# Patient Record
Sex: FEMALE | Race: WHITE | NOT HISPANIC OR LATINO | Employment: FULL TIME | URBAN - METROPOLITAN AREA
[De-identification: names, ages, dates, MRNs, and addresses within clinical notes are randomized per-mention and may not be internally consistent; named-entity substitution may affect disease eponyms.]

---

## 2017-01-09 ENCOUNTER — LAB VISIT (OUTPATIENT)
Dept: LAB | Facility: HOSPITAL | Age: 62
End: 2017-01-09
Attending: FAMILY MEDICINE
Payer: COMMERCIAL

## 2017-01-09 ENCOUNTER — OFFICE VISIT (OUTPATIENT)
Dept: FAMILY MEDICINE | Facility: CLINIC | Age: 62
End: 2017-01-09
Payer: COMMERCIAL

## 2017-01-09 VITALS
BODY MASS INDEX: 29.8 KG/M2 | OXYGEN SATURATION: 95 % | RESPIRATION RATE: 14 BRPM | HEIGHT: 65 IN | SYSTOLIC BLOOD PRESSURE: 126 MMHG | WEIGHT: 178.88 LBS | TEMPERATURE: 98 F | HEART RATE: 87 BPM | DIASTOLIC BLOOD PRESSURE: 80 MMHG

## 2017-01-09 DIAGNOSIS — F43.23 ACUTE ADJUSTMENT DISORDER WITH MIXED ANXIETY AND DEPRESSED MOOD: ICD-10-CM

## 2017-01-09 DIAGNOSIS — I10 ESSENTIAL HYPERTENSION: Primary | ICD-10-CM

## 2017-01-09 DIAGNOSIS — E78.5 HYPERLIPIDEMIA, UNSPECIFIED HYPERLIPIDEMIA TYPE: ICD-10-CM

## 2017-01-09 DIAGNOSIS — I20.1 PRINZMETAL'S ANGINA: ICD-10-CM

## 2017-01-09 DIAGNOSIS — G47.33 OSA ON CPAP: ICD-10-CM

## 2017-01-09 DIAGNOSIS — I10 ESSENTIAL HYPERTENSION: ICD-10-CM

## 2017-01-09 LAB
ALBUMIN SERPL BCP-MCNC: 3.7 G/DL
ALP SERPL-CCNC: 65 U/L
ALT SERPL W/O P-5'-P-CCNC: 15 U/L
ANION GAP SERPL CALC-SCNC: 8 MMOL/L
AST SERPL-CCNC: 20 U/L
BILIRUB SERPL-MCNC: 0.4 MG/DL
BUN SERPL-MCNC: 15 MG/DL
CALCIUM SERPL-MCNC: 9.5 MG/DL
CHLORIDE SERPL-SCNC: 105 MMOL/L
CO2 SERPL-SCNC: 28 MMOL/L
CREAT SERPL-MCNC: 0.8 MG/DL
EST. GFR  (AFRICAN AMERICAN): >60 ML/MIN/1.73 M^2
EST. GFR  (NON AFRICAN AMERICAN): >60 ML/MIN/1.73 M^2
GLUCOSE SERPL-MCNC: 88 MG/DL
POTASSIUM SERPL-SCNC: 4.3 MMOL/L
PROT SERPL-MCNC: 7 G/DL
SODIUM SERPL-SCNC: 141 MMOL/L

## 2017-01-09 PROCEDURE — 80053 COMPREHEN METABOLIC PANEL: CPT

## 2017-01-09 PROCEDURE — 90471 IMMUNIZATION ADMIN: CPT | Mod: S$GLB,,, | Performed by: FAMILY MEDICINE

## 2017-01-09 PROCEDURE — 36415 COLL VENOUS BLD VENIPUNCTURE: CPT | Mod: PO

## 2017-01-09 PROCEDURE — 3074F SYST BP LT 130 MM HG: CPT | Mod: S$GLB,,, | Performed by: FAMILY MEDICINE

## 2017-01-09 PROCEDURE — 99214 OFFICE O/P EST MOD 30 MIN: CPT | Mod: 25,S$GLB,, | Performed by: FAMILY MEDICINE

## 2017-01-09 PROCEDURE — 1159F MED LIST DOCD IN RCRD: CPT | Mod: S$GLB,,, | Performed by: FAMILY MEDICINE

## 2017-01-09 PROCEDURE — 3079F DIAST BP 80-89 MM HG: CPT | Mod: S$GLB,,, | Performed by: FAMILY MEDICINE

## 2017-01-09 PROCEDURE — 90688 IIV4 VACCINE SPLT 0.5 ML IM: CPT | Mod: S$GLB,,, | Performed by: FAMILY MEDICINE

## 2017-01-09 PROCEDURE — 99999 PR PBB SHADOW E&M-EST. PATIENT-LVL III: CPT | Mod: PBBFAC,,, | Performed by: FAMILY MEDICINE

## 2017-01-09 RX ORDER — VALSARTAN 80 MG/1
80 TABLET ORAL DAILY
Qty: 90 TABLET | Refills: 0 | Status: SHIPPED | OUTPATIENT
Start: 2017-01-09 | End: 2017-03-01 | Stop reason: SDUPTHER

## 2017-01-09 RX ORDER — CITALOPRAM 20 MG/1
20 TABLET, FILM COATED ORAL DAILY
Qty: 30 TABLET | Refills: 1 | Status: SHIPPED | OUTPATIENT
Start: 2017-01-09 | End: 2017-03-01

## 2017-01-09 RX ORDER — HYDROCHLOROTHIAZIDE 12.5 MG/1
12.5 TABLET ORAL DAILY
Qty: 90 TABLET | Refills: 1 | Status: SHIPPED | OUTPATIENT
Start: 2017-01-09 | End: 2018-06-01 | Stop reason: SDUPTHER

## 2017-01-09 RX ORDER — EZETIMIBE 10 MG/1
10 TABLET ORAL DAILY
Qty: 90 TABLET | Refills: 1 | Status: SHIPPED | OUTPATIENT
Start: 2017-01-09 | End: 2017-06-26 | Stop reason: SDUPTHER
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
## 2017-01-09 NOTE — MR AVS SNAPSHOT
[facility]  [address]  [facility] [address]  Phone: [phone]  Fax: [phone]                  Ro Hernandez   2017 10:20 AM   Office Visit    Description:  Female : 1955   Provider:  Marcela Chopra MD   Department:  [facility]           Reason for Visit     Chronic Care           Diagnoses this Visit        Comments    Essential hypertension    -  Primary     Prinzmetal's angina         Hyperlipidemia, unspecified hyperlipidemia type         Acute adjustment disorder with mixed anxiety and depressed mood         HERB on CPAP                To Do List           Future Appointments        Provider Department Dept Phone    2017 12:10 PM LABORATORY, [facility]     2017 1:00 PM [facility] XR2 [facility] [phone]    2017 1:30 PM PULMONARY LAB, [facility] Pulmonary Function [facility] [phone]    2017 2:20 PM Elizabeth Lejeune, NP [facility] - Pulmonary Services [phone]      Goals (5 Years of Data)     None       These Medications        Disp Refills Start End    hydrochlorothiazide (HYDRODIURIL) 12.5 MG Tab 90 tablet 1 2017     Take 1 tablet (12.5 mg total) by mouth once daily. - Oral    Pharmacy: [facility] - [address] Ph #: [phone]       valsartan (DIOVAN) 80 MG tablet 90 tablet 0 2017    Take 1 tablet (80 mg total) by mouth once daily. - Oral    Pharmacy: [facility] - [address] Ph #: [phone]       citalopram (CELEXA) 20 MG tablet 30 tablet 1 2017    Take 1 tablet (20 mg total) by mouth once daily. - Oral    Ph[facility]: [facility] [address] [place], [place] -  [facility] Ph #: [phone]       ezetimibe (ZETIA) 10 mg tablet 90 tablet 1 2017    Take 1 tablet (10 mg total) by mouth once daily. - Oral     Pharmacy: [facility] [address] [place] [address] #: [phone]         [facility] On Call     [facility] On Call Nurse Care Line - 24/7 Assistance  Registered nurses in the [facility] provide clinical advisement, health education, appointment booking, and other advisory services.  Call for this free service at [phone].             Medications           Message regarding Medications     Verify the changes and/or additions to your medication regime listed below are the same as discussed with your clinician today.  If any of these changes or additions are incorrect, please notify your healthcare provider.        START taking these NEW medications        Refills    valsartan (DIOVAN) 80 MG tablet 0    Sig: Take 1 tablet (80 mg total) by mouth once daily.    Class: Normal    Route: Oral    citalopram (CELEXA) 20 MG tablet 1    Sig: Take 1 tablet (20 mg total) by mouth once daily.    Class: Normal    Route: Oral    ezetimibe (ZETIA) 10 mg tablet 1    Sig: Take 1 tablet (10 mg total) by mouth once daily.    Class: Normal    Route: Oral      CHANGE how you are taking these medications     Start Taking Instead of    hydrochlorothiazide (HYDRODIURIL) 12.5 MG Tab hydrochlorothiazide (HYDRODIURIL) 12.5 MG Tab    Dosage:  Take 1 tablet (12.5 mg total) by mouth once daily. Dosage:  Take 12.5 mg by mouth once daily.    Reason for Change:  Reorder       STOP taking these medications     EDARBI 80 mg Tab take 1 tablet by mouth once daily           Verify that the below list of medications is an accurate representation of the medications you are currently taking.  If none reported, the list may be blank. If incorrect, please contact your healthcare provider. Carry this list with you in case of emergency.           Current Medications     CALCIUM CARBONATE/VITAMIN D3 (CALCIUM WITH VITAMIN D ORAL) Take 1 tablet by mouth Daily.    diphenhydrAMINE (BENADRYL) 25 mg capsule Take 1 capsule  "by mouth At bedtime.    estrogen, conjugated,-medroxyprogesterone (PREMPRO) 0.3-1.5 mg per tablet Take 1 tablet by mouth Daily.    hydrochlorothiazide (HYDRODIURIL) 12.5 MG Tab Take 1 tablet (12.5 mg total) by mouth once daily.    hydrocodone-acetaminophen 10-325mg (NORCO)  mg Tab Take 1 tablet by mouth every 12 (twelve) hours.    isosorbide mononitrate (IMDUR) 30 MG 24 hr tablet Take 30 mg by mouth once daily.     LIDODERM 5 %(700 mg/patch) apply 1/2 PATCH DAILY AS DIRECTED FOR 2 WEEKS    MULTIVITAMIN (MULTIPLE VITAMIN ORAL) Take 1 tablet by mouth Daily.    naproxen (NAPROSYN) 500 MG tablet Take 1 tablet (500 mg total) by mouth 2 (two) times daily with meals.    nitroGLYCERIN (NITROSTAT) 0.4 MG SL tablet Place under the tongue Use as needed.    omeprazole (PRILOSEC) 20 MG capsule Take 20 mg by mouth Daily.    verapamil (CALAN-SR) 240 MG CR tablet Take 1 tablet (240 mg total) by mouth once daily.    citalopram (CELEXA) 20 MG tablet Take 1 tablet (20 mg total) by mouth once daily.    ezetimibe (ZETIA) 10 mg tablet Take 1 tablet (10 mg total) by mouth once daily.    valsartan (DIOVAN) 80 MG tablet Take 1 tablet (80 mg total) by mouth once daily.           Clinical Reference Information           Vital Signs - Last Recorded  Most recent update: 1/9/2017 10:43 AM by Mirella Ballesteros MA    BP Pulse Temp Resp Ht Wt    126/80 87 97.7 °F (36.5 °C) (Temporal) 14 5' 5" (1.651 m) 81.1 kg (178 lb 14.5 oz)    SpO2 BMI             95% 29.77 kg/m2         Blood Pressure          Most Recent Value    BP  126/80      Allergies as of 1/9/2017     Ace Inhibitors    Lisinopril    Pravastatin    Rosuvastatin    Simvastatin      Immunizations Administered on Date of Encounter - 1/9/2017     None      Orders Placed During Today's Visit     Future Labs/Procedures Expected by Expires    Comprehensive metabolic panel  1/9/2017 3/10/2018      "

## 2017-01-25 ENCOUNTER — TELEPHONE (OUTPATIENT)
Dept: SMOKING CESSATION | Facility: CLINIC | Age: 62
End: 2017-01-25

## 2017-01-26 ENCOUNTER — TELEPHONE (OUTPATIENT)
Dept: SMOKING CESSATION | Facility: CLINIC | Age: 62
End: 2017-01-26

## 2017-01-30 ENCOUNTER — TELEPHONE (OUTPATIENT)
Dept: SMOKING CESSATION | Facility: CLINIC | Age: 62
End: 2017-01-30

## 2017-03-01 ENCOUNTER — OFFICE VISIT (OUTPATIENT)
Dept: FAMILY MEDICINE | Facility: CLINIC | Age: 62
End: 2017-03-01
Payer: COMMERCIAL

## 2017-03-01 VITALS
TEMPERATURE: 98 F | DIASTOLIC BLOOD PRESSURE: 70 MMHG | BODY MASS INDEX: 31.24 KG/M2 | RESPIRATION RATE: 14 BRPM | HEIGHT: 65 IN | SYSTOLIC BLOOD PRESSURE: 134 MMHG | HEART RATE: 75 BPM | OXYGEN SATURATION: 93 % | WEIGHT: 187.5 LBS

## 2017-03-01 DIAGNOSIS — M19.90 GENERALIZED ARTHRITIS: ICD-10-CM

## 2017-03-01 DIAGNOSIS — I10 ESSENTIAL HYPERTENSION: Primary | ICD-10-CM

## 2017-03-01 DIAGNOSIS — R06.89 YAWNING: ICD-10-CM

## 2017-03-01 DIAGNOSIS — R60.0 EDEMA EXTREMITIES: ICD-10-CM

## 2017-03-01 DIAGNOSIS — E66.9 OBESITY (BMI 30.0-34.9): ICD-10-CM

## 2017-03-01 DIAGNOSIS — F43.23 ACUTE ADJUSTMENT DISORDER WITH MIXED ANXIETY AND DEPRESSED MOOD: ICD-10-CM

## 2017-03-01 DIAGNOSIS — J44.1 COPD WITH ACUTE EXACERBATION: ICD-10-CM

## 2017-03-01 DIAGNOSIS — R63.5 ABNORMAL WEIGHT GAIN: ICD-10-CM

## 2017-03-01 PROCEDURE — 1160F RVW MEDS BY RX/DR IN RCRD: CPT | Mod: S$GLB,,, | Performed by: FAMILY MEDICINE

## 2017-03-01 PROCEDURE — 3078F DIAST BP <80 MM HG: CPT | Mod: S$GLB,,, | Performed by: FAMILY MEDICINE

## 2017-03-01 PROCEDURE — 99214 OFFICE O/P EST MOD 30 MIN: CPT | Mod: S$GLB,,, | Performed by: FAMILY MEDICINE

## 2017-03-01 PROCEDURE — 3075F SYST BP GE 130 - 139MM HG: CPT | Mod: S$GLB,,, | Performed by: FAMILY MEDICINE

## 2017-03-01 PROCEDURE — 99999 PR PBB SHADOW E&M-EST. PATIENT-LVL III: CPT | Mod: PBBFAC,,, | Performed by: FAMILY MEDICINE

## 2017-03-01 RX ORDER — METHYLPREDNISOLONE 4 MG/1
TABLET ORAL
Qty: 1 PACKAGE | Refills: 0 | Status: SHIPPED | OUTPATIENT
Start: 2017-03-01 | End: 2017-03-20 | Stop reason: ALTCHOICE

## 2017-03-01 RX ORDER — VALSARTAN 80 MG/1
80 TABLET ORAL DAILY
Qty: 90 TABLET | Refills: 0 | Status: SHIPPED | OUTPATIENT
Start: 2017-03-01 | End: 2017-04-18 | Stop reason: SDUPTHER

## 2017-03-01 NOTE — PATIENT INSTRUCTIONS
Take Celexa every other day this week, then every 3rd day next week    Take your HCTZ Monday- Friday and if your swelling persists then take it daily.

## 2017-03-01 NOTE — MR AVS SNAPSHOT
[facility] Medicine  [address]  [facility] [address]  Phone: [phone]  Fax: [phone]                  Ro Hernandez   3/1/2017 9:00 AM   Office Visit    Description:  Female : 1955   Provider:  Marcela Chopra MD   Department:  [facility]           Reason for Visit     Follow-up           Diagnoses this Visit        Comments    Essential hypertension    -  Primary     COPD with acute exacerbation         Edema extremities         Acute adjustment disorder with mixed anxiety and depressed mood         Generalized arthritis         Yawning         Abnormal weight gain         Obesity (BMI 30.0-34.9)                To Do List           Future Appointments        Provider Department Dept Phone    2017 1:00 PM [facility] XR2 [facility] [phone]    2017 1:30 PM PULMONARY LAB, [facility]- Pulmonary Function Svcs [phone]    2017 2:20 PM Elizabeth Lejeune, NP [facility] - Pulmonary Services [phone]    2017 7:00 AM Marcela Chopra MD [facility] [phone]      Goals (5 Years of Data)     None       These Medications        Disp Refills Start End    methylPREDNISolone (MEDROL DOSEPACK) 4 mg tablet 1 Package 0 3/1/2017     use as directed    [facility]: [facility] [place] [address] Ph #: [phone]       valsartan (DIOVAN) 80 MG tablet 90 tablet 0 3/1/2017 3/1/2018    Take 1 tablet (80 mg total) by mouth once daily. - Oral    Pharmacy: [facility] - [facility] [place] [address] Ph #: [phone]       Notes to Pharmacy: Please keep on file      [facility] On Call     [facility] On [facility] Nurse Care Line -  Assistance  Registered nurses in the [facility] provide clinical advisement, health education, appointment booking, and other advisory services.  Call for this free service at [phone].              Medications           Message regarding Medications     Verify the changes and/or additions to your medication regime listed below are the same as discussed with your clinician today.  If any of these changes or additions are incorrect, please notify your healthcare provider.        START taking these NEW medications        Refills    methylPREDNISolone (MEDROL DOSEPACK) 4 mg tablet 0    Sig: use as directed    Class: Normal      STOP taking these medications     citalopram (CELEXA) 20 MG tablet Take 1 tablet (20 mg total) by mouth once daily.           Verify that the below list of medications is an accurate representation of the medications you are currently taking.  If none reported, the list may be blank. If incorrect, please contact your healthcare provider. Carry this list with you in case of emergency.           Current Medications     CALCIUM CARBONATE/VITAMIN D3 (CALCIUM WITH VITAMIN D ORAL) Take 1 tablet by mouth Daily.    diphenhydrAMINE (BENADRYL) 25 mg capsule Take 1 capsule by mouth At bedtime.    estrogen, conjugated,-medroxyprogesterone (PREMPRO) 0.3-1.5 mg per tablet Take 1 tablet by mouth Daily.    ezetimibe (ZETIA) 10 mg tablet Take 1 tablet (10 mg total) by mouth once daily.    hydrochlorothiazide (HYDRODIURIL) 12.5 MG Tab Take 1 tablet (12.5 mg total) by mouth once daily.    hydrocodone-acetaminophen 10-325mg (NORCO)  mg Tab Take 1 tablet by mouth every 12 (twelve) hours.    isosorbide mononitrate (IMDUR) 30 MG 24 hr tablet Take 30 mg by mouth once daily.     LIDODERM 5 %(700 mg/patch) apply 1/2 PATCH DAILY AS DIRECTED FOR 2 WEEKS    MULTIVITAMIN (MULTIPLE VITAMIN ORAL) Take 1 tablet by mouth Daily.    naproxen (NAPROSYN) 500 MG tablet Take 1 tablet (500 mg total) by mouth 2 (two) times daily with meals.    nitroGLYCERIN (NITROSTAT) 0.4 MG SL tablet Place under the tongue Use as needed.    omeprazole (PRILOSEC) 20 MG capsule Take 20 mg by mouth Daily.    valsartan (DIOVAN) 80 MG  tablet Take 1 tablet (80 mg total) by mouth once daily.    verapamil (CALAN-SR) 240 MG CR tablet Take 1 tablet (240 mg total) by mouth once daily.    methylPREDNISolone (MEDROL DOSEPACK) 4 mg tablet use as directed           Clinical Reference Information           Your Vitals Were     BP                   134/70           Blood Pressure          Most Recent Value    BP  134/70      Allergies as of 3/1/2017     Ace Inhibitors    Lisinopril    Pravastatin    Rosuvastatin    Simvastatin      Immunizations Administered on Date of Encounter - 3/1/2017     None      Instructions    Take Celexa every other day this week, then every 3rd day next week    Take your HCTZ Monday- Friday and if your swelling persists then take it daily.       Language Assistance Services     ATTENTION: Language assistance services are available, free of charge. Please call [phone].      ATENCIÓN: Si neal delaney, tiene a patino disposición servicios gratuitos de asistencia lingüística. Llame al [phone].     [facility] Ý: N?u b?n nói Ti?ng Vi?t, có các d?ch v? h? tr? ngôn ng? mi?n phí dành cho b?n. G?i s? [phone].         [facility] complies with applicable Federal civil rights laws and does not discriminate on the basis of race, color, national origin, age, disability, or sex.

## 2017-03-01 NOTE — PROGRESS NOTES
"Subjective:       Patient ID: Ro Hernandez is a 61 y.o. female.    Chief Complaint: Follow-up    HPI   Follow-up  60yo female presents today for follow-up. At the time of her last check Celexa was initiated for mood symptoms. She is reporting multiple side effects with use including yawning and overeating. She has gained 9 pounds since her last visit. People at work have been commenting on her yawning as well. She describes binge eating. She continues to report stress but feels her emotions have been blunted. She has been experiencing diffuse body aches and states "everything hurts". Her blood pressure regimen was adjusted due to a formulary change and she is now taking Diovan and HCTZ. She takes the Diovan routinely but the HCTZ only Monday, Wednesday and Friday. Edema has increased. She notes increased cough in the mornings of late but attributes this to dry wall dust in her home. She has not been using her inhalers more frequently. No shortness of breath is reported. She has been afebrile. No known sick contacts.    Review of Systems   Constitutional: Positive for appetite change and unexpected weight change. Negative for fatigue.   Eyes: Negative for visual disturbance.   Respiratory: Negative for cough and shortness of breath.    Cardiovascular: Negative for chest pain, palpitations and leg swelling.   Gastrointestinal: Negative for abdominal pain and nausea.   Genitourinary: Negative for decreased urine volume and difficulty urinating.   Musculoskeletal: Positive for arthralgias and myalgias.   Skin: Negative for rash.   Neurological: Negative for dizziness and headaches.   Psychiatric/Behavioral: Positive for dysphoric mood and sleep disturbance (taking Ibuprofen PM and Benadryl). Negative for suicidal ideas. The patient is nervous/anxious.        Objective:   /70  Pulse 75  Temp 97.6 °F (36.4 °C) (Temporal)   Resp 14  Ht 5' 5" (1.651 m)  Wt 85.1 kg (187 lb 8 oz)  SpO2 (!) 93%  BMI 31.2 " kg/m2  Physical Exam   Constitutional: She is oriented to person, place, and time. She appears well-developed. No distress.   Obese, non-toxic   HENT:   Head: Normocephalic and atraumatic.   Right Ear: Tympanic membrane, external ear and ear canal normal.   Left Ear: Tympanic membrane, external ear and ear canal normal.   Nose: Mucosal edema and rhinorrhea present.   Mouth/Throat: Oropharynx is clear and moist.   Eyes: Conjunctivae and EOM are normal. Pupils are equal, round, and reactive to light.   Neck: Normal range of motion. Neck supple.   Cardiovascular: Normal rate, regular rhythm and normal heart sounds.    Pulmonary/Chest: Effort normal. No respiratory distress. She has wheezes.   Musculoskeletal: She exhibits edema (trace bilateral LE edema).   Lymphadenopathy:     She has no cervical adenopathy.   Neurological: She is alert and oriented to person, place, and time.   Skin: Skin is warm and dry. No rash noted. She is not diaphoretic.   Psychiatric: Her behavior is normal.       Assessment:       1. Essential hypertension    2. COPD with acute exacerbation    3. Edema extremities    4. Acute adjustment disorder with mixed anxiety and depressed mood    5. Generalized arthritis    6. Yawning    7. Abnormal weight gain    8. Obesity (BMI 30.0-34.9)        Plan:   Essential hypertension  Stable. Will continue with current treatment. Have advised intermittent monitoring with target goal <140/90.  -     valsartan (DIOVAN) 80 MG tablet; Take 1 tablet (80 mg total) by mouth once daily.  Dispense: 90 tablet; Refill: 0    COPD with acute exacerbation  Have advised initiation of Medrol christiana with use of Mucinex. She will monitor for any acute changes. Albuterol use every 4 hours while awake. RTC for assessment with any worsening.  -     methylPREDNISolone (MEDROL DOSEPACK) 4 mg tablet; use as directed  Dispense: 1 Package; Refill: 0    Edema extremities  Increased since last check. Discussed appropriate use of HCTZ.  Recommend elevating the extremities while at rest.    Acute adjustment disorder with mixed anxiety and depressed mood  Discussed treatment options and reviewed coping mechanisms. She has declined alternatives. Will taper off Celexa- instructions provided.    Generalized arthritis  Consider Tylenol arthritis. Continue with current measures otherwise as per pain management.    Yawning  Likely secondary to Celexa. Anticipate return to baseline with discontinuation.    Abnormal weight gain  Have advised monitoring dietary intake. Avoidance of binge eating is recommended. Will monitor.    Obesity (BMI 30.0-34.9)  Weight loss efforts remain encouraged through diet and lifestyle measures.    RTC for well check in June 2017.  MMG in May at [facility]

## 2017-03-20 ENCOUNTER — HOSPITAL ENCOUNTER (OUTPATIENT)
Dept: RADIOLOGY | Facility: HOSPITAL | Age: 62
Discharge: HOME | End: 2017-03-20
Attending: FAMILY MEDICINE
Payer: COMMERCIAL

## 2017-03-20 ENCOUNTER — OFFICE VISIT (OUTPATIENT)
Dept: FAMILY MEDICINE | Facility: CLINIC | Age: 62
End: 2017-03-20
Payer: COMMERCIAL

## 2017-03-20 VITALS
SYSTOLIC BLOOD PRESSURE: 149 MMHG | DIASTOLIC BLOOD PRESSURE: 78 MMHG | HEART RATE: 83 BPM | TEMPERATURE: 97 F | HEIGHT: 65 IN | OXYGEN SATURATION: 97 % | BODY MASS INDEX: 31.99 KG/M2 | WEIGHT: 192 LBS

## 2017-03-20 DIAGNOSIS — J02.9 SORE THROAT: ICD-10-CM

## 2017-03-20 DIAGNOSIS — R05.9 COUGH: ICD-10-CM

## 2017-03-20 DIAGNOSIS — J44.89 ASTHMA WITH COPD: Primary | ICD-10-CM

## 2017-03-20 DIAGNOSIS — Z20.828 EXPOSURE TO THE FLU: ICD-10-CM

## 2017-03-20 PROCEDURE — 71020 XR CHEST PA AND LATERAL: CPT | Mod: TC,PO

## 2017-03-20 PROCEDURE — 3078F DIAST BP <80 MM HG: CPT | Mod: S$GLB,,, | Performed by: FAMILY MEDICINE

## 2017-03-20 PROCEDURE — 71020 XR CHEST PA AND LATERAL: CPT | Mod: 26,,, | Performed by: RADIOLOGY

## 2017-03-20 PROCEDURE — 3077F SYST BP >= 140 MM HG: CPT | Mod: S$GLB,,, | Performed by: FAMILY MEDICINE

## 2017-03-20 PROCEDURE — 99999 PR PBB SHADOW E&M-EST. PATIENT-LVL IV: CPT | Mod: PBBFAC,,, | Performed by: FAMILY MEDICINE

## 2017-03-20 PROCEDURE — 87081 CULTURE SCREEN ONLY: CPT

## 2017-03-20 PROCEDURE — 1160F RVW MEDS BY RX/DR IN RCRD: CPT | Mod: S$GLB,,, | Performed by: FAMILY MEDICINE

## 2017-03-20 PROCEDURE — 99214 OFFICE O/P EST MOD 30 MIN: CPT | Mod: S$GLB,,, | Performed by: FAMILY MEDICINE

## 2017-03-20 RX ORDER — PREDNISONE 20 MG/1
TABLET ORAL
Qty: 10 TABLET | Refills: 0 | Status: SHIPPED | OUTPATIENT
Start: 2017-03-20 | End: 2017-06-14 | Stop reason: ALTCHOICE

## 2017-03-20 RX ORDER — OSELTAMIVIR PHOSPHATE 75 MG/1
75 CAPSULE ORAL DAILY
Qty: 10 CAPSULE | Refills: 0 | Status: SHIPPED | OUTPATIENT
Start: 2017-03-20 | End: 2017-03-25

## 2017-03-20 NOTE — PROGRESS NOTES
"Subjective:       Patient ID: Ro Hernandez is a 61 y.o. female.    Chief Complaint: Sore Throat    Sore Throat    This is a new problem. The current episode started in the past 7 days. The problem has been unchanged. The maximum temperature recorded prior to her arrival was 100.4 - 100.9 F. The pain is moderate. Associated symptoms include coughing, swollen glands and trouble swallowing. Pertinent negatives include no abdominal pain, congestion, diarrhea, ear pain, headaches, hoarse voice, shortness of breath or vomiting. Treatments tried: Chloraseptic spray. The treatment provided mild relief.   Patient notes that her  was diagnosed with Influenza B yesterday at a local . She reports having similar symptoms. She had seasonal flu vaccination this year. Patient was seen earlier this month with symptoms of COPD exacerbation. She completed a Medrol christiana but notes that she has continued with some residual wheezing. No shortness of breath is reported. She has not used albuterol today.     Review of Systems   Constitutional: Positive for fatigue. Negative for appetite change, chills and fever.   HENT: Positive for sore throat and trouble swallowing. Negative for congestion, ear pain and hoarse voice.    Respiratory: Positive for cough and wheezing. Negative for chest tightness and shortness of breath.    Cardiovascular: Negative for chest pain and palpitations.   Gastrointestinal: Negative for abdominal pain, diarrhea, nausea and vomiting.   Genitourinary: Negative for decreased urine volume and difficulty urinating.   Musculoskeletal: Positive for arthralgias and myalgias.   Skin: Negative for rash.   Neurological: Negative for dizziness, weakness and headaches.   Psychiatric/Behavioral: Positive for sleep disturbance.       Objective:   BP (!) 149/78  Pulse 83  Temp 96.9 °F (36.1 °C) (Oral)   Ht 5' 5" (1.651 m)  Wt 87.1 kg (192 lb 0.3 oz)  SpO2 97%  BMI 31.95 kg/m2  Physical Exam   Constitutional: She " appears well-developed. No distress.   Obese, mildly ill appearing, non-toxic   HENT:   Head: Normocephalic and atraumatic.   Right Ear: Tympanic membrane, external ear and ear canal normal.   Left Ear: Tympanic membrane, external ear and ear canal normal.   Nose: Mucosal edema and rhinorrhea present.   Mouth/Throat: Posterior oropharyngeal erythema present. No oropharyngeal exudate. No tonsillar exudate.   Eyes: Conjunctivae and EOM are normal. Pupils are equal, round, and reactive to light.   Neck: Normal range of motion. Neck supple.   Cardiovascular: Normal rate, regular rhythm and normal heart sounds.    Pulmonary/Chest: Effort normal. No respiratory distress. She has wheezes.   Abdominal: Soft. Bowel sounds are normal.   Musculoskeletal: She exhibits no edema.   Lymphadenopathy:     She has no cervical adenopathy.        Right: No supraclavicular adenopathy present.        Left: No supraclavicular adenopathy present.   Skin: Skin is warm and dry. No rash noted. She is not diaphoretic.   Psychiatric: She has a normal mood and affect.       Assessment:       1. Asthma with COPD    2. Sore throat    3. Cough    4. Exposure to the flu        Plan:   Asthma with COPD  Recommend use of Albuterol every 4-6 hours while awake- she will proceed home now and utilize. Prednisone taper given persistent wheezing. No PNA on CXR.  -     predniSONE (DELTASONE) 20 MG tablet; Take 2 tabs po daily for 3 days then take 1 tab po daily for 4 days  Dispense: 10 tablet; Refill: 0    Sore throat  Negative RST. Throat culture is pending. Magic Mouthwash prn. Prednisone as above.  -     POCT RAPID STREP A  -     Strep A culture, throat  -     (Magic mouthwash) 1:1:1 Benadryl 12.5mg/5ml liq, aluminum & magnesium hydroxide-simehticone (Maalox), lidocaine viscous 2%; Swish and spit 5 mLs every 4 (four) hours as needed. for mouth sores  Dispense: 90 mL; Refill: 0  -     predniSONE (DELTASONE) 20 MG tablet; Take 2 tabs po daily for 3 days  then take 1 tab po daily for 4 days  Dispense: 10 tablet; Refill: 0    Cough  Consider Mucinex in addition to the above.  -     X-Ray Chest PA And Lateral; Future; Expected date: 3/20/17    Exposure to the flu  Negative rapid flu. Will provide Tamiflu given current symptoms and household exposure.  -     POCT INFLUENZA A/B  -     oseltamivir (TAMIFLU) 75 MG capsule; Take 1 capsule (75 mg total) by mouth once daily.  Dispense: 10 capsule; Refill: 0

## 2017-03-20 NOTE — MR AVS SNAPSHOT
[facility]  [address] [address]  Phone: [phone]  Fax: [phone]                  Ro Hernandez   3/20/2017 9:20 AM   Office Visit    Description:  Female : 1955   Provider:  Marcela Chopra MD   Department:  [facility]           Reason for Visit     Sore Throat           Diagnoses this Visit        Comments    Cough    -  Primary     Sore throat         Exposure to the flu                To Do List           Future Appointments        Provider Department Dept Phone    3/20/2017 10:15 AM [facility] XR1 [facility] [phone]    2017 1:00 PM [facility] XR2 [facility] [phone]    2017 1:30 PM PULMONARY LAB, [facility]- Pulmonary Function [facility] [phone]    2017 2:20 PM Elizabeth Lejeune, NP [facility] - Pulmonary Services [phone]    2017 7:00 AM Marcela Chopra MD [facility] [phone]      Goals (5 Years of Data)     None       These Medications        Disp Refills Start End    (Magic mouthwash) 1:1:1 Benadryl 12.5mg/5ml liq, aluminum & magnesium hydroxide-simehticone (Maalox), lidocaine viscous 2% 90 mL 0 3/20/2017     Swish and spit 5 mLs every 4 (four) hours as needed. for mouth sores - Swish & Spit    Ph[facility]: [facility] [facility] [address] [facility] Ph #: [phone]       oseltamivir (TAMIFLU) 75 MG capsule 10 capsule 0 3/20/2017 3/25/2017    Take 1 capsule (75 mg total) by mouth once daily. - Oral    [facility], [place] [facility] [address] [facility] Ph #: [phone]         [facility]     [facility] -  Assistance  Registered nurses in the [facility] provide clinical advisement, health education, appointment booking, and other advisory services.  Call for this free service at [phone].             Medications            Message regarding Medications     Verify the changes and/or additions to your medication regime listed below are the same as discussed with your clinician today.  If any of these changes or additions are incorrect, please notify your healthcare provider.        START taking these NEW medications        Refills    (Magic mouthwash) 1:1:1 Benadryl 12.5mg/5ml liq, aluminum & magnesium hydroxide-simehticone (Maalox), lidocaine viscous 2% 0    Sig: Swish and spit 5 mLs every 4 (four) hours as needed. for mouth sores    Class: Print    Route: Swish & Spit    oseltamivir (TAMIFLU) 75 MG capsule 0    Sig: Take 1 capsule (75 mg total) by mouth once daily.    Class: Normal    Route: Oral           Verify that the below list of medications is an accurate representation of the medications you are currently taking.  If none reported, the list may be blank. If incorrect, please contact your healthcare provider. Carry this list with you in case of emergency.           Current Medications     CALCIUM CARBONATE/VITAMIN D3 (CALCIUM WITH VITAMIN D ORAL) Take 1 tablet by mouth Daily.    diphenhydrAMINE (BENADRYL) 25 mg capsule Take 1 capsule by mouth At bedtime.    estrogen, conjugated,-medroxyprogesterone (PREMPRO) 0.3-1.5 mg per tablet Take 1 tablet by mouth Daily.    ezetimibe (ZETIA) 10 mg tablet Take 1 tablet (10 mg total) by mouth once daily.    hydrochlorothiazide (HYDRODIURIL) 12.5 MG Tab Take 1 tablet (12.5 mg total) by mouth once daily.    hydrocodone-acetaminophen 10-325mg (NORCO)  mg Tab Take 1 tablet by mouth every 12 (twelve) hours.    isosorbide mononitrate (IMDUR) 30 MG 24 hr tablet Take 30 mg by mouth once daily.     LIDODERM 5 %(700 mg/patch) apply 1/2 PATCH DAILY AS DIRECTED FOR 2 WEEKS    methylPREDNISolone (MEDROL DOSEPACK) 4 mg tablet use as directed    MULTIVITAMIN (MULTIPLE VITAMIN ORAL) Take 1 tablet by mouth Daily.    naproxen (NAPROSYN) 500 MG tablet Take 1 tablet (500 mg total) by mouth 2 (two) times  "daily with meals.    nitroGLYCERIN (NITROSTAT) 0.4 MG SL tablet Place under the tongue Use as needed.    omeprazole (PRILOSEC) 20 MG capsule Take 20 mg by mouth Daily.    valsartan (DIOVAN) 80 MG tablet Take 1 tablet (80 mg total) by mouth once daily.    verapamil (CALAN-SR) 240 MG CR tablet Take 1 tablet (240 mg total) by mouth once daily.    (Magic mouthwash) 1:1:1 Benadryl 12.5mg/5ml liq, aluminum & magnesium hydroxide-simehticone (Maalox), lidocaine viscous 2% Swish and spit 5 mLs every 4 (four) hours as needed. for mouth sores    oseltamivir (TAMIFLU) 75 MG capsule Take 1 capsule (75 mg total) by mouth once daily.           Clinical Reference Information           Your Vitals Were     BP Pulse Temp Height Weight SpO2    149/78 83 96.9 °F (36.1 °C) (Oral) 5' 5" (1.651 m) 87.1 kg (192 lb 0.3 oz) 97%    BMI                31.95 kg/m2          Blood Pressure          Most Recent Value    BP  (!)  149/78      Allergies as of 3/20/2017     Ace Inhibitors    Lisinopril    Pravastatin    Rosuvastatin    Simvastatin      Immunizations Administered on Date of Encounter - 3/20/2017     None      Orders Placed During Today's Visit      Normal Orders This Visit    POCT INFLUENZA A/B     POCT RAPID STREP A     Strep A culture, throat     Future Labs/Procedures Expected by Expires    X-Ray Chest PA And Lateral  3/20/2017 3/20/2018      Language Assistance Services     ATTENTION: Language assistance services are available, free of charge. Please call [phone].      ATENCIÓN: Si habla rhett, tiene a patino disposición servicios gratuitos de asistencia lingüística. Llame al [phone].     FER Ý: N?u b?n nói Ti?ng Vi?t, có các d?ch v? h? tr? ngôn ng? mi?n phí dành cho b?n. G?i s? [phone].         [facility] complies with applicable Federal civil rights laws and does not discriminate on the basis of race, color, national origin, age, disability, or sex.        "

## 2017-03-23 LAB — BACTERIA THROAT CULT: NORMAL

## 2017-04-18 DIAGNOSIS — I10 ESSENTIAL HYPERTENSION: ICD-10-CM

## 2017-04-18 RX ORDER — VALSARTAN 80 MG/1
TABLET ORAL
Qty: 90 TABLET | Refills: 0 | Status: SHIPPED | OUTPATIENT
Start: 2017-04-18 | End: 2017-07-09 | Stop reason: SDUPTHER

## 2017-05-16 ENCOUNTER — PROCEDURE VISIT (OUTPATIENT)
Dept: PULMONOLOGY | Facility: CLINIC | Age: 62
End: 2017-05-16
Payer: COMMERCIAL

## 2017-05-16 ENCOUNTER — HOSPITAL ENCOUNTER (OUTPATIENT)
Dept: RADIOLOGY | Facility: HOSPITAL | Age: 62
Discharge: HOME | End: 2017-05-16
Attending: NURSE PRACTITIONER
Payer: COMMERCIAL

## 2017-05-16 ENCOUNTER — OFFICE VISIT (OUTPATIENT)
Dept: SLEEP MEDICINE | Facility: CLINIC | Age: 62
End: 2017-05-16
Payer: COMMERCIAL

## 2017-05-16 VITALS
RESPIRATION RATE: 18 BRPM | BODY MASS INDEX: 33.49 KG/M2 | DIASTOLIC BLOOD PRESSURE: 72 MMHG | OXYGEN SATURATION: 95 % | WEIGHT: 201 LBS | HEART RATE: 85 BPM | HEIGHT: 65 IN | SYSTOLIC BLOOD PRESSURE: 152 MMHG

## 2017-05-16 DIAGNOSIS — G47.33 OSA (OBSTRUCTIVE SLEEP APNEA): Primary | ICD-10-CM

## 2017-05-16 DIAGNOSIS — J44.9 CHRONIC OBSTRUCTIVE PULMONARY DISEASE, UNSPECIFIED COPD TYPE: ICD-10-CM

## 2017-05-16 LAB
POST FEF 25 75: 1.27 L/S (ref 1.64–2.91)
POST FET 100: 10.05 S
POST FEV1 FVC: 73 %
POST FEV1: 1.85 L (ref 2.26–2.87)
POST FIF 50: 3 L/S
POST FVC: 2.55 L (ref 2.98–3.7)
POST PEF: 4.66 L/S (ref 5.39–7.15)
PRE FEF 25 75: 1.24 L/S (ref 1.64–2.91)
PRE FET 100: 10.28 S
PRE FEV1 FVC: 70 %
PRE FEV1: 1.79 L (ref 2.26–2.87)
PRE FIF 50: 1.94 L/S
PRE FVC: 2.55 L (ref 2.98–3.7)
PRE PEF: 2.95 L/S (ref 5.39–7.15)
PREDICTED FEV1 FVC: 77.42 % (ref 72.52–82.32)
PREDICTED FEV1: 2.57 L (ref 2.26–2.87)
PREDICTED FVC: 3.34 L (ref 2.98–3.7)
PROVOCATION PROTOCOL: ABNORMAL

## 2017-05-16 PROCEDURE — 1160F RVW MEDS BY RX/DR IN RCRD: CPT | Mod: S$GLB,,, | Performed by: NURSE PRACTITIONER

## 2017-05-16 PROCEDURE — 99999 PR PBB SHADOW E&M-EST. PATIENT-LVL IV: CPT | Mod: PBBFAC,,, | Performed by: NURSE PRACTITIONER

## 2017-05-16 PROCEDURE — 71020 XR CHEST PA AND LATERAL: CPT | Mod: TC,PO

## 2017-05-16 PROCEDURE — 3077F SYST BP >= 140 MM HG: CPT | Mod: S$GLB,,, | Performed by: NURSE PRACTITIONER

## 2017-05-16 PROCEDURE — 99214 OFFICE O/P EST MOD 30 MIN: CPT | Mod: 25,S$GLB,, | Performed by: NURSE PRACTITIONER

## 2017-05-16 PROCEDURE — 3078F DIAST BP <80 MM HG: CPT | Mod: S$GLB,,, | Performed by: NURSE PRACTITIONER

## 2017-05-16 PROCEDURE — 71020 XR CHEST PA AND LATERAL: CPT | Mod: 26,,, | Performed by: RADIOLOGY

## 2017-05-16 PROCEDURE — 94060 EVALUATION OF WHEEZING: CPT | Mod: S$GLB,,, | Performed by: INTERNAL MEDICINE

## 2017-05-16 NOTE — PROGRESS NOTES
"Subjective:      Patient ID: Ro Hernandez is a 61 y.o. female.    Chief Complaint: COPD    HPI Comments: Patient presents to the office today for evaluation of COPD and sleep apnea on CPAP.  Patient stopped smoking June 11, 2016.  She has declined bronchodilators.  She feels more shortness of breath when she takes these inhalers.  She is doing well at this time.  No fever, chills, hemoptysis.  Patient is compliant with CPAP.  Unable to download data from her machine.  She is due for replacement as hers is at end-of-life.  Sleep study 2011 reviewed.  Stable lung nodules dating back 2006.    COPD         BP (!) 152/72  Pulse 85  Resp 18  Ht 5' 5" (1.651 m)  Wt 91.2 kg (201 lb)  SpO2 95%  BMI 33.45 kg/m2  Body mass index is 33.45 kg/(m^2).    Review of Systems   Constitutional: Negative.    HENT: Negative.    Respiratory: Negative.    Cardiovascular: Negative.    Musculoskeletal: Negative.    Gastrointestinal: Negative.    Neurological: Negative.    Psychiatric/Behavioral: Negative.      Objective:      Physical Exam   Constitutional: She is oriented to person, place, and time. She appears well-developed and well-nourished.   HENT:   Head: Normocephalic and atraumatic.   Mouth/Throat: Oropharynx is clear and moist.   Neck: Normal range of motion. Neck supple.   Cardiovascular: Normal rate and regular rhythm.  Exam reveals no gallop.    No murmur heard.  Pulmonary/Chest: Effort normal and breath sounds normal.   Abdominal: Soft. She exhibits no mass.   Musculoskeletal: Normal range of motion. She exhibits no edema.   Neurological: She is alert and oriented to person, place, and time.   Skin: Skin is warm and dry.   Psychiatric: She has a normal mood and affect.     Personal Diagnostic Review  Spirometry reviewed with an FEV1 of 72% of predicted    Results for orders placed during the hospital encounter of 05/16/17   X-Ray Chest PA And Lateral    Narrative 2 view chest    Comparison: 03/20/2017    Findings: The " lungs are clear and free of infiltrate.  No pleural effusion or pneumothorax is identified.  The heart is not enlarged.Chronic increased peribronchial markings noted throughout the chest.  There is a calcified granuloma noted within the left upper lung zone.    Impression 1.  No acute cardiopulmonary process.      Electronically signed by: COURTNEY TELLES D.O.  Date:     05/16/17  Time:    13:26          Assessment:       1. HERB (obstructive sleep apnea)    2. Chronic obstructive pulmonary disease, unspecified COPD type        Outpatient Encounter Prescriptions as of 5/16/2017   Medication Sig Dispense Refill    (Magic mouthwash) 1:1:1 Benadryl 12.5mg/5ml liq, aluminum & magnesium hydroxide-simehticone (Maalox), lidocaine viscous 2% Swish and spit 5 mLs every 4 (four) hours as needed. for mouth sores 90 mL 0    CALCIUM CARBONATE/VITAMIN D3 (CALCIUM WITH VITAMIN D ORAL) Take 1 tablet by mouth Daily.      diphenhydrAMINE (BENADRYL) 25 mg capsule Take 1 capsule by mouth At bedtime.      estrogen, conjugated,-medroxyprogesterone (PREMPRO) 0.3-1.5 mg per tablet Take 1 tablet by mouth Daily.      ezetimibe (ZETIA) 10 mg tablet Take 1 tablet (10 mg total) by mouth once daily. 90 tablet 1    hydrochlorothiazide (HYDRODIURIL) 12.5 MG Tab Take 1 tablet (12.5 mg total) by mouth once daily. 90 tablet 1    hydrocodone-acetaminophen 10-325mg (NORCO)  mg Tab Take 1 tablet by mouth every 12 (twelve) hours.  0    isosorbide mononitrate (IMDUR) 30 MG 24 hr tablet Take 30 mg by mouth once daily.   0    LIDODERM 5 %(700 mg/patch) apply 1/2 PATCH DAILY AS DIRECTED FOR 2 WEEKS 10 each 0    MULTIVITAMIN (MULTIPLE VITAMIN ORAL) Take 1 tablet by mouth Daily.      naproxen (NAPROSYN) 500 MG tablet Take 1 tablet (500 mg total) by mouth 2 (two) times daily with meals. (Patient taking differently: Take 500 mg by mouth 2 (two) times daily as needed. ) 60 tablet 2    nitroGLYCERIN (NITROSTAT) 0.4 MG SL tablet Place under the  tongue Use as needed.      omeprazole (PRILOSEC) 20 MG capsule Take 20 mg by mouth Daily.      predniSONE (DELTASONE) 20 MG tablet Take 2 tabs po daily for 3 days then take 1 tab po daily for 4 days 10 tablet 0    valsartan (DIOVAN) 80 MG tablet TAKE 1 TABLET DAILY 90 tablet 0    verapamil (CALAN-SR) 240 MG CR tablet Take 1 tablet (240 mg total) by mouth once daily. 30 tablet 0     No facility-administered encounter medications on file as of 5/16/2017.      Orders Placed This Encounter   Procedures    CPAP/BIPAP SUPPLIES     Replacement- end of life. APRIA     Order Specific Question:   Type of mask:     Answer:   Nasal     Comments:   or FFM     Order Specific Question:   Headgear?     Answer:   Yes     Order Specific Question:   Tubing?     Answer:   Yes     Order Specific Question:   Humidifier chamber?     Answer:   Yes     Order Specific Question:   Chin strap?     Answer:   Yes     Order Specific Question:   Filters?     Answer:   Yes     Order Specific Question:   Length of need (1-99 months):     Answer:   99    CPAP FOR HOME USE     APRIA     Order Specific Question:   Type:     Answer:   Auto CPAP     Order Specific Question:   Auto CPAP pressure setting range (cmH20):     Answer:   4-20     Order Specific Question:   Length of need (1-99 months):     Answer:   99     Order Specific Question:   Humidification:     Answer:   Heated     Order Specific Question:   Type of mask:     Answer:   FFM     Comments:   or nasal     Order Specific Question:   Headgear?     Answer:   Yes     Order Specific Question:   Tubing?     Answer:   Yes     Order Specific Question:   Humidifier chamber?     Answer:   Yes     Order Specific Question:   Chin strap?     Answer:   Yes     Order Specific Question:   Filters?     Answer:   Yes     Plan:      PAP replacement with [employer].  Auto setting 4-20 cm water pressure.  Follow-up in 2 months to review compliance and symptoms.

## 2017-06-02 ENCOUNTER — PATIENT OUTREACH (OUTPATIENT)
Dept: ADMINISTRATIVE | Facility: HOSPITAL | Age: 62
End: 2017-06-02

## 2017-06-02 DIAGNOSIS — E78.5 HYPERLIPIDEMIA, UNSPECIFIED HYPERLIPIDEMIA TYPE: Primary | ICD-10-CM

## 2017-06-12 ENCOUNTER — PATIENT OUTREACH (OUTPATIENT)
Dept: ADMINISTRATIVE | Facility: HOSPITAL | Age: 62
End: 2017-06-12

## 2017-06-12 DIAGNOSIS — E78.5 HYPERLIPIDEMIA, UNSPECIFIED HYPERLIPIDEMIA TYPE: Primary | ICD-10-CM

## 2017-06-12 NOTE — PROGRESS NOTES
I spoke with pt that states she see's [facility] and has had mammogram. Pt stated it was ok to obtain all future results to put in her [facility] records.

## 2017-06-14 ENCOUNTER — OFFICE VISIT (OUTPATIENT)
Dept: FAMILY MEDICINE | Facility: CLINIC | Age: 62
End: 2017-06-14
Payer: COMMERCIAL

## 2017-06-14 ENCOUNTER — LAB VISIT (OUTPATIENT)
Dept: LAB | Facility: HOSPITAL | Age: 62
End: 2017-06-14
Attending: FAMILY MEDICINE
Payer: COMMERCIAL

## 2017-06-14 VITALS
DIASTOLIC BLOOD PRESSURE: 78 MMHG | HEART RATE: 70 BPM | HEIGHT: 65 IN | BODY MASS INDEX: 33.24 KG/M2 | OXYGEN SATURATION: 96 % | WEIGHT: 199.5 LBS | RESPIRATION RATE: 14 BRPM | TEMPERATURE: 97 F | SYSTOLIC BLOOD PRESSURE: 120 MMHG

## 2017-06-14 DIAGNOSIS — J44.89 ASTHMA WITH COPD: ICD-10-CM

## 2017-06-14 DIAGNOSIS — Z13.1 SCREENING FOR DIABETES MELLITUS: ICD-10-CM

## 2017-06-14 DIAGNOSIS — I10 ESSENTIAL HYPERTENSION: ICD-10-CM

## 2017-06-14 DIAGNOSIS — Z00.00 ANNUAL PHYSICAL EXAM: ICD-10-CM

## 2017-06-14 DIAGNOSIS — Z78.0 POST-MENOPAUSE: ICD-10-CM

## 2017-06-14 DIAGNOSIS — Z00.00 ANNUAL PHYSICAL EXAM: Primary | ICD-10-CM

## 2017-06-14 DIAGNOSIS — G47.33 OSA ON CPAP: ICD-10-CM

## 2017-06-14 DIAGNOSIS — E66.9 OBESITY (BMI 30.0-34.9): ICD-10-CM

## 2017-06-14 DIAGNOSIS — E78.5 HYPERLIPIDEMIA, UNSPECIFIED HYPERLIPIDEMIA TYPE: ICD-10-CM

## 2017-06-14 LAB
ALBUMIN SERPL BCP-MCNC: 3.4 G/DL
ALP SERPL-CCNC: 58 U/L
ALT SERPL W/O P-5'-P-CCNC: 17 U/L
ANION GAP SERPL CALC-SCNC: 9 MMOL/L
AST SERPL-CCNC: 22 U/L
BASOPHILS # BLD AUTO: 0.01 K/UL
BASOPHILS NFR BLD: 0.2 %
BILIRUB SERPL-MCNC: 0.4 MG/DL
BUN SERPL-MCNC: 16 MG/DL
CALCIUM SERPL-MCNC: 9.1 MG/DL
CHLORIDE SERPL-SCNC: 107 MMOL/L
CHOLEST/HDLC SERPL: 3.4 {RATIO}
CO2 SERPL-SCNC: 26 MMOL/L
CREAT SERPL-MCNC: 0.8 MG/DL
DIFFERENTIAL METHOD: NORMAL
EOSINOPHIL # BLD AUTO: 0.1 K/UL
EOSINOPHIL NFR BLD: 2.2 %
ERYTHROCYTE [DISTWIDTH] IN BLOOD BY AUTOMATED COUNT: 12.6 %
EST. GFR  (AFRICAN AMERICAN): >60 ML/MIN/1.73 M^2
EST. GFR  (NON AFRICAN AMERICAN): >60 ML/MIN/1.73 M^2
GLUCOSE SERPL-MCNC: 81 MG/DL
HCT VFR BLD AUTO: 40.5 %
HDL/CHOLESTEROL RATIO: 29.3 %
HDLC SERPL-MCNC: 215 MG/DL
HDLC SERPL-MCNC: 63 MG/DL
HGB BLD-MCNC: 13.5 G/DL
LDLC SERPL CALC-MCNC: 132.4 MG/DL
LYMPHOCYTES # BLD AUTO: 1.4 K/UL
LYMPHOCYTES NFR BLD: 28.6 %
MCH RBC QN AUTO: 30.5 PG
MCHC RBC AUTO-ENTMCNC: 33.3 %
MCV RBC AUTO: 92 FL
MONOCYTES # BLD AUTO: 0.3 K/UL
MONOCYTES NFR BLD: 6.7 %
NEUTROPHILS # BLD AUTO: 3.1 K/UL
NEUTROPHILS NFR BLD: 62.1 %
NONHDLC SERPL-MCNC: 152 MG/DL
PLATELET # BLD AUTO: 216 K/UL
PMV BLD AUTO: 9.7 FL
POTASSIUM SERPL-SCNC: 4.3 MMOL/L
PROT SERPL-MCNC: 6.6 G/DL
RBC # BLD AUTO: 4.42 M/UL
SODIUM SERPL-SCNC: 142 MMOL/L
TRIGL SERPL-MCNC: 98 MG/DL
TSH SERPL DL<=0.005 MIU/L-ACNC: 1.11 UIU/ML
WBC # BLD AUTO: 5.04 K/UL

## 2017-06-14 PROCEDURE — 99396 PREV VISIT EST AGE 40-64: CPT | Mod: S$GLB,,, | Performed by: FAMILY MEDICINE

## 2017-06-14 PROCEDURE — 80061 LIPID PANEL: CPT

## 2017-06-14 PROCEDURE — 85025 COMPLETE CBC W/AUTO DIFF WBC: CPT

## 2017-06-14 PROCEDURE — 99999 PR PBB SHADOW E&M-EST. PATIENT-LVL IV: CPT | Mod: PBBFAC,,, | Performed by: FAMILY MEDICINE

## 2017-06-14 PROCEDURE — 80053 COMPREHEN METABOLIC PANEL: CPT

## 2017-06-14 PROCEDURE — 83036 HEMOGLOBIN GLYCOSYLATED A1C: CPT

## 2017-06-14 PROCEDURE — 84443 ASSAY THYROID STIM HORMONE: CPT

## 2017-06-14 PROCEDURE — 36415 COLL VENOUS BLD VENIPUNCTURE: CPT | Mod: PO

## 2017-06-14 RX ORDER — VERAPAMIL HYDROCHLORIDE 240 MG/1
240 TABLET, FILM COATED, EXTENDED RELEASE ORAL DAILY
Qty: 90 TABLET | Refills: 1 | Status: CANCELLED | OUTPATIENT
Start: 2017-06-14

## 2017-06-14 RX ORDER — EZETIMIBE 10 MG/1
10 TABLET ORAL DAILY
Qty: 90 TABLET | Refills: 1 | Status: CANCELLED | OUTPATIENT
Start: 2017-06-14 | End: 2018-06-14

## 2017-06-14 RX ORDER — VALSARTAN 80 MG/1
80 TABLET ORAL DAILY
Qty: 90 TABLET | Refills: 0 | Status: CANCELLED | OUTPATIENT
Start: 2017-06-14

## 2017-06-14 NOTE — PATIENT INSTRUCTIONS
Prevention Guidelines, Women Ages 50 to 64  Screening tests and vaccines are an important part of managing your health. Health counseling is essential, too. Below are guidelines for these, for women ages 50 to 64. Talk with your healthcare provider to make sure youre up to date on what you need.  Screening Who needs it How often   Type 2 diabetes or prediabetes All adults beginning at age 45 and adults without symptoms at any age who are overweight or obese and have 1 or more additional risk factors for diabetes. At  least every 3 years   Alcohol misuse All women in this age group At routine exams   Blood pressure All women in this age group Every 2 years if your blood pressure is less than 120/80 mm Hg; yearly if your systolic blood pressure is 120 to 139 mm Hg, or your diastolic blood pressure reading is 80 to 89 mm Hg   Breast cancer All women in this age group Yearly mammogram and clinical breast exam1   Cervical cancer All women in this age group, except women who have had a complete hysterectomy Pap test every 3 years or Pap test with human papillomavirus (HPV) test every 5 years   Chlamydia Women at increased risk for infection At routine exams   Colorectal cancer All women in this age group Flexible sigmoidoscopy every 5 years, or colonoscopy every 10 years, or double-contrast barium enema every 5 years; yearly fecal occult blood test or fecal immunochemical test; or a stool DNA test as often as your health care provider advises; talk with your health care provider about which tests are best for you   Depression All women in this age group At routine exams   Gonorrhea Sexually active women at increased risk for infection At routine exams   Hepatitis C Anyone at increased risk; 1 time for those born between 1945 and 1965 At routine exams   High cholesterol or triglycerides All women in this age group who are at risk for coronary artery disease At least every 5 years   HIV All women At routine exams   Lung  cancer Adults age 55 to 80 who have smoked Yearly screening in smokers with 30 pack-year history of smoking or who quit within 15 years   Obesity All women in this age group At routine exams   Osteoporosis Women who are postmenopausal Ask your healthcare provider   Syphilis Women at increased risk for infection - talk with your healthcare provider At routine exams   Tuberculosis Women at increased risk for infection - talk with your healthcare provider Ask your healthcare provider   Vision All women in this age group Ask your healthcare provider   Vaccine Who needs it How often   Chickenpox (varicella) All women in this age group who have no record of this infection or vaccine 2 doses; the second dose should be given at least 4 weeks after the first dose   Hepatitis A Women at increased risk for infection - talk with your healthcare provider 2 doses given at least 6 months apart   Hepatitis B Women at increased risk for infection - talk with your healthcare provider 3 doses over 6 months; second dose should be given 1 month after the first dose; the third dose should be given at least 2 months after the second dose and at least 4 months after the first dose   Haemophilus influenzaeType B (HIB) Women at increased risk for infection - talk with your healthcare provider 1 to 3 doses   Influenza (flu) All women in this age group Once a year   Measles, mumps, rubella (MMR) Women in this age group through their late 50s who have no record of these infections or vaccines 1 dose   Meningococcal Women at increased risk for infection - talk with your healthcare provider 1 or more doses   Pneumococcal conjugate vaccine (PCV13) and pneumococcal polysaccharide vaccine (PPSV23) Women at increased risk for infection - talk with your healthcare provider PCV13: 1 dose ages 19 to 65 (protects against 13 types of pneumococcal bacteria)  PPSV23: 1 to 2 doses through age 64, or 1 dose at 65 or older (protects against 23 types of  pneumococcal bacteria)   Tetanus/diphtheria/pertussis (Td/Tdap) booster All women in this age group Td every 10 years, or a one-time dose of Tdap instead of a Td booster after age 18, then Td every 10 years   Zoster All women ages 60 and older 1 dose   Counseling Who needs it How often   BRCA gene mutation testing for breast and ovarian cancer susceptibility Women with increased risk for having gene mutation When your risk is known   Breast cancer and chemoprevention Women at high risk for breast cancer When your risk is known   Diet and exercise Women who are overweight or obese When diagnosed, and then at routine exams   Sexually transmitted infection prevention Women at increased risk for infection - talk with your healthcare provider At routine exams   Use of daily aspirin Women ages 55 and up in this age group who are at risk for cardiovascular health problems such as stroke When your risk is known   Use of tobacco and the health effects it can cause All women in this age group Every exam   1American [employer] Society  Date Last Reviewed: 1/26/2016  © 0521-5042 [employer]. [address] [place] [address], [place] [address]. All rights reserved. This information is not intended as a substitute for professional medical care. Always follow your healthcare professional's instructions.

## 2017-06-14 NOTE — LETTER
June 14, 2017                 [facility]  [address] [facility] [address]  Phone: [phone]  Fax: [phone] June 14, 2017     Patient: Ro Hernandez    YOB: 1955   Date of Visit: 6/14/2017       To Whom It May Concern:     Dr. Rosen is requesting a copy of pt's last pap and mammogram  Please fax records to [phone]    If you have any questions or concerns, please don't hesitate to call.    Sincerely,  Rich RodriguezFulton County Hospital  Phone #[phone] Fax # [phone]
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
## 2017-06-15 LAB
ESTIMATED AVG GLUCOSE: 100 MG/DL
HBA1C MFR BLD HPLC: 5.1 %

## 2017-06-19 DIAGNOSIS — E78.5 HYPERLIPIDEMIA, UNSPECIFIED HYPERLIPIDEMIA TYPE: Primary | ICD-10-CM

## 2017-06-26 DIAGNOSIS — E78.5 HYPERLIPIDEMIA, UNSPECIFIED HYPERLIPIDEMIA TYPE: ICD-10-CM

## 2017-06-27 RX ORDER — EZETIMIBE 10 MG/1
TABLET ORAL
Qty: 90 TABLET | Refills: 0 | Status: SHIPPED | OUTPATIENT
Start: 2017-06-27 | End: 2017-10-20 | Stop reason: SDUPTHER

## 2017-07-09 DIAGNOSIS — I10 ESSENTIAL HYPERTENSION: ICD-10-CM

## 2017-07-09 RX ORDER — VALSARTAN 80 MG/1
TABLET ORAL
Qty: 90 TABLET | Refills: 0 | Status: SHIPPED | OUTPATIENT
Start: 2017-07-09 | End: 2017-10-10 | Stop reason: SDUPTHER

## 2017-07-18 ENCOUNTER — OFFICE VISIT (OUTPATIENT)
Dept: SLEEP MEDICINE | Facility: CLINIC | Age: 62
End: 2017-07-18
Payer: COMMERCIAL

## 2017-07-18 VITALS
HEART RATE: 94 BPM | WEIGHT: 201.25 LBS | DIASTOLIC BLOOD PRESSURE: 72 MMHG | BODY MASS INDEX: 33.53 KG/M2 | RESPIRATION RATE: 18 BRPM | HEIGHT: 65 IN | OXYGEN SATURATION: 95 % | SYSTOLIC BLOOD PRESSURE: 134 MMHG

## 2017-07-18 DIAGNOSIS — J44.9 CHRONIC OBSTRUCTIVE PULMONARY DISEASE, UNSPECIFIED COPD TYPE: ICD-10-CM

## 2017-07-18 DIAGNOSIS — G47.33 OSA (OBSTRUCTIVE SLEEP APNEA): Primary | ICD-10-CM

## 2017-07-18 PROCEDURE — 99214 OFFICE O/P EST MOD 30 MIN: CPT | Mod: S$GLB,,, | Performed by: NURSE PRACTITIONER

## 2017-07-18 PROCEDURE — 99999 PR PBB SHADOW E&M-EST. PATIENT-LVL IV: CPT | Mod: PBBFAC,,, | Performed by: NURSE PRACTITIONER

## 2017-07-18 NOTE — PROGRESS NOTES
"Subjective:      Patient ID: Ro Hernandez is a 61 y.o. female.    Chief Complaint: Sleep Apnea    Presents to office for review of AutoPAP therapy. Recent replacement. Patient states improved symptoms with use of AutoPAP. Sleeping more soundly. Waking up feeling more refreshed. Improved daytime sleepiness. Patient states she is benefiting from use of the AutoPAP.    No fever, chills, or hemoptysis. No pleuritic type chest pain. Breathing is stable as compared to 6 months ago.  Quit smoking one year ago.She has declined bronchodilators.  She feels more shortness of breath when she takes these inhalers.  She is doing well at this time.  Stable lung nodules dating back 2006.    Patient Active Problem List:     Obesity     Unspecified essential hypertension     Other and unspecified hyperlipidemia     Sleep apnea     GERD (gastroesophageal reflux disease)     Arthritis     Fatigue     Benign heart murmur     Personal history of colonic polyps     HERB on CPAP     Asthma with COPD            /72 (BP Location: Right arm, Patient Position: Sitting, BP Method: Manual)   Pulse 94   Resp 18   Ht 5' 5" (1.651 m)   Wt 91.3 kg (201 lb 4.5 oz)   SpO2 95%   BMI 33.49 kg/m²   Body mass index is 33.49 kg/m².    Review of Systems   Constitutional: Negative.    HENT: Negative.    Respiratory: Negative.    Cardiovascular: Negative.    Musculoskeletal: Negative.    Gastrointestinal: Negative.    Neurological: Negative.    Psychiatric/Behavioral: Negative.      Objective:      Physical Exam   Constitutional: She is oriented to person, place, and time. She appears well-developed and well-nourished.   HENT:   Head: Normocephalic and atraumatic.   Mouth/Throat: Oropharynx is clear and moist.   Neck: Normal range of motion. Neck supple.   Cardiovascular: Normal rate and regular rhythm.  Exam reveals no gallop.    No murmur heard.  Pulmonary/Chest: Effort normal and breath sounds normal.   Abdominal: Soft. She exhibits no mass. " There is no tenderness.   Musculoskeletal: Normal range of motion. She exhibits no edema.   Neurological: She is alert and oriented to person, place, and time.   Skin: Skin is warm and dry.   Psychiatric: She has a normal mood and affect.     Personal Diagnostic Review  Autopap download: Patient wears on average 7 hrs and 54 minutes. Greater than 4 hrs 100 % of the time. 90% percentile pressure 11 with AHI 0.4 events/hr  6/27/27-7/16/17    Assessment:       1. HERB (obstructive sleep apnea)    2. Chronic obstructive pulmonary disease, unspecified COPD type        Outpatient Encounter Prescriptions as of 7/18/2017   Medication Sig Dispense Refill    CALCIUM CARBONATE/VITAMIN D3 (CALCIUM WITH VITAMIN D ORAL) Take 1 tablet by mouth Daily.      diphenhydrAMINE (BENADRYL) 25 mg capsule Take 1 capsule by mouth At bedtime.      estrogen, conjugated,-medroxyprogesterone (PREMPRO) 0.3-1.5 mg per tablet Take 1 tablet by mouth Daily.      ezetimibe (ZETIA) 10 mg tablet TAKE 1 TABLET DAILY 90 tablet 0    hydrochlorothiazide (HYDRODIURIL) 12.5 MG Tab Take 1 tablet (12.5 mg total) by mouth once daily. 90 tablet 1    hydrocodone-acetaminophen 10-325mg (NORCO)  mg Tab Take 1 tablet by mouth every 12 (twelve) hours.  0    isosorbide mononitrate (IMDUR) 30 MG 24 hr tablet Take 30 mg by mouth once daily.   0    LIDODERM 5 %(700 mg/patch) apply 1/2 PATCH DAILY AS DIRECTED FOR 2 WEEKS 10 each 0    MULTIVITAMIN (MULTIPLE VITAMIN ORAL) Take 1 tablet by mouth Daily.      naproxen (NAPROSYN) 500 MG tablet Take 1 tablet (500 mg total) by mouth 2 (two) times daily with meals. (Patient taking differently: Take 500 mg by mouth 2 (two) times daily as needed. ) 60 tablet 2    nitroGLYCERIN (NITROSTAT) 0.4 MG SL tablet Place under the tongue Use as needed.      omeprazole (PRILOSEC) 20 MG capsule Take 20 mg by mouth Daily.      valsartan (DIOVAN) 80 MG tablet TAKE 1 TABLET DAILY 90 tablet 0    verapamil (CALAN-SR) 240 MG CR  tablet Take 1 tablet (240 mg total) by mouth once daily. 30 tablet 0     No facility-administered encounter medications on file as of 7/18/2017.      Orders Placed This Encounter   Procedures    X-Ray Chest PA And Lateral     Standing Status:   Future     Standing Expiration Date:   7/18/2018     Order Specific Question:   May the Radiologist modify the order per protocol to meet the clinical needs of the patient?     Answer:   Yes    Spirometry with/without bronchodilator     Standing Status:   Future     Standing Expiration Date:   7/18/2018     Plan:   Doing well on PAP settings. Patient is compliant. Follow up in 10 months with PAP data download from [employer], CXR, Spirometry or call earlier if any problems.

## 2017-08-11 ENCOUNTER — TELEPHONE (OUTPATIENT)
Dept: SMOKING CESSATION | Facility: CLINIC | Age: 62
End: 2017-08-11

## 2017-08-14 ENCOUNTER — TELEPHONE (OUTPATIENT)
Dept: SMOKING CESSATION | Facility: CLINIC | Age: 62
End: 2017-08-14

## 2017-08-16 ENCOUNTER — TELEPHONE (OUTPATIENT)
Dept: SMOKING CESSATION | Facility: CLINIC | Age: 62
End: 2017-08-16

## 2017-08-16 ENCOUNTER — APPOINTMENT (OUTPATIENT)
Dept: RADIOLOGY | Facility: CLINIC | Age: 62
End: 2017-08-16
Attending: FAMILY MEDICINE
Payer: COMMERCIAL

## 2017-08-16 DIAGNOSIS — Z78.0 POST-MENOPAUSE: ICD-10-CM

## 2017-08-16 PROCEDURE — 77080 DXA BONE DENSITY AXIAL: CPT | Mod: TC

## 2017-08-16 PROCEDURE — 77080 DXA BONE DENSITY AXIAL: CPT | Mod: 26,,, | Performed by: INTERNAL MEDICINE

## 2017-08-28 DIAGNOSIS — E55.9 VITAMIN D INSUFFICIENCY: Primary | ICD-10-CM

## 2017-09-15 ENCOUNTER — TELEPHONE (OUTPATIENT)
Dept: FAMILY MEDICINE | Facility: CLINIC | Age: 62
End: 2017-09-15

## 2017-09-15 NOTE — TELEPHONE ENCOUNTER
----- Message from Kathleen Benavidez sent at 9/15/2017 10:53 AM CDT -----  Patient received a letter asking to call contact the office regarding her test results.   Call her at [phone].                                            [email or web address]

## 2017-10-09 ENCOUNTER — OFFICE VISIT (OUTPATIENT)
Dept: FAMILY MEDICINE | Facility: CLINIC | Age: 62
End: 2017-10-09
Payer: COMMERCIAL

## 2017-10-09 VITALS
HEIGHT: 65 IN | HEART RATE: 87 BPM | WEIGHT: 197.31 LBS | SYSTOLIC BLOOD PRESSURE: 144 MMHG | DIASTOLIC BLOOD PRESSURE: 72 MMHG | TEMPERATURE: 99 F | RESPIRATION RATE: 18 BRPM | OXYGEN SATURATION: 96 % | BODY MASS INDEX: 32.87 KG/M2

## 2017-10-09 DIAGNOSIS — R07.9 CHEST PAIN, UNSPECIFIED TYPE: ICD-10-CM

## 2017-10-09 DIAGNOSIS — J06.9 VIRAL UPPER RESPIRATORY TRACT INFECTION: ICD-10-CM

## 2017-10-09 DIAGNOSIS — M54.50 ACUTE BILATERAL LOW BACK PAIN WITHOUT SCIATICA: Primary | ICD-10-CM

## 2017-10-09 PROCEDURE — 99213 OFFICE O/P EST LOW 20 MIN: CPT | Mod: S$GLB,,, | Performed by: NURSE PRACTITIONER

## 2017-10-09 PROCEDURE — 99999 PR PBB SHADOW E&M-EST. PATIENT-LVL IV: CPT | Mod: PBBFAC,,, | Performed by: NURSE PRACTITIONER

## 2017-10-09 RX ORDER — CYCLOBENZAPRINE HCL 10 MG
10 TABLET ORAL 3 TIMES DAILY PRN
Qty: 30 TABLET | Refills: 0 | Status: SHIPPED | OUTPATIENT
Start: 2017-10-09 | End: 2017-10-19

## 2017-10-09 RX ORDER — NITROGLYCERIN 0.4 MG/1
TABLET SUBLINGUAL
Qty: 30 TABLET | Refills: 0 | Status: SHIPPED | OUTPATIENT
Start: 2017-10-09 | End: 2017-10-20 | Stop reason: SDUPTHER

## 2017-10-09 RX ORDER — FLUTICASONE PROPIONATE 50 MCG
1 SPRAY, SUSPENSION (ML) NASAL DAILY
Qty: 16 G | Refills: 0 | Status: SHIPPED | OUTPATIENT
Start: 2017-10-09 | End: 2018-06-13 | Stop reason: SDUPTHER

## 2017-10-09 RX ORDER — METHYLPREDNISOLONE 4 MG/1
TABLET ORAL
Qty: 1 PACKAGE | Refills: 0 | Status: SHIPPED | OUTPATIENT
Start: 2017-10-09 | End: 2017-12-18 | Stop reason: ALTCHOICE

## 2017-10-09 RX ORDER — LEVOCETIRIZINE DIHYDROCHLORIDE 5 MG/1
5 TABLET, FILM COATED ORAL NIGHTLY
Qty: 30 TABLET | Refills: 11 | Status: SHIPPED | OUTPATIENT
Start: 2017-10-09 | End: 2018-06-07

## 2017-10-09 NOTE — PROGRESS NOTES
"Subjective:       Patient ID: Ro Hernandez is a 62 y.o. female.    Chief Complaint: Back Pain  pt reports to clinic with chief complaint of low back pain onset 2 days.  Pt reports bending to  object from low shelf and upon rising she felt a "pinch".  Denies numbness or tingling in lower extremities.  Negative for incontinence of bowel or bladder.  Has tried taking norco without any relief.  Pain worsens upon ambulation resolves with lying.  Rates pain 6/10.  Pt is also requesting a refill on NTG.  Reports medication at home has .   Back Pain   This is a new problem. The current episode started in the past 7 days. The problem occurs constantly. The problem is unchanged. The pain is present in the lumbar spine and sacro-iliac. The quality of the pain is described as aching. The pain does not radiate. The pain is at a severity of 6/10. The symptoms are aggravated by bending and position. Pertinent negatives include no bladder incontinence, bowel incontinence, leg pain, numbness, paresthesias or tingling. The treatment provided no relief.     Review of Systems   Constitutional: Negative.    HENT: Negative.    Respiratory: Negative.    Cardiovascular: Negative.    Gastrointestinal: Negative.  Negative for bowel incontinence.   Genitourinary: Negative.  Negative for bladder incontinence.   Musculoskeletal: Positive for back pain.   Neurological: Negative for tingling, numbness and paresthesias.       Objective:      Physical Exam   Constitutional: She is oriented to person, place, and time. She appears well-developed and well-nourished.   HENT:   Head: Normocephalic.   Nose: Rhinorrhea present. No mucosal edema. Right sinus exhibits no maxillary sinus tenderness and no frontal sinus tenderness. Left sinus exhibits no maxillary sinus tenderness and no frontal sinus tenderness.   Eyes: EOM are normal.   Neck: Neck supple.   Cardiovascular: Normal rate and normal heart sounds.    Pulmonary/Chest: Effort normal " and breath sounds normal.   Musculoskeletal:        Lumbar back: She exhibits decreased range of motion and tenderness (bilateral sacro iliac).   Negative leg raises   Neurological: She is alert and oriented to person, place, and time.   Skin: Skin is warm and dry.   Vitals reviewed.      Assessment:       1. Acute bilateral low back pain without sciatica    2. Viral upper respiratory tract infection    3. Chest pain, unspecified type        Plan:   Acute bilateral low back pain without sciatica  -     cyclobenzaprine (FLEXERIL) 10 MG tablet; Take 1 tablet (10 mg total) by mouth 3 (three) times daily as needed.  Dispense: 30 tablet; Refill: 0  -     methylPREDNISolone (MEDROL DOSEPACK) 4 mg tablet; use as directed  Dispense: 1 Package; Refill: 0  Back stretching exercises. Follow up if symptoms does not improve  Viral upper respiratory tract infection  -     fluticasone (FLONASE) 50 mcg/actuation nasal spray; 1 spray by Each Nare route once daily.  Dispense: 16 g; Refill: 0  -     levocetirizine (XYZAL) 5 MG tablet; Take 1 tablet (5 mg total) by mouth every evening.  Dispense: 30 tablet; Refill: 11    Chest pain, unspecified type  -     nitroGLYCERIN (NITROSTAT) 0.4 MG SL tablet; q5 mins x 3 doses as needed chest pain  Dispense: 30 tablet; Refill: 0      No Follow-up on file.

## 2017-10-10 DIAGNOSIS — I10 ESSENTIAL HYPERTENSION: ICD-10-CM

## 2017-10-10 RX ORDER — VALSARTAN 80 MG/1
TABLET ORAL
Qty: 90 TABLET | Refills: 0 | Status: SHIPPED | OUTPATIENT
Start: 2017-10-10 | End: 2017-12-18 | Stop reason: SDUPTHER

## 2017-10-20 DIAGNOSIS — R07.9 CHEST PAIN, UNSPECIFIED TYPE: ICD-10-CM

## 2017-10-20 DIAGNOSIS — E78.5 HYPERLIPIDEMIA, UNSPECIFIED HYPERLIPIDEMIA TYPE: ICD-10-CM

## 2017-10-20 RX ORDER — NITROGLYCERIN 0.4 MG/1
TABLET SUBLINGUAL
Qty: 25 TABLET | Refills: 0 | Status: SHIPPED | OUTPATIENT
Start: 2017-10-20

## 2017-10-20 RX ORDER — EZETIMIBE 10 MG/1
TABLET ORAL
Qty: 90 TABLET | Refills: 0 | Status: SHIPPED | OUTPATIENT
Start: 2017-10-20 | End: 2017-12-18 | Stop reason: SDUPTHER

## 2017-12-18 ENCOUNTER — OFFICE VISIT (OUTPATIENT)
Dept: FAMILY MEDICINE | Facility: CLINIC | Age: 62
End: 2017-12-18
Payer: COMMERCIAL

## 2017-12-18 VITALS
RESPIRATION RATE: 15 BRPM | HEIGHT: 65 IN | HEART RATE: 86 BPM | BODY MASS INDEX: 32.85 KG/M2 | WEIGHT: 197.19 LBS | TEMPERATURE: 98 F | DIASTOLIC BLOOD PRESSURE: 70 MMHG | OXYGEN SATURATION: 97 % | SYSTOLIC BLOOD PRESSURE: 132 MMHG

## 2017-12-18 DIAGNOSIS — E55.9 VITAMIN D INSUFFICIENCY: ICD-10-CM

## 2017-12-18 DIAGNOSIS — E78.5 HYPERLIPIDEMIA, UNSPECIFIED HYPERLIPIDEMIA TYPE: ICD-10-CM

## 2017-12-18 DIAGNOSIS — E66.9 OBESITY (BMI 30.0-34.9): ICD-10-CM

## 2017-12-18 DIAGNOSIS — G47.33 OSA ON CPAP: ICD-10-CM

## 2017-12-18 DIAGNOSIS — J44.89 ASTHMA WITH COPD: ICD-10-CM

## 2017-12-18 DIAGNOSIS — I10 ESSENTIAL HYPERTENSION: Primary | ICD-10-CM

## 2017-12-18 DIAGNOSIS — J30.9 ALLERGIC RHINITIS, UNSPECIFIED CHRONICITY, UNSPECIFIED SEASONALITY, UNSPECIFIED TRIGGER: ICD-10-CM

## 2017-12-18 PROCEDURE — 99214 OFFICE O/P EST MOD 30 MIN: CPT | Mod: 25,S$GLB,, | Performed by: FAMILY MEDICINE

## 2017-12-18 PROCEDURE — 90686 IIV4 VACC NO PRSV 0.5 ML IM: CPT | Mod: S$GLB,,, | Performed by: FAMILY MEDICINE

## 2017-12-18 PROCEDURE — 90471 IMMUNIZATION ADMIN: CPT | Mod: S$GLB,,, | Performed by: FAMILY MEDICINE

## 2017-12-18 PROCEDURE — 99999 PR PBB SHADOW E&M-EST. PATIENT-LVL III: CPT | Mod: PBBFAC,,, | Performed by: FAMILY MEDICINE

## 2017-12-18 RX ORDER — VALSARTAN 80 MG/1
80 TABLET ORAL DAILY
Qty: 90 TABLET | Refills: 1 | Status: SHIPPED | OUTPATIENT
Start: 2017-12-18 | End: 2018-06-13 | Stop reason: SDUPTHER

## 2017-12-18 RX ORDER — EZETIMIBE 10 MG/1
10 TABLET ORAL DAILY
Qty: 90 TABLET | Refills: 1 | Status: SHIPPED | OUTPATIENT
Start: 2017-12-18 | End: 2018-06-13 | Stop reason: SDUPTHER

## 2017-12-18 RX ORDER — ALBUTEROL SULFATE 90 UG/1
2 AEROSOL, METERED RESPIRATORY (INHALATION) EVERY 6 HOURS PRN
Qty: 3 INHALER | Refills: 1 | Status: SHIPPED | OUTPATIENT
Start: 2017-12-18 | End: 2018-07-17

## 2017-12-18 NOTE — PROGRESS NOTES
Subjective:       Patient ID: Ro Hernandez is a 62 y.o. female.    Chief Complaint: Chronic Care    HPI   Chronic Care  63yo female presents today for chronic care assessment. Blood pressure has been well controlled with current treatment. She denies any HA, CP or dizziness. She continues to see Cardiology and reports having a visit within the past 2 months. An echocardiogram is planned to assess her murmur. Compliance with Zetia use is reported. Updated lipid levels are due. She denies any adverse effects of treatment. Asthma and COPD symptoms have been stable. No recent use of albuterol is reported. She has some nasal drainage today and reports smoke exposure yesterday which seems to have triggered symptoms. Compliance with Xyzal use is reported. She does have Flonase but has not been consistent with use. She has been wearing her CPAP. She had a visit with Pulmonology in July 2017 and a visit is planned for next year. There have been no recent ER visits or hospitalizations.    Review of Systems   Constitutional: Negative for appetite change, fatigue and unexpected weight change.   HENT: Positive for postnasal drip. Negative for congestion, ear pain, sinus pressure and sore throat.    Eyes: Negative for redness, itching and visual disturbance.   Respiratory: Negative for cough and shortness of breath.    Cardiovascular: Negative for chest pain, palpitations and leg swelling.   Gastrointestinal: Negative for abdominal pain, constipation and diarrhea.   Genitourinary: Negative for decreased urine volume and difficulty urinating.   Musculoskeletal: Negative for arthralgias and myalgias.   Skin: Negative for rash.   Allergic/Immunologic: Positive for environmental allergies.   Neurological: Negative for dizziness, weakness and headaches.   Psychiatric/Behavioral: Negative for dysphoric mood and sleep disturbance. The patient is not nervous/anxious.        Objective:   /70   Pulse 86   Temp 98 °F (36.7 °C)  "(Temporal)   Resp 15   Ht 5' 5" (1.651 m)   Wt 89.4 kg (197 lb 3.2 oz)   SpO2 97%   BMI 32.82 kg/m²   Physical Exam   Constitutional: She is oriented to person, place, and time. She appears well-developed. No distress.   Obese, non-toxic   HENT:   Head: Normocephalic and atraumatic.   Right Ear: Tympanic membrane, external ear and ear canal normal.   Left Ear: Tympanic membrane, external ear and ear canal normal.   Nose: Mucosal edema and rhinorrhea present.   Mouth/Throat: Posterior oropharyngeal erythema present. No oropharyngeal exudate.   Eyes: Conjunctivae and EOM are normal. Pupils are equal, round, and reactive to light.   Neck: Normal range of motion. Neck supple.   Cardiovascular: Normal rate, regular rhythm and normal heart sounds.    Pulmonary/Chest: Effort normal and breath sounds normal.   Abdominal: Soft. Bowel sounds are normal.   Musculoskeletal: She exhibits no edema.   Lymphadenopathy:     She has no cervical adenopathy.   Neurological: She is alert and oriented to person, place, and time.   Skin: Skin is warm and dry. She is not diaphoretic.   Psychiatric: She has a normal mood and affect. Her behavior is normal.       Assessment:       1. Essential hypertension    2. Hyperlipidemia, unspecified hyperlipidemia type    3. Asthma with COPD    4. Allergic rhinitis, unspecified chronicity, unspecified seasonality, unspecified trigger    5. HERB on CPAP    6. Vitamin D insufficiency    7. Obesity (BMI 30.0-34.9)        Plan:   Essential hypertension  Stable and well controlled. Will continue with the current combination of treatment as well as heart healthy diet. Target BP <140/90. She continues with HCTZ every M,W and F and will call for renewal if needed prior to next check. She will follow-up with Dr. Quiles regarding echocardiogram.  -     valsartan (DIOVAN) 80 MG tablet; Take 1 tablet (80 mg total) by mouth once daily.  Dispense: 90 tablet; Refill: 1    Hyperlipidemia, unspecified " hyperlipidemia type  Will proceed with updated lipid assessment. Continue with Zetia and heart healthy diet in the interim.  -     ezetimibe (ZETIA) 10 mg tablet; Take 1 tablet (10 mg total) by mouth once daily.  Dispense: 90 tablet; Refill: 1    Asthma with COPD  Stable and without s/s of exacerbation. Will renew albuterol which she has been using sparingly on a prn basis. Keep appt next year with Pulmonology as scheduled.  -     albuterol 90 mcg/actuation inhaler; Inhale 2 puffs into the lungs every 6 (six) hours as needed. Dispense with spacer.  Dispense: 3 Inhaler; Refill: 1    Allergic rhinitis, unspecified chronicity, unspecified seasonality, unspecified trigger  Discussed consistent use of Flonase. Continue with Xyzal.    HERB on CPAP  Compliance with CPAP use is recommended.    Vitamin D insufficiency  Proceed with vitamin D labs. Additional recommendations pending results.    Obesity (BMI 30.0-34.9)  Weight loss efforts remain encouraged through diet and lifestyle measures.    RTC in June 2018 for wellness.  Records release for MMG to be obtained at [facility] next week.

## 2017-12-19 ENCOUNTER — LAB VISIT (OUTPATIENT)
Dept: LAB | Facility: HOSPITAL | Age: 62
End: 2017-12-19
Attending: FAMILY MEDICINE
Payer: COMMERCIAL

## 2017-12-19 DIAGNOSIS — E55.9 VITAMIN D INSUFFICIENCY: ICD-10-CM

## 2017-12-19 DIAGNOSIS — E78.5 HYPERLIPIDEMIA, UNSPECIFIED HYPERLIPIDEMIA TYPE: ICD-10-CM

## 2017-12-19 LAB
25(OH)D3+25(OH)D2 SERPL-MCNC: 27 NG/ML
ALBUMIN SERPL BCP-MCNC: 3.6 G/DL
ALP SERPL-CCNC: 59 U/L
ALT SERPL W/O P-5'-P-CCNC: 15 U/L
ANION GAP SERPL CALC-SCNC: 7 MMOL/L
AST SERPL-CCNC: 18 U/L
BILIRUB SERPL-MCNC: 0.4 MG/DL
BUN SERPL-MCNC: 19 MG/DL
CALCIUM SERPL-MCNC: 9.4 MG/DL
CHLORIDE SERPL-SCNC: 106 MMOL/L
CHOLEST SERPL-MCNC: 220 MG/DL
CHOLEST/HDLC SERPL: 3.7 {RATIO}
CO2 SERPL-SCNC: 28 MMOL/L
CREAT SERPL-MCNC: 0.8 MG/DL
EST. GFR  (AFRICAN AMERICAN): >60 ML/MIN/1.73 M^2
EST. GFR  (NON AFRICAN AMERICAN): >60 ML/MIN/1.73 M^2
GLUCOSE SERPL-MCNC: 93 MG/DL
HDLC SERPL-MCNC: 59 MG/DL
HDLC SERPL: 26.8 %
LDLC SERPL CALC-MCNC: 141.2 MG/DL
NONHDLC SERPL-MCNC: 161 MG/DL
POTASSIUM SERPL-SCNC: 4.3 MMOL/L
PROT SERPL-MCNC: 6.8 G/DL
SODIUM SERPL-SCNC: 141 MMOL/L
TRIGL SERPL-MCNC: 99 MG/DL

## 2017-12-19 PROCEDURE — 80053 COMPREHEN METABOLIC PANEL: CPT

## 2017-12-19 PROCEDURE — 80061 LIPID PANEL: CPT

## 2017-12-19 PROCEDURE — 36415 COLL VENOUS BLD VENIPUNCTURE: CPT | Mod: PO

## 2017-12-19 PROCEDURE — 82306 VITAMIN D 25 HYDROXY: CPT

## 2018-03-05 ENCOUNTER — CLINICAL SUPPORT (OUTPATIENT)
Dept: SMOKING CESSATION | Facility: CLINIC | Age: 63
End: 2018-03-05
Payer: COMMERCIAL

## 2018-03-05 DIAGNOSIS — F17.200 NICOTINE DEPENDENCE: Primary | ICD-10-CM

## 2018-03-05 PROCEDURE — 99407 BEHAV CHNG SMOKING > 10 MIN: CPT | Mod: S$GLB,,,

## 2018-03-13 ENCOUNTER — TELEPHONE (OUTPATIENT)
Dept: FAMILY MEDICINE | Facility: CLINIC | Age: 63
End: 2018-03-13

## 2018-03-13 DIAGNOSIS — Z12.31 VISIT FOR SCREENING MAMMOGRAM: Primary | ICD-10-CM

## 2018-03-13 NOTE — TELEPHONE ENCOUNTER
Please call patient- records of mammogram obtained in August 2016 from [facility] have been received. Has she had an updated study elsewhere?

## 2018-03-13 NOTE — TELEPHONE ENCOUNTER
Please ask that she call after the mammogram so we can request the results. See if she can provide us date and location.

## 2018-03-16 DIAGNOSIS — R07.9 CHEST PAIN, UNSPECIFIED TYPE: ICD-10-CM

## 2018-03-16 RX ORDER — NITROGLYCERIN 0.4 MG/1
TABLET SUBLINGUAL
Refills: 0 | OUTPATIENT
Start: 2018-03-16

## 2018-03-21 ENCOUNTER — PATIENT OUTREACH (OUTPATIENT)
Dept: ADMINISTRATIVE | Facility: HOSPITAL | Age: 63
End: 2018-03-21

## 2018-05-01 ENCOUNTER — TELEPHONE (OUTPATIENT)
Dept: PULMONOLOGY | Facility: CLINIC | Age: 63
End: 2018-05-01

## 2018-05-02 ENCOUNTER — PATIENT MESSAGE (OUTPATIENT)
Dept: FAMILY MEDICINE | Facility: CLINIC | Age: 63
End: 2018-05-02

## 2018-05-31 ENCOUNTER — PATIENT OUTREACH (OUTPATIENT)
Dept: ADMINISTRATIVE | Facility: HOSPITAL | Age: 63
End: 2018-05-31

## 2018-06-01 ENCOUNTER — TELEPHONE (OUTPATIENT)
Dept: FAMILY MEDICINE | Facility: CLINIC | Age: 63
End: 2018-06-01

## 2018-06-01 DIAGNOSIS — I10 ESSENTIAL HYPERTENSION: ICD-10-CM

## 2018-06-04 RX ORDER — HYDROCHLOROTHIAZIDE 12.5 MG/1
TABLET ORAL
Qty: 90 TABLET | Refills: 0 | Status: SHIPPED | OUTPATIENT
Start: 2018-06-04 | End: 2018-06-13 | Stop reason: SDUPTHER

## 2018-06-04 NOTE — TELEPHONE ENCOUNTER
RX request has been approved. Please advise patient to keep pending visit for follow-up as scheduled.

## 2018-06-07 ENCOUNTER — OFFICE VISIT (OUTPATIENT)
Dept: SLEEP MEDICINE | Facility: CLINIC | Age: 63
End: 2018-06-07
Payer: COMMERCIAL

## 2018-06-07 ENCOUNTER — HOSPITAL ENCOUNTER (OUTPATIENT)
Dept: RADIOLOGY | Facility: HOSPITAL | Age: 63
Discharge: HOME | End: 2018-06-07
Attending: NURSE PRACTITIONER
Payer: COMMERCIAL

## 2018-06-07 ENCOUNTER — PROCEDURE VISIT (OUTPATIENT)
Dept: PULMONOLOGY | Facility: CLINIC | Age: 63
End: 2018-06-07
Payer: COMMERCIAL

## 2018-06-07 VITALS
SYSTOLIC BLOOD PRESSURE: 124 MMHG | OXYGEN SATURATION: 96 % | BODY MASS INDEX: 36.1 KG/M2 | RESPIRATION RATE: 16 BRPM | HEIGHT: 65 IN | DIASTOLIC BLOOD PRESSURE: 58 MMHG | HEART RATE: 83 BPM | WEIGHT: 216.69 LBS

## 2018-06-07 DIAGNOSIS — J45.20 MILD INTERMITTENT ASTHMA WITHOUT COMPLICATION: ICD-10-CM

## 2018-06-07 DIAGNOSIS — J44.9 CHRONIC OBSTRUCTIVE PULMONARY DISEASE, UNSPECIFIED COPD TYPE: Primary | ICD-10-CM

## 2018-06-07 DIAGNOSIS — J44.9 CHRONIC OBSTRUCTIVE PULMONARY DISEASE, UNSPECIFIED COPD TYPE: ICD-10-CM

## 2018-06-07 DIAGNOSIS — G47.33 OSA ON CPAP: Primary | Chronic | ICD-10-CM

## 2018-06-07 DIAGNOSIS — R91.1 PULMONARY NODULE: ICD-10-CM

## 2018-06-07 DIAGNOSIS — R91.1 LUNG NODULE: ICD-10-CM

## 2018-06-07 LAB
BRPFT: NORMAL
FEF 25 75 CHG: 16 %
FEF 25 75 LLN: 1.03
FEF 25 75 POST REF: 87 %
FEF 25 75 POST: 1.84 L/S (ref 1.03–3.19)
FEF 25 75 PRE REF: 75 %
FEF 25 75 PRE: 1.58 L/S (ref 1.03–3.19)
FEF 25 75 REF: 2.11
FET100 CHG: -5.7 %
FET100 POST: 9.6 SEC
FET100 PRE: 10.18 SEC
FEV1 CHG: 3.8 %
FEV1 FVC CHG: 4.4 %
FEV1 FVC LLN: 66
FEV1 FVC POST REF: 95.8 %
FEV1 FVC POST: 75.31 % (ref 66.06–91.22)
FEV1 FVC PRE REF: 91.7 %
FEV1 FVC PRE: 72.14 % (ref 66.06–91.22)
FEV1 FVC REF: 79
FEV1 LLN: 1.81
FEV1 POST REF: 95.4 %
FEV1 POST: 2.32 L (ref 1.81–3.06)
FEV1 PRE REF: 91.8 %
FEV1 PRE: 2.23 L (ref 1.81–3.06)
FEV1 REF: 2.43
FEV6 CHG: 0 %
FEV6 LLN: 2.48
FEV6 POST REF: 93.6 %
FEV6 POST: 2.98 L (ref 2.48–3.88)
FEV6 PRE REF: 93.6 %
FEV6 PRE: 2.98 L (ref 2.48–3.88)
FEV6 REF: 3.18
FVC CHG: -0.5 %
FVC LLN: 2.32
FVC POST REF: 98.9 %
FVC POST: 3.08 L (ref 2.32–3.91)
FVC PRE REF: 99.4 %
FVC PRE: 3.1 L (ref 2.32–3.91)
FVC REF: 3.12
MVV LLN: 79
MVV REF: 93
PEF CHG: -0.7 %
PEF LLN: 4.45
PEF POST REF: 92.2 %
PEF POST: 5.73 L/S (ref 4.45–7.97)
PEF PRE REF: 92.9 %
PEF PRE: 5.77 L/S (ref 4.45–7.97)
PEF REF: 6.21

## 2018-06-07 PROCEDURE — 99214 OFFICE O/P EST MOD 30 MIN: CPT | Mod: 25,S$GLB,, | Performed by: INTERNAL MEDICINE

## 2018-06-07 PROCEDURE — 3074F SYST BP LT 130 MM HG: CPT | Mod: CPTII,S$GLB,, | Performed by: INTERNAL MEDICINE

## 2018-06-07 PROCEDURE — 94060 EVALUATION OF WHEEZING: CPT | Mod: S$GLB,,, | Performed by: INTERNAL MEDICINE

## 2018-06-07 PROCEDURE — 71046 X-RAY EXAM CHEST 2 VIEWS: CPT | Mod: 26,,, | Performed by: RADIOLOGY

## 2018-06-07 PROCEDURE — 71046 X-RAY EXAM CHEST 2 VIEWS: CPT | Mod: TC,FY,PO

## 2018-06-07 PROCEDURE — 99999 PR PBB SHADOW E&M-EST. PATIENT-LVL III: CPT | Mod: PBBFAC,,, | Performed by: INTERNAL MEDICINE

## 2018-06-07 PROCEDURE — 3078F DIAST BP <80 MM HG: CPT | Mod: CPTII,S$GLB,, | Performed by: INTERNAL MEDICINE

## 2018-06-07 PROCEDURE — 3008F BODY MASS INDEX DOCD: CPT | Mod: CPTII,S$GLB,, | Performed by: INTERNAL MEDICINE

## 2018-06-07 NOTE — ASSESSMENT & PLAN NOTE
ACT score 25  Immunizations current  Both FEV1 and FVC significantly improved since 11/2016  Very infrequent MDI use

## 2018-06-07 NOTE — LETTER
June 7, 2018      Elizabeth Lejeune, NP  [address] [address]           [facility]  [address] [address]  Phone: [phone]          Patient: Ro Hernandez   MR Number: [id number]   YOB: 1955   Date of Visit: 6/7/2018       Dear Elizabeth Lejeune:    Thank you for referring Ro Hernandez to me for evaluation. Attached you will find relevant portions of my assessment and plan of care.    If you have questions, please do not hesitate to call me. I look forward to following Ro Hernandez along with you.    Sincerely,    Laurent Jain MD    Enclosure  CC:  No Recipients    If you would like to receive this communication electronically, please contact [email or web address] or [phone] to request more information on [employer] access.    For providers and/or their staff who would like to refer a patient to [facility], please contact us through our one-stop-shop provider referral line, Guillermo Yanes, at [phone].    If you feel you have received this communication in error or would no longer like to receive these types of communications, please e-mail [email or web address]

## 2018-06-07 NOTE — PROGRESS NOTES
"Subjective:       Patient ID: Ro Hernandez is a 62 y.o. female.    Chief Complaint: Asthma and Sleep Apnea    Patient is a 62 y.o. female   Last visit 09/2014: Has quit smoking since and FEV1 and FVC improved  No cough No SOB, No wheeze  Has PRn inhaler: FEV1 2.23( 91.8%)  Immunisations current  Has nodule on prior CT 2014, Semisolid GGO  Repeat ordered  She has HERB on CPAP  [place] score 2  Using CPAP and benefits  1PPD for 25 years quit for 16 years now smoking againe 1/2 ppd.  I have reviewed the patient's medical history in detail and updated the computerized patient record.        Review of Systems   All other systems reviewed and are negative.      Objective:       Vitals:    06/07/18 0940   BP: (!) 124/58   BP Location: Right arm   Patient Position: Sitting   Pulse: 83   Resp: 16   SpO2: 96%   Weight: 98.3 kg (216 lb 11.2 oz)   Height: 5' 5" (1.651 m)     Physical Exam   Constitutional: She is oriented to person, place, and time. She appears well-developed and well-nourished. She is obese.   HENT:   Head: Normocephalic.   Nose: Nose normal.   Mouth/Throat: Oropharynx is clear and moist. Mallampati Score: II.   Neck: Normal range of motion. Neck supple. No JVD present.   Cardiovascular: Normal rate and regular rhythm.    Murmur heard.  Pulmonary/Chest: Normal expansion, effort normal and breath sounds normal. She has no decreased breath sounds. She exhibits no tenderness.   Abdominal: Soft.   Musculoskeletal: Normal range of motion.   Lymphadenopathy:     She has no axillary adenopathy.   Neurological: She is alert and oriented to person, place, and time. She has normal reflexes. No cranial nerve deficit.   Skin: Skin is warm and dry.   Psychiatric: She has a normal mood and affect.   Nursing note and vitals reviewed.    Personal Diagnostic Review  Chest x-ray:   The lungs are clear and free of infiltrate.  No pleural effusion or pneumothorax. The heart is not enlarged. There are 2 nodules noted within the " left lung 1 located at the junction of the left upper lung zone and left midlung zone 1 located within the left lower lung zone that are stable when compared to multiple prior studies.      Pulmonary function tests: FEV1: 2.23  (91.8 % predicted), FVC:  3.10 (99.4 % predicted), FEV1/FVC:  72    DOWNLOAD  Compliance Report  Usage 03/09/2018 - 06/06/2018  Usage days 89/90 days (99%)  >= 4 hours 85 days (94%)  < 4 hours 4 days (4%)  Usage hours 695 hours 20 minutes  Average usage (total days) 7 hours 44 minutes  Average usage (days used) 7 hours 49 minutes  Median usage (days used) 8 hours 1 minutes  AirSense 10 AutoSet  Serial number [id number]  Mode AutoSet  Min Pressure 4 cmH2O  Max Pressure 20 cmH2O  EPR Fulltime  EPR level 3  Response Standard  Therapy  Pressure - cmH2O Median: 5.0 95th percentile: 9.5 Maximum: 11.0  Leaks - L/min Median: 1.9 95th percentile: 14.4 Maximum: 22.8  Events per hour AI: 0.1 HI: 0.5 AHI: 0.6  Apnea Index Central: 0.0 Obstructive: 0.1 Unknown: 0.0  RERA Index 0.1  Cheyne-Gamboa respiration (average duration per night) 0 minutes (0%)  No flowsheet data found.      Assessment:       Problem List Items Addressed This Visit     HERB on CPAP - Primary (Chronic)     [place] score 2  Bed time 64518bs  Wake 6 15 am  APAP 4-20  Will change to 5-12         Relevant Orders    HME - OTHER    MyChart Patient Entered CPAP Usage    Mild intermittent asthma without complication     ACT score 25  Immunizations current  Both FEV1 and FVC significantly improved since 11/2016  Very infrequent MDI use         Pulmonary nodule     Semi GGO LLL  8.2 mm  Former smoker  Last imaging 10/2014           Other Visit Diagnoses     Lung nodule        Relevant Orders    CT Chest With Contrast    Creatinine, serum          Plan:       Using CPAP and benefits  Asthma Stable    Follow-up in about 1 year (around 6/7/2019) for chest CT next week, APAP 5-12, weight loss, PRN inhaler, Spirometry and CXR next visit, Download,  CP.    This note was prepared using voice recognition system and is likely to have sound alike errors that may have been overlooked even after proof reading.  Please call me with any questions    Discussed diagnosis, its evaluation, treatment and usual course. All questions answered.    Thank you for the courtesy of participating in the care of this patient    Laurent Jain MD

## 2018-06-08 ENCOUNTER — PATIENT MESSAGE (OUTPATIENT)
Dept: FAMILY MEDICINE | Facility: CLINIC | Age: 63
End: 2018-06-08

## 2018-06-12 ENCOUNTER — LAB VISIT (OUTPATIENT)
Dept: LAB | Facility: HOSPITAL | Age: 63
End: 2018-06-12
Attending: INTERNAL MEDICINE
Payer: COMMERCIAL

## 2018-06-12 ENCOUNTER — TELEPHONE (OUTPATIENT)
Dept: RADIOLOGY | Facility: HOSPITAL | Age: 63
End: 2018-06-12

## 2018-06-12 DIAGNOSIS — R91.1 LUNG NODULE: ICD-10-CM

## 2018-06-12 LAB
CREAT SERPL-MCNC: 0.7 MG/DL
EST. GFR  (AFRICAN AMERICAN): >60 ML/MIN/1.73 M^2
EST. GFR  (NON AFRICAN AMERICAN): >60 ML/MIN/1.73 M^2

## 2018-06-12 PROCEDURE — 82565 ASSAY OF CREATININE: CPT

## 2018-06-12 PROCEDURE — 36415 COLL VENOUS BLD VENIPUNCTURE: CPT | Mod: PO

## 2018-06-13 ENCOUNTER — OFFICE VISIT (OUTPATIENT)
Dept: FAMILY MEDICINE | Facility: CLINIC | Age: 63
End: 2018-06-13
Payer: COMMERCIAL

## 2018-06-13 ENCOUNTER — HOSPITAL ENCOUNTER (OUTPATIENT)
Dept: RADIOLOGY | Facility: HOSPITAL | Age: 63
Discharge: HOME | End: 2018-06-13
Attending: INTERNAL MEDICINE
Payer: COMMERCIAL

## 2018-06-13 VITALS
WEIGHT: 220.44 LBS | TEMPERATURE: 97 F | HEIGHT: 65 IN | RESPIRATION RATE: 18 BRPM | SYSTOLIC BLOOD PRESSURE: 138 MMHG | OXYGEN SATURATION: 98 % | DIASTOLIC BLOOD PRESSURE: 80 MMHG | BODY MASS INDEX: 36.73 KG/M2 | HEART RATE: 88 BPM

## 2018-06-13 DIAGNOSIS — E66.01 SEVERE OBESITY (BMI 35.0-39.9) WITH COMORBIDITY: ICD-10-CM

## 2018-06-13 DIAGNOSIS — G47.33 OSA ON CPAP: ICD-10-CM

## 2018-06-13 DIAGNOSIS — J30.9 ALLERGIC RHINITIS, UNSPECIFIED SEASONALITY, UNSPECIFIED TRIGGER: ICD-10-CM

## 2018-06-13 DIAGNOSIS — R63.5 ABNORMAL WEIGHT GAIN: ICD-10-CM

## 2018-06-13 DIAGNOSIS — Z78.9 STATIN INTOLERANCE: ICD-10-CM

## 2018-06-13 DIAGNOSIS — J44.89 ASTHMA WITH COPD: ICD-10-CM

## 2018-06-13 DIAGNOSIS — I10 ESSENTIAL HYPERTENSION: Primary | ICD-10-CM

## 2018-06-13 DIAGNOSIS — E55.9 VITAMIN D INSUFFICIENCY: ICD-10-CM

## 2018-06-13 DIAGNOSIS — E78.5 HYPERLIPIDEMIA, UNSPECIFIED HYPERLIPIDEMIA TYPE: ICD-10-CM

## 2018-06-13 DIAGNOSIS — I20.1 PRINZMETAL'S ANGINA: ICD-10-CM

## 2018-06-13 DIAGNOSIS — R91.1 PULMONARY NODULE: ICD-10-CM

## 2018-06-13 DIAGNOSIS — R91.1 LUNG NODULE: ICD-10-CM

## 2018-06-13 PROCEDURE — 71260 CT THORAX DX C+: CPT | Mod: TC,PO

## 2018-06-13 PROCEDURE — 25500020 PHARM REV CODE 255: Mod: PO | Performed by: INTERNAL MEDICINE

## 2018-06-13 PROCEDURE — 99214 OFFICE O/P EST MOD 30 MIN: CPT | Mod: S$GLB,,, | Performed by: FAMILY MEDICINE

## 2018-06-13 PROCEDURE — 71260 CT THORAX DX C+: CPT | Mod: 26,,, | Performed by: RADIOLOGY

## 2018-06-13 PROCEDURE — 3008F BODY MASS INDEX DOCD: CPT | Mod: CPTII,S$GLB,, | Performed by: FAMILY MEDICINE

## 2018-06-13 PROCEDURE — 3075F SYST BP GE 130 - 139MM HG: CPT | Mod: CPTII,S$GLB,, | Performed by: FAMILY MEDICINE

## 2018-06-13 PROCEDURE — 3079F DIAST BP 80-89 MM HG: CPT | Mod: CPTII,S$GLB,, | Performed by: FAMILY MEDICINE

## 2018-06-13 PROCEDURE — 99999 PR PBB SHADOW E&M-EST. PATIENT-LVL III: CPT | Mod: PBBFAC,,, | Performed by: FAMILY MEDICINE

## 2018-06-13 RX ORDER — HYDROCHLOROTHIAZIDE 12.5 MG/1
12.5 TABLET ORAL DAILY
Qty: 90 TABLET | Refills: 1 | Status: SHIPPED | OUTPATIENT
Start: 2018-06-13 | End: 2019-09-30

## 2018-06-13 RX ORDER — EZETIMIBE 10 MG/1
10 TABLET ORAL DAILY
Qty: 90 TABLET | Refills: 1 | Status: SHIPPED | OUTPATIENT
Start: 2018-06-13 | End: 2018-12-10 | Stop reason: SDUPTHER

## 2018-06-13 RX ORDER — VALSARTAN 80 MG/1
80 TABLET ORAL DAILY
Qty: 90 TABLET | Refills: 1 | Status: SHIPPED | OUTPATIENT
Start: 2018-06-13 | End: 2018-09-04 | Stop reason: ALTCHOICE

## 2018-06-13 RX ORDER — FLUTICASONE PROPIONATE 50 MCG
2 SPRAY, SUSPENSION (ML) NASAL DAILY
Qty: 16 G | Refills: 5 | Status: SHIPPED | OUTPATIENT
Start: 2018-06-13 | End: 2018-10-23 | Stop reason: SDUPTHER

## 2018-06-13 RX ADMIN — IOHEXOL 75 ML: 350 INJECTION, SOLUTION INTRAVENOUS at 12:06

## 2018-06-13 NOTE — PROGRESS NOTES
Subjective:       Patient ID: Ro Hernandez is a 62 y.o. female.    Chief Complaint: Chronic Care    HPI   Chronic Care  61yo female presents today for chronic care assessment. She reports stable BP with combination of Diovan and HCTZ. She is also on Verapamil and Imdur per Cardiology. She has not been monitoring her BP. She does take NTG intermittently- no recent use is reported. She describes having some palpitations attributed to vitamin D intake. She has discontinued use and symptoms have resolved. She is taking Zetia and has statin intolerance. Last lipid panel was in December 2017. She notes increase in post nasal drainage and has been utilizing Affrin for relief. Previously taking Flonase and would like RX. She notes that she has increased congestion in the evening hours which impacts her CPAP use. She has had recent assessment per Pulmonology with stable PFT's. She will have a CT chest later today. She notes her WWE is up to date per Dr. Richardson- states recent MMG was negative. There have been no recent ER visits or hospitalizations.    Review of Systems   Constitutional: Negative for fatigue and unexpected weight change.   HENT: Positive for congestion and postnasal drip. Negative for rhinorrhea and sore throat.    Eyes: Negative for visual disturbance.   Respiratory: Negative for cough and shortness of breath.    Cardiovascular: Negative for chest pain, palpitations and leg swelling.   Gastrointestinal: Negative for constipation and diarrhea.   Endocrine: Negative for cold intolerance and heat intolerance.   Genitourinary: Negative for decreased urine volume and difficulty urinating.   Musculoskeletal: Positive for arthralgias and back pain. Negative for myalgias.   Skin: Negative for rash.   Allergic/Immunologic: Positive for environmental allergies.   Neurological: Negative for dizziness and headaches.   Psychiatric/Behavioral: Negative for dysphoric mood and sleep disturbance. The patient is not  "nervous/anxious.        Objective:   /80   Pulse 88   Temp 97.3 °F (36.3 °C) (Temporal)   Resp 18   Ht 5' 5" (1.651 m)   Wt 100 kg (220 lb 7.4 oz)   SpO2 98%   BMI 36.69 kg/m²   Physical Exam   Constitutional: She is oriented to person, place, and time. She appears well-developed and well-nourished.   Obese, non-toxic   HENT:   Head: Normocephalic and atraumatic.   Right Ear: Tympanic membrane, external ear and ear canal normal.   Left Ear: Tympanic membrane, external ear and ear canal normal.   Nose: Nose normal.   Mouth/Throat: Oropharynx is clear and moist.   Eyes: Conjunctivae and EOM are normal. Pupils are equal, round, and reactive to light.   Neck: Normal range of motion. Neck supple.   Cardiovascular: Normal rate, regular rhythm and normal heart sounds.    Pulmonary/Chest: Effort normal and breath sounds normal.   Abdominal: Soft. Bowel sounds are normal.   Musculoskeletal: She exhibits no edema.   Neurological: She is alert and oriented to person, place, and time.   Skin: Skin is warm and dry.   Psychiatric: She has a normal mood and affect. Her behavior is normal.       Assessment:       1. Essential hypertension    2. Hyperlipidemia, unspecified hyperlipidemia type    3. Asthma with COPD    4. Pulmonary nodule    5. HERB on CPAP    6. Prinzmetal's angina    7. Abnormal weight gain    8. Allergic rhinitis, unspecified seasonality, unspecified trigger    9. Vitamin D insufficiency    10. Severe obesity (BMI 35.0-39.9) with comorbidity    11. Statin intolerance        Plan:      Essential hypertension  Stable. Continue with current treatment. Target BP goal remains<140/90. Heart healthy diet is advised. Intermittent home monitoring has been discussed.  -     Comprehensive metabolic panel; Future; Expected date: 06/13/2018  -     TSH; Future; Expected date: 06/13/2018  -     valsartan (DIOVAN) 80 MG tablet; Take 1 tablet (80 mg total) by mouth once daily.  Dispense: 90 tablet; Refill: 1  -     " hydroCHLOROthiazide (HYDRODIURIL) 12.5 MG Tab; Take 1 tablet (12.5 mg total) by mouth once daily.  Dispense: 90 tablet; Refill: 1    Hyperlipidemia, unspecified hyperlipidemia type  -     Lipid panel; Future; Expected date: 06/13/2018  -     ezetimibe (ZETIA) 10 mg tablet; Take 1 tablet (10 mg total) by mouth once daily.  Dispense: 90 tablet; Refill: 1    Asthma with COPD  Continue as per Pulmonology.    Pulmonary nodule  Continue as per Pulmonology.    HERB on CPAP  Continue as per Pulmonology.    Prinzmetal's angina  Continue as per Cardiology.    Abnormal weight gain  -     Hemoglobin A1c; Future; Expected date: 06/13/2018    Allergic rhinitis, unspecified seasonality, unspecified trigger  -     fluticasone (FLONASE) 50 mcg/actuation nasal spray; 2 sprays (100 mcg total) by Each Nare route once daily.  Dispense: 16 g; Refill: 5    Vitamin D insufficiency  Continue with supplementation.    Severe obesity (BMI 35.0-39.9) with comorbidity  Weight loss efforts are encouraged.    Statin intolerance  As above. Continue with Zetia.  RTC in July 2018 for wellness

## 2018-06-14 ENCOUNTER — PATIENT OUTREACH (OUTPATIENT)
Dept: ADMINISTRATIVE | Facility: HOSPITAL | Age: 63
End: 2018-06-14

## 2018-06-14 NOTE — LETTER
July 16, 2018    Dr Sammie Satnton             [facility]  [address]  [facility] [address]  Phone: [phone] July 16, 2018     Patient: Ro Hernandez    YOB: 1955   Date of Visit: 6/14/2018     To whom it may concern       We are seeing Ro Hernandez, JOELLEN.O.B is 1955, at [facility]. Marcela Chopra MD is their primary care physician. To help with our [facility] maintenance records could you please send the following:     Most recent Mammogram Result.    Please send fax to [phone], Attention Lexis YADAV LPN Care Coordination.    Thank-you in advance for your assistance. If you have any questions or concerns, please don't hesitate to contact me at [phone].     Lexis PATTEN LPN  Care Coordination Department  [facility]

## 2018-06-15 ENCOUNTER — LAB VISIT (OUTPATIENT)
Dept: LAB | Facility: HOSPITAL | Age: 63
End: 2018-06-15
Attending: FAMILY MEDICINE
Payer: COMMERCIAL

## 2018-06-15 DIAGNOSIS — R63.5 ABNORMAL WEIGHT GAIN: ICD-10-CM

## 2018-06-15 DIAGNOSIS — E78.5 HYPERLIPIDEMIA, UNSPECIFIED HYPERLIPIDEMIA TYPE: ICD-10-CM

## 2018-06-15 DIAGNOSIS — I10 ESSENTIAL HYPERTENSION: ICD-10-CM

## 2018-06-15 LAB
ALBUMIN SERPL BCP-MCNC: 3.7 G/DL
ALP SERPL-CCNC: 57 U/L
ALT SERPL W/O P-5'-P-CCNC: 15 U/L
ANION GAP SERPL CALC-SCNC: 9 MMOL/L
AST SERPL-CCNC: 19 U/L
BILIRUB SERPL-MCNC: 0.4 MG/DL
BUN SERPL-MCNC: 19 MG/DL
CALCIUM SERPL-MCNC: 9.4 MG/DL
CHLORIDE SERPL-SCNC: 105 MMOL/L
CHOLEST SERPL-MCNC: 208 MG/DL
CHOLEST/HDLC SERPL: 3.8 {RATIO}
CO2 SERPL-SCNC: 26 MMOL/L
CREAT SERPL-MCNC: 0.8 MG/DL
EST. GFR  (AFRICAN AMERICAN): >60 ML/MIN/1.73 M^2
EST. GFR  (NON AFRICAN AMERICAN): >60 ML/MIN/1.73 M^2
ESTIMATED AVG GLUCOSE: 100 MG/DL
GLUCOSE SERPL-MCNC: 80 MG/DL
HBA1C MFR BLD HPLC: 5.1 %
HDLC SERPL-MCNC: 55 MG/DL
HDLC SERPL: 26.4 %
LDLC SERPL CALC-MCNC: 117.8 MG/DL
NONHDLC SERPL-MCNC: 153 MG/DL
POTASSIUM SERPL-SCNC: 4.3 MMOL/L
PROT SERPL-MCNC: 6.9 G/DL
SODIUM SERPL-SCNC: 140 MMOL/L
TRIGL SERPL-MCNC: 176 MG/DL
TSH SERPL DL<=0.005 MIU/L-ACNC: 1.47 UIU/ML

## 2018-06-15 PROCEDURE — 84443 ASSAY THYROID STIM HORMONE: CPT

## 2018-06-15 PROCEDURE — 80053 COMPREHEN METABOLIC PANEL: CPT

## 2018-06-15 PROCEDURE — 83036 HEMOGLOBIN GLYCOSYLATED A1C: CPT

## 2018-06-15 PROCEDURE — 80061 LIPID PANEL: CPT

## 2018-06-15 PROCEDURE — 36415 COLL VENOUS BLD VENIPUNCTURE: CPT | Mod: PO

## 2018-06-18 ENCOUNTER — PATIENT MESSAGE (OUTPATIENT)
Dept: FAMILY MEDICINE | Facility: CLINIC | Age: 63
End: 2018-06-18

## 2018-06-24 ENCOUNTER — TELEPHONE (OUTPATIENT)
Dept: PULMONOLOGY | Facility: HOSPITAL | Age: 63
End: 2018-06-24

## 2018-06-24 DIAGNOSIS — N28.1 KIDNEY CYSTS: Primary | ICD-10-CM

## 2018-06-25 ENCOUNTER — TELEPHONE (OUTPATIENT)
Dept: PULMONOLOGY | Facility: CLINIC | Age: 63
End: 2018-06-25

## 2018-06-25 NOTE — TELEPHONE ENCOUNTER
----- Message from Laurent Jain MD sent at 6/24/2018  4:37 PM CDT -----  Please inform chest ct was okay but has large cyst in left kidney and needs further test: US  Mail report also

## 2018-07-06 ENCOUNTER — TELEPHONE (OUTPATIENT)
Dept: RADIOLOGY | Facility: HOSPITAL | Age: 63
End: 2018-07-06

## 2018-07-09 ENCOUNTER — HOSPITAL ENCOUNTER (OUTPATIENT)
Dept: RADIOLOGY | Facility: HOSPITAL | Age: 63
Discharge: HOME | End: 2018-07-09
Attending: INTERNAL MEDICINE
Payer: COMMERCIAL

## 2018-07-09 DIAGNOSIS — N28.1 KIDNEY CYSTS: ICD-10-CM

## 2018-07-09 PROCEDURE — 76770 US EXAM ABDO BACK WALL COMP: CPT | Mod: TC,PO

## 2018-07-09 PROCEDURE — 76770 US EXAM ABDO BACK WALL COMP: CPT | Mod: 26,,, | Performed by: RADIOLOGY

## 2018-07-10 ENCOUNTER — TELEPHONE (OUTPATIENT)
Dept: PULMONOLOGY | Facility: CLINIC | Age: 63
End: 2018-07-10

## 2018-07-10 DIAGNOSIS — N28.1 RENAL CYST: Primary | ICD-10-CM

## 2018-07-10 NOTE — TELEPHONE ENCOUNTER
----- Message from Laurent Jain MD sent at 7/9/2018 10:22 AM CDT -----  Please let her know cysts seen on Kidney  If has not see Urology before should see one

## 2018-07-16 ENCOUNTER — PATIENT OUTREACH (OUTPATIENT)
Dept: FAMILY MEDICINE | Facility: CLINIC | Age: 63
End: 2018-07-16

## 2018-07-17 ENCOUNTER — OFFICE VISIT (OUTPATIENT)
Dept: FAMILY MEDICINE | Facility: CLINIC | Age: 63
End: 2018-07-17
Payer: COMMERCIAL

## 2018-07-17 VITALS
HEIGHT: 65 IN | DIASTOLIC BLOOD PRESSURE: 76 MMHG | HEART RATE: 90 BPM | SYSTOLIC BLOOD PRESSURE: 122 MMHG | OXYGEN SATURATION: 96 % | BODY MASS INDEX: 36.65 KG/M2 | TEMPERATURE: 98 F | WEIGHT: 220 LBS

## 2018-07-17 DIAGNOSIS — I20.1 PRINZMETAL'S ANGINA: ICD-10-CM

## 2018-07-17 DIAGNOSIS — Z00.00 ANNUAL PHYSICAL EXAM: Primary | ICD-10-CM

## 2018-07-17 DIAGNOSIS — R91.1 PULMONARY NODULE: ICD-10-CM

## 2018-07-17 DIAGNOSIS — R63.5 ABNORMAL WEIGHT GAIN: ICD-10-CM

## 2018-07-17 DIAGNOSIS — E66.01 SEVERE OBESITY (BMI 35.0-39.9) WITH COMORBIDITY: ICD-10-CM

## 2018-07-17 DIAGNOSIS — Z78.9 STATIN INTOLERANCE: ICD-10-CM

## 2018-07-17 DIAGNOSIS — N28.1 COMPLEX RENAL CYST: ICD-10-CM

## 2018-07-17 DIAGNOSIS — Z87.891 HISTORY OF SMOKING: ICD-10-CM

## 2018-07-17 DIAGNOSIS — J44.89 ASTHMA WITH COPD: ICD-10-CM

## 2018-07-17 DIAGNOSIS — E78.5 HYPERLIPIDEMIA, UNSPECIFIED HYPERLIPIDEMIA TYPE: ICD-10-CM

## 2018-07-17 DIAGNOSIS — G47.33 OSA ON CPAP: ICD-10-CM

## 2018-07-17 DIAGNOSIS — I10 ESSENTIAL HYPERTENSION: ICD-10-CM

## 2018-07-17 PROCEDURE — 3078F DIAST BP <80 MM HG: CPT | Mod: CPTII,S$GLB,, | Performed by: FAMILY MEDICINE

## 2018-07-17 PROCEDURE — 3074F SYST BP LT 130 MM HG: CPT | Mod: CPTII,S$GLB,, | Performed by: FAMILY MEDICINE

## 2018-07-17 PROCEDURE — 99396 PREV VISIT EST AGE 40-64: CPT | Mod: S$GLB,,, | Performed by: FAMILY MEDICINE

## 2018-07-17 PROCEDURE — 99999 PR PBB SHADOW E&M-EST. PATIENT-LVL IV: CPT | Mod: PBBFAC,,, | Performed by: FAMILY MEDICINE

## 2018-07-17 NOTE — PROGRESS NOTES
Ro Hernandez 62 y.o. White female    HPI- The patient is presenting today for Annual Exam  63yo female presents today for annual examination. She is reporting stable vision. She has not had a recent eye exam- estimates her last check was 4 years ago. She has been followed at [place]. Hearing has been stable and denies any tinnitus or hearing loss. She has been struggling with weight gain. Since her last visit she has not gained any further. She has made dietary changes. Recent A1c screening is not consistent with diabetes. She is not currently exercising but she does wear a FITBIT and achieves 5,000 steps daily. No exertional intolerance is reported. She is sleeping well and averages at least 8 hours nightly. She has been compliant with CPAP use. She quit smoking 2 years ago in June 2018. She notes having recent assessment per Pulmonology with stable findings but a left complex renal cyst was identified. She has been advised to see Urology but arrangements have not been made. Mood symptoms are stable. She is followed by GYN (Dr. Sammie Richardson) and has had recent updated MMG. She continues to see Cardiology for routine follow-up with regard to her Prinzmetal's. Recent echocardiogram was reportedly stable. There have been no recent ER visits or hospitalizations.    Current providers- Dr. Chung (Pain Management); Dr. Richardson (GYN); Dr. Quiles (Cardiology); Dr. Jain (Pulm); [facility]    Past Medical History:   Diagnosis Date    Allergy     Arthritis 6/11/2013    COPD (chronic obstructive pulmonary disease)     GERD (gastroesophageal reflux disease)     Hypertension     Obesity 6/11/2013    Other and unspecified hyperlipidemia 6/11/2013    Prinzmetal's angina     Sleep apnea 6/11/2013     Past Surgical History:   Procedure Laterality Date    ABDOMINAL SURGERY      CHOLECYSTECTOMY      EYE SURGERY      FOOT SURGERY      TONSILLECTOMY      TUBAL LIGATION       Family History   Problem  Relation Age of Onset    Hypertension Mother     Cancer Mother     Kidney cancer Mother     COPD Father     Hypertension Father     Heart disease Sister     Hyperlipidemia Son     Colon cancer Neg Hx      Current Outpatient Prescriptions   Medication Sig Dispense Refill    diphenhydrAMINE (BENADRYL) 25 mg capsule Take 1 capsule by mouth At bedtime.      estrogen, conjugated,-medroxyprogesterone (PREMPRO) 0.3-1.5 mg per tablet Take 1 tablet by mouth Daily.      ezetimibe (ZETIA) 10 mg tablet Take 1 tablet (10 mg total) by mouth once daily. 90 tablet 1    fluticasone (FLONASE) 50 mcg/actuation nasal spray 2 sprays (100 mcg total) by Each Nare route once daily. 16 g 5    hydroCHLOROthiazide (HYDRODIURIL) 12.5 MG Tab Take 1 tablet (12.5 mg total) by mouth once daily. 90 tablet 1    hydrocodone-acetaminophen 10-325mg (NORCO)  mg Tab Take 1 tablet by mouth every 12 (twelve) hours.  0    isosorbide mononitrate (IMDUR) 30 MG 24 hr tablet Take 30 mg by mouth once daily.   0    MULTIVITAMIN (MULTIPLE VITAMIN ORAL) Take 1 tablet by mouth Daily.      naproxen (NAPROSYN) 500 MG tablet Take 1 tablet (500 mg total) by mouth 2 (two) times daily with meals. (Patient taking differently: Take 500 mg by mouth 2 (two) times daily as needed. ) 60 tablet 2    nitroGLYCERIN (NITROSTAT) 0.4 MG SL tablet DISSOLVE 1 TABLET UNDER THE TONGUE EVERY 5 MINUTES FOR 3 DOSES AS NEEDED FOR CHEST PAIN 25 tablet 0    omeprazole (PRILOSEC) 20 MG capsule Take 20 mg by mouth Daily.      valsartan (DIOVAN) 80 MG tablet Take 1 tablet (80 mg total) by mouth once daily. 90 tablet 1    verapamil (CALAN-SR) 240 MG CR tablet Take 1 tablet (240 mg total) by mouth once daily. 30 tablet 0     No current facility-administered medications for this visit.      Allergies-  Review of patient's allergies indicates:   Allergen Reactions    Ace inhibitors      Other reaction(s): Hives    Lisinopril      Other reaction(s): rash    Pravastatin   "    Other reaction(s): Mental Changes    Rosuvastatin      Other reaction(s): Muscle pain    Simvastatin      Other reaction(s): leg cramps     ROS-   CONSTITUTIONAL- No fever, chills, fatigue or weight loss, +weight gain  HEENT- No vision changes, ear pain, hearing loss, sore throat  CHEST- No cough, shortness of breath  CV- No chest pain, palpitations, edema  Abdomen- No abdominal pain, change in bowel habits, blood in stool  - No change in urination, no dysuria, no hematuria  Neurologic- No headaches, no dizziness, no weakness  Musculoskeletal- No joint pain, + back pain, no myalgias  Endocrine- No polydypsia, polyphagia or polyuria, no heat or cold intolerance  Hematologic- No bruising or bleeding, no lymphadenopathy  Psych- No change in mood, no trouble with sleep  Integument- No rashes, no skin changes    /76 (BP Location: Right arm, Patient Position: Sitting, BP Method: Large (Manual))   Pulse 90   Temp 97.5 °F (36.4 °C) (Tympanic)   Ht 5' 5" (1.651 m)   Wt 99.8 kg (219 lb 15.8 oz)   SpO2 96%   BMI 36.61 kg/m²     PE-  CONSTITIONAL- Well developed, well nourished, no acute distress  HEENT- Normal cephalic, atraumatic, PERRLA, right ear external- no abnormality, left ear external- no abnormality, right TM- normal to visualization, left TM- normal to visualization, moist mucus membranes, no oropharyngeal abnormality visualized nasal turbinates are clear  Neck- Full range of motion, no thyromegaly, no cervical lymphadenopathy  CV- Normal rate and rhythm, heart sounds normal, + murmur, no rubs or gallops  Chest- Breath sounds are normal and equal bilaterally, normal inspiratory and expiratory efforts  Abdomen- Bowel sounds are equal in all four quadrants, non tender to palpation, soft, no distention  Musculoskeletal- Normal ROM of the extremities, no tenderness  Neurologic- Alert, orientated x 3, cranial nerves intact  Skin- Warm and dry to touch, no rashes, no visible lesions  Psych- Mood and " affect normal, Behavior normal    Assessment and Plan-  Annual physical exam  General health screening recommendations were reviewed with the patient in office today. Emphasized healthy eating and regular physical activity. Anticipatory guidance has been provided with regard to age and informational handouts have been given. Next well check is recommended in 1 year, rtc sooner for routine chronic care assessment.     Essential hypertension  Stable. Continue with current treatment. Target BP goal remains<140/90. Heart healthy diet is advised. Intermittent home monitoring has been discussed.    Hyperlipidemia, unspecified hyperlipidemia type  Continue with Zetia at current dosing.    Asthma with COPD  Continue with current treatment and follow-up recommendations as per Pulmonology.    Pulmonary nodule  Continue with current treatment and follow-up recommendations as per Pulmonology.    HERB on CPAP  Continue with current treatment and follow-up recommendations as per Pulmonology.    Prinzmetal's angina  Continue with current treatment and follow-up recommendations as per Cardiology.    Abnormal weight gain  -     Glucose Tolerance 2 Hour; Future; Expected date: 07/17/2018    Complex renal cyst  -     Ambulatory Referral to Urology    Severe obesity (BMI 35.0-39.9) with comorbidity  Weight loss efforts remain encouraged through diet and lifestyle measures.    Statin intolerance  Continue with Zetia at current dosing in combination with heart healthy diet.    History of smoking    Records request [facility] from [facility] (Dr. Sammie Richardson)

## 2018-07-18 ENCOUNTER — LAB VISIT (OUTPATIENT)
Dept: LAB | Facility: HOSPITAL | Age: 63
End: 2018-07-18
Attending: FAMILY MEDICINE
Payer: COMMERCIAL

## 2018-07-18 DIAGNOSIS — R63.5 ABNORMAL WEIGHT GAIN: ICD-10-CM

## 2018-07-18 LAB
GLUCOSE SERPL-MCNC: 113 MG/DL
GLUCOSE SERPL-MCNC: 79 MG/DL
GLUCOSE SERPL-MCNC: 88 MG/DL

## 2018-07-18 PROCEDURE — 36415 COLL VENOUS BLD VENIPUNCTURE: CPT | Mod: PO

## 2018-07-18 PROCEDURE — 82951 GLUCOSE TOLERANCE TEST (GTT): CPT

## 2018-08-31 ENCOUNTER — TELEPHONE (OUTPATIENT)
Dept: FAMILY MEDICINE | Facility: CLINIC | Age: 63
End: 2018-08-31

## 2018-08-31 NOTE — TELEPHONE ENCOUNTER
[facility] sent [facility] a request stating that pt valsartan 80 mg has been recalled.    lov 7/17/18  Last labs 7/18/18

## 2018-09-04 DIAGNOSIS — I10 ESSENTIAL HYPERTENSION: Primary | ICD-10-CM

## 2018-09-04 RX ORDER — LOSARTAN POTASSIUM 50 MG/1
50 TABLET ORAL DAILY
Qty: 90 TABLET | Refills: 3 | Status: SHIPPED | OUTPATIENT
Start: 2018-09-04 | End: 2018-09-18 | Stop reason: SDUPTHER

## 2018-09-04 NOTE — TELEPHONE ENCOUNTER
RX for Losartan has been sent locally. Please schedule patient for a nurse visit for BP check within the next 2 weeks. If BP is stable with the change from Valsartan I will then send a new RX to her mail order. Let me know if there are questions.

## 2018-09-05 ENCOUNTER — TELEPHONE (OUTPATIENT)
Dept: FAMILY MEDICINE | Facility: CLINIC | Age: 63
End: 2018-09-05

## 2018-09-05 NOTE — TELEPHONE ENCOUNTER
Losartan was already sent and I asked that she come in for a follow-up visit once she has started the new medication. Home BP monitoring is also recommended in the interim. Please make arrangements this month if not already completed.

## 2018-09-05 NOTE — TELEPHONE ENCOUNTER
Received valsartan 80 mg from [facility] stating that the medication was recalled.   Pt will need alternative [facility].

## 2018-09-06 ENCOUNTER — OFFICE VISIT (OUTPATIENT)
Dept: URGENT CARE | Facility: CLINIC | Age: 63
End: 2018-09-06
Payer: COMMERCIAL

## 2018-09-06 VITALS
HEIGHT: 65 IN | RESPIRATION RATE: 18 BRPM | DIASTOLIC BLOOD PRESSURE: 72 MMHG | TEMPERATURE: 98 F | OXYGEN SATURATION: 97 % | SYSTOLIC BLOOD PRESSURE: 170 MMHG | BODY MASS INDEX: 37.74 KG/M2 | WEIGHT: 226.5 LBS | HEART RATE: 84 BPM

## 2018-09-06 DIAGNOSIS — J32.9 SINUSITIS, UNSPECIFIED CHRONICITY, UNSPECIFIED LOCATION: Primary | ICD-10-CM

## 2018-09-06 DIAGNOSIS — J40 BRONCHITIS: ICD-10-CM

## 2018-09-06 LAB
CTP QC/QA: YES
S PYO RRNA THROAT QL PROBE: NEGATIVE

## 2018-09-06 PROCEDURE — 87081 CULTURE SCREEN ONLY: CPT

## 2018-09-06 PROCEDURE — 99999 PR PBB SHADOW E&M-EST. PATIENT-LVL V: CPT | Mod: PBBFAC,,, | Performed by: NURSE PRACTITIONER

## 2018-09-06 PROCEDURE — 87880 STREP A ASSAY W/OPTIC: CPT | Mod: QW,S$GLB,, | Performed by: NURSE PRACTITIONER

## 2018-09-06 PROCEDURE — 96372 THER/PROPH/DIAG INJ SC/IM: CPT | Mod: S$GLB,,, | Performed by: NURSE PRACTITIONER

## 2018-09-06 PROCEDURE — 3077F SYST BP >= 140 MM HG: CPT | Mod: CPTII,S$GLB,, | Performed by: NURSE PRACTITIONER

## 2018-09-06 PROCEDURE — 3078F DIAST BP <80 MM HG: CPT | Mod: CPTII,S$GLB,, | Performed by: NURSE PRACTITIONER

## 2018-09-06 PROCEDURE — 99214 OFFICE O/P EST MOD 30 MIN: CPT | Mod: 25,S$GLB,, | Performed by: NURSE PRACTITIONER

## 2018-09-06 RX ORDER — BETAMETHASONE SODIUM PHOSPHATE AND BETAMETHASONE ACETATE 3; 3 MG/ML; MG/ML
6 INJECTION, SUSPENSION INTRA-ARTICULAR; INTRALESIONAL; INTRAMUSCULAR; SOFT TISSUE
Status: COMPLETED | OUTPATIENT
Start: 2018-09-06 | End: 2018-09-06

## 2018-09-06 RX ORDER — CODEINE PHOSPHATE AND GUAIFENESIN 10; 100 MG/5ML; MG/5ML
5 SOLUTION ORAL EVERY 6 HOURS PRN
Qty: 180 ML | Refills: 0 | Status: SHIPPED | OUTPATIENT
Start: 2018-09-06 | End: 2018-09-16

## 2018-09-06 RX ORDER — DOXYCYCLINE 100 MG/1
100 CAPSULE ORAL EVERY 12 HOURS
Qty: 20 CAPSULE | Refills: 0 | Status: SHIPPED | OUTPATIENT
Start: 2018-09-06 | End: 2018-09-16

## 2018-09-06 RX ADMIN — BETAMETHASONE SODIUM PHOSPHATE AND BETAMETHASONE ACETATE 6 MG: 3; 3 INJECTION, SUSPENSION INTRA-ARTICULAR; INTRALESIONAL; INTRAMUSCULAR; SOFT TISSUE at 05:09

## 2018-09-06 NOTE — PROGRESS NOTES
"Subjective:      Patient ID: Ro Hernandez is a 63 y.o. female.    Chief Complaint: Cough; Sinusitis; and Sore Throat    Cough   This is a new problem. The current episode started in the past 7 days. The problem has been gradually worsening. The problem occurs constantly. The cough is productive of sputum. Associated symptoms include headaches, nasal congestion, postnasal drip, a sore throat, shortness of breath and wheezing. Pertinent negatives include no fever. Risk factors: redoing wood floors in the house and there is a lot of dust and debris. Treatments tried: Flonase. The treatment provided no relief. Her past medical history is significant for asthma (improved greatly after she stopped smoking), COPD and pneumonia (as a child had chronic pneumonia).     Review of Systems   Constitutional: Negative.  Negative for fever.   HENT: Positive for congestion, postnasal drip and sore throat.    Respiratory: Positive for cough, shortness of breath and wheezing.    Cardiovascular: Negative.    Gastrointestinal: Negative.    Musculoskeletal: Negative.    Skin: Negative.    Neurological: Positive for headaches.   Hematological: Negative.        Objective:   BP (!) 170/72 (BP Location: Right arm, Patient Position: Sitting)   Pulse 84   Temp 97.7 °F (36.5 °C) (Tympanic)   Resp 18   Ht 5' 5" (1.651 m)   Wt 102.7 kg (226 lb 8.4 oz)   SpO2 97%   BMI 37.70 kg/m²   Physical Exam   Constitutional: She is oriented to person, place, and time. She appears well-developed and well-nourished. She is active and cooperative. No distress.   HENT:   Head: Normocephalic and atraumatic.   Right Ear: A middle ear effusion is present.   Left Ear: A middle ear effusion is present.   Nose: Mucosal edema and sinus tenderness present.   Mouth/Throat: Uvula is midline and mucous membranes are normal. Posterior oropharyngeal erythema present. No oropharyngeal exudate or posterior oropharyngeal edema.   Eyes: Right eye exhibits no discharge. " Left eye exhibits no discharge.   Neck: Normal range of motion. Neck supple.   Cardiovascular: Regular rhythm and normal heart sounds.   Pulmonary/Chest: Effort normal. She has wheezes (diffuse expiratory wheezing). She has no rales.   Musculoskeletal: Normal range of motion.   Lymphadenopathy:     She has no cervical adenopathy.   Neurological: She is alert and oriented to person, place, and time.   Skin: Skin is warm. No rash noted. She is not diaphoretic.   Nursing note and vitals reviewed.    Assessment:      1. Sinusitis, unspecified chronicity, unspecified location    2. Bronchitis       Plan:   Sinusitis, unspecified chronicity, unspecified location  -     doxycycline (VIBRAMYCIN) 100 MG Cap; Take 1 capsule (100 mg total) by mouth every 12 (twelve) hours. for 10 days  Dispense: 20 capsule; Refill: 0    Bronchitis  -     guaifenesin-codeine 100-10 mg/5 ml (TUSSI-ORGANIDIN NR)  mg/5 mL syrup; Take 5 mLs by mouth every 6 (six) hours as needed for Cough.  Dispense: 180 mL; Refill: 0  -     betamethasone acetate-betamethasone sodium phosphate injection 6 mg; Inject 1 mL (6 mg total) into the muscle one time.    Instructions, follow up, and supportive care as per AVS.  Follow up with PCP if not improved or for any new or worsening symptoms.  Advised of potential for drowsiness with cough syrup. No driving, alcohol, or other potentially sedating medications while taking this medication.    Chest X-ray deferred today. Mrs. Hernandez has had pneumonia in the past and states that this does not feel like pneumonia. She will notify PCP or myself if symptoms aren't improving and would likely need imaging if not better.

## 2018-09-10 LAB — BACTERIA THROAT CULT: NORMAL

## 2018-09-18 ENCOUNTER — HOSPITAL ENCOUNTER (OUTPATIENT)
Dept: RADIOLOGY | Facility: HOSPITAL | Age: 63
Discharge: HOME | End: 2018-09-18
Attending: FAMILY MEDICINE
Payer: COMMERCIAL

## 2018-09-18 ENCOUNTER — TELEPHONE (OUTPATIENT)
Dept: FAMILY MEDICINE | Facility: CLINIC | Age: 63
End: 2018-09-18

## 2018-09-18 ENCOUNTER — OFFICE VISIT (OUTPATIENT)
Dept: FAMILY MEDICINE | Facility: CLINIC | Age: 63
End: 2018-09-18
Payer: COMMERCIAL

## 2018-09-18 VITALS
DIASTOLIC BLOOD PRESSURE: 80 MMHG | RESPIRATION RATE: 17 BRPM | SYSTOLIC BLOOD PRESSURE: 136 MMHG | TEMPERATURE: 98 F | HEIGHT: 65 IN | WEIGHT: 221.81 LBS | BODY MASS INDEX: 36.96 KG/M2 | OXYGEN SATURATION: 97 % | HEART RATE: 86 BPM

## 2018-09-18 DIAGNOSIS — J01.00 ACUTE MAXILLARY SINUSITIS, RECURRENCE NOT SPECIFIED: Primary | ICD-10-CM

## 2018-09-18 DIAGNOSIS — I10 ESSENTIAL HYPERTENSION: ICD-10-CM

## 2018-09-18 DIAGNOSIS — J44.89 ASTHMA WITH COPD: ICD-10-CM

## 2018-09-18 DIAGNOSIS — E66.01 SEVERE OBESITY (BMI 35.0-39.9) WITH COMORBIDITY: ICD-10-CM

## 2018-09-18 PROCEDURE — 3075F SYST BP GE 130 - 139MM HG: CPT | Mod: CPTII,S$GLB,, | Performed by: FAMILY MEDICINE

## 2018-09-18 PROCEDURE — 3079F DIAST BP 80-89 MM HG: CPT | Mod: CPTII,S$GLB,, | Performed by: FAMILY MEDICINE

## 2018-09-18 PROCEDURE — 99999 PR PBB SHADOW E&M-EST. PATIENT-LVL III: CPT | Mod: PBBFAC,,, | Performed by: FAMILY MEDICINE

## 2018-09-18 PROCEDURE — 71046 X-RAY EXAM CHEST 2 VIEWS: CPT | Mod: TC,PO

## 2018-09-18 PROCEDURE — 1101F PT FALLS ASSESS-DOCD LE1/YR: CPT | Mod: CPTII,S$GLB,, | Performed by: FAMILY MEDICINE

## 2018-09-18 PROCEDURE — 99214 OFFICE O/P EST MOD 30 MIN: CPT | Mod: S$GLB,,, | Performed by: FAMILY MEDICINE

## 2018-09-18 PROCEDURE — 71046 X-RAY EXAM CHEST 2 VIEWS: CPT | Mod: 26,,, | Performed by: RADIOLOGY

## 2018-09-18 PROCEDURE — 3008F BODY MASS INDEX DOCD: CPT | Mod: CPTII,S$GLB,, | Performed by: FAMILY MEDICINE

## 2018-09-18 RX ORDER — PREDNISONE 20 MG/1
TABLET ORAL
Qty: 10 TABLET | Refills: 0 | Status: SHIPPED | OUTPATIENT
Start: 2018-09-18 | End: 2018-10-25

## 2018-09-18 RX ORDER — LOSARTAN POTASSIUM 50 MG/1
50 TABLET ORAL DAILY
Qty: 90 TABLET | Refills: 0 | Status: SHIPPED | OUTPATIENT
Start: 2018-09-18 | End: 2018-10-25

## 2018-09-18 RX ORDER — AMOXICILLIN AND CLAVULANATE POTASSIUM 875; 125 MG/1; MG/1
1 TABLET, FILM COATED ORAL EVERY 12 HOURS
Qty: 20 TABLET | Refills: 0 | Status: SHIPPED | OUTPATIENT
Start: 2018-09-18 | End: 2018-10-25

## 2018-09-18 NOTE — PROGRESS NOTES
Subjective:       Patient ID: Ro Hernandez is a 63 y.o. female.    Chief Complaint: Cough    Cough   This is a new problem. The current episode started 1 to 4 weeks ago. The problem has been unchanged. The problem occurs every few hours. The cough is productive of sputum. Associated symptoms include ear pain (left ear), headaches, nasal congestion and postnasal drip. Pertinent negatives include no chest pain, chills, eye redness, fever, myalgias, rash, sore throat or shortness of breath. Nothing aggravates the symptoms. Treatments tried: Doxycycline, Tussi-Organdin, Betamethasone injection. The treatment provided mild relief. Her past medical history is significant for environmental allergies.   Patient presents with report of persistent cough. Symptoms have been ongoing for the past 2-3 weeks. She was evaluated at  on 9/6/18 and was placed on Doxy which she has completed but symptoms have not resolved. CXR was not obtained. She has noted some improvement but no resolution. Persistent left ear pain is reported. Hearing is stable. She is afebrile. Notes having some recent work on her bamboo wood floors which seems to have contributed to symptoms. Patient is also reporting in spite of attempts to transition off Valsartan to Losartan she has not obtained RX. BP control has been stable.     Review of Systems   Constitutional: Negative for chills and fever.   HENT: Positive for congestion, ear pain (left ear), postnasal drip and sinus pressure. Negative for hearing loss and sore throat.    Eyes: Positive for discharge and itching. Negative for redness and visual disturbance.   Respiratory: Positive for cough and chest tightness. Negative for shortness of breath.    Cardiovascular: Negative for chest pain and palpitations.   Gastrointestinal: Negative for abdominal pain, diarrhea, nausea and vomiting.   Genitourinary: Negative for decreased urine volume and difficulty urinating.   Musculoskeletal: Negative for  "arthralgias and myalgias.   Skin: Negative for rash.   Allergic/Immunologic: Positive for environmental allergies.   Neurological: Positive for headaches. Negative for dizziness and weakness.   Hematological: Positive for adenopathy.   Psychiatric/Behavioral: Positive for sleep disturbance (due to cough).       Objective:   /80   Pulse 86   Temp 97.9 °F (36.6 °C) (Temporal)   Resp 17   Ht 5' 5" (1.651 m)   Wt 100.6 kg (221 lb 12.5 oz)   SpO2 97%   BMI 36.91 kg/m²   Physical Exam   Constitutional: She is oriented to person, place, and time. She appears well-developed and well-nourished. No distress.   Obese, non-toxic   HENT:   Head: Normocephalic and atraumatic.   Right Ear: Tympanic membrane, external ear and ear canal normal.   Left Ear: Tympanic membrane, external ear and ear canal normal.   Nose: Mucosal edema present. No rhinorrhea. Right sinus exhibits no maxillary sinus tenderness. Left sinus exhibits maxillary sinus tenderness.   Mouth/Throat: Oropharynx is clear and moist.   Eyes: Conjunctivae and EOM are normal. Pupils are equal, round, and reactive to light.   Neck: Normal range of motion. Neck supple.   Cardiovascular: Normal rate, regular rhythm and normal heart sounds.   Pulmonary/Chest: Effort normal. She has decreased breath sounds in the right lower field and the left lower field.   Abdominal: Soft. Bowel sounds are normal.   Musculoskeletal: She exhibits no edema.   Neurological: She is alert and oriented to person, place, and time.   Skin: Skin is warm and dry. She is not diaphoretic.   Psychiatric: She has a normal mood and affect. Her behavior is normal.       Assessment:       1. Acute maxillary sinusitis, recurrence not specified    2. Asthma with COPD    3. Essential hypertension    4. Severe obesity (BMI 35.0-39.9) with comorbidity        Plan:      Acute maxillary sinusitis, recurrence not specified  Recommend continuation of Flonase. Consider nasal saline rinses. Will provide " Augmentin as completion of Doxycycline has not afforded resolution. Low threshold for ENT eval if symptoms do not improve.  -     amoxicillin-clavulanate 875-125mg (AUGMENTIN) 875-125 mg per tablet; Take 1 tablet by mouth every 12 (twelve) hours.  Dispense: 20 tablet; Refill: 0    Asthma with COPD  No evidence of PNA. Will provide Prednisone as below. Discussed use of Albuterol as well. Proceed as above.  -     X-Ray Chest PA And Lateral; Future; Expected date: 09/18/2018  -     predniSONE (DELTASONE) 20 MG tablet; Take 2 tabs po daily for 3 days then 1 tab po daily for 4 days  Dispense: 10 tablet; Refill: 0    Essential hypertension  Will transition to Losartan. Patient will need a nurse visit for BP check within the next 2 weeks. Home monitoring has also been advised. Target BP goal remains <140/90. Continue with remainder of routine medications.  -     losartan (COZAAR) 50 MG tablet; Take 1 tablet (50 mg total) by mouth once daily.  Dispense: 90 tablet; Refill: 0    Severe obesity (BMI 35.0-39.9) with comorbidity  Weight loss efforts remain encouraged.

## 2018-09-18 NOTE — TELEPHONE ENCOUNTER
Nurse visit for BP check in 2 weeks. Also, see result note for CXR please for instruction. Nasal saline rinses can be added as needed.

## 2018-10-02 ENCOUNTER — TELEPHONE (OUTPATIENT)
Dept: FAMILY MEDICINE | Facility: CLINIC | Age: 63
End: 2018-10-02

## 2018-10-02 ENCOUNTER — CLINICAL SUPPORT (OUTPATIENT)
Dept: FAMILY MEDICINE | Facility: CLINIC | Age: 63
End: 2018-10-02
Payer: COMMERCIAL

## 2018-10-02 VITALS — SYSTOLIC BLOOD PRESSURE: 120 MMHG | DIASTOLIC BLOOD PRESSURE: 78 MMHG

## 2018-10-02 NOTE — TELEPHONE ENCOUNTER
Pt came in for bp check . bp was 120/78. Pt states she doesn't think the medication is working bc she feels weird when she takes it she couldn't describe how she feels. But  She said she has Prinzmetal's angina. and you are aware of it. Please advise

## 2018-10-23 DIAGNOSIS — J30.9 ALLERGIC RHINITIS, UNSPECIFIED SEASONALITY, UNSPECIFIED TRIGGER: ICD-10-CM

## 2018-10-23 RX ORDER — FLUTICASONE PROPIONATE 50 MCG
2 SPRAY, SUSPENSION (ML) NASAL DAILY
Qty: 48 G | Refills: 1 | Status: SHIPPED | OUTPATIENT
Start: 2018-10-23 | End: 2019-07-26 | Stop reason: SDUPTHER

## 2018-10-25 ENCOUNTER — LAB VISIT (OUTPATIENT)
Dept: LAB | Facility: HOSPITAL | Age: 63
End: 2018-10-25
Attending: UROLOGY
Payer: COMMERCIAL

## 2018-10-25 ENCOUNTER — OFFICE VISIT (OUTPATIENT)
Dept: UROLOGY | Facility: CLINIC | Age: 63
End: 2018-10-25
Payer: COMMERCIAL

## 2018-10-25 VITALS
DIASTOLIC BLOOD PRESSURE: 82 MMHG | BODY MASS INDEX: 37.43 KG/M2 | SYSTOLIC BLOOD PRESSURE: 124 MMHG | WEIGHT: 224.63 LBS | HEIGHT: 65 IN

## 2018-10-25 DIAGNOSIS — N28.1 RENAL CYST: Primary | ICD-10-CM

## 2018-10-25 DIAGNOSIS — N28.1 RENAL CYST: ICD-10-CM

## 2018-10-25 LAB
BILIRUB SERPL-MCNC: NORMAL MG/DL
BLOOD URINE, POC: NORMAL
COLOR, POC UA: YELLOW
CREAT SERPL-MCNC: 0.8 MG/DL
EST. GFR  (AFRICAN AMERICAN): >60 ML/MIN/1.73 M^2
EST. GFR  (NON AFRICAN AMERICAN): >60 ML/MIN/1.73 M^2
GLUCOSE UR QL STRIP: NORMAL
KETONES UR QL STRIP: NORMAL
LEUKOCYTE ESTERASE URINE, POC: NORMAL
NITRITE, POC UA: NORMAL
PH, POC UA: 5
PROTEIN, POC: NORMAL
SPECIFIC GRAVITY, POC UA: 1.01
UROBILINOGEN, POC UA: NORMAL

## 2018-10-25 PROCEDURE — 36415 COLL VENOUS BLD VENIPUNCTURE: CPT

## 2018-10-25 PROCEDURE — 82565 ASSAY OF CREATININE: CPT

## 2018-10-25 PROCEDURE — 99999 PR PBB SHADOW E&M-EST. PATIENT-LVL III: CPT | Mod: PBBFAC,,, | Performed by: UROLOGY

## 2018-10-25 PROCEDURE — 99244 OFF/OP CNSLTJ NEW/EST MOD 40: CPT | Mod: 25,S$GLB,, | Performed by: UROLOGY

## 2018-10-25 PROCEDURE — 81002 URINALYSIS NONAUTO W/O SCOPE: CPT | Mod: S$GLB,,, | Performed by: UROLOGY

## 2018-10-25 RX ORDER — VALSARTAN 80 MG/1
TABLET ORAL
Refills: 3 | COMMUNITY
Start: 2018-10-04 | End: 2019-02-08 | Stop reason: SDUPTHER

## 2018-10-25 NOTE — LETTER
October 25, 2018      Marcela Chopra [place]  [address] [address] [address]           [facility]  [address] [place] [id number]  Phone: [phone]  Fax: [phone]          Patient: Ro Hernandez   MR Number: [id number]   YOB: 1955   Date of Visit: 10/25/2018       Dear Dr. Marcela Chopra:    Thank you for referring Ro Hernandez to me for evaluation. Attached you will find relevant portions of my assessment and plan of care.    If you have questions, please do not hesitate to call me. I look forward to following Ro Hernandez along with you.    Sincerely,    Blade Livingston IV, MD    Enclosure  CC:  No Recipients    If you would like to receive this communication electronically, please contact [email or web address] or [phone] to request more information on [employer] Link access.    For providers and/or their staff who would like to refer a patient to [facility], please contact us through our one-stop-shop provider referral line, [facility], at [phone].    If you feel you have received this communication in error or would no longer like to receive these types of communications, please e-mail [email or web address]

## 2018-10-25 NOTE — PROGRESS NOTES
Chief Complaint: Renal Cyst    HPI:   10/25/18: 64 yo woman referred by Dr. Flores for finding of a complex renal cyst seen on CT ordered by pulmonology.  No abd/pelvic pain and no exac/rel factors.  No hematuria.  No urolithiasis.  No urinary bother.  No  history.  Normal sexual function.  Had an US that shows a 6cm left renal cyst with a septation and calcification.     Allergies:  Ace inhibitors; Lisinopril; Pravastatin; Rosuvastatin; and Simvastatin    Medications: has a current medication list which includes the following prescription(s): diphenhydramine, estrogen (conjugated)-medroxyprogesterone 0.3-1.5 mg, ezetimibe, fluticasone, hydrochlorothiazide, hydrocodone-acetaminophen, isosorbide mononitrate, multivitamin, naproxen, nitroglycerin, omeprazole, valsartan, and verapamil.    Review of Systems:  General: No fever, chills, fatigability, or weight loss.  Skin: No rashes, itching, or changes in color or texture of skin.  Chest: Denies MALCOLM, cyanosis, wheezing, cough, and sputum production.  Abdomen: Appetite fine. No weight loss. Denies diarrhea, abdominal pain, hematemesis, or blood in stool.  Musculoskeletal: No joint stiffness or swelling. Denies back pain.  : As above.  All other review of systems negative.    PMH:   has a past medical history of Allergy, Arthritis (6/11/2013), COPD (chronic obstructive pulmonary disease), GERD (gastroesophageal reflux disease), Hypertension, Obesity (6/11/2013), Other and unspecified hyperlipidemia (6/11/2013), Prinzmetal's angina, and Sleep apnea (6/11/2013).    PSH:   has a past surgical history that includes Eye surgery; Foot surgery; Cholecystectomy; Tonsillectomy; Tubal ligation; Abdominal surgery; and COLONOSCOPY (N/A, 4/24/2014).    FamHx: family history includes COPD in her father; Cancer in her mother; Heart disease in her sister; Hyperlipidemia in her son; Hypertension in her father and mother; Kidney cancer in her mother.    SocHx:  reports that she quit  smoking about 2 years ago. Her smoking use included cigarettes. She has a 1.00 pack-year smoking history. she has never used smokeless tobacco. She reports that she does not drink alcohol or use drugs.     Physical Exam:  Vitals:   Vitals:    10/25/18 1316   BP: 124/82     General: A&Ox3. No apparent distress. No deformities.  Neck: No masses. Normal thyroid.  Lungs: normal inspiration. No use of accessory muscles.  Heart: normal pulse. No arrhythmias.  Abdomen: Soft. NT. ND. No masses. No hernias. No hepatosplenomegaly.  Lymphatic: Neck and groin nodes negative.  Skin: The skin is warm and dry. No jaundice.  Ext: No c/c/e.  : External genitalia normal.     Labs/Studies:   Urinalysis performed in clinic, summary: UA normal    Impression/Plan:   1. CT renal mass protocol to confirm mass Bos2 and no worse, US/RTC 1 year.

## 2018-11-01 ENCOUNTER — HOSPITAL ENCOUNTER (OUTPATIENT)
Dept: RADIOLOGY | Facility: HOSPITAL | Age: 63
Discharge: HOME | End: 2018-11-01
Attending: UROLOGY
Payer: COMMERCIAL

## 2018-11-01 DIAGNOSIS — N28.1 RENAL CYST: ICD-10-CM

## 2018-11-01 PROCEDURE — 25500020 PHARM REV CODE 255: Performed by: UROLOGY

## 2018-11-01 PROCEDURE — 74178 CT ABD&PLV WO CNTR FLWD CNTR: CPT | Mod: TC

## 2018-11-01 RX ADMIN — IOHEXOL 75 ML: 350 INJECTION, SOLUTION INTRAVENOUS at 03:11

## 2018-11-12 ENCOUNTER — HOSPITAL ENCOUNTER (OUTPATIENT)
Dept: RADIOLOGY | Facility: HOSPITAL | Age: 63
Discharge: HOME | End: 2018-11-12
Attending: FAMILY MEDICINE
Payer: COMMERCIAL

## 2018-11-12 ENCOUNTER — OFFICE VISIT (OUTPATIENT)
Dept: FAMILY MEDICINE | Facility: CLINIC | Age: 63
End: 2018-11-12
Payer: COMMERCIAL

## 2018-11-12 DIAGNOSIS — J30.9 ALLERGIC RHINITIS, UNSPECIFIED SEASONALITY, UNSPECIFIED TRIGGER: ICD-10-CM

## 2018-11-12 DIAGNOSIS — R14.0 ABDOMINAL BLOATING: ICD-10-CM

## 2018-11-12 DIAGNOSIS — I10 ESSENTIAL HYPERTENSION: ICD-10-CM

## 2018-11-12 DIAGNOSIS — I20.1 PRINZMETAL'S ANGINA: ICD-10-CM

## 2018-11-12 DIAGNOSIS — R14.0 ABDOMINAL BLOATING: Primary | ICD-10-CM

## 2018-11-12 DIAGNOSIS — R10.10 UPPER ABDOMINAL PAIN: ICD-10-CM

## 2018-11-12 DIAGNOSIS — E66.01 SEVERE OBESITY (BMI 35.0-39.9) WITH COMORBIDITY: ICD-10-CM

## 2018-11-12 DIAGNOSIS — J44.89 ASTHMA WITH COPD: ICD-10-CM

## 2018-11-12 PROCEDURE — 74019 RADEX ABDOMEN 2 VIEWS: CPT | Mod: 26,,, | Performed by: RADIOLOGY

## 2018-11-12 PROCEDURE — 1101F PT FALLS ASSESS-DOCD LE1/YR: CPT | Mod: CPTII,S$GLB,, | Performed by: FAMILY MEDICINE

## 2018-11-12 PROCEDURE — 99999 PR PBB SHADOW E&M-EST. PATIENT-LVL V: CPT | Mod: PBBFAC,,, | Performed by: FAMILY MEDICINE

## 2018-11-12 PROCEDURE — 74019 RADEX ABDOMEN 2 VIEWS: CPT | Mod: TC,PO

## 2018-11-12 PROCEDURE — 3008F BODY MASS INDEX DOCD: CPT | Mod: CPTII,S$GLB,, | Performed by: FAMILY MEDICINE

## 2018-11-12 PROCEDURE — 99214 OFFICE O/P EST MOD 30 MIN: CPT | Mod: 25,S$GLB,, | Performed by: FAMILY MEDICINE

## 2018-11-12 PROCEDURE — 90471 IMMUNIZATION ADMIN: CPT | Mod: S$GLB,,, | Performed by: FAMILY MEDICINE

## 2018-11-12 PROCEDURE — 90662 IIV NO PRSV INCREASED AG IM: CPT | Mod: S$GLB,,, | Performed by: FAMILY MEDICINE

## 2018-11-12 RX ORDER — PANTOPRAZOLE SODIUM 40 MG/1
40 TABLET, DELAYED RELEASE ORAL DAILY
Qty: 30 TABLET | Refills: 0 | Status: SHIPPED | OUTPATIENT
Start: 2018-11-12 | End: 2018-12-10 | Stop reason: SDUPTHER

## 2018-11-12 NOTE — PROGRESS NOTES
"Subjective:       Patient ID: Ro Hernandez is a 63 y.o. female.    Chief Complaint: Abdominal Pain    HPI   Abdominal Pain  64yo female presents today with report of epigastric abdominal pain for the past 1-2 weeks. She notes "when I drink water it hurts". She is taking Prilosec OTC and Mylanta with some improvement in symptoms. Describes increasing belching. She does report some flatus but denies any increase. There is no constipation or diarrhea currently. She did have an acute GI illness a few weeks ago but has been at baseline. Notes that she had similar symptoms remotely. A family member has recently been found to have H pylori and she is concerned about the possibility of infection herself. No prior history of the infection and no previous testing. Pain is rated at 7/10.    Review of Systems   Constitutional: Positive for appetite change. Negative for fatigue.   HENT: Positive for postnasal drip. Negative for sore throat and trouble swallowing.    Respiratory: Negative for cough and shortness of breath.    Cardiovascular: Negative for chest pain and palpitations.   Gastrointestinal: Positive for abdominal distention and abdominal pain. Negative for anal bleeding, blood in stool, constipation, diarrhea, nausea and vomiting.   Genitourinary: Negative for decreased urine volume and difficulty urinating.   Allergic/Immunologic: Positive for environmental allergies.   Neurological: Negative for dizziness, weakness and headaches.       Objective:   /72   Pulse 88   Temp 98.4 °F (36.9 °C) (Temporal)   Resp 16   Ht 5' 5" (1.651 m)   Wt 100.8 kg (222 lb 5 oz)   SpO2 97%   BMI 36.99 kg/m²   Physical Exam   Constitutional: She is oriented to person, place, and time. She appears well-developed and well-nourished.   Obese, non-toxic   HENT:   Head: Normocephalic and atraumatic.   Right Ear: Tympanic membrane, external ear and ear canal normal.   Left Ear: Tympanic membrane, external ear and ear canal normal. "   Nose: Mucosal edema and rhinorrhea present.   Mouth/Throat: Oropharynx is clear and moist.   Eyes: Conjunctivae and EOM are normal. Pupils are equal, round, and reactive to light.   Neck: Normal range of motion. Neck supple.   Cardiovascular: Normal rate, regular rhythm, normal heart sounds and intact distal pulses.   Pulmonary/Chest: Effort normal and breath sounds normal.   Abdominal: Soft. Bowel sounds are normal. There is tenderness in the epigastric area.   Musculoskeletal: She exhibits no edema.   Neurological: She is alert and oriented to person, place, and time.   Skin: Skin is warm and dry.   Psychiatric: She has a normal mood and affect. Her behavior is normal.       Assessment:       1. Abdominal bloating    2. Upper abdominal pain    3. Essential hypertension    4. Prinzmetal's angina    5. Asthma with COPD    6. Allergic rhinitis, unspecified seasonality, unspecified trigger    7. Severe obesity (BMI 35.0-39.9) with comorbidity        Plan:      Abdominal bloating  -     X-Ray Abdomen Flat And Erect; Future; Expected date: 11/12/2018    Upper abdominal pain  -     H. PYLORI ANTIBODY, IGG; Future; Expected date: 11/12/2018  -     pantoprazole (PROTONIX) 40 MG tablet; Take 1 tablet (40 mg total) by mouth once daily.  Dispense: 30 tablet; Refill: 0  -     Lipase; Future; Expected date: 11/12/2018  -     Comprehensive metabolic panel; Future; Expected date: 11/12/2018  -     X-Ray Abdomen Flat And Erect; Future; Expected date: 11/12/2018    Essential hypertension  Continue with current treatment as per Cardiology. Advised importance of heart healthy diet. Target BP <140/90.    Prinzmetal's angina  Continue as per Cardiology.    Asthma with COPD  Continue as per Pulmonology.    Allergic rhinitis, unspecified seasonality, unspecified trigger  Continue with Flonase and Benadryl.    Severe obesity (BMI 35.0-39.9) with comorbidity  Weight loss efforts remain encouraged through diet and lifestyle  measures.    Other orders  -     Influenza - High Dose (65+) (PF) (IM)

## 2018-11-20 VITALS
HEIGHT: 65 IN | HEART RATE: 88 BPM | WEIGHT: 222.31 LBS | RESPIRATION RATE: 16 BRPM | BODY MASS INDEX: 37.04 KG/M2 | DIASTOLIC BLOOD PRESSURE: 72 MMHG | SYSTOLIC BLOOD PRESSURE: 134 MMHG | TEMPERATURE: 98 F | OXYGEN SATURATION: 97 %

## 2018-11-21 ENCOUNTER — OFFICE VISIT (OUTPATIENT)
Dept: GASTROENTEROLOGY | Facility: CLINIC | Age: 63
End: 2018-11-21
Payer: COMMERCIAL

## 2018-11-21 VITALS
WEIGHT: 222.69 LBS | BODY MASS INDEX: 37.1 KG/M2 | HEIGHT: 65 IN | HEART RATE: 78 BPM | DIASTOLIC BLOOD PRESSURE: 69 MMHG | SYSTOLIC BLOOD PRESSURE: 126 MMHG

## 2018-11-21 DIAGNOSIS — K21.9 GASTROESOPHAGEAL REFLUX DISEASE, ESOPHAGITIS PRESENCE NOT SPECIFIED: ICD-10-CM

## 2018-11-21 DIAGNOSIS — R14.2 BELCHING: ICD-10-CM

## 2018-11-21 DIAGNOSIS — R10.13 EPIGASTRIC PAIN: Primary | ICD-10-CM

## 2018-11-21 PROCEDURE — 99999 PR PBB SHADOW E&M-EST. PATIENT-LVL IV: CPT | Mod: PBBFAC,,, | Performed by: PHYSICIAN ASSISTANT

## 2018-11-21 PROCEDURE — 3078F DIAST BP <80 MM HG: CPT | Mod: CPTII,S$GLB,, | Performed by: PHYSICIAN ASSISTANT

## 2018-11-21 PROCEDURE — 3008F BODY MASS INDEX DOCD: CPT | Mod: CPTII,S$GLB,, | Performed by: PHYSICIAN ASSISTANT

## 2018-11-21 PROCEDURE — 99203 OFFICE O/P NEW LOW 30 MIN: CPT | Mod: S$GLB,,, | Performed by: PHYSICIAN ASSISTANT

## 2018-11-21 PROCEDURE — 1101F PT FALLS ASSESS-DOCD LE1/YR: CPT | Mod: CPTII,S$GLB,, | Performed by: PHYSICIAN ASSISTANT

## 2018-11-21 PROCEDURE — 3074F SYST BP LT 130 MM HG: CPT | Mod: CPTII,S$GLB,, | Performed by: PHYSICIAN ASSISTANT

## 2018-11-21 NOTE — H&P (VIEW-ONLY)
GI OUTPATIENT NOTE    PCP: Marcela Chopra [address] [facility] [address] / [facility] [id number]    Chief Complaint   Patient presents with    Abdominal Pain       HISTORY OF PRESENT ILLNESS:  63 y.o. female presents to the GI clinic today for initial evaluation. The patient complains of epigastric pain and belching. The need to belch wakes her at night. Sometimes she has to move around to get it up. She also takes antacids and soda water. She wears Cpap every night. The epigastric pain started about four weeks ago but the belching has been going on about six months. The epigastric pain is described as burning and occurs when she eats. It doesn't have to be much and sometimes even water can cause it. If she doesn't eat, she has no pain. She denies nausea or vomiting. She is s/p cholecystectomy. She was taking Naproxyn daily but her PCP had her stop it about two weeks ago. Her PCP also prescribed Protonix 40 mg daily, but there has been no significant change in her pain. She denies heartburn, indigestion, dysphagia, nausea, vomiting or melena. She takes a stool softener daily and denies lower GI symptoms. She takes an opioid for arthritis.     Past Medical History:   Diagnosis Date    Allergy     Arthritis 6/11/2013    COPD (chronic obstructive pulmonary disease)     GERD (gastroesophageal reflux disease)     Hypertension     Obesity 6/11/2013    Other and unspecified hyperlipidemia 6/11/2013    Prinzmetal's angina     Sleep apnea 6/11/2013       Past Surgical History:   Procedure Laterality Date    ABDOMINAL SURGERY      CHOLECYSTECTOMY      COLONOSCOPY N/A 4/24/2014    Performed by Madan Palacio MD at [facility] ENDO    EYE SURGERY      FOOT SURGERY      TONSILLECTOMY      TUBAL LIGATION         Social History     Socioeconomic History    Marital status:      Spouse name: Not on file    Number of children: Not on file    Years of education: Not on file    Highest education level: Not on  file   Social Needs    Financial resource strain: Not on file    Food insecurity - worry: Not on file    Food insecurity - inability: Not on file    Transportation needs - medical: Not on file    Transportation needs - non-medical: Not on file   Occupational History    Not on file   Tobacco Use    Smoking status: Former Smoker     Packs/day: 0.50     Years: 2.00     Pack years: 1.00     Types: Cigarettes     Last attempt to quit: 2016     Years since quittin.4    Smokeless tobacco: Never Used    Tobacco comment: has patch on now   Substance and Sexual Activity    Alcohol use: No     Alcohol/week: 0.6 oz     Types: 1 Standard drinks or equivalent per week    Drug use: No    Sexual activity: No   Other Topics Concern    Not on file   Social History Narrative    Not on file       Family History   Problem Relation Age of Onset    Hypertension Mother     Cancer Mother     Kidney cancer Mother     COPD Father     Hypertension Father     Heart disease Sister     Hyperlipidemia Son     Colon cancer Neg Hx        MEDS/ALLERGY:  The patient's medications and allergies were reviewed and updated in the EPIC chart.     Review of Systems   Constitutional: Negative for fever.   HENT: Negative for hearing loss.    Eyes: Negative for visual disturbance.   Respiratory: Negative for cough and shortness of breath.    Cardiovascular: Negative for chest pain.   Gastrointestinal:        As per HPI.   Genitourinary: Positive for frequency. Negative for dysuria and hematuria.   Musculoskeletal: Positive for arthralgias and back pain.   Skin: Negative for rash.   Neurological: Negative for seizures, syncope, numbness and headaches.   Hematological: Does not bruise/bleed easily.   Psychiatric/Behavioral: The patient is not nervous/anxious.        Physical Exam   Constitutional: She is oriented to person, place, and time. Vital signs are normal. She appears well-developed and well-nourished.   HENT:   Head:  Normocephalic and atraumatic.   Eyes: EOM are normal.   Neck: Normal range of motion. Neck supple. Carotid bruit is not present. No thyromegaly present.   Cardiovascular: Normal rate and regular rhythm.   Murmur heard.  Pulmonary/Chest: Effort normal and breath sounds normal. No respiratory distress. She has no wheezes.   Abdominal: Soft. Normal appearance and bowel sounds are normal. She exhibits no distension and no mass. There is no tenderness.   Musculoskeletal: She exhibits no edema.   Neurological: She is alert and oriented to person, place, and time. No cranial nerve deficit. Gait normal.   Skin: Skin is warm and dry. No rash noted.   Psychiatric: Thought content normal.     LABS/IMAGING:   Pertinent results were reviewed.     ASSESSMENT:  1. Epigastric pain    2. Belching    3. Gastroesophageal reflux disease, esophagitis presence not specified        PLAN:  Ulcer is at the top of the differential because of her NSAID use. Also, consider gastroparesis, but less likely. Continue Protonix daily. Add OTC Omeprazole at bedtime. Continue to avoid NSAIDs. I will order an UGI series. If abnormal or continued symptoms, will consider EGD.   We discussed air swallowing as a common cause of belching. She is on CPAP at night which is time when the belching is problematic. I will have her try Simethicone at bedtime, but not sure we will be able to change this much. Discussed other times when air swallowing can occur in attempts to minimize.     ORDER SUMMARY:  Orders Placed This Encounter   Procedures    FL Upper GI With KUB        Follow-up in about 4 weeks (around 12/19/2018).     Thank you for the opportunity to participate in the care of this patient. This consult was designated to me by my supervising physician. He fully participated in the development of the assessment and recommendations.    Clarence Barboza PA-C.

## 2018-11-21 NOTE — PROGRESS NOTES
GI OUTPATIENT NOTE    PCP: Marcela Chopra [id number] [facility] [address]    Chief Complaint   Patient presents with    Abdominal Pain       HISTORY OF PRESENT ILLNESS:  63 y.o. female presents to the GI clinic today for initial evaluation. The patient complains of epigastric pain and belching. The need to belch wakes her at night. Sometimes she has to move around to get it up. She also takes antacids and soda water. She wears Cpap every night. The epigastric pain started about four weeks ago but the belching has been going on about six months. The epigastric pain is described as burning and occurs when she eats. It doesn't have to be much and sometimes even water can cause it. If she doesn't eat, she has no pain. She denies nausea or vomiting. She is s/p cholecystectomy. She was taking Naproxyn daily but her PCP had her stop it about two weeks ago. Her PCP also prescribed Protonix 40 mg daily, but there has been no significant change in her pain. She denies heartburn, indigestion, dysphagia, nausea, vomiting or melena. She takes a stool softener daily and denies lower GI symptoms. She takes an opioid for arthritis.     Past Medical History:   Diagnosis Date    Allergy     Arthritis 6/11/2013    COPD (chronic obstructive pulmonary disease)     GERD (gastroesophageal reflux disease)     Hypertension     Obesity 6/11/2013    Other and unspecified hyperlipidemia 6/11/2013    Prinzmetal's angina     Sleep apnea 6/11/2013       Past Surgical History:   Procedure Laterality Date    ABDOMINAL SURGERY      CHOLECYSTECTOMY      COLONOSCOPY N/A 4/24/2014    Performed by Madan Palacio MD at [facility]    EYE SURGERY      FOOT SURGERY      TONSILLECTOMY      TUBAL LIGATION         Social History     Socioeconomic History    Marital status:      Spouse name: Not on file    Number of children: Not on file    Years of education: Not on file    Highest education level: Not on  file   Social Needs    Financial resource strain: Not on file    Food insecurity - worry: Not on file    Food insecurity - inability: Not on file    Transportation needs - medical: Not on file    Transportation needs - non-medical: Not on file   Occupational History    Not on file   Tobacco Use    Smoking status: Former Smoker     Packs/day: 0.50     Years: 2.00     Pack years: 1.00     Types: Cigarettes     Last attempt to quit: 2016     Years since quittin.4    Smokeless tobacco: Never Used    Tobacco comment: has patch on now   Substance and Sexual Activity    Alcohol use: No     Alcohol/week: 0.6 oz     Types: 1 Standard drinks or equivalent per week    Drug use: No    Sexual activity: No   Other Topics Concern    Not on file   Social History Narrative    Not on file       Family History   Problem Relation Age of Onset    Hypertension Mother     Cancer Mother     Kidney cancer Mother     COPD Father     Hypertension Father     Heart disease Sister     Hyperlipidemia Son     Colon cancer Neg Hx        MEDS/ALLERGY:  The patient's medications and allergies were reviewed and updated in the EPIC chart.     Review of Systems   Constitutional: Negative for fever.   HENT: Negative for hearing loss.    Eyes: Negative for visual disturbance.   Respiratory: Negative for cough and shortness of breath.    Cardiovascular: Negative for chest pain.   Gastrointestinal:        As per HPI.   Genitourinary: Positive for frequency. Negative for dysuria and hematuria.   Musculoskeletal: Positive for arthralgias and back pain.   Skin: Negative for rash.   Neurological: Negative for seizures, syncope, numbness and headaches.   Hematological: Does not bruise/bleed easily.   Psychiatric/Behavioral: The patient is not nervous/anxious.        Physical Exam   Constitutional: She is oriented to person, place, and time. Vital signs are normal. She appears well-developed and well-nourished.   HENT:   Head:  Normocephalic and atraumatic.   Eyes: EOM are normal.   Neck: Normal range of motion. Neck supple. Carotid bruit is not present. No thyromegaly present.   Cardiovascular: Normal rate and regular rhythm.   Murmur heard.  Pulmonary/Chest: Effort normal and breath sounds normal. No respiratory distress. She has no wheezes.   Abdominal: Soft. Normal appearance and bowel sounds are normal. She exhibits no distension and no mass. There is no tenderness.   Musculoskeletal: She exhibits no edema.   Neurological: She is alert and oriented to person, place, and time. No cranial nerve deficit. Gait normal.   Skin: Skin is warm and dry. No rash noted.   Psychiatric: Thought content normal.     LABS/IMAGING:   Pertinent results were reviewed.     ASSESSMENT:  1. Epigastric pain    2. Belching    3. Gastroesophageal reflux disease, esophagitis presence not specified        PLAN:  Ulcer is at the top of the differential because of her NSAID use. Also, consider gastroparesis, but less likely. Continue Protonix daily. Add OTC Omeprazole at bedtime. Continue to avoid NSAIDs. I will order an UGI series. If abnormal or continued symptoms, will consider EGD.   We discussed air swallowing as a common cause of belching. She is on CPAP at night which is time when the belching is problematic. I will have her try Simethicone at bedtime, but not sure we will be able to change this much. Discussed other times when air swallowing can occur in attempts to minimize.     ORDER SUMMARY:  Orders Placed This Encounter   Procedures    FL Upper GI With KUB        Follow-up in about 4 weeks (around 12/19/2018).     Thank you for the opportunity to participate in the care of this patient. This consult was designated to me by my supervising physician. He fully participated in the development of the assessment and recommendations.    Clarence Barboza PA-C.

## 2018-11-21 NOTE — PATIENT INSTRUCTIONS
Continue with Protonix in the mornings. Start Omeprazole (Prilosec) in the evening before your last meal. Take Simethicone at bedtime.

## 2018-11-21 NOTE — LETTER
November 21, 2018      Marcela Chopra MD  [id number] [address] [address]           [facility]  [address] [place] [id number]  Phone: [phone]  Fax: [phone]          Patient: Ro Hernandez   MR Number: [id number]   YOB: 1955   Date of Visit: 11/21/2018       Dear Dr. Marcela Chopra:    Thank you for referring Ro Hernandez to me for evaluation. Attached you will find relevant portions of my assessment and plan of care.    If you have questions, please do not hesitate to call me. I look forward to following Ro Hernandez along with you.    Sincerely,    GERMAINE Doeosure  CC:  No Recipients    If you would like to receive this communication electronically, please contact [email or web address] or [phone] to request more information on [employer] access.    For providers and/or their staff who would like to refer a patient to [facility], please contact us through our one-stop-shop provider referral line, [facility] [place], at [phone].    If you feel you have received this communication in error or would no longer like to receive these types of communications, please e-mail [email or web address]

## 2018-11-28 ENCOUNTER — HOSPITAL ENCOUNTER (OUTPATIENT)
Dept: RADIOLOGY | Facility: HOSPITAL | Age: 63
Discharge: HOME | End: 2018-11-28
Attending: PHYSICIAN ASSISTANT
Payer: COMMERCIAL

## 2018-11-28 DIAGNOSIS — R14.2 BELCHING: ICD-10-CM

## 2018-11-28 DIAGNOSIS — R10.13 EPIGASTRIC PAIN: Primary | ICD-10-CM

## 2018-11-28 DIAGNOSIS — K21.9 GASTROESOPHAGEAL REFLUX DISEASE, ESOPHAGITIS PRESENCE NOT SPECIFIED: ICD-10-CM

## 2018-11-28 DIAGNOSIS — R10.13 EPIGASTRIC PAIN: ICD-10-CM

## 2018-11-28 DIAGNOSIS — R93.3 ABNORMAL FINDING ON GI TRACT IMAGING: ICD-10-CM

## 2018-11-28 PROCEDURE — 74241 FL UPPER GI W KUB: CPT | Mod: TC

## 2018-11-28 PROCEDURE — A9698 NON-RAD CONTRAST MATERIALNOC: HCPCS | Performed by: PHYSICIAN ASSISTANT

## 2018-11-28 PROCEDURE — 25500020 PHARM REV CODE 255: Performed by: PHYSICIAN ASSISTANT

## 2018-11-28 RX ADMIN — Medication: at 09:11

## 2018-12-10 ENCOUNTER — HOSPITAL ENCOUNTER (OUTPATIENT)
Facility: HOSPITAL | Age: 63
Discharge: HOME | End: 2018-12-10
Attending: INTERNAL MEDICINE | Admitting: INTERNAL MEDICINE
Payer: COMMERCIAL

## 2018-12-10 ENCOUNTER — ANESTHESIA EVENT (OUTPATIENT)
Dept: ENDOSCOPY | Facility: HOSPITAL | Age: 63
End: 2018-12-10
Payer: COMMERCIAL

## 2018-12-10 ENCOUNTER — ANESTHESIA (OUTPATIENT)
Dept: ENDOSCOPY | Facility: HOSPITAL | Age: 63
End: 2018-12-10
Payer: COMMERCIAL

## 2018-12-10 DIAGNOSIS — E78.5 HYPERLIPIDEMIA, UNSPECIFIED HYPERLIPIDEMIA TYPE: ICD-10-CM

## 2018-12-10 DIAGNOSIS — R10.13 EPIGASTRIC ABDOMINAL PAIN: Primary | ICD-10-CM

## 2018-12-10 DIAGNOSIS — K29.30 CHRONIC SUPERFICIAL GASTRITIS WITHOUT BLEEDING: ICD-10-CM

## 2018-12-10 DIAGNOSIS — K29.60 BILE REFLUX GASTRITIS: ICD-10-CM

## 2018-12-10 DIAGNOSIS — R93.3 ABNORMAL UGI SERIES: ICD-10-CM

## 2018-12-10 DIAGNOSIS — R10.10 UPPER ABDOMINAL PAIN: ICD-10-CM

## 2018-12-10 PROCEDURE — 25000003 PHARM REV CODE 250: Performed by: INTERNAL MEDICINE

## 2018-12-10 PROCEDURE — 27201012 HC FORCEPS, HOT/COLD, DISP: Performed by: INTERNAL MEDICINE

## 2018-12-10 PROCEDURE — 43239 EGD BIOPSY SINGLE/MULTIPLE: CPT | Performed by: INTERNAL MEDICINE

## 2018-12-10 PROCEDURE — 25000003 PHARM REV CODE 250: Performed by: NURSE ANESTHETIST, CERTIFIED REGISTERED

## 2018-12-10 PROCEDURE — 88305 TISSUE EXAM BY PATHOLOGIST: CPT | Performed by: PATHOLOGY

## 2018-12-10 PROCEDURE — 37000008 HC ANESTHESIA 1ST 15 MINUTES: Performed by: INTERNAL MEDICINE

## 2018-12-10 PROCEDURE — 88305 TISSUE EXAM BY PATHOLOGIST: CPT | Mod: 26,,, | Performed by: PATHOLOGY

## 2018-12-10 PROCEDURE — 63600175 PHARM REV CODE 636 W HCPCS: Performed by: NURSE ANESTHETIST, CERTIFIED REGISTERED

## 2018-12-10 PROCEDURE — 37000009 HC ANESTHESIA EA ADD 15 MINS: Performed by: INTERNAL MEDICINE

## 2018-12-10 PROCEDURE — 43239 EGD BIOPSY SINGLE/MULTIPLE: CPT | Mod: ,,, | Performed by: INTERNAL MEDICINE

## 2018-12-10 RX ORDER — LIDOCAINE HYDROCHLORIDE 10 MG/ML
INJECTION INFILTRATION; PERINEURAL
Status: DISCONTINUED | OUTPATIENT
Start: 2018-12-10 | End: 2018-12-10

## 2018-12-10 RX ORDER — PROPOFOL 10 MG/ML
VIAL (ML) INTRAVENOUS
Status: DISCONTINUED | OUTPATIENT
Start: 2018-12-10 | End: 2018-12-10

## 2018-12-10 RX ORDER — SODIUM CHLORIDE, SODIUM LACTATE, POTASSIUM CHLORIDE, CALCIUM CHLORIDE 600; 310; 30; 20 MG/100ML; MG/100ML; MG/100ML; MG/100ML
INJECTION, SOLUTION INTRAVENOUS CONTINUOUS
Status: DISCONTINUED | OUTPATIENT
Start: 2018-12-10 | End: 2018-12-10 | Stop reason: HOSPADM

## 2018-12-10 RX ORDER — PANTOPRAZOLE SODIUM 40 MG/1
TABLET, DELAYED RELEASE ORAL
Qty: 30 TABLET | Refills: 0 | Status: SHIPPED | OUTPATIENT
Start: 2018-12-10 | End: 2018-12-19

## 2018-12-10 RX ORDER — EZETIMIBE 10 MG/1
TABLET ORAL
Qty: 90 TABLET | Refills: 1 | Status: SHIPPED | OUTPATIENT
Start: 2018-12-10 | End: 2019-09-30 | Stop reason: SDUPTHER

## 2018-12-10 RX ORDER — SODIUM CHLORIDE 0.9 % (FLUSH) 0.9 %
3 SYRINGE (ML) INJECTION
Status: DISCONTINUED | OUTPATIENT
Start: 2018-12-10 | End: 2018-12-10 | Stop reason: HOSPADM

## 2018-12-10 RX ADMIN — PROPOFOL 50 MG: 10 INJECTION, EMULSION INTRAVENOUS at 07:12

## 2018-12-10 RX ADMIN — PROPOFOL 150 MG: 10 INJECTION, EMULSION INTRAVENOUS at 06:12

## 2018-12-10 RX ADMIN — LIDOCAINE HYDROCHLORIDE 50 MG: 10 INJECTION, SOLUTION INFILTRATION; PERINEURAL at 06:12

## 2018-12-10 RX ADMIN — SODIUM CHLORIDE, SODIUM LACTATE, POTASSIUM CHLORIDE, AND CALCIUM CHLORIDE: .6; .31; .03; .02 INJECTION, SOLUTION INTRAVENOUS at 06:12

## 2018-12-10 NOTE — TRANSFER OF CARE
"Anesthesia Transfer of Care Note    Patient: Ro Hernandez    Procedure(s) Performed: Procedure(s) (LRB):  EGD (ESOPHAGOGASTRODUODENOSCOPY) (N/A)    Patient location: PACU    Anesthesia Type: MAC    Transport from OR: Transported from OR on room air with adequate spontaneous ventilation    Post pain: adequate analgesia    Post assessment: no apparent anesthetic complications    Post vital signs: stable    Level of consciousness: awake    Nausea/Vomiting: no nausea/vomiting    Complications: none    Transfer of care protocol was followed      Last vitals:   Visit Vitals  BP (!) 158/66 (BP Location: Left arm, Patient Position: Lying)   Pulse 86   Temp 36.7 °C (98.1 °F) (Tympanic)   Resp 20   Ht 5' 5" (1.651 m)   Wt 100.7 kg (222 lb)   SpO2 95%   Breastfeeding? No   BMI 36.94 kg/m²     "

## 2018-12-10 NOTE — DISCHARGE INSTRUCTIONS
Gastritis (Adult)    Gastritis is inflammation and irritation of the stomach lining. It can be present for a short time (acute) or be long lasting (chronic). Gastritis is often caused by infection with bacteria called H pylori. More than a third of people in the [place] have this bacteria in their bodies. In many cases, H pylori causes no problems or symptoms. In some people, though, the infection irritates the stomach lining and causes gastritis. Other causes of stomach irritation include drinking alcohol or taking pain-relieving medicines called NSAIDs (such as aspirin or ibuprofen).   Symptoms of gastritis can include:  · Abdominal pain or bloating  · Loss of appetite  · Nausea or vomiting  · Vomiting blood or having black stools  · Feeling more tired than usual  An inflamed and irritated stomach lining is more likely to develop a sore called an ulcer. To help prevent this, gastritis should be treated.  Home care  If needed, medicines may be prescribed. If you have H pylori infection, treating it will likely relieve your symptoms. Other changes can help reduce stomach irritation and help it heal.  · If you have been prescribed medicines for H pylori infection, take them as directed. Take all of the medicine until it is finished or your healthcare provider tells you to stop, even if you feel better.  · Your healthcare provider may recommend avoiding NSAIDs. If you take daily aspirin for your heart or other medical reasons, do not stop without talking to your healthcare provider first.  · Avoid drinking alcohol.  · Stop smoking. Smoking can irritate the stomach and delay healing. As much as possible, stay away from second hand smoke.  Follow-up care  Follow up with your healthcare provider, or as advised by our staff. Testing may be needed to check for inflammation or an ulcer.  When to seek medical advice  Call your healthcare provider for any of the following:  · Stomach pain that gets worse or moves to the lower  right abdomen (appendix area)  · Chest pain that appears or gets worse, or spreads to the back, neck, shoulder, or arm  · Frequent vomiting (cant keep down liquids)  · Blood in the stool or vomit (red or black in color)  · Feeling weak or dizzy  · Fever of 100.4ºF (38ºC) or higher, or as directed by your healthcare provider  Date Last Reviewed: 6/22/2015  © 5005-4686 [employer]. [address] [place] [address]. All rights reserved. This information is not intended as a substitute for professional medical care. Always follow your healthcare professional's instructions.

## 2018-12-10 NOTE — DISCHARGE SUMMARY
[facility]  Brief Operative Note     SUMMARY     Surgery Date: 12/10/2018     Surgeon(s) and Role:     * Raúl Polanco III, MD - Primary    Assisting Surgeon: None    Pre-op Diagnosis:  Epigastric pain [R10.13]  Abnormal finding on GI tract imaging [R93.3]    Post-op Diagnosis:  Post-Op Diagnosis Codes:     * Epigastric pain [R10.13]     * Abnormal finding on GI tract imaging [R93.3]     - Gastritis  Procedure(s) (LRB):  EGD (ESOPHAGOGASTRODUODENOSCOPY) (N/A)    Anesthesia: Choice    Description of the findings of the procedure: Procedures completed. See Procedure note for full details.    Findings/Key Components: Procedures completed. See Procedure note for full details.    Prosthetics/Devices: None    Estimated Blood Loss: * No values recorded between 12/10/2018 12:00 AM and 12/10/2018  7:24 AM *         Specimens:   Specimen (12h ago, onward)    Start     Ordered    12/10/18 0704  Specimen to Pathology - Surgery  Once     Comments:  1. Prepyloric & Antrum bx- gastritis2. Esophagus bx- r/o Eosinophilic esophagitis and microscopic esophagitis     Start Status   12/10/18 0704 Collected (12/10/18 0710)       12/10/18 0708          Discharge Note    SUMMARY     Admit Date: 12/10/2018    Discharge Date and Time: 12/10/2018    Hospital Course (synopsis of major diagnoses, care, treatment, and services provided during the course of the hospital stay):  Procedures completed. See Procedure note for full details. Discharge patient when discharge criteria met.    Final Diagnosis: Post-Op Diagnosis Codes:     * Epigastric pain [R10.13]     * Abnormal finding on GI tract imaging [R93.3]     - Gastritis  Disposition: Discharge patient when discharge criteria met.    Follow Up/Patient Instructions:       Medications:  Reconciled Home Medications:   Current Discharge Medication List      CONTINUE these medications which have NOT CHANGED    Details   estrogen, conjugated,-medroxyprogesterone (PREMPRO) 0.3-1.5  mg per tablet Take 1 tablet by mouth Daily.      ezetimibe (ZETIA) 10 mg tablet Take 1 tablet (10 mg total) by mouth once daily.  Qty: 90 tablet, Refills: 1    Associated Diagnoses: Hyperlipidemia, unspecified hyperlipidemia type      fluticasone (FLONASE) 50 mcg/actuation nasal spray 2 sprays (100 mcg total) by Each Nare route once daily.  Qty: 48 g, Refills: 1    Associated Diagnoses: Allergic rhinitis, unspecified seasonality, unspecified trigger      hydroCHLOROthiazide (HYDRODIURIL) 12.5 MG Tab Take 1 tablet (12.5 mg total) by mouth once daily.  Qty: 90 tablet, Refills: 1    Associated Diagnoses: Essential hypertension      hydrocodone-acetaminophen 10-325mg (NORCO)  mg Tab Take 1 tablet by mouth every 12 (twelve) hours.  Refills: 0      isosorbide mononitrate (IMDUR) 30 MG 24 hr tablet Take 30 mg by mouth once daily.   Refills: 0      MULTIVITAMIN (MULTIPLE VITAMIN ORAL) Take 1 tablet by mouth Daily.      pantoprazole (PROTONIX) 40 MG tablet Take 1 tablet (40 mg total) by mouth once daily.  Qty: 30 tablet, Refills: 0    Associated Diagnoses: Upper abdominal pain      valsartan (DIOVAN) 80 MG tablet TK 1 T PO D  Refills: 3      verapamil (CALAN-SR) 240 MG CR tablet Take 1 tablet (240 mg total) by mouth once daily.  Qty: 30 tablet, Refills: 0    Associated Diagnoses: Essential hypertension      diphenhydrAMINE (BENADRYL) 25 mg capsule Take 1 capsule by mouth At bedtime.      naproxen (NAPROSYN) 500 MG tablet Take 1 tablet (500 mg total) by mouth 2 (two) times daily with meals.  Qty: 60 tablet, Refills: 2      nitroGLYCERIN (NITROSTAT) 0.4 MG SL tablet DISSOLVE 1 TABLET UNDER THE TONGUE EVERY 5 MINUTES FOR 3 DOSES AS NEEDED FOR CHEST PAIN  Qty: 25 tablet, Refills: 0    Associated Diagnoses: Chest pain, unspecified type            Discharge Procedure Orders   Diet general     Diet general     Activity as tolerated     Activity as tolerated

## 2018-12-10 NOTE — [employer] PATIENT INSTRUCTIONS
Discharge Summary/Instructions after an Endoscopic Procedure  Patient Name: Ro Hernandez  Patient MRN: [id number]  Patient YOB: 1955  Monday, December 10, 2018 Raúl Polanco III, MD  RESTRICTIONS:  During your procedure today, you received medications for sedation.  These   medications may affect your judgment, balance and coordination.  Therefore,   for 24 hours, you have the following restrictions:   - DO NOT drive a car, operate machinery, make legal/financial decisions,   sign important papers or drink alcohol.    ACTIVITY:  Today: no heavy lifting, straining or running due to procedural   sedation/anesthesia.  The following day: return to full activity including work.  DIET:  Eat and drink normally unless instructed otherwise.     TREATMENT FOR COMMON SIDE EFFECTS:  - Mild abdominal pain, nausea, belching, bloating or excessive gas:  rest,   eat lightly and use a heating pad.  - Sore Throat: treat with throat lozenges and/or gargle with warm salt   water.  - Because air was used during the procedure, expelling large amounts of air   from your rectum or belching is normal.  - If a bowel prep was taken, you may not have a bowel movement for 1-3 days.    This is normal.  SYMPTOMS TO WATCH FOR AND REPORT TO YOUR PHYSICIAN:  1. Abdominal pain or bloating, other than gas cramps.  2. Chest pain.  3. Back pain.  4. Signs of infection such as: chills or fever occurring within 24 hours   after the procedure.  5. Rectal bleeding, which would show as bright red, maroon, or black stools.   (A tablespoon of blood from the rectum is not serious, especially if   hemorrhoids are present.)  6. Vomiting.  7. Weakness or dizziness.  GO DIRECTLY TO THE NEAREST EMERGENCY ROOM IF YOU HAVE ANY OF THE FOLLOWING:      Difficulty breathing              Chills and/or fever over 101 F   Persistent vomiting and/or vomiting blood   Severe abdominal pain   Severe chest pain   Black, tarry stools   Bleeding- more than one  tablespoon   Any other symptom or condition that you feel may need urgent attention  Your doctor recommends these additional instructions:  If any biopsies were taken, your doctors clinic will contact you in 1 to 2   weeks with any results.  - Discharge patient to home (via wheelchair).   - Resume previous diet.   - Continue present medications.   - Await pathology results.   - Return to GI clinic as previously scheduled.  For questions, problems or results please call your physician Raúl Polanco III, MD at Work:  [phone]  If you have any questions about the above instructions, call the GI   department at [phone] or call the endoscopy unit at [phone]   from 7am until 3 pm.  [facility], EMERGENCY ROOM PHONE NUMBER:   [phone]  IF A COMPLICATION OR EMERGENCY SITUATION ARISES AND YOU ARE UNABLE TO REACH   YOUR PHYSICIAN - GO DIRECTLY TO THE EMERGENCY ROOM.  I have read or have had read to me these discharge instructions for my   procedure and have received a written copy.  I understand these   instructions and will follow-up with my physician if I have any questions.     __________________________________       _____________________________________  Nurse Signature                                          Patient/Designated   Responsible Party Signature  Raúl Polanco III, MD  12/10/2018 7:20:31 AM  This report has been verified and signed electronically.  [employer]

## 2018-12-10 NOTE — ANESTHESIA PREPROCEDURE EVALUATION
12/10/2018  Ro Hernandez is a 63 y.o., female.    Anesthesia Evaluation    I have reviewed the Patient Summary Reports.    I have reviewed the Nursing Notes.   I have reviewed the Medications.     Review of Systems  Anesthesia Hx:  No problems with previous Anesthesia  Denies Family Hx of Anesthesia complications.   Denies Personal Hx of Anesthesia complications.   Social:  Non-Smoker, No Alcohol Use    Hematology/Oncology:  Hematology Normal   Oncology Normal     EENT/Dental:EENT/Dental Normal   Cardiovascular:   Exercise tolerance: poor Hypertension, well controlled Angina Pt states has had echo states everything is fine, seen NP 2 times in last month for BP and is well controlled   Pulmonary:   COPD, mild Asthma asymptomatic Sleep Apnea, CPAP    Renal/:   Chronic Renal Disease (renal cyst left)    Hepatic/GI:   GERD, poorly controlled    Musculoskeletal:  Musculoskeletal Normal    Neurological:  Neurology Normal    Endocrine:  Endocrine Normal    Dermatological:  Skin Normal    Psych:  Psychiatric Normal           Physical Exam  General:  Well nourished, Obesity    Airway/Jaw/Neck:  Airway Findings: Mouth Opening: Normal Mallampati: I        Eyes/Ears/Nose:  EYES/EARS/NOSE FINDINGS: Normal   Dental:  Dental Findings: In tact   Chest/Lungs:  Chest/Lungs Findings: Clear to auscultation     Heart/Vascular:  Heart Findings: Rate: Normal  Rhythm: Regular Rhythm  Heart Murmur  Systolic  Vascular Findings: Normal    Abdomen:  Abdomen Findings:  Nontender     Musculoskeletal:  Musculoskeletal Findings: Normal   Skin:  Skin Findings: Normal    Mental Status:  Mental Status Findings:  Alert and Oriented, Cooperative         Anesthesia Plan  Type of Anesthesia, risks & benefits discussed:  Anesthesia Type:  MAC  Patient's Preference:   Intra-op Monitoring Plan:   Intra-op Monitoring Plan Comments:   Post Op Pain  Control Plan:   Post Op Pain Control Plan Comments:   Induction:   IV  Beta Blocker:  Patient is not currently on a Beta-Blocker (No further documentation required).       Informed Consent: Patient understands risks and agrees with Anesthesia plan.  Questions answered. Anesthesia consent signed with patient.  ASA Score: 3     Day of Surgery Review of History & Physical: I have interviewed and examined the patient. I have reviewed the patient's H&P dated:  There are no significant changes.          Ready For Surgery From Anesthesia Perspective.

## 2018-12-10 NOTE — INTERVAL H&P NOTE
The patient has been examined and the H&P has been reviewed:I have reviewed this note and I agree with this assessment. The patient was seen in the GI office and remains stable for endoscopy at the time of this present evaluation.         Anesthesia/Surgery risks, benefits and alternative options discussed and understood by patient/family.          Active Hospital Problems    Diagnosis  POA    Epigastric abdominal pain [R10.13]  Yes      Resolved Hospital Problems   No resolved problems to display.

## 2018-12-10 NOTE — ANESTHESIA RELEASE NOTE
"Anesthesia Release from PACU Note    Patient: Ro Hernandez    Procedure(s) Performed: Procedure(s) (LRB):  EGD (ESOPHAGOGASTRODUODENOSCOPY) (N/A)    Anesthesia type: MAC    Post pain: Adequate analgesia    Post assessment: no apparent anesthetic complications, tolerated procedure well and no evidence of recall    Last Vitals:   Visit Vitals  BP (!) 158/66 (BP Location: Left arm, Patient Position: Lying)   Pulse 86   Temp 36.7 °C (98.1 °F) (Tympanic)   Resp 20   Ht 5' 5" (1.651 m)   Wt 100.7 kg (222 lb)   SpO2 95%   Breastfeeding? No   BMI 36.94 kg/m²       Post vital signs: stable    Level of consciousness: awake, alert  and oriented    Nausea/Vomiting: no nausea/no vomiting    Complications: none    Airway Patency: patent    Respiratory: unassisted, spontaneous ventilation, room air    Cardiovascular: stable and blood pressure at baseline    Hydration: euvolemic  "

## 2018-12-11 VITALS
HEART RATE: 78 BPM | DIASTOLIC BLOOD PRESSURE: 67 MMHG | WEIGHT: 222 LBS | BODY MASS INDEX: 36.99 KG/M2 | TEMPERATURE: 99 F | SYSTOLIC BLOOD PRESSURE: 128 MMHG | OXYGEN SATURATION: 95 % | RESPIRATION RATE: 20 BRPM | HEIGHT: 65 IN

## 2018-12-19 ENCOUNTER — OFFICE VISIT (OUTPATIENT)
Dept: GASTROENTEROLOGY | Facility: CLINIC | Age: 63
End: 2018-12-19
Payer: COMMERCIAL

## 2018-12-19 VITALS
HEIGHT: 65 IN | HEART RATE: 79 BPM | WEIGHT: 223.75 LBS | BODY MASS INDEX: 37.28 KG/M2 | DIASTOLIC BLOOD PRESSURE: 69 MMHG | SYSTOLIC BLOOD PRESSURE: 124 MMHG

## 2018-12-19 DIAGNOSIS — K59.09 CHRONIC CONSTIPATION: ICD-10-CM

## 2018-12-19 DIAGNOSIS — R10.13 EPIGASTRIC PAIN: Primary | ICD-10-CM

## 2018-12-19 DIAGNOSIS — R14.2 BELCHING: ICD-10-CM

## 2018-12-19 PROCEDURE — 99213 OFFICE O/P EST LOW 20 MIN: CPT | Mod: S$GLB,,, | Performed by: PHYSICIAN ASSISTANT

## 2018-12-19 PROCEDURE — 3008F BODY MASS INDEX DOCD: CPT | Mod: CPTII,S$GLB,, | Performed by: PHYSICIAN ASSISTANT

## 2018-12-19 PROCEDURE — 3074F SYST BP LT 130 MM HG: CPT | Mod: CPTII,S$GLB,, | Performed by: PHYSICIAN ASSISTANT

## 2018-12-19 PROCEDURE — 99999 PR PBB SHADOW E&M-EST. PATIENT-LVL IV: CPT | Mod: PBBFAC,,, | Performed by: PHYSICIAN ASSISTANT

## 2018-12-19 PROCEDURE — 1101F PT FALLS ASSESS-DOCD LE1/YR: CPT | Mod: CPTII,S$GLB,, | Performed by: PHYSICIAN ASSISTANT

## 2018-12-19 PROCEDURE — 3078F DIAST BP <80 MM HG: CPT | Mod: CPTII,S$GLB,, | Performed by: PHYSICIAN ASSISTANT

## 2018-12-19 RX ORDER — PANTOPRAZOLE SODIUM 20 MG/1
20 TABLET, DELAYED RELEASE ORAL
Qty: 60 TABLET | Refills: 11 | Status: SHIPPED | OUTPATIENT
Start: 2018-12-19 | End: 2019-01-11 | Stop reason: SDUPTHER

## 2018-12-19 NOTE — PROGRESS NOTES
Subjective:      Patient ID: Ro Hernandez is a 63 y.o. female.    Chief Complaint: Abdominal Pain    HPI  The patient has a history of epigastric burning, belching and GERD. Last visit, she reported NSAID use which her PCP had recommended she stop. She had just started a daily PPI prescribed by her PCP. An upper GI series was recommended and showed mild thickening of the stomach folds without gastroparesis. It also showed moderate constipation. Daily Miralax was recommended. An EGD was then recommended which showed mild erythema in the stomach. Biopsies showed mild reactive gastropathy without H. Pylori. Today the patient reports resolution of epigastric pain and GERD; however, belching continues to be a problem. She tried simethicone without relief. She is on CPAP at night.     Review of Systems  As per HPI.     Objective:     Physical Exam   Constitutional: She is oriented to person, place, and time. She appears well-developed and well-nourished. No distress.   HENT:   Head: Normocephalic and atraumatic.   Eyes: EOM are normal.   Cardiovascular: Normal rate and regular rhythm.   Murmur heard.  Pulmonary/Chest: Effort normal and breath sounds normal. No respiratory distress. She has no wheezes.   Abdominal: Soft. Bowel sounds are normal. She exhibits no distension. There is no tenderness.   Neurological: She is alert and oriented to person, place, and time. No cranial nerve deficit.   Skin: She is not diaphoretic.   Psychiatric: Her behavior is normal.       Assessment:     1. Epigastric pain    2. Belching    3. Chronic constipation        Plan:     I will adjust her PPI. We discussed possible long term side effects. We reviewed habits and foods which may cause belching. Nothing further work up indicated at this time.     Medications Ordered This Encounter   Medications    pantoprazole (PROTONIX) 20 MG tablet     Sig: Take 1 tablet (20 mg total) by mouth 2 (two) times daily before meals.     Dispense:  60 tablet      Refill:  11     Follow-up if symptoms worsen or fail to improve.    Thank you for the opportunity to participate in the care of this patient.   Clarence Barboza PA-C.

## 2019-01-10 ENCOUNTER — PATIENT MESSAGE (OUTPATIENT)
Dept: GASTROENTEROLOGY | Facility: CLINIC | Age: 64
End: 2019-01-10

## 2019-01-11 RX ORDER — PANTOPRAZOLE SODIUM 20 MG/1
20 TABLET, DELAYED RELEASE ORAL
Qty: 180 TABLET | Refills: 3 | Status: SHIPPED | OUTPATIENT
Start: 2019-01-11 | End: 2019-12-02 | Stop reason: SDUPTHER

## 2019-01-15 ENCOUNTER — TELEPHONE (OUTPATIENT)
Dept: ENDOSCOPY | Facility: HOSPITAL | Age: 64
End: 2019-01-15

## 2019-02-07 DIAGNOSIS — I10 ESSENTIAL HYPERTENSION: ICD-10-CM

## 2019-02-08 RX ORDER — VALSARTAN 80 MG/1
TABLET ORAL
Qty: 90 TABLET | Refills: 0 | Status: SHIPPED | OUTPATIENT
Start: 2019-02-08 | End: 2019-05-09 | Stop reason: SDUPTHER

## 2019-05-09 DIAGNOSIS — I10 ESSENTIAL HYPERTENSION: ICD-10-CM

## 2019-05-10 RX ORDER — VALSARTAN 80 MG/1
TABLET ORAL
Qty: 90 TABLET | Refills: 0 | Status: SHIPPED | OUTPATIENT
Start: 2019-05-10 | End: 2019-08-06 | Stop reason: SDUPTHER

## 2019-06-07 DIAGNOSIS — E78.5 HYPERLIPIDEMIA, UNSPECIFIED HYPERLIPIDEMIA TYPE: ICD-10-CM

## 2019-06-10 RX ORDER — EZETIMIBE 10 MG/1
TABLET ORAL
Qty: 90 TABLET | Refills: 1 | OUTPATIENT
Start: 2019-06-10

## 2019-07-26 DIAGNOSIS — J30.9 ALLERGIC RHINITIS, UNSPECIFIED SEASONALITY, UNSPECIFIED TRIGGER: ICD-10-CM

## 2019-07-27 RX ORDER — FLUTICASONE PROPIONATE 50 MCG
SPRAY, SUSPENSION (ML) NASAL
Qty: 48 G | Refills: 1 | Status: SHIPPED | OUTPATIENT
Start: 2019-07-27 | End: 2020-03-27 | Stop reason: SDUPTHER

## 2019-08-06 DIAGNOSIS — I10 ESSENTIAL HYPERTENSION: ICD-10-CM

## 2019-08-07 RX ORDER — VALSARTAN 80 MG/1
TABLET ORAL
Qty: 90 TABLET | Refills: 0 | Status: SHIPPED | OUTPATIENT
Start: 2019-08-07 | End: 2019-09-30 | Stop reason: SDUPTHER

## 2019-09-26 ENCOUNTER — PATIENT MESSAGE (OUTPATIENT)
Dept: FAMILY MEDICINE | Facility: CLINIC | Age: 64
End: 2019-09-26

## 2019-09-26 ENCOUNTER — PATIENT OUTREACH (OUTPATIENT)
Dept: ADMINISTRATIVE | Facility: HOSPITAL | Age: 64
End: 2019-09-26

## 2019-09-26 DIAGNOSIS — I10 ESSENTIAL HYPERTENSION: Primary | ICD-10-CM

## 2019-09-26 DIAGNOSIS — R53.83 FATIGUE, UNSPECIFIED TYPE: ICD-10-CM

## 2019-09-26 DIAGNOSIS — R63.5 ABNORMAL WEIGHT GAIN: ICD-10-CM

## 2019-09-26 DIAGNOSIS — E55.9 VITAMIN D INSUFFICIENCY: ICD-10-CM

## 2019-09-26 NOTE — PROGRESS NOTES
Health Maintenance reviewed.   Immunizations: Abstracted.  Care Everywhere abstracted: No new results found.  Health Maintenance Due   Topic    Aspirin/Antiplatelet Therapy     Pneumococcal Vaccine (65+ Low/Medium Risk) (1 of 2 - PCV13)    Mammogram     Colonoscopy     Lipid Panel    ANDREW:MAMMO ANDREW:COLON LIPID:[id number]

## 2019-09-27 ENCOUNTER — LAB VISIT (OUTPATIENT)
Dept: LAB | Facility: HOSPITAL | Age: 64
End: 2019-09-27
Attending: FAMILY MEDICINE
Payer: COMMERCIAL

## 2019-09-27 DIAGNOSIS — I10 ESSENTIAL HYPERTENSION: ICD-10-CM

## 2019-09-27 DIAGNOSIS — E55.9 VITAMIN D INSUFFICIENCY: ICD-10-CM

## 2019-09-27 DIAGNOSIS — R53.83 FATIGUE, UNSPECIFIED TYPE: ICD-10-CM

## 2019-09-27 DIAGNOSIS — R63.5 ABNORMAL WEIGHT GAIN: ICD-10-CM

## 2019-09-27 LAB
25(OH)D3+25(OH)D2 SERPL-MCNC: 33 NG/ML (ref 30–96)
ALBUMIN SERPL BCP-MCNC: 3.8 G/DL (ref 3.5–5.2)
ALP SERPL-CCNC: 55 U/L (ref 55–135)
ALT SERPL W/O P-5'-P-CCNC: 10 U/L (ref 10–44)
ANION GAP SERPL CALC-SCNC: 9 MMOL/L (ref 8–16)
AST SERPL-CCNC: 15 U/L (ref 10–40)
BASOPHILS # BLD AUTO: 0.02 K/UL (ref 0–0.2)
BASOPHILS NFR BLD: 0.5 % (ref 0–1.9)
BILIRUB SERPL-MCNC: 0.4 MG/DL (ref 0.1–1)
BUN SERPL-MCNC: 17 MG/DL (ref 8–23)
CALCIUM SERPL-MCNC: 9.8 MG/DL (ref 8.7–10.5)
CHLORIDE SERPL-SCNC: 105 MMOL/L (ref 95–110)
CHOLEST SERPL-MCNC: 256 MG/DL (ref 120–199)
CHOLEST/HDLC SERPL: 4.1 {RATIO} (ref 2–5)
CO2 SERPL-SCNC: 25 MMOL/L (ref 23–29)
CREAT SERPL-MCNC: 0.9 MG/DL (ref 0.5–1.4)
DIFFERENTIAL METHOD: NORMAL
EOSINOPHIL # BLD AUTO: 0.1 K/UL (ref 0–0.5)
EOSINOPHIL NFR BLD: 2.7 % (ref 0–8)
ERYTHROCYTE [DISTWIDTH] IN BLOOD BY AUTOMATED COUNT: 12.8 % (ref 11.5–14.5)
EST. GFR  (AFRICAN AMERICAN): >60 ML/MIN/1.73 M^2
EST. GFR  (NON AFRICAN AMERICAN): >60 ML/MIN/1.73 M^2
ESTIMATED AVG GLUCOSE: 94 MG/DL (ref 68–131)
GLUCOSE SERPL-MCNC: 86 MG/DL (ref 70–110)
HBA1C MFR BLD HPLC: 4.9 % (ref 4–5.6)
HCT VFR BLD AUTO: 40 % (ref 37–48.5)
HDLC SERPL-MCNC: 63 MG/DL (ref 40–75)
HDLC SERPL: 24.6 % (ref 20–50)
HGB BLD-MCNC: 13.1 G/DL (ref 12–16)
IMM GRANULOCYTES # BLD AUTO: 0 K/UL (ref 0–0.04)
IMM GRANULOCYTES NFR BLD AUTO: 0 % (ref 0–0.5)
LDLC SERPL CALC-MCNC: 157 MG/DL (ref 63–159)
LYMPHOCYTES # BLD AUTO: 1.4 K/UL (ref 1–4.8)
LYMPHOCYTES NFR BLD: 33.3 % (ref 18–48)
MCH RBC QN AUTO: 30.5 PG (ref 27–31)
MCHC RBC AUTO-ENTMCNC: 32.8 G/DL (ref 32–36)
MCV RBC AUTO: 93 FL (ref 82–98)
MONOCYTES # BLD AUTO: 0.3 K/UL (ref 0.3–1)
MONOCYTES NFR BLD: 6.1 % (ref 4–15)
NEUTROPHILS # BLD AUTO: 2.3 K/UL (ref 1.8–7.7)
NEUTROPHILS NFR BLD: 57.4 % (ref 38–73)
NONHDLC SERPL-MCNC: 193 MG/DL
NRBC BLD-RTO: 0 /100 WBC
PLATELET # BLD AUTO: 233 K/UL (ref 150–350)
PMV BLD AUTO: 10.2 FL (ref 9.2–12.9)
POTASSIUM SERPL-SCNC: 4.5 MMOL/L (ref 3.5–5.1)
PROT SERPL-MCNC: 7.1 G/DL (ref 6–8.4)
RBC # BLD AUTO: 4.29 M/UL (ref 4–5.4)
SODIUM SERPL-SCNC: 139 MMOL/L (ref 136–145)
TRIGL SERPL-MCNC: 180 MG/DL (ref 30–150)
TSH SERPL DL<=0.005 MIU/L-ACNC: 1.06 UIU/ML (ref 0.4–4)
WBC # BLD AUTO: 4.08 K/UL (ref 3.9–12.7)

## 2019-09-27 PROCEDURE — 36415 COLL VENOUS BLD VENIPUNCTURE: CPT | Mod: PO

## 2019-09-27 PROCEDURE — 80061 LIPID PANEL: CPT

## 2019-09-27 PROCEDURE — 85025 COMPLETE CBC W/AUTO DIFF WBC: CPT

## 2019-09-27 PROCEDURE — 84443 ASSAY THYROID STIM HORMONE: CPT

## 2019-09-27 PROCEDURE — 83036 HEMOGLOBIN GLYCOSYLATED A1C: CPT

## 2019-09-27 PROCEDURE — 80053 COMPREHEN METABOLIC PANEL: CPT

## 2019-09-27 PROCEDURE — 82306 VITAMIN D 25 HYDROXY: CPT

## 2019-09-30 ENCOUNTER — OFFICE VISIT (OUTPATIENT)
Dept: FAMILY MEDICINE | Facility: CLINIC | Age: 64
End: 2019-09-30
Payer: COMMERCIAL

## 2019-09-30 VITALS
BODY MASS INDEX: 32.03 KG/M2 | DIASTOLIC BLOOD PRESSURE: 80 MMHG | SYSTOLIC BLOOD PRESSURE: 138 MMHG | HEIGHT: 65 IN | RESPIRATION RATE: 16 BRPM | OXYGEN SATURATION: 97 % | WEIGHT: 192.25 LBS | TEMPERATURE: 98 F | HEART RATE: 93 BPM

## 2019-09-30 DIAGNOSIS — I10 ESSENTIAL HYPERTENSION: Primary | ICD-10-CM

## 2019-09-30 DIAGNOSIS — J45.20 MILD INTERMITTENT ASTHMA WITHOUT COMPLICATION: ICD-10-CM

## 2019-09-30 DIAGNOSIS — I20.1 PRINZMETAL'S ANGINA: ICD-10-CM

## 2019-09-30 DIAGNOSIS — E55.9 VITAMIN D INSUFFICIENCY: ICD-10-CM

## 2019-09-30 DIAGNOSIS — Z78.9 STATIN INTOLERANCE: ICD-10-CM

## 2019-09-30 DIAGNOSIS — J30.9 ALLERGIC RHINITIS, UNSPECIFIED SEASONALITY, UNSPECIFIED TRIGGER: ICD-10-CM

## 2019-09-30 DIAGNOSIS — E78.5 HYPERLIPIDEMIA, UNSPECIFIED HYPERLIPIDEMIA TYPE: ICD-10-CM

## 2019-09-30 DIAGNOSIS — Z86.010 HISTORY OF COLON POLYPS: ICD-10-CM

## 2019-09-30 DIAGNOSIS — E66.9 OBESITY (BMI 30-39.9): ICD-10-CM

## 2019-09-30 DIAGNOSIS — Z23 NEED FOR VACCINATION WITH 13-POLYVALENT PNEUMOCOCCAL CONJUGATE VACCINE: ICD-10-CM

## 2019-09-30 DIAGNOSIS — L98.9 SKIN LESION OF RIGHT ARM: ICD-10-CM

## 2019-09-30 DIAGNOSIS — Z12.11 COLON CANCER SCREENING: ICD-10-CM

## 2019-09-30 PROCEDURE — 90670 PNEUMOCOCCAL CONJUGATE VACCINE 13-VALENT LESS THAN 5YO & GREATER THAN: ICD-10-PCS | Mod: S$GLB,,, | Performed by: FAMILY MEDICINE

## 2019-09-30 PROCEDURE — 90471 FLU VACCINE - HIGH DOSE (65+) PRESERVATIVE FREE IM: ICD-10-PCS | Mod: S$GLB,,, | Performed by: FAMILY MEDICINE

## 2019-09-30 PROCEDURE — 99999 PR PBB SHADOW E&M-EST. PATIENT-LVL V: ICD-10-PCS | Mod: PBBFAC,,, | Performed by: FAMILY MEDICINE

## 2019-09-30 PROCEDURE — 3075F SYST BP GE 130 - 139MM HG: CPT | Mod: CPTII,S$GLB,, | Performed by: FAMILY MEDICINE

## 2019-09-30 PROCEDURE — 90472 PNEUMOCOCCAL CONJUGATE VACCINE 13-VALENT LESS THAN 5YO & GREATER THAN: ICD-10-PCS | Mod: S$GLB,,, | Performed by: FAMILY MEDICINE

## 2019-09-30 PROCEDURE — 99999 PR PBB SHADOW E&M-EST. PATIENT-LVL V: CPT | Mod: PBBFAC,,, | Performed by: FAMILY MEDICINE

## 2019-09-30 PROCEDURE — 90662 IIV NO PRSV INCREASED AG IM: CPT | Mod: S$GLB,,, | Performed by: FAMILY MEDICINE

## 2019-09-30 PROCEDURE — 90670 PCV13 VACCINE IM: CPT | Mod: S$GLB,,, | Performed by: FAMILY MEDICINE

## 2019-09-30 PROCEDURE — 90472 IMMUNIZATION ADMIN EACH ADD: CPT | Mod: S$GLB,,, | Performed by: FAMILY MEDICINE

## 2019-09-30 PROCEDURE — 99214 OFFICE O/P EST MOD 30 MIN: CPT | Mod: 25,S$GLB,, | Performed by: FAMILY MEDICINE

## 2019-09-30 PROCEDURE — 1101F PR PT FALLS ASSESS DOC 0-1 FALLS W/OUT INJ PAST YR: ICD-10-PCS | Mod: CPTII,S$GLB,, | Performed by: FAMILY MEDICINE

## 2019-09-30 PROCEDURE — 90471 IMMUNIZATION ADMIN: CPT | Mod: S$GLB,,, | Performed by: FAMILY MEDICINE

## 2019-09-30 PROCEDURE — 1101F PT FALLS ASSESS-DOCD LE1/YR: CPT | Mod: CPTII,S$GLB,, | Performed by: FAMILY MEDICINE

## 2019-09-30 PROCEDURE — 3079F DIAST BP 80-89 MM HG: CPT | Mod: CPTII,S$GLB,, | Performed by: FAMILY MEDICINE

## 2019-09-30 PROCEDURE — 99214 PR OFFICE/OUTPT VISIT, EST, LEVL IV, 30-39 MIN: ICD-10-PCS | Mod: 25,S$GLB,, | Performed by: FAMILY MEDICINE

## 2019-09-30 PROCEDURE — 3075F PR MOST RECENT SYSTOLIC BLOOD PRESS GE 130-139MM HG: ICD-10-PCS | Mod: CPTII,S$GLB,, | Performed by: FAMILY MEDICINE

## 2019-09-30 PROCEDURE — 90662 FLU VACCINE - HIGH DOSE (65+) PRESERVATIVE FREE IM: ICD-10-PCS | Mod: S$GLB,,, | Performed by: FAMILY MEDICINE

## 2019-09-30 PROCEDURE — 3079F PR MOST RECENT DIASTOLIC BLOOD PRESSURE 80-89 MM HG: ICD-10-PCS | Mod: CPTII,S$GLB,, | Performed by: FAMILY MEDICINE

## 2019-09-30 RX ORDER — CELECOXIB 200 MG/1
CAPSULE ORAL
COMMUNITY
Start: 2019-09-04 | End: 2022-04-01

## 2019-09-30 RX ORDER — VALSARTAN 80 MG/1
80 TABLET ORAL DAILY
Qty: 90 TABLET | Refills: 1 | Status: SHIPPED | OUTPATIENT
Start: 2019-09-30 | End: 2020-03-27

## 2019-09-30 RX ORDER — VERAPAMIL HYDROCHLORIDE 240 MG/1
240 TABLET, FILM COATED, EXTENDED RELEASE ORAL DAILY
Qty: 90 TABLET | Refills: 1 | Status: SHIPPED | OUTPATIENT
Start: 2019-09-30 | End: 2020-03-27

## 2019-09-30 RX ORDER — EZETIMIBE 10 MG/1
10 TABLET ORAL DAILY
Qty: 90 TABLET | Refills: 1 | Status: SHIPPED | OUTPATIENT
Start: 2019-09-30 | End: 2020-03-27 | Stop reason: SDUPTHER

## 2019-09-30 RX ORDER — ASPIRIN 81 MG/1
81 TABLET ORAL DAILY
COMMUNITY

## 2019-09-30 NOTE — PROGRESS NOTES
"Subjective:       Patient ID: Ro Hernandez is a 64 y.o. female.    Chief Complaint: Chronic Care    HPI   Chronic Care  65yo female presents today for chronic care assessment. She has had recent lab assessment and is here for review of results. Maintains compliance with Valsartan and Verapamil in combination. There is no report of CP or palpitations. She has been off Zetia but denies any previous adverse effects of use. Lipid levels as follows: TC-256, TG-180, HDL-63 and LDL-157.0. She has lost 30 pounds since last assessment (November 2018) and has been consuming a low carb diet. Notes a new lesion to the right arm that has a dark appearance. No drainage or redness is noted. Health maintenance update is needed. There have been no recent ER visits or hospitalizations.    Review of Systems   Constitutional: Negative for activity change and unexpected weight change.   HENT: Negative for hearing loss, rhinorrhea and trouble swallowing.    Eyes: Negative for discharge and visual disturbance.   Respiratory: Negative for chest tightness and wheezing.    Cardiovascular: Negative for chest pain and palpitations.   Gastrointestinal: Positive for constipation. Negative for blood in stool, diarrhea and vomiting.   Endocrine: Negative for polydipsia and polyuria.   Genitourinary: Negative for difficulty urinating, dysuria, hematuria and menstrual problem.   Musculoskeletal: Positive for arthralgias. Negative for joint swelling and neck pain.   Neurological: Negative for weakness and headaches.   Psychiatric/Behavioral: Negative for confusion and dysphoric mood.       Objective:   /80   Pulse 93   Temp 97.5 °F (36.4 °C) (Temporal)   Resp 16   Ht 5' 5" (1.651 m)   Wt 87.2 kg (192 lb 3.9 oz)   SpO2 97%   BMI 31.99 kg/m²   Physical Exam   Constitutional: She is oriented to person, place, and time. She appears well-developed and well-nourished.   Obese, non-toxic   HENT:   Head: Normocephalic and atraumatic.   Right " Ear: Tympanic membrane, external ear and ear canal normal.   Left Ear: Tympanic membrane, external ear and ear canal normal.   Nose: Nose normal.   Mouth/Throat: Oropharynx is clear and moist.   Eyes: Pupils are equal, round, and reactive to light. Conjunctivae and EOM are normal.   Neck: Normal range of motion. Neck supple.   Cardiovascular: Normal rate, regular rhythm and normal heart sounds.   Pulmonary/Chest: Effort normal and breath sounds normal.   Abdominal: Soft. Bowel sounds are normal.   Musculoskeletal: She exhibits no edema.   Neurological: She is alert and oriented to person, place, and time.   Skin: Skin is warm and dry.   Psychiatric: She has a normal mood and affect. Her behavior is normal.       Assessment:       1. Essential hypertension    2. Hyperlipidemia, unspecified hyperlipidemia type    3. Vitamin D insufficiency    4. Prinzmetal's angina    5. Mild intermittent asthma without complication    6. Allergic rhinitis, unspecified seasonality, unspecified trigger    7. Skin lesion of right arm    8. Obesity (BMI 30-39.9)    9. Statin intolerance    10. Colon cancer screening    11. History of colon polyps    12. Need for vaccination with 13-polyvalent pneumococcal conjugate vaccine        Plan:      Essential hypertension  Stable. Continue with current treatment. Target BP goal remains<140/90. Heart healthy diet is advised. Intermittent home monitoring has been discussed.  -     valsartan (DIOVAN) 80 MG tablet; Take 1 tablet (80 mg total) by mouth once daily.  Dispense: 90 tablet; Refill: 1  -     verapamil (CALAN-SR) 240 MG CR tablet; Take 1 tablet (240 mg total) by mouth once daily.  Dispense: 90 tablet; Refill: 1  -     Comprehensive metabolic panel; Future; Expected date: 09/30/2019    Hyperlipidemia, unspecified hyperlipidemia type  -     ezetimibe (ZETIA) 10 mg tablet; Take 1 tablet (10 mg total) by mouth once daily.  Dispense: 90 tablet; Refill: 1  -     Lipid panel; Future; Expected date:  09/30/2019    Vitamin D insufficiency  Continue with current supplementation (is taking calcium plus D).    Prinzmetal's angina  As per Cardiology.    Mild intermittent asthma without complication  Overdue for assessment with Pulmonology- recommend scheduling as soon as possible.    Allergic rhinitis, unspecified seasonality, unspecified trigger  On Flonase and prn Claritin. May benefit from the addition of Singulair.     Skin lesion of right arm  -     Ambulatory Referral to Dermatology    Obesity (BMI 30-39.9)  Weight loss efforts remain encouraged through diet and lifestyle measures.    Statin intolerance  As above.    Colon cancer screening  -     Case request GI: COLONOSCOPY    History of colon polyps  -     Case request GI: COLONOSCOPY    Need for vaccination with 13-polyvalent pneumococcal conjugate vaccine  -     (In Office Administered) Pneumococcal Conjugate Vaccine (13 Valent) (IM)    Other orders  -     Influenza - High Dose (65+) (PF) (IM)    Records request for pap and MMG from Dr. Sammie Richardson at [facility].  Next check in 6 months with labs prior to the visit.

## 2019-10-01 ENCOUNTER — TELEPHONE (OUTPATIENT)
Dept: ENDOSCOPY | Facility: HOSPITAL | Age: 64
End: 2019-10-01

## 2019-10-01 ENCOUNTER — PATIENT MESSAGE (OUTPATIENT)
Dept: ENDOSCOPY | Facility: HOSPITAL | Age: 64
End: 2019-10-01

## 2019-10-03 ENCOUNTER — INITIAL CONSULT (OUTPATIENT)
Dept: DERMATOLOGY | Facility: CLINIC | Age: 64
End: 2019-10-03
Payer: COMMERCIAL

## 2019-10-03 DIAGNOSIS — D18.00 ANGIOMA: ICD-10-CM

## 2019-10-03 DIAGNOSIS — D22.9 MULTIPLE BENIGN NEVI: Primary | ICD-10-CM

## 2019-10-03 PROCEDURE — 99203 OFFICE O/P NEW LOW 30 MIN: CPT | Mod: S$GLB,,, | Performed by: DERMATOLOGY

## 2019-10-03 PROCEDURE — 99999 PR PBB SHADOW E&M-EST. PATIENT-LVL III: ICD-10-PCS | Mod: PBBFAC,,, | Performed by: DERMATOLOGY

## 2019-10-03 PROCEDURE — 1101F PT FALLS ASSESS-DOCD LE1/YR: CPT | Mod: CPTII,S$GLB,, | Performed by: DERMATOLOGY

## 2019-10-03 PROCEDURE — 99203 PR OFFICE/OUTPT VISIT, NEW, LEVL III, 30-44 MIN: ICD-10-PCS | Mod: S$GLB,,, | Performed by: DERMATOLOGY

## 2019-10-03 PROCEDURE — 1101F PR PT FALLS ASSESS DOC 0-1 FALLS W/OUT INJ PAST YR: ICD-10-PCS | Mod: CPTII,S$GLB,, | Performed by: DERMATOLOGY

## 2019-10-03 PROCEDURE — 99999 PR PBB SHADOW E&M-EST. PATIENT-LVL III: CPT | Mod: PBBFAC,,, | Performed by: DERMATOLOGY

## 2019-10-03 NOTE — PATIENT INSTRUCTIONS
Monitoring Moles  Moles, also called nevi, are small, pigmented (colored) marks on the skin. They have no known purpose. Many moles appear before age 30, but they also increase frequently as people age. Moles most often are benign (not cancer) and harmless. But some become cancerous. Thats why you need to watch the moles on your body and tell your health care provider about any concern you.    What are moles?  Moles are a type of pigmented pascale. Freckles, which often are sprinkled across the bridge of the nose, the cheeks, and the arms, are another type of pigmented pascale. Moles can appear on any part of the body. There are many types, sizes, and shapes of moles. Most moles are solid brown. In most cases, they are flat or dome-shaped, smooth, and have well-defined edges. Freckles are flat.  Why worry about moles?  Most moles are benign and dont require treatment. You can have moles removed if you dont like the way they look or feel. But moles that appear after you are 30 or that change in certain ways may become a problem. These moles may turn into melanoma, a type of skin cancer. Melanoma is one of the fastest growing cancers in the [place], but it is often curable if caught early. But this disease can be life-threatening, particularly when not diagnosed early. The more moles someone has, the higher the risk. Risk is also higher for those who have had more lifetime exposure to the sun, severe blistering sunburns, exposure to tanning beds, a prior personal history of cancer, and those with a family history of skin cancer. To manage your risk, its smart to check your moles for changes and ask your health care provider to perform a thorough skin exam when you have a physical exam. To do this, you first need to learn where your moles are. Then, be sure to check your moles each month.    Checking your moles  You can check many of your moles each month. You can do this right after you shower and before you get  dressed. Check your body from head to toe. Then, make a list of your moles. If you find any new moles or changes in your moles, call your health care provider. To check your moles, youll need:  · A full-length mirror  · A stool or chair to sit on while you check your feet  If you have a lot of moles, take digital photos of them each month. Make sure to take photos both up close and from a distance. These can help you see if any moles change over time.  When to seek medical treatment  See your health care provider if your moles hurt, itch, ooze, bleed, thicken, become crusty, or show other changes. Also, be sure to call your health care provider if your moles show any of the following signs of melanoma:  · A change in size, shape, color, or elevation  · Asymmetry (when the sides dont match)  · Ragged, notched, or blurred borders  · Varied colors within the same mole  · Size is larger than 5 mm or 6 mm in diameter (the size of a pencil eraser)  © 5661-2931 [employer]. [address] [place] [address]. All rights reserved. This information is not intended as a substitute for professional medical care. Always follow your healthcare professional's instructions.        Preventing Skin Cancer  Relaxing in the sun may feel good. But it isnt good for your skin. In fact, being exposed to the suns harmful rays is a major cause of skin cancer. This is a serious disease that can be life-threatening. People of all ages and backgrounds are at risk. But in most cases, skin cancer can be prevented.    Your Role in Prevention  You can act today to help prevent skin cancer. Start by avoiding the suns UV (ultraviolet) rays. And dont use tanning beds, which are no safer than the sun. Taking these steps can help keep you from getting skin cancer. It can also help prevent wrinkles and other sun-induced aging effects. Make sure your children also follow these safeguards. Now is the time to start taking  preventive steps against skin cancer.  When You Are Outdoors  Protect your skin when you go outdoors during the day. Take precautions whenever you go out to eat, run errands by car or on foot, or do any outdoor activity. There isnt just one easy way to protect your skin. Its best to follow all of these steps:  · Wear tightly woven clothing that covers your skin. Put on a wide-brimmed hat to protect your face, ears, and scalp.  · Watch the clock. Try to avoid the sun between 10 a.m. and 4 p.m., when it is strongest.  · Head for the shade or create your own. Use an umbrella when sitting or strolling.  · Know that the suns rays can reflect off sand, water, and snow. This can harm your skin. Take extra care when you are near reflective surfaces.  · Keep in mind that even when the weather is hazy or cloudy, your skin can be exposed to strong UV rays.  · Shield your skin with sunscreen. Also, apply sunscreen to your childrens skin.  Tips for Using Sunscreen  To help prevent skin cancer, choose the right sunscreen and use it correctly. Try the following tips:  · Choose a sunscreen that has a sun protection factor (SPF) of at least 15. For the best protection, an SPF of at least 30 is preferred. Also, choose a sunscreen labeled broad spectrum. This will shield you from both UVA and UVB (ultraviolet A and B) rays.  · If one brand irritates your skin, try another, particularly ones without fragrance.  · Use a water-resistant sunscreen if swimming or sweating.  · Reapply sunscreen every 2 hours. If youre active, do this more often.  · Cover any sun-exposed skin, from your face to your feet. Dont forget your ears and your lips.  · Know that while sunscreen helps protect you, it isnt enough. You should also wear protective clothing. And try to stay out of the sun as much as you can, especially from 10 a.m. to 4 p.m.  © 1793-3617 [employer]. [address] [place] [address], [place] [address]. All rights reserved.  This information is not intended as a substitute for professional medical care. Always follow your healthcare professional's instructions.

## 2019-10-03 NOTE — LETTER
October 3, 2019      Marcela Chopra MD  [address] [facility] [address]           [facility]  [address] [facility] [address]  Phone: [phone]  Fax: [phone]          Patient: Ro Hernandez   MR Number: [id number]   YOB: 1955   Date of Visit: 10/3/2019       Dear Dr. Marcela Chopra:    Thank you for referring Ro Hernandez to me for evaluation. Attached you will find relevant portions of my assessment and plan of care.    If you have questions, please do not hesitate to call me. I look forward to following Ro Hernandez along with you.    Sincerely,    Daphne Segundo MD    Enclosure  CC:  No Recipients    If you would like to receive this communication electronically, please contact [email or web address] or [phone] to request more information on [employer] online [employer] Link access.    For providers and/or their staff who would like to refer a patient to [facility], please contact us through our one-stop-shop provider referral line, [facility], at [phone].    If you feel you have received this communication in error or would no longer like to receive these types of communications, please e-mail [email or web address]

## 2019-10-03 NOTE — PROGRESS NOTES
Subjective:       Patient ID:  Ro Hernandez is a 64 y.o. female who presents for   Chief Complaint   Patient presents with    Skin Check     FBSE c/o spot to R arm      History of Present Illness: The patient presents with chief complaint of lesion.  Location: R arm  Duration: 1 year  Signs/Symptoms: growing    Prior treatments: none     Denies personal or family hx skin cancers       Review of Systems   Constitutional: Negative for fever and chills.   Gastrointestinal: Negative for nausea and vomiting.   Skin: Positive for activity-related sunscreen use. Negative for daily sunscreen use and recent sunburn.   Hematologic/Lymphatic: Does not bruise/bleed easily.        Objective:    Physical Exam   Constitutional: She appears well-developed and well-nourished. No distress.   Neurological: She is alert and oriented to person, place, and time. She is not disoriented.   Psychiatric: She has a normal mood and affect.   Skin:   Areas Examined (abnormalities noted in diagram):   Scalp / Hair Palpated and Inspected  Head / Face Inspection Performed  Neck Inspection Performed  Chest / Axilla Inspection Performed  Abdomen Inspection Performed  Genitals / Buttocks / Groin Inspection Performed  Back Inspection Performed  RUE Inspected  LUE Inspection Performed  RLE Inspected  LLE Inspection Performed  Nails and Digits Inspection Performed                   Diagram Legend     Erythematous scaling macule/papule c/w actinic keratosis       Vascular papule c/w angioma      Pigmented verrucoid papule/plaque c/w seborrheic keratosis      Yellow umbilicated papule c/w sebaceous hyperplasia      Irregularly shaped tan macule c/w lentigo     1-2 mm smooth white papules consistent with Milia      Movable subcutaneous cyst with punctum c/w epidermal inclusion cyst      Subcutaneous movable cyst c/w pilar cyst      Firm pink to brown papule c/w dermatofibroma      Pedunculated fleshy papule(s) c/w skin tag(s)      Evenly pigmented  macule c/w junctional nevus     Mildly variegated pigmented, slightly irregular-bordered macule c/w mildly atypical nevus      Flesh colored to evenly pigmented papule c/w intradermal nevus       Pink pearly papule/plaque c/w basal cell carcinoma      Erythematous hyperkeratotic cursted plaque c/w SCC      Surgical scar with no sign of skin cancer recurrence      Open and closed comedones      Inflammatory papules and pustules      Verrucoid papule consistent consistent with wart     Erythematous eczematous patches and plaques     Dystrophic onycholytic nail with subungual debris c/w onychomycosis     Umbilicated papule    Erythematous-base heme-crusted tan verrucoid plaque consistent with inflamed seborrheic keratosis     Erythematous Silvery Scaling Plaque c/w Psoriasis     See annotation      Assessment / Plan:        Multiple benign nevi  Reassurance given.  Discussed ABCDEF of melanoma and changes for patient to look for.  AAD Handout given. Discussed importance of daily use of sunscreen which is broad-spectrum and has a minimum SPF of 30.    Angioma  Reassurance given.  Lesions are benign.             Follow up in about 1 year (around 10/3/2020).

## 2019-10-23 ENCOUNTER — TELEPHONE (OUTPATIENT)
Dept: RADIOLOGY | Facility: HOSPITAL | Age: 64
End: 2019-10-23

## 2019-10-24 ENCOUNTER — APPOINTMENT (OUTPATIENT)
Dept: RADIOLOGY | Facility: HOSPITAL | Age: 64
End: 2019-10-24
Attending: UROLOGY
Payer: COMMERCIAL

## 2019-10-24 DIAGNOSIS — N28.1 RENAL CYST: ICD-10-CM

## 2019-10-24 PROCEDURE — 76770 US EXAM ABDO BACK WALL COMP: CPT | Mod: 26,,, | Performed by: RADIOLOGY

## 2019-10-24 PROCEDURE — 76770 US EXAM ABDO BACK WALL COMP: CPT | Mod: TC,PO

## 2019-10-24 PROCEDURE — 76770 US RETROPERITONEAL COMPLETE: ICD-10-PCS | Mod: 26,,, | Performed by: RADIOLOGY

## 2019-11-06 ENCOUNTER — LAB VISIT (OUTPATIENT)
Dept: LAB | Facility: HOSPITAL | Age: 64
End: 2019-11-06
Attending: UROLOGY
Payer: COMMERCIAL

## 2019-11-06 ENCOUNTER — OFFICE VISIT (OUTPATIENT)
Dept: UROLOGY | Facility: CLINIC | Age: 64
End: 2019-11-06
Payer: COMMERCIAL

## 2019-11-06 VITALS
HEART RATE: 74 BPM | DIASTOLIC BLOOD PRESSURE: 58 MMHG | HEIGHT: 65 IN | SYSTOLIC BLOOD PRESSURE: 144 MMHG | WEIGHT: 192.25 LBS | BODY MASS INDEX: 32.03 KG/M2

## 2019-11-06 DIAGNOSIS — N28.1 RENAL CYST: Primary | ICD-10-CM

## 2019-11-06 DIAGNOSIS — N28.1 RENAL CYST: ICD-10-CM

## 2019-11-06 DIAGNOSIS — I10 HYPERTENSION, UNSPECIFIED TYPE: ICD-10-CM

## 2019-11-06 LAB
BILIRUB SERPL-MCNC: NORMAL MG/DL
BLOOD URINE, POC: NORMAL
COLOR, POC UA: YELLOW
GLUCOSE UR QL STRIP: NORMAL
KETONES UR QL STRIP: NORMAL
LEUKOCYTE ESTERASE URINE, POC: NORMAL
NITRITE, POC UA: NORMAL
PH, POC UA: 7
PROTEIN, POC: NORMAL
SPECIFIC GRAVITY, POC UA: 1
UROBILINOGEN, POC UA: NORMAL

## 2019-11-06 PROCEDURE — 99214 OFFICE O/P EST MOD 30 MIN: CPT | Mod: 25,S$GLB,, | Performed by: UROLOGY

## 2019-11-06 PROCEDURE — 81002 URINALYSIS NONAUTO W/O SCOPE: CPT | Mod: S$GLB,,, | Performed by: UROLOGY

## 2019-11-06 PROCEDURE — 36415 COLL VENOUS BLD VENIPUNCTURE: CPT

## 2019-11-06 PROCEDURE — 3078F PR MOST RECENT DIASTOLIC BLOOD PRESSURE < 80 MM HG: ICD-10-PCS | Mod: CPTII,S$GLB,, | Performed by: UROLOGY

## 2019-11-06 PROCEDURE — 3077F SYST BP >= 140 MM HG: CPT | Mod: CPTII,S$GLB,, | Performed by: UROLOGY

## 2019-11-06 PROCEDURE — 1101F PT FALLS ASSESS-DOCD LE1/YR: CPT | Mod: CPTII,S$GLB,, | Performed by: UROLOGY

## 2019-11-06 PROCEDURE — 3078F DIAST BP <80 MM HG: CPT | Mod: CPTII,S$GLB,, | Performed by: UROLOGY

## 2019-11-06 PROCEDURE — 99214 PR OFFICE/OUTPT VISIT, EST, LEVL IV, 30-39 MIN: ICD-10-PCS | Mod: 25,S$GLB,, | Performed by: UROLOGY

## 2019-11-06 PROCEDURE — 99999 PR PBB SHADOW E&M-EST. PATIENT-LVL III: ICD-10-PCS | Mod: PBBFAC,,, | Performed by: UROLOGY

## 2019-11-06 PROCEDURE — 1101F PR PT FALLS ASSESS DOC 0-1 FALLS W/OUT INJ PAST YR: ICD-10-PCS | Mod: CPTII,S$GLB,, | Performed by: UROLOGY

## 2019-11-06 PROCEDURE — 99999 PR PBB SHADOW E&M-EST. PATIENT-LVL III: CPT | Mod: PBBFAC,,, | Performed by: UROLOGY

## 2019-11-06 PROCEDURE — 3077F PR MOST RECENT SYSTOLIC BLOOD PRESSURE >= 140 MM HG: ICD-10-PCS | Mod: CPTII,S$GLB,, | Performed by: UROLOGY

## 2019-11-06 PROCEDURE — 81002 POCT URINE DIPSTICK WITHOUT MICROSCOPE: ICD-10-PCS | Mod: S$GLB,,, | Performed by: UROLOGY

## 2019-11-06 PROCEDURE — 82565 ASSAY OF CREATININE: CPT

## 2019-11-06 NOTE — PROGRESS NOTES
Chief Complaint: Renal Cyst    HPI:   1/6/19: US shows a new lower pole left renal finding not obviously present on CT.  Reviewed history in detail.   10/25/18: 62 yo woman referred by Dr. Flores for finding of a complex renal cyst seen on CT ordered by pulmonology.  No abd/pelvic pain and no exac/rel factors.  No hematuria.  No urolithiasis.  No urinary bother.  No  history.  Normal sexual function.  Had an US that shows a 6cm left renal cyst with a septation and calcification.     Allergies:  Ace inhibitors; Lisinopril; Pravastatin; Rosuvastatin; and Simvastatin    Medications: has a current medication list which includes the following prescription(s): aspirin, celecoxib, estrogen (conjugated)-medroxyprogesterone 0.3-1.5 mg, fluticasone propionate, hydrocodone-acetaminophen, isosorbide mononitrate, multivitamin, naproxen, nitroglycerin, pantoprazole, valsartan, verapamil, and ezetimibe.    Review of Systems:  General: No fever, chills, fatigability, or weight loss.  Skin: No rashes, itching, or changes in color or texture of skin.  Chest: Denies MALCOLM, cyanosis, wheezing, cough, and sputum production.  Abdomen: Appetite fine. No weight loss. Denies diarrhea, abdominal pain, hematemesis, or blood in stool.  Musculoskeletal: No joint stiffness or swelling. Denies back pain.  : As above.  All other review of systems negative.    PMH:   has a past medical history of Allergy, Arthritis (6/11/2013), COPD (chronic obstructive pulmonary disease), GERD (gastroesophageal reflux disease), Hypertension, Obesity (6/11/2013), Other and unspecified hyperlipidemia (6/11/2013), Prinzmetal's angina, and Sleep apnea (6/11/2013).    PSH:   has a past surgical history that includes Eye surgery; Foot surgery; Cholecystectomy; Tonsillectomy; Tubal ligation; Abdominal surgery; and Esophagogastroduodenoscopy (N/A, 12/10/2018).    FamHx: family history includes COPD in her father; Cancer in her mother; Heart disease in her sister;  Hyperlipidemia in her son; Hypertension in her father and mother; Kidney cancer in her mother.    SocHx:  reports that she quit smoking about 3 years ago. Her smoking use included cigarettes. She has a 1.00 pack-year smoking history. She has never used smokeless tobacco. She reports that she does not drink alcohol or use drugs.     Physical Exam:  Vitals:   Vitals:    11/06/19 1555   BP: (!) 144/58   Pulse: 74     General: A&Ox3. No apparent distress. No deformities.  Neck: No masses. Normal thyroid.  Lungs: normal inspiration. No use of accessory muscles.  Heart: normal pulse. No arrhythmias.  Abdomen: Soft. NT. ND  Skin: The skin is warm and dry. No jaundice.  Ext: No c/c/e.  :     10/18: External genitalia normal.     Labs/Studies:   Urinalysis performed in clinic, summary: UA normal    Impression/Plan:   1. CT renal mass protocol to confirm mass Bos2 and no worse again, US/RTC 1 year.  2. HTN: discussed and referred to PCP for further management.

## 2019-11-07 LAB
CREAT SERPL-MCNC: 1 MG/DL (ref 0.5–1.4)
EST. GFR  (AFRICAN AMERICAN): >60 ML/MIN/1.73 M^2
EST. GFR  (NON AFRICAN AMERICAN): 58.8 ML/MIN/1.73 M^2

## 2019-11-13 ENCOUNTER — HOSPITAL ENCOUNTER (OUTPATIENT)
Dept: RADIOLOGY | Facility: HOSPITAL | Age: 64
Discharge: HOME | End: 2019-11-13
Attending: UROLOGY
Payer: COMMERCIAL

## 2019-11-13 DIAGNOSIS — N28.1 RENAL CYST: ICD-10-CM

## 2019-11-13 PROCEDURE — 74178 CT ABD&PLV WO CNTR FLWD CNTR: CPT | Mod: TC

## 2019-11-13 PROCEDURE — 25500020 PHARM REV CODE 255: Performed by: UROLOGY

## 2019-11-13 RX ADMIN — IOHEXOL 100 ML: 350 INJECTION, SOLUTION INTRAVENOUS at 03:11

## 2019-11-22 ENCOUNTER — TELEPHONE (OUTPATIENT)
Dept: ENDOSCOPY | Facility: HOSPITAL | Age: 64
End: 2019-11-22

## 2019-11-22 RX ORDER — SODIUM, POTASSIUM,MAG SULFATES 17.5-3.13G
1 SOLUTION, RECONSTITUTED, ORAL ORAL DAILY
Qty: 1 KIT | Refills: 0 | Status: SHIPPED | OUTPATIENT
Start: 2019-11-22 | End: 2019-11-24

## 2019-11-22 NOTE — TELEPHONE ENCOUNTER
Notified patient of change in prep for colonoscopy.  Advised to  Suprep RX from her pharmacy. Written directions sent via patient portal and discussed with patient. All questions answered.

## 2019-12-02 RX ORDER — PANTOPRAZOLE SODIUM 20 MG/1
20 TABLET, DELAYED RELEASE ORAL
Qty: 180 TABLET | Refills: 0 | Status: SHIPPED | OUTPATIENT
Start: 2019-12-02 | End: 2020-03-23

## 2019-12-06 ENCOUNTER — ANESTHESIA EVENT (OUTPATIENT)
Dept: ENDOSCOPY | Facility: HOSPITAL | Age: 64
End: 2019-12-06
Payer: COMMERCIAL

## 2019-12-06 ENCOUNTER — ANESTHESIA (OUTPATIENT)
Dept: ENDOSCOPY | Facility: HOSPITAL | Age: 64
End: 2019-12-06
Payer: COMMERCIAL

## 2019-12-06 ENCOUNTER — HOSPITAL ENCOUNTER (OUTPATIENT)
Facility: HOSPITAL | Age: 64
Discharge: HOME | End: 2019-12-06
Attending: COLON & RECTAL SURGERY | Admitting: COLON & RECTAL SURGERY
Payer: COMMERCIAL

## 2019-12-06 VITALS
HEART RATE: 78 BPM | WEIGHT: 184.94 LBS | OXYGEN SATURATION: 97 % | DIASTOLIC BLOOD PRESSURE: 65 MMHG | BODY MASS INDEX: 30.81 KG/M2 | HEIGHT: 65 IN | TEMPERATURE: 97 F | SYSTOLIC BLOOD PRESSURE: 128 MMHG | RESPIRATION RATE: 20 BRPM

## 2019-12-06 DIAGNOSIS — Z12.11 SCREENING FOR COLON CANCER: ICD-10-CM

## 2019-12-06 DIAGNOSIS — Z86.010 PERSONAL HISTORY OF COLONIC POLYPS: Primary | ICD-10-CM

## 2019-12-06 PROCEDURE — 88305 TISSUE EXAM BY PATHOLOGIST: ICD-10-PCS | Mod: 26,,, | Performed by: PATHOLOGY

## 2019-12-06 PROCEDURE — 45380 COLONOSCOPY AND BIOPSY: CPT | Mod: 33,,, | Performed by: COLON & RECTAL SURGERY

## 2019-12-06 PROCEDURE — 37000008 HC ANESTHESIA 1ST 15 MINUTES: Performed by: COLON & RECTAL SURGERY

## 2019-12-06 PROCEDURE — 45380 COLONOSCOPY AND BIOPSY: CPT | Performed by: COLON & RECTAL SURGERY

## 2019-12-06 PROCEDURE — 37000009 HC ANESTHESIA EA ADD 15 MINS: Performed by: COLON & RECTAL SURGERY

## 2019-12-06 PROCEDURE — 88305 TISSUE EXAM BY PATHOLOGIST: CPT | Mod: 26,,, | Performed by: PATHOLOGY

## 2019-12-06 PROCEDURE — 27201012 HC FORCEPS, HOT/COLD, DISP: Performed by: COLON & RECTAL SURGERY

## 2019-12-06 PROCEDURE — 88305 TISSUE EXAM BY PATHOLOGIST: CPT | Performed by: PATHOLOGY

## 2019-12-06 PROCEDURE — 25000003 PHARM REV CODE 250: Performed by: NURSE ANESTHETIST, CERTIFIED REGISTERED

## 2019-12-06 PROCEDURE — 45380 PR COLONOSCOPY,BIOPSY: ICD-10-PCS | Mod: 33,,, | Performed by: COLON & RECTAL SURGERY

## 2019-12-06 PROCEDURE — 63600175 PHARM REV CODE 636 W HCPCS: Performed by: COLON & RECTAL SURGERY

## 2019-12-06 PROCEDURE — 63600175 PHARM REV CODE 636 W HCPCS: Performed by: NURSE ANESTHETIST, CERTIFIED REGISTERED

## 2019-12-06 PROCEDURE — C1773 RET DEV, INSERTABLE: HCPCS | Performed by: COLON & RECTAL SURGERY

## 2019-12-06 RX ORDER — SODIUM CHLORIDE, SODIUM LACTATE, POTASSIUM CHLORIDE, CALCIUM CHLORIDE 600; 310; 30; 20 MG/100ML; MG/100ML; MG/100ML; MG/100ML
INJECTION, SOLUTION INTRAVENOUS CONTINUOUS
Status: DISCONTINUED | OUTPATIENT
Start: 2019-12-06 | End: 2019-12-06 | Stop reason: HOSPADM

## 2019-12-06 RX ORDER — PROPOFOL 10 MG/ML
VIAL (ML) INTRAVENOUS
Status: DISCONTINUED | OUTPATIENT
Start: 2019-12-06 | End: 2019-12-06

## 2019-12-06 RX ORDER — LIDOCAINE HYDROCHLORIDE 10 MG/ML
INJECTION, SOLUTION EPIDURAL; INFILTRATION; INTRACAUDAL; PERINEURAL
Status: DISCONTINUED | OUTPATIENT
Start: 2019-12-06 | End: 2019-12-06

## 2019-12-06 RX ADMIN — PROPOFOL 20 MG: 10 INJECTION, EMULSION INTRAVENOUS at 08:12

## 2019-12-06 RX ADMIN — PROPOFOL 100 MG: 10 INJECTION, EMULSION INTRAVENOUS at 08:12

## 2019-12-06 RX ADMIN — LIDOCAINE HYDROCHLORIDE 50 MG: 10 INJECTION, SOLUTION EPIDURAL; INFILTRATION; INTRACAUDAL; PERINEURAL at 08:12

## 2019-12-06 RX ADMIN — SODIUM CHLORIDE, SODIUM LACTATE, POTASSIUM CHLORIDE, AND CALCIUM CHLORIDE: 600; 310; 30; 20 INJECTION, SOLUTION INTRAVENOUS at 08:12

## 2019-12-06 NOTE — BRIEF OP NOTE
[facility] -   Brief Operative Note     SUMMARY     Surgery Date: 12/6/2019     Surgeon(s) and Role:     * Winston Smith MD - Primary    Assisting Surgeon: None    Pre-op Diagnosis:  Screening [Z13.9]    Post-op Diagnosis:  Post-Op Diagnosis Codes:     * Screening [Z13.9]    Procedure(s) (LRB):  COLONOSCOPY (N/A)    Anesthesia: Choice    Description of the findings of the procedure: See Op Note    Findings/Key Components: See Op Note    Estimated Blood Loss: * No values recorded between 12/6/2019 12:00 AM and 12/6/2019  8:39 AM *         Specimens:   Specimen (12h ago, onward)    None          Discharge Note    SUMMARY     Admit Date: 12/6/2019    Discharge Date and Time: 12/6/2019 8:42 AM    Hospital Course Patient was seen in the preoperative area by both myself and anesthesia. All consents were verified and all questions appropriately answered. All risks, benefits and alternatives explained to patient. Patient proceeded to endoscopy suite for colonoscopy and was discharged home postoperative once cleared by anesthesia.    Final Diagnosis: Post-Op Diagnosis Codes:     * Screening [Z13.9]    Disposition: Home or Self Care    Follow Up/Patient Instructions: See Provation report    Medications:  Reconciled Home Medications:      Medication List      CHANGE how you take these medications    naproxen 500 MG tablet  Commonly known as:  NAPROSYN  Take 1 tablet (500 mg total) by mouth 2 (two) times daily with meals.  What changed:    · when to take this  · reasons to take this        CONTINUE taking these medications    aspirin 81 MG EC tablet  Commonly known as:  ECOTRIN  Take 81 mg by mouth once daily.     celecoxib 200 MG capsule  Commonly known as:  CeleBREX     ezetimibe 10 mg tablet  Commonly known as:  ZETIA  Take 1 tablet (10 mg total) by mouth once daily.     fluticasone propionate 50 mcg/actuation nasal spray  Commonly known as:  FLONASE  USE 2 SPRAYS IN EACH NOSTRIL ONCE DAILY      HYDROcodone-acetaminophen  mg per tablet  Commonly known as:  NORCO  Take 1 tablet by mouth every 12 (twelve) hours.     isosorbide mononitrate 30 MG 24 hr tablet  Commonly known as:  IMDUR  Take 30 mg by mouth once daily.     MULTIPLE VITAMIN ORAL  Take 1 tablet by mouth Daily.     nitroGLYCERIN 0.4 MG SL tablet  Commonly known as:  NITROSTAT  DISSOLVE 1 TABLET UNDER THE TONGUE EVERY 5 MINUTES FOR 3 DOSES AS NEEDED FOR CHEST PAIN     pantoprazole 20 MG tablet  Commonly known as:  PROTONIX  Take 1 tablet (20 mg total) by mouth 2 (two) times daily before meals.     Prempro 0.3-1.5 mg per tablet  Generic drug:  estrogen (conjugated)-medroxyprogesterone 0.3-1.5 mg  Take 1 tablet by mouth Daily.     valsartan 80 MG tablet  Commonly known as:  DIOVAN  Take 1 tablet (80 mg total) by mouth once daily.     verapamil 240 MG CR tablet  Commonly known as:  CALAN-SR  Take 1 tablet (240 mg total) by mouth once daily.          Discharge Procedure Orders   Diet general     Call MD for:  temperature >100.4     Call MD for:  persistent nausea and vomiting     Call MD for:  severe uncontrolled pain     Call MD for:  difficulty breathing, headache or visual disturbances     Call MD for:  redness, tenderness, or signs of infection (pain, swelling, redness, odor or green/yellow discharge around incision site)     Call MD for:  hives     Call MD for:  persistent dizziness or light-headedness     Call MD for:  extreme fatigue     Activity as tolerated     Follow-up Information     Marcela Chopra MD.    Specialty:  Family Medicine  Why:  As needed  Contact information:  [address] [place] [address] [phone]

## 2019-12-06 NOTE — [employer] PATIENT INSTRUCTIONS
Discharge Summary/Instructions after an Endoscopic Procedure  Patient Name: Ro Hernandez  Patient MRN: [id number]  Patient YOB: 1955 Friday, December 06, 2019 Winston Smith MD  RESTRICTIONS:  During your procedure today, you received medications for sedation.  These   medications may affect your judgment, balance and coordination.  Therefore,   for 24 hours, you have the following restrictions:   - DO NOT drive a car, operate machinery, make legal/financial decisions,   sign important papers or drink alcohol.    ACTIVITY:  Today: no heavy lifting, straining or running due to procedural   sedation/anesthesia.  The following day: return to full activity including work.  DIET:  Eat and drink normally unless instructed otherwise.     TREATMENT FOR COMMON SIDE EFFECTS:  - Mild abdominal pain, nausea, belching, bloating or excessive gas:  rest,   eat lightly and use a heating pad.  - Sore Throat: treat with throat lozenges and/or gargle with warm salt   water.  - Because air was used during the procedure, expelling large amounts of air   from your rectum or belching is normal.  - If a bowel prep was taken, you may not have a bowel movement for 1-3 days.    This is normal.  SYMPTOMS TO WATCH FOR AND REPORT TO YOUR PHYSICIAN:  1. Abdominal pain or bloating, other than gas cramps.  2. Chest pain.  3. Back pain.  4. Signs of infection such as: chills or fever occurring within 24 hours   after the procedure.  5. Rectal bleeding, which would show as bright red, maroon, or black stools.   (A tablespoon of blood from the rectum is not serious, especially if   hemorrhoids are present.)  6. Vomiting.  7. Weakness or dizziness.  GO DIRECTLY TO THE NEAREST EMERGENCY ROOM IF YOU HAVE ANY OF THE FOLLOWING:      Difficulty breathing              Chills and/or fever over 101 F   Persistent vomiting and/or vomiting blood   Severe abdominal pain   Severe chest pain   Black, tarry stools   Bleeding- more than one  tablespoon   Any other symptom or condition that you feel may need urgent attention  Your doctor recommends these additional instructions:  If any biopsies were taken, your doctors clinic will contact you in 1 to 2   weeks with any results.  - Discharge patient to home.   - Resume previous diet.   - Continue present medications.   - Await pathology results.   - Repeat colonoscopy in 5 years for surveillance.   - Return to primary care physician PRN.  For questions, problems or results please call your physician Winston Smith MD at Work:  [phone]  If you have any questions about the above instructions, call the GI   department at [phone] or call the endoscopy unit at [phone]   from 7am until 3 pm.  [facility] [place], EMERGENCY ROOM PHONE NUMBER:   [phone]  IF A COMPLICATION OR EMERGENCY SITUATION ARISES AND YOU ARE UNABLE TO REACH   YOUR PHYSICIAN - GO DIRECTLY TO THE EMERGENCY ROOM.  I have read or have had read to me these discharge instructions for my   procedure and have received a written copy.  I understand these   instructions and will follow-up with my physician if I have any questions.     __________________________________       _____________________________________  Nurse Signature                                          Patient/Designated   Responsible Party Signature  MD Winston Magallanes MD  12/6/2019 8:42:47 AM  This report has been verified and signed electronically.  [employer]

## 2019-12-06 NOTE — ANESTHESIA RELEASE NOTE
"Anesthesia Release from PACU Note    Patient: Ro Hernandez    Procedure(s) Performed: Procedure(s) (LRB):  COLONOSCOPY (N/A)    Anesthesia type: MAC    Post pain: Adequate analgesia    Post assessment: no apparent anesthetic complications    Last Vitals:   Visit Vitals  /68 (BP Location: Left arm, Patient Position: Lying)   Pulse 89   Temp 37.1 °C (98.7 °F) (Skin)   Resp 18   Ht 5' 5" (1.651 m)   Wt 83.9 kg (184 lb 15.5 oz)   SpO2 96%   Breastfeeding? No   BMI 30.78 kg/m²       Post vital signs: stable    Level of consciousness: awake    Nausea/Vomiting: no nausea/no vomiting    Complications: none    Airway Patency: patent    Respiratory: unassisted    Cardiovascular: stable and blood pressure at baseline    Hydration: euvolemic  "

## 2019-12-06 NOTE — H&P
[facility]  Colon and Rectal Surgery  History & Physical    Patient Name: Ro Hernandez  MRN: [id number]  Admission Date: 12/6/2019  Attending Physician: Winston Smith MD  Primary Care Provider: Marcela Chopra MD    Patient information was obtained from patient and medical records.    Subjective:     Chief Complaint/Reason for Admission: Here for Colonoscopy    History of Present Illness:  Patient is a 64 y.o. female presents for colonoscopy. Last cscope 5yrs ago and +personal hx of polyps. No current hematochezia, melena or change in bowel habits. No personal or fam hx of CRC or IBD.    No current facility-administered medications on file prior to encounter.      Current Outpatient Medications on File Prior to Encounter   Medication Sig    celecoxib (CELEBREX) 200 MG capsule     estrogen, conjugated,-medroxyprogesterone (PREMPRO) 0.3-1.5 mg per tablet Take 1 tablet by mouth Daily.    fluticasone propionate (FLONASE) 50 mcg/actuation nasal spray USE 2 SPRAYS IN EACH NOSTRIL ONCE DAILY    hydrocodone-acetaminophen 10-325mg (NORCO)  mg Tab Take 1 tablet by mouth every 12 (twelve) hours.    isosorbide mononitrate (IMDUR) 30 MG 24 hr tablet Take 30 mg by mouth once daily.     MULTIVITAMIN (MULTIPLE VITAMIN ORAL) Take 1 tablet by mouth Daily.    naproxen (NAPROSYN) 500 MG tablet Take 1 tablet (500 mg total) by mouth 2 (two) times daily with meals. (Patient taking differently: Take 500 mg by mouth 2 (two) times daily as needed. )    nitroGLYCERIN (NITROSTAT) 0.4 MG SL tablet DISSOLVE 1 TABLET UNDER THE TONGUE EVERY 5 MINUTES FOR 3 DOSES AS NEEDED FOR CHEST PAIN    valsartan (DIOVAN) 80 MG tablet Take 1 tablet (80 mg total) by mouth once daily.    verapamil (CALAN-SR) 240 MG CR tablet Take 1 tablet (240 mg total) by mouth once daily.    aspirin (ECOTRIN) 81 MG EC tablet Take 81 mg by mouth once daily.    ezetimibe (ZETIA) 10 mg tablet Take 1 tablet (10 mg total) by mouth once  daily. (Patient not taking: Reported on 11/6/2019)       Review of patient's allergies indicates:   Allergen Reactions    Ace inhibitors      Other reaction(s): Hives    Lisinopril      Other reaction(s): rash    Pravastatin      Other reaction(s): Mental Changes    Rosuvastatin      Other reaction(s): Muscle pain    Simvastatin      Other reaction(s): leg cramps       Past Medical History:   Diagnosis Date    Allergy     Arthritis 6/11/2013    COPD (chronic obstructive pulmonary disease)     GERD (gastroesophageal reflux disease)     Hypertension     Obesity 6/11/2013    Other and unspecified hyperlipidemia 6/11/2013    Prinzmetal's angina     Sleep apnea 6/11/2013     Past Surgical History:   Procedure Laterality Date    ABDOMINAL SURGERY      CHOLECYSTECTOMY      ESOPHAGOGASTRODUODENOSCOPY N/A 12/10/2018    Procedure: EGD (ESOPHAGOGASTRODUODENOSCOPY);  Surgeon: Raúl Polanco III, MD;  Location: [facility];  Service: Endoscopy;  Laterality: N/A;    EYE SURGERY      FOOT SURGERY      TONSILLECTOMY      TUBAL LIGATION       Family History     Problem Relation (Age of Onset)    COPD Father    Cancer Mother    Heart disease Sister    Hyperlipidemia Son    Hypertension Mother, Father    Kidney cancer Mother        Tobacco Use    Smoking status: Former Smoker     Packs/day: 0.50     Years: 2.00     Pack years: 1.00     Types: Cigarettes     Last attempt to quit: 6/11/2016     Years since quitting: 3.4    Smokeless tobacco: Never Used   Substance and Sexual Activity    Alcohol use: No     Alcohol/week: 1.0 standard drinks     Types: 1 Standard drinks or equivalent per week     Frequency: Never    Drug use: No    Sexual activity: Never     Review of Systems   Constitutional: Negative for activity change, appetite change, chills, fatigue, fever and unexpected weight change.   HENT: Negative for congestion, ear pain, sore throat and trouble swallowing.    Eyes: Negative for pain, redness and  itching.   Respiratory: Negative for cough, shortness of breath and wheezing.    Cardiovascular: Negative for chest pain, palpitations and leg swelling.   Gastrointestinal: Negative for abdominal distention, abdominal pain, anal bleeding, blood in stool, constipation, diarrhea, nausea, rectal pain and vomiting.   Endocrine: Negative for cold intolerance, heat intolerance and polyuria.   Genitourinary: Negative for dysuria, flank pain, frequency and hematuria.   Musculoskeletal: Negative for gait problem, joint swelling and neck pain.   Skin: Negative for color change, rash and wound.   Allergic/Immunologic: Negative for environmental allergies and immunocompromised state.   Neurological: Negative for dizziness, speech difficulty, weakness and numbness.   Psychiatric/Behavioral: Negative for agitation, confusion and hallucinations.     Objective:     Vital Signs (Most Recent):  Temp: 98.7 °F (37.1 °C) (12/06/19 0742)  Pulse: 89 (12/06/19 0742)  Resp: 18 (12/06/19 0742)  BP: 127/68 (12/06/19 0742)  SpO2: 96 % (12/06/19 0742) Vital Signs (24h Range):  Temp:  [98.7 °F (37.1 °C)] 98.7 °F (37.1 °C)  Pulse:  [89] 89  Resp:  [18] 18  SpO2:  [96 %] 96 %  BP: (127)/(68) 127/68     Weight: 83.9 kg (184 lb 15.5 oz)  Body mass index is 30.78 kg/m².    Physical Exam   Constitutional: She is oriented to person, place, and time. She appears well-developed.   HENT:   Head: Normocephalic and atraumatic.   Eyes: Conjunctivae and EOM are normal.   Neck: Normal range of motion. No thyromegaly present.   Cardiovascular: Normal rate and regular rhythm.   Pulmonary/Chest: Effort normal. No respiratory distress.   Abdominal: Soft. She exhibits no distension and no mass. There is no tenderness.   Musculoskeletal: Normal range of motion. She exhibits no edema or tenderness.   Neurological: She is alert and oriented to person, place, and time.   Skin: Skin is warm and dry. Capillary refill takes less than 2 seconds. No rash noted.    Psychiatric: She has a normal mood and affect.         Assessment/Plan:     Patient is a 64 y.o. female who presents for colonoscopy     - Ok to proceed to endoscopy suite for colonoscopy  - Consent obtained. All risks, benefits and alternatives fully explained to patient, including but not limited to bleeding, infection, perforation, and missed polyps. All questions appropriately answered to patient's satisfaction. Consent signed and placed on chart.    Active Diagnoses:    Diagnosis Date Noted POA    Screening for colon cancer [Z12.11] 12/06/2019 Not Applicable      Problems Resolved During this Admission:     VTE Risk Mitigation (From admission, onward)    None          Winston Smith MD  Colon and Rectal Surgery  [facility]

## 2019-12-06 NOTE — ANESTHESIA PREPROCEDURE EVALUATION
12/06/2019  Ro Hernandez is a 64 y.o., female.    Anesthesia Evaluation    I have reviewed the Patient Summary Reports.    I have reviewed the Nursing Notes.   I have reviewed the Medications.     Review of Systems  Anesthesia Hx:  No problems with previous Anesthesia    Hematology/Oncology:  Hematology Normal   Oncology Normal     EENT/Dental:EENT/Dental Normal   Cardiovascular:   Hypertension, well controlled CAD   Angina    Pulmonary:   COPD, mild Sleep Apnea    Education provided regarding risk of obstructive sleep apnea     Renal/:   Chronic Renal Disease    Hepatic/GI:   GERD, well controlled    Neurological:   Neuromuscular Disease,    Endocrine:  Endocrine Normal    Dermatological:  Skin Normal    Psych:  Psychiatric Normal           Physical Exam  General:  Obesity    Airway/Jaw/Neck:  Airway Findings: Mouth Opening: Normal Tongue: Normal  General Airway Assessment: Adult  Mallampati: I  Improves to I with phonation.  TM Distance: 4 - 6 cm      Dental:  Dental Findings: Upper front caps   Chest/Lungs:  Chest/Lungs Findings: Clear to auscultation, Normal Respiratory Rate     Heart/Vascular:  Heart Findings: Rate: Normal  Rhythm: Regular Rhythm        Mental Status:  Mental Status Findings:  Alert and Oriented         Anesthesia Plan  Type of Anesthesia, risks & benefits discussed:  Anesthesia Type:  MAC  Patient's Preference:   Intra-op Monitoring Plan: standard ASA monitors  Intra-op Monitoring Plan Comments:   Post Op Pain Control Plan:   Post Op Pain Control Plan Comments:   Induction:   IV  Beta Blocker:  Patient is not currently on a Beta-Blocker (No further documentation required).       Informed Consent: Patient understands risks and agrees with Anesthesia plan.  Questions answered. Anesthesia consent signed with patient.  ASA Score: 3     Day of Surgery Review of History & Physical: I have  interviewed and examined the patient. I have reviewed the patient's H&P dated:  There are no significant changes.          Ready For Surgery From Anesthesia Perspective.

## 2019-12-06 NOTE — DISCHARGE INSTRUCTIONS

## 2019-12-06 NOTE — TRANSFER OF CARE
"Anesthesia Transfer of Care Note    Patient: Ro Hernandez    Procedure(s) Performed: Procedure(s) (LRB):  COLONOSCOPY (N/A)    Patient location: GI    Anesthesia Type: MAC    Transport from OR: Transported from OR on room air with adequate spontaneous ventilation    Post pain: adequate analgesia    Post assessment: no apparent anesthetic complications    Post vital signs: stable    Level of consciousness: awake    Complications: none    Transfer of care protocol was followed      Last vitals:   Visit Vitals  /68 (BP Location: Left arm, Patient Position: Lying)   Pulse 89   Temp 37.1 °C (98.7 °F) (Skin)   Resp 18   Ht 5' 5" (1.651 m)   Wt 83.9 kg (184 lb 15.5 oz)   SpO2 96%   Breastfeeding? No   BMI 30.78 kg/m²     "

## 2019-12-11 LAB
FINAL PATHOLOGIC DIAGNOSIS: NORMAL
GROSS: NORMAL

## 2020-02-11 ENCOUNTER — PATIENT MESSAGE (OUTPATIENT)
Dept: FAMILY MEDICINE | Facility: CLINIC | Age: 65
End: 2020-02-11

## 2020-02-13 ENCOUNTER — LAB VISIT (OUTPATIENT)
Dept: LAB | Facility: HOSPITAL | Age: 65
End: 2020-02-13
Attending: FAMILY MEDICINE
Payer: COMMERCIAL

## 2020-02-13 DIAGNOSIS — E78.5 HYPERLIPIDEMIA, UNSPECIFIED HYPERLIPIDEMIA TYPE: ICD-10-CM

## 2020-02-13 DIAGNOSIS — I10 ESSENTIAL HYPERTENSION: ICD-10-CM

## 2020-02-13 PROCEDURE — 80053 COMPREHEN METABOLIC PANEL: CPT

## 2020-02-13 PROCEDURE — 36415 COLL VENOUS BLD VENIPUNCTURE: CPT | Mod: PO

## 2020-02-13 PROCEDURE — 80061 LIPID PANEL: CPT

## 2020-02-14 LAB
ALBUMIN SERPL BCP-MCNC: 3.6 G/DL (ref 3.5–5.2)
ALP SERPL-CCNC: 49 U/L (ref 55–135)
ALT SERPL W/O P-5'-P-CCNC: 9 U/L (ref 10–44)
ANION GAP SERPL CALC-SCNC: 5 MMOL/L (ref 8–16)
AST SERPL-CCNC: 15 U/L (ref 10–40)
BILIRUB SERPL-MCNC: 0.3 MG/DL (ref 0.1–1)
BUN SERPL-MCNC: 20 MG/DL (ref 8–23)
CALCIUM SERPL-MCNC: 8.9 MG/DL (ref 8.7–10.5)
CHLORIDE SERPL-SCNC: 108 MMOL/L (ref 95–110)
CHOLEST SERPL-MCNC: 248 MG/DL (ref 120–199)
CHOLEST/HDLC SERPL: 4.4 {RATIO} (ref 2–5)
CO2 SERPL-SCNC: 28 MMOL/L (ref 23–29)
CREAT SERPL-MCNC: 1 MG/DL (ref 0.5–1.4)
EST. GFR  (AFRICAN AMERICAN): >60 ML/MIN/1.73 M^2
EST. GFR  (NON AFRICAN AMERICAN): 58.8 ML/MIN/1.73 M^2
GLUCOSE SERPL-MCNC: 84 MG/DL (ref 70–110)
HDLC SERPL-MCNC: 57 MG/DL (ref 40–75)
HDLC SERPL: 23 % (ref 20–50)
LDLC SERPL CALC-MCNC: 159.6 MG/DL (ref 63–159)
NONHDLC SERPL-MCNC: 191 MG/DL
POTASSIUM SERPL-SCNC: 4.4 MMOL/L (ref 3.5–5.1)
PROT SERPL-MCNC: 6.8 G/DL (ref 6–8.4)
SODIUM SERPL-SCNC: 141 MMOL/L (ref 136–145)
TRIGL SERPL-MCNC: 157 MG/DL (ref 30–150)

## 2020-03-23 RX ORDER — PANTOPRAZOLE SODIUM 20 MG/1
TABLET, DELAYED RELEASE ORAL
Qty: 60 TABLET | Refills: 2 | Status: SHIPPED | OUTPATIENT
Start: 2020-03-23 | End: 2020-06-03

## 2020-03-24 ENCOUNTER — PATIENT MESSAGE (OUTPATIENT)
Dept: FAMILY MEDICINE | Facility: CLINIC | Age: 65
End: 2020-03-24

## 2020-03-27 ENCOUNTER — TELEPHONE (OUTPATIENT)
Dept: FAMILY MEDICINE | Facility: CLINIC | Age: 65
End: 2020-03-27

## 2020-03-27 ENCOUNTER — OFFICE VISIT (OUTPATIENT)
Dept: FAMILY MEDICINE | Facility: CLINIC | Age: 65
End: 2020-03-27
Payer: COMMERCIAL

## 2020-03-27 VITALS — SYSTOLIC BLOOD PRESSURE: 132 MMHG | DIASTOLIC BLOOD PRESSURE: 78 MMHG

## 2020-03-27 DIAGNOSIS — J30.9 ALLERGIC RHINITIS, UNSPECIFIED SEASONALITY, UNSPECIFIED TRIGGER: ICD-10-CM

## 2020-03-27 DIAGNOSIS — J45.20 MILD INTERMITTENT ASTHMA WITHOUT COMPLICATION: ICD-10-CM

## 2020-03-27 DIAGNOSIS — E78.5 HYPERLIPIDEMIA, UNSPECIFIED HYPERLIPIDEMIA TYPE: ICD-10-CM

## 2020-03-27 DIAGNOSIS — I20.1 PRINZMETAL'S ANGINA: ICD-10-CM

## 2020-03-27 DIAGNOSIS — G47.33 OSA ON CPAP: ICD-10-CM

## 2020-03-27 DIAGNOSIS — E66.9 OBESITY (BMI 30-39.9): ICD-10-CM

## 2020-03-27 DIAGNOSIS — I10 ESSENTIAL HYPERTENSION: Primary | ICD-10-CM

## 2020-03-27 DIAGNOSIS — Z78.9 STATIN INTOLERANCE: ICD-10-CM

## 2020-03-27 PROCEDURE — 1101F PT FALLS ASSESS-DOCD LE1/YR: CPT | Mod: CPTII,,, | Performed by: FAMILY MEDICINE

## 2020-03-27 PROCEDURE — 3078F DIAST BP <80 MM HG: CPT | Mod: CPTII,,, | Performed by: FAMILY MEDICINE

## 2020-03-27 PROCEDURE — 1159F PR MEDICATION LIST DOCUMENTED IN MEDICAL RECORD: ICD-10-PCS | Mod: ,,, | Performed by: FAMILY MEDICINE

## 2020-03-27 PROCEDURE — 3075F SYST BP GE 130 - 139MM HG: CPT | Mod: CPTII,,, | Performed by: FAMILY MEDICINE

## 2020-03-27 PROCEDURE — 3075F PR MOST RECENT SYSTOLIC BLOOD PRESS GE 130-139MM HG: ICD-10-PCS | Mod: CPTII,,, | Performed by: FAMILY MEDICINE

## 2020-03-27 PROCEDURE — 1159F MED LIST DOCD IN RCRD: CPT | Mod: ,,, | Performed by: FAMILY MEDICINE

## 2020-03-27 PROCEDURE — 99214 PR OFFICE/OUTPT VISIT, EST, LEVL IV, 30-39 MIN: ICD-10-PCS | Mod: 95,,, | Performed by: FAMILY MEDICINE

## 2020-03-27 PROCEDURE — 99214 OFFICE O/P EST MOD 30 MIN: CPT | Mod: 95,,, | Performed by: FAMILY MEDICINE

## 2020-03-27 PROCEDURE — 3078F PR MOST RECENT DIASTOLIC BLOOD PRESSURE < 80 MM HG: ICD-10-PCS | Mod: CPTII,,, | Performed by: FAMILY MEDICINE

## 2020-03-27 PROCEDURE — 1101F PR PT FALLS ASSESS DOC 0-1 FALLS W/OUT INJ PAST YR: ICD-10-PCS | Mod: CPTII,,, | Performed by: FAMILY MEDICINE

## 2020-03-27 RX ORDER — FLUTICASONE PROPIONATE 50 MCG
2 SPRAY, SUSPENSION (ML) NASAL DAILY
Qty: 48 G | Refills: 1 | Status: SHIPPED | OUTPATIENT
Start: 2020-03-27 | End: 2020-09-23 | Stop reason: SDUPTHER

## 2020-03-27 RX ORDER — ISOSORBIDE MONONITRATE 30 MG/1
TABLET, EXTENDED RELEASE ORAL
Qty: 135 TABLET | Refills: 1
Start: 2020-03-27

## 2020-03-27 RX ORDER — VALSARTAN 40 MG/1
TABLET ORAL
Qty: 45 TABLET | Refills: 1
Start: 2020-03-27 | End: 2020-09-23

## 2020-03-27 RX ORDER — EZETIMIBE 10 MG/1
10 TABLET ORAL DAILY
Qty: 90 TABLET | Refills: 1 | Status: SHIPPED | OUTPATIENT
Start: 2020-03-27 | End: 2020-09-23

## 2020-03-27 RX ORDER — VERAPAMIL HYDROCHLORIDE 360 MG/1
360 CAPSULE, DELAYED RELEASE PELLETS ORAL DAILY
Qty: 90 CAPSULE | Refills: 1
Start: 2020-03-27 | End: 2021-03-27

## 2020-03-27 NOTE — TELEPHONE ENCOUNTER
Please schedule labs around 9/15- wants 8AM appt any day. See me the following week- requesting 7:40appt.

## 2020-03-27 NOTE — PROGRESS NOTES
Subjective:       Patient ID: Ro Hernandez is a 64 y.o. female.    Chief Complaint: Follow-up    HPI   Follow-up  65yo female presents today via video visit for follow-up. She has had recent lab update. She reports since her last visit adjustments have been made to her BP regimen. She had been experiencing more CP than usual and with adjustment in Imdur dosing her symptoms improved. She was scheduled to see her Cardiologist (Dr. Quiles) for follow-up but given the COVID-19 crisis her visit date has been rescheduled to late April 2020. Home BP readings have been in the 120-130's/60-70's. She denies any HA, palpitations or dyspnea. She was never able to fill the RX for Zetia that was sent in September 2020. She has historical statin intolerance. Describes maintaining a healthy diet intake with limiting calories to less than 1200 per day. She has been walking for activity as she is currently out of work. No exertional intolerance is reported. She does note AR symptoms but takes Claritin in combination with Flonase for relief. There are no cough or cold symptoms reported. Reflux is stable with PPI use. She does take NSAIDs in addition to chronic pain medication for underlying joint pain. There have been no recent ER visits or hospitalizations.     Review of Systems   Constitutional: Negative for activity change and unexpected weight change.   HENT: Negative for hearing loss, rhinorrhea and trouble swallowing.    Eyes: Negative for discharge and visual disturbance.   Respiratory: Negative for chest tightness and wheezing.    Cardiovascular: Negative for chest pain and palpitations.   Gastrointestinal: Negative for blood in stool, constipation, diarrhea and vomiting.   Endocrine: Negative for polydipsia and polyuria.   Genitourinary: Negative for difficulty urinating, dysuria, hematuria and menstrual problem.   Musculoskeletal: Positive for arthralgias. Negative for joint swelling and neck pain.   Neurological: Negative  for weakness and headaches.   Psychiatric/Behavioral: Negative for confusion, dysphoric mood and sleep disturbance. The patient is not nervous/anxious.        Objective:   /78   Physical Exam   Constitutional: She is oriented to person, place, and time. She appears well-developed and well-nourished. No distress.   HENT:   Head: Normocephalic and atraumatic.   Mouth/Throat: Oropharynx is clear and moist.   Eyes: No scleral icterus.   Pulmonary/Chest: Effort normal.   Neurological: She is alert and oriented to person, place, and time.   Skin: She is not diaphoretic.   Psychiatric: She has a normal mood and affect. Her behavior is normal.       Assessment:       1. Essential hypertension    2. Hyperlipidemia, unspecified hyperlipidemia type    3. Prinzmetal's angina    4. Mild intermittent asthma without complication    5. Allergic rhinitis, unspecified seasonality, unspecified trigger    6. HERB on CPAP    7. Obesity (BMI 30-39.9)    8. Statin intolerance        Plan:      Essential hypertension  Stable. Continue with current treatment. Target BP goal remains<140/90. Heart healthy diet is advised. Intermittent home monitoring has been discussed. Further follow-up with Cardiology is recommended as scheduled.   -     isosorbide mononitrate (IMDUR) 30 MG 24 hr tablet; Take 1 tab po in the AM and 1/2 tab po in the PM  Dispense: 135 tablet; Refill: 1  -     valsartan (DIOVAN) 40 MG tablet; Take 1/2 tab po once daily  Dispense: 45 tablet; Refill: 1  -     verapamiL (VERELAN) 360 MG C24P; Take 1 capsule (360 mg total) by mouth once daily.  Dispense: 90 capsule; Refill: 1  -     Comprehensive metabolic panel; Future; Expected date: 03/27/2020    Hyperlipidemia, unspecified hyperlipidemia type  -     ezetimibe (ZETIA) 10 mg tablet; Take 1 tablet (10 mg total) by mouth once daily.  Dispense: 90 tablet; Refill: 1  -     Lipid panel; Future; Expected date: 03/27/2020  -     TSH; Future; Expected date:  03/27/2020    Prinzmetal's angina  As per Cardiology.    Mild intermittent asthma without complication  No respiratory concerns since quitting smoking. Declines need for rescue inhaler.  -     CBC auto differential; Future; Expected date: 03/27/2020    Allergic rhinitis, unspecified seasonality, unspecified trigger  -     fluticasone propionate (FLONASE) 50 mcg/actuation nasal spray; 2 sprays (100 mcg total) by Each Nostril route once daily.  Dispense: 48 g; Refill: 1  -     CBC auto differential; Future; Expected date: 03/27/2020    HERB on CPAP  Compliance with CPAP use is advised.     Obesity (BMI 30-39.9)  Weight loss efforts remain encouraged through diet and lifestyle measures.     Statin intolerance    The patient location is: home  The chief complaint leading to consultation is: follow-up/chronic care  Visit type: Virtual visit with synchronous audio and video  Total time spent with patient: 20 minutes  Each patient to whom he or she provides medical services by telemedicine is:  (1) informed of the relationship between the physician and patient and the respective role of any other health care provider with respect to management of the patient; and (2) notified that he or she may decline to receive medical services by telemedicine and may withdraw from such care at any time.

## 2020-04-02 ENCOUNTER — TELEPHONE (OUTPATIENT)
Dept: FAMILY MEDICINE | Facility: CLINIC | Age: 65
End: 2020-04-02

## 2020-04-02 NOTE — TELEPHONE ENCOUNTER
Spoke with pharmacist and did a prior auth for patient Zetia 10 mg 1 po daily and it was approved for 1 year. [facility] will be filling today.

## 2020-04-02 NOTE — TELEPHONE ENCOUNTER
----- Message from Yamilex Engel sent at 4/2/2020  3:40 PM CDT -----  Contact: Jasmin with [facility]   Jasmin called and stated she was calling to follow up on a prescription for Zetia. She can be reached at [phone] ref # [id number]     Thanks,  TF

## 2020-05-07 ENCOUNTER — PATIENT OUTREACH (OUTPATIENT)
Dept: ADMINISTRATIVE | Facility: HOSPITAL | Age: 65
End: 2020-05-07

## 2020-05-07 NOTE — PROGRESS NOTES
Patient's mammogram performed at [facility] with Dr. Sammie Stanton, not schedule yet. Patient unsure when she will call and schedule, but she will contact us after to retreive results.

## 2020-06-05 ENCOUNTER — PATIENT OUTREACH (OUTPATIENT)
Dept: ADMINISTRATIVE | Facility: HOSPITAL | Age: 65
End: 2020-06-05

## 2020-07-07 ENCOUNTER — OFFICE VISIT (OUTPATIENT)
Dept: FAMILY MEDICINE | Facility: CLINIC | Age: 65
End: 2020-07-07
Payer: COMMERCIAL

## 2020-07-07 ENCOUNTER — HOSPITAL ENCOUNTER (OUTPATIENT)
Dept: RADIOLOGY | Facility: HOSPITAL | Age: 65
Discharge: HOME | End: 2020-07-07
Attending: FAMILY MEDICINE
Payer: COMMERCIAL

## 2020-07-07 VITALS
OXYGEN SATURATION: 97 % | RESPIRATION RATE: 16 BRPM | BODY MASS INDEX: 31 KG/M2 | WEIGHT: 186.06 LBS | DIASTOLIC BLOOD PRESSURE: 80 MMHG | TEMPERATURE: 99 F | HEART RATE: 97 BPM | SYSTOLIC BLOOD PRESSURE: 136 MMHG | HEIGHT: 65 IN

## 2020-07-07 DIAGNOSIS — R10.9 LEFT FLANK PAIN: ICD-10-CM

## 2020-07-07 DIAGNOSIS — E66.9 OBESITY (BMI 30-39.9): ICD-10-CM

## 2020-07-07 DIAGNOSIS — N30.00 ACUTE CYSTITIS WITHOUT HEMATURIA: Primary | ICD-10-CM

## 2020-07-07 DIAGNOSIS — K59.00 CONSTIPATION, UNSPECIFIED CONSTIPATION TYPE: ICD-10-CM

## 2020-07-07 DIAGNOSIS — N30.00 ACUTE CYSTITIS WITHOUT HEMATURIA: ICD-10-CM

## 2020-07-07 LAB
BACTERIA #/AREA URNS AUTO: ABNORMAL /HPF
BILIRUB SERPL-MCNC: NORMAL MG/DL
BILIRUB UR QL STRIP: NEGATIVE
BLOOD URINE, POC: NORMAL
CLARITY UR REFRACT.AUTO: CLEAR
CLARITY, POC UA: CLEAR
COLOR UR AUTO: YELLOW
COLOR, POC UA: YELLOW
GLUCOSE UR QL STRIP: NEGATIVE
GLUCOSE UR QL STRIP: NORMAL
HGB UR QL STRIP: NEGATIVE
KETONES UR QL STRIP: NEGATIVE
KETONES UR QL STRIP: NORMAL
LEUKOCYTE ESTERASE UR QL STRIP: ABNORMAL
LEUKOCYTE ESTERASE URINE, POC: NORMAL
MICROSCOPIC COMMENT: ABNORMAL
NITRITE UR QL STRIP: NEGATIVE
NITRITE, POC UA: NORMAL
PH UR STRIP: 5 [PH] (ref 5–8)
PH, POC UA: 5
PROT UR QL STRIP: NEGATIVE
PROTEIN, POC: NORMAL
RBC #/AREA URNS AUTO: 0 /HPF (ref 0–4)
SP GR UR STRIP: 1.01 (ref 1–1.03)
SPECIFIC GRAVITY, POC UA: 1.01
SQUAMOUS #/AREA URNS AUTO: 0 /HPF
URN SPEC COLLECT METH UR: ABNORMAL
UROBILINOGEN, POC UA: NORMAL
WBC #/AREA URNS AUTO: 9 /HPF (ref 0–5)

## 2020-07-07 PROCEDURE — 87086 URINE CULTURE/COLONY COUNT: CPT

## 2020-07-07 PROCEDURE — 3079F PR MOST RECENT DIASTOLIC BLOOD PRESSURE 80-89 MM HG: ICD-10-PCS | Mod: CPTII,S$GLB,, | Performed by: FAMILY MEDICINE

## 2020-07-07 PROCEDURE — 87186 SC STD MICRODIL/AGAR DIL: CPT

## 2020-07-07 PROCEDURE — 74018 RADEX ABDOMEN 1 VIEW: CPT | Mod: 26,,, | Performed by: RADIOLOGY

## 2020-07-07 PROCEDURE — 3008F BODY MASS INDEX DOCD: CPT | Mod: CPTII,S$GLB,, | Performed by: FAMILY MEDICINE

## 2020-07-07 PROCEDURE — 1101F PR PT FALLS ASSESS DOC 0-1 FALLS W/OUT INJ PAST YR: ICD-10-PCS | Mod: CPTII,S$GLB,, | Performed by: FAMILY MEDICINE

## 2020-07-07 PROCEDURE — 1126F AMNT PAIN NOTED NONE PRSNT: CPT | Mod: S$GLB,,, | Performed by: FAMILY MEDICINE

## 2020-07-07 PROCEDURE — 3079F DIAST BP 80-89 MM HG: CPT | Mod: CPTII,S$GLB,, | Performed by: FAMILY MEDICINE

## 2020-07-07 PROCEDURE — 99999 PR PBB SHADOW E&M-EST. PATIENT-LVL V: ICD-10-PCS | Mod: PBBFAC,,, | Performed by: FAMILY MEDICINE

## 2020-07-07 PROCEDURE — 3075F SYST BP GE 130 - 139MM HG: CPT | Mod: CPTII,S$GLB,, | Performed by: FAMILY MEDICINE

## 2020-07-07 PROCEDURE — 87077 CULTURE AEROBIC IDENTIFY: CPT

## 2020-07-07 PROCEDURE — 1159F MED LIST DOCD IN RCRD: CPT | Mod: S$GLB,,, | Performed by: FAMILY MEDICINE

## 2020-07-07 PROCEDURE — 87088 URINE BACTERIA CULTURE: CPT

## 2020-07-07 PROCEDURE — 99214 OFFICE O/P EST MOD 30 MIN: CPT | Mod: 25,S$GLB,, | Performed by: FAMILY MEDICINE

## 2020-07-07 PROCEDURE — 81002 POCT URINE DIPSTICK WITHOUT MICROSCOPE: ICD-10-PCS | Mod: S$GLB,,, | Performed by: FAMILY MEDICINE

## 2020-07-07 PROCEDURE — 3075F PR MOST RECENT SYSTOLIC BLOOD PRESS GE 130-139MM HG: ICD-10-PCS | Mod: CPTII,S$GLB,, | Performed by: FAMILY MEDICINE

## 2020-07-07 PROCEDURE — 74018 RADEX ABDOMEN 1 VIEW: CPT | Mod: TC,PO

## 2020-07-07 PROCEDURE — 81002 URINALYSIS NONAUTO W/O SCOPE: CPT | Mod: S$GLB,,, | Performed by: FAMILY MEDICINE

## 2020-07-07 PROCEDURE — 3008F PR BODY MASS INDEX (BMI) DOCUMENTED: ICD-10-PCS | Mod: CPTII,S$GLB,, | Performed by: FAMILY MEDICINE

## 2020-07-07 PROCEDURE — 81001 URINALYSIS AUTO W/SCOPE: CPT

## 2020-07-07 PROCEDURE — 99999 PR PBB SHADOW E&M-EST. PATIENT-LVL V: CPT | Mod: PBBFAC,,, | Performed by: FAMILY MEDICINE

## 2020-07-07 PROCEDURE — 1126F PR PAIN SEVERITY QUANTIFIED, NO PAIN PRESENT: ICD-10-PCS | Mod: S$GLB,,, | Performed by: FAMILY MEDICINE

## 2020-07-07 PROCEDURE — 99214 PR OFFICE/OUTPT VISIT, EST, LEVL IV, 30-39 MIN: ICD-10-PCS | Mod: 25,S$GLB,, | Performed by: FAMILY MEDICINE

## 2020-07-07 PROCEDURE — 74018 XR ABDOMEN AP 1 VIEW: ICD-10-PCS | Mod: 26,,, | Performed by: RADIOLOGY

## 2020-07-07 PROCEDURE — 1159F PR MEDICATION LIST DOCUMENTED IN MEDICAL RECORD: ICD-10-PCS | Mod: S$GLB,,, | Performed by: FAMILY MEDICINE

## 2020-07-07 PROCEDURE — 1101F PT FALLS ASSESS-DOCD LE1/YR: CPT | Mod: CPTII,S$GLB,, | Performed by: FAMILY MEDICINE

## 2020-07-07 RX ORDER — PHENAZOPYRIDINE HYDROCHLORIDE 100 MG/1
100 TABLET, FILM COATED ORAL 3 TIMES DAILY PRN
Qty: 6 TABLET | Refills: 0 | Status: SHIPPED | OUTPATIENT
Start: 2020-07-07 | End: 2020-07-09

## 2020-07-07 RX ORDER — CEFUROXIME AXETIL 250 MG/1
250 TABLET ORAL EVERY 12 HOURS
Qty: 14 TABLET | Refills: 0 | Status: SHIPPED | OUTPATIENT
Start: 2020-07-07 | End: 2020-07-07 | Stop reason: SDUPTHER

## 2020-07-07 RX ORDER — PHENAZOPYRIDINE HYDROCHLORIDE 100 MG/1
100 TABLET, FILM COATED ORAL 3 TIMES DAILY PRN
Qty: 6 TABLET | Refills: 0 | Status: SHIPPED | OUTPATIENT
Start: 2020-07-07 | End: 2020-07-07 | Stop reason: SDUPTHER

## 2020-07-07 RX ORDER — CEFUROXIME AXETIL 250 MG/1
250 TABLET ORAL EVERY 12 HOURS
Qty: 14 TABLET | Refills: 0 | Status: SHIPPED | OUTPATIENT
Start: 2020-07-07 | End: 2020-07-30 | Stop reason: ALTCHOICE

## 2020-07-07 NOTE — PROGRESS NOTES
"Subjective:       Patient ID: Ro Hernandez is a 64 y.o. female.    Chief Complaint: Urinary Tract Infection    Urinary Tract Infection   This is a new problem. The current episode started in the past 7 days. The problem occurs every urination. The problem has been gradually improving. The quality of the pain is described as aching. The pain is at a severity of 5/10. The pain is moderate. There has been no fever. Associated symptoms include flank pain and constipation. Pertinent negatives include no chills, frequency, nausea, urgency, vomiting or rash. She has tried nothing for the symptoms. Her past medical history is significant for hypertension. There is no history of diabetes mellitus, kidney stones or recurrent UTIs.     Review of Systems   Constitutional: Negative for chills and fever.   HENT: Positive for rhinorrhea. Negative for congestion, ear pain and sinus pressure.    Eyes: Negative for visual disturbance.   Respiratory: Negative for cough and shortness of breath.    Cardiovascular: Negative for chest pain and palpitations.   Gastrointestinal: Positive for constipation. Negative for abdominal pain, diarrhea, nausea and vomiting.   Genitourinary: Positive for dysuria and flank pain. Negative for difficulty urinating, frequency, urgency, vaginal discharge and vaginal pain.   Musculoskeletal: Negative for arthralgias and myalgias.   Skin: Negative for rash.   Neurological: Negative for weakness and light-headedness.   Psychiatric/Behavioral: Negative for sleep disturbance.       Objective:   /80   Pulse 97   Temp 98.8 °F (37.1 °C) (Temporal)   Resp 16   Ht 5' 5" (1.651 m)   Wt 84.4 kg (186 lb 1.1 oz)   SpO2 97%   BMI 30.96 kg/m²   Physical Exam  Constitutional:       Appearance: She is well-developed. She is obese. She is not toxic-appearing.   HENT:      Head: Normocephalic and atraumatic.      Right Ear: Tympanic membrane, ear canal and external ear normal.      Left Ear: Tympanic membrane, " ear canal and external ear normal.      Nose: Nose normal.      Mouth/Throat:      Mouth: Mucous membranes are moist.   Eyes:      Extraocular Movements: Extraocular movements intact.      Conjunctiva/sclera: Conjunctivae normal.      Pupils: Pupils are equal, round, and reactive to light.   Neck:      Musculoskeletal: Normal range of motion.   Cardiovascular:      Rate and Rhythm: Normal rate and regular rhythm.      Heart sounds: Murmur present.   Pulmonary:      Effort: Pulmonary effort is normal.      Breath sounds: Normal breath sounds.   Abdominal:      General: Bowel sounds are normal.      Palpations: Abdomen is soft.      Tenderness: There is no abdominal tenderness.   Genitourinary:     Comments: Mild suprapubic tenderness, no CVA tenderness  Skin:     General: Skin is warm and dry.      Findings: No rash.   Neurological:      General: No focal deficit present.      Mental Status: She is alert and oriented to person, place, and time.   Psychiatric:         Mood and Affect: Mood normal.         Behavior: Behavior normal.         Assessment:       1. Acute cystitis without hematuria    2. Left flank pain    3. Constipation, unspecified constipation type    4. Obesity (BMI 30-39.9)        Plan:      Acute cystitis without hematuria  -     POCT URINE DIPSTICK WITHOUT MICROSCOPE  -     Urine culture; Future; Expected date: 07/07/2020  -     Urinalysis; Future; Expected date: 07/07/2020  -     Discontinue: cefUROXime (CEFTIN) 250 MG tablet; Take 1 tablet (250 mg total) by mouth every 12 (twelve) hours.  Dispense: 14 tablet; Refill: 0  -     Discontinue: phenazopyridine (PYRIDIUM) 100 MG tablet; Take 1 tablet (100 mg total) by mouth 3 (three) times daily as needed for Pain.  Dispense: 6 tablet; Refill: 0    Left flank pain  -     X-Ray Abdomen AP 1 View; Future; Expected date: 07/07/2020    Constipation, unspecified constipation type  -     X-Ray Abdomen AP 1 View; Future; Expected date: 07/07/2020    Obesity  (BMI 30-39.9)  Weight loss efforts remain encouraged through diet and lifestyle measures.

## 2020-07-09 LAB — BACTERIA UR CULT: ABNORMAL

## 2020-07-28 ENCOUNTER — PATIENT OUTREACH (OUTPATIENT)
Dept: ADMINISTRATIVE | Facility: OTHER | Age: 65
End: 2020-07-28

## 2020-07-28 NOTE — PROGRESS NOTES
Requested updates within Care Everywhere.  Patient's chart was reviewed for overdue DUSTIN topics.  Immunizations reconciled.    ANDREW sent to  for 2020 mammogram.

## 2020-07-28 NOTE — LETTER
July 28, 2020        Sammie Stanton MD  [address] [place], [place] [address] [address]             [facility]  [address] [place] [id number]  Phone: [phone]   Patient: Ro Hernandez   MR Number: [id number]   YOB: 1955           Dear Dr. Stanton:    Ro Hernandez is a patient of Dr. Chopra (PCP) at [facility]. While reviewing his/her chart, it has come to our attention that there is an outstanding lab/procedure. Please help keep our Health Maintenance records as accurate and as up to date as possible by supplying the following:     2020 Mammogram                                                                              Please fax to [facility] @ [phone].    Thank you for your assistance in our patient's healthcare.     Sincerely,     Jany Cespedes MA           CC  No Recipients

## 2020-07-30 ENCOUNTER — OFFICE VISIT (OUTPATIENT)
Dept: GASTROENTEROLOGY | Facility: CLINIC | Age: 65
End: 2020-07-30
Payer: COMMERCIAL

## 2020-07-30 VITALS
SYSTOLIC BLOOD PRESSURE: 118 MMHG | WEIGHT: 188.06 LBS | BODY MASS INDEX: 31.33 KG/M2 | HEART RATE: 94 BPM | OXYGEN SATURATION: 98 % | DIASTOLIC BLOOD PRESSURE: 58 MMHG | HEIGHT: 65 IN

## 2020-07-30 DIAGNOSIS — K59.09 CHRONIC CONSTIPATION: ICD-10-CM

## 2020-07-30 DIAGNOSIS — R14.2 BELCHING: ICD-10-CM

## 2020-07-30 DIAGNOSIS — K21.9 GASTROESOPHAGEAL REFLUX DISEASE, ESOPHAGITIS PRESENCE NOT SPECIFIED: Primary | ICD-10-CM

## 2020-07-30 PROCEDURE — 1159F PR MEDICATION LIST DOCUMENTED IN MEDICAL RECORD: ICD-10-PCS | Mod: S$GLB,,, | Performed by: PHYSICIAN ASSISTANT

## 2020-07-30 PROCEDURE — 1159F MED LIST DOCD IN RCRD: CPT | Mod: S$GLB,,, | Performed by: PHYSICIAN ASSISTANT

## 2020-07-30 PROCEDURE — 1101F PT FALLS ASSESS-DOCD LE1/YR: CPT | Mod: CPTII,S$GLB,, | Performed by: PHYSICIAN ASSISTANT

## 2020-07-30 PROCEDURE — 1126F AMNT PAIN NOTED NONE PRSNT: CPT | Mod: S$GLB,,, | Performed by: PHYSICIAN ASSISTANT

## 2020-07-30 PROCEDURE — 99999 PR PBB SHADOW E&M-EST. PATIENT-LVL IV: ICD-10-PCS | Mod: PBBFAC,,, | Performed by: PHYSICIAN ASSISTANT

## 2020-07-30 PROCEDURE — 1126F PR PAIN SEVERITY QUANTIFIED, NO PAIN PRESENT: ICD-10-PCS | Mod: S$GLB,,, | Performed by: PHYSICIAN ASSISTANT

## 2020-07-30 PROCEDURE — 1101F PR PT FALLS ASSESS DOC 0-1 FALLS W/OUT INJ PAST YR: ICD-10-PCS | Mod: CPTII,S$GLB,, | Performed by: PHYSICIAN ASSISTANT

## 2020-07-30 PROCEDURE — 3078F DIAST BP <80 MM HG: CPT | Mod: CPTII,S$GLB,, | Performed by: PHYSICIAN ASSISTANT

## 2020-07-30 PROCEDURE — 3078F PR MOST RECENT DIASTOLIC BLOOD PRESSURE < 80 MM HG: ICD-10-PCS | Mod: CPTII,S$GLB,, | Performed by: PHYSICIAN ASSISTANT

## 2020-07-30 PROCEDURE — 99999 PR PBB SHADOW E&M-EST. PATIENT-LVL IV: CPT | Mod: PBBFAC,,, | Performed by: PHYSICIAN ASSISTANT

## 2020-07-30 PROCEDURE — 99213 OFFICE O/P EST LOW 20 MIN: CPT | Mod: S$GLB,,, | Performed by: PHYSICIAN ASSISTANT

## 2020-07-30 PROCEDURE — 99213 PR OFFICE/OUTPT VISIT, EST, LEVL III, 20-29 MIN: ICD-10-PCS | Mod: S$GLB,,, | Performed by: PHYSICIAN ASSISTANT

## 2020-07-30 PROCEDURE — 3008F PR BODY MASS INDEX (BMI) DOCUMENTED: ICD-10-PCS | Mod: CPTII,S$GLB,, | Performed by: PHYSICIAN ASSISTANT

## 2020-07-30 PROCEDURE — 3008F BODY MASS INDEX DOCD: CPT | Mod: CPTII,S$GLB,, | Performed by: PHYSICIAN ASSISTANT

## 2020-07-30 PROCEDURE — 3074F SYST BP LT 130 MM HG: CPT | Mod: CPTII,S$GLB,, | Performed by: PHYSICIAN ASSISTANT

## 2020-07-30 PROCEDURE — 3074F PR MOST RECENT SYSTOLIC BLOOD PRESSURE < 130 MM HG: ICD-10-PCS | Mod: CPTII,S$GLB,, | Performed by: PHYSICIAN ASSISTANT

## 2020-07-30 RX ORDER — PANTOPRAZOLE SODIUM 20 MG/1
20 TABLET, DELAYED RELEASE ORAL
Qty: 180 TABLET | Refills: 3 | Status: SHIPPED | OUTPATIENT
Start: 2020-07-30 | End: 2021-08-16

## 2020-07-30 NOTE — PROGRESS NOTES
Subjective:      Patient ID: Ro Hernandez is a 65 y.o. female.    Chief Complaint: Annual Exam (Protonix refill)    HPI  The patient has a history of GERD, belching and chronic constipation. She was last seen 12/2018. She is here for a routine follow up and medication refill. She is doing well with Protonix 20 mg bid. She still has belching but no other upper GI symptoms. She reports some issues with constipation lately and has been treating it with a stool softener and daily dulcolax.     Colonoscopy (12/06/19) - 3 mm polyp, diverticulosis, non-bleeding internal hemorrhoids.     Review of Systems  As per HPI.     Objective:     Physical Exam  Constitutional:       General: She is not in acute distress.     Appearance: She is well-developed. She is not diaphoretic.   HENT:      Head: Normocephalic and atraumatic.   Cardiovascular:      Rate and Rhythm: Normal rate and regular rhythm.   Pulmonary:      Effort: Pulmonary effort is normal. No respiratory distress.      Breath sounds: Normal breath sounds. No wheezing.   Abdominal:      General: Bowel sounds are normal. There is no distension.      Palpations: Abdomen is soft.      Tenderness: There is no abdominal tenderness.   Neurological:      Mental Status: She is alert and oriented to person, place, and time.      Cranial Nerves: No cranial nerve deficit.      Gait: Gait abnormal.   Psychiatric:         Behavior: Behavior normal.         Assessment:     1. Gastroesophageal reflux disease, esophagitis presence not specified    2. Belching    3. Chronic constipation        Plan:     Discussed Protonix dosing and she is pleased with current dose. Refill sent.   Recommend starting Miralax daily for constipation. If bowel habits don't improve after four weeks, she should contact me to discuss alternative therapy.     Medications Ordered This Encounter   Medications    pantoprazole (PROTONIX) 20 MG tablet     Sig: Take 1 tablet (20 mg total) by mouth 2 (two) times  daily before meals.     Dispense:  180 tablet     Refill:  3       Follow up if symptoms worsen or fail to improve.    Thank you for the opportunity to participate in the care of this patient.   Clarence Barboza PA-C.

## 2020-07-30 NOTE — PATIENT INSTRUCTIONS
Start Miralax every day for at least four weeks. If bowel movements don't improve contact me to discuss alternative therapy.

## 2020-08-07 ENCOUNTER — OFFICE VISIT (OUTPATIENT)
Dept: PULMONOLOGY | Facility: CLINIC | Age: 65
End: 2020-08-07
Payer: COMMERCIAL

## 2020-08-07 ENCOUNTER — LAB VISIT (OUTPATIENT)
Dept: LAB | Facility: HOSPITAL | Age: 65
End: 2020-08-07
Attending: INTERNAL MEDICINE
Payer: COMMERCIAL

## 2020-08-07 VITALS
HEART RATE: 95 BPM | WEIGHT: 189.63 LBS | DIASTOLIC BLOOD PRESSURE: 72 MMHG | HEIGHT: 65 IN | RESPIRATION RATE: 18 BRPM | SYSTOLIC BLOOD PRESSURE: 122 MMHG | OXYGEN SATURATION: 96 % | BODY MASS INDEX: 31.59 KG/M2

## 2020-08-07 DIAGNOSIS — E66.09 CLASS 1 OBESITY DUE TO EXCESS CALORIES WITH SERIOUS COMORBIDITY AND BODY MASS INDEX (BMI) OF 31.0 TO 31.9 IN ADULT: ICD-10-CM

## 2020-08-07 DIAGNOSIS — R06.89 DYSPNEA AND RESPIRATORY ABNORMALITIES: ICD-10-CM

## 2020-08-07 DIAGNOSIS — R06.00 DYSPNEA AND RESPIRATORY ABNORMALITIES: ICD-10-CM

## 2020-08-07 DIAGNOSIS — R06.00 DYSPNEA AND RESPIRATORY ABNORMALITIES: Primary | ICD-10-CM

## 2020-08-07 DIAGNOSIS — G47.33 OSA ON CPAP: Chronic | ICD-10-CM

## 2020-08-07 DIAGNOSIS — R06.89 DYSPNEA AND RESPIRATORY ABNORMALITIES: Primary | ICD-10-CM

## 2020-08-07 DIAGNOSIS — R91.1 LUNG NODULE: Chronic | ICD-10-CM

## 2020-08-07 LAB — ERYTHROCYTE [SEDIMENTATION RATE] IN BLOOD BY WESTERGREN METHOD: 21 MM/HR (ref 0–20)

## 2020-08-07 PROCEDURE — 99215 PR OFFICE/OUTPT VISIT, EST, LEVL V, 40-54 MIN: ICD-10-PCS | Mod: S$GLB,,, | Performed by: INTERNAL MEDICINE

## 2020-08-07 PROCEDURE — 3008F BODY MASS INDEX DOCD: CPT | Mod: CPTII,S$GLB,, | Performed by: INTERNAL MEDICINE

## 2020-08-07 PROCEDURE — 99999 PR PBB SHADOW E&M-EST. PATIENT-LVL IV: ICD-10-PCS | Mod: PBBFAC,,, | Performed by: INTERNAL MEDICINE

## 2020-08-07 PROCEDURE — 1101F PR PT FALLS ASSESS DOC 0-1 FALLS W/OUT INJ PAST YR: ICD-10-PCS | Mod: CPTII,S$GLB,, | Performed by: INTERNAL MEDICINE

## 2020-08-07 PROCEDURE — 1101F PT FALLS ASSESS-DOCD LE1/YR: CPT | Mod: CPTII,S$GLB,, | Performed by: INTERNAL MEDICINE

## 2020-08-07 PROCEDURE — 1159F MED LIST DOCD IN RCRD: CPT | Mod: S$GLB,,, | Performed by: INTERNAL MEDICINE

## 2020-08-07 PROCEDURE — 3008F PR BODY MASS INDEX (BMI) DOCUMENTED: ICD-10-PCS | Mod: CPTII,S$GLB,, | Performed by: INTERNAL MEDICINE

## 2020-08-07 PROCEDURE — 3078F PR MOST RECENT DIASTOLIC BLOOD PRESSURE < 80 MM HG: ICD-10-PCS | Mod: CPTII,S$GLB,, | Performed by: INTERNAL MEDICINE

## 2020-08-07 PROCEDURE — 1159F PR MEDICATION LIST DOCUMENTED IN MEDICAL RECORD: ICD-10-PCS | Mod: S$GLB,,, | Performed by: INTERNAL MEDICINE

## 2020-08-07 PROCEDURE — 36415 COLL VENOUS BLD VENIPUNCTURE: CPT

## 2020-08-07 PROCEDURE — 3074F PR MOST RECENT SYSTOLIC BLOOD PRESSURE < 130 MM HG: ICD-10-PCS | Mod: CPTII,S$GLB,, | Performed by: INTERNAL MEDICINE

## 2020-08-07 PROCEDURE — 86140 C-REACTIVE PROTEIN: CPT

## 2020-08-07 PROCEDURE — 3078F DIAST BP <80 MM HG: CPT | Mod: CPTII,S$GLB,, | Performed by: INTERNAL MEDICINE

## 2020-08-07 PROCEDURE — 99215 OFFICE O/P EST HI 40 MIN: CPT | Mod: S$GLB,,, | Performed by: INTERNAL MEDICINE

## 2020-08-07 PROCEDURE — 85651 RBC SED RATE NONAUTOMATED: CPT

## 2020-08-07 PROCEDURE — 86255 FLUORESCENT ANTIBODY SCREEN: CPT

## 2020-08-07 PROCEDURE — 99999 PR PBB SHADOW E&M-EST. PATIENT-LVL IV: CPT | Mod: PBBFAC,,, | Performed by: INTERNAL MEDICINE

## 2020-08-07 PROCEDURE — 3074F SYST BP LT 130 MM HG: CPT | Mod: CPTII,S$GLB,, | Performed by: INTERNAL MEDICINE

## 2020-08-07 PROCEDURE — 86038 ANTINUCLEAR ANTIBODIES: CPT

## 2020-08-07 RX ORDER — METOPROLOL SUCCINATE 25 MG/1
TABLET, EXTENDED RELEASE ORAL
COMMUNITY
Start: 2020-08-04 | End: 2022-02-18

## 2020-08-07 NOTE — ASSESSMENT & PLAN NOTE
7 mm Lung nodule.     Fleischner Society Guidelines:    High risk patient:   Smoking history, history of malignancy or risk factors for malignancy.( non-solid ground glass opacities and partially solid nodules may require longer follow up to exclude indolent adenocarcinoma)      Nodule size Low risk patient High risk patient   4 mm  No follow up Follow up CT in 12 months. If unchanged, no further follow up.   4 - 6 mm Follow up CT in 12 months; if unchanged, no further follow up.  Initial follow up CT in 6 - 12 months, then at 18 - 24 months if no change.      6 - 8 mm  Initial follow up CT at 6 - 12 months and at 18 months if no change.  Initial follow up CT at 3 - 6 months. Then at 9-12 months and 24 months if no change.      > 8 mm Initial follow up CT at 3, 6, 9 and 24 months, dynamic contrast enhanced CT, PET-CT and/or biopsy. Initial follow up CT at 3, 6, 9 and 24 months, dynamic contrast enhanced CT, PET-CT and/or biopsy.       Repeat CT chest.

## 2020-08-07 NOTE — PROGRESS NOTES
New patient    Subjective:      Patient ID: Ro Hernandez is a 65 y.o. female.    Patient Active Problem List   Diagnosis    Class 1 obesity due to excess calories with body mass index (BMI) of 31.0 to 31.9 in adult    Unspecified essential hypertension    Other and unspecified hyperlipidemia    GERD (gastroesophageal reflux disease)    Arthritis    Fatigue    Benign heart murmur    Personal history of colonic polyps    HERB on CPAP    Mild intermittent asthma without complication    Lung nodule    Complex renal cyst    Prinzmetal's angina    Epigastric abdominal pain    Screening for colon cancer    Dyspnea and respiratory abnormalities       Problem list has been reviewed.    she has been referred by Self, Balaji for evaluation and management for   Chief Complaint   Patient presents with    Sleep Apnea    Pulmonary Nodules    Asthma    COPD       Chief Complaint: Sleep Apnea, Pulmonary Nodules, Asthma, and COPD      HPI:    Diagnosed with HERB by [facility] approximately 10 years ago.     She is on APAP 4 - 20 CMWP. She is complaince with her PAP.     She  definitely thinks PAP is beneficial to her health and she wants to continue with PAP therapy.        Boyd Sleepiness Scale   EPWORTH SLEEPINESS SCALE 8/7/2020 6/7/2018 7/18/2017   Sitting and reading 0 0 0   Watching TV 0 0 1   Sitting, inactive in a public place (e.g. a theatre or a meeting) 0 0 0   As a passenger in a car for an hour without a break 0 0 0   Lying down to rest in the afternoon when circumstances permit 2 2 3   Sitting and talking to someone 0 0 0   Sitting quietly after a lunch without alcohol 0 0 0   In a car, while stopped for a few minutes in traffic 0 0 0   Total score 2 2 4           Patient reports difficulty breathing, AM productive cough and denies hemoptysis.       Dyspnea Characteristics:   Disproportionate Exertional Dyspnea   Dyspnea Duration:  Chronic  Dyspnea Severity:  JESSICA 2  Dyspnea Timing:  Exertional  only  Dyspnea Contributing Factors:  Tobacco Abuse   Dyspnea Associated Symptoms:  Chest tightmess       Modified Pramod Dyspnea Scale      0  Nothing at all    0.5  Very, very slight (just noticeable)    1  Very slight    2  Slight    3  Moderate    4 Somewhat severe    5 Severe      6    7  Very severe    8    9   Very, very severe (almost maximal)  10  Maximal        Patient denies recent sick contacts.   Patient does not have new pets. Patient does not have a history of asthma. Patient does have a history of environmental allergens. Daily allergy regimen = CLARITIN / ZYRTEC PRN. Patient has not traveled recently. Patient does have a history of smoking. She smoked 1 PPD for approximately more than 40 years.  Patient has had a previous chest x-ray. Patient has had a PPD done.  PPD was Negative      Previous Report Reviewed: historical medical records, office notes and radiology reports     Past Medical History: The following portions of the patient's history were reviewed and updated as appropriate:   She  has a past surgical history that includes Eye surgery; Foot surgery; Cholecystectomy; Tonsillectomy; Tubal ligation; Abdominal surgery; Esophagogastroduodenoscopy (N/A, 12/10/2018); and Colonoscopy (N/A, 12/6/2019).  Her family history includes COPD in her father; Cancer in her mother; Heart disease in her sister; Hyperlipidemia in her son; Hypertension in her father and mother; Kidney cancer in her mother.  She  reports that she quit smoking about 4 years ago. Her smoking use included cigarettes. She has a 1.00 pack-year smoking history. She has never used smokeless tobacco. She reports that she does not drink alcohol or use drugs.  She has a current medication list which includes the following prescription(s): aspirin, celecoxib, estrogen (conjugated)-medroxyprogesterone 0.3-1.5 mg, ezetimibe, fluticasone propionate, hydrocodone-acetaminophen, isosorbide mononitrate, metoprolol succinate, multivitamin, naproxen,  "nitroglycerin, pantoprazole, valsartan, and verapamil.  She is allergic to ace inhibitors; lisinopril; pravastatin; rosuvastatin; and simvastatin..    Review of Systems   Constitutional: Negative for fever, chills, appetite change, fatigue and night sweats.   HENT: Negative for nosebleeds, postnasal drip, sore throat, trouble swallowing, congestion and hearing loss.    Eyes: Positive for itching.   Respiratory: Positive for apnea, chest tightness and dyspnea on extertion. Negative for snoring, cough, wheezing and orthopnea.    Cardiovascular: Positive for palpitations. Negative for chest pain and leg swelling.   Genitourinary: Negative for difficulty urinating.   Endocrine: Negative for cold intolerance and heat intolerance.    Musculoskeletal: Positive for arthralgias and back pain.   Skin: Negative for rash.   Gastrointestinal: Positive for acid reflux. Negative for nausea, vomiting, abdominal pain and abdominal distention.        Constipation   Neurological: Negative for dizziness, syncope, light-headedness and headaches.   Hematological: Excessive bruising.   Psychiatric/Behavioral: The patient is not nervous/anxious.           Occupational History:    Denies occupational exposure to asbestos, silica and petrochemicals.    Avocational Exposures:  none    Pet Exposures:  none      Objective:     Vitals:    08/07/20 1334   BP: 122/72   Pulse: 95   Resp: 18   SpO2: 96%   Weight: 86 kg (189 lb 9.5 oz)   Height: 5' 5" (1.651 m)     Body mass index is 31.55 kg/m².    Physical Exam  Vitals signs and nursing note reviewed.   Constitutional:       Appearance: Normal appearance.   HENT:      Head: Normocephalic and atraumatic.      Right Ear: Tympanic membrane normal.      Left Ear: Tympanic membrane normal.      Mouth/Throat:      Mouth: Mucous membranes are dry.   Eyes:      Extraocular Movements: Extraocular movements intact.      Pupils: Pupils are equal, round, and reactive to light.   Neck:      " Musculoskeletal: Normal range of motion and neck supple.   Cardiovascular:      Rate and Rhythm: Normal rate.      Heart sounds: Murmur present. Systolic murmur present with a grade of 4/6.   Pulmonary:      Effort: Pulmonary effort is normal.      Breath sounds: Normal breath sounds.   Abdominal:      General: Abdomen is flat.      Palpations: Abdomen is soft.   Musculoskeletal: Normal range of motion.      Right lower leg: No edema.      Left lower leg: No edema.   Skin:     General: Skin is warm and dry.      Capillary Refill: Capillary refill takes less than 2 seconds.   Neurological:      General: No focal deficit present.      Mental Status: She is alert.   Psychiatric:         Mood and Affect: Mood normal.         Behavior: Behavior normal.         Personal Diagnostic Review  Pulmonary function tests: 06/07/18: FEV1: 2.23  (91.8 % predicted), FVC: 3.10 (99.4 % predicted), FEV1/FVC:  72 %. NORMAL SPIROMETRY    No results found for this or any previous visit.  Results for orders placed during the hospital encounter of 06/13/18   CT Chest With Contrast    Narrative EXAMINATION:  CT CHEST WITH CONTRAST    CLINICAL HISTORY:  Lung nodule, <1cm;Solitary pulmonary nodule    TECHNIQUE:  Low dose axial images, sagittal and coronal reformations were obtained from the thoracic inlet to the lung bases following the IV administration of 75 mL of Omnipaque 350.    COMPARISON:  Chest x-ray from 6 days earlier and a chest CT from 10/02/2014 and a chest CT from September 2006    FINDINGS:  There is a nodule seen in the left lower lobe that measures approximately 7 mm.  This was also present on the CT from 2006 and at that time it also measured approximately 7 mm.  Given stability for several years this is most compatible with benign etiology.  This may relate to a noncalcified granuloma.  Additional tiny scattered nodular densities in the left lower lobe seen on exam from 2006 are no longer visualized consistent with resolved  inflammation/infection.  A tiny calcified granuloma in the left upper lobe is seen.  No new pulmonary nodule or mass is identified.    There is ground-glass attenuation noted along the bronchovascular bundles which may suggest mild edema.  Respiratory motion artifact is present.  Bibasilar areas of subsegmental atelectasis are noted.  Trachea is patent.  Thyroid gland appears unremarkable.    Limited imaging through the upper abdomen demonstrates cholecystectomy changes, calcified splenic granulomas and partial visualization of a left renal cyst which appears to contain a thin septation with calcification.  No nodular components.    Heart size is normal.  No pericardial effusion.  Great vessels remain patent.  Small scattered mediastinal and hilar lymph nodes.  No bulky adenopathy.  Calcified left subcarinal region granuloma is noted.    Review of bone windows demonstrate the osseous structures to be grossly intact.  No destructive osseous lesion is evident.      Impression Left lower lobe pulmonary nodule which has been stable for multiple years and is most suggestive of benign etiology likely a noncalcified granuloma.  Additional calcified granuloma in the left upper lung.  No new or suspicious nodule is seen at this time.  Partially visualized mildly complex appearing left renal cyst.  Consider renal sonogram for further characterization.  Other findings as detailed above.      Electronically signed by: Arnel Latif MD  Date:    06/13/2018  Time:    13:31       Assessment /Plan:     Discussed diagnosis, its evaluation, treatment and usual course. All questions answered.    Problem List Items Addressed This Visit        Pulmonary    Lung nodule    Current Assessment & Plan       7 mm Lung nodule.     Fleischner Society Guidelines:    High risk patient:   Smoking history, history of malignancy or risk factors for malignancy.( non-solid ground glass opacities and partially solid nodules may require longer follow up  to exclude indolent adenocarcinoma)      Nodule size Low risk patient High risk patient   4 mm  No follow up Follow up CT in 12 months. If unchanged, no further follow up.   4 - 6 mm Follow up CT in 12 months; if unchanged, no further follow up.  Initial follow up CT in 6 - 12 months, then at 18 - 24 months if no change.      6 - 8 mm  Initial follow up CT at 6 - 12 months and at 18 months if no change.  Initial follow up CT at 3 - 6 months. Then at 9-12 months and 24 months if no change.      > 8 mm Initial follow up CT at 3, 6, 9 and 24 months, dynamic contrast enhanced CT, PET-CT and/or biopsy. Initial follow up CT at 3, 6, 9 and 24 months, dynamic contrast enhanced CT, PET-CT and/or biopsy.       Repeat CT chest.          Relevant Orders    CT Chest Without Contrast       Endocrine    Class 1 obesity due to excess calories with body mass index (BMI) of 31.0 to 31.9 in adult (Chronic)    Current Assessment & Plan     General weight loss/lifestyle modification strategies discussed (elicit support from others; identify saboteurs; non-food rewards, etc).  Diet interventions: low calorie (1000 kCal/d) deficit diet.  Informal exercise measures discussed, e.g. taking stairs instead of elevator.  Regular aerobic exercise program discussed.            Other    HERB on CPAP (Chronic)    Current Assessment & Plan     Start APAP of  4  - 20 CMWP. (DME - [facility])     Discussed therapeutic goals for positive airway pressure therapy(CPAP or BiPAP): Ideal is usage 100% of nights for 6 - 8 hours per night. Minimum usage is 70% of night for at least 4 hours per night used. Pateint expressed understanding. All Questions answered.    She will bring her SD card next visit.          Dyspnea and respiratory abnormalities - Primary    Current Assessment & Plan     Etiology unclear.  Multifactorial etiology suspected.  Likely contributors to etiology (checked)    [x] Pulmonary airway disease    [x]  Pulmonary parenchymal  disease  [x] Pulmonary vascular disease   [x] Pleural disease  [] Pulmonary vasculitis  [] Hypoventilation ( chest wall deformity, neuromuscular disease, obesity etc)  [] Anemia  [] Thyroid disease.  [] Cardiac illess  []         Sleep disorder    EVALUATION:  [x]        Complete PFT with bronchodilator.  []        Bronchial challenge with methacholine.   [x]        Stress test, pulmonary.  [x]        PULM - Arterial Blood Gases  [x]        Chest X Ray  [x]        CT scan of chest.   [x]        JAYY  [x]        Sedimentation rate  [x]        C-reactive protein  [x]        Anti-neutrophilic cytoplasmic antibody          PLAN:  Discussed diagnosis, its evaluation, treatment and usual course.        Call if shortness of breath worsens, blood in sputum, change in character of cough, development of fever or chills, inability to maintain nutrition and hydration.     Re evaluate in four weeks with results.           Relevant Orders    JAYY Screen w/Reflex    C-Reactive Protein    Sedimentation rate    Anti-neutrophilic cytoplasmic antibody    Complete PFT with bronchodilator    PULM - Arterial Blood Gases--in addition to PFT only    Pulmonary stress test              TIME SPENT WITH PATIENT: Time spent: 60 minutes in face to face  discussion concerning diagnosis, prognosis, review of lab and test results, benefits of treatment as well as management of disease, counseling of patient and coordination of care between various health  care providers . Greater than half the time spent was used for coordination of care and counseling of patient.     Follow up in about 1 month (around 9/7/2020) for Obesity, Shortness of breath, Lung Nodule, HERB.

## 2020-08-07 NOTE — PATIENT INSTRUCTIONS
Lung Anatomy  Your lungs take air in to give your body oxygen, which the body needs to work. Your lungs, like all the tissues in your body, are made up of billions of tiny specialized cells. Old lung cells die and are replaced by new, identical lung cells. This natural process helps ensure healthy lungs.    Date Last Reviewed: 11/1/2016  © 0519-8096 [employer]. [address] [place] [address], [place] [address]. All rights reserved. This information is not intended as a substitute for professional medical care. Always follow your healthcare professional's instructions.

## 2020-08-07 NOTE — ASSESSMENT & PLAN NOTE
Etiology unclear.  Multifactorial etiology suspected.  Likely contributors to etiology (checked)    [x] Pulmonary airway disease    [x]  Pulmonary parenchymal disease  [x] Pulmonary vascular disease   [x] Pleural disease  [] Pulmonary vasculitis  [] Hypoventilation ( chest wall deformity, neuromuscular disease, obesity etc)  [] Anemia  [] Thyroid disease.  [] Cardiac illess  []         Sleep disorder    EVALUATION:  [x]        Complete PFT with bronchodilator.  []        Bronchial challenge with methacholine.   [x]        Stress test, pulmonary.  [x]        PULM - Arterial Blood Gases  [x]        Chest X Ray  [x]        CT scan of chest.   [x]        JAYY  [x]        Sedimentation rate  [x]        C-reactive protein  [x]        Anti-neutrophilic cytoplasmic antibody          PLAN:  Discussed diagnosis, its evaluation, treatment and usual course.        Call if shortness of breath worsens, blood in sputum, change in character of cough, development of fever or chills, inability to maintain nutrition and hydration.     Re evaluate in four weeks with results.

## 2020-08-07 NOTE — ASSESSMENT & PLAN NOTE
Start APAP of  4  - 20 CMWP. (DME - [facility])     Discussed therapeutic goals for positive airway pressure therapy(CPAP or BiPAP): Ideal is usage 100% of nights for 6 - 8 hours per night. Minimum usage is 70% of night for at least 4 hours per night used. Pateint expressed understanding. All Questions answered.    She will bring her SD card next visit.

## 2020-08-08 LAB — CRP SERPL-MCNC: 1.1 MG/L (ref 0–8.2)

## 2020-08-10 LAB
ANA SER QL IF: NORMAL
ANCA AB TITR SER IF: NORMAL TITER
P-ANCA TITR SER IF: NORMAL TITER

## 2020-08-31 ENCOUNTER — TELEPHONE (OUTPATIENT)
Dept: FAMILY MEDICINE | Facility: CLINIC | Age: 65
End: 2020-08-31

## 2020-08-31 NOTE — TELEPHONE ENCOUNTER
----- Message from Marcela Chopra MD sent at 8/31/2020  4:23 PM CDT -----  Contact: [facility]  ext [phone]  Spoke with NP Hoa.  ----- Message -----  From: Corina Pena LPN  Sent: 8/31/2020   4:13 PM CDT  To: Marcela Chopra MD    Hey the NP wants you to call her cell when you have a chance. Its listed below  ----- Message -----  From: Freda Carrero  Sent: 8/31/2020   4:07 PM CDT  To: [facility] Staff    Zoë states Dr Quiles or Hoa NP needs to speak to Dr Flores. Please call and advise at  ext [phone] or Fang NP cell

## 2020-09-01 ENCOUNTER — LAB VISIT (OUTPATIENT)
Dept: LAB | Facility: HOSPITAL | Age: 65
End: 2020-09-01
Attending: FAMILY MEDICINE
Payer: COMMERCIAL

## 2020-09-01 ENCOUNTER — OFFICE VISIT (OUTPATIENT)
Dept: FAMILY MEDICINE | Facility: CLINIC | Age: 65
End: 2020-09-01
Payer: COMMERCIAL

## 2020-09-01 VITALS
DIASTOLIC BLOOD PRESSURE: 70 MMHG | WEIGHT: 188.25 LBS | OXYGEN SATURATION: 95 % | TEMPERATURE: 99 F | SYSTOLIC BLOOD PRESSURE: 138 MMHG | HEIGHT: 65 IN | RESPIRATION RATE: 16 BRPM | HEART RATE: 89 BPM | BODY MASS INDEX: 31.36 KG/M2

## 2020-09-01 DIAGNOSIS — J45.20 MILD INTERMITTENT ASTHMA WITHOUT COMPLICATION: ICD-10-CM

## 2020-09-01 DIAGNOSIS — K59.09 CHRONIC CONSTIPATION: ICD-10-CM

## 2020-09-01 DIAGNOSIS — R70.0 ELEVATED SED RATE: ICD-10-CM

## 2020-09-01 DIAGNOSIS — D64.9 ANEMIA, UNSPECIFIED TYPE: ICD-10-CM

## 2020-09-01 DIAGNOSIS — G47.33 OSA ON CPAP: ICD-10-CM

## 2020-09-01 DIAGNOSIS — K21.9 GASTROESOPHAGEAL REFLUX DISEASE, ESOPHAGITIS PRESENCE NOT SPECIFIED: ICD-10-CM

## 2020-09-01 DIAGNOSIS — I20.1 PRINZMETAL'S ANGINA: ICD-10-CM

## 2020-09-01 DIAGNOSIS — D64.9 ANEMIA, UNSPECIFIED TYPE: Primary | ICD-10-CM

## 2020-09-01 LAB
BASOPHILS # BLD AUTO: 0.02 K/UL (ref 0–0.2)
BASOPHILS NFR BLD: 0.4 % (ref 0–1.9)
DIFFERENTIAL METHOD: ABNORMAL
EOSINOPHIL # BLD AUTO: 0.1 K/UL (ref 0–0.5)
EOSINOPHIL NFR BLD: 1.6 % (ref 0–8)
ERYTHROCYTE [DISTWIDTH] IN BLOOD BY AUTOMATED COUNT: 14.2 % (ref 11.5–14.5)
ERYTHROCYTE [SEDIMENTATION RATE] IN BLOOD BY WESTERGREN METHOD: 18 MM/HR (ref 0–36)
HCT VFR BLD AUTO: 31.6 % (ref 37–48.5)
HGB BLD-MCNC: 9 G/DL (ref 12–16)
IMM GRANULOCYTES # BLD AUTO: 0.01 K/UL (ref 0–0.04)
IMM GRANULOCYTES NFR BLD AUTO: 0.2 % (ref 0–0.5)
LYMPHOCYTES # BLD AUTO: 1.2 K/UL (ref 1–4.8)
LYMPHOCYTES NFR BLD: 24.6 % (ref 18–48)
MCH RBC QN AUTO: 22.7 PG (ref 27–31)
MCHC RBC AUTO-ENTMCNC: 28.5 G/DL (ref 32–36)
MCV RBC AUTO: 80 FL (ref 82–98)
MONOCYTES # BLD AUTO: 0.3 K/UL (ref 0.3–1)
MONOCYTES NFR BLD: 6.8 % (ref 4–15)
NEUTROPHILS # BLD AUTO: 3.2 K/UL (ref 1.8–7.7)
NEUTROPHILS NFR BLD: 66.4 % (ref 38–73)
NRBC BLD-RTO: 0 /100 WBC
PLATELET # BLD AUTO: 268 K/UL (ref 150–350)
PMV BLD AUTO: 10.4 FL (ref 9.2–12.9)
RBC # BLD AUTO: 3.96 M/UL (ref 4–5.4)
WBC # BLD AUTO: 4.87 K/UL (ref 3.9–12.7)

## 2020-09-01 PROCEDURE — 99214 OFFICE O/P EST MOD 30 MIN: CPT | Mod: S$GLB,,, | Performed by: FAMILY MEDICINE

## 2020-09-01 PROCEDURE — 1126F PR PAIN SEVERITY QUANTIFIED, NO PAIN PRESENT: ICD-10-PCS | Mod: S$GLB,,, | Performed by: FAMILY MEDICINE

## 2020-09-01 PROCEDURE — 3078F PR MOST RECENT DIASTOLIC BLOOD PRESSURE < 80 MM HG: ICD-10-PCS | Mod: CPTII,S$GLB,, | Performed by: FAMILY MEDICINE

## 2020-09-01 PROCEDURE — 3008F PR BODY MASS INDEX (BMI) DOCUMENTED: ICD-10-PCS | Mod: CPTII,S$GLB,, | Performed by: FAMILY MEDICINE

## 2020-09-01 PROCEDURE — 1159F MED LIST DOCD IN RCRD: CPT | Mod: S$GLB,,, | Performed by: FAMILY MEDICINE

## 2020-09-01 PROCEDURE — 99214 PR OFFICE/OUTPT VISIT, EST, LEVL IV, 30-39 MIN: ICD-10-PCS | Mod: S$GLB,,, | Performed by: FAMILY MEDICINE

## 2020-09-01 PROCEDURE — 81001 URINALYSIS AUTO W/SCOPE: CPT

## 2020-09-01 PROCEDURE — 1126F AMNT PAIN NOTED NONE PRSNT: CPT | Mod: S$GLB,,, | Performed by: FAMILY MEDICINE

## 2020-09-01 PROCEDURE — 82607 VITAMIN B-12: CPT

## 2020-09-01 PROCEDURE — 1101F PT FALLS ASSESS-DOCD LE1/YR: CPT | Mod: CPTII,S$GLB,, | Performed by: FAMILY MEDICINE

## 2020-09-01 PROCEDURE — 85652 RBC SED RATE AUTOMATED: CPT

## 2020-09-01 PROCEDURE — 82728 ASSAY OF FERRITIN: CPT

## 2020-09-01 PROCEDURE — 1101F PR PT FALLS ASSESS DOC 0-1 FALLS W/OUT INJ PAST YR: ICD-10-PCS | Mod: CPTII,S$GLB,, | Performed by: FAMILY MEDICINE

## 2020-09-01 PROCEDURE — 1159F PR MEDICATION LIST DOCUMENTED IN MEDICAL RECORD: ICD-10-PCS | Mod: S$GLB,,, | Performed by: FAMILY MEDICINE

## 2020-09-01 PROCEDURE — 3075F PR MOST RECENT SYSTOLIC BLOOD PRESS GE 130-139MM HG: ICD-10-PCS | Mod: CPTII,S$GLB,, | Performed by: FAMILY MEDICINE

## 2020-09-01 PROCEDURE — 99999 PR PBB SHADOW E&M-EST. PATIENT-LVL IV: CPT | Mod: PBBFAC,,, | Performed by: FAMILY MEDICINE

## 2020-09-01 PROCEDURE — 83540 ASSAY OF IRON: CPT

## 2020-09-01 PROCEDURE — 3078F DIAST BP <80 MM HG: CPT | Mod: CPTII,S$GLB,, | Performed by: FAMILY MEDICINE

## 2020-09-01 PROCEDURE — 3075F SYST BP GE 130 - 139MM HG: CPT | Mod: CPTII,S$GLB,, | Performed by: FAMILY MEDICINE

## 2020-09-01 PROCEDURE — 36415 COLL VENOUS BLD VENIPUNCTURE: CPT | Mod: PO

## 2020-09-01 PROCEDURE — 82746 ASSAY OF FOLIC ACID SERUM: CPT

## 2020-09-01 PROCEDURE — 99999 PR PBB SHADOW E&M-EST. PATIENT-LVL IV: ICD-10-PCS | Mod: PBBFAC,,, | Performed by: FAMILY MEDICINE

## 2020-09-01 PROCEDURE — 3008F BODY MASS INDEX DOCD: CPT | Mod: CPTII,S$GLB,, | Performed by: FAMILY MEDICINE

## 2020-09-01 PROCEDURE — 85025 COMPLETE CBC W/AUTO DIFF WBC: CPT

## 2020-09-01 NOTE — PROGRESS NOTES
Subjective:       Patient ID: Ro Hernandez is a 65 y.o. female.    Chief Complaint: Anemia    HPI   Anemia  64yo female presents today on recommendation made per Cardiology secondary to anemia. Patient notes having progressively worsening dyspnea on exertion since July 2020. She has found herself easily fatigued and has required frequent breaks with minimal activity. She attributed symptoms to her underlying cardiac issues and was seen by Dr. Quiles. She underwent a left heart cath last week and there were plans to proceed with KIERAN next week. Plans have been placed on hold due to findings of anemia. She denies any melena or rectal bleeding. There is no report of any hematuria or unusual bruising or bleeding. She has been more tired than her usual baseline. She has been using her inhaler more than usual- this does afford some relief. There is no previous history of anemia. Within the past two years she has undergone EGD and C scope. She is scheduled for updated WWE with her GYN later this month (Dr. Sammie Stanton).     Review of Systems   Constitutional: Positive for activity change and fatigue. Negative for unexpected weight change.   HENT: Positive for postnasal drip. Negative for congestion, ear pain, nosebleeds, sore throat and trouble swallowing.    Eyes: Negative for visual disturbance.   Respiratory: Positive for cough, chest tightness and shortness of breath.    Cardiovascular: Positive for palpitations. Negative for chest pain.   Gastrointestinal: Positive for constipation. Negative for abdominal pain, blood in stool, diarrhea, nausea and vomiting.   Genitourinary: Negative for difficulty urinating and hematuria.   Musculoskeletal: Negative for arthralgias and myalgias.   Skin: Positive for pallor. Negative for rash.   Neurological: Positive for light-headedness. Negative for dizziness, weakness and headaches.   Hematological: Bruises/bleeds easily.   Psychiatric/Behavioral: Negative for dysphoric mood and  "sleep disturbance.       Objective:   /70   Pulse 89   Temp 98.8 °F (37.1 °C) (Temporal)   Resp 16   Ht 5' 5" (1.651 m)   Wt 85.4 kg (188 lb 4.4 oz)   SpO2 95%   BMI 31.33 kg/m²   Physical Exam  Constitutional:       Appearance: She is well-developed. She is obese. She is not toxic-appearing.   HENT:      Head: Normocephalic and atraumatic.      Right Ear: Tympanic membrane, ear canal and external ear normal.      Left Ear: Tympanic membrane, ear canal and external ear normal.      Nose: Nose normal.      Mouth/Throat:      Mouth: Mucous membranes are moist.   Eyes:      Extraocular Movements: Extraocular movements intact.      Conjunctiva/sclera: Conjunctivae normal.      Pupils: Pupils are equal, round, and reactive to light.   Neck:      Musculoskeletal: Normal range of motion.   Cardiovascular:      Rate and Rhythm: Normal rate and regular rhythm.      Heart sounds: Murmur present.   Pulmonary:      Effort: Pulmonary effort is normal.      Breath sounds: Normal breath sounds.   Abdominal:      General: Bowel sounds are normal.      Palpations: Abdomen is soft.   Skin:     General: Skin is warm and dry.      Findings: No rash.   Neurological:      Mental Status: She is alert and oriented to person, place, and time.   Psychiatric:         Mood and Affect: Mood normal.         Behavior: Behavior normal.         Assessment:       1. Anemia, unspecified type    2. Elevated sed rate    3. Gastroesophageal reflux disease, esophagitis presence not specified    4. Chronic constipation    5. Prinzmetal's angina    6. Mild intermittent asthma without complication    7. HERB on CPAP    8. BMI 31.0-31.9,adult        Plan:      Anemia, unspecified type  Records obtained and reviewed from Dr. Quiles. Spoke with NP Hoa Hopkins by phone- H&H has dropped now to 8.7/30.3 from 9.4/31.6 (labs obtained 2 weeks apart). Last CBC on file within [facility] was obtained in September 2019 at which time H&H was 13/1/40. Will proceed " with additional labs as below. Have made arrangements for updated GI evaluation. In the interim she is asked to continue with PPI use and place her NSAID medication on hold (ok to continue with ASA). She will monitor for any change in symptoms. Low threshold for Hematology evaluation.   -     CBC auto differential; Future; Expected date: 09/01/2020  -     Ferritin; Future; Expected date: 09/01/2020  -     Iron and TIBC; Future; Expected date: 09/01/2020  -     Urinalysis; Future; Expected date: 09/01/2020  -     Occult blood x 1, stool; Future; Expected date: 09/01/2020  -     Vitamin B12; Future; Expected date: 09/01/2020  -     Folate; Future; Expected date: 09/01/2020    Elevated sed rate  -     Sedimentation rate; Future; Expected date: 09/01/2020    Gastroesophageal reflux disease, esophagitis presence not specified  As above.    Chronic constipation  As per GI.    Prinzmetal's angina  As per Cardiology.    Mild intermittent asthma without complication  As per Pulmonology.    HERB on CPAP  As above.     BMI 31.0-31.9, adult  Weight loss efforts remain encouraged through diet and lifestyle measures.

## 2020-09-02 ENCOUNTER — LAB VISIT (OUTPATIENT)
Dept: LAB | Facility: HOSPITAL | Age: 65
End: 2020-09-02
Attending: FAMILY MEDICINE
Payer: COMMERCIAL

## 2020-09-02 ENCOUNTER — PATIENT OUTREACH (OUTPATIENT)
Dept: ADMINISTRATIVE | Facility: OTHER | Age: 65
End: 2020-09-02

## 2020-09-02 DIAGNOSIS — D64.9 ANEMIA, UNSPECIFIED TYPE: ICD-10-CM

## 2020-09-02 DIAGNOSIS — R82.90 ABNORMAL FINDING ON URINALYSIS: Primary | ICD-10-CM

## 2020-09-02 DIAGNOSIS — N39.0 URINARY TRACT INFECTION WITHOUT HEMATURIA, SITE UNSPECIFIED: ICD-10-CM

## 2020-09-02 LAB
BACTERIA #/AREA URNS AUTO: ABNORMAL /HPF
BILIRUB UR QL STRIP: NEGATIVE
CLARITY UR REFRACT.AUTO: ABNORMAL
COLOR UR AUTO: YELLOW
FERRITIN SERPL-MCNC: 9 NG/ML (ref 20–300)
FOLATE SERPL-MCNC: 35.5 NG/ML (ref 4–24)
GLUCOSE UR QL STRIP: NEGATIVE
HGB UR QL STRIP: ABNORMAL
IRON SERPL-MCNC: 17 UG/DL (ref 30–160)
KETONES UR QL STRIP: NEGATIVE
LEUKOCYTE ESTERASE UR QL STRIP: NEGATIVE
MICROSCOPIC COMMENT: ABNORMAL
NITRITE UR QL STRIP: NEGATIVE
PH UR STRIP: 7 [PH] (ref 5–8)
PROT UR QL STRIP: NEGATIVE
RBC #/AREA URNS AUTO: 1 /HPF (ref 0–4)
SATURATED IRON: 3 % (ref 20–50)
SP GR UR STRIP: 1.01 (ref 1–1.03)
SQUAMOUS #/AREA URNS AUTO: 1 /HPF
TOTAL IRON BINDING CAPACITY: 561 UG/DL (ref 250–450)
TRANSFERRIN SERPL-MCNC: 379 MG/DL (ref 200–375)
URN SPEC COLLECT METH UR: ABNORMAL
VIT B12 SERPL-MCNC: 528 PG/ML (ref 210–950)
WBC #/AREA URNS AUTO: 1 /HPF (ref 0–5)

## 2020-09-02 PROCEDURE — 82272 OCCULT BLD FECES 1-3 TESTS: CPT

## 2020-09-02 NOTE — PROGRESS NOTES
Health Maintenance Due   Topic Date Due    Shingles Vaccine (1 of 2) 07/21/2003    Mammogram  04/17/2020    Influenza Vaccine (1) 09/01/2020     Updates were requested from care everywhere.  Chart was reviewed for overdue Proactive [facility] Encounters ([facility]USTIN) topics (CRS, Breast Cancer Screening, Eye exam)  Health Maintenance has been updated.  LINKS immunization registry triggered.  Immunizations were reconciled.

## 2020-09-03 ENCOUNTER — OFFICE VISIT (OUTPATIENT)
Dept: GASTROENTEROLOGY | Facility: CLINIC | Age: 65
End: 2020-09-03
Payer: COMMERCIAL

## 2020-09-03 VITALS
WEIGHT: 189.63 LBS | DIASTOLIC BLOOD PRESSURE: 66 MMHG | HEIGHT: 65 IN | HEART RATE: 90 BPM | OXYGEN SATURATION: 97 % | SYSTOLIC BLOOD PRESSURE: 144 MMHG | BODY MASS INDEX: 31.59 KG/M2

## 2020-09-03 DIAGNOSIS — Z13.9 SCREENING PROCEDURE: ICD-10-CM

## 2020-09-03 DIAGNOSIS — D50.9 IRON DEFICIENCY ANEMIA, UNSPECIFIED IRON DEFICIENCY ANEMIA TYPE: Primary | ICD-10-CM

## 2020-09-03 LAB — OB PNL STL: NEGATIVE

## 2020-09-03 PROCEDURE — 99214 PR OFFICE/OUTPT VISIT, EST, LEVL IV, 30-39 MIN: ICD-10-PCS | Mod: S$GLB,,, | Performed by: PHYSICIAN ASSISTANT

## 2020-09-03 PROCEDURE — 1126F PR PAIN SEVERITY QUANTIFIED, NO PAIN PRESENT: ICD-10-PCS | Mod: S$GLB,,, | Performed by: PHYSICIAN ASSISTANT

## 2020-09-03 PROCEDURE — 3077F PR MOST RECENT SYSTOLIC BLOOD PRESSURE >= 140 MM HG: ICD-10-PCS | Mod: CPTII,S$GLB,, | Performed by: PHYSICIAN ASSISTANT

## 2020-09-03 PROCEDURE — 3008F PR BODY MASS INDEX (BMI) DOCUMENTED: ICD-10-PCS | Mod: CPTII,S$GLB,, | Performed by: PHYSICIAN ASSISTANT

## 2020-09-03 PROCEDURE — 1159F PR MEDICATION LIST DOCUMENTED IN MEDICAL RECORD: ICD-10-PCS | Mod: S$GLB,,, | Performed by: PHYSICIAN ASSISTANT

## 2020-09-03 PROCEDURE — 3078F DIAST BP <80 MM HG: CPT | Mod: CPTII,S$GLB,, | Performed by: PHYSICIAN ASSISTANT

## 2020-09-03 PROCEDURE — 1126F AMNT PAIN NOTED NONE PRSNT: CPT | Mod: S$GLB,,, | Performed by: PHYSICIAN ASSISTANT

## 2020-09-03 PROCEDURE — 1101F PR PT FALLS ASSESS DOC 0-1 FALLS W/OUT INJ PAST YR: ICD-10-PCS | Mod: CPTII,S$GLB,, | Performed by: PHYSICIAN ASSISTANT

## 2020-09-03 PROCEDURE — 3077F SYST BP >= 140 MM HG: CPT | Mod: CPTII,S$GLB,, | Performed by: PHYSICIAN ASSISTANT

## 2020-09-03 PROCEDURE — 1159F MED LIST DOCD IN RCRD: CPT | Mod: S$GLB,,, | Performed by: PHYSICIAN ASSISTANT

## 2020-09-03 PROCEDURE — 99999 PR PBB SHADOW E&M-EST. PATIENT-LVL V: CPT | Mod: PBBFAC,,, | Performed by: PHYSICIAN ASSISTANT

## 2020-09-03 PROCEDURE — 3008F BODY MASS INDEX DOCD: CPT | Mod: CPTII,S$GLB,, | Performed by: PHYSICIAN ASSISTANT

## 2020-09-03 PROCEDURE — 3078F PR MOST RECENT DIASTOLIC BLOOD PRESSURE < 80 MM HG: ICD-10-PCS | Mod: CPTII,S$GLB,, | Performed by: PHYSICIAN ASSISTANT

## 2020-09-03 PROCEDURE — 99999 PR PBB SHADOW E&M-EST. PATIENT-LVL V: ICD-10-PCS | Mod: PBBFAC,,, | Performed by: PHYSICIAN ASSISTANT

## 2020-09-03 PROCEDURE — 1101F PT FALLS ASSESS-DOCD LE1/YR: CPT | Mod: CPTII,S$GLB,, | Performed by: PHYSICIAN ASSISTANT

## 2020-09-03 PROCEDURE — 99214 OFFICE O/P EST MOD 30 MIN: CPT | Mod: S$GLB,,, | Performed by: PHYSICIAN ASSISTANT

## 2020-09-04 ENCOUNTER — TELEPHONE (OUTPATIENT)
Dept: GASTROENTEROLOGY | Facility: CLINIC | Age: 65
End: 2020-09-04

## 2020-09-04 RX ORDER — CEFUROXIME AXETIL 250 MG/1
250 TABLET ORAL 2 TIMES DAILY
Qty: 14 TABLET | Refills: 0 | Status: SHIPPED | OUTPATIENT
Start: 2020-09-04 | End: 2020-09-08 | Stop reason: ALTCHOICE

## 2020-09-05 ENCOUNTER — LAB VISIT (OUTPATIENT)
Dept: URGENT CARE | Facility: CLINIC | Age: 65
End: 2020-09-05
Payer: COMMERCIAL

## 2020-09-05 DIAGNOSIS — Z13.9 SCREENING PROCEDURE: ICD-10-CM

## 2020-09-05 PROCEDURE — U0003 INFECTIOUS AGENT DETECTION BY NUCLEIC ACID (DNA OR RNA); SEVERE ACUTE RESPIRATORY SYNDROME CORONAVIRUS 2 (SARS-COV-2) (CORONAVIRUS DISEASE [COVID-19]), AMPLIFIED PROBE TECHNIQUE, MAKING USE OF HIGH THROUGHPUT TECHNOLOGIES AS DESCRIBED BY CMS-2020-01-R: HCPCS

## 2020-09-06 LAB — SARS-COV-2 RNA RESP QL NAA+PROBE: NOT DETECTED

## 2020-09-08 ENCOUNTER — HOSPITAL ENCOUNTER (OUTPATIENT)
Facility: HOSPITAL | Age: 65
Discharge: HOME | End: 2020-09-08
Attending: INTERNAL MEDICINE | Admitting: INTERNAL MEDICINE
Payer: COMMERCIAL

## 2020-09-08 ENCOUNTER — TELEPHONE (OUTPATIENT)
Dept: FAMILY MEDICINE | Facility: CLINIC | Age: 65
End: 2020-09-08

## 2020-09-08 ENCOUNTER — ANESTHESIA (OUTPATIENT)
Dept: ENDOSCOPY | Facility: HOSPITAL | Age: 65
End: 2020-09-08
Payer: COMMERCIAL

## 2020-09-08 ENCOUNTER — ANESTHESIA EVENT (OUTPATIENT)
Dept: ENDOSCOPY | Facility: HOSPITAL | Age: 65
End: 2020-09-08
Payer: COMMERCIAL

## 2020-09-08 VITALS
RESPIRATION RATE: 18 BRPM | TEMPERATURE: 98 F | OXYGEN SATURATION: 94 % | WEIGHT: 187.81 LBS | SYSTOLIC BLOOD PRESSURE: 141 MMHG | BODY MASS INDEX: 31.29 KG/M2 | HEART RATE: 78 BPM | DIASTOLIC BLOOD PRESSURE: 63 MMHG | HEIGHT: 65 IN

## 2020-09-08 DIAGNOSIS — D50.0 IRON DEFICIENCY ANEMIA DUE TO CHRONIC BLOOD LOSS: Primary | ICD-10-CM

## 2020-09-08 PROCEDURE — 63600175 PHARM REV CODE 636 W HCPCS: Performed by: INTERNAL MEDICINE

## 2020-09-08 PROCEDURE — 88305 TISSUE EXAM BY PATHOLOGIST: CPT | Mod: 26,,, | Performed by: PATHOLOGY

## 2020-09-08 PROCEDURE — 63600175 PHARM REV CODE 636 W HCPCS: Performed by: NURSE ANESTHETIST, CERTIFIED REGISTERED

## 2020-09-08 PROCEDURE — 37000009 HC ANESTHESIA EA ADD 15 MINS: Performed by: INTERNAL MEDICINE

## 2020-09-08 PROCEDURE — 43239 PR EGD, FLEX, W/BIOPSY, SGL/MULTI: ICD-10-PCS | Mod: ,,, | Performed by: INTERNAL MEDICINE

## 2020-09-08 PROCEDURE — 88305 TISSUE EXAM BY PATHOLOGIST: CPT | Performed by: PATHOLOGY

## 2020-09-08 PROCEDURE — 88305 TISSUE EXAM BY PATHOLOGIST: ICD-10-PCS | Mod: 26,,, | Performed by: PATHOLOGY

## 2020-09-08 PROCEDURE — 43239 EGD BIOPSY SINGLE/MULTIPLE: CPT | Performed by: INTERNAL MEDICINE

## 2020-09-08 PROCEDURE — 27201012 HC FORCEPS, HOT/COLD, DISP: Performed by: INTERNAL MEDICINE

## 2020-09-08 PROCEDURE — 37000008 HC ANESTHESIA 1ST 15 MINUTES: Performed by: INTERNAL MEDICINE

## 2020-09-08 PROCEDURE — 43239 EGD BIOPSY SINGLE/MULTIPLE: CPT | Mod: ,,, | Performed by: INTERNAL MEDICINE

## 2020-09-08 RX ORDER — PROPOFOL 10 MG/ML
VIAL (ML) INTRAVENOUS
Status: DISCONTINUED | OUTPATIENT
Start: 2020-09-08 | End: 2020-09-08

## 2020-09-08 RX ORDER — SODIUM CHLORIDE 0.9 % (FLUSH) 0.9 %
10 SYRINGE (ML) INJECTION
Status: DISCONTINUED | OUTPATIENT
Start: 2020-09-08 | End: 2020-09-08 | Stop reason: HOSPADM

## 2020-09-08 RX ORDER — SODIUM CHLORIDE, SODIUM LACTATE, POTASSIUM CHLORIDE, CALCIUM CHLORIDE 600; 310; 30; 20 MG/100ML; MG/100ML; MG/100ML; MG/100ML
INJECTION, SOLUTION INTRAVENOUS CONTINUOUS
Status: DISCONTINUED | OUTPATIENT
Start: 2020-09-08 | End: 2020-09-08 | Stop reason: HOSPADM

## 2020-09-08 RX ORDER — NITROFURANTOIN 25; 75 MG/1; MG/1
100 CAPSULE ORAL 2 TIMES DAILY
Qty: 14 CAPSULE | Refills: 0 | Status: SHIPPED | OUTPATIENT
Start: 2020-09-08 | End: 2020-09-23 | Stop reason: ALTCHOICE

## 2020-09-08 RX ADMIN — PROPOFOL 20 MG: 10 INJECTION, EMULSION INTRAVENOUS at 11:09

## 2020-09-08 RX ADMIN — PROPOFOL 30 MG: 10 INJECTION, EMULSION INTRAVENOUS at 11:09

## 2020-09-08 RX ADMIN — PROPOFOL 100 MG: 10 INJECTION, EMULSION INTRAVENOUS at 11:09

## 2020-09-08 RX ADMIN — SODIUM CHLORIDE, SODIUM LACTATE, POTASSIUM CHLORIDE, AND CALCIUM CHLORIDE: 600; 310; 30; 20 INJECTION, SOLUTION INTRAVENOUS at 11:09

## 2020-09-08 NOTE — DISCHARGE SUMMARY
[facility] -   Brief Operative Note     SUMMARY     Surgery Date: 9/8/2020     Surgeon(s) and Role:     * Raúl Polanco III, MD - Primary    Assisting Surgeon: None    Pre-op Diagnosis:  Iron deficiency anemia, unspecified iron deficiency anemia type [D50.9]    Post-op Diagnosis:  Post-Op Diagnosis Codes:     * Iron deficiency anemia, unspecified iron deficiency anemia type [D50.9]      - Gastritis      - Gastric Ulcer scar  Procedure(s) (LRB):  EGD (ESOPHAGOGASTRODUODENOSCOPY) (N/A)    Anesthesia: Choice    Description of the findings of the procedure: Procedures completed. See Procedure note for full details.    Findings/Key Components: Procedures completed. See Procedure note for full details.    Prosthetics/Devices: None    Estimated Blood Loss: 0 mL         Specimens:   Specimen (12h ago, onward)    None          Discharge Note    SUMMARY     Admit Date: 9/8/2020    Discharge Date and Time: 9/8/2020    Hospital Course (synopsis of major diagnoses, care, treatment, and services provided during the course of the hospital stay):  Procedures completed. See Procedure note for full details. Discharge patient when discharge criteria met.    Final Diagnosis: Post-Op Diagnosis Codes:     * Iron deficiency anemia, unspecified iron deficiency anemia type [D50.9]      - Gastritis      - Gastric Ulcer scar  Disposition: Discharge patient when discharge criteria met.    Follow Up/Patient Instructions:       Medications:  Reconciled Home Medications:   Current Discharge Medication List      CONTINUE these medications which have NOT CHANGED    Details   aspirin (ECOTRIN) 81 MG EC tablet Take 81 mg by mouth once daily.      celecoxib (CELEBREX) 200 MG capsule       estrogen, conjugated,-medroxyprogesterone (PREMPRO) 0.3-1.5 mg per tablet Take 1 tablet by mouth Daily.      ezetimibe (ZETIA) 10 mg tablet Take 1 tablet (10 mg total) by mouth once daily.  Qty: 90 tablet, Refills: 1    Associated Diagnoses:  Hyperlipidemia, unspecified hyperlipidemia type      fluticasone propionate (FLONASE) 50 mcg/actuation nasal spray 2 sprays (100 mcg total) by Each Nostril route once daily.  Qty: 48 g, Refills: 1    Associated Diagnoses: Allergic rhinitis, unspecified seasonality, unspecified trigger      hydrocodone-acetaminophen 10-325mg (NORCO)  mg Tab Take 1 tablet by mouth every 12 (twelve) hours.  Refills: 0      isosorbide mononitrate (IMDUR) 30 MG 24 hr tablet Take 1 tab po in the AM and 1/2 tab po in the PM  Qty: 135 tablet, Refills: 1    Associated Diagnoses: Essential hypertension      metoprolol succinate (TOPROL-XL) 25 MG 24 hr tablet     Comments: .      MULTIVITAMIN (MULTIPLE VITAMIN ORAL) Take 1 tablet by mouth Daily.      naproxen (NAPROSYN) 500 MG tablet Take 1 tablet (500 mg total) by mouth 2 (two) times daily with meals.  Qty: 60 tablet, Refills: 2      nitrofurantoin, macrocrystal-monohydrate, (MACROBID) 100 MG capsule Take 1 capsule (100 mg total) by mouth 2 (two) times daily.  Qty: 14 capsule, Refills: 0    Associated Diagnoses: Urinary tract infection without hematuria, site unspecified      nitroGLYCERIN (NITROSTAT) 0.4 MG SL tablet DISSOLVE 1 TABLET UNDER THE TONGUE EVERY 5 MINUTES FOR 3 DOSES AS NEEDED FOR CHEST PAIN  Qty: 25 tablet, Refills: 0    Associated Diagnoses: Chest pain, unspecified type      pantoprazole (PROTONIX) 20 MG tablet Take 1 tablet (20 mg total) by mouth 2 (two) times daily before meals.  Qty: 180 tablet, Refills: 3      verapamiL (VERELAN) 360 MG C24P Take 1 capsule (360 mg total) by mouth once daily.  Qty: 90 capsule, Refills: 1    Associated Diagnoses: Essential hypertension      valsartan (DIOVAN) 40 MG tablet Take 1/2 tab po once daily  Qty: 45 tablet, Refills: 1    Associated Diagnoses: Essential hypertension            Discharge Procedure Orders   Diet general     Activity as tolerated

## 2020-09-08 NOTE — [employer] PATIENT INSTRUCTIONS
Discharge Summary/Instructions after an Endoscopic Procedure  Patient Name: Ro Hernandez  Patient MRN: [id number]  Patient YOB: 1955 Tuesday, September 8, 2020 Raúl Polanco III, MD  RESTRICTIONS:  During your procedure today, you received medications for sedation.  These   medications may affect your judgment, balance and coordination.  Therefore,   for 24 hours, you have the following restrictions:   - DO NOT drive a car, operate machinery, make legal/financial decisions,   sign important papers or drink alcohol.    ACTIVITY:  Today: no heavy lifting, straining or running due to procedural   sedation/anesthesia.  The following day: return to full activity including work.  DIET:  Eat and drink normally unless instructed otherwise.     TREATMENT FOR COMMON SIDE EFFECTS:  - Mild abdominal pain, nausea, belching, bloating or excessive gas:  rest,   eat lightly and use a heating pad.  - Sore Throat: treat with throat lozenges and/or gargle with warm salt   water.  - Because air was used during the procedure, expelling large amounts of air   from your rectum or belching is normal.  - If a bowel prep was taken, you may not have a bowel movement for 1-3 days.    This is normal.  SYMPTOMS TO WATCH FOR AND REPORT TO YOUR PHYSICIAN:  1. Abdominal pain or bloating, other than gas cramps.  2. Chest pain.  3. Back pain.  4. Signs of infection such as: chills or fever occurring within 24 hours   after the procedure.  5. Rectal bleeding, which would show as bright red, maroon, or black stools.   (A tablespoon of blood from the rectum is not serious, especially if   hemorrhoids are present.)  6. Vomiting.  7. Weakness or dizziness.  GO DIRECTLY TO THE NEAREST EMERGENCY ROOM IF YOU HAVE ANY OF THE FOLLOWING:      Difficulty breathing              Chills and/or fever over 101 F   Persistent vomiting and/or vomiting blood   Severe abdominal pain   Severe chest pain   Black, tarry stools   Bleeding- more than one  tablespoon   Any other symptom or condition that you feel may need urgent attention  Your doctor recommends these additional instructions:  If any biopsies were taken, your doctors clinic will contact you in 1 to 2   weeks with any results.  - Discharge patient to home (via wheelchair).   - Resume previous diet.   - Continue present medications.   - Await pathology results.   - To visualize the small bowel, perform video capsule endoscopy at   appointment to be scheduled.  For questions, problems or results please call your physician Raúl Polanco III, MD at Work:  [phone]  If you have any questions about the above instructions, call the GI   department at [phone] or call the endoscopy unit at [phone]   from 7am until 3 pm.  [facility] [place], EMERGENCY ROOM PHONE NUMBER:   [phone]  IF A COMPLICATION OR EMERGENCY SITUATION ARISES AND YOU ARE UNABLE TO REACH   YOUR PHYSICIAN - GO DIRECTLY TO THE EMERGENCY ROOM.  I have read or have had read to me these discharge instructions for my   procedure and have received a written copy.  I understand these   instructions and will follow-up with my physician if I have any questions.     __________________________________       _____________________________________  Nurse Signature                                          Patient/Designated   Responsible Party Signature  Raúl Polanco III, MD  9/8/2020 11:36:47 AM  This report has been verified and signed electronically.  [employer]

## 2020-09-08 NOTE — ANESTHESIA PREPROCEDURE EVALUATION
09/08/2020  Ro Hernandez is a 65 y.o., female.    Anesthesia Evaluation    I have reviewed the Patient Summary Reports.    I have reviewed the Nursing Notes.    I have reviewed the Medications.     Review of Systems  Anesthesia Hx:  No previous Anesthesia    Social:  Former Smoker    Hematology/Oncology:  Hematology Normal   Oncology Normal     EENT/Dental:EENT/Dental Normal   Cardiovascular:   Exercise tolerance: good Hypertension CAD   Angina NYHA Classification I    Pulmonary:   COPD Asthma Sleep Apnea    Renal/:   Chronic Renal Disease    Hepatic/GI:  Hepatic/GI Normal    Musculoskeletal:  Musculoskeletal Normal    Neurological:   Neuromuscular Disease,    Endocrine:  Endocrine Normal    Dermatological:  Skin Normal    Psych:  Psychiatric Normal           Physical Exam  General:  Well nourished, Obesity    Airway/Jaw/Neck:  Airway Findings: Mouth Opening: Normal Tongue: Normal  General Airway Assessment: Adult  Mallampati: II  Improves to I with phonation.  TM Distance: Normal, at least 6 cm        Eyes/Ears/Nose:  EYES/EARS/NOSE FINDINGS: Normal   Dental:  DENTAL FINDINGS: Normal   Chest/Lungs:  Chest/Lungs Findings: Clear to auscultation, Normal Respiratory Rate     Heart/Vascular:  Heart Findings: Rate: Normal  Rhythm: Regular Rhythm     Abdomen:  Abdomen Findings: Normal    Musculoskeletal:  Musculoskeletal Findings: Normal   Skin:  Skin Findings: Normal    Mental Status:  Mental Status Findings:  Cooperative, Alert and Oriented         Anesthesia Plan  Type of Anesthesia, risks & benefits discussed:  Anesthesia Type:  MAC  Patient's Preference:   Intra-op Monitoring Plan: standard ASA monitors  Intra-op Monitoring Plan Comments:   Post Op Pain Control Plan: multimodal analgesia  Post Op Pain Control Plan Comments:   Induction:   IV  Beta Blocker:  Patient is not currently on a Beta-Blocker (No  further documentation required).       Informed Consent: Patient understands risks and agrees with Anesthesia plan.  Questions answered. Anesthesia consent signed with patient.  ASA Score: 3     Day of Surgery Review of History & Physical:    H&P update referred to the surgeon.         Ready For Surgery From Anesthesia Perspective.

## 2020-09-08 NOTE — INTERVAL H&P NOTE
The patient has been examined and the H&P has been reviewed:I have reviewed this note and I agree with this assessment. The patient was seen in the GI office and remains stable for endoscopy at the time of this present evaluation. GH       risks, benefits and alternative options discussed and understood by patient/family.          Active Hospital Problems    Diagnosis  POA    Iron deficiency anemia due to chronic blood loss [D50.0]  Yes      Resolved Hospital Problems   No resolved problems to display.

## 2020-09-08 NOTE — DISCHARGE INSTRUCTIONS

## 2020-09-08 NOTE — H&P
Short Stay Endoscopy History and Physical    PCP - Marcela Chopra MD    Procedure - EGD  ASA - 3  Mallampati - per anesthesia  History of Anesthesia problems - no  Family history Anesthesia problems -  no     HPI:  This is a 65 y.o.female here for evaluation of : Iron deficiency Anemia    Reflux - yes  Dysphagia - no  Abdominal pain - no  Diarrhea - no  Anemia - yes  GI bleeding - no  Nausea and vomiting-no  Early satiety-no  aversion to sight or smell of food-no    ROS:  Constitutional: No fevers, chills, No weight loss  ENT: No allergies  CV: No chest pain  Pulm: No cough, No shortness of breath  Ophtho: No vision changes  GI: see HPI  Derm: No rash  Heme: No lymphadenopathy, No bruising  MSK: No arthritis  : No dysuria, No hematuria  Endo: No hot or cold intolerance  Neuro: No syncope, No seizure  Psych: No anxiety, No depression    Medical History:  Past Medical History:   Diagnosis Date    Allergy     Arthritis 6/11/2013    COPD (chronic obstructive pulmonary disease)     GERD (gastroesophageal reflux disease)     Hypertension     Obesity 6/11/2013    Other and unspecified hyperlipidemia 6/11/2013    Prinzmetal's angina     Sleep apnea 6/11/2013       Surgical History:  Past Surgical History:   Procedure Laterality Date    ABDOMINAL SURGERY      CHOLECYSTECTOMY      COLONOSCOPY N/A 12/6/2019    Procedure: COLONOSCOPY;  Surgeon: Winston Smith MD;  Location: [facility];  Service: Endoscopy;  Laterality: N/A;    ESOPHAGOGASTRODUODENOSCOPY N/A 12/10/2018    Procedure: EGD (ESOPHAGOGASTRODUODENOSCOPY);  Surgeon: Raúl Polanco III, MD;  Location: [facility];  Service: Endoscopy;  Laterality: N/A;    EYE SURGERY      FOOT SURGERY      TONSILLECTOMY      TUBAL LIGATION         Family History:  Family History   Problem Relation Age of Onset    Hypertension Mother     Cancer Mother     Kidney cancer Mother     COPD Father     Hypertension Father     Heart disease Sister      Hyperlipidemia Son     Colon cancer Neg Hx        Social History:  Social History     Socioeconomic History    Marital status:      Spouse name: Not on file    Number of children: Not on file    Years of education: Not on file    Highest education level: Not on file   Occupational History    Not on file   Social Needs    Financial resource strain: Not hard at all    Food insecurity     Worry: Never true     Inability: Never true    Transportation needs     Medical: No     Non-medical: No   Tobacco Use    Smoking status: Former Smoker     Packs/day: 0.50     Years: 2.00     Pack years: 1.00     Types: Cigarettes     Quit date: 2016     Years since quittin.2    Smokeless tobacco: Never Used   Substance and Sexual Activity    Alcohol use: No     Alcohol/week: 1.0 standard drinks     Types: 1 Standard drinks or equivalent per week     Frequency: Never    Drug use: No    Sexual activity: Never   Lifestyle    Physical activity     Days per week: 0 days     Minutes per session: Not on file    Stress: Only a little   Relationships    Social connections     Talks on phone: More than three times a week     Gets together: More than three times a week     Attends Gnosticist service: Not on file     Active member of club or organization: No     Attends meetings of clubs or organizations: Never     Relationship status:    Other Topics Concern    Not on file   Social History Narrative    Not on file       Allergies:   Review of patient's allergies indicates:   Allergen Reactions    Ace inhibitors      Other reaction(s): Hives    Lisinopril      Other reaction(s): rash    Pravastatin      Other reaction(s): Mental Changes    Rosuvastatin Other (See Comments)     Other reaction(s): Muscle pain    Simvastatin Other (See Comments)     Other reaction(s): leg cramps       Medications:   No current facility-administered medications on file prior to encounter.      Current Outpatient  Medications on File Prior to Encounter   Medication Sig Dispense Refill    aspirin (ECOTRIN) 81 MG EC tablet Take 81 mg by mouth once daily.      cefUROXime (CEFTIN) 250 MG tablet Take 1 tablet (250 mg total) by mouth 2 (two) times daily. 14 tablet 0    celecoxib (CELEBREX) 200 MG capsule       estrogen, conjugated,-medroxyprogesterone (PREMPRO) 0.3-1.5 mg per tablet Take 1 tablet by mouth Daily.      ezetimibe (ZETIA) 10 mg tablet Take 1 tablet (10 mg total) by mouth once daily. 90 tablet 1    fluticasone propionate (FLONASE) 50 mcg/actuation nasal spray 2 sprays (100 mcg total) by Each Nostril route once daily. 48 g 1    hydrocodone-acetaminophen 10-325mg (NORCO)  mg Tab Take 1 tablet by mouth every 12 (twelve) hours.  0    isosorbide mononitrate (IMDUR) 30 MG 24 hr tablet Take 1 tab po in the AM and 1/2 tab po in the  tablet 1    metoprolol succinate (TOPROL-XL) 25 MG 24 hr tablet       MULTIVITAMIN (MULTIPLE VITAMIN ORAL) Take 1 tablet by mouth Daily.      naproxen (NAPROSYN) 500 MG tablet Take 1 tablet (500 mg total) by mouth 2 (two) times daily with meals. (Patient not taking: Reported on 9/3/2020) 60 tablet 2    nitroGLYCERIN (NITROSTAT) 0.4 MG SL tablet DISSOLVE 1 TABLET UNDER THE TONGUE EVERY 5 MINUTES FOR 3 DOSES AS NEEDED FOR CHEST PAIN 25 tablet 0    pantoprazole (PROTONIX) 20 MG tablet Take 1 tablet (20 mg total) by mouth 2 (two) times daily before meals. 180 tablet 3    valsartan (DIOVAN) 40 MG tablet Take 1/2 tab po once daily 45 tablet 1    verapamiL (VERELAN) 360 MG C24P Take 1 capsule (360 mg total) by mouth once daily. 90 capsule 1       Objective Findings:    Vital Signs:There were no vitals filed for this visit.        Physical Exam:  General Appearance: Well appearing in no acute distress  Eyes:    No scleral icterus  ENT: Neck supple, Lips, mucosa, and tongue normal; teeth and gums normal  Lungs: CTA bilaterally in anterior and posterior fields, no wheezes, no  crackles.  Heart:  Regular rate, S1, S2 normal, no murmurs heard.  Abdomen: Soft, non tender, non distended with normal bowel sounds. No hepatosplenomegaly, ascites, or mass.  Extremities: No clubbing, cyanosis or edema  Skin: No rash    Labs:  Reviewed    Plan: EGD  I have explained the risks and benefits of endoscopy procedures to the patient including but not limited to bleeding, perforation, infection, and death. The patient wishes to proceed.

## 2020-09-08 NOTE — TELEPHONE ENCOUNTER
----- Message from Marcela Chopra MD sent at 9/8/2020  7:22 AM CDT -----  D/C Ceftin in favor of Macrobid. Urine culture is positive for 2 organisms. Please check to see whether she is having any UTI symptoms.

## 2020-09-08 NOTE — ANESTHESIA POSTPROCEDURE EVALUATION
Anesthesia Post Evaluation    Patient: Ro Hernandez    Procedure(s) Performed: Procedure(s) (LRB):  EGD (ESOPHAGOGASTRODUODENOSCOPY) (N/A)    Final Anesthesia Type: MAC    Patient location during evaluation: GI PACU  Patient participation: Yes- Able to Participate  Level of consciousness: awake and alert  Post-procedure vital signs: reviewed and stable  Pain management: adequate  Airway patency: patent    PONV status at discharge: No PONV  Anesthetic complications: no      Cardiovascular status: blood pressure returned to baseline  Respiratory status: unassisted  Hydration status: euvolemic  Follow-up not needed.          Vitals Value Taken Time   /72 09/08/20 1128   Temp 97 09/08/20 1128   Pulse 72 09/08/20 1128   Resp 12 09/08/20 1128   SpO2 95 09/08/20 1128         No case tracking events are documented in the log.      Pain/Trinh Score: No data recorded

## 2020-09-08 NOTE — TRANSFER OF CARE
"Anesthesia Transfer of Care Note    Patient: Ro Hernandez    Procedure(s) Performed: Procedure(s) (LRB):  EGD (ESOPHAGOGASTRODUODENOSCOPY) (N/A)    Patient location: GI    Anesthesia Type: MAC    Transport from OR: Transported from OR on room air with adequate spontaneous ventilation    Post pain: adequate analgesia    Post assessment: no apparent anesthetic complications    Post vital signs: stable    Level of consciousness: awake and alert    Nausea/Vomiting: no nausea/vomiting    Complications: none    Transfer of care protocol was followed      Last vitals:   Visit Vitals  BP (!) 154/74   Pulse 81   Temp 36.7 °C (98.1 °F)   Resp 16   Ht 5' 5" (1.651 m)   Wt 85.2 kg (187 lb 13.3 oz)   SpO2 95%   Breastfeeding No   BMI 31.26 kg/m²     "

## 2020-09-09 DIAGNOSIS — D50.9 IRON DEFICIENCY ANEMIA, UNSPECIFIED IRON DEFICIENCY ANEMIA TYPE: Primary | ICD-10-CM

## 2020-09-10 LAB
FINAL PATHOLOGIC DIAGNOSIS: NORMAL
GROSS: NORMAL

## 2020-09-14 ENCOUNTER — OFFICE VISIT (OUTPATIENT)
Dept: HEMATOLOGY/ONCOLOGY | Facility: CLINIC | Age: 65
End: 2020-09-14
Payer: COMMERCIAL

## 2020-09-14 VITALS
HEIGHT: 65 IN | OXYGEN SATURATION: 95 % | BODY MASS INDEX: 31.74 KG/M2 | TEMPERATURE: 99 F | HEART RATE: 88 BPM | WEIGHT: 190.5 LBS

## 2020-09-14 DIAGNOSIS — K25.4 CHRONIC GASTRIC ULCER WITH HEMORRHAGE: ICD-10-CM

## 2020-09-14 DIAGNOSIS — D50.9 IRON DEFICIENCY ANEMIA, UNSPECIFIED IRON DEFICIENCY ANEMIA TYPE: Primary | ICD-10-CM

## 2020-09-14 PROCEDURE — 99204 OFFICE O/P NEW MOD 45 MIN: CPT | Mod: S$GLB,,, | Performed by: INTERNAL MEDICINE

## 2020-09-14 PROCEDURE — 99999 PR PBB SHADOW E&M-EST. PATIENT-LVL IV: CPT | Mod: PBBFAC,,, | Performed by: INTERNAL MEDICINE

## 2020-09-14 PROCEDURE — 1159F PR MEDICATION LIST DOCUMENTED IN MEDICAL RECORD: ICD-10-PCS | Mod: S$GLB,,, | Performed by: INTERNAL MEDICINE

## 2020-09-14 PROCEDURE — 3008F PR BODY MASS INDEX (BMI) DOCUMENTED: ICD-10-PCS | Mod: CPTII,S$GLB,, | Performed by: INTERNAL MEDICINE

## 2020-09-14 PROCEDURE — 1101F PR PT FALLS ASSESS DOC 0-1 FALLS W/OUT INJ PAST YR: ICD-10-PCS | Mod: CPTII,S$GLB,, | Performed by: INTERNAL MEDICINE

## 2020-09-14 PROCEDURE — 1159F MED LIST DOCD IN RCRD: CPT | Mod: S$GLB,,, | Performed by: INTERNAL MEDICINE

## 2020-09-14 PROCEDURE — 1101F PT FALLS ASSESS-DOCD LE1/YR: CPT | Mod: CPTII,S$GLB,, | Performed by: INTERNAL MEDICINE

## 2020-09-14 PROCEDURE — 3008F BODY MASS INDEX DOCD: CPT | Mod: CPTII,S$GLB,, | Performed by: INTERNAL MEDICINE

## 2020-09-14 PROCEDURE — 99204 PR OFFICE/OUTPT VISIT, NEW, LEVL IV, 45-59 MIN: ICD-10-PCS | Mod: S$GLB,,, | Performed by: INTERNAL MEDICINE

## 2020-09-14 PROCEDURE — 99999 PR PBB SHADOW E&M-EST. PATIENT-LVL IV: ICD-10-PCS | Mod: PBBFAC,,, | Performed by: INTERNAL MEDICINE

## 2020-09-14 PROCEDURE — 1126F PR PAIN SEVERITY QUANTIFIED, NO PAIN PRESENT: ICD-10-PCS | Mod: S$GLB,,, | Performed by: INTERNAL MEDICINE

## 2020-09-14 PROCEDURE — 1126F AMNT PAIN NOTED NONE PRSNT: CPT | Mod: S$GLB,,, | Performed by: INTERNAL MEDICINE

## 2020-09-14 RX ORDER — METHYLPREDNISOLONE SOD SUCC 125 MG
125 VIAL (EA) INJECTION
Status: CANCELLED
Start: 2020-09-25

## 2020-09-14 RX ORDER — SODIUM CHLORIDE 0.9 % (FLUSH) 0.9 %
10 SYRINGE (ML) INJECTION
Status: CANCELLED | OUTPATIENT
Start: 2020-09-14

## 2020-09-14 RX ORDER — HEPARIN 100 UNIT/ML
500 SYRINGE INTRAVENOUS
Status: CANCELLED | OUTPATIENT
Start: 2020-09-25

## 2020-09-14 RX ORDER — METHYLPREDNISOLONE SOD SUCC 125 MG
125 VIAL (EA) INJECTION
Status: CANCELLED
Start: 2020-09-14

## 2020-09-14 RX ORDER — SODIUM CHLORIDE 0.9 % (FLUSH) 0.9 %
10 SYRINGE (ML) INJECTION
Status: CANCELLED | OUTPATIENT
Start: 2020-09-25

## 2020-09-14 RX ORDER — HEPARIN 100 UNIT/ML
500 SYRINGE INTRAVENOUS
Status: CANCELLED | OUTPATIENT
Start: 2020-09-14

## 2020-09-14 NOTE — LETTER
September 14, 2020      Clarence Barboza PA-C  [address]  [place] [address]            [facility]  [address] [address]  Phone: [phone]  Fax: [phone]          Patient: Ro Hernandez   MR Number: [id number]   YOB: 1955   Date of Visit: 9/14/2020       Dear Clarence Barboza:    Thank you for referring Ro Hernandez to me for evaluation. Attached you will find relevant portions of my assessment and plan of care.    If you have questions, please do not hesitate to call me. I look forward to following Ro Hernandez along with you.    Sincerely,    John Jennings MD    Enclosure  CC:  No Recipients    If you would like to receive this communication electronically, please contact [email or web address] or [phone] to request more information on [employer] Link access.    For providers and/or their staff who would like to refer a patient to [facility], please contact us through our one-stop-shop provider referral line, [facility] , at [phone].    If you feel you have received this communication in error or would no longer like to receive these types of communications, please e-mail [email or web address]

## 2020-09-14 NOTE — PROGRESS NOTES
Subjective:       Patient ID: Ro Hernandez is a 65 y.o. female.    Chief Complaint: Results and Anemia    HPI 65 year female history of increasing fatigue shortness of breath patient was found have marked anemia also has Pica for ice.  Patient recently underwent upper endoscopy which reveals a scar from a gastric ulcer.  Patient is scheduled for video capsule endoscopy later this week being evaluated by Dr. Quiles at [facility]    Past Medical History:   Diagnosis Date    Allergy     Angina     Arthritis 2013    Asthma     COPD (chronic obstructive pulmonary disease)     GERD (gastroesophageal reflux disease)     Hx of Prinzmetal angina     Hypertension     Obesity 2013    Other and unspecified hyperlipidemia 2013    Prinzmetal's angina     Sleep apnea 2013     Family History   Problem Relation Age of Onset    Hypertension Mother     Cancer Mother     Kidney cancer Mother     COPD Father     Hypertension Father     Heart disease Sister     Hyperlipidemia Son     Colon cancer Neg Hx      Social History     Socioeconomic History    Marital status:      Spouse name: Not on file    Number of children: Not on file    Years of education: Not on file    Highest education level: Not on file   Occupational History    Not on file   Social Needs    Financial resource strain: Not hard at all    Food insecurity     Worry: Never true     Inability: Never true    Transportation needs     Medical: No     Non-medical: No   Tobacco Use    Smoking status: Former Smoker     Packs/day: 0.50     Years: 2.00     Pack years: 1.00     Types: Cigarettes     Quit date: 2016     Years since quittin.2    Smokeless tobacco: Never Used   Substance and Sexual Activity    Alcohol use: No     Alcohol/week: 1.0 standard drinks     Types: 1 Standard drinks or equivalent per week     Frequency: Never    Drug use: No    Sexual activity: Never   Lifestyle    Physical  activity     Days per week: 0 days     Minutes per session: Not on file    Stress: Only a little   Relationships    Social connections     Talks on phone: More than three times a week     Gets together: More than three times a week     Attends Spiritism service: Not on file     Active member of club or organization: No     Attends meetings of clubs or organizations: Never     Relationship status:    Other Topics Concern    Not on file   Social History Narrative    Not on file     Past Surgical History:   Procedure Laterality Date    ABDOMINAL SURGERY      CHOLECYSTECTOMY      COLONOSCOPY N/A 12/6/2019    Procedure: COLONOSCOPY;  Surgeon: Winston Smith MD;  Location: [facility];  Service: Endoscopy;  Laterality: N/A;    ESOPHAGOGASTRODUODENOSCOPY N/A 12/10/2018    Procedure: EGD (ESOPHAGOGASTRODUODENOSCOPY);  Surgeon: Raúl Polanco III, MD;  Location: [facility];  Service: Endoscopy;  Laterality: N/A;    ESOPHAGOGASTRODUODENOSCOPY N/A 9/8/2020    Procedure: EGD (ESOPHAGOGASTRODUODENOSCOPY);  Surgeon: Raúl Polanco III, MD;  Location: [facility];  Service: Endoscopy;  Laterality: N/A;    EYE SURGERY      FOOT SURGERY      TONSILLECTOMY      TUBAL LIGATION         Labs:  Lab Results   Component Value Date    WBC 4.87 09/01/2020    HGB 9.0 (L) 09/01/2020    HCT 31.6 (L) 09/01/2020    MCV 80 (L) 09/01/2020     09/01/2020     BMP  Lab Results   Component Value Date     02/13/2020    K 4.4 02/13/2020     02/13/2020    CO2 28 02/13/2020    BUN 20 02/13/2020    CREATININE 1.0 02/13/2020    CALCIUM 8.9 02/13/2020    ANIONGAP 5 (L) 02/13/2020    ESTGFRAFRICA >60.0 02/13/2020    EGFRNONAA 58.8 (A) 02/13/2020     Lab Results   Component Value Date    ALT 9 (L) 02/13/2020    AST 15 02/13/2020    ALKPHOS 49 (L) 02/13/2020    BILITOT 0.3 02/13/2020       Lab Results   Component Value Date    IRON 17 (L) 09/01/2020    TIBC 561 (H) 09/01/2020    FERRITIN 9 (L) 09/01/2020     Lab Results    Component Value Date    UGYQHEIN44 528 09/01/2020     Lab Results   Component Value Date    FOLATE 35.5 (H) 09/01/2020     Lab Results   Component Value Date    TSH 1.056 09/27/2019         Review of Systems   Constitutional: Positive for activity change, appetite change and fatigue. Negative for chills, diaphoresis, fever and unexpected weight change.        Pica for ice   HENT: Negative for congestion, dental problem, drooling, ear discharge, ear pain, facial swelling, hearing loss, mouth sores, nosebleeds, postnasal drip, rhinorrhea, sinus pressure, sneezing, sore throat, tinnitus, trouble swallowing and voice change.    Eyes: Negative for photophobia, pain, discharge, redness, itching and visual disturbance.   Respiratory: Positive for shortness of breath. Negative for cough, choking, chest tightness, wheezing and stridor.    Cardiovascular: Negative for chest pain, palpitations and leg swelling.   Gastrointestinal: Negative for abdominal distention, abdominal pain, anal bleeding, blood in stool, constipation, diarrhea, nausea, rectal pain and vomiting.   Endocrine: Negative for cold intolerance, heat intolerance, polydipsia, polyphagia and polyuria.   Genitourinary: Negative for decreased urine volume, difficulty urinating, dyspareunia, dysuria, enuresis, flank pain, frequency, genital sores, hematuria, menstrual problem, pelvic pain, urgency, vaginal bleeding, vaginal discharge and vaginal pain.   Musculoskeletal: Negative for arthralgias, back pain, gait problem, joint swelling, myalgias, neck pain and neck stiffness.   Skin: Negative for color change, pallor and rash.   Allergic/Immunologic: Negative for environmental allergies, food allergies and immunocompromised state.   Neurological: Positive for weakness. Negative for dizziness, tremors, seizures, syncope, facial asymmetry, speech difficulty, light-headedness, numbness and headaches.   Hematological: Negative for adenopathy. Does not bruise/bleed  easily.   Psychiatric/Behavioral: Negative for agitation, behavioral problems, confusion, decreased concentration, dysphoric mood, hallucinations, self-injury, sleep disturbance and suicidal ideas. The patient is nervous/anxious. The patient is not hyperactive.        Objective:      Physical Exam  Vitals signs reviewed.   Constitutional:       General: She is not in acute distress.     Appearance: She is well-developed. She is not diaphoretic.   HENT:      Head: Normocephalic and atraumatic.      Right Ear: External ear normal.      Left Ear: External ear normal.      Nose: Nose normal.      Right Sinus: No maxillary sinus tenderness or frontal sinus tenderness.      Left Sinus: No maxillary sinus tenderness or frontal sinus tenderness.      Mouth/Throat:      Pharynx: No oropharyngeal exudate.   Eyes:      General: Lids are normal. No scleral icterus.        Right eye: No discharge.         Left eye: No discharge.      Conjunctiva/sclera: Conjunctivae normal.      Right eye: Right conjunctiva is not injected. No hemorrhage.     Left eye: Left conjunctiva is not injected. No hemorrhage.     Pupils: Pupils are equal, round, and reactive to light.   Neck:      Musculoskeletal: Normal range of motion and neck supple.      Thyroid: No thyromegaly.      Vascular: No JVD.      Trachea: No tracheal deviation.   Cardiovascular:      Rate and Rhythm: Normal rate.   Pulmonary:      Effort: Pulmonary effort is normal. No respiratory distress.      Breath sounds: No stridor.   Chest:      Chest wall: No tenderness.   Abdominal:      General: Bowel sounds are normal. There is no distension.      Palpations: Abdomen is soft. There is no hepatomegaly, splenomegaly or mass.      Tenderness: There is no abdominal tenderness. There is no rebound.   Musculoskeletal: Normal range of motion.         General: No tenderness.   Lymphadenopathy:      Cervical: No cervical adenopathy.      Upper Body:      Right upper body: No  supraclavicular adenopathy.      Left upper body: No supraclavicular adenopathy.   Skin:     General: Skin is dry.      Findings: No erythema or rash.   Neurological:      Mental Status: She is alert and oriented to person, place, and time.      Cranial Nerves: No cranial nerve deficit.      Coordination: Coordination normal.   Psychiatric:         Behavior: Behavior normal.         Thought Content: Thought content normal.         Judgment: Judgment normal.             Assessment:      1. Iron deficiency anemia, unspecified iron deficiency anemia type    2. Chronic gastric ulcer with hemorrhage           Plan:     Documented iron deficiency anemia new onset scheduled for video capsule later this week and evaluation by Cardiology intolerant of oral iron severe constipation will proceed with intravenous iron orders written reviewed return 3 months with CBC iron status prior discussed implications with her        John Jennings Jr, MD FACP

## 2020-09-15 ENCOUNTER — LAB VISIT (OUTPATIENT)
Dept: LAB | Facility: HOSPITAL | Age: 65
End: 2020-09-15
Attending: FAMILY MEDICINE
Payer: COMMERCIAL

## 2020-09-15 DIAGNOSIS — J45.20 MILD INTERMITTENT ASTHMA WITHOUT COMPLICATION: ICD-10-CM

## 2020-09-15 DIAGNOSIS — I10 ESSENTIAL HYPERTENSION: ICD-10-CM

## 2020-09-15 DIAGNOSIS — J30.9 ALLERGIC RHINITIS, UNSPECIFIED SEASONALITY, UNSPECIFIED TRIGGER: ICD-10-CM

## 2020-09-15 DIAGNOSIS — E78.5 HYPERLIPIDEMIA, UNSPECIFIED HYPERLIPIDEMIA TYPE: ICD-10-CM

## 2020-09-15 DIAGNOSIS — D50.9 IRON DEFICIENCY ANEMIA, UNSPECIFIED IRON DEFICIENCY ANEMIA TYPE: ICD-10-CM

## 2020-09-15 LAB
ALBUMIN SERPL BCP-MCNC: 3.6 G/DL (ref 3.5–5.2)
ALP SERPL-CCNC: 56 U/L (ref 55–135)
ALT SERPL W/O P-5'-P-CCNC: 11 U/L (ref 10–44)
ANION GAP SERPL CALC-SCNC: 10 MMOL/L (ref 8–16)
AST SERPL-CCNC: 16 U/L (ref 10–40)
BASOPHILS # BLD AUTO: 0.03 K/UL (ref 0–0.2)
BASOPHILS NFR BLD: 0.5 % (ref 0–1.9)
BILIRUB SERPL-MCNC: 0.3 MG/DL (ref 0.1–1)
BUN SERPL-MCNC: 18 MG/DL (ref 8–23)
CALCIUM SERPL-MCNC: 9.1 MG/DL (ref 8.7–10.5)
CHLORIDE SERPL-SCNC: 108 MMOL/L (ref 95–110)
CHOLEST SERPL-MCNC: 195 MG/DL (ref 120–199)
CHOLEST/HDLC SERPL: 3.1 {RATIO} (ref 2–5)
CO2 SERPL-SCNC: 22 MMOL/L (ref 23–29)
CREAT SERPL-MCNC: 0.8 MG/DL (ref 0.5–1.4)
DIFFERENTIAL METHOD: ABNORMAL
EOSINOPHIL # BLD AUTO: 0.2 K/UL (ref 0–0.5)
EOSINOPHIL NFR BLD: 3.8 % (ref 0–8)
ERYTHROCYTE [DISTWIDTH] IN BLOOD BY AUTOMATED COUNT: 14.5 % (ref 11.5–14.5)
EST. GFR  (AFRICAN AMERICAN): >60 ML/MIN/1.73 M^2
EST. GFR  (NON AFRICAN AMERICAN): >60 ML/MIN/1.73 M^2
FERRITIN SERPL-MCNC: 8 NG/ML (ref 20–300)
GLUCOSE SERPL-MCNC: 82 MG/DL (ref 70–110)
HCT VFR BLD AUTO: 32.1 % (ref 37–48.5)
HDLC SERPL-MCNC: 62 MG/DL (ref 40–75)
HDLC SERPL: 31.8 % (ref 20–50)
HGB BLD-MCNC: 8.8 G/DL (ref 12–16)
IMM GRANULOCYTES # BLD AUTO: 0.03 K/UL (ref 0–0.04)
IMM GRANULOCYTES NFR BLD AUTO: 0.5 % (ref 0–0.5)
IRON SERPL-MCNC: 19 UG/DL (ref 30–160)
LDLC SERPL CALC-MCNC: 113.6 MG/DL (ref 63–159)
LYMPHOCYTES # BLD AUTO: 1.2 K/UL (ref 1–4.8)
LYMPHOCYTES NFR BLD: 20.8 % (ref 18–48)
MCH RBC QN AUTO: 21.9 PG (ref 27–31)
MCHC RBC AUTO-ENTMCNC: 27.4 G/DL (ref 32–36)
MCV RBC AUTO: 80 FL (ref 82–98)
MONOCYTES # BLD AUTO: 0.4 K/UL (ref 0.3–1)
MONOCYTES NFR BLD: 7.6 % (ref 4–15)
NEUTROPHILS # BLD AUTO: 3.7 K/UL (ref 1.8–7.7)
NEUTROPHILS NFR BLD: 66.8 % (ref 38–73)
NONHDLC SERPL-MCNC: 133 MG/DL
NRBC BLD-RTO: 0 /100 WBC
PLATELET # BLD AUTO: 226 K/UL (ref 150–350)
PMV BLD AUTO: 10.5 FL (ref 9.2–12.9)
POTASSIUM SERPL-SCNC: 4.5 MMOL/L (ref 3.5–5.1)
PROT SERPL-MCNC: 7 G/DL (ref 6–8.4)
RBC # BLD AUTO: 4.02 M/UL (ref 4–5.4)
SATURATED IRON: 4 % (ref 20–50)
SODIUM SERPL-SCNC: 140 MMOL/L (ref 136–145)
TOTAL IRON BINDING CAPACITY: 524 UG/DL (ref 250–450)
TRANSFERRIN SERPL-MCNC: 354 MG/DL (ref 200–375)
TRIGL SERPL-MCNC: 97 MG/DL (ref 30–150)
TSH SERPL DL<=0.005 MIU/L-ACNC: 1.3 UIU/ML (ref 0.4–4)
WBC # BLD AUTO: 5.53 K/UL (ref 3.9–12.7)

## 2020-09-15 PROCEDURE — 84443 ASSAY THYROID STIM HORMONE: CPT

## 2020-09-15 PROCEDURE — 80053 COMPREHEN METABOLIC PANEL: CPT

## 2020-09-15 PROCEDURE — 36415 COLL VENOUS BLD VENIPUNCTURE: CPT | Mod: PO

## 2020-09-15 PROCEDURE — 83540 ASSAY OF IRON: CPT

## 2020-09-15 PROCEDURE — 82728 ASSAY OF FERRITIN: CPT

## 2020-09-15 PROCEDURE — 80061 LIPID PANEL: CPT

## 2020-09-15 PROCEDURE — 85025 COMPLETE CBC W/AUTO DIFF WBC: CPT

## 2020-09-16 NOTE — PRE-PROCEDURE INSTRUCTIONS
PAT call completed, medications reviewed, NPO status reviewed. Procedure day instructions reviewed. Patient verbalized understanding.

## 2020-09-18 ENCOUNTER — HOSPITAL ENCOUNTER (OUTPATIENT)
Facility: HOSPITAL | Age: 65
Discharge: HOME | End: 2020-09-18
Attending: INTERNAL MEDICINE | Admitting: INTERNAL MEDICINE
Payer: COMMERCIAL

## 2020-09-18 PROCEDURE — 91110 PR GI TRACT CAPSULE ENDOSCOPY: ICD-10-PCS | Mod: 26,,, | Performed by: INTERNAL MEDICINE

## 2020-09-18 PROCEDURE — 91110 GI TRC IMG INTRAL ESOPH-ILE: CPT | Mod: 26,,, | Performed by: INTERNAL MEDICINE

## 2020-09-18 PROCEDURE — 91110 GI TRC IMG INTRAL ESOPH-ILE: CPT

## 2020-09-18 NOTE — PLAN OF CARE
Patient arrived for Video Capsule Endoscopy. Two patient identifier verified. Patient verbalized adequate prep. Reviewed the procedure w/ the patient, provided instructions, and answered all questions. Patient verbalized understanding. Dietary restrictions explained. PiilCam swallowed at  0740.  Capsule visualized in patient's stomach. Lot# [id number], Capsule ID# [id number], Exp: 7/20/2021. Pt instructed to return at 1540. No distress noted. Discharged from facility w/o difficulties.

## 2020-09-18 NOTE — DISCHARGE INSTRUCTIONS

## 2020-09-23 ENCOUNTER — OFFICE VISIT (OUTPATIENT)
Dept: FAMILY MEDICINE | Facility: CLINIC | Age: 65
End: 2020-09-23
Payer: COMMERCIAL

## 2020-09-23 ENCOUNTER — PATIENT MESSAGE (OUTPATIENT)
Dept: GASTROENTEROLOGY | Facility: CLINIC | Age: 65
End: 2020-09-23

## 2020-09-23 VITALS
TEMPERATURE: 98 F | WEIGHT: 189.94 LBS | OXYGEN SATURATION: 100 % | DIASTOLIC BLOOD PRESSURE: 60 MMHG | SYSTOLIC BLOOD PRESSURE: 136 MMHG | BODY MASS INDEX: 31.65 KG/M2 | HEART RATE: 95 BPM | RESPIRATION RATE: 16 BRPM | HEIGHT: 65 IN

## 2020-09-23 DIAGNOSIS — E78.2 MIXED HYPERLIPIDEMIA: ICD-10-CM

## 2020-09-23 DIAGNOSIS — J30.9 ALLERGIC RHINITIS, UNSPECIFIED SEASONALITY, UNSPECIFIED TRIGGER: ICD-10-CM

## 2020-09-23 DIAGNOSIS — N39.0 URINARY TRACT INFECTION WITHOUT HEMATURIA, SITE UNSPECIFIED: ICD-10-CM

## 2020-09-23 DIAGNOSIS — I20.1 PRINZMETAL'S ANGINA: ICD-10-CM

## 2020-09-23 DIAGNOSIS — I10 ESSENTIAL HYPERTENSION: Primary | ICD-10-CM

## 2020-09-23 DIAGNOSIS — Z23 NEED FOR 23-POLYVALENT PNEUMOCOCCAL POLYSACCHARIDE VACCINE: ICD-10-CM

## 2020-09-23 DIAGNOSIS — K21.9 GASTROESOPHAGEAL REFLUX DISEASE, ESOPHAGITIS PRESENCE NOT SPECIFIED: ICD-10-CM

## 2020-09-23 DIAGNOSIS — G47.33 OSA ON CPAP: ICD-10-CM

## 2020-09-23 PROCEDURE — 87086 URINE CULTURE/COLONY COUNT: CPT

## 2020-09-23 PROCEDURE — 1126F AMNT PAIN NOTED NONE PRSNT: CPT | Mod: S$GLB,,, | Performed by: FAMILY MEDICINE

## 2020-09-23 PROCEDURE — 3008F PR BODY MASS INDEX (BMI) DOCUMENTED: ICD-10-PCS | Mod: CPTII,S$GLB,, | Performed by: FAMILY MEDICINE

## 2020-09-23 PROCEDURE — 99999 PR PBB SHADOW E&M-EST. PATIENT-LVL IV: ICD-10-PCS | Mod: PBBFAC,,, | Performed by: FAMILY MEDICINE

## 2020-09-23 PROCEDURE — 87186 SC STD MICRODIL/AGAR DIL: CPT

## 2020-09-23 PROCEDURE — 1101F PR PT FALLS ASSESS DOC 0-1 FALLS W/OUT INJ PAST YR: ICD-10-PCS | Mod: CPTII,S$GLB,, | Performed by: FAMILY MEDICINE

## 2020-09-23 PROCEDURE — 99214 PR OFFICE/OUTPT VISIT, EST, LEVL IV, 30-39 MIN: ICD-10-PCS | Mod: S$GLB,,, | Performed by: FAMILY MEDICINE

## 2020-09-23 PROCEDURE — 3078F PR MOST RECENT DIASTOLIC BLOOD PRESSURE < 80 MM HG: ICD-10-PCS | Mod: CPTII,S$GLB,, | Performed by: FAMILY MEDICINE

## 2020-09-23 PROCEDURE — 3075F SYST BP GE 130 - 139MM HG: CPT | Mod: CPTII,S$GLB,, | Performed by: FAMILY MEDICINE

## 2020-09-23 PROCEDURE — 1101F PT FALLS ASSESS-DOCD LE1/YR: CPT | Mod: CPTII,S$GLB,, | Performed by: FAMILY MEDICINE

## 2020-09-23 PROCEDURE — 99214 OFFICE O/P EST MOD 30 MIN: CPT | Mod: S$GLB,,, | Performed by: FAMILY MEDICINE

## 2020-09-23 PROCEDURE — 99999 PR PBB SHADOW E&M-EST. PATIENT-LVL IV: CPT | Mod: PBBFAC,,, | Performed by: FAMILY MEDICINE

## 2020-09-23 PROCEDURE — 3078F DIAST BP <80 MM HG: CPT | Mod: CPTII,S$GLB,, | Performed by: FAMILY MEDICINE

## 2020-09-23 PROCEDURE — 1159F PR MEDICATION LIST DOCUMENTED IN MEDICAL RECORD: ICD-10-PCS | Mod: S$GLB,,, | Performed by: FAMILY MEDICINE

## 2020-09-23 PROCEDURE — 3075F PR MOST RECENT SYSTOLIC BLOOD PRESS GE 130-139MM HG: ICD-10-PCS | Mod: CPTII,S$GLB,, | Performed by: FAMILY MEDICINE

## 2020-09-23 PROCEDURE — 1159F MED LIST DOCD IN RCRD: CPT | Mod: S$GLB,,, | Performed by: FAMILY MEDICINE

## 2020-09-23 PROCEDURE — 87088 URINE BACTERIA CULTURE: CPT

## 2020-09-23 PROCEDURE — 87077 CULTURE AEROBIC IDENTIFY: CPT

## 2020-09-23 PROCEDURE — 1126F PR PAIN SEVERITY QUANTIFIED, NO PAIN PRESENT: ICD-10-PCS | Mod: S$GLB,,, | Performed by: FAMILY MEDICINE

## 2020-09-23 PROCEDURE — 3008F BODY MASS INDEX DOCD: CPT | Mod: CPTII,S$GLB,, | Performed by: FAMILY MEDICINE

## 2020-09-23 RX ORDER — ISOSORBIDE MONONITRATE 30 MG/1
TABLET, EXTENDED RELEASE ORAL
Qty: 135 TABLET | Refills: 1 | Status: CANCELLED
Start: 2020-09-23

## 2020-09-23 RX ORDER — VERAPAMIL HYDROCHLORIDE 360 MG/1
360 CAPSULE, DELAYED RELEASE PELLETS ORAL DAILY
Qty: 90 CAPSULE | Refills: 1 | Status: CANCELLED
Start: 2020-09-23 | End: 2021-09-23

## 2020-09-23 RX ORDER — FLUTICASONE PROPIONATE 50 MCG
2 SPRAY, SUSPENSION (ML) NASAL DAILY
Qty: 48 G | Refills: 1 | Status: SHIPPED | OUTPATIENT
Start: 2020-09-23 | End: 2021-06-28 | Stop reason: SDUPTHER

## 2020-09-23 RX ORDER — EZETIMIBE 10 MG/1
10 TABLET ORAL DAILY
Qty: 90 TABLET | Refills: 1 | Status: SHIPPED | OUTPATIENT
Start: 2020-09-23 | End: 2021-03-29 | Stop reason: SDUPTHER

## 2020-09-23 NOTE — PROGRESS NOTES
Subjective:       Patient ID: Ro Hernandez is a 65 y.o. female.    Chief Complaint: Shortness of Breath    Shortness of Breath  This is a new problem. The current episode started more than 1 month ago. The problem occurs every few minutes. The problem has been waxing and waning. The average episode lasts 10 minutes. Associated symptoms include chest pain. Pertinent negatives include no abdominal pain, claudication, coryza, ear pain, fever, headaches, hemoptysis, leg pain, leg swelling, neck pain, orthopnea, PND, rash, rhinorrhea, sore throat, sputum production, swollen glands, syncope, vomiting or wheezing. The symptoms are aggravated by emotional upset, exercise and any activity. The patient has no known risk factors for DVT/PE. Her past medical history is significant for allergies, asthma, COPD and pneumonia. There is no history of aspirin allergies, bronchiolitis, CAD, chronic lung disease, DVT, a heart failure, PE or a recent surgery.   Since last visit patient has been evaluated by GI and Hematology. She has just completed capsule endoscopy and is awaiting results. Tomorrow she will receive the first of two scheduled iron infusions. She notes ongoing dyspnea and fatigue. At times she stumbles when ambulating but denies any falls. She denies any rectal bleeding or melena and has not experienced any epistaxis or hematuria. Updated labs demonstrate improvement in overall lipid control with Zetia use. Current lipid levels are measured as follows: TC-195, TG-97, HDL-62 and LDL-113.6. BP is well controlled. She does have a pending follow-up visit with Cardiology in October 2020. Allergy symptoms vary. She denies any current symptoms of concern for infection. Urine culture obtained at last visit was consistent with UTI. She denies any urinary symptoms. She will see her GYN on 10/16 for updated WWE and MMG.     Review of Systems   Constitutional: Positive for activity change and fatigue. Negative for appetite change  "and fever.   HENT: Negative for congestion, ear pain, rhinorrhea and sore throat.    Eyes: Negative for visual disturbance.   Respiratory: Positive for shortness of breath. Negative for cough, hemoptysis, sputum production and wheezing.    Cardiovascular: Positive for chest pain. Negative for orthopnea, claudication, leg swelling, syncope and PND.   Gastrointestinal: Negative for abdominal pain, blood in stool, constipation, diarrhea, nausea and vomiting.   Genitourinary: Negative for decreased urine volume, difficulty urinating, dysuria, flank pain and frequency.   Musculoskeletal: Positive for arthralgias. Negative for neck pain.   Skin: Negative for rash.   Allergic/Immunologic: Positive for environmental allergies.   Neurological: Positive for weakness. Negative for dizziness, light-headedness and headaches.   Psychiatric/Behavioral: Negative for dysphoric mood and sleep disturbance.       Objective:   /60   Pulse 95   Temp 98.2 °F (36.8 °C) (Temporal)   Resp 16   Ht 5' 5" (1.651 m)   Wt 86.1 kg (189 lb 14.8 oz)   SpO2 100%   BMI 31.61 kg/m²   Physical Exam  Constitutional:       Appearance: She is well-developed. She is obese. She is not ill-appearing or toxic-appearing.   HENT:      Head: Normocephalic and atraumatic.      Right Ear: Tympanic membrane, ear canal and external ear normal.      Left Ear: Tympanic membrane, ear canal and external ear normal.      Nose: Nose normal.      Mouth/Throat:      Mouth: Mucous membranes are moist.   Eyes:      Extraocular Movements: Extraocular movements intact.      Conjunctiva/sclera: Conjunctivae normal.      Pupils: Pupils are equal, round, and reactive to light.   Neck:      Musculoskeletal: Normal range of motion.   Cardiovascular:      Rate and Rhythm: Normal rate and regular rhythm.      Heart sounds: Murmur present.   Pulmonary:      Effort: Pulmonary effort is normal.      Breath sounds: Normal breath sounds.   Abdominal:      General: Bowel sounds " are normal.      Palpations: Abdomen is soft.   Musculoskeletal:         General: No swelling.   Skin:     General: Skin is warm and dry.      Comments: Scattered bruising to the bilateral upper extremities   Neurological:      Mental Status: She is alert and oriented to person, place, and time.   Psychiatric:         Mood and Affect: Mood normal.         Behavior: Behavior normal.         Assessment:       1. Essential hypertension    2. Mixed hyperlipidemia    3. Allergic rhinitis, unspecified seasonality, unspecified trigger    4. Gastroesophageal reflux disease, esophagitis presence not specified    5. Prinzmetal's angina    6. HERB on CPAP    7. Urinary tract infection without hematuria, site unspecified    8. BMI 31.0-31.9,adult    9. Need for 23-polyvalent pneumococcal polysaccharide vaccine        Plan:      Essential hypertension  Stable with current measures. Heart healthy diet remains advised. Follow-up with Cardiology as scheduled.    Mixed hyperlipidemia  Stable and improved with treatment. Continuation of Zetia in combination with heart healthy diet is advised.  -     ezetimibe (ZETIA) 10 mg tablet; Take 1 tablet (10 mg total) by mouth once daily.  Dispense: 90 tablet; Refill: 1    Allergic rhinitis, unspecified seasonality, unspecified trigger  -     fluticasone propionate (FLONASE) 50 mcg/actuation nasal spray; 2 sprays (100 mcg total) by Each Nostril route once daily.  Dispense: 48 g; Refill: 1    Gastroesophageal reflux disease, esophagitis presence not specified  As per GI.    Prinzmetal's angina  As per Cardiology.    HERB on CPAP  Compliance with CPAP use is advised.    Urinary tract infection without hematuria, site unspecified  -     Urine culture; Future; Expected date: 09/23/2020    BMI 31.0-31.9,adult  Weight loss efforts remain encouraged through diet and lifestyle measures.    Need for 23-polyvalent pneumococcal polysaccharide vaccine  -     (In Office Administered) Pneumococcal  Polysaccharide Vaccine (23 Valent) (SQ/IM)    Patient will return in October for pneumococcal and flu vaccines.  Records request obtained for pending GYN eval.

## 2020-09-24 ENCOUNTER — TELEPHONE (OUTPATIENT)
Dept: GASTROENTEROLOGY | Facility: CLINIC | Age: 65
End: 2020-09-24

## 2020-09-24 ENCOUNTER — INFUSION (OUTPATIENT)
Dept: INFUSION THERAPY | Facility: HOSPITAL | Age: 65
End: 2020-09-24
Attending: INTERNAL MEDICINE
Payer: COMMERCIAL

## 2020-09-24 VITALS
DIASTOLIC BLOOD PRESSURE: 58 MMHG | SYSTOLIC BLOOD PRESSURE: 122 MMHG | RESPIRATION RATE: 16 BRPM | BODY MASS INDEX: 31.53 KG/M2 | HEIGHT: 65 IN | HEART RATE: 82 BPM | WEIGHT: 189.25 LBS | OXYGEN SATURATION: 96 % | TEMPERATURE: 98 F

## 2020-09-24 DIAGNOSIS — D50.0 IRON DEFICIENCY ANEMIA DUE TO CHRONIC BLOOD LOSS: Primary | ICD-10-CM

## 2020-09-24 DIAGNOSIS — T18.9XXA FOREIGN BODY IN DIGESTIVE TRACT, INITIAL ENCOUNTER: Primary | ICD-10-CM

## 2020-09-24 PROCEDURE — 25000003 PHARM REV CODE 250: Performed by: INTERNAL MEDICINE

## 2020-09-24 PROCEDURE — 96365 THER/PROPH/DIAG IV INF INIT: CPT

## 2020-09-24 PROCEDURE — A4216 STERILE WATER/SALINE, 10 ML: HCPCS | Performed by: INTERNAL MEDICINE

## 2020-09-24 PROCEDURE — 96375 TX/PRO/DX INJ NEW DRUG ADDON: CPT

## 2020-09-24 PROCEDURE — 63600175 PHARM REV CODE 636 W HCPCS: Mod: JG | Performed by: INTERNAL MEDICINE

## 2020-09-24 RX ORDER — METHYLPREDNISOLONE SOD SUCC 125 MG
125 VIAL (EA) INJECTION
Status: COMPLETED | OUTPATIENT
Start: 2020-09-24 | End: 2020-09-24

## 2020-09-24 RX ORDER — SODIUM CHLORIDE 0.9 % (FLUSH) 0.9 %
10 SYRINGE (ML) INJECTION
Status: DISCONTINUED | OUTPATIENT
Start: 2020-09-24 | End: 2020-09-24 | Stop reason: HOSPADM

## 2020-09-24 RX ADMIN — METHYLPREDNISOLONE SODIUM SUCCINATE 125 MG: 125 INJECTION, POWDER, FOR SOLUTION INTRAMUSCULAR; INTRAVENOUS at 08:09

## 2020-09-24 RX ADMIN — Medication 10 ML: at 08:09

## 2020-09-24 RX ADMIN — FERRIC CARBOXYMALTOSE INJECTION 750 MG: 50 INJECTION, SOLUTION INTRAVENOUS at 09:09

## 2020-09-24 RX ADMIN — SODIUM CHLORIDE: 9 INJECTION, SOLUTION INTRAVENOUS at 08:09

## 2020-09-24 NOTE — DISCHARGE INSTRUCTIONS
[facility]  [address]  [address]  [phone] phone     [phone] fax  Hours of Operation: Monday- Friday 8:00am- 5:00pm  After hours phone  [phone]  Hematology / Oncology Physicians on call      Dr. Dick Nunez, MEJIA Torre, MEJIA Welsh NP    Please call with any concerns regarding your appointment today.  WAYS TO HELP PREVENT INFECTION         WASH YOUR HANDS OFTEN DURING THE DAY, ESPECIALLY BEFORE YOU EAT, AFTER USING THE BATHROOM, AND AFTER TOUCHING ANIMALS     STAY AWAY FROM PEOPLE WHO HAVE ILLNESSES YOU CAN CATCH; SUCH AS COLDS, FLU, CHICKEN POX     TRY TO AVOID CROWDS     STAY AWAY FROM CHILDREN WHO RECENTLY HAVE RECEIVED LIVE VIRUS VACCINES     MAINTAIN GOOD MOUTH CARE     DO NOT SQUEEZE OR SCRATCH PIMPLES     CLEAN CUTS & SCRAPES RIGHT AWAY AND DAILY UNTIL HEALED WITH WARM WATER, SOAP & AN ANTISEPTIC     AVOID CONTACT WITH LITTER BOXES, BIRD CAGES, & FISH TANKS     AVOID STANDING WATER, IE., BIRD BATHS, FLOWER POTS/VASES, OR HUMIDIFIERS     WEAR GLOVES WHEN GARDENING OR CLEANING UP AFTER OTHERS, ESPECIALLY BABIES & SMALL CHILDREN     DO NOT EAT RAW FISH, SEAFOOD, MEAT, OR EGGS  FALL PREVENTION   Falls often occur due to slipping, tripping or losing your balance. Here are ways to reduce your risk of falling again.   Was there anything that caused your fall that can be fixed, removed or replaced?   Make your home safe by keeping walkways clear of objects you may trip over.   Use non-slip pads under rugs.   Do not walk in poorly lit areas.   Do not stand on chairs or wobbly ladders.   Use caution when reaching overhead or looking upward. This position can cause a loss of balance.   Be sure your shoes fit properly, have non-slip bottoms and are in good condition.   Be cautious when going up and down stairs, curbs, and when walking on  uneven sidewalks.   If your balance is poor, consider using a cane or walker.   If your fall was related to alcohol use, stop or limit alcohol intake.   If your fall was related to use of sleeping medicines, talk to your doctor about this. You may need to reduce your dosage at bedtime if you awaken during the night to go to the bathroom.   To reduce the need for nighttime bathroom trips:   Avoid drinking fluids for several hours before going to bed   Empty your bladder before going to bed   Men can keep a urinal at the bedside   © 2175-9069 [facility], [address] [place] [address], [place] [address]. All rights reserved. This information is not intended as a substitute for professional medical care. Always follow your healthcare professional's instructions.

## 2020-09-24 NOTE — [employer] PATIENT INSTRUCTIONS
Discharge Summary/Instructions after an Endoscopic Procedure  Patient Name: Ro Hernandez  Patient MRN: [id number]  Patient YOB: 1955 Friday, September 18, 2020 Yvette Looney MD  RESTRICTIONS:  During your procedure today, you received medications for sedation.  These   medications may affect your judgment, balance and coordination.  Therefore,   for 24 hours, you have the following restrictions:   - DO NOT drive a car, operate machinery, make legal/financial decisions,   sign important papers or drink alcohol.    ACTIVITY:  Today: no heavy lifting, straining or running due to procedural   sedation/anesthesia.  The following day: return to full activity including work.  DIET:  Eat and drink normally unless instructed otherwise.     TREATMENT FOR COMMON SIDE EFFECTS:  - Mild abdominal pain, nausea, belching, bloating or excessive gas:  rest,   eat lightly and use a heating pad.  - Sore Throat: treat with throat lozenges and/or gargle with warm salt   water.  - Because air was used during the procedure, expelling large amounts of air   from your rectum or belching is normal.  - If a bowel prep was taken, you may not have a bowel movement for 1-3 days.    This is normal.  SYMPTOMS TO WATCH FOR AND REPORT TO YOUR PHYSICIAN:  1. Abdominal pain or bloating, other than gas cramps.  2. Chest pain.  3. Back pain.  4. Signs of infection such as: chills or fever occurring within 24 hours   after the procedure.  5. Rectal bleeding, which would show as bright red, maroon, or black stools.   (A tablespoon of blood from the rectum is not serious, especially if   hemorrhoids are present.)  6. Vomiting.  7. Weakness or dizziness.  GO DIRECTLY TO THE NEAREST EMERGENCY ROOM IF YOU HAVE ANY OF THE FOLLOWING:      Difficulty breathing              Chills and/or fever over 101 F   Persistent vomiting and/or vomiting blood   Severe abdominal pain   Severe chest pain   Black, tarry stools   Bleeding- more than one  tablespoon   Any other symptom or condition that you feel may need urgent attention  Your doctor recommends these additional instructions:  If any biopsies were taken, your doctors clinic will contact you in 1 to 2   weeks with any results.  -  If anemia continues, consider push enteroscopy for further evaluation  - Perform an upright abdominal x-ray at the next available appointment to   rule out capsule retention.  For questions, problems or results please call your physician Yvette Looney MD at Work:  [phone]  If you have any questions about the above instructions, call the GI   department at [phone] or call the endoscopy unit at [phone]   from 7am until 3 pm.  [facility], EMERGENCY ROOM PHONE NUMBER:   [phone]  IF A COMPLICATION OR EMERGENCY SITUATION ARISES AND YOU ARE UNABLE TO REACH   YOUR PHYSICIAN - GO DIRECTLY TO THE EMERGENCY ROOM.  I have read or have had read to me these discharge instructions for my   procedure and have received a written copy.  I understand these   instructions and will follow-up with my physician if I have any questions.     __________________________________       _____________________________________  Nurse Signature                                          Patient/Designated   Responsible Party Signature  MD Yvette Ward MD  9/24/2020 9:51:31 AM  [employer]

## 2020-09-24 NOTE — TELEPHONE ENCOUNTER
----- Message from Yvette Looney MD sent at 9/24/2020  9:52 AM CDT -----  Regarding: KUB for ADIN Roque,     This patient's video capsule study does not capture any footage of the video capsule entering into the colon. I have ordered an abdominal xray for the patient to complete at her earliest convenience to ensure the capsule passed into the colon. Please call patient and inform.     Thanks,       Dr. Looney

## 2020-09-24 NOTE — PLAN OF CARE
Problem: Adult Inpatient Plan of Care  Goal: Plan of Care Review  Outcome: Ongoing, Progressing  Goal: Patient-Specific Goal (Individualization)  Outcome: Ongoing, Progressing  Flowsheets (Taken 9/24/2020 0857)  Individualized Care Needs: feet elevated and warm blanket  Anxieties, Fears or Concerns: none voiced  Patient-Specific Goals (Include Timeframe): to tolerate iron today  Goal: Absence of Hospital-Acquired Illness or Injury  Outcome: Ongoing, Progressing  Goal: Optimal Comfort and Wellbeing  Outcome: Ongoing, Progressing  Goal: Readiness for Transition of Care  Outcome: Ongoing, Progressing  Goal: Rounds/Family Conference  Outcome: Ongoing, Progressing     Problem: Anemia  Goal: Anemia Symptom Improvement  Outcome: Ongoing, Progressing   Patient report, feeling tired and SOBOE.

## 2020-09-25 ENCOUNTER — PATIENT OUTREACH (OUTPATIENT)
Dept: ADMINISTRATIVE | Facility: HOSPITAL | Age: 65
End: 2020-09-25

## 2020-09-25 ENCOUNTER — HOSPITAL ENCOUNTER (OUTPATIENT)
Dept: RADIOLOGY | Facility: HOSPITAL | Age: 65
Discharge: HOME | End: 2020-09-25
Attending: INTERNAL MEDICINE
Payer: COMMERCIAL

## 2020-09-25 DIAGNOSIS — T18.9XXA FOREIGN BODY IN DIGESTIVE TRACT, INITIAL ENCOUNTER: ICD-10-CM

## 2020-09-25 LAB — BACTERIA UR CULT: ABNORMAL

## 2020-09-25 PROCEDURE — 74018 XR ABDOMEN AP 1 VIEW: ICD-10-PCS | Mod: 26,,, | Performed by: RADIOLOGY

## 2020-09-25 PROCEDURE — 74018 RADEX ABDOMEN 1 VIEW: CPT | Mod: 26,,, | Performed by: RADIOLOGY

## 2020-09-25 PROCEDURE — 74018 RADEX ABDOMEN 1 VIEW: CPT | Mod: TC,PO

## 2020-09-25 NOTE — PROGRESS NOTES
Working Mammo Report:     Spoke with pt regarding updated mammogram. Pt is scheduled 10/16/2020 with Dr. Sammie Vasquez for WWE and MMG/.

## 2020-09-28 DIAGNOSIS — B96.20 E. COLI UTI: Primary | ICD-10-CM

## 2020-09-28 DIAGNOSIS — N39.0 E. COLI UTI: Primary | ICD-10-CM

## 2020-09-28 RX ORDER — NITROFURANTOIN 25; 75 MG/1; MG/1
100 CAPSULE ORAL 2 TIMES DAILY
Qty: 14 CAPSULE | Refills: 0 | Status: SHIPPED | OUTPATIENT
Start: 2020-09-28 | End: 2021-01-11

## 2020-10-01 ENCOUNTER — INFUSION (OUTPATIENT)
Dept: INFUSION THERAPY | Facility: HOSPITAL | Age: 65
End: 2020-10-01
Attending: INTERNAL MEDICINE
Payer: COMMERCIAL

## 2020-10-01 VITALS
WEIGHT: 191.69 LBS | RESPIRATION RATE: 17 BRPM | DIASTOLIC BLOOD PRESSURE: 61 MMHG | BODY MASS INDEX: 31.9 KG/M2 | OXYGEN SATURATION: 97 % | TEMPERATURE: 98 F | HEART RATE: 75 BPM | SYSTOLIC BLOOD PRESSURE: 120 MMHG

## 2020-10-01 DIAGNOSIS — D50.0 IRON DEFICIENCY ANEMIA DUE TO CHRONIC BLOOD LOSS: Primary | ICD-10-CM

## 2020-10-01 DIAGNOSIS — D50.9 IRON DEFICIENCY ANEMIA, UNSPECIFIED IRON DEFICIENCY ANEMIA TYPE: Primary | ICD-10-CM

## 2020-10-01 PROCEDURE — 96365 THER/PROPH/DIAG IV INF INIT: CPT

## 2020-10-01 PROCEDURE — 96375 TX/PRO/DX INJ NEW DRUG ADDON: CPT

## 2020-10-01 PROCEDURE — 25000003 PHARM REV CODE 250: Performed by: INTERNAL MEDICINE

## 2020-10-01 PROCEDURE — 63600175 PHARM REV CODE 636 W HCPCS: Mod: JG | Performed by: INTERNAL MEDICINE

## 2020-10-01 RX ORDER — METHYLPREDNISOLONE SOD SUCC 125 MG
125 VIAL (EA) INJECTION
Status: COMPLETED | OUTPATIENT
Start: 2020-10-01 | End: 2020-10-01

## 2020-10-01 RX ADMIN — METHYLPREDNISOLONE SODIUM SUCCINATE 125 MG: 125 INJECTION, POWDER, FOR SOLUTION INTRAMUSCULAR; INTRAVENOUS at 09:10

## 2020-10-01 RX ADMIN — FERRIC CARBOXYMALTOSE INJECTION 750 MG: 50 INJECTION, SOLUTION INTRAVENOUS at 09:10

## 2020-10-01 NOTE — PLAN OF CARE
Patient tolerated iron infusion well. Reviewed S&S of reaction. Patient verbalized understanding.

## 2020-10-14 ENCOUNTER — OFFICE VISIT (OUTPATIENT)
Dept: FAMILY MEDICINE | Facility: CLINIC | Age: 65
End: 2020-10-14
Payer: COMMERCIAL

## 2020-10-14 ENCOUNTER — CLINICAL SUPPORT (OUTPATIENT)
Dept: FAMILY MEDICINE | Facility: CLINIC | Age: 65
End: 2020-10-14
Payer: COMMERCIAL

## 2020-10-14 ENCOUNTER — PATIENT MESSAGE (OUTPATIENT)
Dept: FAMILY MEDICINE | Facility: CLINIC | Age: 65
End: 2020-10-14

## 2020-10-14 VITALS — SYSTOLIC BLOOD PRESSURE: 118 MMHG | DIASTOLIC BLOOD PRESSURE: 67 MMHG

## 2020-10-14 DIAGNOSIS — N28.1 RENAL CYST: ICD-10-CM

## 2020-10-14 DIAGNOSIS — N39.0 RECURRENT UTI: Primary | ICD-10-CM

## 2020-10-14 DIAGNOSIS — Z86.16 HISTORY OF 2019 NOVEL CORONAVIRUS DISEASE (COVID-19): ICD-10-CM

## 2020-10-14 LAB
BACTERIA #/AREA URNS AUTO: NORMAL /HPF
BILIRUB UR QL STRIP: NEGATIVE
CLARITY UR REFRACT.AUTO: ABNORMAL
COLOR UR AUTO: YELLOW
GLUCOSE UR QL STRIP: NEGATIVE
HGB UR QL STRIP: NEGATIVE
KETONES UR QL STRIP: NEGATIVE
LEUKOCYTE ESTERASE UR QL STRIP: ABNORMAL
MICROSCOPIC COMMENT: NORMAL
NITRITE UR QL STRIP: NEGATIVE
PH UR STRIP: 5 [PH] (ref 5–8)
PROT UR QL STRIP: NEGATIVE
SP GR UR STRIP: 1.02 (ref 1–1.03)
SQUAMOUS #/AREA URNS AUTO: 3 /HPF
URN SPEC COLLECT METH UR: ABNORMAL
WBC #/AREA URNS AUTO: 0 /HPF (ref 0–5)

## 2020-10-14 PROCEDURE — 1159F PR MEDICATION LIST DOCUMENTED IN MEDICAL RECORD: ICD-10-PCS | Mod: ,,, | Performed by: FAMILY MEDICINE

## 2020-10-14 PROCEDURE — 87086 URINE CULTURE/COLONY COUNT: CPT

## 2020-10-14 PROCEDURE — 87077 CULTURE AEROBIC IDENTIFY: CPT

## 2020-10-14 PROCEDURE — 1101F PR PT FALLS ASSESS DOC 0-1 FALLS W/OUT INJ PAST YR: ICD-10-PCS | Mod: CPTII,,, | Performed by: FAMILY MEDICINE

## 2020-10-14 PROCEDURE — 81001 URINALYSIS AUTO W/SCOPE: CPT

## 2020-10-14 PROCEDURE — 99214 OFFICE O/P EST MOD 30 MIN: CPT | Mod: 95,25,, | Performed by: FAMILY MEDICINE

## 2020-10-14 PROCEDURE — 1159F MED LIST DOCD IN RCRD: CPT | Mod: ,,, | Performed by: FAMILY MEDICINE

## 2020-10-14 PROCEDURE — 96372 PR INJECTION,THERAP/PROPH/DIAG2ST, IM OR SUBCUT: ICD-10-PCS | Mod: S$GLB,,, | Performed by: FAMILY MEDICINE

## 2020-10-14 PROCEDURE — 1101F PT FALLS ASSESS-DOCD LE1/YR: CPT | Mod: CPTII,,, | Performed by: FAMILY MEDICINE

## 2020-10-14 PROCEDURE — 96372 THER/PROPH/DIAG INJ SC/IM: CPT | Mod: S$GLB,,, | Performed by: FAMILY MEDICINE

## 2020-10-14 PROCEDURE — 99999 PR PBB SHADOW E&M-EST. PATIENT-LVL II: ICD-10-PCS | Mod: PBBFAC,,,

## 2020-10-14 PROCEDURE — 87186 SC STD MICRODIL/AGAR DIL: CPT

## 2020-10-14 PROCEDURE — 87088 URINE BACTERIA CULTURE: CPT

## 2020-10-14 PROCEDURE — 3074F PR MOST RECENT SYSTOLIC BLOOD PRESSURE < 130 MM HG: ICD-10-PCS | Mod: CPTII,,, | Performed by: FAMILY MEDICINE

## 2020-10-14 PROCEDURE — 99999 PR PBB SHADOW E&M-EST. PATIENT-LVL II: CPT | Mod: PBBFAC,,,

## 2020-10-14 PROCEDURE — 99214 PR OFFICE/OUTPT VISIT, EST, LEVL IV, 30-39 MIN: ICD-10-PCS | Mod: 95,25,, | Performed by: FAMILY MEDICINE

## 2020-10-14 PROCEDURE — 3078F PR MOST RECENT DIASTOLIC BLOOD PRESSURE < 80 MM HG: ICD-10-PCS | Mod: CPTII,,, | Performed by: FAMILY MEDICINE

## 2020-10-14 PROCEDURE — 3078F DIAST BP <80 MM HG: CPT | Mod: CPTII,,, | Performed by: FAMILY MEDICINE

## 2020-10-14 PROCEDURE — 3074F SYST BP LT 130 MM HG: CPT | Mod: CPTII,,, | Performed by: FAMILY MEDICINE

## 2020-10-14 RX ORDER — LIDOCAINE HYDROCHLORIDE 10 MG/ML
2.1 INJECTION INFILTRATION; PERINEURAL ONCE
Status: CANCELLED | OUTPATIENT
Start: 2020-10-14 | End: 2020-10-14

## 2020-10-14 RX ORDER — LIDOCAINE HYDROCHLORIDE 10 MG/ML
2.1 INJECTION INFILTRATION; PERINEURAL
Status: COMPLETED | OUTPATIENT
Start: 2020-10-14 | End: 2020-10-14

## 2020-10-14 RX ORDER — CEFDINIR 300 MG/1
300 CAPSULE ORAL 2 TIMES DAILY
Qty: 10 CAPSULE | Refills: 0 | Status: SHIPPED | OUTPATIENT
Start: 2020-10-14 | End: 2020-10-19

## 2020-10-14 RX ORDER — CEFTRIAXONE 1 G/1
1 INJECTION, POWDER, FOR SOLUTION INTRAMUSCULAR; INTRAVENOUS
Status: COMPLETED | OUTPATIENT
Start: 2020-10-14 | End: 2020-10-14

## 2020-10-14 RX ADMIN — CEFTRIAXONE 1 G: 1 INJECTION, POWDER, FOR SOLUTION INTRAMUSCULAR; INTRAVENOUS at 03:10

## 2020-10-14 RX ADMIN — LIDOCAINE HYDROCHLORIDE 2.1 ML: 10 INJECTION INFILTRATION; PERINEURAL at 03:10

## 2020-10-14 NOTE — PROGRESS NOTES
Pt on day 11 of COVID but per Dr. Chopra, pt to get Rocephin 1 Gram.  Reconstituted Lidocaine 1% 2.1ml into Rocephin 1gram vial. Donned full PPE, gown, gloves, N95 mask and face.  Shield. adminstered to G. Pt tolerated well. Advised to wait 15 min, verbalized understanding.

## 2020-10-14 NOTE — PROGRESS NOTES
Subjective:       Patient ID: Ro Hernandez is a 65 y.o. female.    Chief Complaint: Painful Urination    Dysuria   This is a new problem. The current episode started in the past 7 days. The problem has been gradually worsening. The quality of the pain is described as burning. The pain is mild. There has been no fever. Associated symptoms include frequency and urgency. Pertinent negatives include no chills, flank pain, hematuria, nausea, vomiting, constipation or rash. There is no history of kidney stones.   Patient presents today via virtual visit with concern for recurrent UTI. Since July 2020 she has been found to have a positive urine culture on 3 occasions (7/7/2020 Klebsiella, 9/2/2020 E coli and Enterococcus, 9/23/2020 E coli). She reports maintaining Miralax use for management of constipation. Denies any current bowel related concerns. She has been seen by Urology and is followed annually per Dr. Livingston for renal cyst. She is currently recovering from COVID-19. Notes she is afebrile and starting to feel back to baseline. Given her diagnosis she was provided with Azithromycin and Prednisone which she has completed. There are no vaginitis symptoms.     Review of Systems   Constitutional: Negative for chills, fatigue and fever.   HENT: Negative for congestion, ear pain and sinus pressure.    Eyes: Negative for visual disturbance.   Respiratory: Negative for cough and shortness of breath.    Cardiovascular: Negative for chest pain and palpitations.   Gastrointestinal: Negative for constipation, nausea and vomiting.   Genitourinary: Positive for dysuria, frequency and urgency. Negative for difficulty urinating, flank pain, hematuria and vaginal discharge.   Musculoskeletal: Negative for arthralgias and myalgias.   Skin: Negative for rash.   Neurological: Negative for weakness and headaches.   Psychiatric/Behavioral: Negative for dysphoric mood and sleep disturbance.       Objective:   /65   Physical  Exam  Constitutional:       Appearance: Normal appearance. She is not ill-appearing or toxic-appearing.   HENT:      Head: Normocephalic and atraumatic.      Mouth/Throat:      Mouth: Mucous membranes are moist.   Eyes:      Extraocular Movements: Extraocular movements intact.      Conjunctiva/sclera: Conjunctivae normal.   Pulmonary:      Effort: Pulmonary effort is normal. No respiratory distress.   Genitourinary:     Comments: No suprapubic tenderness  Neurological:      Mental Status: She is alert and oriented to person, place, and time.   Psychiatric:         Mood and Affect: Mood normal.         Behavior: Behavior normal.         Assessment:       1. Recurrent UTI    2. Renal cyst    3. History of 2019 novel coronavirus disease (COVID-19)    4. BMI 31.0-31.9,adult        Plan:      Recurrent UTI  Will obtain updated UA with CX. If results are positive further assessment per Urology is recommended- cath specimen will need to be considered. Antibiotic resistance noted on most recent CX results. Rocephin IM to be provided today and she will start Cefdinir in the AM. Encouraged cranberry intake and hydration efforts. Emphasized importance of maintaining stable bowel habits. Reviewed w/s and reasons to seek reassessment should symptoms evolve.  -     Urinalysis; Future; Expected date: 10/14/2020  -     Urine culture; Future; Expected date: 10/14/2020  -     cefTRIAXone injection 1 g  -     cefdinir (OMNICEF) 300 MG capsule; Take 1 capsule (300 mg total) by mouth 2 (two) times daily. for 5 days  Dispense: 10 capsule; Refill: 0    Renal cyst  As per Urology.    History of 2019 novel coronavirus disease (COVID-19)  Patient has completed quarantine and has remained afebrile. Note of the infection has been made in her [facility] record.    BMI 31.0-31.9,adult  Weight loss efforts remain encouraged through diet and lifestyle measures.     The patient location is: [place]  The chief complaint leading to consultation is:  Painful urination    Visit type: audiovisual    Face to Face time with patient: 15 minutes  17 minutes of total time spent on the encounter, which includes face to face time and non-face to face time preparing to see the patient (eg, review of tests), Obtaining and/or reviewing separately obtained history, Documenting clinical information in the electronic or other health record, Independently interpreting results (not separately reported) and communicating results to the patient/family/caregiver, or Care coordination (not separately reported).     Each patient to whom he or she provides medical services by telemedicine is:  (1) informed of the relationship between the physician and patient and the respective role of any other health care provider with respect to management of the patient; and (2) notified that he or she may decline to receive medical services by telemedicine and may withdraw from such care at any time.

## 2020-10-16 LAB — BACTERIA UR CULT: ABNORMAL

## 2020-11-03 ENCOUNTER — TELEPHONE (OUTPATIENT)
Dept: RADIOLOGY | Facility: HOSPITAL | Age: 65
End: 2020-11-03

## 2020-11-04 ENCOUNTER — HOSPITAL ENCOUNTER (OUTPATIENT)
Dept: RADIOLOGY | Facility: HOSPITAL | Age: 65
Discharge: HOME | End: 2020-11-04
Attending: UROLOGY
Payer: COMMERCIAL

## 2020-11-04 DIAGNOSIS — N28.1 RENAL CYST: ICD-10-CM

## 2020-11-04 PROCEDURE — 76770 US EXAM ABDO BACK WALL COMP: CPT | Mod: TC

## 2020-11-04 PROCEDURE — 76770 US EXAM ABDO BACK WALL COMP: CPT | Mod: 26,,, | Performed by: RADIOLOGY

## 2020-11-04 PROCEDURE — 76770 US RETROPERITONEAL COMPLETE: ICD-10-PCS | Mod: 26,,, | Performed by: RADIOLOGY

## 2020-11-09 ENCOUNTER — OFFICE VISIT (OUTPATIENT)
Dept: UROLOGY | Facility: CLINIC | Age: 65
End: 2020-11-09
Payer: COMMERCIAL

## 2020-11-09 VITALS
BODY MASS INDEX: 31.96 KG/M2 | WEIGHT: 191.81 LBS | SYSTOLIC BLOOD PRESSURE: 124 MMHG | DIASTOLIC BLOOD PRESSURE: 68 MMHG | HEIGHT: 65 IN

## 2020-11-09 DIAGNOSIS — K59.00 CONSTIPATION, UNSPECIFIED CONSTIPATION TYPE: ICD-10-CM

## 2020-11-09 DIAGNOSIS — N30.00 ACUTE CYSTITIS WITHOUT HEMATURIA: ICD-10-CM

## 2020-11-09 DIAGNOSIS — N28.1 RENAL CYST: Primary | ICD-10-CM

## 2020-11-09 DIAGNOSIS — E66.01 MORBID OBESITY: ICD-10-CM

## 2020-11-09 LAB
BILIRUB SERPL-MCNC: NORMAL MG/DL
BLOOD URINE, POC: NORMAL
CLARITY, POC UA: NORMAL
COLOR, POC UA: YELLOW
GLUCOSE UR QL STRIP: NORMAL
KETONES UR QL STRIP: NORMAL
LEUKOCYTE ESTERASE URINE, POC: NORMAL
NITRITE, POC UA: NORMAL
PH, POC UA: 6
POC RESIDUAL URINE VOLUME: 19 ML (ref 0–100)
PROTEIN, POC: NORMAL
SPECIFIC GRAVITY, POC UA: 1.01
UROBILINOGEN, POC UA: NORMAL

## 2020-11-09 PROCEDURE — 87088 URINE BACTERIA CULTURE: CPT

## 2020-11-09 PROCEDURE — 99214 OFFICE O/P EST MOD 30 MIN: CPT | Mod: 25,S$GLB,, | Performed by: UROLOGY

## 2020-11-09 PROCEDURE — 87186 SC STD MICRODIL/AGAR DIL: CPT

## 2020-11-09 PROCEDURE — 1101F PT FALLS ASSESS-DOCD LE1/YR: CPT | Mod: CPTII,S$GLB,, | Performed by: UROLOGY

## 2020-11-09 PROCEDURE — 3078F PR MOST RECENT DIASTOLIC BLOOD PRESSURE < 80 MM HG: ICD-10-PCS | Mod: CPTII,S$GLB,, | Performed by: UROLOGY

## 2020-11-09 PROCEDURE — 81002 URINALYSIS NONAUTO W/O SCOPE: CPT | Mod: S$GLB,,, | Performed by: UROLOGY

## 2020-11-09 PROCEDURE — 51798 POCT BLADDER SCAN: ICD-10-PCS | Mod: S$GLB,,, | Performed by: UROLOGY

## 2020-11-09 PROCEDURE — 3078F DIAST BP <80 MM HG: CPT | Mod: CPTII,S$GLB,, | Performed by: UROLOGY

## 2020-11-09 PROCEDURE — 1126F AMNT PAIN NOTED NONE PRSNT: CPT | Mod: S$GLB,,, | Performed by: UROLOGY

## 2020-11-09 PROCEDURE — 3008F BODY MASS INDEX DOCD: CPT | Mod: CPTII,S$GLB,, | Performed by: UROLOGY

## 2020-11-09 PROCEDURE — 81002 POCT URINE DIPSTICK WITHOUT MICROSCOPE: ICD-10-PCS | Mod: S$GLB,,, | Performed by: UROLOGY

## 2020-11-09 PROCEDURE — 1126F PR PAIN SEVERITY QUANTIFIED, NO PAIN PRESENT: ICD-10-PCS | Mod: S$GLB,,, | Performed by: UROLOGY

## 2020-11-09 PROCEDURE — 87086 URINE CULTURE/COLONY COUNT: CPT

## 2020-11-09 PROCEDURE — 1159F PR MEDICATION LIST DOCUMENTED IN MEDICAL RECORD: ICD-10-PCS | Mod: S$GLB,,, | Performed by: UROLOGY

## 2020-11-09 PROCEDURE — 3074F SYST BP LT 130 MM HG: CPT | Mod: CPTII,S$GLB,, | Performed by: UROLOGY

## 2020-11-09 PROCEDURE — 99999 PR PBB SHADOW E&M-EST. PATIENT-LVL III: CPT | Mod: PBBFAC,,, | Performed by: UROLOGY

## 2020-11-09 PROCEDURE — 99214 PR OFFICE/OUTPT VISIT, EST, LEVL IV, 30-39 MIN: ICD-10-PCS | Mod: 25,S$GLB,, | Performed by: UROLOGY

## 2020-11-09 PROCEDURE — 99999 PR PBB SHADOW E&M-EST. PATIENT-LVL III: ICD-10-PCS | Mod: PBBFAC,,, | Performed by: UROLOGY

## 2020-11-09 PROCEDURE — 87077 CULTURE AEROBIC IDENTIFY: CPT

## 2020-11-09 PROCEDURE — 3008F PR BODY MASS INDEX (BMI) DOCUMENTED: ICD-10-PCS | Mod: CPTII,S$GLB,, | Performed by: UROLOGY

## 2020-11-09 PROCEDURE — 1101F PR PT FALLS ASSESS DOC 0-1 FALLS W/OUT INJ PAST YR: ICD-10-PCS | Mod: CPTII,S$GLB,, | Performed by: UROLOGY

## 2020-11-09 PROCEDURE — 81001 URINALYSIS AUTO W/SCOPE: CPT

## 2020-11-09 PROCEDURE — 51798 US URINE CAPACITY MEASURE: CPT | Mod: S$GLB,,, | Performed by: UROLOGY

## 2020-11-09 PROCEDURE — 3074F PR MOST RECENT SYSTOLIC BLOOD PRESSURE < 130 MM HG: ICD-10-PCS | Mod: CPTII,S$GLB,, | Performed by: UROLOGY

## 2020-11-09 PROCEDURE — 1159F MED LIST DOCD IN RCRD: CPT | Mod: S$GLB,,, | Performed by: UROLOGY

## 2020-11-09 NOTE — PROGRESS NOTES
Chief Complaint: Renal Cyst    HPI:   11/9/20:  Indep assessment of imaging agree with report:  Bilateral renal cysts present and likely benign.  Reviewed history in detail. Sx of UTI present and are dysuria, sudden urgency.  Has had Abx on 4-5 occasions in last months.  Constipation has been a problem chronicly.  Uses daily miralax.  Drinks a lot of fluid.  Two cups coffee first in AM.  Daytime worse.  Nocturia x1.    1/6/19: US shows a new lower pole left renal finding not obviously present on CT.  Reviewed history in detail.   10/25/18: 64 yo woman referred by Dr. Flores for finding of a complex renal cyst seen on CT ordered by pulmonology.  No abd/pelvic pain and no exac/rel factors.  No hematuria.  No urolithiasis.  No urinary bother.  No  history.  Normal sexual function.  Had an US that shows a 6cm left renal cyst with a septation and calcification.     Allergies:  Ace inhibitors, Lisinopril, Pravastatin, Rosuvastatin, and Simvastatin    Medications: has a current medication list which includes the following prescription(s): aspirin, celecoxib, estrogen (conjugated)-medroxyprogesterone 0.3-1.5 mg, ezetimibe, fluticasone propionate, hydrocodone-acetaminophen, isosorbide mononitrate, metoprolol succinate, multivitamin, nitrofurantoin (macrocrystal-monohydrate), nitroglycerin, pantoprazole, and verapamil.    Review of Systems:  General: No fever, chills, fatigability, or weight loss.  Skin: No rashes, itching, or changes in color or texture of skin.  Chest: Denies MALCOLM, cyanosis, wheezing, cough, and sputum production.  Abdomen: Appetite fine. No weight loss. Denies diarrhea, abdominal pain, hematemesis, or blood in stool.  Musculoskeletal: No joint stiffness or swelling. Denies back pain.  : As above.  All other review of systems negative.    PMH:   has a past medical history of Allergy, Angina, Arthritis (6/11/2013), Asthma, COPD (chronic obstructive pulmonary disease), GERD (gastroesophageal reflux disease),  Prinzmetal angina, Hypertension, Obesity (6/11/2013), Other and unspecified hyperlipidemia (6/11/2013), Prinzmetal's angina, and Sleep apnea (6/11/2013).    PSH:   has a past surgical history that includes Eye surgery; Foot surgery; Cholecystectomy; Tonsillectomy; Tubal ligation; Abdominal surgery; Esophagogastroduodenoscopy (N/A, 12/10/2018); Colonoscopy (N/A, 12/6/2019); Esophagogastroduodenoscopy (N/A, 9/8/2020); and Intraluminal gastrointestinal tract imaging via capsule (N/A, 9/18/2020).    FamHx: family history includes COPD in her father; Cancer in her mother; Heart disease in her sister; Hyperlipidemia in her son; Hypertension in her father and mother; Kidney cancer in her mother.    SocHx:  reports that she quit smoking about 4 years ago. Her smoking use included cigarettes. She has a 1.00 pack-year smoking history. She has never used smokeless tobacco. She reports that she does not drink alcohol or use drugs.     Physical Exam:  Vitals:   Vitals:    11/09/20 1345   BP: 124/68     General: A&Ox3. No apparent distress. No deformities.  Neck: No masses. Normal thyroid.  Lungs: normal inspiration. No use of accessory muscles.  Heart: normal pulse. No arrhythmias.  Abdomen: Soft. NT. ND  Skin: The skin is warm and dry. No jaundice.  Ext: No c/c/e.  :     10/18: External genitalia normal.     Labs/Studies:   Urinalysis performed in clinic, summary: UA normal    Impression/Plan:   1. CT renal mass protocol confirmed mass Bos2 and no worse again, US/RTC 1 year.  2. UTI: UA is normal today. Cath UA/UCx.   3. Miralax for constipation; norco likely causing constipation  4. Obesity: discussed portion control

## 2020-11-10 LAB
BACTERIA #/AREA URNS AUTO: NORMAL /HPF
MICROSCOPIC COMMENT: NORMAL
RBC #/AREA URNS AUTO: 1 /HPF (ref 0–4)
SQUAMOUS #/AREA URNS AUTO: 1 /HPF
WBC #/AREA URNS AUTO: 1 /HPF (ref 0–5)

## 2020-11-12 LAB — BACTERIA UR CULT: ABNORMAL

## 2020-11-16 ENCOUNTER — PATIENT OUTREACH (OUTPATIENT)
Dept: ADMINISTRATIVE | Facility: HOSPITAL | Age: 65
End: 2020-11-16

## 2020-11-16 ENCOUNTER — TELEPHONE (OUTPATIENT)
Dept: UROLOGY | Facility: CLINIC | Age: 65
End: 2020-11-16

## 2020-11-16 RX ORDER — NITROFURANTOIN (MACROCRYSTALS) 100 MG/1
100 CAPSULE ORAL EVERY 12 HOURS
Qty: 14 CAPSULE | Refills: 0 | Status: SHIPPED | OUTPATIENT
Start: 2020-11-16 | End: 2020-11-23

## 2020-12-02 ENCOUNTER — PATIENT MESSAGE (OUTPATIENT)
Dept: HEMATOLOGY/ONCOLOGY | Facility: CLINIC | Age: 65
End: 2020-12-02

## 2020-12-07 ENCOUNTER — LAB VISIT (OUTPATIENT)
Dept: LAB | Facility: HOSPITAL | Age: 65
End: 2020-12-07
Attending: INTERNAL MEDICINE
Payer: COMMERCIAL

## 2020-12-07 DIAGNOSIS — D50.9 IRON DEFICIENCY ANEMIA, UNSPECIFIED IRON DEFICIENCY ANEMIA TYPE: ICD-10-CM

## 2020-12-07 LAB
BASOPHILS # BLD AUTO: 0.03 K/UL (ref 0–0.2)
BASOPHILS NFR BLD: 0.4 % (ref 0–1.9)
DIFFERENTIAL METHOD: ABNORMAL
EOSINOPHIL # BLD AUTO: 0.1 K/UL (ref 0–0.5)
EOSINOPHIL NFR BLD: 0.9 % (ref 0–8)
ERYTHROCYTE [DISTWIDTH] IN BLOOD BY AUTOMATED COUNT: 16.8 % (ref 11.5–14.5)
HCT VFR BLD AUTO: 43.9 % (ref 37–48.5)
HGB BLD-MCNC: 13.8 G/DL (ref 12–16)
IMM GRANULOCYTES # BLD AUTO: 0.02 K/UL (ref 0–0.04)
IMM GRANULOCYTES NFR BLD AUTO: 0.3 % (ref 0–0.5)
LYMPHOCYTES # BLD AUTO: 1.7 K/UL (ref 1–4.8)
LYMPHOCYTES NFR BLD: 21.1 % (ref 18–48)
MCH RBC QN AUTO: 29 PG (ref 27–31)
MCHC RBC AUTO-ENTMCNC: 31.4 G/DL (ref 32–36)
MCV RBC AUTO: 92 FL (ref 82–98)
MONOCYTES # BLD AUTO: 0.6 K/UL (ref 0.3–1)
MONOCYTES NFR BLD: 7.5 % (ref 4–15)
NEUTROPHILS # BLD AUTO: 5.5 K/UL (ref 1.8–7.7)
NEUTROPHILS NFR BLD: 69.8 % (ref 38–73)
NRBC BLD-RTO: 0 /100 WBC
PLATELET # BLD AUTO: 204 K/UL (ref 150–350)
PMV BLD AUTO: 9.2 FL (ref 9.2–12.9)
RBC # BLD AUTO: 4.76 M/UL (ref 4–5.4)
WBC # BLD AUTO: 7.91 K/UL (ref 3.9–12.7)

## 2020-12-07 PROCEDURE — 82728 ASSAY OF FERRITIN: CPT

## 2020-12-07 PROCEDURE — 85025 COMPLETE CBC W/AUTO DIFF WBC: CPT

## 2020-12-07 PROCEDURE — 36415 COLL VENOUS BLD VENIPUNCTURE: CPT | Mod: PO

## 2020-12-07 PROCEDURE — 83540 ASSAY OF IRON: CPT

## 2020-12-08 LAB
FERRITIN SERPL-MCNC: 79 NG/ML (ref 20–300)
IRON SERPL-MCNC: 68 UG/DL (ref 30–160)
SATURATED IRON: 17 % (ref 20–50)
TOTAL IRON BINDING CAPACITY: 400 UG/DL (ref 250–450)
TRANSFERRIN SERPL-MCNC: 270 MG/DL (ref 200–375)

## 2020-12-14 ENCOUNTER — OFFICE VISIT (OUTPATIENT)
Dept: HEMATOLOGY/ONCOLOGY | Facility: CLINIC | Age: 65
End: 2020-12-14
Payer: COMMERCIAL

## 2020-12-14 ENCOUNTER — INFUSION (OUTPATIENT)
Dept: INFUSION THERAPY | Facility: HOSPITAL | Age: 65
End: 2020-12-14
Attending: INTERNAL MEDICINE
Payer: COMMERCIAL

## 2020-12-14 VITALS
SYSTOLIC BLOOD PRESSURE: 163 MMHG | TEMPERATURE: 99 F | BODY MASS INDEX: 33.64 KG/M2 | HEART RATE: 71 BPM | WEIGHT: 202.19 LBS | OXYGEN SATURATION: 96 % | DIASTOLIC BLOOD PRESSURE: 73 MMHG

## 2020-12-14 VITALS
WEIGHT: 202.19 LBS | OXYGEN SATURATION: 94 % | HEART RATE: 76 BPM | HEIGHT: 65 IN | BODY MASS INDEX: 33.69 KG/M2 | TEMPERATURE: 98 F | SYSTOLIC BLOOD PRESSURE: 150 MMHG | DIASTOLIC BLOOD PRESSURE: 74 MMHG

## 2020-12-14 DIAGNOSIS — K25.4 CHRONIC GASTRIC ULCER WITH HEMORRHAGE: ICD-10-CM

## 2020-12-14 DIAGNOSIS — D50.9 IRON DEFICIENCY ANEMIA, UNSPECIFIED IRON DEFICIENCY ANEMIA TYPE: Primary | ICD-10-CM

## 2020-12-14 DIAGNOSIS — Z86.16 HISTORY OF 2019 NOVEL CORONAVIRUS DISEASE (COVID-19): ICD-10-CM

## 2020-12-14 PROBLEM — R06.89 DYSPNEA AND RESPIRATORY ABNORMALITIES: Status: RESOLVED | Noted: 2020-08-07 | Resolved: 2020-12-14

## 2020-12-14 PROBLEM — R06.00 DYSPNEA AND RESPIRATORY ABNORMALITIES: Status: RESOLVED | Noted: 2020-08-07 | Resolved: 2020-12-14

## 2020-12-14 PROCEDURE — 99214 PR OFFICE/OUTPT VISIT, EST, LEVL IV, 30-39 MIN: ICD-10-PCS | Mod: 25,S$GLB,, | Performed by: INTERNAL MEDICINE

## 2020-12-14 PROCEDURE — 90472 PNEUMOCOCCAL POLYSACCHARIDE VACCINE 23-VALENT =>2YO SQ IM: ICD-10-PCS | Mod: ,,, | Performed by: INTERNAL MEDICINE

## 2020-12-14 PROCEDURE — 90694 VACC AIIV4 NO PRSRV 0.5ML IM: CPT | Mod: ,,, | Performed by: INTERNAL MEDICINE

## 2020-12-14 PROCEDURE — 90694 FLU VACCINE - QUADRIVALENT - ADJUVANTED: ICD-10-PCS | Mod: ,,, | Performed by: INTERNAL MEDICINE

## 2020-12-14 PROCEDURE — 3077F SYST BP >= 140 MM HG: CPT | Mod: CPTII,S$GLB,, | Performed by: INTERNAL MEDICINE

## 2020-12-14 PROCEDURE — 1159F MED LIST DOCD IN RCRD: CPT | Mod: S$GLB,,, | Performed by: INTERNAL MEDICINE

## 2020-12-14 PROCEDURE — 99999 PR PBB SHADOW E&M-EST. PATIENT-LVL IV: CPT | Mod: PBBFAC,,, | Performed by: INTERNAL MEDICINE

## 2020-12-14 PROCEDURE — 1159F PR MEDICATION LIST DOCUMENTED IN MEDICAL RECORD: ICD-10-PCS | Mod: S$GLB,,, | Performed by: INTERNAL MEDICINE

## 2020-12-14 PROCEDURE — 3008F BODY MASS INDEX DOCD: CPT | Mod: CPTII,S$GLB,, | Performed by: INTERNAL MEDICINE

## 2020-12-14 PROCEDURE — 1126F PR PAIN SEVERITY QUANTIFIED, NO PAIN PRESENT: ICD-10-PCS | Mod: S$GLB,,, | Performed by: INTERNAL MEDICINE

## 2020-12-14 PROCEDURE — 90732 PPSV23 VACC 2 YRS+ SUBQ/IM: CPT | Mod: ,,, | Performed by: INTERNAL MEDICINE

## 2020-12-14 PROCEDURE — 90471 FLU VACCINE - QUADRIVALENT - ADJUVANTED: ICD-10-PCS | Mod: ,,, | Performed by: INTERNAL MEDICINE

## 2020-12-14 PROCEDURE — 3288F PR FALLS RISK ASSESSMENT DOCUMENTED: ICD-10-PCS | Mod: CPTII,S$GLB,, | Performed by: INTERNAL MEDICINE

## 2020-12-14 PROCEDURE — 99999 PR PBB SHADOW E&M-EST. PATIENT-LVL IV: ICD-10-PCS | Mod: PBBFAC,,, | Performed by: INTERNAL MEDICINE

## 2020-12-14 PROCEDURE — 1126F AMNT PAIN NOTED NONE PRSNT: CPT | Mod: S$GLB,,, | Performed by: INTERNAL MEDICINE

## 2020-12-14 PROCEDURE — 1101F PT FALLS ASSESS-DOCD LE1/YR: CPT | Mod: CPTII,S$GLB,, | Performed by: INTERNAL MEDICINE

## 2020-12-14 PROCEDURE — 3288F FALL RISK ASSESSMENT DOCD: CPT | Mod: CPTII,S$GLB,, | Performed by: INTERNAL MEDICINE

## 2020-12-14 PROCEDURE — 90732 PNEUMOCOCCAL POLYSACCHARIDE VACCINE 23-VALENT =>2YO SQ IM: ICD-10-PCS | Mod: ,,, | Performed by: INTERNAL MEDICINE

## 2020-12-14 PROCEDURE — 3078F DIAST BP <80 MM HG: CPT | Mod: CPTII,S$GLB,, | Performed by: INTERNAL MEDICINE

## 2020-12-14 PROCEDURE — 3008F PR BODY MASS INDEX (BMI) DOCUMENTED: ICD-10-PCS | Mod: CPTII,S$GLB,, | Performed by: INTERNAL MEDICINE

## 2020-12-14 PROCEDURE — 3077F PR MOST RECENT SYSTOLIC BLOOD PRESSURE >= 140 MM HG: ICD-10-PCS | Mod: CPTII,S$GLB,, | Performed by: INTERNAL MEDICINE

## 2020-12-14 PROCEDURE — 90472 IMMUNIZATION ADMIN EACH ADD: CPT | Mod: ,,, | Performed by: INTERNAL MEDICINE

## 2020-12-14 PROCEDURE — 90471 IMMUNIZATION ADMIN: CPT | Mod: ,,, | Performed by: INTERNAL MEDICINE

## 2020-12-14 PROCEDURE — 1101F PR PT FALLS ASSESS DOC 0-1 FALLS W/OUT INJ PAST YR: ICD-10-PCS | Mod: CPTII,S$GLB,, | Performed by: INTERNAL MEDICINE

## 2020-12-14 PROCEDURE — 3078F PR MOST RECENT DIASTOLIC BLOOD PRESSURE < 80 MM HG: ICD-10-PCS | Mod: CPTII,S$GLB,, | Performed by: INTERNAL MEDICINE

## 2020-12-14 PROCEDURE — 99214 OFFICE O/P EST MOD 30 MIN: CPT | Mod: 25,S$GLB,, | Performed by: INTERNAL MEDICINE

## 2020-12-14 NOTE — PROGRESS NOTES
Subjective:       Patient ID: Ro Hernandez is a 65 y.o. female.    Chief Complaint: Results and Anemia    HPI 65-year-old female history of iron deficiency anemia.  GI evaluation recently completed patient feels much better after intravenous iron.  Recent COVID infection 2020    Past Medical History:   Diagnosis Date    Allergy     Angina     Arthritis 2013    Asthma     COPD (chronic obstructive pulmonary disease)     GERD (gastroesophageal reflux disease)     Hx of Prinzmetal angina     Hypertension     Obesity 2013    Other and unspecified hyperlipidemia 2013    Prinzmetal's angina     Sleep apnea 2013     Family History   Problem Relation Age of Onset    Hypertension Mother     Cancer Mother     Kidney cancer Mother     COPD Father     Hypertension Father     Heart disease Sister     Hyperlipidemia Son     Colon cancer Neg Hx      Social History     Socioeconomic History    Marital status:      Spouse name: Not on file    Number of children: Not on file    Years of education: Not on file    Highest education level: Not on file   Occupational History    Not on file   Social Needs    Financial resource strain: Not hard at all    Food insecurity     Worry: Never true     Inability: Never true    Transportation needs     Medical: No     Non-medical: No   Tobacco Use    Smoking status: Former Smoker     Packs/day: 0.50     Years: 2.00     Pack years: 1.00     Types: Cigarettes     Quit date: 2016     Years since quittin.5    Smokeless tobacco: Never Used   Substance and Sexual Activity    Alcohol use: No     Alcohol/week: 1.0 standard drinks     Types: 1 Standard drinks or equivalent per week     Frequency: Never    Drug use: No    Sexual activity: Never   Lifestyle    Physical activity     Days per week: 0 days     Minutes per session: Not on file    Stress: Only a little   Relationships    Social connections     Talks on phone: More  than three times a week     Gets together: More than three times a week     Attends [employer] service: Not on file     Active member of club or organization: No     Attends meetings of clubs or organizations: Never     Relationship status:    Other Topics Concern    Not on file   Social History Narrative    Not on file     Past Surgical History:   Procedure Laterality Date    ABDOMINAL SURGERY      CHOLECYSTECTOMY      COLONOSCOPY N/A 12/6/2019    Procedure: COLONOSCOPY;  Surgeon: Winston Smith MD;  Location: [facility];  Service: Endoscopy;  Laterality: N/A;    ESOPHAGOGASTRODUODENOSCOPY N/A 12/10/2018    Procedure: EGD (ESOPHAGOGASTRODUODENOSCOPY);  Surgeon: Raúl Polanco III, MD;  Location: [facility];  Service: Endoscopy;  Laterality: N/A;    ESOPHAGOGASTRODUODENOSCOPY N/A 9/8/2020    Procedure: EGD (ESOPHAGOGASTRODUODENOSCOPY);  Surgeon: Raúl Polanco III, MD;  Location: [facility];  Service: Endoscopy;  Laterality: N/A;    EYE SURGERY      FOOT SURGERY      INTRALUMINAL GASTROINTESTINAL TRACT IMAGING VIA CAPSULE N/A 9/18/2020    Procedure: IMAGING PROCEDURE, GI TRACT, INTRALUMINAL, VIA CAPSULE;  Surgeon: Blade Holley RN;  Location: [facility];  Service: Endoscopy;  Laterality: N/A;    TONSILLECTOMY      TUBAL LIGATION         Labs:  Lab Results   Component Value Date    WBC 7.91 12/07/2020    HGB 13.8 12/07/2020    HCT 43.9 12/07/2020    MCV 92 12/07/2020     12/07/2020     BMP  Lab Results   Component Value Date     09/15/2020    K 4.5 09/15/2020     09/15/2020    CO2 22 (L) 09/15/2020    BUN 18 09/15/2020    CREATININE 0.8 09/15/2020    CALCIUM 9.1 09/15/2020    ANIONGAP 10 09/15/2020    ESTGFRAFRICA >60.0 09/15/2020    EGFRNONAA >60.0 09/15/2020     Lab Results   Component Value Date    ALT 11 09/15/2020    AST 16 09/15/2020    ALKPHOS 56 09/15/2020    BILITOT 0.3 09/15/2020       Lab Results   Component Value Date    IRON 68 12/07/2020    TIBC 400 12/07/2020     FERRITIN 79 12/07/2020     Lab Results   Component Value Date    RWEDAKTL53 528 09/01/2020     Lab Results   Component Value Date    FOLATE 35.5 (H) 09/01/2020     Lab Results   Component Value Date    TSH 1.304 09/15/2020         Review of Systems   Constitutional: Positive for fatigue. Negative for activity change, appetite change, chills, diaphoresis, fever and unexpected weight change.   HENT: Negative for congestion, dental problem, drooling, ear discharge, ear pain, facial swelling, hearing loss, mouth sores, nosebleeds, postnasal drip, rhinorrhea, sinus pressure, sneezing, sore throat, tinnitus, trouble swallowing and voice change.    Eyes: Negative for photophobia, pain, discharge, redness, itching and visual disturbance.   Respiratory: Negative for cough, choking, chest tightness, shortness of breath, wheezing and stridor.    Cardiovascular: Negative for chest pain, palpitations and leg swelling.   Gastrointestinal: Negative for abdominal distention, abdominal pain, anal bleeding, blood in stool, constipation, diarrhea, nausea, rectal pain and vomiting.   Endocrine: Negative for cold intolerance, heat intolerance, polydipsia, polyphagia and polyuria.   Genitourinary: Negative for decreased urine volume, difficulty urinating, dyspareunia, dysuria, enuresis, flank pain, frequency, genital sores, hematuria, menstrual problem, pelvic pain, urgency, vaginal bleeding, vaginal discharge and vaginal pain.   Musculoskeletal: Negative for arthralgias, back pain, gait problem, joint swelling, myalgias, neck pain and neck stiffness.   Skin: Negative for color change, pallor and rash.   Allergic/Immunologic: Negative for environmental allergies, food allergies and immunocompromised state.   Neurological: Positive for weakness. Negative for dizziness, tremors, seizures, syncope, facial asymmetry, speech difficulty, light-headedness, numbness and headaches.   Hematological: Negative for adenopathy. Does not bruise/bleed  easily.   Psychiatric/Behavioral: Positive for dysphoric mood. Negative for agitation, behavioral problems, confusion, decreased concentration, hallucinations, self-injury, sleep disturbance and suicidal ideas. The patient is nervous/anxious. The patient is not hyperactive.        Objective:      Physical Exam  Vitals signs reviewed.   Constitutional:       General: She is not in acute distress.     Appearance: She is well-developed. She is not diaphoretic.   HENT:      Head: Normocephalic and atraumatic.      Right Ear: External ear normal.      Left Ear: External ear normal.      Nose: Nose normal.      Right Sinus: No maxillary sinus tenderness or frontal sinus tenderness.      Left Sinus: No maxillary sinus tenderness or frontal sinus tenderness.      Mouth/Throat:      Pharynx: No oropharyngeal exudate.   Eyes:      General: Lids are normal. No scleral icterus.        Right eye: No discharge.         Left eye: No discharge.      Conjunctiva/sclera: Conjunctivae normal.      Right eye: Right conjunctiva is not injected. No hemorrhage.     Left eye: Left conjunctiva is not injected. No hemorrhage.     Pupils: Pupils are equal, round, and reactive to light.   Neck:      Musculoskeletal: Normal range of motion and neck supple.      Thyroid: No thyromegaly.      Vascular: No JVD.      Trachea: No tracheal deviation.   Cardiovascular:      Rate and Rhythm: Normal rate.   Pulmonary:      Effort: Pulmonary effort is normal. No respiratory distress.      Breath sounds: No stridor.   Chest:      Chest wall: No tenderness.   Abdominal:      General: Bowel sounds are normal. There is no distension.      Palpations: Abdomen is soft. There is no hepatomegaly, splenomegaly or mass.      Tenderness: There is no abdominal tenderness. There is no rebound.   Musculoskeletal: Normal range of motion.         General: No tenderness.   Lymphadenopathy:      Cervical: No cervical adenopathy.      Upper Body:      Right upper body: No  supraclavicular adenopathy.      Left upper body: No supraclavicular adenopathy.   Skin:     General: Skin is dry.      Findings: No erythema or rash.   Neurological:      Mental Status: She is alert and oriented to person, place, and time.      Cranial Nerves: No cranial nerve deficit.      Coordination: Coordination normal.   Psychiatric:         Behavior: Behavior normal.         Thought Content: Thought content normal.         Judgment: Judgment normal.             Assessment:      1. Iron deficiency anemia, unspecified iron deficiency anemia type    2. Chronic gastric ulcer with hemorrhage    3. History of 2019 novel coronavirus disease (COVID-19)           Plan:     Marked good improvement in terms of intravenous iron administration with CBC and iron status.  At this time patient is to return in 3 months with CBC iron status prior.  Patient will be given flu and pneumonia 23 vaccination today discussed implications with her 25 min face-to-face time coordination of care greater 50% time face-to-face with patient        John Jennings Jr, MD FACP

## 2020-12-14 NOTE — NURSING
Patient arrived to unit ambulatory without distress for immunizations, Flu and Pneumonia; see MAR and vitals for more details. Tolerated well without adverse events, discharged ambulatory out of clinic.  Instructed to wait 15 minutes following injections. Understanding verbalized.

## 2021-01-06 ENCOUNTER — PATIENT MESSAGE (OUTPATIENT)
Dept: FAMILY MEDICINE | Facility: CLINIC | Age: 66
End: 2021-01-06

## 2021-01-06 ENCOUNTER — HOSPITAL ENCOUNTER (OUTPATIENT)
Dept: RADIOLOGY | Facility: HOSPITAL | Age: 66
Discharge: HOME | End: 2021-01-06
Attending: NURSE PRACTITIONER
Payer: COMMERCIAL

## 2021-01-06 ENCOUNTER — OFFICE VISIT (OUTPATIENT)
Dept: FAMILY MEDICINE | Facility: CLINIC | Age: 66
End: 2021-01-06
Payer: COMMERCIAL

## 2021-01-06 ENCOUNTER — CLINICAL SUPPORT (OUTPATIENT)
Dept: PULMONOLOGY | Facility: CLINIC | Age: 66
End: 2021-01-06
Payer: COMMERCIAL

## 2021-01-06 ENCOUNTER — OFFICE VISIT (OUTPATIENT)
Dept: PULMONOLOGY | Facility: CLINIC | Age: 66
End: 2021-01-06
Payer: COMMERCIAL

## 2021-01-06 VITALS
WEIGHT: 206.56 LBS | OXYGEN SATURATION: 96 % | WEIGHT: 206.56 LBS | DIASTOLIC BLOOD PRESSURE: 68 MMHG | HEIGHT: 65 IN | HEART RATE: 78 BPM | RESPIRATION RATE: 18 BRPM | BODY MASS INDEX: 34.41 KG/M2 | BODY MASS INDEX: 34.41 KG/M2 | HEIGHT: 65 IN | SYSTOLIC BLOOD PRESSURE: 139 MMHG

## 2021-01-06 VITALS
HEIGHT: 65 IN | HEART RATE: 96 BPM | OXYGEN SATURATION: 95 % | BODY MASS INDEX: 34.44 KG/M2 | DIASTOLIC BLOOD PRESSURE: 70 MMHG | RESPIRATION RATE: 16 BRPM | SYSTOLIC BLOOD PRESSURE: 120 MMHG | WEIGHT: 206.69 LBS | TEMPERATURE: 97 F

## 2021-01-06 DIAGNOSIS — J44.89 ASTHMA WITH COPD: Primary | ICD-10-CM

## 2021-01-06 DIAGNOSIS — R06.00 DYSPNEA AND RESPIRATORY ABNORMALITIES: ICD-10-CM

## 2021-01-06 DIAGNOSIS — E78.2 MIXED HYPERLIPIDEMIA: ICD-10-CM

## 2021-01-06 DIAGNOSIS — J44.1 ASTHMA WITH COPD WITH EXACERBATION: ICD-10-CM

## 2021-01-06 DIAGNOSIS — E66.09 CLASS 1 OBESITY DUE TO EXCESS CALORIES WITH SERIOUS COMORBIDITY AND BODY MASS INDEX (BMI) OF 31.0 TO 31.9 IN ADULT: ICD-10-CM

## 2021-01-06 DIAGNOSIS — J45.901 ASTHMA WITH COPD WITH EXACERBATION: ICD-10-CM

## 2021-01-06 DIAGNOSIS — R39.15 URINARY URGENCY: ICD-10-CM

## 2021-01-06 DIAGNOSIS — R06.89 DYSPNEA AND RESPIRATORY ABNORMALITIES: ICD-10-CM

## 2021-01-06 DIAGNOSIS — N28.1 RENAL CYST: ICD-10-CM

## 2021-01-06 DIAGNOSIS — F17.211 CIGARETTE NICOTINE DEPENDENCE IN REMISSION: ICD-10-CM

## 2021-01-06 DIAGNOSIS — G47.33 OSA ON CPAP: ICD-10-CM

## 2021-01-06 DIAGNOSIS — D64.9 ANEMIA, UNSPECIFIED TYPE: ICD-10-CM

## 2021-01-06 DIAGNOSIS — I10 ESSENTIAL HYPERTENSION: ICD-10-CM

## 2021-01-06 DIAGNOSIS — R06.00 DYSPNEA, UNSPECIFIED TYPE: Primary | ICD-10-CM

## 2021-01-06 DIAGNOSIS — Z12.2 ENCOUNTER FOR SCREENING FOR MALIGNANT NEOPLASM OF RESPIRATORY ORGANS: ICD-10-CM

## 2021-01-06 LAB
BILIRUB SERPL-MCNC: NORMAL MG/DL
BLOOD URINE, POC: NORMAL
CLARITY, POC UA: CLEAR
COLOR, POC UA: NORMAL
GLUCOSE UR QL STRIP: NORMAL
KETONES UR QL STRIP: NORMAL
LEUKOCYTE ESTERASE URINE, POC: NORMAL
NITRITE, POC UA: NORMAL
PH, POC UA: 5
PROTEIN, POC: NORMAL
SPECIFIC GRAVITY, POC UA: 1.02
UROBILINOGEN, POC UA: NORMAL

## 2021-01-06 PROCEDURE — 3288F FALL RISK ASSESSMENT DOCD: CPT | Mod: CPTII,S$GLB,, | Performed by: NURSE PRACTITIONER

## 2021-01-06 PROCEDURE — 1159F MED LIST DOCD IN RCRD: CPT | Mod: S$GLB,,, | Performed by: NURSE PRACTITIONER

## 2021-01-06 PROCEDURE — 3078F DIAST BP <80 MM HG: CPT | Mod: CPTII,S$GLB,, | Performed by: FAMILY MEDICINE

## 2021-01-06 PROCEDURE — 81002 POCT URINE DIPSTICK WITHOUT MICROSCOPE: ICD-10-PCS | Mod: S$GLB,,, | Performed by: FAMILY MEDICINE

## 2021-01-06 PROCEDURE — 81002 URINALYSIS NONAUTO W/O SCOPE: CPT | Mod: S$GLB,,, | Performed by: FAMILY MEDICINE

## 2021-01-06 PROCEDURE — 71046 XR CHEST PA AND LATERAL: ICD-10-PCS | Mod: 26,,, | Performed by: RADIOLOGY

## 2021-01-06 PROCEDURE — 87186 SC STD MICRODIL/AGAR DIL: CPT

## 2021-01-06 PROCEDURE — 3288F FALL RISK ASSESSMENT DOCD: CPT | Mod: CPTII,S$GLB,, | Performed by: FAMILY MEDICINE

## 2021-01-06 PROCEDURE — 1126F AMNT PAIN NOTED NONE PRSNT: CPT | Mod: S$GLB,,, | Performed by: FAMILY MEDICINE

## 2021-01-06 PROCEDURE — 99214 PR OFFICE/OUTPT VISIT, EST, LEVL IV, 30-39 MIN: ICD-10-PCS | Mod: 25,S$GLB,, | Performed by: FAMILY MEDICINE

## 2021-01-06 PROCEDURE — 1126F PR PAIN SEVERITY QUANTIFIED, NO PAIN PRESENT: ICD-10-PCS | Mod: S$GLB,,, | Performed by: FAMILY MEDICINE

## 2021-01-06 PROCEDURE — 3078F PR MOST RECENT DIASTOLIC BLOOD PRESSURE < 80 MM HG: ICD-10-PCS | Mod: CPTII,S$GLB,, | Performed by: FAMILY MEDICINE

## 2021-01-06 PROCEDURE — 87086 URINE CULTURE/COLONY COUNT: CPT

## 2021-01-06 PROCEDURE — 3008F BODY MASS INDEX DOCD: CPT | Mod: CPTII,S$GLB,, | Performed by: NURSE PRACTITIONER

## 2021-01-06 PROCEDURE — 1159F MED LIST DOCD IN RCRD: CPT | Mod: S$GLB,,, | Performed by: FAMILY MEDICINE

## 2021-01-06 PROCEDURE — 94618 PULMONARY STRESS TESTING: ICD-10-PCS | Mod: S$GLB,,, | Performed by: INTERNAL MEDICINE

## 2021-01-06 PROCEDURE — 99214 PR OFFICE/OUTPT VISIT, EST, LEVL IV, 30-39 MIN: ICD-10-PCS | Mod: 25,S$GLB,, | Performed by: NURSE PRACTITIONER

## 2021-01-06 PROCEDURE — 1101F PR PT FALLS ASSESS DOC 0-1 FALLS W/OUT INJ PAST YR: ICD-10-PCS | Mod: CPTII,S$GLB,, | Performed by: FAMILY MEDICINE

## 2021-01-06 PROCEDURE — 3288F PR FALLS RISK ASSESSMENT DOCUMENTED: ICD-10-PCS | Mod: CPTII,S$GLB,, | Performed by: FAMILY MEDICINE

## 2021-01-06 PROCEDURE — 99999 PR PBB SHADOW E&M-EST. PATIENT-LVL V: ICD-10-PCS | Mod: PBBFAC,,, | Performed by: NURSE PRACTITIONER

## 2021-01-06 PROCEDURE — 3008F PR BODY MASS INDEX (BMI) DOCUMENTED: ICD-10-PCS | Mod: CPTII,S$GLB,, | Performed by: FAMILY MEDICINE

## 2021-01-06 PROCEDURE — 87077 CULTURE AEROBIC IDENTIFY: CPT

## 2021-01-06 PROCEDURE — 3074F SYST BP LT 130 MM HG: CPT | Mod: CPTII,S$GLB,, | Performed by: FAMILY MEDICINE

## 2021-01-06 PROCEDURE — 99999 PR PBB SHADOW E&M-EST. PATIENT-LVL IV: CPT | Mod: PBBFAC,,, | Performed by: FAMILY MEDICINE

## 2021-01-06 PROCEDURE — 99999 PR PBB SHADOW E&M-EST. PATIENT-LVL IV: ICD-10-PCS | Mod: PBBFAC,,, | Performed by: FAMILY MEDICINE

## 2021-01-06 PROCEDURE — 3008F PR BODY MASS INDEX (BMI) DOCUMENTED: ICD-10-PCS | Mod: CPTII,S$GLB,, | Performed by: NURSE PRACTITIONER

## 2021-01-06 PROCEDURE — 1159F PR MEDICATION LIST DOCUMENTED IN MEDICAL RECORD: ICD-10-PCS | Mod: S$GLB,,, | Performed by: NURSE PRACTITIONER

## 2021-01-06 PROCEDURE — 1101F PR PT FALLS ASSESS DOC 0-1 FALLS W/OUT INJ PAST YR: ICD-10-PCS | Mod: CPTII,S$GLB,, | Performed by: NURSE PRACTITIONER

## 2021-01-06 PROCEDURE — 3078F PR MOST RECENT DIASTOLIC BLOOD PRESSURE < 80 MM HG: ICD-10-PCS | Mod: CPTII,S$GLB,, | Performed by: NURSE PRACTITIONER

## 2021-01-06 PROCEDURE — 99214 OFFICE O/P EST MOD 30 MIN: CPT | Mod: 25,S$GLB,, | Performed by: NURSE PRACTITIONER

## 2021-01-06 PROCEDURE — 1101F PT FALLS ASSESS-DOCD LE1/YR: CPT | Mod: CPTII,S$GLB,, | Performed by: FAMILY MEDICINE

## 2021-01-06 PROCEDURE — 3075F PR MOST RECENT SYSTOLIC BLOOD PRESS GE 130-139MM HG: ICD-10-PCS | Mod: CPTII,S$GLB,, | Performed by: NURSE PRACTITIONER

## 2021-01-06 PROCEDURE — 3008F BODY MASS INDEX DOCD: CPT | Mod: CPTII,S$GLB,, | Performed by: FAMILY MEDICINE

## 2021-01-06 PROCEDURE — 94618 PULMONARY STRESS TESTING: CPT | Mod: S$GLB,,, | Performed by: INTERNAL MEDICINE

## 2021-01-06 PROCEDURE — 87088 URINE BACTERIA CULTURE: CPT

## 2021-01-06 PROCEDURE — 99214 OFFICE O/P EST MOD 30 MIN: CPT | Mod: 25,S$GLB,, | Performed by: FAMILY MEDICINE

## 2021-01-06 PROCEDURE — 3288F PR FALLS RISK ASSESSMENT DOCUMENTED: ICD-10-PCS | Mod: CPTII,S$GLB,, | Performed by: NURSE PRACTITIONER

## 2021-01-06 PROCEDURE — 3078F DIAST BP <80 MM HG: CPT | Mod: CPTII,S$GLB,, | Performed by: NURSE PRACTITIONER

## 2021-01-06 PROCEDURE — 99999 PR PBB SHADOW E&M-EST. PATIENT-LVL V: CPT | Mod: PBBFAC,,, | Performed by: NURSE PRACTITIONER

## 2021-01-06 PROCEDURE — 71046 X-RAY EXAM CHEST 2 VIEWS: CPT | Mod: TC

## 2021-01-06 PROCEDURE — 3074F PR MOST RECENT SYSTOLIC BLOOD PRESSURE < 130 MM HG: ICD-10-PCS | Mod: CPTII,S$GLB,, | Performed by: FAMILY MEDICINE

## 2021-01-06 PROCEDURE — 1101F PT FALLS ASSESS-DOCD LE1/YR: CPT | Mod: CPTII,S$GLB,, | Performed by: NURSE PRACTITIONER

## 2021-01-06 PROCEDURE — 71046 X-RAY EXAM CHEST 2 VIEWS: CPT | Mod: 26,,, | Performed by: RADIOLOGY

## 2021-01-06 PROCEDURE — 3075F SYST BP GE 130 - 139MM HG: CPT | Mod: CPTII,S$GLB,, | Performed by: NURSE PRACTITIONER

## 2021-01-06 PROCEDURE — 1159F PR MEDICATION LIST DOCUMENTED IN MEDICAL RECORD: ICD-10-PCS | Mod: S$GLB,,, | Performed by: FAMILY MEDICINE

## 2021-01-06 RX ORDER — MOMETASONE FUROATE AND FORMOTEROL FUMARATE DIHYDRATE 200; 5 UG/1; UG/1
2 AEROSOL RESPIRATORY (INHALATION) 2 TIMES DAILY
Qty: 13 G | Refills: 11 | Status: SHIPPED | OUTPATIENT
Start: 2021-01-06 | End: 2024-01-02

## 2021-01-06 RX ORDER — ALBUTEROL SULFATE 90 UG/1
2 AEROSOL, METERED RESPIRATORY (INHALATION) EVERY 4 HOURS PRN
Qty: 18 G | Refills: 11 | Status: SHIPPED | OUTPATIENT
Start: 2021-01-06 | End: 2022-01-06

## 2021-01-06 RX ORDER — ALBUTEROL SULFATE 0.83 MG/ML
2.5 SOLUTION RESPIRATORY (INHALATION) EVERY 4 HOURS PRN
Qty: 150 ML | Refills: 11 | Status: SHIPPED | OUTPATIENT
Start: 2021-01-06 | End: 2022-01-06

## 2021-01-06 RX ORDER — AZITHROMYCIN 250 MG/1
TABLET, FILM COATED ORAL
Qty: 6 TABLET | Refills: 0 | Status: SHIPPED | OUTPATIENT
Start: 2021-01-06 | End: 2021-11-19

## 2021-01-06 RX ORDER — PREDNISONE 20 MG/1
TABLET ORAL
Qty: 20 TABLET | Refills: 0 | Status: SHIPPED | OUTPATIENT
Start: 2021-01-06 | End: 2021-11-19

## 2021-01-09 LAB — BACTERIA UR CULT: ABNORMAL

## 2021-01-11 RX ORDER — NITROFURANTOIN 25; 75 MG/1; MG/1
100 CAPSULE ORAL 2 TIMES DAILY
Qty: 20 CAPSULE | Refills: 0 | Status: SHIPPED | OUTPATIENT
Start: 2021-01-11 | End: 2022-04-01 | Stop reason: ALTCHOICE

## 2021-02-28 ENCOUNTER — LAB VISIT (OUTPATIENT)
Dept: OTOLARYNGOLOGY | Facility: CLINIC | Age: 66
End: 2021-02-28
Payer: COMMERCIAL

## 2021-02-28 DIAGNOSIS — J44.89 ASTHMA WITH COPD: ICD-10-CM

## 2021-02-28 PROCEDURE — U0003 INFECTIOUS AGENT DETECTION BY NUCLEIC ACID (DNA OR RNA); SEVERE ACUTE RESPIRATORY SYNDROME CORONAVIRUS 2 (SARS-COV-2) (CORONAVIRUS DISEASE [COVID-19]), AMPLIFIED PROBE TECHNIQUE, MAKING USE OF HIGH THROUGHPUT TECHNOLOGIES AS DESCRIBED BY CMS-2020-01-R: HCPCS

## 2021-02-28 PROCEDURE — U0005 INFEC AGEN DETEC AMPLI PROBE: HCPCS

## 2021-03-01 LAB — SARS-COV-2 RNA RESP QL NAA+PROBE: NOT DETECTED

## 2021-03-02 ENCOUNTER — HOSPITAL ENCOUNTER (OUTPATIENT)
Dept: RADIOLOGY | Facility: HOSPITAL | Age: 66
Discharge: HOME | End: 2021-03-02
Attending: NURSE PRACTITIONER
Payer: COMMERCIAL

## 2021-03-02 DIAGNOSIS — F17.211 CIGARETTE NICOTINE DEPENDENCE IN REMISSION: ICD-10-CM

## 2021-03-02 DIAGNOSIS — Z12.2 ENCOUNTER FOR SCREENING FOR MALIGNANT NEOPLASM OF RESPIRATORY ORGANS: ICD-10-CM

## 2021-03-02 PROCEDURE — 71271 CT CHEST LUNG SCREENING LOW DOSE: ICD-10-PCS | Mod: 26,,, | Performed by: RADIOLOGY

## 2021-03-02 PROCEDURE — 71271 CT THORAX LUNG CANCER SCR C-: CPT | Mod: TC

## 2021-03-02 PROCEDURE — 71271 CT THORAX LUNG CANCER SCR C-: CPT | Mod: 26,,, | Performed by: RADIOLOGY

## 2021-03-03 ENCOUNTER — CLINICAL SUPPORT (OUTPATIENT)
Dept: PULMONOLOGY | Facility: CLINIC | Age: 66
End: 2021-03-03
Payer: COMMERCIAL

## 2021-03-03 ENCOUNTER — OFFICE VISIT (OUTPATIENT)
Dept: PULMONOLOGY | Facility: CLINIC | Age: 66
End: 2021-03-03
Payer: COMMERCIAL

## 2021-03-03 VITALS
SYSTOLIC BLOOD PRESSURE: 124 MMHG | HEIGHT: 65 IN | BODY MASS INDEX: 35.26 KG/M2 | RESPIRATION RATE: 18 BRPM | HEART RATE: 80 BPM | OXYGEN SATURATION: 95 % | DIASTOLIC BLOOD PRESSURE: 78 MMHG | WEIGHT: 211.63 LBS

## 2021-03-03 DIAGNOSIS — E66.01 SEVERE OBESITY (BMI 35.0-35.9 WITH COMORBIDITY): ICD-10-CM

## 2021-03-03 DIAGNOSIS — R01.0 BENIGN HEART MURMUR: ICD-10-CM

## 2021-03-03 DIAGNOSIS — J45.20 MILD INTERMITTENT ASTHMA WITHOUT COMPLICATION: ICD-10-CM

## 2021-03-03 DIAGNOSIS — J44.89 ASTHMA WITH COPD: ICD-10-CM

## 2021-03-03 DIAGNOSIS — K21.9 GASTROESOPHAGEAL REFLUX DISEASE, UNSPECIFIED WHETHER ESOPHAGITIS PRESENT: ICD-10-CM

## 2021-03-03 DIAGNOSIS — R91.8 MULTIPLE LUNG NODULES: Primary | ICD-10-CM

## 2021-03-03 DIAGNOSIS — Z86.16 HISTORY OF 2019 NOVEL CORONAVIRUS DISEASE (COVID-19): ICD-10-CM

## 2021-03-03 DIAGNOSIS — G47.33 OSA ON CPAP: Chronic | ICD-10-CM

## 2021-03-03 DIAGNOSIS — R59.0 MEDIASTINAL LYMPHADENOPATHY: ICD-10-CM

## 2021-03-03 PROCEDURE — 3288F FALL RISK ASSESSMENT DOCD: CPT | Mod: CPTII,S$GLB,, | Performed by: NURSE PRACTITIONER

## 2021-03-03 PROCEDURE — 99214 OFFICE O/P EST MOD 30 MIN: CPT | Mod: 25,S$GLB,, | Performed by: NURSE PRACTITIONER

## 2021-03-03 PROCEDURE — 1159F PR MEDICATION LIST DOCUMENTED IN MEDICAL RECORD: ICD-10-PCS | Mod: S$GLB,,, | Performed by: NURSE PRACTITIONER

## 2021-03-03 PROCEDURE — 3008F BODY MASS INDEX DOCD: CPT | Mod: CPTII,S$GLB,, | Performed by: NURSE PRACTITIONER

## 2021-03-03 PROCEDURE — 3288F PR FALLS RISK ASSESSMENT DOCUMENTED: ICD-10-PCS | Mod: CPTII,S$GLB,, | Performed by: NURSE PRACTITIONER

## 2021-03-03 PROCEDURE — 1101F PR PT FALLS ASSESS DOC 0-1 FALLS W/OUT INJ PAST YR: ICD-10-PCS | Mod: CPTII,S$GLB,, | Performed by: NURSE PRACTITIONER

## 2021-03-03 PROCEDURE — 99214 PR OFFICE/OUTPT VISIT, EST, LEVL IV, 30-39 MIN: ICD-10-PCS | Mod: 25,S$GLB,, | Performed by: NURSE PRACTITIONER

## 2021-03-03 PROCEDURE — 3078F PR MOST RECENT DIASTOLIC BLOOD PRESSURE < 80 MM HG: ICD-10-PCS | Mod: CPTII,S$GLB,, | Performed by: NURSE PRACTITIONER

## 2021-03-03 PROCEDURE — 99999 PR PBB SHADOW E&M-EST. PATIENT-LVL IV: CPT | Mod: PBBFAC,,, | Performed by: NURSE PRACTITIONER

## 2021-03-03 PROCEDURE — 3078F DIAST BP <80 MM HG: CPT | Mod: CPTII,S$GLB,, | Performed by: NURSE PRACTITIONER

## 2021-03-03 PROCEDURE — 94010 BREATHING CAPACITY TEST: ICD-10-PCS | Mod: S$GLB,,, | Performed by: INTERNAL MEDICINE

## 2021-03-03 PROCEDURE — 94010 BREATHING CAPACITY TEST: CPT | Mod: S$GLB,,, | Performed by: INTERNAL MEDICINE

## 2021-03-03 PROCEDURE — 3074F SYST BP LT 130 MM HG: CPT | Mod: CPTII,S$GLB,, | Performed by: NURSE PRACTITIONER

## 2021-03-03 PROCEDURE — 99999 PR PBB SHADOW E&M-EST. PATIENT-LVL IV: ICD-10-PCS | Mod: PBBFAC,,, | Performed by: NURSE PRACTITIONER

## 2021-03-03 PROCEDURE — 3074F PR MOST RECENT SYSTOLIC BLOOD PRESSURE < 130 MM HG: ICD-10-PCS | Mod: CPTII,S$GLB,, | Performed by: NURSE PRACTITIONER

## 2021-03-03 PROCEDURE — 1101F PT FALLS ASSESS-DOCD LE1/YR: CPT | Mod: CPTII,S$GLB,, | Performed by: NURSE PRACTITIONER

## 2021-03-03 PROCEDURE — 1159F MED LIST DOCD IN RCRD: CPT | Mod: S$GLB,,, | Performed by: NURSE PRACTITIONER

## 2021-03-03 PROCEDURE — 3008F PR BODY MASS INDEX (BMI) DOCUMENTED: ICD-10-PCS | Mod: CPTII,S$GLB,, | Performed by: NURSE PRACTITIONER

## 2021-03-08 LAB
BRPFT: NORMAL
FEF 25 75 LLN: 0.95
FEF 25 75 PRE REF: 81.7 %
FEF 25 75 REF: 2
FEV1 FVC LLN: 65
FEV1 FVC PRE REF: 99.1 %
FEV1 FVC REF: 78
FEV1 LLN: 1.73
FEV1 PRE REF: 77.6 %
FEV1 REF: 2.35
FVC LLN: 2.23
FVC PRE REF: 77.6 %
FVC REF: 3.03
PEF LLN: 4.25
PEF PRE REF: 88.4 %
PEF REF: 6.01
PRE FEF 25 75: 1.64 L/S
PRE FET 100: 7.63 SEC
PRE FEV1 FVC: 77.62 %
PRE FEV1: 1.82 L
PRE FVC: 2.35 L
PRE PEF: 5.32 L/S

## 2021-03-17 ENCOUNTER — LAB VISIT (OUTPATIENT)
Dept: LAB | Facility: HOSPITAL | Age: 66
End: 2021-03-17
Attending: INTERNAL MEDICINE
Payer: COMMERCIAL

## 2021-03-17 DIAGNOSIS — D50.9 IRON DEFICIENCY ANEMIA, UNSPECIFIED IRON DEFICIENCY ANEMIA TYPE: ICD-10-CM

## 2021-03-17 DIAGNOSIS — K25.4 CHRONIC GASTRIC ULCER WITH HEMORRHAGE: ICD-10-CM

## 2021-03-17 LAB
BASOPHILS # BLD AUTO: 0.02 K/UL (ref 0–0.2)
BASOPHILS NFR BLD: 0.3 % (ref 0–1.9)
DIFFERENTIAL METHOD: NORMAL
EOSINOPHIL # BLD AUTO: 0.1 K/UL (ref 0–0.5)
EOSINOPHIL NFR BLD: 1.3 % (ref 0–8)
ERYTHROCYTE [DISTWIDTH] IN BLOOD BY AUTOMATED COUNT: 12.7 % (ref 11.5–14.5)
HCT VFR BLD AUTO: 42 % (ref 37–48.5)
HGB BLD-MCNC: 14 G/DL (ref 12–16)
IMM GRANULOCYTES # BLD AUTO: 0.03 K/UL (ref 0–0.04)
IMM GRANULOCYTES NFR BLD AUTO: 0.5 % (ref 0–0.5)
LYMPHOCYTES # BLD AUTO: 1.8 K/UL (ref 1–4.8)
LYMPHOCYTES NFR BLD: 29.3 % (ref 18–48)
MCH RBC QN AUTO: 30.5 PG (ref 27–31)
MCHC RBC AUTO-ENTMCNC: 33.3 G/DL (ref 32–36)
MCV RBC AUTO: 92 FL (ref 82–98)
MONOCYTES # BLD AUTO: 0.4 K/UL (ref 0.3–1)
MONOCYTES NFR BLD: 6.4 % (ref 4–15)
NEUTROPHILS # BLD AUTO: 3.9 K/UL (ref 1.8–7.7)
NEUTROPHILS NFR BLD: 62.2 % (ref 38–73)
NRBC BLD-RTO: 0 /100 WBC
PLATELET # BLD AUTO: 208 K/UL (ref 150–350)
PMV BLD AUTO: 9.3 FL (ref 9.2–12.9)
RBC # BLD AUTO: 4.59 M/UL (ref 4–5.4)
WBC # BLD AUTO: 6.24 K/UL (ref 3.9–12.7)

## 2021-03-17 PROCEDURE — 83540 ASSAY OF IRON: CPT | Performed by: INTERNAL MEDICINE

## 2021-03-17 PROCEDURE — 82728 ASSAY OF FERRITIN: CPT | Performed by: INTERNAL MEDICINE

## 2021-03-17 PROCEDURE — 85025 COMPLETE CBC W/AUTO DIFF WBC: CPT | Performed by: INTERNAL MEDICINE

## 2021-03-17 PROCEDURE — 36415 COLL VENOUS BLD VENIPUNCTURE: CPT | Performed by: INTERNAL MEDICINE

## 2021-03-18 LAB
FERRITIN SERPL-MCNC: 26 NG/ML (ref 20–300)
IRON SERPL-MCNC: 69 UG/DL (ref 30–160)
SATURATED IRON: 15 % (ref 20–50)
TOTAL IRON BINDING CAPACITY: 451 UG/DL (ref 250–450)
TRANSFERRIN SERPL-MCNC: 305 MG/DL (ref 200–375)

## 2021-03-22 ENCOUNTER — PATIENT MESSAGE (OUTPATIENT)
Dept: HEMATOLOGY/ONCOLOGY | Facility: CLINIC | Age: 66
End: 2021-03-22

## 2021-03-22 ENCOUNTER — OFFICE VISIT (OUTPATIENT)
Dept: HEMATOLOGY/ONCOLOGY | Facility: CLINIC | Age: 66
End: 2021-03-22
Payer: COMMERCIAL

## 2021-03-22 VITALS
OXYGEN SATURATION: 96 % | DIASTOLIC BLOOD PRESSURE: 75 MMHG | TEMPERATURE: 97 F | BODY MASS INDEX: 35.45 KG/M2 | SYSTOLIC BLOOD PRESSURE: 132 MMHG | HEIGHT: 65 IN | HEART RATE: 80 BPM | WEIGHT: 212.75 LBS

## 2021-03-22 DIAGNOSIS — E66.01 SEVERE OBESITY (BMI 35.0-35.9 WITH COMORBIDITY): ICD-10-CM

## 2021-03-22 DIAGNOSIS — D50.9 IRON DEFICIENCY ANEMIA, UNSPECIFIED IRON DEFICIENCY ANEMIA TYPE: Primary | ICD-10-CM

## 2021-03-22 DIAGNOSIS — Z86.16 HISTORY OF 2019 NOVEL CORONAVIRUS DISEASE (COVID-19): ICD-10-CM

## 2021-03-22 PROBLEM — K25.4 CHRONIC GASTRIC ULCER WITH HEMORRHAGE: Status: RESOLVED | Noted: 2020-09-14 | Resolved: 2021-03-22

## 2021-03-22 PROBLEM — Z12.11 SCREENING FOR COLON CANCER: Status: RESOLVED | Noted: 2019-12-06 | Resolved: 2021-03-22

## 2021-03-22 PROCEDURE — 1159F PR MEDICATION LIST DOCUMENTED IN MEDICAL RECORD: ICD-10-PCS | Mod: S$GLB,,, | Performed by: INTERNAL MEDICINE

## 2021-03-22 PROCEDURE — 1101F PT FALLS ASSESS-DOCD LE1/YR: CPT | Mod: CPTII,S$GLB,, | Performed by: INTERNAL MEDICINE

## 2021-03-22 PROCEDURE — 3075F PR MOST RECENT SYSTOLIC BLOOD PRESS GE 130-139MM HG: ICD-10-PCS | Mod: CPTII,S$GLB,, | Performed by: INTERNAL MEDICINE

## 2021-03-22 PROCEDURE — 1159F MED LIST DOCD IN RCRD: CPT | Mod: S$GLB,,, | Performed by: INTERNAL MEDICINE

## 2021-03-22 PROCEDURE — 1126F AMNT PAIN NOTED NONE PRSNT: CPT | Mod: S$GLB,,, | Performed by: INTERNAL MEDICINE

## 2021-03-22 PROCEDURE — 3288F FALL RISK ASSESSMENT DOCD: CPT | Mod: CPTII,S$GLB,, | Performed by: INTERNAL MEDICINE

## 2021-03-22 PROCEDURE — 1101F PR PT FALLS ASSESS DOC 0-1 FALLS W/OUT INJ PAST YR: ICD-10-PCS | Mod: CPTII,S$GLB,, | Performed by: INTERNAL MEDICINE

## 2021-03-22 PROCEDURE — 3078F DIAST BP <80 MM HG: CPT | Mod: CPTII,S$GLB,, | Performed by: INTERNAL MEDICINE

## 2021-03-22 PROCEDURE — 99214 PR OFFICE/OUTPT VISIT, EST, LEVL IV, 30-39 MIN: ICD-10-PCS | Mod: S$GLB,,, | Performed by: INTERNAL MEDICINE

## 2021-03-22 PROCEDURE — 99999 PR PBB SHADOW E&M-EST. PATIENT-LVL IV: CPT | Mod: PBBFAC,,, | Performed by: INTERNAL MEDICINE

## 2021-03-22 PROCEDURE — 3078F PR MOST RECENT DIASTOLIC BLOOD PRESSURE < 80 MM HG: ICD-10-PCS | Mod: CPTII,S$GLB,, | Performed by: INTERNAL MEDICINE

## 2021-03-22 PROCEDURE — 3288F PR FALLS RISK ASSESSMENT DOCUMENTED: ICD-10-PCS | Mod: CPTII,S$GLB,, | Performed by: INTERNAL MEDICINE

## 2021-03-22 PROCEDURE — 99214 OFFICE O/P EST MOD 30 MIN: CPT | Mod: S$GLB,,, | Performed by: INTERNAL MEDICINE

## 2021-03-22 PROCEDURE — 3008F PR BODY MASS INDEX (BMI) DOCUMENTED: ICD-10-PCS | Mod: CPTII,S$GLB,, | Performed by: INTERNAL MEDICINE

## 2021-03-22 PROCEDURE — 3075F SYST BP GE 130 - 139MM HG: CPT | Mod: CPTII,S$GLB,, | Performed by: INTERNAL MEDICINE

## 2021-03-22 PROCEDURE — 3008F BODY MASS INDEX DOCD: CPT | Mod: CPTII,S$GLB,, | Performed by: INTERNAL MEDICINE

## 2021-03-22 PROCEDURE — 1126F PR PAIN SEVERITY QUANTIFIED, NO PAIN PRESENT: ICD-10-PCS | Mod: S$GLB,,, | Performed by: INTERNAL MEDICINE

## 2021-03-22 PROCEDURE — 99999 PR PBB SHADOW E&M-EST. PATIENT-LVL IV: ICD-10-PCS | Mod: PBBFAC,,, | Performed by: INTERNAL MEDICINE

## 2021-03-29 ENCOUNTER — PATIENT MESSAGE (OUTPATIENT)
Dept: FAMILY MEDICINE | Facility: CLINIC | Age: 66
End: 2021-03-29

## 2021-03-29 DIAGNOSIS — E78.2 MIXED HYPERLIPIDEMIA: ICD-10-CM

## 2021-03-29 RX ORDER — EZETIMIBE 10 MG/1
10 TABLET ORAL DAILY
Qty: 90 TABLET | Refills: 1 | Status: SHIPPED | OUTPATIENT
Start: 2021-03-29 | End: 2021-09-20

## 2021-05-20 ENCOUNTER — LAB VISIT (OUTPATIENT)
Dept: LAB | Facility: HOSPITAL | Age: 66
End: 2021-05-20
Attending: INTERNAL MEDICINE
Payer: COMMERCIAL

## 2021-05-20 DIAGNOSIS — D50.9 IRON DEFICIENCY ANEMIA, UNSPECIFIED IRON DEFICIENCY ANEMIA TYPE: ICD-10-CM

## 2021-05-20 LAB
BASOPHILS # BLD AUTO: 0.03 K/UL (ref 0–0.2)
BASOPHILS NFR BLD: 0.6 % (ref 0–1.9)
DIFFERENTIAL METHOD: ABNORMAL
EOSINOPHIL # BLD AUTO: 0.1 K/UL (ref 0–0.5)
EOSINOPHIL NFR BLD: 1.7 % (ref 0–8)
ERYTHROCYTE [DISTWIDTH] IN BLOOD BY AUTOMATED COUNT: 12.5 % (ref 11.5–14.5)
FERRITIN SERPL-MCNC: 17 NG/ML (ref 20–300)
HCT VFR BLD AUTO: 43.3 % (ref 37–48.5)
HGB BLD-MCNC: 14.1 G/DL (ref 12–16)
IMM GRANULOCYTES # BLD AUTO: 0.02 K/UL (ref 0–0.04)
IMM GRANULOCYTES NFR BLD AUTO: 0.4 % (ref 0–0.5)
IRON SERPL-MCNC: 56 UG/DL (ref 30–160)
LYMPHOCYTES # BLD AUTO: 1.5 K/UL (ref 1–4.8)
LYMPHOCYTES NFR BLD: 28.2 % (ref 18–48)
MCH RBC QN AUTO: 30.2 PG (ref 27–31)
MCHC RBC AUTO-ENTMCNC: 32.6 G/DL (ref 32–36)
MCV RBC AUTO: 93 FL (ref 82–98)
MONOCYTES # BLD AUTO: 0.4 K/UL (ref 0.3–1)
MONOCYTES NFR BLD: 7.8 % (ref 4–15)
NEUTROPHILS # BLD AUTO: 3.2 K/UL (ref 1.8–7.7)
NEUTROPHILS NFR BLD: 61.3 % (ref 38–73)
NRBC BLD-RTO: 0 /100 WBC
PLATELET # BLD AUTO: 211 K/UL (ref 150–450)
PMV BLD AUTO: 9.1 FL (ref 9.2–12.9)
RBC # BLD AUTO: 4.67 M/UL (ref 4–5.4)
SATURATED IRON: 12 % (ref 20–50)
TOTAL IRON BINDING CAPACITY: 475 UG/DL (ref 250–450)
TRANSFERRIN SERPL-MCNC: 321 MG/DL (ref 200–375)
WBC # BLD AUTO: 5.28 K/UL (ref 3.9–12.7)

## 2021-05-20 PROCEDURE — 82728 ASSAY OF FERRITIN: CPT | Performed by: INTERNAL MEDICINE

## 2021-05-20 PROCEDURE — 85025 COMPLETE CBC W/AUTO DIFF WBC: CPT | Performed by: INTERNAL MEDICINE

## 2021-05-20 PROCEDURE — 83540 ASSAY OF IRON: CPT | Performed by: INTERNAL MEDICINE

## 2021-05-20 PROCEDURE — 36415 COLL VENOUS BLD VENIPUNCTURE: CPT | Performed by: INTERNAL MEDICINE

## 2021-05-31 ENCOUNTER — OFFICE VISIT (OUTPATIENT)
Dept: HEMATOLOGY/ONCOLOGY | Facility: CLINIC | Age: 66
End: 2021-05-31
Payer: COMMERCIAL

## 2021-05-31 VITALS
TEMPERATURE: 97 F | RESPIRATION RATE: 18 BRPM | DIASTOLIC BLOOD PRESSURE: 64 MMHG | SYSTOLIC BLOOD PRESSURE: 127 MMHG | BODY MASS INDEX: 35.26 KG/M2 | OXYGEN SATURATION: 95 % | WEIGHT: 211.63 LBS | HEART RATE: 73 BPM | HEIGHT: 65 IN

## 2021-05-31 DIAGNOSIS — D50.9 IRON DEFICIENCY ANEMIA, UNSPECIFIED IRON DEFICIENCY ANEMIA TYPE: Primary | ICD-10-CM

## 2021-05-31 PROCEDURE — 1159F MED LIST DOCD IN RCRD: CPT | Mod: S$GLB,,, | Performed by: NURSE PRACTITIONER

## 2021-05-31 PROCEDURE — 1101F PR PT FALLS ASSESS DOC 0-1 FALLS W/OUT INJ PAST YR: ICD-10-PCS | Mod: CPTII,S$GLB,, | Performed by: NURSE PRACTITIONER

## 2021-05-31 PROCEDURE — 99999 PR PBB SHADOW E&M-EST. PATIENT-LVL III: ICD-10-PCS | Mod: PBBFAC,,, | Performed by: NURSE PRACTITIONER

## 2021-05-31 PROCEDURE — 1126F PR PAIN SEVERITY QUANTIFIED, NO PAIN PRESENT: ICD-10-PCS | Mod: S$GLB,,, | Performed by: NURSE PRACTITIONER

## 2021-05-31 PROCEDURE — 1159F PR MEDICATION LIST DOCUMENTED IN MEDICAL RECORD: ICD-10-PCS | Mod: S$GLB,,, | Performed by: NURSE PRACTITIONER

## 2021-05-31 PROCEDURE — 1126F AMNT PAIN NOTED NONE PRSNT: CPT | Mod: S$GLB,,, | Performed by: NURSE PRACTITIONER

## 2021-05-31 PROCEDURE — 3008F PR BODY MASS INDEX (BMI) DOCUMENTED: ICD-10-PCS | Mod: CPTII,S$GLB,, | Performed by: NURSE PRACTITIONER

## 2021-05-31 PROCEDURE — 3288F PR FALLS RISK ASSESSMENT DOCUMENTED: ICD-10-PCS | Mod: CPTII,S$GLB,, | Performed by: NURSE PRACTITIONER

## 2021-05-31 PROCEDURE — 99214 OFFICE O/P EST MOD 30 MIN: CPT | Mod: S$GLB,,, | Performed by: NURSE PRACTITIONER

## 2021-05-31 PROCEDURE — 3008F BODY MASS INDEX DOCD: CPT | Mod: CPTII,S$GLB,, | Performed by: NURSE PRACTITIONER

## 2021-05-31 PROCEDURE — 99999 PR PBB SHADOW E&M-EST. PATIENT-LVL III: CPT | Mod: PBBFAC,,, | Performed by: NURSE PRACTITIONER

## 2021-05-31 PROCEDURE — 99214 PR OFFICE/OUTPT VISIT, EST, LEVL IV, 30-39 MIN: ICD-10-PCS | Mod: S$GLB,,, | Performed by: NURSE PRACTITIONER

## 2021-05-31 PROCEDURE — 3288F FALL RISK ASSESSMENT DOCD: CPT | Mod: CPTII,S$GLB,, | Performed by: NURSE PRACTITIONER

## 2021-05-31 PROCEDURE — 1101F PT FALLS ASSESS-DOCD LE1/YR: CPT | Mod: CPTII,S$GLB,, | Performed by: NURSE PRACTITIONER

## 2021-05-31 RX ORDER — SODIUM CHLORIDE 0.9 % (FLUSH) 0.9 %
10 SYRINGE (ML) INJECTION
Status: CANCELLED | OUTPATIENT
Start: 2021-05-31

## 2021-05-31 RX ORDER — METHYLPREDNISOLONE SOD SUCC 125 MG
80 VIAL (EA) INJECTION
Status: CANCELLED
Start: 2021-05-31

## 2021-05-31 RX ORDER — HEPARIN 100 UNIT/ML
500 SYRINGE INTRAVENOUS
Status: CANCELLED | OUTPATIENT
Start: 2021-05-31

## 2021-05-31 RX ORDER — HEPARIN 100 UNIT/ML
500 SYRINGE INTRAVENOUS
Status: CANCELLED | OUTPATIENT
Start: 2021-06-11

## 2021-05-31 RX ORDER — METHYLPREDNISOLONE SOD SUCC 125 MG
80 VIAL (EA) INJECTION
Status: CANCELLED
Start: 2021-06-11

## 2021-05-31 RX ORDER — SODIUM CHLORIDE 0.9 % (FLUSH) 0.9 %
10 SYRINGE (ML) INJECTION
Status: CANCELLED | OUTPATIENT
Start: 2021-06-11

## 2021-06-03 ENCOUNTER — OFFICE VISIT (OUTPATIENT)
Dept: PULMONOLOGY | Facility: CLINIC | Age: 66
End: 2021-06-03
Payer: COMMERCIAL

## 2021-06-03 ENCOUNTER — HOSPITAL ENCOUNTER (OUTPATIENT)
Dept: RADIOLOGY | Facility: HOSPITAL | Age: 66
Discharge: HOME | End: 2021-06-03
Attending: NURSE PRACTITIONER
Payer: COMMERCIAL

## 2021-06-03 VITALS
BODY MASS INDEX: 35.21 KG/M2 | OXYGEN SATURATION: 92 % | HEART RATE: 78 BPM | SYSTOLIC BLOOD PRESSURE: 124 MMHG | HEIGHT: 65 IN | WEIGHT: 211.31 LBS | RESPIRATION RATE: 20 BRPM | DIASTOLIC BLOOD PRESSURE: 60 MMHG

## 2021-06-03 DIAGNOSIS — R59.0 MEDIASTINAL LYMPHADENOPATHY: ICD-10-CM

## 2021-06-03 DIAGNOSIS — R91.8 MULTIPLE LUNG NODULES: ICD-10-CM

## 2021-06-03 DIAGNOSIS — F17.211 CIGARETTE NICOTINE DEPENDENCE IN REMISSION: ICD-10-CM

## 2021-06-03 DIAGNOSIS — R59.0 MEDIASTINAL LYMPHADENOPATHY: Primary | ICD-10-CM

## 2021-06-03 DIAGNOSIS — Z86.16 HISTORY OF 2019 NOVEL CORONAVIRUS DISEASE (COVID-19): ICD-10-CM

## 2021-06-03 DIAGNOSIS — R91.1 SOLITARY PULMONARY NODULE: ICD-10-CM

## 2021-06-03 DIAGNOSIS — J45.20 MILD INTERMITTENT ASTHMA WITHOUT COMPLICATION: ICD-10-CM

## 2021-06-03 DIAGNOSIS — G47.33 OSA ON CPAP: Chronic | ICD-10-CM

## 2021-06-03 DIAGNOSIS — R01.0 BENIGN HEART MURMUR: ICD-10-CM

## 2021-06-03 PROCEDURE — 3288F PR FALLS RISK ASSESSMENT DOCUMENTED: ICD-10-PCS | Mod: CPTII,S$GLB,, | Performed by: NURSE PRACTITIONER

## 2021-06-03 PROCEDURE — 71250 CT THORAX DX C-: CPT | Mod: TC

## 2021-06-03 PROCEDURE — 99214 OFFICE O/P EST MOD 30 MIN: CPT | Mod: S$GLB,,, | Performed by: NURSE PRACTITIONER

## 2021-06-03 PROCEDURE — 1159F MED LIST DOCD IN RCRD: CPT | Mod: S$GLB,,, | Performed by: NURSE PRACTITIONER

## 2021-06-03 PROCEDURE — 3008F PR BODY MASS INDEX (BMI) DOCUMENTED: ICD-10-PCS | Mod: CPTII,S$GLB,, | Performed by: NURSE PRACTITIONER

## 2021-06-03 PROCEDURE — 1101F PT FALLS ASSESS-DOCD LE1/YR: CPT | Mod: CPTII,S$GLB,, | Performed by: NURSE PRACTITIONER

## 2021-06-03 PROCEDURE — 3288F FALL RISK ASSESSMENT DOCD: CPT | Mod: CPTII,S$GLB,, | Performed by: NURSE PRACTITIONER

## 2021-06-03 PROCEDURE — 99999 PR PBB SHADOW E&M-EST. PATIENT-LVL V: CPT | Mod: PBBFAC,,, | Performed by: NURSE PRACTITIONER

## 2021-06-03 PROCEDURE — 99214 PR OFFICE/OUTPT VISIT, EST, LEVL IV, 30-39 MIN: ICD-10-PCS | Mod: S$GLB,,, | Performed by: NURSE PRACTITIONER

## 2021-06-03 PROCEDURE — 1101F PR PT FALLS ASSESS DOC 0-1 FALLS W/OUT INJ PAST YR: ICD-10-PCS | Mod: CPTII,S$GLB,, | Performed by: NURSE PRACTITIONER

## 2021-06-03 PROCEDURE — 1159F PR MEDICATION LIST DOCUMENTED IN MEDICAL RECORD: ICD-10-PCS | Mod: S$GLB,,, | Performed by: NURSE PRACTITIONER

## 2021-06-03 PROCEDURE — 99999 PR PBB SHADOW E&M-EST. PATIENT-LVL V: ICD-10-PCS | Mod: PBBFAC,,, | Performed by: NURSE PRACTITIONER

## 2021-06-03 PROCEDURE — 3008F BODY MASS INDEX DOCD: CPT | Mod: CPTII,S$GLB,, | Performed by: NURSE PRACTITIONER

## 2021-06-10 ENCOUNTER — INFUSION (OUTPATIENT)
Dept: INFUSION THERAPY | Facility: HOSPITAL | Age: 66
End: 2021-06-10
Attending: INTERNAL MEDICINE
Payer: COMMERCIAL

## 2021-06-10 VITALS
HEART RATE: 63 BPM | HEIGHT: 65 IN | WEIGHT: 210.56 LBS | OXYGEN SATURATION: 96 % | BODY MASS INDEX: 35.08 KG/M2 | TEMPERATURE: 98 F | SYSTOLIC BLOOD PRESSURE: 134 MMHG | DIASTOLIC BLOOD PRESSURE: 63 MMHG | RESPIRATION RATE: 18 BRPM

## 2021-06-10 DIAGNOSIS — D50.0 IRON DEFICIENCY ANEMIA DUE TO CHRONIC BLOOD LOSS: Primary | ICD-10-CM

## 2021-06-10 PROCEDURE — 25000003 PHARM REV CODE 250: Performed by: NURSE PRACTITIONER

## 2021-06-10 PROCEDURE — 63600175 PHARM REV CODE 636 W HCPCS: Performed by: NURSE PRACTITIONER

## 2021-06-10 PROCEDURE — 96365 THER/PROPH/DIAG IV INF INIT: CPT

## 2021-06-10 PROCEDURE — 96375 TX/PRO/DX INJ NEW DRUG ADDON: CPT

## 2021-06-10 RX ORDER — METHYLPREDNISOLONE SOD SUCC 125 MG
80 VIAL (EA) INJECTION
Status: COMPLETED | OUTPATIENT
Start: 2021-06-10 | End: 2021-06-10

## 2021-06-10 RX ADMIN — METHYLPREDNISOLONE SODIUM SUCCINATE 80 MG: 125 INJECTION, POWDER, FOR SOLUTION INTRAMUSCULAR; INTRAVENOUS at 08:06

## 2021-06-10 RX ADMIN — SODIUM CHLORIDE: 9 INJECTION, SOLUTION INTRAVENOUS at 08:06

## 2021-06-10 RX ADMIN — FERRIC CARBOXYMALTOSE INJECTION 750 MG: 50 INJECTION, SOLUTION INTRAVENOUS at 08:06

## 2021-06-11 ENCOUNTER — TELEPHONE (OUTPATIENT)
Dept: RADIOLOGY | Facility: HOSPITAL | Age: 66
End: 2021-06-11

## 2021-06-14 ENCOUNTER — HOSPITAL ENCOUNTER (OUTPATIENT)
Dept: RADIOLOGY | Facility: HOSPITAL | Age: 66
Discharge: HOME | End: 2021-06-14
Attending: NURSE PRACTITIONER
Payer: COMMERCIAL

## 2021-06-14 DIAGNOSIS — R91.8 MULTIPLE LUNG NODULES: ICD-10-CM

## 2021-06-14 DIAGNOSIS — R59.0 MEDIASTINAL LYMPHADENOPATHY: ICD-10-CM

## 2021-06-14 DIAGNOSIS — F17.211 CIGARETTE NICOTINE DEPENDENCE IN REMISSION: ICD-10-CM

## 2021-06-14 PROCEDURE — 78815 PET IMAGE W/CT SKULL-THIGH: CPT | Mod: 26,PS,, | Performed by: RADIOLOGY

## 2021-06-14 PROCEDURE — 78815 NM PET CT ROUTINE: ICD-10-PCS | Mod: 26,PS,, | Performed by: RADIOLOGY

## 2021-06-14 PROCEDURE — 78815 PET IMAGE W/CT SKULL-THIGH: CPT | Mod: TC

## 2021-06-17 ENCOUNTER — INFUSION (OUTPATIENT)
Dept: INFUSION THERAPY | Facility: HOSPITAL | Age: 66
End: 2021-06-17
Attending: INTERNAL MEDICINE
Payer: COMMERCIAL

## 2021-06-17 VITALS
DIASTOLIC BLOOD PRESSURE: 62 MMHG | OXYGEN SATURATION: 95 % | HEART RATE: 69 BPM | SYSTOLIC BLOOD PRESSURE: 127 MMHG | RESPIRATION RATE: 16 BRPM | TEMPERATURE: 98 F

## 2021-06-17 DIAGNOSIS — D50.0 IRON DEFICIENCY ANEMIA DUE TO CHRONIC BLOOD LOSS: Primary | ICD-10-CM

## 2021-06-17 PROCEDURE — 96365 THER/PROPH/DIAG IV INF INIT: CPT

## 2021-06-17 PROCEDURE — 25000003 PHARM REV CODE 250: Performed by: NURSE PRACTITIONER

## 2021-06-17 PROCEDURE — 96375 TX/PRO/DX INJ NEW DRUG ADDON: CPT

## 2021-06-17 PROCEDURE — 63600175 PHARM REV CODE 636 W HCPCS: Mod: JG | Performed by: NURSE PRACTITIONER

## 2021-06-17 RX ORDER — METHYLPREDNISOLONE SOD SUCC 125 MG
80 VIAL (EA) INJECTION
Status: COMPLETED | OUTPATIENT
Start: 2021-06-17 | End: 2021-06-17

## 2021-06-17 RX ADMIN — FERRIC CARBOXYMALTOSE INJECTION 750 MG: 50 INJECTION, SOLUTION INTRAVENOUS at 09:06

## 2021-06-17 RX ADMIN — METHYLPREDNISOLONE SODIUM SUCCINATE 80 MG: 125 INJECTION, POWDER, FOR SOLUTION INTRAMUSCULAR; INTRAVENOUS at 08:06

## 2021-06-28 DIAGNOSIS — J30.9 ALLERGIC RHINITIS, UNSPECIFIED SEASONALITY, UNSPECIFIED TRIGGER: ICD-10-CM

## 2021-06-28 RX ORDER — FLUTICASONE PROPIONATE 50 MCG
2 SPRAY, SUSPENSION (ML) NASAL DAILY
Qty: 48 G | Refills: 1 | Status: SHIPPED | OUTPATIENT
Start: 2021-06-28 | End: 2022-01-15

## 2021-08-16 RX ORDER — PANTOPRAZOLE SODIUM 20 MG/1
TABLET, DELAYED RELEASE ORAL
Qty: 60 TABLET | Refills: 0 | Status: SHIPPED | OUTPATIENT
Start: 2021-08-16 | End: 2021-09-13

## 2021-08-27 ENCOUNTER — LAB VISIT (OUTPATIENT)
Dept: LAB | Facility: HOSPITAL | Age: 66
End: 2021-08-27
Attending: INTERNAL MEDICINE
Payer: COMMERCIAL

## 2021-08-27 DIAGNOSIS — D50.9 IRON DEFICIENCY ANEMIA, UNSPECIFIED IRON DEFICIENCY ANEMIA TYPE: ICD-10-CM

## 2021-08-27 LAB
ANION GAP SERPL CALC-SCNC: 9 MMOL/L (ref 8–16)
BASOPHILS # BLD AUTO: 0.01 K/UL (ref 0–0.2)
BASOPHILS NFR BLD: 0.2 % (ref 0–1.9)
BUN SERPL-MCNC: 23 MG/DL (ref 8–23)
CALCIUM SERPL-MCNC: 9.7 MG/DL (ref 8.7–10.5)
CHLORIDE SERPL-SCNC: 107 MMOL/L (ref 95–110)
CO2 SERPL-SCNC: 25 MMOL/L (ref 23–29)
CREAT SERPL-MCNC: 0.8 MG/DL (ref 0.5–1.4)
DIFFERENTIAL METHOD: ABNORMAL
EOSINOPHIL # BLD AUTO: 0.1 K/UL (ref 0–0.5)
EOSINOPHIL NFR BLD: 1.6 % (ref 0–8)
ERYTHROCYTE [DISTWIDTH] IN BLOOD BY AUTOMATED COUNT: 12.6 % (ref 11.5–14.5)
EST. GFR  (AFRICAN AMERICAN): >60 ML/MIN/1.73 M^2
EST. GFR  (NON AFRICAN AMERICAN): >60 ML/MIN/1.73 M^2
GLUCOSE SERPL-MCNC: 103 MG/DL (ref 70–110)
HCT VFR BLD AUTO: 43.3 % (ref 37–48.5)
HGB BLD-MCNC: 14.8 G/DL (ref 12–16)
IMM GRANULOCYTES # BLD AUTO: 0.02 K/UL (ref 0–0.04)
IMM GRANULOCYTES NFR BLD AUTO: 0.3 % (ref 0–0.5)
IRON SERPL-MCNC: 109 UG/DL (ref 30–160)
LYMPHOCYTES # BLD AUTO: 1.8 K/UL (ref 1–4.8)
LYMPHOCYTES NFR BLD: 28.8 % (ref 18–48)
MCH RBC QN AUTO: 31.6 PG (ref 27–31)
MCHC RBC AUTO-ENTMCNC: 34.2 G/DL (ref 32–36)
MCV RBC AUTO: 92 FL (ref 82–98)
MONOCYTES # BLD AUTO: 0.5 K/UL (ref 0.3–1)
MONOCYTES NFR BLD: 8.6 % (ref 4–15)
NEUTROPHILS # BLD AUTO: 3.7 K/UL (ref 1.8–7.7)
NEUTROPHILS NFR BLD: 60.5 % (ref 38–73)
NRBC BLD-RTO: 0 /100 WBC
PLATELET # BLD AUTO: 200 K/UL (ref 150–450)
PMV BLD AUTO: 9 FL (ref 9.2–12.9)
POTASSIUM SERPL-SCNC: 4.6 MMOL/L (ref 3.5–5.1)
RBC # BLD AUTO: 4.69 M/UL (ref 4–5.4)
SATURATED IRON: 31 % (ref 20–50)
SODIUM SERPL-SCNC: 141 MMOL/L (ref 136–145)
TOTAL IRON BINDING CAPACITY: 351 UG/DL (ref 250–450)
TRANSFERRIN SERPL-MCNC: 237 MG/DL (ref 200–375)
WBC # BLD AUTO: 6.17 K/UL (ref 3.9–12.7)

## 2021-08-27 PROCEDURE — 80048 BASIC METABOLIC PNL TOTAL CA: CPT | Performed by: NURSE PRACTITIONER

## 2021-08-27 PROCEDURE — 36415 COLL VENOUS BLD VENIPUNCTURE: CPT | Performed by: NURSE PRACTITIONER

## 2021-08-27 PROCEDURE — 85025 COMPLETE CBC W/AUTO DIFF WBC: CPT | Performed by: NURSE PRACTITIONER

## 2021-08-27 PROCEDURE — 82728 ASSAY OF FERRITIN: CPT | Performed by: NURSE PRACTITIONER

## 2021-08-27 PROCEDURE — 84466 ASSAY OF TRANSFERRIN: CPT | Performed by: NURSE PRACTITIONER

## 2021-08-28 LAB — FERRITIN SERPL-MCNC: 449 NG/ML (ref 20–300)

## 2021-09-03 ENCOUNTER — OFFICE VISIT (OUTPATIENT)
Dept: HEMATOLOGY/ONCOLOGY | Facility: CLINIC | Age: 66
End: 2021-09-03
Payer: COMMERCIAL

## 2021-09-03 VITALS
TEMPERATURE: 98 F | SYSTOLIC BLOOD PRESSURE: 135 MMHG | BODY MASS INDEX: 36.14 KG/M2 | WEIGHT: 216.94 LBS | DIASTOLIC BLOOD PRESSURE: 57 MMHG | OXYGEN SATURATION: 95 % | HEIGHT: 65 IN | HEART RATE: 75 BPM

## 2021-09-03 DIAGNOSIS — D50.9 IRON DEFICIENCY ANEMIA, UNSPECIFIED IRON DEFICIENCY ANEMIA TYPE: Primary | ICD-10-CM

## 2021-09-03 PROCEDURE — 3078F PR MOST RECENT DIASTOLIC BLOOD PRESSURE < 80 MM HG: ICD-10-PCS | Mod: CPTII,S$GLB,, | Performed by: NURSE PRACTITIONER

## 2021-09-03 PROCEDURE — 1101F PR PT FALLS ASSESS DOC 0-1 FALLS W/OUT INJ PAST YR: ICD-10-PCS | Mod: CPTII,S$GLB,, | Performed by: NURSE PRACTITIONER

## 2021-09-03 PROCEDURE — 1101F PT FALLS ASSESS-DOCD LE1/YR: CPT | Mod: CPTII,S$GLB,, | Performed by: NURSE PRACTITIONER

## 2021-09-03 PROCEDURE — 1160F PR REVIEW ALL MEDS BY PRESCRIBER/CLIN PHARMACIST DOCUMENTED: ICD-10-PCS | Mod: CPTII,S$GLB,, | Performed by: NURSE PRACTITIONER

## 2021-09-03 PROCEDURE — 1160F RVW MEDS BY RX/DR IN RCRD: CPT | Mod: CPTII,S$GLB,, | Performed by: NURSE PRACTITIONER

## 2021-09-03 PROCEDURE — 1125F AMNT PAIN NOTED PAIN PRSNT: CPT | Mod: CPTII,S$GLB,, | Performed by: NURSE PRACTITIONER

## 2021-09-03 PROCEDURE — 1125F PR PAIN SEVERITY QUANTIFIED, PAIN PRESENT: ICD-10-PCS | Mod: CPTII,S$GLB,, | Performed by: NURSE PRACTITIONER

## 2021-09-03 PROCEDURE — 3288F PR FALLS RISK ASSESSMENT DOCUMENTED: ICD-10-PCS | Mod: CPTII,S$GLB,, | Performed by: NURSE PRACTITIONER

## 2021-09-03 PROCEDURE — 3075F SYST BP GE 130 - 139MM HG: CPT | Mod: CPTII,S$GLB,, | Performed by: NURSE PRACTITIONER

## 2021-09-03 PROCEDURE — 3288F FALL RISK ASSESSMENT DOCD: CPT | Mod: CPTII,S$GLB,, | Performed by: NURSE PRACTITIONER

## 2021-09-03 PROCEDURE — 3078F DIAST BP <80 MM HG: CPT | Mod: CPTII,S$GLB,, | Performed by: NURSE PRACTITIONER

## 2021-09-03 PROCEDURE — 3075F PR MOST RECENT SYSTOLIC BLOOD PRESS GE 130-139MM HG: ICD-10-PCS | Mod: CPTII,S$GLB,, | Performed by: NURSE PRACTITIONER

## 2021-09-03 PROCEDURE — 99213 OFFICE O/P EST LOW 20 MIN: CPT | Mod: S$GLB,,, | Performed by: NURSE PRACTITIONER

## 2021-09-03 PROCEDURE — 99999 PR PBB SHADOW E&M-EST. PATIENT-LVL IV: ICD-10-PCS | Mod: PBBFAC,,, | Performed by: NURSE PRACTITIONER

## 2021-09-03 PROCEDURE — 3008F BODY MASS INDEX DOCD: CPT | Mod: CPTII,S$GLB,, | Performed by: NURSE PRACTITIONER

## 2021-09-03 PROCEDURE — 1159F MED LIST DOCD IN RCRD: CPT | Mod: CPTII,S$GLB,, | Performed by: NURSE PRACTITIONER

## 2021-09-03 PROCEDURE — 1159F PR MEDICATION LIST DOCUMENTED IN MEDICAL RECORD: ICD-10-PCS | Mod: CPTII,S$GLB,, | Performed by: NURSE PRACTITIONER

## 2021-09-03 PROCEDURE — 99213 PR OFFICE/OUTPT VISIT, EST, LEVL III, 20-29 MIN: ICD-10-PCS | Mod: S$GLB,,, | Performed by: NURSE PRACTITIONER

## 2021-09-03 PROCEDURE — 3008F PR BODY MASS INDEX (BMI) DOCUMENTED: ICD-10-PCS | Mod: CPTII,S$GLB,, | Performed by: NURSE PRACTITIONER

## 2021-09-03 PROCEDURE — 99999 PR PBB SHADOW E&M-EST. PATIENT-LVL IV: CPT | Mod: PBBFAC,,, | Performed by: NURSE PRACTITIONER

## 2021-09-17 DIAGNOSIS — E78.2 MIXED HYPERLIPIDEMIA: ICD-10-CM

## 2021-09-20 RX ORDER — EZETIMIBE 10 MG/1
TABLET ORAL
Qty: 90 TABLET | Refills: 0 | Status: SHIPPED | OUTPATIENT
Start: 2021-09-20 | End: 2021-11-19 | Stop reason: SDUPTHER

## 2021-10-19 RX ORDER — CYCLOBENZAPRINE HCL 10 MG
TABLET ORAL
COMMUNITY
Start: 2021-10-14 | End: 2022-04-01

## 2021-10-19 RX ORDER — KETOROLAC TROMETHAMINE 10 MG/1
TABLET, FILM COATED ORAL
COMMUNITY
Start: 2021-10-14 | End: 2022-04-01

## 2021-11-09 ENCOUNTER — HOSPITAL ENCOUNTER (OUTPATIENT)
Dept: RADIOLOGY | Facility: HOSPITAL | Age: 66
Discharge: HOME | End: 2021-11-09
Attending: UROLOGY
Payer: COMMERCIAL

## 2021-11-09 DIAGNOSIS — N28.1 RENAL CYST: ICD-10-CM

## 2021-11-09 PROCEDURE — 76770 US EXAM ABDO BACK WALL COMP: CPT | Mod: TC

## 2021-11-09 PROCEDURE — 76770 US RETROPERITONEAL COMPLETE: ICD-10-PCS | Mod: 26,,, | Performed by: RADIOLOGY

## 2021-11-09 PROCEDURE — 76770 US EXAM ABDO BACK WALL COMP: CPT | Mod: 26,,, | Performed by: RADIOLOGY

## 2021-11-17 ENCOUNTER — OFFICE VISIT (OUTPATIENT)
Dept: UROLOGY | Facility: CLINIC | Age: 66
End: 2021-11-17
Payer: COMMERCIAL

## 2021-11-17 VITALS
DIASTOLIC BLOOD PRESSURE: 69 MMHG | SYSTOLIC BLOOD PRESSURE: 152 MMHG | HEIGHT: 65 IN | HEART RATE: 85 BPM | BODY MASS INDEX: 36.73 KG/M2 | WEIGHT: 220.44 LBS

## 2021-11-17 DIAGNOSIS — N28.1 RENAL CYST: Primary | ICD-10-CM

## 2021-11-17 DIAGNOSIS — N30.00 ACUTE CYSTITIS WITHOUT HEMATURIA: ICD-10-CM

## 2021-11-17 DIAGNOSIS — N39.41 URGE INCONTINENCE: ICD-10-CM

## 2021-11-17 PROCEDURE — 3288F PR FALLS RISK ASSESSMENT DOCUMENTED: ICD-10-PCS | Mod: CPTII,S$GLB,, | Performed by: UROLOGY

## 2021-11-17 PROCEDURE — 99999 PR PBB SHADOW E&M-EST. PATIENT-LVL IV: CPT | Mod: PBBFAC,,, | Performed by: UROLOGY

## 2021-11-17 PROCEDURE — 1126F AMNT PAIN NOTED NONE PRSNT: CPT | Mod: CPTII,S$GLB,, | Performed by: UROLOGY

## 2021-11-17 PROCEDURE — 1101F PR PT FALLS ASSESS DOC 0-1 FALLS W/OUT INJ PAST YR: ICD-10-PCS | Mod: CPTII,S$GLB,, | Performed by: UROLOGY

## 2021-11-17 PROCEDURE — 99214 OFFICE O/P EST MOD 30 MIN: CPT | Mod: S$GLB,,, | Performed by: UROLOGY

## 2021-11-17 PROCEDURE — 3288F FALL RISK ASSESSMENT DOCD: CPT | Mod: CPTII,S$GLB,, | Performed by: UROLOGY

## 2021-11-17 PROCEDURE — 1101F PT FALLS ASSESS-DOCD LE1/YR: CPT | Mod: CPTII,S$GLB,, | Performed by: UROLOGY

## 2021-11-17 PROCEDURE — 99214 PR OFFICE/OUTPT VISIT, EST, LEVL IV, 30-39 MIN: ICD-10-PCS | Mod: S$GLB,,, | Performed by: UROLOGY

## 2021-11-17 PROCEDURE — 3078F DIAST BP <80 MM HG: CPT | Mod: CPTII,S$GLB,, | Performed by: UROLOGY

## 2021-11-17 PROCEDURE — 3008F BODY MASS INDEX DOCD: CPT | Mod: CPTII,S$GLB,, | Performed by: UROLOGY

## 2021-11-17 PROCEDURE — 3078F PR MOST RECENT DIASTOLIC BLOOD PRESSURE < 80 MM HG: ICD-10-PCS | Mod: CPTII,S$GLB,, | Performed by: UROLOGY

## 2021-11-17 PROCEDURE — 3008F PR BODY MASS INDEX (BMI) DOCUMENTED: ICD-10-PCS | Mod: CPTII,S$GLB,, | Performed by: UROLOGY

## 2021-11-17 PROCEDURE — 1159F MED LIST DOCD IN RCRD: CPT | Mod: CPTII,S$GLB,, | Performed by: UROLOGY

## 2021-11-17 PROCEDURE — 3077F PR MOST RECENT SYSTOLIC BLOOD PRESSURE >= 140 MM HG: ICD-10-PCS | Mod: CPTII,S$GLB,, | Performed by: UROLOGY

## 2021-11-17 PROCEDURE — 3077F SYST BP >= 140 MM HG: CPT | Mod: CPTII,S$GLB,, | Performed by: UROLOGY

## 2021-11-17 PROCEDURE — 1159F PR MEDICATION LIST DOCUMENTED IN MEDICAL RECORD: ICD-10-PCS | Mod: CPTII,S$GLB,, | Performed by: UROLOGY

## 2021-11-17 PROCEDURE — 99999 PR PBB SHADOW E&M-EST. PATIENT-LVL IV: ICD-10-PCS | Mod: PBBFAC,,, | Performed by: UROLOGY

## 2021-11-17 PROCEDURE — 1126F PR PAIN SEVERITY QUANTIFIED, NO PAIN PRESENT: ICD-10-PCS | Mod: CPTII,S$GLB,, | Performed by: UROLOGY

## 2021-11-17 RX ORDER — METOPROLOL SUCCINATE 50 MG/1
50 TABLET, EXTENDED RELEASE ORAL DAILY
COMMUNITY
Start: 2021-11-12 | End: 2022-02-18

## 2021-11-17 RX ORDER — SOLIFENACIN SUCCINATE 10 MG/1
10 TABLET, FILM COATED ORAL DAILY
Qty: 30 TABLET | Refills: 11 | Status: SHIPPED | OUTPATIENT
Start: 2021-11-17 | End: 2022-04-06 | Stop reason: SDUPTHER

## 2021-11-19 ENCOUNTER — LAB VISIT (OUTPATIENT)
Dept: LAB | Facility: HOSPITAL | Age: 66
End: 2021-11-19
Attending: NURSE PRACTITIONER
Payer: COMMERCIAL

## 2021-11-19 ENCOUNTER — OFFICE VISIT (OUTPATIENT)
Dept: FAMILY MEDICINE | Facility: CLINIC | Age: 66
End: 2021-11-19
Payer: COMMERCIAL

## 2021-11-19 VITALS
DIASTOLIC BLOOD PRESSURE: 70 MMHG | SYSTOLIC BLOOD PRESSURE: 134 MMHG | BODY MASS INDEX: 36.8 KG/M2 | TEMPERATURE: 98 F | HEIGHT: 65 IN | HEART RATE: 85 BPM | OXYGEN SATURATION: 95 % | WEIGHT: 220.88 LBS

## 2021-11-19 DIAGNOSIS — E66.01 SEVERE OBESITY (BMI 35.0-35.9 WITH COMORBIDITY): ICD-10-CM

## 2021-11-19 DIAGNOSIS — R59.0 LYMPHADENOPATHY, MEDIASTINAL: ICD-10-CM

## 2021-11-19 DIAGNOSIS — I20.1 PRINZMETAL'S ANGINA: ICD-10-CM

## 2021-11-19 DIAGNOSIS — Z00.00 ANNUAL PHYSICAL EXAM: ICD-10-CM

## 2021-11-19 DIAGNOSIS — Z00.00 ANNUAL PHYSICAL EXAM: Primary | ICD-10-CM

## 2021-11-19 DIAGNOSIS — E78.2 MIXED HYPERLIPIDEMIA: ICD-10-CM

## 2021-11-19 DIAGNOSIS — I10 PRIMARY HYPERTENSION: ICD-10-CM

## 2021-11-19 LAB
ALBUMIN SERPL BCP-MCNC: 3.9 G/DL (ref 3.5–5.2)
ALP SERPL-CCNC: 64 U/L (ref 55–135)
ALT SERPL W/O P-5'-P-CCNC: 16 U/L (ref 10–44)
ANION GAP SERPL CALC-SCNC: 7 MMOL/L (ref 8–16)
AST SERPL-CCNC: 20 U/L (ref 10–40)
BASOPHILS # BLD AUTO: 0.02 K/UL (ref 0–0.2)
BASOPHILS NFR BLD: 0.4 % (ref 0–1.9)
BILIRUB SERPL-MCNC: 0.5 MG/DL (ref 0.1–1)
BUN SERPL-MCNC: 18 MG/DL (ref 8–23)
CALCIUM SERPL-MCNC: 9.6 MG/DL (ref 8.7–10.5)
CHLORIDE SERPL-SCNC: 104 MMOL/L (ref 95–110)
CHOLEST SERPL-MCNC: 217 MG/DL (ref 120–199)
CHOLEST/HDLC SERPL: 4.4 {RATIO} (ref 2–5)
CO2 SERPL-SCNC: 29 MMOL/L (ref 23–29)
CREAT SERPL-MCNC: 0.7 MG/DL (ref 0.5–1.4)
DIFFERENTIAL METHOD: ABNORMAL
EOSINOPHIL # BLD AUTO: 0.1 K/UL (ref 0–0.5)
EOSINOPHIL NFR BLD: 1.9 % (ref 0–8)
ERYTHROCYTE [DISTWIDTH] IN BLOOD BY AUTOMATED COUNT: 12 % (ref 11.5–14.5)
EST. GFR  (AFRICAN AMERICAN): >60 ML/MIN/1.73 M^2
EST. GFR  (NON AFRICAN AMERICAN): >60 ML/MIN/1.73 M^2
GLUCOSE SERPL-MCNC: 89 MG/DL (ref 70–110)
HCT VFR BLD AUTO: 42.6 % (ref 37–48.5)
HDLC SERPL-MCNC: 49 MG/DL (ref 40–75)
HDLC SERPL: 22.6 % (ref 20–50)
HGB BLD-MCNC: 14.2 G/DL (ref 12–16)
IMM GRANULOCYTES # BLD AUTO: 0.01 K/UL (ref 0–0.04)
IMM GRANULOCYTES NFR BLD AUTO: 0.2 % (ref 0–0.5)
LDLC SERPL CALC-MCNC: 133.2 MG/DL (ref 63–159)
LYMPHOCYTES # BLD AUTO: 1.4 K/UL (ref 1–4.8)
LYMPHOCYTES NFR BLD: 27.9 % (ref 18–48)
MCH RBC QN AUTO: 31.2 PG (ref 27–31)
MCHC RBC AUTO-ENTMCNC: 33.3 G/DL (ref 32–36)
MCV RBC AUTO: 94 FL (ref 82–98)
MONOCYTES # BLD AUTO: 0.3 K/UL (ref 0.3–1)
MONOCYTES NFR BLD: 5.7 % (ref 4–15)
NEUTROPHILS # BLD AUTO: 3.3 K/UL (ref 1.8–7.7)
NEUTROPHILS NFR BLD: 63.9 % (ref 38–73)
NONHDLC SERPL-MCNC: 168 MG/DL
NRBC BLD-RTO: 0 /100 WBC
PLATELET # BLD AUTO: 211 K/UL (ref 150–450)
PMV BLD AUTO: 9.8 FL (ref 9.2–12.9)
POTASSIUM SERPL-SCNC: 4.9 MMOL/L (ref 3.5–5.1)
PROT SERPL-MCNC: 6.7 G/DL (ref 6–8.4)
RBC # BLD AUTO: 4.55 M/UL (ref 4–5.4)
SODIUM SERPL-SCNC: 140 MMOL/L (ref 136–145)
TRIGL SERPL-MCNC: 174 MG/DL (ref 30–150)
WBC # BLD AUTO: 5.13 K/UL (ref 3.9–12.7)

## 2021-11-19 PROCEDURE — 3008F PR BODY MASS INDEX (BMI) DOCUMENTED: ICD-10-PCS | Mod: CPTII,S$GLB,, | Performed by: NURSE PRACTITIONER

## 2021-11-19 PROCEDURE — 99397 PER PM REEVAL EST PAT 65+ YR: CPT | Mod: 25,S$GLB,, | Performed by: NURSE PRACTITIONER

## 2021-11-19 PROCEDURE — 1125F PR PAIN SEVERITY QUANTIFIED, PAIN PRESENT: ICD-10-PCS | Mod: CPTII,S$GLB,, | Performed by: NURSE PRACTITIONER

## 2021-11-19 PROCEDURE — 1101F PR PT FALLS ASSESS DOC 0-1 FALLS W/OUT INJ PAST YR: ICD-10-PCS | Mod: CPTII,S$GLB,, | Performed by: NURSE PRACTITIONER

## 2021-11-19 PROCEDURE — 3078F PR MOST RECENT DIASTOLIC BLOOD PRESSURE < 80 MM HG: ICD-10-PCS | Mod: CPTII,S$GLB,, | Performed by: NURSE PRACTITIONER

## 2021-11-19 PROCEDURE — 90694 FLU VACCINE - QUADRIVALENT - ADJUVANTED: ICD-10-PCS | Mod: S$GLB,,, | Performed by: NURSE PRACTITIONER

## 2021-11-19 PROCEDURE — 99999 PR PBB SHADOW E&M-EST. PATIENT-LVL V: CPT | Mod: PBBFAC,,, | Performed by: NURSE PRACTITIONER

## 2021-11-19 PROCEDURE — 90694 VACC AIIV4 NO PRSRV 0.5ML IM: CPT | Mod: S$GLB,,, | Performed by: NURSE PRACTITIONER

## 2021-11-19 PROCEDURE — 3288F FALL RISK ASSESSMENT DOCD: CPT | Mod: CPTII,S$GLB,, | Performed by: NURSE PRACTITIONER

## 2021-11-19 PROCEDURE — 1125F AMNT PAIN NOTED PAIN PRSNT: CPT | Mod: CPTII,S$GLB,, | Performed by: NURSE PRACTITIONER

## 2021-11-19 PROCEDURE — 3008F BODY MASS INDEX DOCD: CPT | Mod: CPTII,S$GLB,, | Performed by: NURSE PRACTITIONER

## 2021-11-19 PROCEDURE — 3077F SYST BP >= 140 MM HG: CPT | Mod: CPTII,S$GLB,, | Performed by: NURSE PRACTITIONER

## 2021-11-19 PROCEDURE — 3288F PR FALLS RISK ASSESSMENT DOCUMENTED: ICD-10-PCS | Mod: CPTII,S$GLB,, | Performed by: NURSE PRACTITIONER

## 2021-11-19 PROCEDURE — 86769 SARS-COV-2 COVID-19 ANTIBODY: CPT | Performed by: NURSE PRACTITIONER

## 2021-11-19 PROCEDURE — 80061 LIPID PANEL: CPT | Performed by: NURSE PRACTITIONER

## 2021-11-19 PROCEDURE — 90471 FLU VACCINE - QUADRIVALENT - ADJUVANTED: ICD-10-PCS | Mod: S$GLB,,, | Performed by: NURSE PRACTITIONER

## 2021-11-19 PROCEDURE — 1160F PR REVIEW ALL MEDS BY PRESCRIBER/CLIN PHARMACIST DOCUMENTED: ICD-10-PCS | Mod: CPTII,S$GLB,, | Performed by: NURSE PRACTITIONER

## 2021-11-19 PROCEDURE — 1101F PT FALLS ASSESS-DOCD LE1/YR: CPT | Mod: CPTII,S$GLB,, | Performed by: NURSE PRACTITIONER

## 2021-11-19 PROCEDURE — 1159F MED LIST DOCD IN RCRD: CPT | Mod: CPTII,S$GLB,, | Performed by: NURSE PRACTITIONER

## 2021-11-19 PROCEDURE — 36415 COLL VENOUS BLD VENIPUNCTURE: CPT | Mod: PO | Performed by: NURSE PRACTITIONER

## 2021-11-19 PROCEDURE — 3078F DIAST BP <80 MM HG: CPT | Mod: CPTII,S$GLB,, | Performed by: NURSE PRACTITIONER

## 2021-11-19 PROCEDURE — 1159F PR MEDICATION LIST DOCUMENTED IN MEDICAL RECORD: ICD-10-PCS | Mod: CPTII,S$GLB,, | Performed by: NURSE PRACTITIONER

## 2021-11-19 PROCEDURE — 80053 COMPREHEN METABOLIC PANEL: CPT | Performed by: NURSE PRACTITIONER

## 2021-11-19 PROCEDURE — 99397 PR PREVENTIVE VISIT,EST,65 & OVER: ICD-10-PCS | Mod: 25,S$GLB,, | Performed by: NURSE PRACTITIONER

## 2021-11-19 PROCEDURE — 3077F PR MOST RECENT SYSTOLIC BLOOD PRESSURE >= 140 MM HG: ICD-10-PCS | Mod: CPTII,S$GLB,, | Performed by: NURSE PRACTITIONER

## 2021-11-19 PROCEDURE — 1160F RVW MEDS BY RX/DR IN RCRD: CPT | Mod: CPTII,S$GLB,, | Performed by: NURSE PRACTITIONER

## 2021-11-19 PROCEDURE — 85025 COMPLETE CBC W/AUTO DIFF WBC: CPT | Performed by: NURSE PRACTITIONER

## 2021-11-19 PROCEDURE — 99999 PR PBB SHADOW E&M-EST. PATIENT-LVL V: ICD-10-PCS | Mod: PBBFAC,,, | Performed by: NURSE PRACTITIONER

## 2021-11-19 PROCEDURE — 90471 IMMUNIZATION ADMIN: CPT | Mod: S$GLB,,, | Performed by: NURSE PRACTITIONER

## 2021-11-19 RX ORDER — EZETIMIBE 10 MG/1
10 TABLET ORAL DAILY
Qty: 90 TABLET | Refills: 3 | Status: SHIPPED | OUTPATIENT
Start: 2021-11-19 | End: 2022-11-21

## 2021-11-20 LAB
SARS-COV-2 IGG SERPL IA-ACNC: 198.3 AU/ML
SARS-COV-2 IGG SERPL QL IA: POSITIVE

## 2021-11-22 ENCOUNTER — PATIENT MESSAGE (OUTPATIENT)
Dept: FAMILY MEDICINE | Facility: CLINIC | Age: 66
End: 2021-11-22
Payer: COMMERCIAL

## 2021-12-17 ENCOUNTER — OFFICE VISIT (OUTPATIENT)
Dept: UROLOGY | Facility: CLINIC | Age: 66
End: 2021-12-17
Payer: COMMERCIAL

## 2021-12-17 VITALS
HEART RATE: 66 BPM | SYSTOLIC BLOOD PRESSURE: 142 MMHG | DIASTOLIC BLOOD PRESSURE: 79 MMHG | BODY MASS INDEX: 36.8 KG/M2 | WEIGHT: 220.88 LBS | HEIGHT: 65 IN

## 2021-12-17 DIAGNOSIS — N28.1 RENAL CYST: Primary | ICD-10-CM

## 2021-12-17 DIAGNOSIS — N39.41 URGE INCONTINENCE: ICD-10-CM

## 2021-12-17 DIAGNOSIS — N30.00 ACUTE CYSTITIS WITHOUT HEMATURIA: ICD-10-CM

## 2021-12-17 LAB
BILIRUB SERPL-MCNC: NORMAL MG/DL
BLOOD URINE, POC: NORMAL
CLARITY, POC UA: CLEAR
COLOR, POC UA: YELLOW
GLUCOSE UR QL STRIP: NORMAL
KETONES UR QL STRIP: NORMAL
LEUKOCYTE ESTERASE URINE, POC: NORMAL
NITRITE, POC UA: NORMAL
PH, POC UA: 8
PROTEIN, POC: NORMAL
SPECIFIC GRAVITY, POC UA: NORMAL
UROBILINOGEN, POC UA: NORMAL

## 2021-12-17 PROCEDURE — 99213 OFFICE O/P EST LOW 20 MIN: CPT | Mod: S$GLB,,, | Performed by: UROLOGY

## 2021-12-17 PROCEDURE — 81002 URINALYSIS NONAUTO W/O SCOPE: CPT | Mod: S$GLB,,, | Performed by: UROLOGY

## 2021-12-17 PROCEDURE — 99999 PR PBB SHADOW E&M-EST. PATIENT-LVL IV: CPT | Mod: PBBFAC,,, | Performed by: UROLOGY

## 2021-12-17 PROCEDURE — 81002 POCT URINE DIPSTICK WITHOUT MICROSCOPE: ICD-10-PCS | Mod: S$GLB,,, | Performed by: UROLOGY

## 2021-12-17 PROCEDURE — 99213 PR OFFICE/OUTPT VISIT, EST, LEVL III, 20-29 MIN: ICD-10-PCS | Mod: S$GLB,,, | Performed by: UROLOGY

## 2021-12-17 PROCEDURE — 99999 PR PBB SHADOW E&M-EST. PATIENT-LVL IV: ICD-10-PCS | Mod: PBBFAC,,, | Performed by: UROLOGY

## 2022-01-10 DIAGNOSIS — J30.9 ALLERGIC RHINITIS, UNSPECIFIED SEASONALITY, UNSPECIFIED TRIGGER: ICD-10-CM

## 2022-01-11 NOTE — TELEPHONE ENCOUNTER
No new care gaps identified.  Powered by [employer]. Reference number: [id number].   1/10/2022 11:23:01 PM CST

## 2022-01-15 RX ORDER — FLUTICASONE PROPIONATE 50 MCG
SPRAY, SUSPENSION (ML) NASAL
Qty: 48 G | Refills: 0 | Status: SHIPPED | OUTPATIENT
Start: 2022-01-15 | End: 2022-04-15

## 2022-01-15 NOTE — TELEPHONE ENCOUNTER
Refill Authorization Note   Ro Hernandez  is requesting a refill authorization.  Brief Assessment and Rationale for Refill:  Approve     Medication Therapy Plan:       Medication Reconciliation Completed: No   Comments:   --->Care Gap information included below if applicable.   Orders Placed This Encounter    fluticasone propionate (FLONASE) 50 mcg/actuation nasal spray      Requested Prescriptions   Signed Prescriptions Disp Refills    fluticasone propionate (FLONASE) 50 mcg/actuation nasal spray 48 g 0     Sig: USE 2 SPRAYS IN EACH NOSTRIL ONCE DAILY       Ear, Nose, and Throat: Nasal Preparations - Corticosteroids Passed - 1/10/2022 11:22 PM        Passed - Patient is at least 18 years old        Passed - Valid encounter within last 15 months     Recent Visits  Date Type Provider Dept   01/06/21 Office Visit Minor Charlton MD [facility]   10/14/20 Office Visit Marcela Chopra MD [facility]   09/23/20 Office Visit Marcela Chopra MD [facility]   09/01/20 Office Visit Marcela Chopra MD [facility]   07/07/20 Office Visit Marcela Chopra MD [facility]   03/27/20 Office Visit Marcela Chopra MD [facility]   Showing recent visits within past 720 days and meeting all other requirements  Future Appointments  No visits were found meeting these conditions.  Showing future appointments within next 150 days and meeting all other requirements      Future Appointments              In 1 month [facility] - Lab, [facility]    In 1 month MEJIA Mejia - Hematol Oncol, [facility]    In 1 month [facility] CT1 LIMIT 500 LBS [facility] [place] - Ct Scan 1st Fl, [facility]    In 1 month MEJIA Shaffer - Pulmonary Services,  [facility]    In 5 months MD Nicolás Nye - Urology,  [facility]    In 9 months [facility]  MAMMO1 SCREEN [facility] - Mammography, [place] [facility]    In 9 months  Sammie Stanton MD [facility] - OB/GYN, [place] [facility]                    Appointments  past 12m or future 3m with PCP    Date Provider   Last Visit   1/6/2021 Minor Charlton MD   Next Visit   Visit date not found Minor Charlton MD   ED visits in past 90 days: 0     Note composed:5:54 PM 01/15/2022

## 2022-02-15 ENCOUNTER — LAB VISIT (OUTPATIENT)
Dept: LAB | Facility: HOSPITAL | Age: 67
End: 2022-02-15
Attending: NURSE PRACTITIONER
Payer: COMMERCIAL

## 2022-02-15 DIAGNOSIS — D50.9 IRON DEFICIENCY ANEMIA, UNSPECIFIED IRON DEFICIENCY ANEMIA TYPE: ICD-10-CM

## 2022-02-15 LAB
BASOPHILS # BLD AUTO: 0.02 K/UL (ref 0–0.2)
BASOPHILS NFR BLD: 0.4 % (ref 0–1.9)
DIFFERENTIAL METHOD: ABNORMAL
EOSINOPHIL # BLD AUTO: 0.1 K/UL (ref 0–0.5)
EOSINOPHIL NFR BLD: 2.6 % (ref 0–8)
ERYTHROCYTE [DISTWIDTH] IN BLOOD BY AUTOMATED COUNT: 12.3 % (ref 11.5–14.5)
FERRITIN SERPL-MCNC: 241 NG/ML (ref 20–300)
HCT VFR BLD AUTO: 41.9 % (ref 37–48.5)
HGB BLD-MCNC: 14.1 G/DL (ref 12–16)
IMM GRANULOCYTES # BLD AUTO: 0.02 K/UL (ref 0–0.04)
IMM GRANULOCYTES NFR BLD AUTO: 0.4 % (ref 0–0.5)
IRON SERPL-MCNC: 100 UG/DL (ref 30–160)
LYMPHOCYTES # BLD AUTO: 1.8 K/UL (ref 1–4.8)
LYMPHOCYTES NFR BLD: 33.6 % (ref 18–48)
MCH RBC QN AUTO: 31 PG (ref 27–31)
MCHC RBC AUTO-ENTMCNC: 33.7 G/DL (ref 32–36)
MCV RBC AUTO: 92 FL (ref 82–98)
MONOCYTES # BLD AUTO: 0.3 K/UL (ref 0.3–1)
MONOCYTES NFR BLD: 6.1 % (ref 4–15)
NEUTROPHILS # BLD AUTO: 3.1 K/UL (ref 1.8–7.7)
NEUTROPHILS NFR BLD: 56.9 % (ref 38–73)
NRBC BLD-RTO: 0 /100 WBC
PLATELET # BLD AUTO: 187 K/UL (ref 150–450)
PMV BLD AUTO: 8.8 FL (ref 9.2–12.9)
RBC # BLD AUTO: 4.55 M/UL (ref 4–5.4)
SATURATED IRON: 29 % (ref 20–50)
TOTAL IRON BINDING CAPACITY: 342 UG/DL (ref 250–450)
TRANSFERRIN SERPL-MCNC: 231 MG/DL (ref 200–375)
WBC # BLD AUTO: 5.39 K/UL (ref 3.9–12.7)

## 2022-02-15 PROCEDURE — 82728 ASSAY OF FERRITIN: CPT | Performed by: NURSE PRACTITIONER

## 2022-02-15 PROCEDURE — 36415 COLL VENOUS BLD VENIPUNCTURE: CPT | Performed by: NURSE PRACTITIONER

## 2022-02-15 PROCEDURE — 85025 COMPLETE CBC W/AUTO DIFF WBC: CPT | Performed by: NURSE PRACTITIONER

## 2022-02-15 PROCEDURE — 84466 ASSAY OF TRANSFERRIN: CPT | Performed by: NURSE PRACTITIONER

## 2022-02-18 ENCOUNTER — OFFICE VISIT (OUTPATIENT)
Dept: HEMATOLOGY/ONCOLOGY | Facility: CLINIC | Age: 67
End: 2022-02-18
Payer: COMMERCIAL

## 2022-02-18 VITALS
HEIGHT: 65 IN | BODY MASS INDEX: 38.24 KG/M2 | WEIGHT: 229.5 LBS | TEMPERATURE: 98 F | OXYGEN SATURATION: 95 % | SYSTOLIC BLOOD PRESSURE: 132 MMHG | HEART RATE: 77 BPM | DIASTOLIC BLOOD PRESSURE: 59 MMHG

## 2022-02-18 DIAGNOSIS — D50.9 IRON DEFICIENCY ANEMIA, UNSPECIFIED IRON DEFICIENCY ANEMIA TYPE: Primary | ICD-10-CM

## 2022-02-18 PROCEDURE — 1125F AMNT PAIN NOTED PAIN PRSNT: CPT | Mod: CPTII,S$GLB,, | Performed by: NURSE PRACTITIONER

## 2022-02-18 PROCEDURE — 99999 PR PBB SHADOW E&M-EST. PATIENT-LVL IV: ICD-10-PCS | Mod: PBBFAC,,, | Performed by: NURSE PRACTITIONER

## 2022-02-18 PROCEDURE — 3078F PR MOST RECENT DIASTOLIC BLOOD PRESSURE < 80 MM HG: ICD-10-PCS | Mod: CPTII,S$GLB,, | Performed by: NURSE PRACTITIONER

## 2022-02-18 PROCEDURE — 3075F SYST BP GE 130 - 139MM HG: CPT | Mod: CPTII,S$GLB,, | Performed by: NURSE PRACTITIONER

## 2022-02-18 PROCEDURE — 3075F PR MOST RECENT SYSTOLIC BLOOD PRESS GE 130-139MM HG: ICD-10-PCS | Mod: CPTII,S$GLB,, | Performed by: NURSE PRACTITIONER

## 2022-02-18 PROCEDURE — 99214 PR OFFICE/OUTPT VISIT, EST, LEVL IV, 30-39 MIN: ICD-10-PCS | Mod: S$GLB,,, | Performed by: NURSE PRACTITIONER

## 2022-02-18 PROCEDURE — 3078F DIAST BP <80 MM HG: CPT | Mod: CPTII,S$GLB,, | Performed by: NURSE PRACTITIONER

## 2022-02-18 PROCEDURE — 1159F MED LIST DOCD IN RCRD: CPT | Mod: CPTII,S$GLB,, | Performed by: NURSE PRACTITIONER

## 2022-02-18 PROCEDURE — 1160F PR REVIEW ALL MEDS BY PRESCRIBER/CLIN PHARMACIST DOCUMENTED: ICD-10-PCS | Mod: CPTII,S$GLB,, | Performed by: NURSE PRACTITIONER

## 2022-02-18 PROCEDURE — 99214 OFFICE O/P EST MOD 30 MIN: CPT | Mod: S$GLB,,, | Performed by: NURSE PRACTITIONER

## 2022-02-18 PROCEDURE — 99999 PR PBB SHADOW E&M-EST. PATIENT-LVL IV: CPT | Mod: PBBFAC,,, | Performed by: NURSE PRACTITIONER

## 2022-02-18 PROCEDURE — 1125F PR PAIN SEVERITY QUANTIFIED, PAIN PRESENT: ICD-10-PCS | Mod: CPTII,S$GLB,, | Performed by: NURSE PRACTITIONER

## 2022-02-18 PROCEDURE — 1160F RVW MEDS BY RX/DR IN RCRD: CPT | Mod: CPTII,S$GLB,, | Performed by: NURSE PRACTITIONER

## 2022-02-18 PROCEDURE — 1159F PR MEDICATION LIST DOCUMENTED IN MEDICAL RECORD: ICD-10-PCS | Mod: CPTII,S$GLB,, | Performed by: NURSE PRACTITIONER

## 2022-02-18 PROCEDURE — 3008F BODY MASS INDEX DOCD: CPT | Mod: CPTII,S$GLB,, | Performed by: NURSE PRACTITIONER

## 2022-02-18 PROCEDURE — 3008F PR BODY MASS INDEX (BMI) DOCUMENTED: ICD-10-PCS | Mod: CPTII,S$GLB,, | Performed by: NURSE PRACTITIONER

## 2022-02-18 RX ORDER — METOPROLOL SUCCINATE 100 MG/1
TABLET, EXTENDED RELEASE ORAL
COMMUNITY
Start: 2022-01-25

## 2022-02-18 NOTE — ASSESSMENT & PLAN NOTE
Resolution of iron deficiency anemia s/p Injectafer IV x 2. Previous GI evaluation did not reveal active bleeding. 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 Patient reports intolerance to oral iron due to constipation    Continue iron rich foods. Cbc, iron studies lab appointment in 3 months. F/u 6 months with cbc iron studies prior

## 2022-02-18 NOTE — PROGRESS NOTES
Subjective:       Patient ID: Ro Hernandez is a 66 y.o. female.    Chief Complaint: f/u JAQUELINE    HPI: 66 y.o female with h/o HTN, COPD, HLD female here today for follow up of iron deficiency most recent s/p IV Injectafer x 2 2021. Had previous iron deficiency anemia treated with Injectafer. Today she feels well and is without complaints. Denies any anemia symptoms or abnormal bleeding.    Social History     Socioeconomic History    Marital status:    Tobacco Use    Smoking status: Former Smoker     Packs/day: 1.00     Years: 32.00     Pack years: 32.00     Types: Cigarettes     Start date:      Quit date: 2016     Years since quittin.6    Smokeless tobacco: Never Used    Tobacco comment: smoked for 25 yrs. quit 16yrs. then smoked again for 5-6 before quitting in .   Substance and Sexual Activity    Alcohol use: No     Alcohol/week: 1.0 standard drink     Types: 1 Standard drinks or equivalent per week    Drug use: No    Sexual activity: Not Currently       Past Medical History:   Diagnosis Date    Allergy     Angina     Arthritis 2013    Asthma     COPD (chronic obstructive pulmonary disease)     GERD (gastroesophageal reflux disease)     Hx of Prinzmetal angina     Hypertension     Obesity 2013    Other and unspecified hyperlipidemia 2013    Prinzmetal's angina     Sleep apnea 2013       Family History   Problem Relation Age of Onset    Hypertension Mother     Cancer Mother     Kidney cancer Mother     COPD Father     Hypertension Father     Heart disease Sister     Hyperlipidemia Son     Breast cancer Maternal Grandmother     Colon cancer Neg Hx        Past Surgical History:   Procedure Laterality Date    ABDOMINAL SURGERY      CHOLECYSTECTOMY      COLONOSCOPY N/A 2019    Procedure: COLONOSCOPY;  Surgeon: Winston Smith MD;  Location: [facility];  Service: Endoscopy;  Laterality: N/A;    ESOPHAGOGASTRODUODENOSCOPY N/A 12/10/2018     Procedure: EGD (ESOPHAGOGASTRODUODENOSCOPY);  Surgeon: Raúl Polanco III, MD;  Location: [facility] ENDO;  Service: Endoscopy;  Laterality: N/A;    ESOPHAGOGASTRODUODENOSCOPY N/A 9/8/2020    Procedure: EGD (ESOPHAGOGASTRODUODENOSCOPY);  Surgeon: Raúl Polanco III, MD;  Location: [facility] ENDO;  Service: Endoscopy;  Laterality: N/A;    EYE SURGERY      FOOT SURGERY      INTRALUMINAL GASTROINTESTINAL TRACT IMAGING VIA CAPSULE N/A 9/18/2020    Procedure: IMAGING PROCEDURE, GI TRACT, INTRALUMINAL, VIA CAPSULE;  Surgeon: Blade Holley RN;  Location: [facility] ENDO;  Service: Endoscopy;  Laterality: N/A;    TONSILLECTOMY      TUBAL LIGATION         Review of Systems   Constitutional: Negative for activity change, appetite change, chills, diaphoresis, fatigue, fever and unexpected weight change.   HENT: Negative for congestion, mouth sores, nosebleeds, sore throat, trouble swallowing and voice change.    Eyes: Negative for visual disturbance.   Respiratory: Negative for cough, chest tightness, shortness of breath and wheezing.    Cardiovascular: Negative for chest pain and leg swelling.   Gastrointestinal: Negative for abdominal distention, abdominal pain, blood in stool, constipation, diarrhea, nausea and vomiting.   Genitourinary: Negative for difficulty urinating, dysuria and hematuria.   Musculoskeletal: Negative for arthralgias, back pain and myalgias.   Skin: Negative for pallor, rash and wound.   Neurological: Negative for dizziness, syncope, weakness and headaches.   Hematological: Negative for adenopathy. Does not bruise/bleed easily.   Psychiatric/Behavioral: The patient is not nervous/anxious.          Medication List with Changes/Refills   Current Medications    ALBUTEROL (PROVENTIL) 2.5 MG /3 ML (0.083 %) NEBULIZER SOLUTION    Take 3 mLs (2.5 mg total) by nebulization every 4 (four) hours as needed. Rescue    ASPIRIN (ECOTRIN) 81 MG EC TABLET    Take 81 mg by mouth once daily.    CELECOXIB (CELEBREX) 200 MG  CAPSULE        CYCLOBENZAPRINE (FLEXERIL) 10 MG TABLET        ESTROGEN, CONJUGATED,-MEDROXYPROGESTERONE 0.3-1.5 MG (PREMPRO) 0.3-1.5 MG PER TABLET    Take 1 tablet by mouth Daily.    EZETIMIBE (ZETIA) 10 MG TABLET    Take 1 tablet (10 mg total) by mouth once daily.    FLUTICASONE PROPIONATE (FLONASE) 50 MCG/ACTUATION NASAL SPRAY    USE 2 SPRAYS IN EACH NOSTRIL ONCE DAILY    HYDROCODONE-ACETAMINOPHEN 10-325MG (NORCO)  MG TAB    Take 1 tablet by mouth every 12 (twelve) hours.    ISOSORBIDE MONONITRATE (IMDUR) 30 MG 24 HR TABLET    Take 1 tab po in the AM and 1/2 tab po in the PM    KETOROLAC (TORADOL) 10 MG TABLET        METOPROLOL SUCCINATE (TOPROL-XL) 100 MG 24 HR TABLET        MOMETASONE-FORMOTEROL (DULERA) 200-5 MCG/ACTUATION INHALER    Inhale 2 puffs into the lungs 2 (two) times daily. Controller    MULTIVITAMIN (MULTIPLE VITAMIN ORAL)    Take 1 tablet by mouth Daily.    NITROFURANTOIN, MACROCRYSTAL-MONOHYDRATE, (MACROBID) 100 MG CAPSULE    Take 1 capsule (100 mg total) by mouth 2 (two) times daily.    NITROGLYCERIN (NITROSTAT) 0.4 MG SL TABLET    DISSOLVE 1 TABLET UNDER THE TONGUE EVERY 5 MINUTES FOR 3 DOSES AS NEEDED FOR CHEST PAIN    PANTOPRAZOLE (PROTONIX) 20 MG TABLET    TAKE 1 TABLET TWICE A DAY BEFORE MEALS    SOLIFENACIN (VESICARE) 10 MG TABLET    Take 1 tablet (10 mg total) by mouth once daily.    VERAPAMIL (VERELAN) 360 MG C24P    Take 1 capsule (360 mg total) by mouth once daily.   Discontinued Medications    METOPROLOL SUCCINATE (TOPROL-XL) 25 MG 24 HR TABLET        METOPROLOL SUCCINATE (TOPROL-XL) 50 MG 24 HR TABLET    Take 50 mg by mouth once daily.     Objective:     Vitals:    02/18/22 1347   BP: (!) 132/59   Pulse: 77   Temp: 97.7 °F (36.5 °C)     Lab Results   Component Value Date    WBC 5.39 02/15/2022    HGB 14.1 02/15/2022    HCT 41.9 02/15/2022    MCV 92 02/15/2022     02/15/2022       BMP  Lab Results   Component Value Date     11/19/2021    K 4.9 11/19/2021      11/19/2021    CO2 29 11/19/2021    BUN 18 11/19/2021    CREATININE 0.7 11/19/2021    CALCIUM 9.6 11/19/2021    ANIONGAP 7 (L) 11/19/2021    ESTGFRAFRICA >60.0 11/19/2021    EGFRNONAA >60.0 11/19/2021     Lab Results   Component Value Date    ALT 16 11/19/2021    AST 20 11/19/2021    ALKPHOS 64 11/19/2021    BILITOT 0.5 11/19/2021     Lab Results   Component Value Date    IRON 100 02/15/2022    TIBC 342 02/15/2022    FERRITIN 241 02/15/2022         Physical Exam  Vitals reviewed.   Constitutional:       Appearance: She is well-developed.   HENT:      Head: Normocephalic.      Right Ear: External ear normal.      Left Ear: External ear normal.   Eyes:      General: Lids are normal. No scleral icterus.        Right eye: No discharge.         Left eye: No discharge.      Conjunctiva/sclera: Conjunctivae normal.   Neck:      Thyroid: No thyroid mass.   Cardiovascular:      Rate and Rhythm: Normal rate.   Pulmonary:      Effort: Pulmonary effort is normal. No respiratory distress.      Breath sounds: Normal breath sounds. No wheezing or rales.   Abdominal:      General: Bowel sounds are normal. There is no distension.      Palpations: Abdomen is soft.      Tenderness: There is no abdominal tenderness.   Genitourinary:     Comments: deferred  Musculoskeletal:         General: Normal range of motion.      Cervical back: Normal range of motion.   Skin:     General: Skin is warm and dry.   Neurological:      Mental Status: She is alert and oriented to person, place, and time.   Psychiatric:         Speech: Speech normal.         Behavior: Behavior normal. Behavior is cooperative.         Thought Content: Thought content normal.          Assessment:     Problem List Items Addressed This Visit        Oncology    Iron deficiency anemia - Primary     Resolution of iron deficiency anemia s/p Injectafer IV x 2. Previous GI evaluation did not reveal active bleeding. 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 Patient reports intolerance to oral iron due to constipation    Continue iron rich foods. Cbc, iron studies lab appointment in 3 months. F/u 6 months with cbc iron studies prior         Relevant Orders    CBC Auto Differential    Iron and TIBC    Ferritin    CBC Auto Differential    Iron and TIBC    Ferritin            Plan:     Iron deficiency anemia, unspecified iron deficiency anemia type  -     CBC Auto Differential; Future; Expected date: 02/18/2022  -     Iron and TIBC; Future; Expected date: 02/18/2022  -     Ferritin; Future; Expected date: 02/18/2022  -     CBC Auto Differential; Future; Expected date: 02/18/2022  -     Iron and TIBC; Future; Expected date: 02/18/2022  -     Ferritin; Future; Expected date: 02/18/2022            YANETH Jha

## 2022-03-03 ENCOUNTER — HOSPITAL ENCOUNTER (OUTPATIENT)
Dept: RADIOLOGY | Facility: HOSPITAL | Age: 67
Discharge: HOME | End: 2022-03-03
Attending: NURSE PRACTITIONER
Payer: COMMERCIAL

## 2022-03-03 DIAGNOSIS — R91.1 SOLITARY PULMONARY NODULE: ICD-10-CM

## 2022-03-03 PROCEDURE — 71271 CT THORAX LUNG CANCER SCR C-: CPT | Mod: 26,,, | Performed by: RADIOLOGY

## 2022-03-03 PROCEDURE — 71271 CT CHEST LUNG SCREENING LOW DOSE: ICD-10-PCS | Mod: 26,,, | Performed by: RADIOLOGY

## 2022-03-03 PROCEDURE — 71271 CT THORAX LUNG CANCER SCR C-: CPT | Mod: TC

## 2022-03-03 NOTE — PROGRESS NOTES
Subjective:      Patient ID: Ro Hernandez is a 66 y.o. female.    Patient Active Problem List   Diagnosis    Severe obesity (BMI 35.0-35.9 with comorbidity)    Unspecified essential hypertension    Other and unspecified hyperlipidemia    GERD (gastroesophageal reflux disease)    Arthritis    Benign heart murmur    HERB on CPAP    Mild intermittent asthma without complication    Multiple lung nodules    Renal cyst    Prinzmetal's angina    Epigastric abdominal pain    Iron deficiency anemia due to chronic blood loss    Iron deficiency anemia    History of 2019 novel coronavirus disease (COVID-19)    Acute cystitis without hematuria    Urge incontinence    Atherosclerosis of aorta       Problem list has been reviewed.      Chief Complaint   Patient presents with    Pulmonary Nodules    Sleep Apnea       Chief Complaint: Pulmonary Nodules and Sleep Apnea      HPI:  Ro Hernandez 66 y.o. female returns to [facility]monary Clinic with review 3/3/2022 repeat CT chest reevaluate mediastinum/hilar lymphadenopathy and pulmonary nodules.     3/3/2022 CT chest Benign Appearance or Behavior - continue annual screening with LDCT in 12 months.    6/14/2021 PET CT   1. No abnormal elevated FDG uptake demonstrated with previously described pulmonary nodules.  2. Nonspecific low level FDG uptake noted within stable mediastinal lymph nodes as above.     Former smoker 32 pack years. Quit 2016.      Shortness of breath chronic long standing, stable.    Shortness of breath had improved in past after iron transfusions. Managed by hematology     3/3/2021 Spirometry is normal    1/6/2021 6mwd No desaturations requiring supplemental oxygen at rest or exertion.      No cough, no wheezing, no mucous.     Dyspnea Characteristics:   Disproportionate Exertional Dyspnea   Dyspnea Duration:  Chronic  Dyspnea Severity:  JESSICA 2  Dyspnea Timing:  Exertional only  Dyspnea Contributing Factors:  Tobacco Abuse , 30 pack year former  smoker. Cigarette smoker for 25 yrs. quit 16yrs. then smoked again for 5-6 before quitting in 2016.  Dyspnea Associated Symptoms:  none, only has sensation of not enough air with exertion and lying down, some times with talking        Has diagnosis of Asthma with COPD off inhalers over the past 4 years.     Patient denies recent sick contacts.   Patient does not have new pets. Patient does have a history of asthma.   Patient does have a history of environmental allergens.   Daily allergy regimen = CLARITIN / ZYRTEC PRN.   Patient has not traveled recently. Patient does have a history of smoking. She smoked 1 PPD for approximately 32 years.    Patient has had a previous chest x-ray. Patient has had a PPD done.  PPD was Negative      HERB on CPAP  Diagnosed with HERB by [facility] approximately 10 years ago.     She is on APAP 4 - 20 CMWP. She is complaince with her PAP.     She  definitely thinks PAP is beneficial to her health and she wants to continue with PAP therapy.      Compliance Report  Compliance  Payor Standard  Usage 12/05/2021 - 03/04/2022  Usage days 89/90 days (99%)  >= 4 hours 88 days (98%)  < 4 hours 1 days (1%)  Usage hours 792 hours 28 minutes  Average usage (total days) 8 hours 48 minutes  Average usage (days used) 8 hours 54 minutes  Median usage (days used) 9 hours 5 minutes  Total used hours (value since last reset - 03/04/2022) 15,090 hours  AirSense 10 AutoSet  Serial number [id number]  Mode AutoSet  Min Pressure 5 cmH2O  Max Pressure 12 cmH2O  EPR Fulltime  EPR level 3  Response Standard  Therapy  Pressure - cmH2O Median: 7.3 95th percentile: 11.3 Maximum: 11.8  Leaks - L/min Median: 5.3 95th percentile: 19.5 Maximum: 29.4  Events per hour AI: 0.6 HI: 0.9 AHI: 1.5  Apnea Index Central: 0.0 Obstructive: 0.5 Unknown: 0.0  RERA Index 0.2  Cheyne-Gamboa respiration (average duration per night) 0 minutes (0%)  Usage - hours      [place] Sleepiness Scale   EPWORTH SLEEPINESS SCALE 3/4/2022 6/3/2021  3/3/2021 1/6/2021 8/7/2020 6/7/2018 7/18/2017   Sitting and reading 0 0 0 0 0 0 0   Watching TV 0 0 0 0 0 0 1   Sitting, inactive in a public place (e.g. a theatre or a meeting) 0 0 0 0 0 0 0   As a passenger in a car for an hour without a break 0 0 0 0 0 0 0   Lying down to rest in the afternoon when circumstances permit 3 2 2 2 2 2 3   Sitting and talking to someone 0 0 0 0 0 0 0   Sitting quietly after a lunch without alcohol 0 0 0 0 0 0 0   In a car, while stopped for a few minutes in traffic 0 0 0 0 0 0 0   Total score 3 2 2 2 2 2 4           Previous Report Reviewed: historical medical records, office notes and radiology reports     Past Medical History: The following portions of the patient's history were reviewed and updated as appropriate:   She  has a past surgical history that includes Eye surgery; Foot surgery; Cholecystectomy; Tonsillectomy; Tubal ligation; Abdominal surgery; Esophagogastroduodenoscopy (N/A, 12/10/2018); Colonoscopy (N/A, 12/6/2019); Esophagogastroduodenoscopy (N/A, 9/8/2020); and Intraluminal gastrointestinal tract imaging via capsule (N/A, 9/18/2020).  Her family history includes Breast cancer in her maternal grandmother; COPD in her father; Cancer in her mother; Heart disease in her sister; Hyperlipidemia in her son; Hypertension in her father and mother; Kidney cancer in her mother.  She  reports that she quit smoking about 5 years ago. Her smoking use included cigarettes. She started smoking about 54 years ago. She has a 32.00 pack-year smoking history. She has never used smokeless tobacco. She reports that she does not drink alcohol and does not use drugs.  She has a current medication list which includes the following prescription(s): albuterol, aspirin, ezetimibe, fluticasone propionate, hydrocodone-acetaminophen, isosorbide mononitrate, metoprolol succinate, multivitamin, nitroglycerin, pantoprazole, solifenacin, verapamil, celecoxib, cyclobenzaprine, ketorolac, dulera, and  "nitrofurantoin (macrocrystal-monohydrate).  She is allergic to ace inhibitors, lisinopril, pravastatin, rosuvastatin, and simvastatin..    Review of Systems   Constitutional: Negative for fever, chills, appetite change, fatigue and night sweats.   HENT: Negative for nosebleeds, postnasal drip, sore throat, trouble swallowing, congestion and hearing loss.    Eyes: Negative for itching.   Respiratory: Positive for apnea, shortness of breath and dyspnea on extertion. Negative for snoring, cough, chest tightness, wheezing and orthopnea.    Cardiovascular: Negative for chest pain, palpitations and leg swelling.   Genitourinary: Negative for difficulty urinating.   Endocrine: Negative for cold intolerance and heat intolerance.    Musculoskeletal: Positive for arthralgias and back pain.   Skin: Negative for rash.   Gastrointestinal: Negative for nausea, vomiting, abdominal pain, abdominal distention and acid reflux.        Constipation   Neurological: Negative for dizziness, syncope, light-headedness and headaches.   Hematological: Excessive bruising.   Psychiatric/Behavioral: The patient is not nervous/anxious.    All other systems reviewed and are negative.         Occupational History:    Denies occupational exposure to asbestos, silica and petrochemicals.    Avocational Exposures:  none    Pet Exposures:  none      Objective:     Vitals:    03/04/22 1107   BP: (!) 140/82   Pulse: 71   Resp: 14   SpO2: (!) 94%   Weight: 103.3 kg (227 lb 11.8 oz)   Height: 5' 5" (1.651 m)     Body mass index is 37.9 kg/m².    Physical Exam  Vitals and nursing note reviewed.   Constitutional:       General: She is not in acute distress.     Appearance: Normal appearance. She is well-developed and well-groomed. She is obese. She is not ill-appearing or toxic-appearing.   HENT:      Head: Normocephalic.      Right Ear: External ear normal.      Left Ear: External ear normal.      Nose: Nose normal.      Mouth/Throat:      " Pharynx: No oropharyngeal exudate.   Eyes:      Conjunctiva/sclera: Conjunctivae normal.   Cardiovascular:      Rate and Rhythm: Normal rate and regular rhythm.      Heart sounds: Normal heart sounds.   Pulmonary:      Effort: Pulmonary effort is normal.      Breath sounds: Normal breath sounds. No stridor.   Abdominal:      Palpations: Abdomen is soft.   Musculoskeletal:         General: Normal range of motion.      Cervical back: Normal range of motion and neck supple.   Lymphadenopathy:      Cervical: No cervical adenopathy.   Skin:     General: Skin is warm and dry.   Neurological:      Mental Status: She is alert and oriented to person, place, and time.   Psychiatric:         Behavior: Behavior normal. Behavior is cooperative.         Thought Content: Thought content normal.         Judgment: Judgment normal.       Personal Diagnostic Review      3/3/2021 Spirometry is normal    1/6/2021 6mwd No desaturations requiring supplemental oxygen at rest or exertion.    Pulmonary function tests: 06/07/18: Normal spirometry.  FEV1: 2.23  (91.8 % predicted), FVC: 3.10 (99.4 % predicted), FEV1/FVC:  72 %. NORMAL SPIROMETRY    CT Chest Lung Screening Low Dose  Narrative: EXAMINATION:  CT CHEST LUNG SCREENING LOW DOSE    CLINICAL HISTORY:  Lung nodule, > 8mm, follow up exam; Solitary pulmonary nodule    TECHNIQUE:  CT of the thorax was performed with low dose, lung screening protocol.  No contrast was administered.  Sagittal and coronal reconstructions were obtained.    COMPARISON:  None.    CT chest dated Kiara 3, 2021    FINDINGS:  Lungs: There are no abnormal opacities that require further evaluation.  As seen on the prior studies, there is a 5 mm nodule in the right upper lobe (image number [id number]) and 9 mm nodule in the left lower lobe (image number [id number]).  There is mosaic pattern attenuation lungs bilaterally which could suggest reactive airway disease.  The lungs show no findings consistent with emphysema.  There is  persistent prominence of the right paratracheal lymph node measuring up to 1.5 cm and subcarinal lymph node measuring 1.5 cm.    Pleura:   No effusion..    Heart and pericardium: Normal size without effusion.    Aorta and vasculature: Atherosclerosis including coronary arteries.    Chest wall and skeletal structures: Unremarkable except age-appropriate degenerative changes.    Upper abdomen: Patient is status post cholecystectomy.  Otherwise, unremarkable.  Impression: Lung-RADS Category:  2 - Benign Appearance or Behavior - continue annual screening with LDCT in 12 months.    Clinically or potentially clinically significant non lung cancer finding:  None.    Prior Lung Cancer Modifier:  No history of prior lung cancer.    Electronically signed by: Barak Long MD  Date:    03/03/2022  Time:    11:16      3/2/2021 LDCT revealed: Mediastinum/hilar: Enlarged lymph nodes with short axis diameters of 14 mm in the right paratracheal space and 15 mm in the subcarinal space.  Mild fullness of the bilateral hilar which could represent underlying enlarged lymph nodes.  Calcified left hilar nodes.   Lung-RADS Category:  4A - Suspicious - consultation advised - possible next steps 3 month LDCT - in some scenarios PET/CT.   Clinically or potentially clinically significant non lung cancer finding:  None.    EXAMINATION: 6/14/2021  NM PET CT ROUTINE     CLINICAL HISTORY:  Mediastinal lymphadenopathy. Multiple pulmonary nodules largest left lung 9.4 cm. Localized enlarged lymph nodes; Localized enlarged lymph nodes     TECHNIQUE:  11.9 mCi of F18-FDG was administered intravenously in the right antecubital fossa.  After an approximately 60 min distribution time, PET/CT images were acquired from the skull base to the mid thigh. Transmission images were acquired to correct for attenuation using a whole body low-dose CT scan without contrast with the arms positioned above the head.     COMPARISON:  CT dated  06/03/2021     FINDINGS:  Quality of the study: Adequate.     Head neck: No abnormal foci of increased tracer uptake are present.     Chest: There is nonspecific low level tracer uptake seen with previously described right paratracheal and subcarinal lymph nodes with SUV max of 2.7 and 2.2 respectively.  No significant FDG uptake seen associated with the previously described 9 mm left lower lobe pulmonary nodule.  Smaller right upper lobe pulmonary nodule also demonstrates no significant FDG uptake but may be below resolution for PET.  No FDG avid pulmonary nodules visualized.     Abdomen and pelvis: No abnormal foci of increased tracer uptake are present.     Skeletal: No abnormal foci of increased tracer uptake are present.     Physiologic uptake of the tracer is present within the brain, salivary glands, myocardium, GI and  tracts.     Incidental CT findings: There are bilateral renal cyst present without associated FDG uptake.  Numerous sigmoid colonic diverticular present without evidence of acute diverticulitis.  Prior cholecystectomy noted.     Impression:     1. No abnormal elevated FDG uptake demonstrated with previously described pulmonary nodules.  2. Nonspecific low level FDG uptake noted within stable mediastinal lymph nodes as above.        Electronically signed by: Winston Self MD  Date:                                            06/14/2021  Time:                                           13:56    No results found for this or any previous visit.  Results for orders placed during the hospital encounter of 06/13/18   CT Chest With Contrast    Narrative EXAMINATION:  CT CHEST WITH CONTRAST    CLINICAL HISTORY:  Lung nodule, <1cm;Solitary pulmonary nodule    TECHNIQUE:  Low dose axial images, sagittal and coronal reformations were obtained from the thoracic inlet to the lung bases following the IV administration of 75 mL of Omnipaque 350.    COMPARISON:  Chest x-ray from 6 days earlier and a chest  CT from 10/02/2014 and a chest CT from September 2006    FINDINGS:  There is a nodule seen in the left lower lobe that measures approximately 7 mm.  This was also present on the CT from 2006 and at that time it also measured approximately 7 mm.  Given stability for several years this is most compatible with benign etiology.  This may relate to a noncalcified granuloma.  Additional tiny scattered nodular densities in the left lower lobe seen on exam from 2006 are no longer visualized consistent with resolved inflammation/infection.  A tiny calcified granuloma in the left upper lobe is seen.  No new pulmonary nodule or mass is identified.    There is ground-glass attenuation noted along the bronchovascular bundles which may suggest mild edema.  Respiratory motion artifact is present.  Bibasilar areas of subsegmental atelectasis are noted.  Trachea is patent.  Thyroid gland appears unremarkable.    Limited imaging through the upper abdomen demonstrates cholecystectomy changes, calcified splenic granulomas and partial visualization of a left renal cyst which appears to contain a thin septation with calcification.  No nodular components.    Heart size is normal.  No pericardial effusion.  Great vessels remain patent.  Small scattered mediastinal and hilar lymph nodes.  No bulky adenopathy.  Calcified left subcarinal region granuloma is noted.    Review of bone windows demonstrate the osseous structures to be grossly intact.  No destructive osseous lesion is evident.      Impression Left lower lobe pulmonary nodule which has been stable for multiple years and is most suggestive of benign etiology likely a noncalcified granuloma.  Additional calcified granuloma in the left upper lung.  No new or suspicious nodule is seen at this time.  Partially visualized mildly complex appearing left renal cyst.  Consider renal sonogram for further characterization.  Other findings as detailed above.      Electronically signed by: Arnel  MD Salomon  Date:    06/13/2018  Time:    13:31       Assessment /Plan:     Orders Placed This Encounter   Procedures    CPAP/BIPAP SUPPLIES     Benefits and compliant  90 day supply. 4 refills  HME: [facility]     Order Specific Question:   Length of need (1-99 months):     Answer:   99     Order Specific Question:   Choose ONE mask type and its corresponding cushions and/or pillows:     Answer:    Nasal Cushion Mask, 1 per 90 days:  Nasal Cushions, (6 per 90 days)     Order Specific Question:   Choose EITHER Heated or Non-Heated Tubjing     Answer:    Non-Heated Tubing, 1 per 90 days     Order Specific Question:   Number of Days Needed:     Answer:   99     Order Specific Question:   All other supplies as needed as listed below:     Answer:    Headgear, 1 per 180 days     Order Specific Question:   All other supplies as needed as listed below:     Answer:    Chin Strap, 1 per 180 days     Order Specific Question:   All other supplies as needed as listed below:     Answer:    Disposable Filter, 6 per 90 days     Order Specific Question:   All other supplies as needed as listed below:     Answer:    Humidifier Chamber, 1 per 180 days     Order Specific Question:   All other supplies as needed as listed below:     Answer:    Non-Disposable Filter, 1 per 180 days    CT Chest Lung Screening Low Dose     Standing Status:   Future     Standing Expiration Date:   5/24/2024     Order Specific Question:   Is the patient a current or former smoker who quit within the past 15 years?     Answer:   Yes     Order Specific Question:   Is the patient between the age 50-80 years old?     Answer:   Yes     Order Specific Question:   Has the patient ever had lung cancer or an abnormal Chest CT?     Answer:   No     Order Specific Question:   Has the patient smoked 20 or more packs of cigarettes/year?     Answer:   Yes     Order Specific Question:   May the Radiologist modify the order per protocol  to meet the clinical needs of the patient?     Answer:   Yes     Order Specific Question:   Does the patient show any signs or symptoms of lung cancer?     Answer:   No     Order Specific Question:   Is this the first (baseline) CT or an annual exam?     Answer:   Annual [2]     Order Specific Question:   Is there documentation of shared decision making for this lung screening exam?     Answer:   Yes     Order Specific Question:   Is this a low dose screening chest CT?     Answer:   Yes       Discussed diagnosis, its evaluation, treatment and usual course. All questions answered.    Problem List Items Addressed This Visit     HERB on CPAP - Primary (Chronic)    Current Assessment & Plan     Start APAP of  4  - 20 CMWP. (DME - [facility])   AHI: 1.5  Continued adherence   She needs to bring her SD card for downloads.            Relevant Orders    CPAP/BIPAP SUPPLIES    Severe obesity (BMI 35.0-35.9 with comorbidity)    Current Assessment & Plan     Encouraged calorie reduction and 30 minutes of exercise daily. Discussed impact of obesity on general health.  Wt Readings from Last 9 Encounters:   03/04/22 103.3 kg (227 lb 11.8 oz)   02/18/22 104.1 kg (229 lb 8 oz)   12/17/21 100.2 kg (220 lb 14.4 oz)   11/19/21 100.2 kg (220 lb 14.4 oz)   11/17/21 100 kg (220 lb 7.4 oz)   09/03/21 98.4 kg (216 lb 14.9 oz)   06/10/21 95.5 kg (210 lb 8.6 oz)   06/03/21 95.8 kg (211 lb 5 oz)   05/31/21 96 kg (211 lb 10.3 oz)   Body mass index is 37.9 kg/m².             Multiple lung nodules    Current Assessment & Plan     3/3/2022 CT chest Benign Appearance or Behavior - continue annual screening with LDCT in 12 months.    6/14/2021 PET CT   1. No abnormal elevated FDG uptake demonstrated with previously described pulmonary nodules.  2. Nonspecific low level FDG uptake noted within stable mediastinal lymph nodes as above.     Former smoker 32 pack years. Quit 2016.              Relevant Orders    CT Chest Lung Screening Low Dose    Mild  intermittent asthma without complication    Current Assessment & Plan     Controlled  3/3/2021 spirometry normal.  Has dulera, does not need to use  Has Albuterol inhaler rare use.   Continued current regimen   Patient declines repeat spirometry, since prior normal and remains stable, not on inhalers           Atherosclerosis of aorta    Current Assessment & Plan     Seen on imaging. Follow heart healthy diet. regular exercise.   Managed by cardiology              Other Visit Diagnoses     Solitary pulmonary nodule        Relevant Orders    CT Chest Lung Screening Low Dose    Cigarette nicotine dependence in remission        Relevant Orders    CT Chest Lung Screening Low Dose        Follow up in about 1 year (around 3/4/2023) for CPAP 1 year compliance download. Pulmonary nodule w/review CT chest.

## 2022-03-04 ENCOUNTER — OFFICE VISIT (OUTPATIENT)
Dept: PULMONOLOGY | Facility: CLINIC | Age: 67
End: 2022-03-04
Payer: COMMERCIAL

## 2022-03-04 VITALS
SYSTOLIC BLOOD PRESSURE: 140 MMHG | BODY MASS INDEX: 37.95 KG/M2 | HEIGHT: 65 IN | WEIGHT: 227.75 LBS | OXYGEN SATURATION: 94 % | DIASTOLIC BLOOD PRESSURE: 82 MMHG | HEART RATE: 71 BPM | RESPIRATION RATE: 14 BRPM

## 2022-03-04 DIAGNOSIS — G47.33 OSA ON CPAP: Primary | ICD-10-CM

## 2022-03-04 DIAGNOSIS — R91.8 MULTIPLE LUNG NODULES: ICD-10-CM

## 2022-03-04 DIAGNOSIS — E66.01 SEVERE OBESITY (BMI 35.0-35.9 WITH COMORBIDITY): ICD-10-CM

## 2022-03-04 DIAGNOSIS — R91.1 SOLITARY PULMONARY NODULE: ICD-10-CM

## 2022-03-04 DIAGNOSIS — I70.0 ATHEROSCLEROSIS OF AORTA: ICD-10-CM

## 2022-03-04 DIAGNOSIS — F17.211 CIGARETTE NICOTINE DEPENDENCE IN REMISSION: ICD-10-CM

## 2022-03-04 DIAGNOSIS — J45.20 MILD INTERMITTENT ASTHMA WITHOUT COMPLICATION: ICD-10-CM

## 2022-03-04 PROCEDURE — 1160F RVW MEDS BY RX/DR IN RCRD: CPT | Mod: CPTII,S$GLB,, | Performed by: NURSE PRACTITIONER

## 2022-03-04 PROCEDURE — 3008F BODY MASS INDEX DOCD: CPT | Mod: CPTII,S$GLB,, | Performed by: NURSE PRACTITIONER

## 2022-03-04 PROCEDURE — 3008F PR BODY MASS INDEX (BMI) DOCUMENTED: ICD-10-PCS | Mod: CPTII,S$GLB,, | Performed by: NURSE PRACTITIONER

## 2022-03-04 PROCEDURE — 99214 PR OFFICE/OUTPT VISIT, EST, LEVL IV, 30-39 MIN: ICD-10-PCS | Mod: S$GLB,,, | Performed by: NURSE PRACTITIONER

## 2022-03-04 PROCEDURE — 1160F PR REVIEW ALL MEDS BY PRESCRIBER/CLIN PHARMACIST DOCUMENTED: ICD-10-PCS | Mod: CPTII,S$GLB,, | Performed by: NURSE PRACTITIONER

## 2022-03-04 PROCEDURE — 99999 PR PBB SHADOW E&M-EST. PATIENT-LVL V: ICD-10-PCS | Mod: PBBFAC,,, | Performed by: NURSE PRACTITIONER

## 2022-03-04 PROCEDURE — 3288F PR FALLS RISK ASSESSMENT DOCUMENTED: ICD-10-PCS | Mod: CPTII,S$GLB,, | Performed by: NURSE PRACTITIONER

## 2022-03-04 PROCEDURE — 3079F DIAST BP 80-89 MM HG: CPT | Mod: CPTII,S$GLB,, | Performed by: NURSE PRACTITIONER

## 2022-03-04 PROCEDURE — 99999 PR PBB SHADOW E&M-EST. PATIENT-LVL V: CPT | Mod: PBBFAC,,, | Performed by: NURSE PRACTITIONER

## 2022-03-04 PROCEDURE — 1159F PR MEDICATION LIST DOCUMENTED IN MEDICAL RECORD: ICD-10-PCS | Mod: CPTII,S$GLB,, | Performed by: NURSE PRACTITIONER

## 2022-03-04 PROCEDURE — 3077F SYST BP >= 140 MM HG: CPT | Mod: CPTII,S$GLB,, | Performed by: NURSE PRACTITIONER

## 2022-03-04 PROCEDURE — 3079F PR MOST RECENT DIASTOLIC BLOOD PRESSURE 80-89 MM HG: ICD-10-PCS | Mod: CPTII,S$GLB,, | Performed by: NURSE PRACTITIONER

## 2022-03-04 PROCEDURE — 3077F PR MOST RECENT SYSTOLIC BLOOD PRESSURE >= 140 MM HG: ICD-10-PCS | Mod: CPTII,S$GLB,, | Performed by: NURSE PRACTITIONER

## 2022-03-04 PROCEDURE — 1101F PR PT FALLS ASSESS DOC 0-1 FALLS W/OUT INJ PAST YR: ICD-10-PCS | Mod: CPTII,S$GLB,, | Performed by: NURSE PRACTITIONER

## 2022-03-04 PROCEDURE — 1101F PT FALLS ASSESS-DOCD LE1/YR: CPT | Mod: CPTII,S$GLB,, | Performed by: NURSE PRACTITIONER

## 2022-03-04 PROCEDURE — 1159F MED LIST DOCD IN RCRD: CPT | Mod: CPTII,S$GLB,, | Performed by: NURSE PRACTITIONER

## 2022-03-04 PROCEDURE — 99214 OFFICE O/P EST MOD 30 MIN: CPT | Mod: S$GLB,,, | Performed by: NURSE PRACTITIONER

## 2022-03-04 PROCEDURE — 3288F FALL RISK ASSESSMENT DOCD: CPT | Mod: CPTII,S$GLB,, | Performed by: NURSE PRACTITIONER

## 2022-03-04 NOTE — ASSESSMENT & PLAN NOTE
Start APAP of  4  - 20 CMWP. (DME - [facility])   AHI: 1.5  Continued adherence   She needs to bring her SD card for downloads.

## 2022-03-04 NOTE — ASSESSMENT & PLAN NOTE
Encouraged calorie reduction and 30 minutes of exercise daily. Discussed impact of obesity on general health.  Wt Readings from Last 9 Encounters:   03/04/22 103.3 kg (227 lb 11.8 oz)   02/18/22 104.1 kg (229 lb 8 oz)   12/17/21 100.2 kg (220 lb 14.4 oz)   11/19/21 100.2 kg (220 lb 14.4 oz)   11/17/21 100 kg (220 lb 7.4 oz)   09/03/21 98.4 kg (216 lb 14.9 oz)   06/10/21 95.5 kg (210 lb 8.6 oz)   06/03/21 95.8 kg (211 lb 5 oz)   05/31/21 96 kg (211 lb 10.3 oz)   Body mass index is 37.9 kg/m².

## 2022-03-04 NOTE — ASSESSMENT & PLAN NOTE
3/3/2022 CT chest Benign Appearance or Behavior - continue annual screening with LDCT in 12 months.    6/14/2021 PET CT   1. No abnormal elevated FDG uptake demonstrated with previously described pulmonary nodules.  2. Nonspecific low level FDG uptake noted within stable mediastinal lymph nodes as above.     Former smoker 32 pack years. Quit 2016.

## 2022-03-04 NOTE — ASSESSMENT & PLAN NOTE
Controlled  3/3/2021 spirometry normal.  Has dulera, does not need to use  Has Albuterol inhaler rare use.   Continued current regimen   Patient declines repeat spirometry, since prior normal and remains stable, not on inhalers

## 2022-03-17 ENCOUNTER — PATIENT MESSAGE (OUTPATIENT)
Dept: GASTROENTEROLOGY | Facility: CLINIC | Age: 67
End: 2022-03-17
Payer: COMMERCIAL

## 2022-03-18 RX ORDER — PANTOPRAZOLE SODIUM 20 MG/1
20 TABLET, DELAYED RELEASE ORAL
Qty: 60 TABLET | Refills: 0 | Status: SHIPPED | OUTPATIENT
Start: 2022-03-18 | End: 2022-04-01 | Stop reason: SDUPTHER

## 2022-04-01 ENCOUNTER — OFFICE VISIT (OUTPATIENT)
Dept: GASTROENTEROLOGY | Facility: CLINIC | Age: 67
End: 2022-04-01
Payer: COMMERCIAL

## 2022-04-01 VITALS
HEIGHT: 65 IN | WEIGHT: 219.81 LBS | BODY MASS INDEX: 36.62 KG/M2 | HEART RATE: 74 BPM | SYSTOLIC BLOOD PRESSURE: 122 MMHG | DIASTOLIC BLOOD PRESSURE: 80 MMHG

## 2022-04-01 DIAGNOSIS — K55.20 AVM (ARTERIOVENOUS MALFORMATION) OF SMALL BOWEL, ACQUIRED: ICD-10-CM

## 2022-04-01 DIAGNOSIS — K59.09 CHRONIC CONSTIPATION: ICD-10-CM

## 2022-04-01 DIAGNOSIS — K21.9 GASTROESOPHAGEAL REFLUX DISEASE, UNSPECIFIED WHETHER ESOPHAGITIS PRESENT: Primary | ICD-10-CM

## 2022-04-01 PROCEDURE — 3074F PR MOST RECENT SYSTOLIC BLOOD PRESSURE < 130 MM HG: ICD-10-PCS | Mod: CPTII,S$GLB,, | Performed by: PHYSICIAN ASSISTANT

## 2022-04-01 PROCEDURE — 3008F PR BODY MASS INDEX (BMI) DOCUMENTED: ICD-10-PCS | Mod: CPTII,S$GLB,, | Performed by: PHYSICIAN ASSISTANT

## 2022-04-01 PROCEDURE — 99214 OFFICE O/P EST MOD 30 MIN: CPT | Mod: S$GLB,,, | Performed by: PHYSICIAN ASSISTANT

## 2022-04-01 PROCEDURE — 1160F PR REVIEW ALL MEDS BY PRESCRIBER/CLIN PHARMACIST DOCUMENTED: ICD-10-PCS | Mod: CPTII,S$GLB,, | Performed by: PHYSICIAN ASSISTANT

## 2022-04-01 PROCEDURE — 1101F PT FALLS ASSESS-DOCD LE1/YR: CPT | Mod: CPTII,S$GLB,, | Performed by: PHYSICIAN ASSISTANT

## 2022-04-01 PROCEDURE — 99214 PR OFFICE/OUTPT VISIT, EST, LEVL IV, 30-39 MIN: ICD-10-PCS | Mod: S$GLB,,, | Performed by: PHYSICIAN ASSISTANT

## 2022-04-01 PROCEDURE — 3008F BODY MASS INDEX DOCD: CPT | Mod: CPTII,S$GLB,, | Performed by: PHYSICIAN ASSISTANT

## 2022-04-01 PROCEDURE — 1125F AMNT PAIN NOTED PAIN PRSNT: CPT | Mod: CPTII,S$GLB,, | Performed by: PHYSICIAN ASSISTANT

## 2022-04-01 PROCEDURE — 3074F SYST BP LT 130 MM HG: CPT | Mod: CPTII,S$GLB,, | Performed by: PHYSICIAN ASSISTANT

## 2022-04-01 PROCEDURE — 1125F PR PAIN SEVERITY QUANTIFIED, PAIN PRESENT: ICD-10-PCS | Mod: CPTII,S$GLB,, | Performed by: PHYSICIAN ASSISTANT

## 2022-04-01 PROCEDURE — 1160F RVW MEDS BY RX/DR IN RCRD: CPT | Mod: CPTII,S$GLB,, | Performed by: PHYSICIAN ASSISTANT

## 2022-04-01 PROCEDURE — 3079F PR MOST RECENT DIASTOLIC BLOOD PRESSURE 80-89 MM HG: ICD-10-PCS | Mod: CPTII,S$GLB,, | Performed by: PHYSICIAN ASSISTANT

## 2022-04-01 PROCEDURE — 1101F PR PT FALLS ASSESS DOC 0-1 FALLS W/OUT INJ PAST YR: ICD-10-PCS | Mod: CPTII,S$GLB,, | Performed by: PHYSICIAN ASSISTANT

## 2022-04-01 PROCEDURE — 3288F PR FALLS RISK ASSESSMENT DOCUMENTED: ICD-10-PCS | Mod: CPTII,S$GLB,, | Performed by: PHYSICIAN ASSISTANT

## 2022-04-01 PROCEDURE — 1159F MED LIST DOCD IN RCRD: CPT | Mod: CPTII,S$GLB,, | Performed by: PHYSICIAN ASSISTANT

## 2022-04-01 PROCEDURE — 99999 PR PBB SHADOW E&M-EST. PATIENT-LVL IV: CPT | Mod: PBBFAC,,, | Performed by: PHYSICIAN ASSISTANT

## 2022-04-01 PROCEDURE — 3288F FALL RISK ASSESSMENT DOCD: CPT | Mod: CPTII,S$GLB,, | Performed by: PHYSICIAN ASSISTANT

## 2022-04-01 PROCEDURE — 99999 PR PBB SHADOW E&M-EST. PATIENT-LVL IV: ICD-10-PCS | Mod: PBBFAC,,, | Performed by: PHYSICIAN ASSISTANT

## 2022-04-01 PROCEDURE — 3079F DIAST BP 80-89 MM HG: CPT | Mod: CPTII,S$GLB,, | Performed by: PHYSICIAN ASSISTANT

## 2022-04-01 PROCEDURE — 1159F PR MEDICATION LIST DOCUMENTED IN MEDICAL RECORD: ICD-10-PCS | Mod: CPTII,S$GLB,, | Performed by: PHYSICIAN ASSISTANT

## 2022-04-01 RX ORDER — PANTOPRAZOLE SODIUM 20 MG/1
20 TABLET, DELAYED RELEASE ORAL
Qty: 180 TABLET | Refills: 3 | Status: SHIPPED | OUTPATIENT
Start: 2022-04-01 | End: 2023-11-15 | Stop reason: SDUPTHER

## 2022-04-01 RX ORDER — LACTULOSE 10 G/15ML
20 SOLUTION ORAL; RECTAL 2 TIMES DAILY
Qty: 1892 ML | Refills: 2 | Status: SHIPPED | OUTPATIENT
Start: 2022-04-01 | End: 2024-01-02 | Stop reason: SDUPTHER

## 2022-04-01 NOTE — PROGRESS NOTES
Subjective:      Patient ID: Ro Hernandez is a 66 y.o. female.    Chief Complaint: Medication Refill (Pantoprazole (requests 90 day refill)    HPI  The patient has a history of anemia, GERD, belching and chronic constipation. She was last seen September 2020. She is here requesting a refill of her PPI. At her last visit, she had new anemia. An EGD was done which showed bilious gastric fluid, scar in the antrum and gastric mucosal variant. Pathology showed mild chronic inactive gastritis and changes suggestive of proton pump inhibitor effect. No H. Pylori, dysplasia or intestinal metaplasia. Since no source of anemia was found, she was scheduled for a VCE. This showed a single angioectasia without bleeding in the proximal small bowel.   Today she reports feeling well except for issues with constipation. She is taking a stool softener bid but still having to take a laxative 2-3 times a week. The constipation has been worse since starting Vesicare. She denies nausea, vomiting or abdominal pain. She has GERD symptoms if she misses a dose of her PPI.     Colonoscopy (12/06/19) - 3 mm polyp, diverticulosis, non-bleeding internal hemorrhoids.    Lab Results   Component Value Date    HGB 14.1 02/15/2022     Lab Results   Component Value Date    CREATININE 0.7 11/19/2021       Review of Systems  As per HPI.     Objective:     Physical Exam  Constitutional:       General: She is not in acute distress.     Appearance: She is well-developed. She is not diaphoretic.   HENT:      Head: Normocephalic and atraumatic.   Eyes:      Extraocular Movements: Extraocular movements intact.   Cardiovascular:      Rate and Rhythm: Normal rate and regular rhythm.      Heart sounds: Murmur heard.   Pulmonary:      Effort: Pulmonary effort is normal. No respiratory distress.      Breath sounds: Normal breath sounds. No wheezing.   Abdominal:      General: Bowel sounds are normal. There is no distension.      Palpations: Abdomen is soft.       Tenderness: There is no abdominal tenderness.   Neurological:      Mental Status: She is alert and oriented to person, place, and time.      Cranial Nerves: No cranial nerve deficit.   Psychiatric:         Behavior: Behavior normal.         Assessment:     1. Gastroesophageal reflux disease, unspecified whether esophagitis present    2. Chronic constipation    3. AVM (arteriovenous malformation) of small bowel, acquired        Plan:     Possible long term side effects of PPI use were discussed with the patient. Attempts should be made to decrease dosing to the lowest effective dose. In her case, she feels she needs it bid. She has no history of osteoporosis or CKD. Therefore, it will be refilled.   As for her constipation, and keeping cost in mind, I will prescribe lactulose bid. Dosing to be titrated as needed. If poor response, will consider Linzess. She doesn't like taking Miralax because she frequently forgets to take it.     Medications Ordered This Encounter   Medications    lactulose (CHRONULAC) 10 gram/15 mL solution     Sig: Take 30 mLs (20 g total) by mouth 2 (two) times daily.     Dispense:  1892 mL     Refill:  2    pantoprazole (PROTONIX) 20 MG tablet     Sig: Take 1 tablet (20 mg total) by mouth 2 (two) times daily before meals.     Dispense:  180 tablet     Refill:  3         Follow up if symptoms worsen or fail to improve.    Thank you for the opportunity to participate in the care of this patient.   Clarence Barboza PA-C.

## 2022-04-06 ENCOUNTER — PATIENT MESSAGE (OUTPATIENT)
Dept: UROLOGY | Facility: CLINIC | Age: 67
End: 2022-04-06
Payer: COMMERCIAL

## 2022-04-06 RX ORDER — SOLIFENACIN SUCCINATE 10 MG/1
10 TABLET, FILM COATED ORAL DAILY
Qty: 90 TABLET | Refills: 3 | Status: SHIPPED | OUTPATIENT
Start: 2022-04-06 | End: 2023-03-21 | Stop reason: SDUPTHER

## 2022-05-18 ENCOUNTER — LAB VISIT (OUTPATIENT)
Dept: LAB | Facility: HOSPITAL | Age: 67
End: 2022-05-18
Attending: NURSE PRACTITIONER
Payer: COMMERCIAL

## 2022-05-18 DIAGNOSIS — D50.9 IRON DEFICIENCY ANEMIA, UNSPECIFIED IRON DEFICIENCY ANEMIA TYPE: ICD-10-CM

## 2022-05-18 LAB
BASOPHILS # BLD AUTO: 0.02 K/UL (ref 0–0.2)
BASOPHILS NFR BLD: 0.4 % (ref 0–1.9)
DIFFERENTIAL METHOD: NORMAL
EOSINOPHIL # BLD AUTO: 0.1 K/UL (ref 0–0.5)
EOSINOPHIL NFR BLD: 1.8 % (ref 0–8)
ERYTHROCYTE [DISTWIDTH] IN BLOOD BY AUTOMATED COUNT: 12.7 % (ref 11.5–14.5)
FERRITIN SERPL-MCNC: 147 NG/ML (ref 20–300)
HCT VFR BLD AUTO: 40.8 % (ref 37–48.5)
HGB BLD-MCNC: 13.7 G/DL (ref 12–16)
IMM GRANULOCYTES # BLD AUTO: 0.02 K/UL (ref 0–0.04)
IMM GRANULOCYTES NFR BLD AUTO: 0.4 % (ref 0–0.5)
IRON SERPL-MCNC: 55 UG/DL (ref 30–160)
LYMPHOCYTES # BLD AUTO: 1.7 K/UL (ref 1–4.8)
LYMPHOCYTES NFR BLD: 30.2 % (ref 18–48)
MCH RBC QN AUTO: 30.8 PG (ref 27–31)
MCHC RBC AUTO-ENTMCNC: 33.6 G/DL (ref 32–36)
MCV RBC AUTO: 92 FL (ref 82–98)
MONOCYTES # BLD AUTO: 0.4 K/UL (ref 0.3–1)
MONOCYTES NFR BLD: 7.7 % (ref 4–15)
NEUTROPHILS # BLD AUTO: 3.3 K/UL (ref 1.8–7.7)
NEUTROPHILS NFR BLD: 59.5 % (ref 38–73)
NRBC BLD-RTO: 0 /100 WBC
PLATELET # BLD AUTO: 200 K/UL (ref 150–450)
PMV BLD AUTO: 9.6 FL (ref 9.2–12.9)
RBC # BLD AUTO: 4.45 M/UL (ref 4–5.4)
SATURATED IRON: 16 % (ref 20–50)
TOTAL IRON BINDING CAPACITY: 345 UG/DL (ref 250–450)
TRANSFERRIN SERPL-MCNC: 233 MG/DL (ref 200–375)
WBC # BLD AUTO: 5.6 K/UL (ref 3.9–12.7)

## 2022-05-18 PROCEDURE — 84466 ASSAY OF TRANSFERRIN: CPT | Performed by: NURSE PRACTITIONER

## 2022-05-18 PROCEDURE — 82728 ASSAY OF FERRITIN: CPT | Performed by: NURSE PRACTITIONER

## 2022-05-18 PROCEDURE — 85025 COMPLETE CBC W/AUTO DIFF WBC: CPT | Performed by: NURSE PRACTITIONER

## 2022-05-18 PROCEDURE — 36415 COLL VENOUS BLD VENIPUNCTURE: CPT | Mod: PO | Performed by: NURSE PRACTITIONER

## 2022-06-17 ENCOUNTER — OFFICE VISIT (OUTPATIENT)
Dept: UROLOGY | Facility: CLINIC | Age: 67
End: 2022-06-17
Payer: COMMERCIAL

## 2022-06-17 VITALS — WEIGHT: 219 LBS | BODY MASS INDEX: 36.44 KG/M2

## 2022-06-17 DIAGNOSIS — N30.00 ACUTE CYSTITIS WITHOUT HEMATURIA: ICD-10-CM

## 2022-06-17 DIAGNOSIS — N39.41 URGE INCONTINENCE: ICD-10-CM

## 2022-06-17 DIAGNOSIS — N28.1 RENAL CYST: Primary | ICD-10-CM

## 2022-06-17 PROCEDURE — 1159F MED LIST DOCD IN RCRD: CPT | Mod: CPTII,S$GLB,, | Performed by: UROLOGY

## 2022-06-17 PROCEDURE — 99213 PR OFFICE/OUTPT VISIT, EST, LEVL III, 20-29 MIN: ICD-10-PCS | Mod: S$GLB,,, | Performed by: UROLOGY

## 2022-06-17 PROCEDURE — 1159F PR MEDICATION LIST DOCUMENTED IN MEDICAL RECORD: ICD-10-PCS | Mod: CPTII,S$GLB,, | Performed by: UROLOGY

## 2022-06-17 PROCEDURE — 3008F BODY MASS INDEX DOCD: CPT | Mod: CPTII,S$GLB,, | Performed by: UROLOGY

## 2022-06-17 PROCEDURE — 99999 PR PBB SHADOW E&M-EST. PATIENT-LVL III: ICD-10-PCS | Mod: PBBFAC,,, | Performed by: UROLOGY

## 2022-06-17 PROCEDURE — 3008F PR BODY MASS INDEX (BMI) DOCUMENTED: ICD-10-PCS | Mod: CPTII,S$GLB,, | Performed by: UROLOGY

## 2022-06-17 PROCEDURE — 1126F AMNT PAIN NOTED NONE PRSNT: CPT | Mod: CPTII,S$GLB,, | Performed by: UROLOGY

## 2022-06-17 PROCEDURE — 99999 PR PBB SHADOW E&M-EST. PATIENT-LVL III: CPT | Mod: PBBFAC,,, | Performed by: UROLOGY

## 2022-06-17 PROCEDURE — 99213 OFFICE O/P EST LOW 20 MIN: CPT | Mod: S$GLB,,, | Performed by: UROLOGY

## 2022-06-17 PROCEDURE — 1126F PR PAIN SEVERITY QUANTIFIED, NO PAIN PRESENT: ICD-10-PCS | Mod: CPTII,S$GLB,, | Performed by: UROLOGY

## 2022-06-17 NOTE — PROGRESS NOTES
Chief Complaint:   Encounter Diagnoses   Name Primary?    Renal cyst Yes    Acute cystitis without hematuria     Urge incontinence          HPI:   6/17/22- currently doing well on VESIcare no evidence of infections.      Allergies:  Ace inhibitors, Lisinopril, Pravastatin, Rosuvastatin, and Simvastatin    Medications: has a current medication list which includes the following prescription(s): albuterol, aspirin, ezetimibe, fluticasone propionate, hydrocodone-acetaminophen, isosorbide mononitrate, lactulose, metoprolol succinate, dulera, multivitamin, nitroglycerin, pantoprazole, solifenacin, and verapamil.    Review of Systems:  General: No fever, chills, fatigability, or weight loss.  Skin: No rashes, itching, or changes in color or texture of skin.  Chest: Denies MALCOLM, cyanosis, wheezing, cough, and sputum production.  Abdomen: Appetite fine. No weight loss. Denies diarrhea, abdominal pain, hematemesis, or blood in stool.  Musculoskeletal: No joint stiffness or swelling. Denies back pain.  : As above.  All other review of systems negative.    PMH:   has a past medical history of Allergy, Angina, Arthritis (6/11/2013), Asthma, COPD (chronic obstructive pulmonary disease), GERD (gastroesophageal reflux disease), Prinzmetal angina, Hypertension, Obesity (6/11/2013), Other and unspecified hyperlipidemia (6/11/2013), Prinzmetal's angina, and Sleep apnea (6/11/2013).    PSH:   has a past surgical history that includes Eye surgery; Foot surgery; Cholecystectomy; Tonsillectomy; Tubal ligation; Abdominal surgery; Esophagogastroduodenoscopy (N/A, 12/10/2018); Colonoscopy (N/A, 12/6/2019); Esophagogastroduodenoscopy (N/A, 9/8/2020); and Intraluminal gastrointestinal tract imaging via capsule (N/A, 9/18/2020).    FamHx: family history includes Breast cancer in her maternal grandmother; COPD in her father; Cancer in her mother; Heart disease in her sister; Hyperlipidemia in her son; Hypertension in her father and mother;  Kidney cancer in her mother.    SocHx:  reports that she quit smoking about 6 years ago. Her smoking use included cigarettes. She started smoking about 54 years ago. She has a 32.00 pack-year smoking history. She has never used smokeless tobacco. She reports that she does not drink alcohol and does not use drugs.     Physical Exam:  Vitals:   There were no vitals filed for this visit.  General: A&Ox3. No apparent distress. No deformities.  Neck: No masses. Normal thyroid.  Lungs: normal inspiration. No use of accessory muscles.  Heart: normal pulse. No arrhythmias.  Abdomen: Soft. NT. ND  Skin: The skin is warm and dry. No jaundice.  Ext: No c/c/e.     Labs/Studies:   UA normal 6/22  pvr 74 ml 6/22  KASHIF 3.7 cm left complex renal cyst 10/21  CT renal protocol 4.8 cm left Bosniak 2 renal cyst 11/19      Impression/Plan:      1. Renal cyst- in regards to the cyst, this was classified as a Bosniak 2 cyst years ago with stability ever since then.  No need to follow further.      2. UTI- no evidence of recurrent UTI, call with any issues.    3. Urge incontinence- VESIcare 10 mg has assisted her symptoms, she will continue.  Call with any worsening of symptoms, otherwise see me in 1 year.  Call if she needs to be seen before hand.

## 2022-08-05 ENCOUNTER — OFFICE VISIT (OUTPATIENT)
Dept: FAMILY MEDICINE | Facility: CLINIC | Age: 67
End: 2022-08-05
Payer: COMMERCIAL

## 2022-08-05 ENCOUNTER — LAB VISIT (OUTPATIENT)
Dept: LAB | Facility: HOSPITAL | Age: 67
End: 2022-08-05
Attending: FAMILY MEDICINE
Payer: COMMERCIAL

## 2022-08-05 VITALS
DIASTOLIC BLOOD PRESSURE: 70 MMHG | TEMPERATURE: 98 F | SYSTOLIC BLOOD PRESSURE: 126 MMHG | HEART RATE: 85 BPM | OXYGEN SATURATION: 92 % | WEIGHT: 221.13 LBS | HEIGHT: 65 IN | BODY MASS INDEX: 36.84 KG/M2

## 2022-08-05 DIAGNOSIS — D50.9 IRON DEFICIENCY ANEMIA, UNSPECIFIED IRON DEFICIENCY ANEMIA TYPE: ICD-10-CM

## 2022-08-05 DIAGNOSIS — I10 PRIMARY HYPERTENSION: ICD-10-CM

## 2022-08-05 DIAGNOSIS — E66.01 SEVERE OBESITY (BMI 35.0-35.9 WITH COMORBIDITY): ICD-10-CM

## 2022-08-05 DIAGNOSIS — Z78.9 STATIN INTOLERANCE: ICD-10-CM

## 2022-08-05 DIAGNOSIS — R91.8 MULTIPLE LUNG NODULES: ICD-10-CM

## 2022-08-05 DIAGNOSIS — J45.901 ASTHMA WITH COPD WITH EXACERBATION: ICD-10-CM

## 2022-08-05 DIAGNOSIS — K21.9 GASTROESOPHAGEAL REFLUX DISEASE, UNSPECIFIED WHETHER ESOPHAGITIS PRESENT: ICD-10-CM

## 2022-08-05 DIAGNOSIS — Z78.0 POSTMENOPAUSAL: ICD-10-CM

## 2022-08-05 DIAGNOSIS — I10 ESSENTIAL HYPERTENSION: ICD-10-CM

## 2022-08-05 DIAGNOSIS — R53.82 CHRONIC FATIGUE: Primary | ICD-10-CM

## 2022-08-05 DIAGNOSIS — G47.33 OSA ON CPAP: ICD-10-CM

## 2022-08-05 DIAGNOSIS — J44.1 ASTHMA WITH COPD WITH EXACERBATION: ICD-10-CM

## 2022-08-05 LAB
ALBUMIN SERPL BCP-MCNC: 4 G/DL (ref 3.5–5.2)
ALP SERPL-CCNC: 64 U/L (ref 55–135)
ALT SERPL W/O P-5'-P-CCNC: 16 U/L (ref 10–44)
ANION GAP SERPL CALC-SCNC: 8 MMOL/L (ref 8–16)
AST SERPL-CCNC: 21 U/L (ref 10–40)
BASOPHILS # BLD AUTO: 0.02 K/UL (ref 0–0.2)
BASOPHILS # BLD AUTO: 0.02 K/UL (ref 0–0.2)
BASOPHILS NFR BLD: 0.4 % (ref 0–1.9)
BASOPHILS NFR BLD: 0.4 % (ref 0–1.9)
BILIRUB SERPL-MCNC: 0.4 MG/DL (ref 0.1–1)
BUN SERPL-MCNC: 19 MG/DL (ref 8–23)
CALCIUM SERPL-MCNC: 9.6 MG/DL (ref 8.7–10.5)
CHLORIDE SERPL-SCNC: 106 MMOL/L (ref 95–110)
CO2 SERPL-SCNC: 25 MMOL/L (ref 23–29)
CREAT SERPL-MCNC: 0.8 MG/DL (ref 0.5–1.4)
DIFFERENTIAL METHOD: NORMAL
DIFFERENTIAL METHOD: NORMAL
EOSINOPHIL # BLD AUTO: 0.1 K/UL (ref 0–0.5)
EOSINOPHIL # BLD AUTO: 0.1 K/UL (ref 0–0.5)
EOSINOPHIL NFR BLD: 1.8 % (ref 0–8)
EOSINOPHIL NFR BLD: 1.8 % (ref 0–8)
ERYTHROCYTE [DISTWIDTH] IN BLOOD BY AUTOMATED COUNT: 12.6 % (ref 11.5–14.5)
ERYTHROCYTE [DISTWIDTH] IN BLOOD BY AUTOMATED COUNT: 12.6 % (ref 11.5–14.5)
EST. GFR  (NO RACE VARIABLE): >60 ML/MIN/1.73 M^2
FERRITIN SERPL-MCNC: 140 NG/ML (ref 20–300)
GLUCOSE SERPL-MCNC: 99 MG/DL (ref 70–110)
HCT VFR BLD AUTO: 41.9 % (ref 37–48.5)
HCT VFR BLD AUTO: 41.9 % (ref 37–48.5)
HGB BLD-MCNC: 14.1 G/DL (ref 12–16)
HGB BLD-MCNC: 14.1 G/DL (ref 12–16)
IMM GRANULOCYTES # BLD AUTO: 0.01 K/UL (ref 0–0.04)
IMM GRANULOCYTES # BLD AUTO: 0.01 K/UL (ref 0–0.04)
IMM GRANULOCYTES NFR BLD AUTO: 0.2 % (ref 0–0.5)
IMM GRANULOCYTES NFR BLD AUTO: 0.2 % (ref 0–0.5)
IRON SERPL-MCNC: 64 UG/DL (ref 30–160)
IRON SERPL-MCNC: 64 UG/DL (ref 30–160)
LYMPHOCYTES # BLD AUTO: 1.8 K/UL (ref 1–4.8)
LYMPHOCYTES # BLD AUTO: 1.8 K/UL (ref 1–4.8)
LYMPHOCYTES NFR BLD: 32.8 % (ref 18–48)
LYMPHOCYTES NFR BLD: 32.8 % (ref 18–48)
MCH RBC QN AUTO: 30.7 PG (ref 27–31)
MCH RBC QN AUTO: 30.7 PG (ref 27–31)
MCHC RBC AUTO-ENTMCNC: 33.7 G/DL (ref 32–36)
MCHC RBC AUTO-ENTMCNC: 33.7 G/DL (ref 32–36)
MCV RBC AUTO: 91 FL (ref 82–98)
MCV RBC AUTO: 91 FL (ref 82–98)
MONOCYTES # BLD AUTO: 0.4 K/UL (ref 0.3–1)
MONOCYTES # BLD AUTO: 0.4 K/UL (ref 0.3–1)
MONOCYTES NFR BLD: 7.5 % (ref 4–15)
MONOCYTES NFR BLD: 7.5 % (ref 4–15)
NEUTROPHILS # BLD AUTO: 3.2 K/UL (ref 1.8–7.7)
NEUTROPHILS # BLD AUTO: 3.2 K/UL (ref 1.8–7.7)
NEUTROPHILS NFR BLD: 57.3 % (ref 38–73)
NEUTROPHILS NFR BLD: 57.3 % (ref 38–73)
NRBC BLD-RTO: 0 /100 WBC
NRBC BLD-RTO: 0 /100 WBC
PLATELET # BLD AUTO: 210 K/UL (ref 150–450)
PLATELET # BLD AUTO: 210 K/UL (ref 150–450)
PMV BLD AUTO: 9.4 FL (ref 9.2–12.9)
PMV BLD AUTO: 9.4 FL (ref 9.2–12.9)
POTASSIUM SERPL-SCNC: 4.4 MMOL/L (ref 3.5–5.1)
PROT SERPL-MCNC: 7.3 G/DL (ref 6–8.4)
RBC # BLD AUTO: 4.59 M/UL (ref 4–5.4)
RBC # BLD AUTO: 4.59 M/UL (ref 4–5.4)
SATURATED IRON: 16 % (ref 20–50)
SATURATED IRON: 16 % (ref 20–50)
SODIUM SERPL-SCNC: 139 MMOL/L (ref 136–145)
TOTAL IRON BINDING CAPACITY: 410 UG/DL (ref 250–450)
TOTAL IRON BINDING CAPACITY: 410 UG/DL (ref 250–450)
TRANSFERRIN SERPL-MCNC: 277 MG/DL (ref 200–375)
TRANSFERRIN SERPL-MCNC: 277 MG/DL (ref 200–375)
TSH SERPL DL<=0.005 MIU/L-ACNC: 0.89 UIU/ML (ref 0.4–4)
WBC # BLD AUTO: 5.61 K/UL (ref 3.9–12.7)
WBC # BLD AUTO: 5.61 K/UL (ref 3.9–12.7)

## 2022-08-05 PROCEDURE — 3078F PR MOST RECENT DIASTOLIC BLOOD PRESSURE < 80 MM HG: ICD-10-PCS | Mod: CPTII,S$GLB,, | Performed by: FAMILY MEDICINE

## 2022-08-05 PROCEDURE — 84466 ASSAY OF TRANSFERRIN: CPT | Performed by: NURSE PRACTITIONER

## 2022-08-05 PROCEDURE — 99999 PR PBB SHADOW E&M-EST. PATIENT-LVL IV: ICD-10-PCS | Mod: PBBFAC,,, | Performed by: FAMILY MEDICINE

## 2022-08-05 PROCEDURE — 1126F AMNT PAIN NOTED NONE PRSNT: CPT | Mod: CPTII,S$GLB,, | Performed by: FAMILY MEDICINE

## 2022-08-05 PROCEDURE — 1126F PR PAIN SEVERITY QUANTIFIED, NO PAIN PRESENT: ICD-10-PCS | Mod: CPTII,S$GLB,, | Performed by: FAMILY MEDICINE

## 2022-08-05 PROCEDURE — 85025 COMPLETE CBC W/AUTO DIFF WBC: CPT | Performed by: NURSE PRACTITIONER

## 2022-08-05 PROCEDURE — 3008F PR BODY MASS INDEX (BMI) DOCUMENTED: ICD-10-PCS | Mod: CPTII,S$GLB,, | Performed by: FAMILY MEDICINE

## 2022-08-05 PROCEDURE — 99999 PR PBB SHADOW E&M-EST. PATIENT-LVL IV: CPT | Mod: PBBFAC,,, | Performed by: FAMILY MEDICINE

## 2022-08-05 PROCEDURE — 80053 COMPREHEN METABOLIC PANEL: CPT | Performed by: FAMILY MEDICINE

## 2022-08-05 PROCEDURE — 1159F PR MEDICATION LIST DOCUMENTED IN MEDICAL RECORD: ICD-10-PCS | Mod: CPTII,S$GLB,, | Performed by: FAMILY MEDICINE

## 2022-08-05 PROCEDURE — 36415 COLL VENOUS BLD VENIPUNCTURE: CPT | Mod: PO | Performed by: NURSE PRACTITIONER

## 2022-08-05 PROCEDURE — 3074F PR MOST RECENT SYSTOLIC BLOOD PRESSURE < 130 MM HG: ICD-10-PCS | Mod: CPTII,S$GLB,, | Performed by: FAMILY MEDICINE

## 2022-08-05 PROCEDURE — 82728 ASSAY OF FERRITIN: CPT | Performed by: NURSE PRACTITIONER

## 2022-08-05 PROCEDURE — 3288F FALL RISK ASSESSMENT DOCD: CPT | Mod: CPTII,S$GLB,, | Performed by: FAMILY MEDICINE

## 2022-08-05 PROCEDURE — 3288F PR FALLS RISK ASSESSMENT DOCUMENTED: ICD-10-PCS | Mod: CPTII,S$GLB,, | Performed by: FAMILY MEDICINE

## 2022-08-05 PROCEDURE — 3008F BODY MASS INDEX DOCD: CPT | Mod: CPTII,S$GLB,, | Performed by: FAMILY MEDICINE

## 2022-08-05 PROCEDURE — 99499 UNLISTED E&M SERVICE: CPT | Mod: S$GLB,,, | Performed by: FAMILY MEDICINE

## 2022-08-05 PROCEDURE — 1101F PR PT FALLS ASSESS DOC 0-1 FALLS W/OUT INJ PAST YR: ICD-10-PCS | Mod: CPTII,S$GLB,, | Performed by: FAMILY MEDICINE

## 2022-08-05 PROCEDURE — 99499 RISK ADDL DX/OHS AUDIT: ICD-10-PCS | Mod: S$GLB,,, | Performed by: FAMILY MEDICINE

## 2022-08-05 PROCEDURE — 1101F PT FALLS ASSESS-DOCD LE1/YR: CPT | Mod: CPTII,S$GLB,, | Performed by: FAMILY MEDICINE

## 2022-08-05 PROCEDURE — 99214 PR OFFICE/OUTPT VISIT, EST, LEVL IV, 30-39 MIN: ICD-10-PCS | Mod: S$GLB,,, | Performed by: FAMILY MEDICINE

## 2022-08-05 PROCEDURE — 1159F MED LIST DOCD IN RCRD: CPT | Mod: CPTII,S$GLB,, | Performed by: FAMILY MEDICINE

## 2022-08-05 PROCEDURE — 3074F SYST BP LT 130 MM HG: CPT | Mod: CPTII,S$GLB,, | Performed by: FAMILY MEDICINE

## 2022-08-05 PROCEDURE — 84443 ASSAY THYROID STIM HORMONE: CPT | Performed by: FAMILY MEDICINE

## 2022-08-05 PROCEDURE — 3078F DIAST BP <80 MM HG: CPT | Mod: CPTII,S$GLB,, | Performed by: FAMILY MEDICINE

## 2022-08-05 PROCEDURE — 99214 OFFICE O/P EST MOD 30 MIN: CPT | Mod: S$GLB,,, | Performed by: FAMILY MEDICINE

## 2022-08-05 NOTE — PROGRESS NOTES
Subjective:       Patient ID: Ro Hernandez is a 67 y.o. female.    Chief Complaint: Follow-up      HPI Comments:       Current Outpatient Medications:     aspirin (ECOTRIN) 81 MG EC tablet, Take 81 mg by mouth once daily., Disp: , Rfl:     ezetimibe (ZETIA) 10 mg tablet, Take 1 tablet (10 mg total) by mouth once daily., Disp: 90 tablet, Rfl: 3    fluticasone propionate (FLONASE) 50 mcg/actuation nasal spray, USE 2 SPRAYS IN EACH NOSTRIL ONCE DAILY, Disp: 48 g, Rfl: 3    hydrocodone-acetaminophen 10-325mg (NORCO)  mg Tab, Take 1 tablet by mouth every 12 (twelve) hours., Disp: , Rfl: 0    isosorbide mononitrate (IMDUR) 30 MG 24 hr tablet, Take 1 tab po in the AM and 1/2 tab po in the PM, Disp: 135 tablet, Rfl: 1    lactulose (CHRONULAC) 10 gram/15 mL solution, Take 30 mLs (20 g total) by mouth 2 (two) times daily., Disp: 1892 mL, Rfl: 2    metoprolol succinate (TOPROL-XL) 100 MG 24 hr tablet, , Disp: , Rfl:     MULTIVITAMIN (MULTIPLE VITAMIN ORAL), Take 1 tablet by mouth Daily., Disp: , Rfl:     nitroGLYCERIN (NITROSTAT) 0.4 MG SL tablet, DISSOLVE 1 TABLET UNDER THE TONGUE EVERY 5 MINUTES FOR 3 DOSES AS NEEDED FOR CHEST PAIN, Disp: 25 tablet, Rfl: 0    solifenacin (VESICARE) 10 MG tablet, Take 1 tablet (10 mg total) by mouth once daily., Disp: 90 tablet, Rfl: 3    albuterol (PROVENTIL) 2.5 mg /3 mL (0.083 %) nebulizer solution, Take 3 mLs (2.5 mg total) by nebulization every 4 (four) hours as needed. Rescue, Disp: 150 mL, Rfl: 11    mometasone-formoterol (DULERA) 200-5 mcg/actuation inhaler, Inhale 2 puffs into the lungs 2 (two) times daily. Controller (Patient not taking: Reported on 3/4/2022), Disp: 13 g, Rfl: 11    pantoprazole (PROTONIX) 20 MG tablet, Take 1 tablet (20 mg total) by mouth 2 (two) times daily before meals., Disp: 180 tablet, Rfl: 3    verapamiL (VERELAN) 360 MG C24P, Take 1 capsule (360 mg total) by mouth once daily., Disp: 90 capsule, Rfl: 1      This is just our initial  "follow-up visit after meeting for the 1st time about 18 months ago.    Today she reports chronic fatigue.  In the past she has had hematologic issue such as chronic anemia.  She is being worked up for this again now.    She also sees Dr. Quiles for Cardiology.  He has ordered some cardiac testing that will happen soon.  She has a very loud heart murmur, but says she has had this all her life and that her cardiologist is not concerned.      She also has sleep apnea.  But is very compliant with her CPAP, and wakes up rested in the morning.  Only as the day goes on that she started dragging.    She denies depression.  She gave up cigarettes and drinking alcohol in 2016.    She takes a PPI every day for heartburn.  Had an EGD in 2020 that was fairly unremarkable    She is due for DEXA scan.  Her TSH was normal in 2020    She is up-to-date with her gyn examinations      Review of Systems   Constitutional: Positive for fatigue. Negative for activity change, appetite change and fever.   HENT: Negative for sore throat.    Respiratory: Negative for cough and shortness of breath.    Cardiovascular: Negative for chest pain.   Gastrointestinal: Negative for abdominal pain, diarrhea and nausea.   Genitourinary: Negative for difficulty urinating.   Musculoskeletal: Negative for arthralgias and myalgias.   Neurological: Negative for dizziness and headaches.       Objective:      Vitals:    08/05/22 1527   BP: 126/70   Pulse: 85   Temp: 98.2 °F (36.8 °C)   SpO2: (!) 92%   Weight: 100.3 kg (221 lb 1.9 oz)   Height: 5' 5" (1.651 m)   PainSc: 0-No pain     Physical Exam  Vitals and nursing note reviewed.   Constitutional:       General: She is not in acute distress.     Appearance: She is well-developed. She is not diaphoretic.   HENT:      Head: Normocephalic.      Mouth/Throat:      Pharynx: No oropharyngeal exudate.   Neck:      Thyroid: No thyromegaly.   Cardiovascular:      Rate and Rhythm: Normal rate and regular rhythm.      " Heart sounds: Murmur heard.    Systolic murmur is present with a grade of 3/6.  Pulmonary:      Effort: Pulmonary effort is normal.      Breath sounds: Normal breath sounds. No wheezing or rales.   Abdominal:      General: There is no distension.      Palpations: Abdomen is soft.   Musculoskeletal:      Cervical back: Neck supple.   Lymphadenopathy:      Cervical: No cervical adenopathy.   Skin:     General: Skin is warm and dry.   Neurological:      Mental Status: She is alert and oriented to person, place, and time.   Psychiatric:         Behavior: Behavior normal.         Thought Content: Thought content normal.         Judgment: Judgment normal.         Assessment:       1. Chronic fatigue    2. Primary hypertension    3. Severe obesity (BMI 35.0-35.9 with comorbidity)    4. Essential hypertension    5. HERB on CPAP    6. Asthma with COPD with exacerbation    7. Multiple lung nodules    8. Iron deficiency anemia, unspecified iron deficiency anemia type    9. Gastroesophageal reflux disease, unspecified whether esophagitis present    10. Postmenopausal    11. Statin intolerance        Plan:   Chronic fatigue  Comments:  Likely multifactorial.  Rule out anemia now.  Cardiac testing pending.  TSH today    Primary hypertension  -     Comprehensive Metabolic Panel; Future; Expected date: 08/05/2022  -     CBC Auto Differential; Future; Expected date: 08/05/2022    Severe obesity (BMI 35.0-35.9 with comorbidity)    Essential hypertension  Comments:  Controlled.  Follow-up 6 months  Orders:  -     TSH; Future; Expected date: 08/05/2022    HERB on CPAP  Comments:  Nightly compliance    Asthma with COPD with exacerbation  Comments:  Stable    Multiple lung nodules  Comments:  Followed by pulmonology    Iron deficiency anemia, unspecified iron deficiency anemia type  Comments:  CBC, ferritin, iron studies today  Orders:  -     Iron and TIBC; Future; Expected date: 08/05/2022    Gastroesophageal reflux disease, unspecified  whether esophagitis present  Comments:  On daily PPI long-term    Postmenopausal  Comments:  DEXA scan ordered  Orders:  -     DXA Bone Density Spine And Hip; Future; Expected date: 08/05/2022    Statin intolerance  Comments:  On Zetia

## 2022-08-09 ENCOUNTER — OFFICE VISIT (OUTPATIENT)
Dept: HEMATOLOGY/ONCOLOGY | Facility: CLINIC | Age: 67
End: 2022-08-09
Payer: COMMERCIAL

## 2022-08-09 VITALS
TEMPERATURE: 98 F | SYSTOLIC BLOOD PRESSURE: 121 MMHG | BODY MASS INDEX: 36.87 KG/M2 | DIASTOLIC BLOOD PRESSURE: 64 MMHG | WEIGHT: 221.31 LBS | OXYGEN SATURATION: 94 % | HEIGHT: 65 IN | HEART RATE: 69 BPM

## 2022-08-09 DIAGNOSIS — D50.0 IRON DEFICIENCY ANEMIA DUE TO CHRONIC BLOOD LOSS: ICD-10-CM

## 2022-08-09 PROCEDURE — 99999 PR PBB SHADOW E&M-EST. PATIENT-LVL IV: CPT | Mod: PBBFAC,,, | Performed by: NURSE PRACTITIONER

## 2022-08-09 PROCEDURE — 3074F PR MOST RECENT SYSTOLIC BLOOD PRESSURE < 130 MM HG: ICD-10-PCS | Mod: CPTII,S$GLB,, | Performed by: NURSE PRACTITIONER

## 2022-08-09 PROCEDURE — 1159F MED LIST DOCD IN RCRD: CPT | Mod: CPTII,S$GLB,, | Performed by: NURSE PRACTITIONER

## 2022-08-09 PROCEDURE — 3078F DIAST BP <80 MM HG: CPT | Mod: CPTII,S$GLB,, | Performed by: NURSE PRACTITIONER

## 2022-08-09 PROCEDURE — 1160F RVW MEDS BY RX/DR IN RCRD: CPT | Mod: CPTII,S$GLB,, | Performed by: NURSE PRACTITIONER

## 2022-08-09 PROCEDURE — 1126F PR PAIN SEVERITY QUANTIFIED, NO PAIN PRESENT: ICD-10-PCS | Mod: CPTII,S$GLB,, | Performed by: NURSE PRACTITIONER

## 2022-08-09 PROCEDURE — 99214 OFFICE O/P EST MOD 30 MIN: CPT | Mod: S$GLB,,, | Performed by: NURSE PRACTITIONER

## 2022-08-09 PROCEDURE — 3288F PR FALLS RISK ASSESSMENT DOCUMENTED: ICD-10-PCS | Mod: CPTII,S$GLB,, | Performed by: NURSE PRACTITIONER

## 2022-08-09 PROCEDURE — 3008F PR BODY MASS INDEX (BMI) DOCUMENTED: ICD-10-PCS | Mod: CPTII,S$GLB,, | Performed by: NURSE PRACTITIONER

## 2022-08-09 PROCEDURE — 1159F PR MEDICATION LIST DOCUMENTED IN MEDICAL RECORD: ICD-10-PCS | Mod: CPTII,S$GLB,, | Performed by: NURSE PRACTITIONER

## 2022-08-09 PROCEDURE — 3288F FALL RISK ASSESSMENT DOCD: CPT | Mod: CPTII,S$GLB,, | Performed by: NURSE PRACTITIONER

## 2022-08-09 PROCEDURE — 99214 PR OFFICE/OUTPT VISIT, EST, LEVL IV, 30-39 MIN: ICD-10-PCS | Mod: S$GLB,,, | Performed by: NURSE PRACTITIONER

## 2022-08-09 PROCEDURE — 1160F PR REVIEW ALL MEDS BY PRESCRIBER/CLIN PHARMACIST DOCUMENTED: ICD-10-PCS | Mod: CPTII,S$GLB,, | Performed by: NURSE PRACTITIONER

## 2022-08-09 PROCEDURE — 1101F PR PT FALLS ASSESS DOC 0-1 FALLS W/OUT INJ PAST YR: ICD-10-PCS | Mod: CPTII,S$GLB,, | Performed by: NURSE PRACTITIONER

## 2022-08-09 PROCEDURE — 1126F AMNT PAIN NOTED NONE PRSNT: CPT | Mod: CPTII,S$GLB,, | Performed by: NURSE PRACTITIONER

## 2022-08-09 PROCEDURE — 99999 PR PBB SHADOW E&M-EST. PATIENT-LVL IV: ICD-10-PCS | Mod: PBBFAC,,, | Performed by: NURSE PRACTITIONER

## 2022-08-09 PROCEDURE — 3074F SYST BP LT 130 MM HG: CPT | Mod: CPTII,S$GLB,, | Performed by: NURSE PRACTITIONER

## 2022-08-09 PROCEDURE — 3008F BODY MASS INDEX DOCD: CPT | Mod: CPTII,S$GLB,, | Performed by: NURSE PRACTITIONER

## 2022-08-09 PROCEDURE — 1101F PT FALLS ASSESS-DOCD LE1/YR: CPT | Mod: CPTII,S$GLB,, | Performed by: NURSE PRACTITIONER

## 2022-08-09 PROCEDURE — 3078F PR MOST RECENT DIASTOLIC BLOOD PRESSURE < 80 MM HG: ICD-10-PCS | Mod: CPTII,S$GLB,, | Performed by: NURSE PRACTITIONER

## 2022-08-09 RX ORDER — SODIUM CHLORIDE 0.9 % (FLUSH) 0.9 %
10 SYRINGE (ML) INJECTION
Status: CANCELLED | OUTPATIENT
Start: 2022-08-09

## 2022-08-09 RX ORDER — HEPARIN 100 UNIT/ML
500 SYRINGE INTRAVENOUS
Status: CANCELLED | OUTPATIENT
Start: 2022-08-26

## 2022-08-09 RX ORDER — SODIUM CHLORIDE 0.9 % (FLUSH) 0.9 %
10 SYRINGE (ML) INJECTION
Status: CANCELLED | OUTPATIENT
Start: 2022-08-26

## 2022-08-09 RX ORDER — METHYLPREDNISOLONE SOD SUCC 125 MG
80 VIAL (EA) INJECTION
Status: CANCELLED
Start: 2022-08-26

## 2022-08-09 RX ORDER — METHYLPREDNISOLONE SOD SUCC 125 MG
80 VIAL (EA) INJECTION
Status: CANCELLED
Start: 2022-08-09

## 2022-08-09 RX ORDER — HEPARIN 100 UNIT/ML
500 SYRINGE INTRAVENOUS
Status: CANCELLED | OUTPATIENT
Start: 2022-08-09

## 2022-08-09 NOTE — ASSESSMENT & PLAN NOTE
Hemoglobin remains normal. Interval decline in saturated iron level 16%>>29% few months prior. Rising TIBC patient notes gradually worsening fatigue    Arrange Injectafer x 1. F/u 3 months with cbc, iron studies. Discussed S&S to report sooner.

## 2022-08-09 NOTE — PROGRESS NOTES
Subjective:       Patient ID: Ro Hernandez is a 67 y.o. female.    Chief Complaint: f/u JAQUELINE    HPI: 67 y.o female with h/o HTN, COPD, HLD female here today for follow up of iron deficiency most recently s/p IV Injectafer x 2 2021. Had previous iron deficiency anemia treated with Injectafer. Today she feels well and is without complaints. Denies any anemia symptoms or abnormal bleeding.    Patient has undergone prior GI evaluation including EGD/Colonoscopy/VCE. EGD unremarkable. Colonoscopy with polyp, benign pathology, recommended repeat in 5 years. VCE notes angioectasia without bleeding. GI recommends repeat endoscopy if recurrent anemia.         Social History     Socioeconomic History    Marital status:    Tobacco Use    Smoking status: Former Smoker     Packs/day: 1.00     Years: 32.00     Pack years: 32.00     Types: Cigarettes     Start date:      Quit date: 2016     Years since quittin.1    Smokeless tobacco: Never Used    Tobacco comment: smoked for 25 yrs. quit 16yrs. then smoked again for 5-6 before quitting in .   Substance and Sexual Activity    Alcohol use: No     Alcohol/week: 1.0 standard drink     Types: 1 Standard drinks or equivalent per week    Drug use: No    Sexual activity: Not Currently       Past Medical History:   Diagnosis Date    Allergy     Angina     Arthritis 2013    Asthma     COPD (chronic obstructive pulmonary disease)     GERD (gastroesophageal reflux disease)     Hx of Prinzmetal angina     Hypertension     Obesity 2013    Other and unspecified hyperlipidemia 2013    Prinzmetal's angina     Sleep apnea 2013       Family History   Problem Relation Age of Onset    Hypertension Mother     Cancer Mother     Kidney cancer Mother     COPD Father     Hypertension Father     Heart disease Sister     Hyperlipidemia Son     Breast cancer Maternal Grandmother     Colon cancer Neg Hx        Past Surgical History:    Procedure Laterality Date    ABDOMINAL SURGERY      CHOLECYSTECTOMY      COLONOSCOPY N/A 12/6/2019    Procedure: COLONOSCOPY;  Surgeon: Winston Smith MD;  Location: [facility] ENDO;  Service: Endoscopy;  Laterality: N/A;    ESOPHAGOGASTRODUODENOSCOPY N/A 12/10/2018    Procedure: EGD (ESOPHAGOGASTRODUODENOSCOPY);  Surgeon: Raúl Polanco III, MD;  Location: [facility] ENDO;  Service: Endoscopy;  Laterality: N/A;    ESOPHAGOGASTRODUODENOSCOPY N/A 9/8/2020    Procedure: EGD (ESOPHAGOGASTRODUODENOSCOPY);  Surgeon: Raúl Polanco III, MD;  Location: [facility];  Service: Endoscopy;  Laterality: N/A;    EYE SURGERY      FOOT SURGERY      INTRALUMINAL GASTROINTESTINAL TRACT IMAGING VIA CAPSULE N/A 9/18/2020    Procedure: IMAGING PROCEDURE, GI TRACT, INTRALUMINAL, VIA CAPSULE;  Surgeon: Blade Holley RN;  Location: [facility] ENDO;  Service: Endoscopy;  Laterality: N/A;    TONSILLECTOMY      TUBAL LIGATION         Review of Systems   Constitutional: Negative for activity change, appetite change, chills, diaphoresis, fatigue, fever and unexpected weight change.   HENT: Negative for congestion, mouth sores, nosebleeds, sore throat, trouble swallowing and voice change.    Eyes: Negative for visual disturbance.   Respiratory: Negative for cough, chest tightness, shortness of breath and wheezing.    Cardiovascular: Negative for chest pain and leg swelling.   Gastrointestinal: Negative for abdominal distention, abdominal pain, blood in stool, constipation, diarrhea, nausea and vomiting.   Genitourinary: Negative for difficulty urinating, dysuria and hematuria.   Musculoskeletal: Negative for arthralgias, back pain and myalgias.   Skin: Negative for pallor, rash and wound.   Neurological: Negative for dizziness, syncope, weakness and headaches.   Hematological: Negative for adenopathy. Does not bruise/bleed easily.   Psychiatric/Behavioral: The patient is not nervous/anxious.          Medication List with Changes/Refills    Current Medications    ALBUTEROL (PROVENTIL) 2.5 MG /3 ML (0.083 %) NEBULIZER SOLUTION    Take 3 mLs (2.5 mg total) by nebulization every 4 (four) hours as needed. Rescue    ASPIRIN (ECOTRIN) 81 MG EC TABLET    Take 81 mg by mouth once daily.    EZETIMIBE (ZETIA) 10 MG TABLET    Take 1 tablet (10 mg total) by mouth once daily.    FLUTICASONE PROPIONATE (FLONASE) 50 MCG/ACTUATION NASAL SPRAY    USE 2 SPRAYS IN EACH NOSTRIL ONCE DAILY    HYDROCODONE-ACETAMINOPHEN 10-325MG (NORCO)  MG TAB    Take 1 tablet by mouth every 12 (twelve) hours.    ISOSORBIDE MONONITRATE (IMDUR) 30 MG 24 HR TABLET    Take 1 tab po in the AM and 1/2 tab po in the PM    LACTULOSE (CHRONULAC) 10 GRAM/15 ML SOLUTION    Take 30 mLs (20 g total) by mouth 2 (two) times daily.    METOPROLOL SUCCINATE (TOPROL-XL) 100 MG 24 HR TABLET        MOMETASONE-FORMOTEROL (DULERA) 200-5 MCG/ACTUATION INHALER    Inhale 2 puffs into the lungs 2 (two) times daily. Controller    MULTIVITAMIN (MULTIPLE VITAMIN ORAL)    Take 1 tablet by mouth Daily.    NITROGLYCERIN (NITROSTAT) 0.4 MG SL TABLET    DISSOLVE 1 TABLET UNDER THE TONGUE EVERY 5 MINUTES FOR 3 DOSES AS NEEDED FOR CHEST PAIN    PANTOPRAZOLE (PROTONIX) 20 MG TABLET    Take 1 tablet (20 mg total) by mouth 2 (two) times daily before meals.    SOLIFENACIN (VESICARE) 10 MG TABLET    Take 1 tablet (10 mg total) by mouth once daily.    VERAPAMIL (VERELAN) 360 MG C24P    Take 1 capsule (360 mg total) by mouth once daily.     Objective:     Vitals:    08/09/22 1438   BP: 121/64   Pulse: 69   Temp: 97.5 °F (36.4 °C)     Lab Results   Component Value Date    WBC 5.61 08/05/2022    WBC 5.61 08/05/2022    HGB 14.1 08/05/2022    HGB 14.1 08/05/2022    HCT 41.9 08/05/2022    HCT 41.9 08/05/2022    MCV 91 08/05/2022    MCV 91 08/05/2022     08/05/2022     08/05/2022       BMP  Lab Results   Component Value Date     08/05/2022    K 4.4 08/05/2022     08/05/2022    CO2 25 08/05/2022    BUN 19  08/05/2022    CREATININE 0.8 08/05/2022    CALCIUM 9.6 08/05/2022    ANIONGAP 8 08/05/2022    ESTGFRAFRICA >60.0 11/19/2021    EGFRNONAA >60.0 11/19/2021     Lab Results   Component Value Date    ALT 16 08/05/2022    AST 21 08/05/2022    ALKPHOS 64 08/05/2022    BILITOT 0.4 08/05/2022     Lab Results   Component Value Date    IRON 64 08/05/2022    IRON 64 08/05/2022    TIBC 410 08/05/2022    TIBC 410 08/05/2022    FERRITIN 140 08/05/2022         Physical Exam  Vitals reviewed.   Constitutional:       Appearance: She is well-developed.   HENT:      Head: Normocephalic.      Right Ear: External ear normal.      Left Ear: External ear normal.   Eyes:      General: Lids are normal. No scleral icterus.        Right eye: No discharge.         Left eye: No discharge.      Conjunctiva/sclera: Conjunctivae normal.   Neck:      Thyroid: No thyroid mass.   Cardiovascular:      Rate and Rhythm: Normal rate.   Pulmonary:      Effort: Pulmonary effort is normal. No respiratory distress.      Breath sounds: Normal breath sounds. No wheezing or rales.   Abdominal:      General: Bowel sounds are normal. There is no distension.      Palpations: Abdomen is soft.      Tenderness: There is no abdominal tenderness.   Genitourinary:     Comments: deferred  Musculoskeletal:         General: Normal range of motion.      Cervical back: Normal range of motion.   Skin:     General: Skin is warm and dry.   Neurological:      Mental Status: She is alert and oriented to person, place, and time.   Psychiatric:         Speech: Speech normal.         Behavior: Behavior normal. Behavior is cooperative.         Thought Content: Thought content normal.          Assessment:     Problem List Items Addressed This Visit        Oncology    Iron deficiency anemia due to chronic blood loss     Hemoglobin remains normal. Interval decline in saturated iron level 16%>>29% few months prior. Rising TIBC patient notes gradually worsening fatigue    Arrange  Injectafer x 1. F/u 3 months with cbc, iron studies. Discussed S&S to report sooner.            Relevant Orders    CBC Auto Differential    Iron and TIBC    Ferritin            Plan:     Iron deficiency anemia due to chronic blood loss  -     CBC Auto Differential; Future; Expected date: 08/09/2022  -     Iron and TIBC; Future; Expected date: 08/09/2022  -     Ferritin; Future; Expected date: 08/09/2022    Other orders  -     methylPREDNISolone sodium succinate injection 80 mg  -     ferric carboxymaltose (INJECTAFER) 750 mg in sodium chloride 0.9% 265 mL infusion  -     sodium chloride 0.9% 100 mL flush bag  -     sodium chloride 0.9% flush 10 mL  -     heparin, porcine (PF) 100 unit/mL injection flush 500 Units  -     alteplase injection 2 mg  -     methylPREDNISolone sodium succinate injection 80 mg  -     ferric carboxymaltose (INJECTAFER) 750 mg in sodium chloride 0.9% 265 mL infusion  -     sodium chloride 0.9% 100 mL flush bag  -     sodium chloride 0.9% flush 10 mL  -     heparin, porcine (PF) 100 unit/mL injection flush 500 Units  -     alteplase injection 2 mg            YANETH Jha

## 2022-08-10 ENCOUNTER — PATIENT MESSAGE (OUTPATIENT)
Dept: HEMATOLOGY/ONCOLOGY | Facility: CLINIC | Age: 67
End: 2022-08-10
Payer: COMMERCIAL

## 2022-08-25 ENCOUNTER — INFUSION (OUTPATIENT)
Dept: INFUSION THERAPY | Facility: HOSPITAL | Age: 67
End: 2022-08-25
Attending: NURSE PRACTITIONER
Payer: COMMERCIAL

## 2022-08-25 VITALS
DIASTOLIC BLOOD PRESSURE: 62 MMHG | HEART RATE: 61 BPM | SYSTOLIC BLOOD PRESSURE: 138 MMHG | TEMPERATURE: 98 F | OXYGEN SATURATION: 95 % | RESPIRATION RATE: 16 BRPM

## 2022-08-25 DIAGNOSIS — D50.0 IRON DEFICIENCY ANEMIA DUE TO CHRONIC BLOOD LOSS: Primary | ICD-10-CM

## 2022-08-25 PROCEDURE — 96374 THER/PROPH/DIAG INJ IV PUSH: CPT

## 2022-08-25 PROCEDURE — 63600175 PHARM REV CODE 636 W HCPCS: Mod: JW,JG | Performed by: NURSE PRACTITIONER

## 2022-08-25 PROCEDURE — 96365 THER/PROPH/DIAG IV INF INIT: CPT

## 2022-08-25 PROCEDURE — 96375 TX/PRO/DX INJ NEW DRUG ADDON: CPT

## 2022-08-25 PROCEDURE — 25000003 PHARM REV CODE 250: Performed by: NURSE PRACTITIONER

## 2022-08-25 RX ORDER — METHYLPREDNISOLONE SOD SUCC 125 MG
80 VIAL (EA) INJECTION
Status: COMPLETED | OUTPATIENT
Start: 2022-08-25 | End: 2022-08-25

## 2022-08-25 RX ADMIN — SODIUM CHLORIDE: 9 INJECTION, SOLUTION INTRAVENOUS at 02:08

## 2022-08-25 RX ADMIN — METHYLPREDNISOLONE SODIUM SUCCINATE 80 MG: 125 INJECTION, POWDER, FOR SOLUTION INTRAMUSCULAR; INTRAVENOUS at 02:08

## 2022-08-25 RX ADMIN — FERRIC CARBOXYMALTOSE INJECTION 750 MG: 50 INJECTION, SOLUTION INTRAVENOUS at 03:08

## 2022-08-25 NOTE — DISCHARGE INSTRUCTIONS
.THANKS FOR ALLOWING ME TO CARE FOR YOU!!!! ~Susana          THANKS FOR CHOOSING [facility]!!!        [facility]  [address] [facility].  or  [id number] [place] [address] Drive  [phone] phone     [phone] fax  Hours of Operation: Monday- Friday 8:00am- 5:00pm  After hours phone  [phone]  Hematology / Oncology Physicians on call      MEJIA Talbot Dr., Dr., Dr., NP    Please call with any concerns regarding your appointment today.      .FALL PREVENTION   Falls often occur due to slipping, tripping or losing your balance. Here are ways to reduce your risk of falling again.   Was there anything that caused your fall that can be fixed, removed or replaced?   Make your home safe by keeping walkways clear of objects you may trip over.   Use non-slip pads under rugs.   Do not walk in poorly lit areas.   Do not stand on chairs or wobbly ladders.   Use caution when reaching overhead or looking upward. This position can cause a loss of balance.   Be sure your shoes fit properly, have non-slip bottoms and are in good condition.   Be cautious when going up and down stairs, curbs, and when walking on uneven sidewalks.   If your balance is poor, consider using a cane or walker.   If your fall was related to alcohol use, stop or limit alcohol intake.   If your fall was related to use of sleeping medicines, talk to your doctor about this. You may need to reduce your dosage at bedtime if you awaken during the night to go to the bathroom.   To reduce the need for nighttime bathroom trips:   Avoid drinking fluids for several hours before going to bed   Empty your bladder before going to bed   Men can keep a urinal at the bedside   © 9598-8355 [facility], [address] [place] [address], [place] [address]. All rights reserved. This information is not intended as a substitute for professional medical care. Always follow your healthcare professional's  instructions.    .WAYS TO HELP PREVENT INFECTION        WASH YOUR HANDS OFTEN DURING THE DAY, ESPECIALLY BEFORE YOU EAT, AFTER USING THE BATHROOM, AND AFTER TOUCHING ANIMALS    STAY AWAY FROM PEOPLE WHO HAVE ILLNESSES YOU CAN CATCH; SUCH AS COLDS, FLU, CHICKEN POX    TRY TO AVOID CROWDS    STAY AWAY FROM CHILDREN WHO RECENTLY HAVE RECEIVED LIVE VIRUS VACCINES    MAINTAIN GOOD MOUTH CARE    DO NOT SQUEEZE OR SCRATCH PIMPLES    CLEAN CUTS & SCRAPES RIGHT AWAY AND DAILY UNTIL HEALED WITH WARM WATER, SOAP & AN ANTISEPTIC    AVOID CONTACT WITH LITTER BOXES, BIRD CAGES, & FISH TANKS    AVOID STANDING WATER, IE., BIRD BATHS, FLOWER POTS/VASES, OR HUMIDIFIERS    WEAR GLOVES WHEN GARDENING OR CLEANING UP AFTER OTHERS, ESPECIALLY BABIES & SMALL CHILDREN    DO NOT EAT RAW FISH, SEAFOOD, MEAT, OR EGGS

## 2022-08-25 NOTE — NURSING
Patient to unit today for Injectafer. Patient tolerated well. Discharged without distress or complaints.

## 2022-08-25 NOTE — PLAN OF CARE
Problem: Adult Inpatient Plan of Care  Goal: Plan of Care Review  Outcome: Ongoing, Progressing  Flowsheets (Taken 8/25/2022 1512)  Plan of Care Reviewed With: patient  Goal: Patient-Specific Goal (Individualized)  Outcome: Ongoing, Progressing  Flowsheets (Taken 8/25/2022 1512)  Anxieties, Fears or Concerns: none  Goal: Absence of Hospital-Acquired Illness or Injury  Outcome: Ongoing, Progressing  Goal: Optimal Comfort and Wellbeing  Outcome: Ongoing, Progressing  Intervention: Provide Person-Centered Care  Flowsheets (Taken 8/25/2022 1516)  Trust Relationship/Rapport:   care explained   choices provided   emotional support provided   empathic listening provided   thoughts/feelings acknowledged   reassurance provided   questions encouraged   questions answered     Problem: Anemia  Goal: Anemia Symptom Improvement  Outcome: Ongoing, Progressing  Intervention: Monitor and Manage Anemia  Flowsheets (Taken 8/25/2022 1516)  Fatigue Management:   paced activity encouraged   frequent rest breaks encouraged

## 2022-09-09 ENCOUNTER — APPOINTMENT (OUTPATIENT)
Dept: RADIOLOGY | Facility: HOSPITAL | Age: 67
End: 2022-09-09
Attending: FAMILY MEDICINE
Payer: COMMERCIAL

## 2022-09-09 DIAGNOSIS — Z78.0 POSTMENOPAUSAL: ICD-10-CM

## 2022-09-09 PROCEDURE — 77080 DXA BONE DENSITY AXIAL: CPT | Mod: TC

## 2022-09-09 PROCEDURE — 77080 DXA BONE DENSITY AXIAL: CPT | Mod: 26,,, | Performed by: RADIOLOGY

## 2022-09-09 PROCEDURE — 77080 DEXA BONE DENSITY SPINE HIP: ICD-10-PCS | Mod: 26,,, | Performed by: RADIOLOGY

## 2022-09-13 DIAGNOSIS — M79.643 HAND PAIN, NOT ARTHRALGIA, UNSPECIFIED LATERALITY: Primary | ICD-10-CM

## 2022-09-14 ENCOUNTER — OFFICE VISIT (OUTPATIENT)
Dept: ORTHOPEDICS | Facility: CLINIC | Age: 67
End: 2022-09-14
Payer: COMMERCIAL

## 2022-09-14 ENCOUNTER — HOSPITAL ENCOUNTER (OUTPATIENT)
Dept: RADIOLOGY | Facility: HOSPITAL | Age: 67
Discharge: HOME | End: 2022-09-14
Attending: ORTHOPAEDIC SURGERY
Payer: COMMERCIAL

## 2022-09-14 VITALS — HEIGHT: 65 IN | WEIGHT: 221 LBS | BODY MASS INDEX: 36.82 KG/M2

## 2022-09-14 DIAGNOSIS — M65.4 RADIAL STYLOID TENOSYNOVITIS (DE QUERVAIN): Primary | ICD-10-CM

## 2022-09-14 DIAGNOSIS — M79.643 HAND PAIN, NOT ARTHRALGIA, UNSPECIFIED LATERALITY: ICD-10-CM

## 2022-09-14 PROCEDURE — 1159F MED LIST DOCD IN RCRD: CPT | Mod: CPTII,S$GLB,, | Performed by: ORTHOPAEDIC SURGERY

## 2022-09-14 PROCEDURE — 3288F FALL RISK ASSESSMENT DOCD: CPT | Mod: CPTII,S$GLB,, | Performed by: ORTHOPAEDIC SURGERY

## 2022-09-14 PROCEDURE — 1160F PR REVIEW ALL MEDS BY PRESCRIBER/CLIN PHARMACIST DOCUMENTED: ICD-10-PCS | Mod: CPTII,S$GLB,, | Performed by: ORTHOPAEDIC SURGERY

## 2022-09-14 PROCEDURE — 1101F PR PT FALLS ASSESS DOC 0-1 FALLS W/OUT INJ PAST YR: ICD-10-PCS | Mod: CPTII,S$GLB,, | Performed by: ORTHOPAEDIC SURGERY

## 2022-09-14 PROCEDURE — 99213 PR OFFICE/OUTPT VISIT, EST, LEVL III, 20-29 MIN: ICD-10-PCS | Mod: 25,S$GLB,, | Performed by: ORTHOPAEDIC SURGERY

## 2022-09-14 PROCEDURE — 73130 XR HAND COMPLETE 3 VIEWS BILATERAL: ICD-10-PCS | Mod: 26,,, | Performed by: RADIOLOGY

## 2022-09-14 PROCEDURE — 3008F PR BODY MASS INDEX (BMI) DOCUMENTED: ICD-10-PCS | Mod: CPTII,S$GLB,, | Performed by: ORTHOPAEDIC SURGERY

## 2022-09-14 PROCEDURE — 73130 X-RAY EXAM OF HAND: CPT | Mod: 26,,, | Performed by: RADIOLOGY

## 2022-09-14 PROCEDURE — 1125F PR PAIN SEVERITY QUANTIFIED, PAIN PRESENT: ICD-10-PCS | Mod: CPTII,S$GLB,, | Performed by: ORTHOPAEDIC SURGERY

## 2022-09-14 PROCEDURE — 20550 NJX 1 TENDON SHEATH/LIGAMENT: CPT | Mod: RT,S$GLB,, | Performed by: ORTHOPAEDIC SURGERY

## 2022-09-14 PROCEDURE — 73130 X-RAY EXAM OF HAND: CPT | Mod: TC,50

## 2022-09-14 PROCEDURE — 3288F PR FALLS RISK ASSESSMENT DOCUMENTED: ICD-10-PCS | Mod: CPTII,S$GLB,, | Performed by: ORTHOPAEDIC SURGERY

## 2022-09-14 PROCEDURE — 3008F BODY MASS INDEX DOCD: CPT | Mod: CPTII,S$GLB,, | Performed by: ORTHOPAEDIC SURGERY

## 2022-09-14 PROCEDURE — 99213 OFFICE O/P EST LOW 20 MIN: CPT | Mod: 25,S$GLB,, | Performed by: ORTHOPAEDIC SURGERY

## 2022-09-14 PROCEDURE — 99999 PR PBB SHADOW E&M-EST. PATIENT-LVL II: ICD-10-PCS | Mod: PBBFAC,,, | Performed by: ORTHOPAEDIC SURGERY

## 2022-09-14 PROCEDURE — 20550 TENDON SHEATH: ICD-10-PCS | Mod: RT,S$GLB,, | Performed by: ORTHOPAEDIC SURGERY

## 2022-09-14 PROCEDURE — 1159F PR MEDICATION LIST DOCUMENTED IN MEDICAL RECORD: ICD-10-PCS | Mod: CPTII,S$GLB,, | Performed by: ORTHOPAEDIC SURGERY

## 2022-09-14 PROCEDURE — 1125F AMNT PAIN NOTED PAIN PRSNT: CPT | Mod: CPTII,S$GLB,, | Performed by: ORTHOPAEDIC SURGERY

## 2022-09-14 PROCEDURE — 1160F RVW MEDS BY RX/DR IN RCRD: CPT | Mod: CPTII,S$GLB,, | Performed by: ORTHOPAEDIC SURGERY

## 2022-09-14 PROCEDURE — 1101F PT FALLS ASSESS-DOCD LE1/YR: CPT | Mod: CPTII,S$GLB,, | Performed by: ORTHOPAEDIC SURGERY

## 2022-09-14 PROCEDURE — 99999 PR PBB SHADOW E&M-EST. PATIENT-LVL II: CPT | Mod: PBBFAC,,, | Performed by: ORTHOPAEDIC SURGERY

## 2022-09-14 RX ORDER — TRIAMCINOLONE ACETONIDE 40 MG/ML
40 INJECTION, SUSPENSION INTRA-ARTICULAR; INTRAMUSCULAR
Status: DISCONTINUED | OUTPATIENT
Start: 2022-09-14 | End: 2022-09-14 | Stop reason: HOSPADM

## 2022-09-14 RX ADMIN — TRIAMCINOLONE ACETONIDE 40 MG: 40 INJECTION, SUSPENSION INTRA-ARTICULAR; INTRAMUSCULAR at 04:09

## 2022-09-14 NOTE — PROGRESS NOTES
Subjective:     Patient ID: Ro Hernandez is a 67 y.o. female.    Chief Complaint: Pain of the Right Hand      HPI:  The patient is a 67-year-old female with right de Quervain tendinitis.  She uses a wrist splint without improvement she has not tried injection.    Past Medical History:   Diagnosis Date    Allergy     Angina     Arthritis 6/11/2013    Asthma     COPD (chronic obstructive pulmonary disease)     GERD (gastroesophageal reflux disease)     Hx of Prinzmetal angina     Hypertension     Obesity 6/11/2013    Other and unspecified hyperlipidemia 6/11/2013    Prinzmetal's angina     Sleep apnea 6/11/2013     Past Surgical History:   Procedure Laterality Date    ABDOMINAL SURGERY      CHOLECYSTECTOMY      COLONOSCOPY N/A 12/6/2019    Procedure: COLONOSCOPY;  Surgeon: Winston Smith MD;  Location: [facility];  Service: Endoscopy;  Laterality: N/A;    ESOPHAGOGASTRODUODENOSCOPY N/A 12/10/2018    Procedure: EGD (ESOPHAGOGASTRODUODENOSCOPY);  Surgeon: Raúl Polanco III, MD;  Location: [facility];  Service: Endoscopy;  Laterality: N/A;    ESOPHAGOGASTRODUODENOSCOPY N/A 9/8/2020    Procedure: EGD (ESOPHAGOGASTRODUODENOSCOPY);  Surgeon: Raúl Polanco III, MD;  Location: [facility];  Service: Endoscopy;  Laterality: N/A;    EYE SURGERY      FOOT SURGERY      INTRALUMINAL GASTROINTESTINAL TRACT IMAGING VIA CAPSULE N/A 9/18/2020    Procedure: IMAGING PROCEDURE, GI TRACT, INTRALUMINAL, VIA CAPSULE;  Surgeon: Blade Holley RN;  Location: [facility];  Service: Endoscopy;  Laterality: N/A;    TONSILLECTOMY      TUBAL LIGATION       Family History   Problem Relation Age of Onset    Hypertension Mother     Cancer Mother     Kidney cancer Mother     COPD Father     Hypertension Father     Heart disease Sister     Hyperlipidemia Son     Breast cancer Maternal Grandmother     Colon cancer Neg Hx      Social History     Socioeconomic History    Marital status:    Tobacco Use    Smoking status: Former     Packs/day:  1.00     Years: 32.00     Pack years: 32.00     Types: Cigarettes     Start date:      Quit date: 2016     Years since quittin.2    Smokeless tobacco: Never    Tobacco comments:     smoked for 25 yrs. quit 16yrs. then smoked again for 5-6 before quitting in 2016.   Substance and Sexual Activity    Alcohol use: No     Alcohol/week: 1.0 standard drink     Types: 1 Standard drinks or equivalent per week    Drug use: No    Sexual activity: Not Currently     Medication List with Changes/Refills   Current Medications    ALBUTEROL (PROVENTIL) 2.5 MG /3 ML (0.083 %) NEBULIZER SOLUTION    Take 3 mLs (2.5 mg total) by nebulization every 4 (four) hours as needed. Rescue    ASPIRIN (ECOTRIN) 81 MG EC TABLET    Take 81 mg by mouth once daily.    EZETIMIBE (ZETIA) 10 MG TABLET    Take 1 tablet (10 mg total) by mouth once daily.    FLUTICASONE PROPIONATE (FLONASE) 50 MCG/ACTUATION NASAL SPRAY    USE 2 SPRAYS IN EACH NOSTRIL ONCE DAILY    HYDROCODONE-ACETAMINOPHEN 10-325MG (NORCO)  MG TAB    Take 1 tablet by mouth every 12 (twelve) hours.    ISOSORBIDE MONONITRATE (IMDUR) 30 MG 24 HR TABLET    Take 1 tab po in the AM and 1/2 tab po in the PM    LACTULOSE (CHRONULAC) 10 GRAM/15 ML SOLUTION    Take 30 mLs (20 g total) by mouth 2 (two) times daily.    METOPROLOL SUCCINATE (TOPROL-XL) 100 MG 24 HR TABLET        MOMETASONE-FORMOTEROL (DULERA) 200-5 MCG/ACTUATION INHALER    Inhale 2 puffs into the lungs 2 (two) times daily. Controller    MULTIVITAMIN (MULTIPLE VITAMIN ORAL)    Take 1 tablet by mouth Daily.    NITROGLYCERIN (NITROSTAT) 0.4 MG SL TABLET    DISSOLVE 1 TABLET UNDER THE TONGUE EVERY 5 MINUTES FOR 3 DOSES AS NEEDED FOR CHEST PAIN    PANTOPRAZOLE (PROTONIX) 20 MG TABLET    Take 1 tablet (20 mg total) by mouth 2 (two) times daily before meals.    SOLIFENACIN (VESICARE) 10 MG TABLET    Take 1 tablet (10 mg total) by mouth once daily.    VERAPAMIL (VERELAN) 360 MG C24P    Take 1 capsule (360 mg total) by mouth  once daily.     Review of patient's allergies indicates:   Allergen Reactions    Ace inhibitors      Other reaction(s): Hives    Lisinopril      Other reaction(s): rash    Pravastatin      Other reaction(s): Mental Changes    Rosuvastatin Other (See Comments)     Other reaction(s): Muscle pain    Simvastatin Other (See Comments)     Other reaction(s): leg cramps     Review of Systems   Constitutional: Negative for malaise/fatigue.   HENT:  Negative for hearing loss.    Eyes:  Negative for double vision and visual disturbance.   Cardiovascular:  Positive for chest pain.   Respiratory:  Positive for sleep disturbances due to breathing and wheezing. Negative for shortness of breath.    Endocrine: Negative for cold intolerance.   Hematologic/Lymphatic: Does not bruise/bleed easily.   Skin:  Negative for poor wound healing and suspicious lesions.   Musculoskeletal:  Positive for arthritis, joint pain and joint swelling. Negative for gout.   Gastrointestinal:  Positive for abdominal pain and heartburn. Negative for nausea and vomiting.   Genitourinary:  Positive for bladder incontinence and urgency. Negative for dysuria.   Neurological:  Negative for numbness, paresthesias and sensory change.   Psychiatric/Behavioral:  Negative for depression, memory loss and substance abuse. The patient is not nervous/anxious.    Allergic/Immunologic: Negative for persistent infections.     Objective:   Body mass index is 36.78 kg/m².  There were no vitals filed for this visit.             General    Constitutional: She is oriented to person, place, and time. She appears well-developed and well-nourished. No distress.   HENT:   Head: Normocephalic.   Eyes: EOM are normal.   Pulmonary/Chest: Effort normal.   Neurological: She is oriented to person, place, and time.   Psychiatric: She has a normal mood and affect.             Right Hand/Wrist Exam     Inspection   Scars: Wrist - absent Hand -  absent  Effusion: Wrist - absent Hand -   absent    Pain   Wrist - The patient exhibits pain of the extensory musculature.    Tests   Finkelstein's test: positive      Other     Neuorologic Exam    Median Distribution: normal  Ulnar Distribution: normal  Radial Distribution: normal    Comments:  The patient has tenderness 1st dorsal extensor tendon compartment with positive Finkelstein test.  She has multiple Dupuytren's nodules with negative table top test.          Vascular Exam       Capillary Refill  Right Hand: normal capillary refill       radiographs were not obtained today  Assessment:     Encounter Diagnosis   Name Primary?    Radial styloid tenosynovitis (de quervain) Yes        Plan:       The patient injected 1st dorsal extensor tendon compartment right wrist with 0.5 cc of Kenalog and 0.5 cc of 2% plain lidocaine under sterile technique.  She was given a thumb spica splint.  She will return in 3 weeks if not improved              Disclaimer: This note was prepared using a voice recognition system and is likely to have sound alike errors within the text.

## 2022-09-14 NOTE — PROCEDURES
Tendon Sheath    Date/Time: 9/14/2022 4:00 PM  Performed by: Alejandro Romero MD  Authorized by: Alejandro Romero MD     Consent Done?:  Yes (Verbal)  Indications:  Pain  Timeout: prior to procedure the correct patient, procedure, and site was verified    Prep: patient was prepped and draped in usual sterile fashion      Local anesthesia used?: Yes    Local anesthetic:  Lidocaine 2% without epinephrine  Anesthetic total (ml):  0.5    Location:  Wrist  Site:  R first doral compartment  Ultrasonic guidance for needle placement?: No    Needle size:  25 G  Approach:  Radial  Medications:  40 mg triamcinolone acetonide 40 mg/mL

## 2022-10-17 ENCOUNTER — LAB VISIT (OUTPATIENT)
Dept: LAB | Facility: HOSPITAL | Age: 67
End: 2022-10-17
Attending: NURSE PRACTITIONER
Payer: COMMERCIAL

## 2022-10-17 DIAGNOSIS — D50.0 IRON DEFICIENCY ANEMIA DUE TO CHRONIC BLOOD LOSS: ICD-10-CM

## 2022-10-17 LAB
BASOPHILS # BLD AUTO: 0.02 K/UL (ref 0–0.2)
BASOPHILS NFR BLD: 0.4 % (ref 0–1.9)
DIFFERENTIAL METHOD: ABNORMAL
EOSINOPHIL # BLD AUTO: 0.1 K/UL (ref 0–0.5)
EOSINOPHIL NFR BLD: 1.9 % (ref 0–8)
ERYTHROCYTE [DISTWIDTH] IN BLOOD BY AUTOMATED COUNT: 12.9 % (ref 11.5–14.5)
HCT VFR BLD AUTO: 40.2 % (ref 37–48.5)
HGB BLD-MCNC: 13.8 G/DL (ref 12–16)
IMM GRANULOCYTES # BLD AUTO: 0.03 K/UL (ref 0–0.04)
IMM GRANULOCYTES NFR BLD AUTO: 0.6 % (ref 0–0.5)
LYMPHOCYTES # BLD AUTO: 1.8 K/UL (ref 1–4.8)
LYMPHOCYTES NFR BLD: 33.1 % (ref 18–48)
MCH RBC QN AUTO: 31.2 PG (ref 27–31)
MCHC RBC AUTO-ENTMCNC: 34.3 G/DL (ref 32–36)
MCV RBC AUTO: 91 FL (ref 82–98)
MONOCYTES # BLD AUTO: 0.3 K/UL (ref 0.3–1)
MONOCYTES NFR BLD: 5.8 % (ref 4–15)
NEUTROPHILS # BLD AUTO: 3.1 K/UL (ref 1.8–7.7)
NEUTROPHILS NFR BLD: 58.2 % (ref 38–73)
NRBC BLD-RTO: 0 /100 WBC
PLATELET # BLD AUTO: 187 K/UL (ref 150–450)
PMV BLD AUTO: 8.9 FL (ref 9.2–12.9)
RBC # BLD AUTO: 4.43 M/UL (ref 4–5.4)
WBC # BLD AUTO: 5.34 K/UL (ref 3.9–12.7)

## 2022-10-17 PROCEDURE — 85025 COMPLETE CBC W/AUTO DIFF WBC: CPT | Performed by: NURSE PRACTITIONER

## 2022-10-17 PROCEDURE — 36415 COLL VENOUS BLD VENIPUNCTURE: CPT | Performed by: NURSE PRACTITIONER

## 2022-10-17 PROCEDURE — 82728 ASSAY OF FERRITIN: CPT | Performed by: NURSE PRACTITIONER

## 2022-10-17 PROCEDURE — 84466 ASSAY OF TRANSFERRIN: CPT | Performed by: NURSE PRACTITIONER

## 2022-10-17 NOTE — PROGRESS NOTES
ED Provider Note    CHIEF COMPLAINT  Chief Complaint   Patient presents with    Cough     C/o cough with yellow phlegm, off & on for several weeks. + fatigue. No fevers. Chills or shortness of breath.    Dental Pain     Pt has several teeth needing extraction, increased pain x 1 month. Difficulty getting a soon enough dentist appt - 3-6 months out. Tried getting appt with [facility] for abx but next appt in November.       HPI  Ivette Jett is a 45 y.o. female who presents for 2 distinct complaints including chronic cough with productive sputum on and off for the past several months but worse this last week as well as extensive dental pain.  The patient has an extensive history of smoking.  She does admit to drug use.  She has a history of poor dentition and has not seen a dentist in years.  She denies pleuritic chest pain or high fever.  Leg swelling or chest pain.  No facial pain or swelling    REVIEW OF SYSTEMS  See HPI for further details.  No high fevers chills night sweats or weight loss all other systems are negative.     PAST MEDICAL HISTORY  No past medical history on file.  None reported  FAMILY HISTORY  Noncontributory    SOCIAL HISTORY  Social History     Socioeconomic History    Marital status: Unknown   Tobacco Use    Smoking status: Every Day     Types: Cigarettes    Smokeless tobacco: Never   Vaping Use    Vaping Use: Never used   Substance and Sexual Activity    Alcohol use: Yes     Comment: acc    Drug use: Not Currently   Struve polysubstance use    SURGICAL HISTORY  No past surgical history on file.    CURRENT MEDICATIONS  Home Medications       Reviewed by Hope Fajardo R.N. (Registered Nurse) on 10/16/22 at 1732  Med List Status: Not Addressed     Medication Last Dose Status   FLUoxetine (PROZAC) 20 MG Cap  Active   ibuprofen (MOTRIN) 200 MG Tab  Active   multivitamin (THERAGRAN) Tab  Active   Phenazopyridine HCl (AZO URINARY PAIN PO)  Active                    ALLERGIES  No Known  Subjective:      Patient ID: Ro Hernandez is a 65 y.o. female.    Chief Complaint: Anemia    HPI  The patient has a history of GERD, belching and chronic constipation. She has been referred for anemia. She saw Dr. Frank for SOB and Cardiopulmonary work up recommended. She had a left heart cath last week which was reportedly normal. She said a KIERAN was supposed to happen next but she was found to be anemic. The [place] [facility] [place] 2019 at which time her Hgb was 13.1. Two days ago it was checked and Hgb was 9.0. Iron panel consistent with iron deficiency. She denies hematochezia, melena, hematemesis, dysphagia, weight loss or change in bowel habits. She was seen by me recently for a refill of her Protonix. She is doing well with 20 mg bid. She is also taking Miralax daily.     Colonoscopy (12/06/19) - 3 mm polyp, diverticulosis, non-bleeding internal hemorrhoids.    Review of Systems  As per HPI.     Objective:     Physical Exam  Constitutional:       General: She is not in acute distress.     Appearance: She is well-developed. She is not diaphoretic.   HENT:      Head: Normocephalic and atraumatic.   Cardiovascular:      Rate and Rhythm: Normal rate and regular rhythm.   Pulmonary:      Effort: Pulmonary effort is normal. No respiratory distress.      Breath sounds: Normal breath sounds. No wheezing.   Abdominal:      General: Bowel sounds are normal. There is no distension.      Palpations: Abdomen is soft.      Tenderness: There is no abdominal tenderness.   Neurological:      Mental Status: She is alert and oriented to person, place, and time.      Cranial Nerves: No cranial nerve deficit.   Psychiatric:         Behavior: Behavior normal.         Assessment:     1. Iron deficiency anemia, unspecified iron deficiency anemia type    2. Screening procedure        Plan:     Her last colonoscopy was less than a year ago so I would recommend an EGD only. I would also recommend referral to  "Allergies    PHYSICAL EXAM  VITAL SIGNS: BP (!) 140/92   Pulse 98   Temp 37.4 °C (99.4 °F) (Temporal)   Resp 16   Ht 1.626 m (5' 4\")   Wt 54.7 kg (120 lb 9.5 oz)   LMP 09/29/2022   SpO2 93%   BMI 20.70 kg/m²       Constitutional: Well developed, Well nourished, No acute distress, Non-toxic appearance.   HENT: Normocephalic, Atraumatic, Bilateral external ears normal, Oropharynx moist, No oral exudates, Nose normal.  Widespread poor dentition no obvious abscess  Eyes: PERRLA, EOMI, Conjunctiva normal, No discharge.   Neck: Normal range of motion, No tenderness, Supple, No stridor.   Cardiovascular: Normal heart rate, Normal rhythm, No murmurs, No rubs, No gallops.   Thorax & Lungs: Normal breath sounds, No respiratory distress, No wheezing, No chest tenderness.   Abdomen: Bowel sounds normal, Soft, No tenderness, No masses, No pulsatile masses.   Skin: Warm, Dry, No erythema, No rash.   Back: No tenderness, No CVA tenderness.   Extremities: Intact distal pulses, No edema, No tenderness, No cyanosis, No clubbing.   Musculoskeletal: Good range of motion in all major joints. No tenderness to palpation or major deformities noted.   Neurologic: Alert & oriented x 3, Normal motor function, Normal sensory function, No focal deficits noted.   Psychiatric: Anxious        COURSE & MEDICAL DECISION MAKING  Pertinent Labs & Imaging studies reviewed. (See chart for details)  Patient presents here with history and physical exam consistent with likely bronchitis.  She does not have any hypoxia high fever or wheezing.  Lungs are clear.  She also has widespread poor dentition without any obvious abscess or airway compromise.  We will start her on amoxicillin and recommend she follow-up at the [facility]    FINAL IMPRESSION  1.  Dental infection  2.  Bronchitis         Electronically signed by: Chapito Bryant M.D., 10/16/2022 5:53 PM    " Hematology to help with evaluation and management of anemia.     Orders Placed This Encounter   Procedures    COVID-19 Routine Screening    Ambulatory referral/consult to Hematology / Oncology       Follow up if symptoms worsen or fail to improve.    Thank you for the opportunity to participate in the care of this patient.   Clarence Barboza PA-C.

## 2022-10-18 LAB
FERRITIN SERPL-MCNC: 412 NG/ML (ref 20–300)
IRON SERPL-MCNC: 82 UG/DL (ref 30–160)
SATURATED IRON: 24 % (ref 20–50)
TOTAL IRON BINDING CAPACITY: 336 UG/DL (ref 250–450)
TRANSFERRIN SERPL-MCNC: 227 MG/DL (ref 200–375)

## 2022-10-21 ENCOUNTER — PATIENT MESSAGE (OUTPATIENT)
Dept: HEMATOLOGY/ONCOLOGY | Facility: CLINIC | Age: 67
End: 2022-10-21

## 2022-10-21 ENCOUNTER — OFFICE VISIT (OUTPATIENT)
Dept: HEMATOLOGY/ONCOLOGY | Facility: CLINIC | Age: 67
End: 2022-10-21
Payer: COMMERCIAL

## 2022-10-21 VITALS
SYSTOLIC BLOOD PRESSURE: 134 MMHG | TEMPERATURE: 97 F | HEART RATE: 72 BPM | BODY MASS INDEX: 36.33 KG/M2 | DIASTOLIC BLOOD PRESSURE: 60 MMHG | HEIGHT: 65 IN | WEIGHT: 218.06 LBS | OXYGEN SATURATION: 94 %

## 2022-10-21 DIAGNOSIS — D50.0 IRON DEFICIENCY ANEMIA DUE TO CHRONIC BLOOD LOSS: Primary | ICD-10-CM

## 2022-10-21 PROCEDURE — 99213 PR OFFICE/OUTPT VISIT, EST, LEVL III, 20-29 MIN: ICD-10-PCS | Mod: S$GLB,,, | Performed by: NURSE PRACTITIONER

## 2022-10-21 PROCEDURE — 1160F RVW MEDS BY RX/DR IN RCRD: CPT | Mod: CPTII,S$GLB,, | Performed by: NURSE PRACTITIONER

## 2022-10-21 PROCEDURE — 3078F DIAST BP <80 MM HG: CPT | Mod: CPTII,S$GLB,, | Performed by: NURSE PRACTITIONER

## 2022-10-21 PROCEDURE — 3288F PR FALLS RISK ASSESSMENT DOCUMENTED: ICD-10-PCS | Mod: CPTII,S$GLB,, | Performed by: NURSE PRACTITIONER

## 2022-10-21 PROCEDURE — 99999 PR PBB SHADOW E&M-EST. PATIENT-LVL IV: ICD-10-PCS | Mod: PBBFAC,,, | Performed by: NURSE PRACTITIONER

## 2022-10-21 PROCEDURE — 3078F PR MOST RECENT DIASTOLIC BLOOD PRESSURE < 80 MM HG: ICD-10-PCS | Mod: CPTII,S$GLB,, | Performed by: NURSE PRACTITIONER

## 2022-10-21 PROCEDURE — 3075F SYST BP GE 130 - 139MM HG: CPT | Mod: CPTII,S$GLB,, | Performed by: NURSE PRACTITIONER

## 2022-10-21 PROCEDURE — 1125F PR PAIN SEVERITY QUANTIFIED, PAIN PRESENT: ICD-10-PCS | Mod: CPTII,S$GLB,, | Performed by: NURSE PRACTITIONER

## 2022-10-21 PROCEDURE — 3075F PR MOST RECENT SYSTOLIC BLOOD PRESS GE 130-139MM HG: ICD-10-PCS | Mod: CPTII,S$GLB,, | Performed by: NURSE PRACTITIONER

## 2022-10-21 PROCEDURE — 99213 OFFICE O/P EST LOW 20 MIN: CPT | Mod: S$GLB,,, | Performed by: NURSE PRACTITIONER

## 2022-10-21 PROCEDURE — 1159F PR MEDICATION LIST DOCUMENTED IN MEDICAL RECORD: ICD-10-PCS | Mod: CPTII,S$GLB,, | Performed by: NURSE PRACTITIONER

## 2022-10-21 PROCEDURE — 1101F PT FALLS ASSESS-DOCD LE1/YR: CPT | Mod: CPTII,S$GLB,, | Performed by: NURSE PRACTITIONER

## 2022-10-21 PROCEDURE — 1125F AMNT PAIN NOTED PAIN PRSNT: CPT | Mod: CPTII,S$GLB,, | Performed by: NURSE PRACTITIONER

## 2022-10-21 PROCEDURE — 1160F PR REVIEW ALL MEDS BY PRESCRIBER/CLIN PHARMACIST DOCUMENTED: ICD-10-PCS | Mod: CPTII,S$GLB,, | Performed by: NURSE PRACTITIONER

## 2022-10-21 PROCEDURE — 99999 PR PBB SHADOW E&M-EST. PATIENT-LVL IV: CPT | Mod: PBBFAC,,, | Performed by: NURSE PRACTITIONER

## 2022-10-21 PROCEDURE — 1101F PR PT FALLS ASSESS DOC 0-1 FALLS W/OUT INJ PAST YR: ICD-10-PCS | Mod: CPTII,S$GLB,, | Performed by: NURSE PRACTITIONER

## 2022-10-21 PROCEDURE — 3288F FALL RISK ASSESSMENT DOCD: CPT | Mod: CPTII,S$GLB,, | Performed by: NURSE PRACTITIONER

## 2022-10-21 PROCEDURE — 1159F MED LIST DOCD IN RCRD: CPT | Mod: CPTII,S$GLB,, | Performed by: NURSE PRACTITIONER

## 2022-10-21 RX ORDER — AMOXICILLIN 500 MG/1
500 CAPSULE ORAL 3 TIMES DAILY
COMMUNITY
Start: 2022-10-19 | End: 2023-03-22

## 2022-10-21 RX ORDER — NALOXONE HYDROCHLORIDE 4 MG/.1ML
SPRAY NASAL
COMMUNITY
Start: 2022-10-05

## 2022-10-21 NOTE — PROGRESS NOTES
Subjective:       Patient ID: Ro Hernandez is a 67 y.o. female.    Chief Complaint: f/u JAQUELINE    HPI: 67 y.o female with h/o HTN, COPD, HLD female here today for follow up of iron deficiency most recently s/p IV Injectafer x 1 2022. Had previous iron deficiency anemia treated with Injectafer. Today she feels well and is without complaints. Denies any anemia symptoms or abnormal bleeding. Reports notable improvement in fatigue.    Patient has undergone prior GI evaluation including EGD/Colonoscopy/VCE. EGD unremarkable. Colonoscopy with polyp, benign pathology, recommended repeat in 5 years. VCE notes angioectasia without bleeding. GI recommends repeat endoscopy if recurrent anemia.       Social History     Socioeconomic History    Marital status:    Tobacco Use    Smoking status: Former     Packs/day: 1.00     Years: 32.00     Pack years: 32.00     Types: Cigarettes     Start date:      Quit date: 2016     Years since quittin.3    Smokeless tobacco: Never    Tobacco comments:     smoked for 25 yrs. quit 16yrs. then smoked again for 5-6 before quitting in .   Substance and Sexual Activity    Alcohol use: No     Alcohol/week: 1.0 standard drink     Types: 1 Standard drinks or equivalent per week    Drug use: No    Sexual activity: Not Currently       Past Medical History:   Diagnosis Date    Allergy     Angina     Arthritis 2013    Asthma     COPD (chronic obstructive pulmonary disease)     GERD (gastroesophageal reflux disease)     Hx of Prinzmetal angina     Hypertension     Obesity 2013    Other and unspecified hyperlipidemia 2013    Prinzmetal's angina     Sleep apnea 2013       Family History   Problem Relation Age of Onset    Hypertension Mother     Cancer Mother     Kidney cancer Mother     COPD Father     Hypertension Father     Heart disease Sister     Hyperlipidemia Son     Breast cancer Maternal Grandmother     Colon cancer Neg Hx        Past Surgical History:    Procedure Laterality Date    ABDOMINAL SURGERY      CHOLECYSTECTOMY      COLONOSCOPY N/A 12/6/2019    Procedure: COLONOSCOPY;  Surgeon: Winston Smith MD;  Location: [facility] ENDO;  Service: Endoscopy;  Laterality: N/A;    ESOPHAGOGASTRODUODENOSCOPY N/A 12/10/2018    Procedure: EGD (ESOPHAGOGASTRODUODENOSCOPY);  Surgeon: Raúl Polanco III, MD;  Location: [facility] ENDO;  Service: Endoscopy;  Laterality: N/A;    ESOPHAGOGASTRODUODENOSCOPY N/A 9/8/2020    Procedure: EGD (ESOPHAGOGASTRODUODENOSCOPY);  Surgeon: Raúl Polanco III, MD;  Location: [facility];  Service: Endoscopy;  Laterality: N/A;    EYE SURGERY      FOOT SURGERY      INTRALUMINAL GASTROINTESTINAL TRACT IMAGING VIA CAPSULE N/A 9/18/2020    Procedure: IMAGING PROCEDURE, GI TRACT, INTRALUMINAL, VIA CAPSULE;  Surgeon: Blade Holley RN;  Location: [facility] ENDO;  Service: Endoscopy;  Laterality: N/A;    TONSILLECTOMY      TUBAL LIGATION         Review of Systems   Constitutional:  Negative for activity change, appetite change, chills, diaphoresis, fatigue, fever and unexpected weight change.   HENT:  Negative for congestion, mouth sores, nosebleeds, sore throat, trouble swallowing and voice change.    Eyes:  Negative for visual disturbance.   Respiratory:  Negative for cough, chest tightness, shortness of breath and wheezing.    Cardiovascular:  Negative for chest pain and leg swelling.   Gastrointestinal:  Negative for abdominal distention, abdominal pain, blood in stool, constipation, diarrhea, nausea and vomiting.   Genitourinary:  Negative for difficulty urinating, dysuria and hematuria.   Musculoskeletal:  Negative for arthralgias, back pain and myalgias.   Skin:  Negative for pallor, rash and wound.   Neurological:  Negative for dizziness, syncope, weakness and headaches.   Hematological:  Negative for adenopathy. Does not bruise/bleed easily.   Psychiatric/Behavioral:  The patient is not nervous/anxious.        Medication List with Changes/Refills    Current Medications    ALBUTEROL (PROVENTIL) 2.5 MG /3 ML (0.083 %) NEBULIZER SOLUTION    Take 3 mLs (2.5 mg total) by nebulization every 4 (four) hours as needed. Rescue    AMOXICILLIN (AMOXIL) 500 MG CAPSULE    Take 500 mg by mouth 3 (three) times daily.    ASPIRIN (ECOTRIN) 81 MG EC TABLET    Take 81 mg by mouth once daily.    EZETIMIBE (ZETIA) 10 MG TABLET    Take 1 tablet (10 mg total) by mouth once daily.    FLUTICASONE PROPIONATE (FLONASE) 50 MCG/ACTUATION NASAL SPRAY    USE 2 SPRAYS IN EACH NOSTRIL ONCE DAILY    HYDROCODONE-ACETAMINOPHEN 10-325MG (NORCO)  MG TAB    Take 1 tablet by mouth every 12 (twelve) hours.    ISOSORBIDE MONONITRATE (IMDUR) 30 MG 24 HR TABLET    Take 1 tab po in the AM and 1/2 tab po in the PM    LACTULOSE (CHRONULAC) 10 GRAM/15 ML SOLUTION    Take 30 mLs (20 g total) by mouth 2 (two) times daily.    METOPROLOL SUCCINATE (TOPROL-XL) 100 MG 24 HR TABLET        MOMETASONE-FORMOTEROL (DULERA) 200-5 MCG/ACTUATION INHALER    Inhale 2 puffs into the lungs 2 (two) times daily. Controller    MULTIVITAMIN (MULTIPLE VITAMIN ORAL)    Take 1 tablet by mouth Daily.    NALOXONE (NARCAN) 4 MG/ACTUATION SPRY        NITROGLYCERIN (NITROSTAT) 0.4 MG SL TABLET    DISSOLVE 1 TABLET UNDER THE TONGUE EVERY 5 MINUTES FOR 3 DOSES AS NEEDED FOR CHEST PAIN    PANTOPRAZOLE (PROTONIX) 20 MG TABLET    Take 1 tablet (20 mg total) by mouth 2 (two) times daily before meals.    SOLIFENACIN (VESICARE) 10 MG TABLET    Take 1 tablet (10 mg total) by mouth once daily.    VERAPAMIL (VERELAN) 360 MG C24P    Take 1 capsule (360 mg total) by mouth once daily.     Objective:     Vitals:    10/21/22 1319   BP: 134/60   Pulse: 72   Temp: 97.1 °F (36.2 °C)     Lab Results   Component Value Date    WBC 5.34 10/17/2022    HGB 13.8 10/17/2022    HCT 40.2 10/17/2022    MCV 91 10/17/2022     10/17/2022       BMP  Lab Results   Component Value Date     08/05/2022    K 4.4 08/05/2022     08/05/2022    CO2 25  08/05/2022    BUN 19 08/05/2022    CREATININE 0.8 08/05/2022    CALCIUM 9.6 08/05/2022    ANIONGAP 8 08/05/2022    ESTGFRAFRICA >60.0 11/19/2021    EGFRNONAA >60.0 11/19/2021     Lab Results   Component Value Date    ALT 16 08/05/2022    AST 21 08/05/2022    ALKPHOS 64 08/05/2022    BILITOT 0.4 08/05/2022     Lab Results   Component Value Date    IRON 82 10/17/2022    TIBC 336 10/17/2022    FERRITIN 412 (H) 10/17/2022         Physical Exam  Vitals reviewed.   Constitutional:       Appearance: She is well-developed.   HENT:      Head: Normocephalic.      Right Ear: External ear normal.      Left Ear: External ear normal.   Eyes:      General: Lids are normal. No scleral icterus.        Right eye: No discharge.         Left eye: No discharge.      Conjunctiva/sclera: Conjunctivae normal.   Neck:      Thyroid: No thyroid mass.   Cardiovascular:      Rate and Rhythm: Normal rate.   Pulmonary:      Effort: Pulmonary effort is normal. No respiratory distress.      Breath sounds: Normal breath sounds. No wheezing or rales.   Abdominal:      General: Bowel sounds are normal. There is no distension.      Palpations: Abdomen is soft.      Tenderness: There is no abdominal tenderness.   Genitourinary:     Comments: deferred  Musculoskeletal:         General: Normal range of motion.      Cervical back: Normal range of motion.   Skin:     General: Skin is warm and dry.   Neurological:      Mental Status: She is alert and oriented to person, place, and time.   Psychiatric:         Speech: Speech normal.         Behavior: Behavior normal. Behavior is cooperative.         Thought Content: Thought content normal.        Assessment:     Problem List Items Addressed This Visit          Oncology    Iron deficiency anemia due to chronic blood loss - Primary    Relevant Orders    CBC Auto Differential    Iron and TIBC    Ferritin         Plan:     Iron deficiency anemia due to chronic blood loss  -     CBC Auto Differential; Future;  Expected date: 10/21/2022  -     Iron and TIBC; Future; Expected date: 10/21/2022  -     Ferritin; Future; Expected date: 10/21/2022          KALA JhaC

## 2023-01-11 ENCOUNTER — LAB VISIT (OUTPATIENT)
Dept: LAB | Facility: HOSPITAL | Age: 68
End: 2023-01-11
Attending: NURSE PRACTITIONER
Payer: COMMERCIAL

## 2023-01-11 DIAGNOSIS — D50.0 IRON DEFICIENCY ANEMIA DUE TO CHRONIC BLOOD LOSS: ICD-10-CM

## 2023-01-11 LAB
BASOPHILS # BLD AUTO: 0.01 K/UL (ref 0–0.2)
BASOPHILS NFR BLD: 0.2 % (ref 0–1.9)
DIFFERENTIAL METHOD: ABNORMAL
EOSINOPHIL # BLD AUTO: 0.2 K/UL (ref 0–0.5)
EOSINOPHIL NFR BLD: 3 % (ref 0–8)
ERYTHROCYTE [DISTWIDTH] IN BLOOD BY AUTOMATED COUNT: 12.4 % (ref 11.5–14.5)
FERRITIN SERPL-MCNC: 352 NG/ML (ref 20–300)
HCT VFR BLD AUTO: 41.7 % (ref 37–48.5)
HGB BLD-MCNC: 14.1 G/DL (ref 12–16)
IMM GRANULOCYTES # BLD AUTO: 0.02 K/UL (ref 0–0.04)
IMM GRANULOCYTES NFR BLD AUTO: 0.4 % (ref 0–0.5)
IRON SERPL-MCNC: 51 UG/DL (ref 30–160)
LYMPHOCYTES # BLD AUTO: 1.6 K/UL (ref 1–4.8)
LYMPHOCYTES NFR BLD: 30.8 % (ref 18–48)
MCH RBC QN AUTO: 31.5 PG (ref 27–31)
MCHC RBC AUTO-ENTMCNC: 33.8 G/DL (ref 32–36)
MCV RBC AUTO: 93 FL (ref 82–98)
MONOCYTES # BLD AUTO: 0.3 K/UL (ref 0.3–1)
MONOCYTES NFR BLD: 6.3 % (ref 4–15)
NEUTROPHILS # BLD AUTO: 3.1 K/UL (ref 1.8–7.7)
NEUTROPHILS NFR BLD: 59.3 % (ref 38–73)
NRBC BLD-RTO: 0 /100 WBC
PLATELET # BLD AUTO: 225 K/UL (ref 150–450)
PMV BLD AUTO: 9.4 FL (ref 9.2–12.9)
RBC # BLD AUTO: 4.47 M/UL (ref 4–5.4)
SATURATED IRON: 16 % (ref 20–50)
TOTAL IRON BINDING CAPACITY: 323 UG/DL (ref 250–450)
TRANSFERRIN SERPL-MCNC: 218 MG/DL (ref 200–375)
WBC # BLD AUTO: 5.26 K/UL (ref 3.9–12.7)

## 2023-01-11 PROCEDURE — 36415 COLL VENOUS BLD VENIPUNCTURE: CPT | Mod: PO | Performed by: NURSE PRACTITIONER

## 2023-01-11 PROCEDURE — 85025 COMPLETE CBC W/AUTO DIFF WBC: CPT | Performed by: NURSE PRACTITIONER

## 2023-01-11 PROCEDURE — 84466 ASSAY OF TRANSFERRIN: CPT | Performed by: NURSE PRACTITIONER

## 2023-01-11 PROCEDURE — 82728 ASSAY OF FERRITIN: CPT | Performed by: NURSE PRACTITIONER

## 2023-01-12 ENCOUNTER — OFFICE VISIT (OUTPATIENT)
Dept: HEMATOLOGY/ONCOLOGY | Facility: CLINIC | Age: 68
End: 2023-01-12
Payer: COMMERCIAL

## 2023-01-12 ENCOUNTER — HOSPITAL ENCOUNTER (OUTPATIENT)
Dept: RADIOLOGY | Facility: HOSPITAL | Age: 68
Discharge: HOME | End: 2023-01-12
Attending: NURSE PRACTITIONER
Payer: COMMERCIAL

## 2023-01-12 VITALS
OXYGEN SATURATION: 94 % | DIASTOLIC BLOOD PRESSURE: 67 MMHG | BODY MASS INDEX: 37.06 KG/M2 | RESPIRATION RATE: 18 BRPM | TEMPERATURE: 98 F | WEIGHT: 222.44 LBS | HEART RATE: 79 BPM | SYSTOLIC BLOOD PRESSURE: 137 MMHG | HEIGHT: 65 IN

## 2023-01-12 DIAGNOSIS — R06.02 SHORTNESS OF BREATH: ICD-10-CM

## 2023-01-12 DIAGNOSIS — D50.0 IRON DEFICIENCY ANEMIA DUE TO CHRONIC BLOOD LOSS: Primary | ICD-10-CM

## 2023-01-12 PROCEDURE — 3078F PR MOST RECENT DIASTOLIC BLOOD PRESSURE < 80 MM HG: ICD-10-PCS | Mod: CPTII,S$GLB,, | Performed by: NURSE PRACTITIONER

## 2023-01-12 PROCEDURE — 99999 PR PBB SHADOW E&M-EST. PATIENT-LVL V: CPT | Mod: PBBFAC,,, | Performed by: NURSE PRACTITIONER

## 2023-01-12 PROCEDURE — 1159F PR MEDICATION LIST DOCUMENTED IN MEDICAL RECORD: ICD-10-PCS | Mod: CPTII,S$GLB,, | Performed by: NURSE PRACTITIONER

## 2023-01-12 PROCEDURE — 1101F PR PT FALLS ASSESS DOC 0-1 FALLS W/OUT INJ PAST YR: ICD-10-PCS | Mod: CPTII,S$GLB,, | Performed by: NURSE PRACTITIONER

## 2023-01-12 PROCEDURE — 1160F PR REVIEW ALL MEDS BY PRESCRIBER/CLIN PHARMACIST DOCUMENTED: ICD-10-PCS | Mod: CPTII,S$GLB,, | Performed by: NURSE PRACTITIONER

## 2023-01-12 PROCEDURE — 1160F RVW MEDS BY RX/DR IN RCRD: CPT | Mod: CPTII,S$GLB,, | Performed by: NURSE PRACTITIONER

## 2023-01-12 PROCEDURE — 71046 X-RAY EXAM CHEST 2 VIEWS: CPT | Mod: 26,,, | Performed by: RADIOLOGY

## 2023-01-12 PROCEDURE — 3288F FALL RISK ASSESSMENT DOCD: CPT | Mod: CPTII,S$GLB,, | Performed by: NURSE PRACTITIONER

## 2023-01-12 PROCEDURE — 3008F BODY MASS INDEX DOCD: CPT | Mod: CPTII,S$GLB,, | Performed by: NURSE PRACTITIONER

## 2023-01-12 PROCEDURE — 3008F PR BODY MASS INDEX (BMI) DOCUMENTED: ICD-10-PCS | Mod: CPTII,S$GLB,, | Performed by: NURSE PRACTITIONER

## 2023-01-12 PROCEDURE — 1126F PR PAIN SEVERITY QUANTIFIED, NO PAIN PRESENT: ICD-10-PCS | Mod: CPTII,S$GLB,, | Performed by: NURSE PRACTITIONER

## 2023-01-12 PROCEDURE — 1159F MED LIST DOCD IN RCRD: CPT | Mod: CPTII,S$GLB,, | Performed by: NURSE PRACTITIONER

## 2023-01-12 PROCEDURE — 99999 PR PBB SHADOW E&M-EST. PATIENT-LVL V: ICD-10-PCS | Mod: PBBFAC,,, | Performed by: NURSE PRACTITIONER

## 2023-01-12 PROCEDURE — 1126F AMNT PAIN NOTED NONE PRSNT: CPT | Mod: CPTII,S$GLB,, | Performed by: NURSE PRACTITIONER

## 2023-01-12 PROCEDURE — 3288F PR FALLS RISK ASSESSMENT DOCUMENTED: ICD-10-PCS | Mod: CPTII,S$GLB,, | Performed by: NURSE PRACTITIONER

## 2023-01-12 PROCEDURE — 1101F PT FALLS ASSESS-DOCD LE1/YR: CPT | Mod: CPTII,S$GLB,, | Performed by: NURSE PRACTITIONER

## 2023-01-12 PROCEDURE — 71046 XR CHEST PA AND LATERAL: ICD-10-PCS | Mod: 26,,, | Performed by: RADIOLOGY

## 2023-01-12 PROCEDURE — 3075F PR MOST RECENT SYSTOLIC BLOOD PRESS GE 130-139MM HG: ICD-10-PCS | Mod: CPTII,S$GLB,, | Performed by: NURSE PRACTITIONER

## 2023-01-12 PROCEDURE — 3075F SYST BP GE 130 - 139MM HG: CPT | Mod: CPTII,S$GLB,, | Performed by: NURSE PRACTITIONER

## 2023-01-12 PROCEDURE — 71046 X-RAY EXAM CHEST 2 VIEWS: CPT | Mod: TC

## 2023-01-12 PROCEDURE — 99214 PR OFFICE/OUTPT VISIT, EST, LEVL IV, 30-39 MIN: ICD-10-PCS | Mod: S$GLB,,, | Performed by: NURSE PRACTITIONER

## 2023-01-12 PROCEDURE — 99214 OFFICE O/P EST MOD 30 MIN: CPT | Mod: S$GLB,,, | Performed by: NURSE PRACTITIONER

## 2023-01-12 PROCEDURE — 3078F DIAST BP <80 MM HG: CPT | Mod: CPTII,S$GLB,, | Performed by: NURSE PRACTITIONER

## 2023-01-12 NOTE — ASSESSMENT & PLAN NOTE
Hemoglobin remains normal. Interval decline in saturated iron level 16%. TIBC normal. Ferritin elevated    Hold off on iron supplement at present time. Encourage iron rich foods    F/u 3 months with cbc, iron studies. Discussed S&S to report sooner.     Patient reports noting gradually worsening SOB. Known h/o lung nodules monitored by Pulmonology  --CXR today. Will call or communicate results via patient portal. Advised Pulmonology follow up

## 2023-01-12 NOTE — PROGRESS NOTES
Subjective:       Patient ID: Ro Hernandez is a 67 y.o. female.    Chief Complaint: f/u JAQUELINE    HPI: 67 y.o female with h/o HTN, COPD, HLD, JAQUELINE previously treated with Injectafer    Patient has undergone prior GI evaluation including EGD/Colonoscopy/VCE. EGD unremarkable. Colonoscopy with polyp, benign pathology, recommended repeat in 5 years. VCE notes angioectasia without bleeding. GI recommends repeat endoscopy if recurrent anemia.     Today she presents for follow up of her h/o JAQUELINE. She notes gradually worsening SOB with exertion. Denies CP, dizziness, syncope. She does have h/o small bilateral lung nodules followed by Pulmonology yearly  Social History     Socioeconomic History    Marital status:    Tobacco Use    Smoking status: Former     Packs/day: 1.00     Years: 32.00     Pack years: 32.00     Types: Cigarettes     Start date:      Quit date: 2016     Years since quittin.5    Smokeless tobacco: Never    Tobacco comments:     smoked for 25 yrs. quit 16yrs. then smoked again for 5-6 before quitting in .   Substance and Sexual Activity    Alcohol use: No     Alcohol/week: 1.0 standard drink     Types: 1 Standard drinks or equivalent per week    Drug use: No    Sexual activity: Not Currently       Past Medical History:   Diagnosis Date    Allergy     Angina     Arthritis 2013    Asthma     COPD (chronic obstructive pulmonary disease)     GERD (gastroesophageal reflux disease)     Hx of Prinzmetal angina     Hypertension     Obesity 2013    Other and unspecified hyperlipidemia 2013    Prinzmetal's angina     Sleep apnea 2013       Family History   Problem Relation Age of Onset    Hypertension Mother     Cancer Mother     Kidney cancer Mother     COPD Father     Hypertension Father     Heart disease Sister     Hyperlipidemia Son     Breast cancer Maternal Grandmother     Colon cancer Neg Hx        Past Surgical History:   Procedure  Laterality Date    ABDOMINAL SURGERY      CHOLECYSTECTOMY      COLONOSCOPY N/A 12/6/2019    Procedure: COLONOSCOPY;  Surgeon: Winston Smith MD;  Location: [facility] ENDO;  Service: Endoscopy;  Laterality: N/A;    ESOPHAGOGASTRODUODENOSCOPY N/A 12/10/2018    Procedure: EGD (ESOPHAGOGASTRODUODENOSCOPY);  Surgeon: Raúl Polanco III, MD;  Location: [facility] ENDO;  Service: Endoscopy;  Laterality: N/A;    ESOPHAGOGASTRODUODENOSCOPY N/A 9/8/2020    Procedure: EGD (ESOPHAGOGASTRODUODENOSCOPY);  Surgeon: Raúl Polanco III, MD;  Location: [facility] ENDO;  Service: Endoscopy;  Laterality: N/A;    EYE SURGERY      FOOT SURGERY      INTRALUMINAL GASTROINTESTINAL TRACT IMAGING VIA CAPSULE N/A 9/18/2020    Procedure: IMAGING PROCEDURE, GI TRACT, INTRALUMINAL, VIA CAPSULE;  Surgeon: Blade Holley RN;  Location: [facility] ENDO;  Service: Endoscopy;  Laterality: N/A;    TONSILLECTOMY      TUBAL LIGATION         Review of Systems   Constitutional:  Positive for fatigue. Negative for activity change, appetite change, chills, diaphoresis, fever and unexpected weight change.   HENT:  Negative for congestion, mouth sores, nosebleeds, sore throat, trouble swallowing and voice change.    Eyes:  Negative for visual disturbance.   Respiratory:  Positive for shortness of breath. Negative for cough, chest tightness and wheezing.    Cardiovascular:  Negative for chest pain and leg swelling.   Gastrointestinal:  Negative for abdominal distention, abdominal pain, blood in stool, constipation, diarrhea, nausea and vomiting.   Genitourinary:  Negative for difficulty urinating, dysuria and hematuria.   Musculoskeletal:  Negative for arthralgias, back pain and myalgias.   Skin:  Negative for pallor, rash and wound.   Neurological:  Negative for dizziness, syncope, weakness and headaches.   Hematological:  Negative for adenopathy. Does not bruise/bleed easily.   Psychiatric/Behavioral:  The patient is not nervous/anxious.        Medication List with  Changes/Refills   Current Medications    ALBUTEROL (PROVENTIL) 2.5 MG /3 ML (0.083 %) NEBULIZER SOLUTION    Take 3 mLs (2.5 mg total) by nebulization every 4 (four) hours as needed. Rescue    AMOXICILLIN (AMOXIL) 500 MG CAPSULE    Take 500 mg by mouth 3 (three) times daily.    ASPIRIN (ECOTRIN) 81 MG EC TABLET    Take 81 mg by mouth once daily.    EZETIMIBE (ZETIA) 10 MG TABLET    TAKE 1 TABLET DAILY    FLUTICASONE PROPIONATE (FLONASE) 50 MCG/ACTUATION NASAL SPRAY    USE 2 SPRAYS IN EACH NOSTRIL ONCE DAILY    HYDROCODONE-ACETAMINOPHEN 10-325MG (NORCO)  MG TAB    Take 1 tablet by mouth every 12 (twelve) hours.    ISOSORBIDE MONONITRATE (IMDUR) 30 MG 24 HR TABLET    Take 1 tab po in the AM and 1/2 tab po in the PM    LACTULOSE (CHRONULAC) 10 GRAM/15 ML SOLUTION    Take 30 mLs (20 g total) by mouth 2 (two) times daily.    METOPROLOL SUCCINATE (TOPROL-XL) 100 MG 24 HR TABLET        MOMETASONE-FORMOTEROL (DULERA) 200-5 MCG/ACTUATION INHALER    Inhale 2 puffs into the lungs 2 (two) times daily. Controller    MULTIVITAMIN (MULTIPLE VITAMIN ORAL)    Take 1 tablet by mouth Daily.    NALOXONE (NARCAN) 4 MG/ACTUATION SPRY        NITROGLYCERIN (NITROSTAT) 0.4 MG SL TABLET    DISSOLVE 1 TABLET UNDER THE TONGUE EVERY 5 MINUTES FOR 3 DOSES AS NEEDED FOR CHEST PAIN    PANTOPRAZOLE (PROTONIX) 20 MG TABLET    Take 1 tablet (20 mg total) by mouth 2 (two) times daily before meals.    SOLIFENACIN (VESICARE) 10 MG TABLET    Take 1 tablet (10 mg total) by mouth once daily.    VERAPAMIL (VERELAN) 360 MG C24P    Take 1 capsule (360 mg total) by mouth once daily.     Objective:     Vitals:    01/12/23 1350   BP: 137/67   Pulse: 79   Resp: 18   Temp: 97.9 °F (36.6 °C)     Lab Results   Component Value Date    WBC 5.26 01/11/2023    HGB 14.1 01/11/2023    HCT 41.7 01/11/2023    MCV 93 01/11/2023     01/11/2023       BMP  Lab Results   Component Value Date     08/05/2022    K 4.4 08/05/2022     08/05/2022    CO2 25  08/05/2022    BUN 19 08/05/2022    CREATININE 0.8 08/05/2022    CALCIUM 9.6 08/05/2022    ANIONGAP 8 08/05/2022    ESTGFRAFRICA >60.0 11/19/2021    EGFRNONAA >60.0 11/19/2021     Lab Results   Component Value Date    ALT 16 08/05/2022    AST 21 08/05/2022    ALKPHOS 64 08/05/2022    BILITOT 0.4 08/05/2022     Lab Results   Component Value Date    IRON 51 01/11/2023    TIBC 323 01/11/2023    FERRITIN 352 (H) 01/11/2023         Physical Exam  Vitals reviewed.   Constitutional:       Appearance: She is well-developed.   HENT:      Head: Normocephalic.      Right Ear: External ear normal.      Left Ear: External ear normal.   Eyes:      General: Lids are normal. No scleral icterus.        Right eye: No discharge.         Left eye: No discharge.      Conjunctiva/sclera: Conjunctivae normal.   Neck:      Thyroid: No thyroid mass.   Cardiovascular:      Rate and Rhythm: Normal rate.   Pulmonary:      Effort: Pulmonary effort is normal. No respiratory distress.      Breath sounds: Normal breath sounds. No wheezing or rales.   Abdominal:      General: Bowel sounds are normal. There is no distension.      Palpations: Abdomen is soft.      Tenderness: There is no abdominal tenderness.   Genitourinary:     Comments: deferred  Musculoskeletal:         General: Normal range of motion.      Cervical back: Normal range of motion.   Skin:     General: Skin is warm and dry.   Neurological:      Mental Status: She is alert and oriented to person, place, and time.   Psychiatric:         Speech: Speech normal.         Behavior: Behavior normal. Behavior is cooperative.         Thought Content: Thought content normal.        Assessment:     Problem List Items Addressed This Visit          Oncology    Iron deficiency anemia due to chronic blood loss - Primary     Hemoglobin remains normal. Interval decline in saturated iron level 16%. TIBC normal. Ferritin elevated    Hold off on iron supplement at present time. Encourage iron rich  foods    F/u 3 months with cbc, iron studies. Discussed S&S to report sooner.     Patient reports noting gradually worsening SOB. Known h/o lung nodules monitored by Pulmonology  --CXR today. Will call or communicate results via patient portal. Advised Pulmonology follow up         Relevant Orders    CBC Auto Differential    Iron and TIBC    Ferritin     Other Visit Diagnoses       Shortness of breath        Relevant Orders    X-Ray Chest PA And Lateral              Plan:     Iron deficiency anemia due to chronic blood loss  -     CBC Auto Differential; Future; Expected date: 01/12/2023  -     Iron and TIBC; Future; Expected date: 01/12/2023  -     Ferritin; Future; Expected date: 01/12/2023    Shortness of breath  -     X-Ray Chest PA And Lateral; Future; Expected date: 01/12/2023        Med Onc Chart Routing      Follow up with physician    Follow up with [facility] 3 months.   Infusion scheduling note    Injection scheduling note    Labs CBC, ferritin and iron and TIBC   Lab interval:     Imaging None      Pharmacy appointment No pharmacy appointment needed      Other referrals No additional referrals needed           KALA JhaC

## 2023-03-01 PROBLEM — I42.1 OBSTRUCTIVE HYPERTROPHIC CARDIOMYOPATHY: Status: ACTIVE | Noted: 2023-03-01

## 2023-03-01 PROBLEM — J44.9 CHRONIC OBSTRUCTIVE PULMONARY DISEASE, UNSPECIFIED COPD TYPE: Status: ACTIVE | Noted: 2023-03-01

## 2023-03-02 ENCOUNTER — OFFICE VISIT (OUTPATIENT)
Dept: FAMILY MEDICINE | Facility: CLINIC | Age: 68
End: 2023-03-02
Payer: MEDICARE

## 2023-03-02 ENCOUNTER — LAB VISIT (OUTPATIENT)
Dept: LAB | Facility: HOSPITAL | Age: 68
End: 2023-03-02
Attending: FAMILY MEDICINE
Payer: MEDICARE

## 2023-03-02 VITALS
OXYGEN SATURATION: 94 % | SYSTOLIC BLOOD PRESSURE: 132 MMHG | WEIGHT: 222.88 LBS | DIASTOLIC BLOOD PRESSURE: 74 MMHG | HEART RATE: 79 BPM | BODY MASS INDEX: 37.13 KG/M2 | HEIGHT: 65 IN

## 2023-03-02 DIAGNOSIS — E66.01 SEVERE OBESITY (BMI 35.0-35.9 WITH COMORBIDITY): ICD-10-CM

## 2023-03-02 DIAGNOSIS — I10 PRIMARY HYPERTENSION: ICD-10-CM

## 2023-03-02 DIAGNOSIS — I35.0 AORTIC VALVE STENOSIS, ETIOLOGY OF CARDIAC VALVE DISEASE UNSPECIFIED: ICD-10-CM

## 2023-03-02 DIAGNOSIS — I20.1 PRINZMETAL'S ANGINA: ICD-10-CM

## 2023-03-02 DIAGNOSIS — I42.1 OBSTRUCTIVE HYPERTROPHIC CARDIOMYOPATHY: Primary | ICD-10-CM

## 2023-03-02 DIAGNOSIS — R79.9 ABNORMAL FINDING OF BLOOD CHEMISTRY, UNSPECIFIED: ICD-10-CM

## 2023-03-02 DIAGNOSIS — R91.8 MULTIPLE LUNG NODULES: ICD-10-CM

## 2023-03-02 DIAGNOSIS — I70.0 ATHEROSCLEROSIS OF AORTA: ICD-10-CM

## 2023-03-02 DIAGNOSIS — J44.9 CHRONIC OBSTRUCTIVE PULMONARY DISEASE, UNSPECIFIED COPD TYPE: ICD-10-CM

## 2023-03-02 DIAGNOSIS — G47.33 OSA ON CPAP: ICD-10-CM

## 2023-03-02 LAB
ALBUMIN SERPL BCP-MCNC: 3.9 G/DL (ref 3.5–5.2)
ALP SERPL-CCNC: 68 U/L (ref 55–135)
ALT SERPL W/O P-5'-P-CCNC: 14 U/L (ref 10–44)
ANION GAP SERPL CALC-SCNC: 5 MMOL/L (ref 8–16)
AST SERPL-CCNC: 23 U/L (ref 10–40)
BILIRUB SERPL-MCNC: 0.6 MG/DL (ref 0.1–1)
BUN SERPL-MCNC: 17 MG/DL (ref 8–23)
CALCIUM SERPL-MCNC: 9.4 MG/DL (ref 8.7–10.5)
CHLORIDE SERPL-SCNC: 108 MMOL/L (ref 95–110)
CHOLEST SERPL-MCNC: 198 MG/DL (ref 120–199)
CHOLEST/HDLC SERPL: 5.1 {RATIO} (ref 2–5)
CO2 SERPL-SCNC: 28 MMOL/L (ref 23–29)
CREAT SERPL-MCNC: 0.8 MG/DL (ref 0.5–1.4)
EST. GFR  (NO RACE VARIABLE): >60 ML/MIN/1.73 M^2
GLUCOSE SERPL-MCNC: 92 MG/DL (ref 70–110)
HDLC SERPL-MCNC: 39 MG/DL (ref 40–75)
HDLC SERPL: 19.7 % (ref 20–50)
LDLC SERPL CALC-MCNC: 133 MG/DL (ref 63–159)
NONHDLC SERPL-MCNC: 159 MG/DL
POTASSIUM SERPL-SCNC: 4.8 MMOL/L (ref 3.5–5.1)
PROT SERPL-MCNC: 6.9 G/DL (ref 6–8.4)
SODIUM SERPL-SCNC: 141 MMOL/L (ref 136–145)
TRIGL SERPL-MCNC: 130 MG/DL (ref 30–150)

## 2023-03-02 PROCEDURE — 3075F PR MOST RECENT SYSTOLIC BLOOD PRESS GE 130-139MM HG: ICD-10-PCS | Mod: HCNC,CPTII,S$GLB, | Performed by: FAMILY MEDICINE

## 2023-03-02 PROCEDURE — 80053 COMPREHEN METABOLIC PANEL: CPT | Mod: HCNC | Performed by: FAMILY MEDICINE

## 2023-03-02 PROCEDURE — 1159F MED LIST DOCD IN RCRD: CPT | Mod: HCNC,CPTII,S$GLB, | Performed by: FAMILY MEDICINE

## 2023-03-02 PROCEDURE — 99999 PR PBB SHADOW E&M-EST. PATIENT-LVL IV: ICD-10-PCS | Mod: PBBFAC,HCNC,, | Performed by: FAMILY MEDICINE

## 2023-03-02 PROCEDURE — 1101F PR PT FALLS ASSESS DOC 0-1 FALLS W/OUT INJ PAST YR: ICD-10-PCS | Mod: HCNC,CPTII,S$GLB, | Performed by: FAMILY MEDICINE

## 2023-03-02 PROCEDURE — 3075F SYST BP GE 130 - 139MM HG: CPT | Mod: HCNC,CPTII,S$GLB, | Performed by: FAMILY MEDICINE

## 2023-03-02 PROCEDURE — 3008F PR BODY MASS INDEX (BMI) DOCUMENTED: ICD-10-PCS | Mod: HCNC,CPTII,S$GLB, | Performed by: FAMILY MEDICINE

## 2023-03-02 PROCEDURE — 3288F FALL RISK ASSESSMENT DOCD: CPT | Mod: HCNC,CPTII,S$GLB, | Performed by: FAMILY MEDICINE

## 2023-03-02 PROCEDURE — 3078F PR MOST RECENT DIASTOLIC BLOOD PRESSURE < 80 MM HG: ICD-10-PCS | Mod: HCNC,CPTII,S$GLB, | Performed by: FAMILY MEDICINE

## 2023-03-02 PROCEDURE — 36415 COLL VENOUS BLD VENIPUNCTURE: CPT | Mod: HCNC,PO | Performed by: FAMILY MEDICINE

## 2023-03-02 PROCEDURE — 3078F DIAST BP <80 MM HG: CPT | Mod: HCNC,CPTII,S$GLB, | Performed by: FAMILY MEDICINE

## 2023-03-02 PROCEDURE — 1159F PR MEDICATION LIST DOCUMENTED IN MEDICAL RECORD: ICD-10-PCS | Mod: HCNC,CPTII,S$GLB, | Performed by: FAMILY MEDICINE

## 2023-03-02 PROCEDURE — 3008F BODY MASS INDEX DOCD: CPT | Mod: HCNC,CPTII,S$GLB, | Performed by: FAMILY MEDICINE

## 2023-03-02 PROCEDURE — 3288F PR FALLS RISK ASSESSMENT DOCUMENTED: ICD-10-PCS | Mod: HCNC,CPTII,S$GLB, | Performed by: FAMILY MEDICINE

## 2023-03-02 PROCEDURE — 99214 OFFICE O/P EST MOD 30 MIN: CPT | Mod: HCNC,S$GLB,, | Performed by: FAMILY MEDICINE

## 2023-03-02 PROCEDURE — 99214 PR OFFICE/OUTPT VISIT, EST, LEVL IV, 30-39 MIN: ICD-10-PCS | Mod: HCNC,S$GLB,, | Performed by: FAMILY MEDICINE

## 2023-03-02 PROCEDURE — 80061 LIPID PANEL: CPT | Mod: HCNC | Performed by: FAMILY MEDICINE

## 2023-03-02 PROCEDURE — 99999 PR PBB SHADOW E&M-EST. PATIENT-LVL IV: CPT | Mod: PBBFAC,HCNC,, | Performed by: FAMILY MEDICINE

## 2023-03-02 PROCEDURE — 83036 HEMOGLOBIN GLYCOSYLATED A1C: CPT | Mod: HCNC | Performed by: FAMILY MEDICINE

## 2023-03-02 PROCEDURE — 1101F PT FALLS ASSESS-DOCD LE1/YR: CPT | Mod: HCNC,CPTII,S$GLB, | Performed by: FAMILY MEDICINE

## 2023-03-02 NOTE — PROGRESS NOTES
Subjective:       Patient ID: Ro Hernandez is a 67 y.o. female.    Chief Complaint: Follow-up (6 month)      HPI Comments:       Current Outpatient Medications:     amoxicillin (AMOXIL) 500 MG capsule, Take 500 mg by mouth 3 (three) times daily., Disp: , Rfl:     aspirin (ECOTRIN) 81 MG EC tablet, Take 81 mg by mouth once daily., Disp: , Rfl:     ezetimibe (ZETIA) 10 mg tablet, TAKE 1 TABLET DAILY, Disp: 90 tablet, Rfl: 3    fluticasone propionate (FLONASE) 50 mcg/actuation nasal spray, USE 2 SPRAYS IN EACH NOSTRIL ONCE DAILY, Disp: 48 g, Rfl: 3    hydrocodone-acetaminophen 10-325mg (NORCO)  mg Tab, Take 1 tablet by mouth every 12 (twelve) hours., Disp: , Rfl: 0    isosorbide mononitrate (IMDUR) 30 MG 24 hr tablet, Take 1 tab po in the AM and 1/2 tab po in the PM, Disp: 135 tablet, Rfl: 1    lactulose (CHRONULAC) 10 gram/15 mL solution, Take 30 mLs (20 g total) by mouth 2 (two) times daily., Disp: 1892 mL, Rfl: 2    metoprolol succinate (TOPROL-XL) 100 MG 24 hr tablet, , Disp: , Rfl:     MULTIVITAMIN (MULTIPLE VITAMIN ORAL), Take 1 tablet by mouth Daily., Disp: , Rfl:     naloxone (NARCAN) 4 mg/actuation Pillager, , Disp: , Rfl:     nitroGLYCERIN (NITROSTAT) 0.4 MG SL tablet, DISSOLVE 1 TABLET UNDER THE TONGUE EVERY 5 MINUTES FOR 3 DOSES AS NEEDED FOR CHEST PAIN, Disp: 25 tablet, Rfl: 0    solifenacin (VESICARE) 10 MG tablet, Take 1 tablet (10 mg total) by mouth once daily., Disp: 90 tablet, Rfl: 3    albuterol (PROVENTIL) 2.5 mg /3 mL (0.083 %) nebulizer solution, Take 3 mLs (2.5 mg total) by nebulization every 4 (four) hours as needed. Rescue, Disp: 150 mL, Rfl: 11    mometasone-formoterol (DULERA) 200-5 mcg/actuation inhaler, Inhale 2 puffs into the lungs 2 (two) times daily. Controller (Patient not taking: Reported on 3/4/2022), Disp: 13 g, Rfl: 11    pantoprazole (PROTONIX) 20 MG tablet, Take 1 tablet (20 mg total) by mouth 2 (two) times daily before meals., Disp: 180 tablet, Rfl: 3    verapamiL (VERELAN)  "360 MG C24P, Take 1 capsule (360 mg total) by mouth once daily., Disp: 90 capsule, Rfl: 1      Six-month follow-up.  Generally doing well.      Has some dyspnea on exertion as well as shortness of breath at rest.  Sometimes her oxygen levels will desaturate.  Has seen both Cardiology (aortic stenosis) and pulmonology and neither has a good explanation for her.  Cardiology does say that the time is getting closer for valve replacement.      Had some carotid studies done recently that she says were normal.    Due to get low-dose CT scan.  Was not done for some reason.  Will reschedule today.    Review of Systems   Constitutional:  Negative for activity change, appetite change and fever.   HENT:  Negative for sore throat.    Respiratory:  Positive for shortness of breath. Negative for cough.    Cardiovascular:  Negative for chest pain.   Gastrointestinal:  Negative for abdominal pain, diarrhea and nausea.   Genitourinary:  Negative for difficulty urinating.   Musculoskeletal:  Negative for arthralgias and myalgias.   Neurological:  Negative for dizziness and headaches.     Objective:      Vitals:    03/02/23 0814 03/02/23 0824   BP: (!) 144/70 132/74   Pulse: 79    SpO2: (!) 94%    Weight: 101.1 kg (222 lb 14.2 oz)    Height: 5' 5" (1.651 m)      Physical Exam  Vitals and nursing note reviewed.   Constitutional:       General: She is not in acute distress.     Appearance: She is well-developed. She is not diaphoretic.   HENT:      Head: Normocephalic.   Neck:      Thyroid: No thyromegaly.   Cardiovascular:      Rate and Rhythm: Normal rate and regular rhythm.      Heart sounds: Normal heart sounds. No murmur heard.  Pulmonary:      Effort: Pulmonary effort is normal.      Breath sounds: Normal breath sounds. No wheezing or rales.   Abdominal:      General: There is no distension.      Palpations: Abdomen is soft.   Musculoskeletal:      Cervical back: Neck supple.      Right lower leg: No edema.      Left lower leg: No " edema.   Lymphadenopathy:      Cervical: No cervical adenopathy.   Skin:     General: Skin is warm and dry.   Neurological:      Mental Status: She is alert and oriented to person, place, and time.   Psychiatric:         Mood and Affect: Mood normal.         Behavior: Behavior normal.         Thought Content: Thought content normal.         Judgment: Judgment normal.       Assessment:       1. Obstructive hypertrophic cardiomyopathy    2. Chronic obstructive pulmonary disease, unspecified COPD type    3. Severe obesity (BMI 35.0-35.9 with comorbidity)    4. Prinzmetal's angina    5. Atherosclerosis of aorta    6. Primary hypertension    7. HERB on CPAP    8. Multiple lung nodules    9. Aortic valve stenosis, etiology of cardiac valve disease unspecified    10. Abnormal finding of blood chemistry, unspecified          Plan:   Obstructive hypertrophic cardiomyopathy  Comments:  Followed by cardiology    Chronic obstructive pulmonary disease, unspecified COPD type  Comments:  On Dulera.  Followed by pulmonology.    Severe obesity (BMI 35.0-35.9 with comorbidity)  -     Hemoglobin A1C; Future; Expected date: 03/02/2023    Prinzmetal's angina  Comments:  No recent chest pain    Atherosclerosis of aorta  Comments:  On aspirin    Primary hypertension  Comments:  Controlled.  Follow-up 6 months  Orders:  -     Comprehensive Metabolic Panel; Future; Expected date: 03/02/2023  -     Lipid Panel; Future; Expected date: 03/02/2023    HERB on CPAP  Comments:  Compliant    Multiple lung nodules  Comments:  Low-dose CT scan re-scheduled    Aortic valve stenosis, etiology of cardiac valve disease unspecified  Comments:  Outside Cardiology.  Apparently getting close to needing surgery.  Likely cause for some of her dyspnea    Abnormal finding of blood chemistry, unspecified  -     Hemoglobin A1C; Future; Expected date: 03/02/2023

## 2023-03-03 LAB
ESTIMATED AVG GLUCOSE: 97 MG/DL (ref 68–131)
HBA1C MFR BLD: 5 % (ref 4–5.6)

## 2023-03-16 ENCOUNTER — HOSPITAL ENCOUNTER (OUTPATIENT)
Dept: RADIOLOGY | Facility: HOSPITAL | Age: 68
Discharge: HOME | End: 2023-03-16
Attending: NURSE PRACTITIONER
Payer: MEDICARE

## 2023-03-16 DIAGNOSIS — R91.8 MULTIPLE LUNG NODULES: ICD-10-CM

## 2023-03-16 DIAGNOSIS — F17.211 CIGARETTE NICOTINE DEPENDENCE IN REMISSION: ICD-10-CM

## 2023-03-16 DIAGNOSIS — R91.1 SOLITARY PULMONARY NODULE: ICD-10-CM

## 2023-03-16 PROCEDURE — 71271 CT THORAX LUNG CANCER SCR C-: CPT | Mod: 26,HCNC,, | Performed by: RADIOLOGY

## 2023-03-16 PROCEDURE — 71271 CT THORAX LUNG CANCER SCR C-: CPT | Mod: TC,HCNC

## 2023-03-16 PROCEDURE — 71271 CT CHEST LUNG SCREENING LOW DOSE: ICD-10-PCS | Mod: 26,HCNC,, | Performed by: RADIOLOGY

## 2023-03-21 RX ORDER — SOLIFENACIN SUCCINATE 10 MG/1
10 TABLET, FILM COATED ORAL DAILY
Qty: 90 TABLET | Refills: 3 | Status: SHIPPED | OUTPATIENT
Start: 2023-03-21 | End: 2023-06-21 | Stop reason: SDUPTHER

## 2023-03-22 ENCOUNTER — OFFICE VISIT (OUTPATIENT)
Dept: ORTHOPEDICS | Facility: CLINIC | Age: 68
End: 2023-03-22
Payer: MEDICARE

## 2023-03-22 VITALS — WEIGHT: 222 LBS | HEIGHT: 65 IN | BODY MASS INDEX: 36.99 KG/M2

## 2023-03-22 DIAGNOSIS — M65.30 TRIGGER FINGER, ACQUIRED: Primary | ICD-10-CM

## 2023-03-22 PROCEDURE — 99999 PR PBB SHADOW E&M-EST. PATIENT-LVL III: CPT | Mod: PBBFAC,HCNC,, | Performed by: ORTHOPAEDIC SURGERY

## 2023-03-22 PROCEDURE — 3288F PR FALLS RISK ASSESSMENT DOCUMENTED: ICD-10-PCS | Mod: HCNC,CPTII,S$GLB, | Performed by: ORTHOPAEDIC SURGERY

## 2023-03-22 PROCEDURE — 3044F HG A1C LEVEL LT 7.0%: CPT | Mod: HCNC,CPTII,S$GLB, | Performed by: ORTHOPAEDIC SURGERY

## 2023-03-22 PROCEDURE — 20550 TENDON SHEATH: ICD-10-PCS | Mod: HCNC,RT,S$GLB, | Performed by: ORTHOPAEDIC SURGERY

## 2023-03-22 PROCEDURE — 3008F PR BODY MASS INDEX (BMI) DOCUMENTED: ICD-10-PCS | Mod: HCNC,CPTII,S$GLB, | Performed by: ORTHOPAEDIC SURGERY

## 2023-03-22 PROCEDURE — 3044F PR MOST RECENT HEMOGLOBIN A1C LEVEL <7.0%: ICD-10-PCS | Mod: HCNC,CPTII,S$GLB, | Performed by: ORTHOPAEDIC SURGERY

## 2023-03-22 PROCEDURE — 99213 OFFICE O/P EST LOW 20 MIN: CPT | Mod: HCNC,25,S$GLB, | Performed by: ORTHOPAEDIC SURGERY

## 2023-03-22 PROCEDURE — 20550 NJX 1 TENDON SHEATH/LIGAMENT: CPT | Mod: HCNC,RT,S$GLB, | Performed by: ORTHOPAEDIC SURGERY

## 2023-03-22 PROCEDURE — 1125F PR PAIN SEVERITY QUANTIFIED, PAIN PRESENT: ICD-10-PCS | Mod: HCNC,CPTII,S$GLB, | Performed by: ORTHOPAEDIC SURGERY

## 2023-03-22 PROCEDURE — 1159F PR MEDICATION LIST DOCUMENTED IN MEDICAL RECORD: ICD-10-PCS | Mod: HCNC,CPTII,S$GLB, | Performed by: ORTHOPAEDIC SURGERY

## 2023-03-22 PROCEDURE — 1101F PT FALLS ASSESS-DOCD LE1/YR: CPT | Mod: HCNC,CPTII,S$GLB, | Performed by: ORTHOPAEDIC SURGERY

## 2023-03-22 PROCEDURE — 1160F RVW MEDS BY RX/DR IN RCRD: CPT | Mod: HCNC,CPTII,S$GLB, | Performed by: ORTHOPAEDIC SURGERY

## 2023-03-22 PROCEDURE — 99999 PR PBB SHADOW E&M-EST. PATIENT-LVL III: ICD-10-PCS | Mod: PBBFAC,HCNC,, | Performed by: ORTHOPAEDIC SURGERY

## 2023-03-22 PROCEDURE — 99213 PR OFFICE/OUTPT VISIT, EST, LEVL III, 20-29 MIN: ICD-10-PCS | Mod: HCNC,25,S$GLB, | Performed by: ORTHOPAEDIC SURGERY

## 2023-03-22 PROCEDURE — 3288F FALL RISK ASSESSMENT DOCD: CPT | Mod: HCNC,CPTII,S$GLB, | Performed by: ORTHOPAEDIC SURGERY

## 2023-03-22 PROCEDURE — 1125F AMNT PAIN NOTED PAIN PRSNT: CPT | Mod: HCNC,CPTII,S$GLB, | Performed by: ORTHOPAEDIC SURGERY

## 2023-03-22 PROCEDURE — 3008F BODY MASS INDEX DOCD: CPT | Mod: HCNC,CPTII,S$GLB, | Performed by: ORTHOPAEDIC SURGERY

## 2023-03-22 PROCEDURE — 1160F PR REVIEW ALL MEDS BY PRESCRIBER/CLIN PHARMACIST DOCUMENTED: ICD-10-PCS | Mod: HCNC,CPTII,S$GLB, | Performed by: ORTHOPAEDIC SURGERY

## 2023-03-22 PROCEDURE — 1159F MED LIST DOCD IN RCRD: CPT | Mod: HCNC,CPTII,S$GLB, | Performed by: ORTHOPAEDIC SURGERY

## 2023-03-22 PROCEDURE — 1101F PR PT FALLS ASSESS DOC 0-1 FALLS W/OUT INJ PAST YR: ICD-10-PCS | Mod: HCNC,CPTII,S$GLB, | Performed by: ORTHOPAEDIC SURGERY

## 2023-03-22 RX ORDER — TRIAMCINOLONE ACETONIDE 40 MG/ML
40 INJECTION, SUSPENSION INTRA-ARTICULAR; INTRAMUSCULAR
Status: DISCONTINUED | OUTPATIENT
Start: 2023-03-22 | End: 2023-03-22 | Stop reason: HOSPADM

## 2023-03-22 RX ADMIN — TRIAMCINOLONE ACETONIDE 40 MG: 40 INJECTION, SUSPENSION INTRA-ARTICULAR; INTRAMUSCULAR at 11:03

## 2023-03-22 NOTE — PROCEDURES
Tendon Sheath    Date/Time: 3/22/2023 11:15 AM  Performed by: Alejandro Romero MD  Authorized by: Alejandro Romero MD     Consent Done?:  Yes (Verbal)  Indications:  Pain  Timeout: prior to procedure the correct patient, procedure, and site was verified    Prep: patient was prepped and draped in usual sterile fashion      Local anesthesia used?: Yes    Local anesthetic:  Lidocaine 2% without epinephrine  Anesthetic total (ml):  0.5    Location:  Thumb  Site:  R thumb flexor tendon sheath  Ultrasonic guidance for needle placement?: No    Needle size:  25 G  Approach:  Volar  Medications:  40 mg triamcinolone acetonide 40 mg/mL

## 2023-03-22 NOTE — PROGRESS NOTES
Subjective:     Patient ID: Ro Hernandez is a 67 y.o. female.    Chief Complaint: Pain of the Right Hand      HPI:  The patient is a 67-year-old female with a right trigger thumb last injected 09/14/2023 who requests reinjection today    Past Medical History:   Diagnosis Date    Allergy     Angina     Arthritis 6/11/2013    Asthma     COPD (chronic obstructive pulmonary disease)     GERD (gastroesophageal reflux disease)     Hx of Prinzmetal angina     Hypertension     Obesity 6/11/2013    Other and unspecified hyperlipidemia 6/11/2013    Prinzmetal's angina     Sleep apnea 6/11/2013     Past Surgical History:   Procedure Laterality Date    ABDOMINAL SURGERY      CHOLECYSTECTOMY      COLONOSCOPY N/A 12/6/2019    Procedure: COLONOSCOPY;  Surgeon: Winston Smith MD;  Location: [facility];  Service: Endoscopy;  Laterality: N/A;    ESOPHAGOGASTRODUODENOSCOPY N/A 12/10/2018    Procedure: EGD (ESOPHAGOGASTRODUODENOSCOPY);  Surgeon: Raúl Polanco III, MD;  Location: [facility];  Service: Endoscopy;  Laterality: N/A;    ESOPHAGOGASTRODUODENOSCOPY N/A 9/8/2020    Procedure: EGD (ESOPHAGOGASTRODUODENOSCOPY);  Surgeon: Raúl Polanco III, MD;  Location: [facility];  Service: Endoscopy;  Laterality: N/A;    EYE SURGERY      FOOT SURGERY      INTRALUMINAL GASTROINTESTINAL TRACT IMAGING VIA CAPSULE N/A 9/18/2020    Procedure: IMAGING PROCEDURE, GI TRACT, INTRALUMINAL, VIA CAPSULE;  Surgeon: Blade Holley RN;  Location: [facility];  Service: Endoscopy;  Laterality: N/A;    TONSILLECTOMY      TUBAL LIGATION       Family History   Problem Relation Age of Onset    Hypertension Mother     Cancer Mother     Kidney cancer Mother     COPD Father     Hypertension Father     Heart disease Sister     Hyperlipidemia Son     Breast cancer Maternal Grandmother     Colon cancer Neg Hx      Social History     Socioeconomic History    Marital status:    Tobacco Use    Smoking status: Former     Packs/day: 1.00     Years: 32.00      Pack years: 32.00     Types: Cigarettes     Start date:      Quit date: 2016     Years since quittin.7    Smokeless tobacco: Never    Tobacco comments:     smoked for 25 yrs. quit 16yrs. then smoked again for 5-6 before quitting in 2016.   Substance and Sexual Activity    Alcohol use: No     Alcohol/week: 1.0 standard drink     Types: 1 Standard drinks or equivalent per week    Drug use: No    Sexual activity: Not Currently     Medication List with Changes/Refills   Current Medications    ALBUTEROL (PROVENTIL) 2.5 MG /3 ML (0.083 %) NEBULIZER SOLUTION    Take 3 mLs (2.5 mg total) by nebulization every 4 (four) hours as needed. Rescue    ASPIRIN (ECOTRIN) 81 MG EC TABLET    Take 81 mg by mouth once daily.    EZETIMIBE (ZETIA) 10 MG TABLET    TAKE 1 TABLET DAILY    FLUTICASONE PROPIONATE (FLONASE) 50 MCG/ACTUATION NASAL SPRAY    USE 2 SPRAYS IN EACH NOSTRIL ONCE DAILY    ISOSORBIDE MONONITRATE (IMDUR) 30 MG 24 HR TABLET    Take 1 tab po in the AM and 1/2 tab po in the PM    LACTULOSE (CHRONULAC) 10 GRAM/15 ML SOLUTION    Take 30 mLs (20 g total) by mouth 2 (two) times daily.    METOPROLOL SUCCINATE (TOPROL-XL) 100 MG 24 HR TABLET        MOMETASONE-FORMOTEROL (DULERA) 200-5 MCG/ACTUATION INHALER    Inhale 2 puffs into the lungs 2 (two) times daily. Controller    MULTIVITAMIN (MULTIPLE VITAMIN ORAL)    Take 1 tablet by mouth Daily.    NALOXONE (NARCAN) 4 MG/ACTUATION SPRY        NITROGLYCERIN (NITROSTAT) 0.4 MG SL TABLET    DISSOLVE 1 TABLET UNDER THE TONGUE EVERY 5 MINUTES FOR 3 DOSES AS NEEDED FOR CHEST PAIN    PANTOPRAZOLE (PROTONIX) 20 MG TABLET    Take 1 tablet (20 mg total) by mouth 2 (two) times daily before meals.    SOLIFENACIN (VESICARE) 10 MG TABLET    Take 1 tablet (10 mg total) by mouth once daily.    VERAPAMIL (VERELAN) 360 MG C24P    Take 1 capsule (360 mg total) by mouth once daily.     Review of patient's allergies indicates:   Allergen Reactions    Ace inhibitors      Other reaction(s):  Hives    Lisinopril      Other reaction(s): rash    Pravastatin      Other reaction(s): Mental Changes    Rosuvastatin Other (See Comments)     Other reaction(s): Muscle pain    Simvastatin Other (See Comments)     Other reaction(s): leg cramps     Review of Systems   Constitutional: Negative for malaise/fatigue.   HENT:  Negative for hearing loss.    Eyes:  Negative for double vision and visual disturbance.   Cardiovascular:  Positive for chest pain.   Respiratory:  Positive for shortness of breath, sleep disturbances due to breathing and wheezing.    Endocrine: Negative for cold intolerance.   Hematologic/Lymphatic: Does not bruise/bleed easily.   Skin:  Negative for poor wound healing and suspicious lesions.   Musculoskeletal:  Positive for arthritis, joint pain and joint swelling. Negative for gout.   Gastrointestinal:  Positive for abdominal pain and heartburn. Negative for nausea and vomiting.   Genitourinary:  Positive for bladder incontinence and urgency. Negative for dysuria.   Neurological:  Negative for numbness, paresthesias and sensory change.   Psychiatric/Behavioral:  Negative for depression, memory loss and substance abuse. The patient is not nervous/anxious.    Allergic/Immunologic: Negative for persistent infections.     Objective:   Body mass index is 36.94 kg/m².  There were no vitals filed for this visit.             General    Constitutional: She is oriented to person, place, and time. She appears well-developed and well-nourished. No distress.   HENT:   Head: Normocephalic.   Eyes: EOM are normal.   Pulmonary/Chest: Effort normal.   Neurological: She is oriented to person, place, and time.   Psychiatric: She has a normal mood and affect.             Right Hand/Wrist Exam     Inspection   Scars: Wrist - absent Hand -  absent  Effusion: Wrist - absent Hand -  absent    Pain   Hand - The patient exhibits pain of the thumb MCP.    Other     Neuorologic Exam    Median Distribution: normal  Ulnar  Distribution: normal  Radial Distribution: normal    Comments:  There is active triggering right thumb with a palpable nodule that moves with tendon excursion.          Vascular Exam       Capillary Refill  Right Hand: normal capillary refill        Relevant imaging results reviewed and interpreted by me, discussed with the patient and / or family today radiographs were not obtained today  Assessment:     Encounter Diagnosis   Name Primary?    Trigger finger, acquired Yes    Right trigger thumb    Plan:     The patient injected right trigger thumb with 0.5 cc of Kenalog and 0.5 cc of 2% plain lidocaine under sterile technique.  She will wait at least 3 months between injections.                Disclaimer: This note was prepared using a voice recognition system and is likely to have sound alike errors within the text.

## 2023-04-12 ENCOUNTER — LAB VISIT (OUTPATIENT)
Dept: LAB | Facility: HOSPITAL | Age: 68
End: 2023-04-12
Attending: NURSE PRACTITIONER
Payer: MEDICARE

## 2023-04-12 DIAGNOSIS — D50.0 IRON DEFICIENCY ANEMIA DUE TO CHRONIC BLOOD LOSS: ICD-10-CM

## 2023-04-12 LAB
BASOPHILS # BLD AUTO: 0.02 K/UL (ref 0–0.2)
BASOPHILS NFR BLD: 0.3 % (ref 0–1.9)
DIFFERENTIAL METHOD: NORMAL
EOSINOPHIL # BLD AUTO: 0.1 K/UL (ref 0–0.5)
EOSINOPHIL NFR BLD: 1.9 % (ref 0–8)
ERYTHROCYTE [DISTWIDTH] IN BLOOD BY AUTOMATED COUNT: 13 % (ref 11.5–14.5)
FERRITIN SERPL-MCNC: 249 NG/ML (ref 20–300)
HCT VFR BLD AUTO: 44.1 % (ref 37–48.5)
HGB BLD-MCNC: 14.8 G/DL (ref 12–16)
IMM GRANULOCYTES # BLD AUTO: 0.02 K/UL (ref 0–0.04)
IMM GRANULOCYTES NFR BLD AUTO: 0.3 % (ref 0–0.5)
LYMPHOCYTES # BLD AUTO: 1.9 K/UL (ref 1–4.8)
LYMPHOCYTES NFR BLD: 26.3 % (ref 18–48)
MCH RBC QN AUTO: 30.5 PG (ref 27–31)
MCHC RBC AUTO-ENTMCNC: 33.6 G/DL (ref 32–36)
MCV RBC AUTO: 91 FL (ref 82–98)
MONOCYTES # BLD AUTO: 0.5 K/UL (ref 0.3–1)
MONOCYTES NFR BLD: 6.7 % (ref 4–15)
NEUTROPHILS # BLD AUTO: 4.8 K/UL (ref 1.8–7.7)
NEUTROPHILS NFR BLD: 64.5 % (ref 38–73)
NRBC BLD-RTO: 0 /100 WBC
PLATELET # BLD AUTO: 229 K/UL (ref 150–450)
PMV BLD AUTO: 9.5 FL (ref 9.2–12.9)
RBC # BLD AUTO: 4.85 M/UL (ref 4–5.4)
WBC # BLD AUTO: 7.35 K/UL (ref 3.9–12.7)

## 2023-04-12 PROCEDURE — 82728 ASSAY OF FERRITIN: CPT | Mod: HCNC | Performed by: NURSE PRACTITIONER

## 2023-04-12 PROCEDURE — 36415 COLL VENOUS BLD VENIPUNCTURE: CPT | Mod: HCNC,PO | Performed by: NURSE PRACTITIONER

## 2023-04-12 PROCEDURE — 84466 ASSAY OF TRANSFERRIN: CPT | Mod: HCNC | Performed by: NURSE PRACTITIONER

## 2023-04-12 PROCEDURE — 85025 COMPLETE CBC W/AUTO DIFF WBC: CPT | Mod: HCNC | Performed by: NURSE PRACTITIONER

## 2023-04-13 LAB
IRON SERPL-MCNC: 63 UG/DL (ref 30–160)
SATURATED IRON: 18 % (ref 20–50)
TOTAL IRON BINDING CAPACITY: 349 UG/DL (ref 250–450)
TRANSFERRIN SERPL-MCNC: 236 MG/DL (ref 200–375)

## 2023-04-14 ENCOUNTER — OFFICE VISIT (OUTPATIENT)
Dept: HEMATOLOGY/ONCOLOGY | Facility: CLINIC | Age: 68
End: 2023-04-14
Payer: MEDICARE

## 2023-04-14 VITALS
OXYGEN SATURATION: 92 % | HEART RATE: 69 BPM | WEIGHT: 222 LBS | SYSTOLIC BLOOD PRESSURE: 121 MMHG | HEIGHT: 65 IN | BODY MASS INDEX: 36.99 KG/M2 | DIASTOLIC BLOOD PRESSURE: 55 MMHG | TEMPERATURE: 97 F

## 2023-04-14 DIAGNOSIS — R91.8 MULTIPLE LUNG NODULES: ICD-10-CM

## 2023-04-14 DIAGNOSIS — D50.0 IRON DEFICIENCY ANEMIA DUE TO CHRONIC BLOOD LOSS: ICD-10-CM

## 2023-04-14 DIAGNOSIS — E66.01 SEVERE OBESITY (BMI 35.0-35.9 WITH COMORBIDITY): Primary | ICD-10-CM

## 2023-04-14 DIAGNOSIS — J44.9 CHRONIC OBSTRUCTIVE PULMONARY DISEASE, UNSPECIFIED COPD TYPE: ICD-10-CM

## 2023-04-14 PROCEDURE — 1159F PR MEDICATION LIST DOCUMENTED IN MEDICAL RECORD: ICD-10-PCS | Mod: HCNC,CPTII,S$GLB, | Performed by: NURSE PRACTITIONER

## 2023-04-14 PROCEDURE — 3044F HG A1C LEVEL LT 7.0%: CPT | Mod: HCNC,CPTII,S$GLB, | Performed by: NURSE PRACTITIONER

## 2023-04-14 PROCEDURE — 3288F FALL RISK ASSESSMENT DOCD: CPT | Mod: HCNC,CPTII,S$GLB, | Performed by: NURSE PRACTITIONER

## 2023-04-14 PROCEDURE — 1125F PR PAIN SEVERITY QUANTIFIED, PAIN PRESENT: ICD-10-PCS | Mod: HCNC,CPTII,S$GLB, | Performed by: NURSE PRACTITIONER

## 2023-04-14 PROCEDURE — 99214 PR OFFICE/OUTPT VISIT, EST, LEVL IV, 30-39 MIN: ICD-10-PCS | Mod: HCNC,S$GLB,, | Performed by: NURSE PRACTITIONER

## 2023-04-14 PROCEDURE — 3078F DIAST BP <80 MM HG: CPT | Mod: HCNC,CPTII,S$GLB, | Performed by: NURSE PRACTITIONER

## 2023-04-14 PROCEDURE — 3008F PR BODY MASS INDEX (BMI) DOCUMENTED: ICD-10-PCS | Mod: HCNC,CPTII,S$GLB, | Performed by: NURSE PRACTITIONER

## 2023-04-14 PROCEDURE — 3074F PR MOST RECENT SYSTOLIC BLOOD PRESSURE < 130 MM HG: ICD-10-PCS | Mod: HCNC,CPTII,S$GLB, | Performed by: NURSE PRACTITIONER

## 2023-04-14 PROCEDURE — 3044F PR MOST RECENT HEMOGLOBIN A1C LEVEL <7.0%: ICD-10-PCS | Mod: HCNC,CPTII,S$GLB, | Performed by: NURSE PRACTITIONER

## 2023-04-14 PROCEDURE — 1160F RVW MEDS BY RX/DR IN RCRD: CPT | Mod: HCNC,CPTII,S$GLB, | Performed by: NURSE PRACTITIONER

## 2023-04-14 PROCEDURE — 99999 PR PBB SHADOW E&M-EST. PATIENT-LVL IV: CPT | Mod: PBBFAC,HCNC,, | Performed by: NURSE PRACTITIONER

## 2023-04-14 PROCEDURE — 3078F PR MOST RECENT DIASTOLIC BLOOD PRESSURE < 80 MM HG: ICD-10-PCS | Mod: HCNC,CPTII,S$GLB, | Performed by: NURSE PRACTITIONER

## 2023-04-14 PROCEDURE — 3008F BODY MASS INDEX DOCD: CPT | Mod: HCNC,CPTII,S$GLB, | Performed by: NURSE PRACTITIONER

## 2023-04-14 PROCEDURE — 1125F AMNT PAIN NOTED PAIN PRSNT: CPT | Mod: HCNC,CPTII,S$GLB, | Performed by: NURSE PRACTITIONER

## 2023-04-14 PROCEDURE — 3288F PR FALLS RISK ASSESSMENT DOCUMENTED: ICD-10-PCS | Mod: HCNC,CPTII,S$GLB, | Performed by: NURSE PRACTITIONER

## 2023-04-14 PROCEDURE — 1160F PR REVIEW ALL MEDS BY PRESCRIBER/CLIN PHARMACIST DOCUMENTED: ICD-10-PCS | Mod: HCNC,CPTII,S$GLB, | Performed by: NURSE PRACTITIONER

## 2023-04-14 PROCEDURE — 3074F SYST BP LT 130 MM HG: CPT | Mod: HCNC,CPTII,S$GLB, | Performed by: NURSE PRACTITIONER

## 2023-04-14 PROCEDURE — 99214 OFFICE O/P EST MOD 30 MIN: CPT | Mod: HCNC,S$GLB,, | Performed by: NURSE PRACTITIONER

## 2023-04-14 PROCEDURE — 1101F PR PT FALLS ASSESS DOC 0-1 FALLS W/OUT INJ PAST YR: ICD-10-PCS | Mod: HCNC,CPTII,S$GLB, | Performed by: NURSE PRACTITIONER

## 2023-04-14 PROCEDURE — 99999 PR PBB SHADOW E&M-EST. PATIENT-LVL IV: ICD-10-PCS | Mod: PBBFAC,HCNC,, | Performed by: NURSE PRACTITIONER

## 2023-04-14 PROCEDURE — 1159F MED LIST DOCD IN RCRD: CPT | Mod: HCNC,CPTII,S$GLB, | Performed by: NURSE PRACTITIONER

## 2023-04-14 PROCEDURE — 1101F PT FALLS ASSESS-DOCD LE1/YR: CPT | Mod: HCNC,CPTII,S$GLB, | Performed by: NURSE PRACTITIONER

## 2023-04-14 RX ORDER — HYDROCODONE BITARTRATE AND ACETAMINOPHEN 10; 325 MG/1; MG/1
1 TABLET ORAL
COMMUNITY

## 2023-04-14 NOTE — ASSESSMENT & PLAN NOTE
Hemoglobin remains normal. Interval increase in saturated iron level 18>>16%. TIBC normal. Ferritin normal    Hold off on iron supplement at present time. Encourage iron rich foods    F/u 6 months with cbc, iron studies per patient request. Discussed S&S to report sooner.

## 2023-04-14 NOTE — PROGRESS NOTES
Subjective:       Patient ID: Ro Hernandez is a 67 y.o. female.    Chief Complaint: f/u JAQUELINE    HPI: 67 y.o female with h/o HTN, COPD, HLD, JAQUELINE previously treated with Injectafer    Patient has undergone prior GI evaluation including EGD (2020) Colonoscopy (2019) VCE (2020). EGD unremarkable. Colonoscopy with polyp, benign pathology, recommended repeat in 5 years. VCE notes angioectasia without bleeding. GI recommends repeat endoscopy if recurrent anemia.     Today she presents for follow up of her h/o JAQUELINE. She notes chronic intermittent SOB with exertion. Denies CP, dizziness, syncope. She does have h/o small bilateral lung nodules followed by Pulmonology yearly  Social History     Socioeconomic History    Marital status:    Tobacco Use    Smoking status: Former     Packs/day: 1.00     Years: 32.00     Pack years: 32.00     Types: Cigarettes     Start date:      Quit date: 2016     Years since quittin.8    Smokeless tobacco: Never    Tobacco comments:     smoked for 25 yrs. quit 16yrs. then smoked again for 5-6 before quitting in .   Substance and Sexual Activity    Alcohol use: No     Alcohol/week: 1.0 standard drink     Types: 1 Standard drinks or equivalent per week    Drug use: No    Sexual activity: Not Currently       Past Medical History:   Diagnosis Date    Allergy     Angina     Arthritis 2013    Asthma     COPD (chronic obstructive pulmonary disease)     GERD (gastroesophageal reflux disease)     Hx of Prinzmetal angina     Hypertension     Obesity 2013    Other and unspecified hyperlipidemia 2013    Prinzmetal's angina     Sleep apnea 2013       Family History   Problem Relation Age of Onset    Hypertension Mother     Cancer Mother     Kidney cancer Mother     COPD Father     Hypertension Father     Heart disease Sister     Hyperlipidemia Son     Breast cancer Maternal Grandmother     Colon cancer Neg Hx        Past Surgical History:   Procedure  Laterality Date    ABDOMINAL SURGERY      CHOLECYSTECTOMY      COLONOSCOPY N/A 12/6/2019    Procedure: COLONOSCOPY;  Surgeon: Winston Smith MD;  Location: [facility];  Service: Endoscopy;  Laterality: N/A;    ESOPHAGOGASTRODUODENOSCOPY N/A 12/10/2018    Procedure: EGD (ESOPHAGOGASTRODUODENOSCOPY);  Surgeon: Raúl Polanco III, MD;  Location: [facility];  Service: Endoscopy;  Laterality: N/A;    ESOPHAGOGASTRODUODENOSCOPY N/A 9/8/2020    Procedure: EGD (ESOPHAGOGASTRODUODENOSCOPY);  Surgeon: Raúl Polanco III, MD;  Location: [facility];  Service: Endoscopy;  Laterality: N/A;    EYE SURGERY      FOOT SURGERY      INTRALUMINAL GASTROINTESTINAL TRACT IMAGING VIA CAPSULE N/A 9/18/2020    Procedure: IMAGING PROCEDURE, GI TRACT, INTRALUMINAL, VIA CAPSULE;  Surgeon: Blade Holley RN;  Location: [facility];  Service: Endoscopy;  Laterality: N/A;    TONSILLECTOMY      TUBAL LIGATION         Review of Systems   Constitutional:  Negative for activity change, appetite change, chills, diaphoresis, fatigue, fever and unexpected weight change.   HENT:  Negative for congestion, mouth sores, nosebleeds, sore throat, trouble swallowing and voice change.    Eyes:  Negative for visual disturbance.   Respiratory:  Positive for shortness of breath (chronic, intermittent). Negative for cough, chest tightness and wheezing.    Cardiovascular:  Negative for chest pain and leg swelling.   Gastrointestinal:  Negative for abdominal distention, abdominal pain, blood in stool, constipation, diarrhea, nausea and vomiting.   Genitourinary:  Negative for difficulty urinating, dysuria and hematuria.   Musculoskeletal:  Negative for arthralgias, back pain and myalgias.   Skin:  Negative for pallor, rash and wound.   Neurological:  Negative for dizziness, syncope, weakness and headaches.   Hematological:  Negative for adenopathy. Does not bruise/bleed easily.   Psychiatric/Behavioral:  The patient is not nervous/anxious.        Medication List with  Changes/Refills   Current Medications    ALBUTEROL (PROVENTIL) 2.5 MG /3 ML (0.083 %) NEBULIZER SOLUTION    Take 3 mLs (2.5 mg total) by nebulization every 4 (four) hours as needed. Rescue    ASPIRIN (ECOTRIN) 81 MG EC TABLET    Take 81 mg by mouth once daily.    EZETIMIBE (ZETIA) 10 MG TABLET    Take 1 tablet (10 mg total) by mouth once daily.    FLUTICASONE PROPIONATE (FLONASE) 50 MCG/ACTUATION NASAL SPRAY    USE 2 SPRAYS IN EACH NOSTRIL ONCE DAILY    HYDROCODONE-ACETAMINOPHEN (NORCO)  MG PER TABLET    Take 1 tablet by mouth. One tablet twice a day    ISOSORBIDE MONONITRATE (IMDUR) 30 MG 24 HR TABLET    Take 1 tab po in the AM and 1/2 tab po in the PM    LACTULOSE (CHRONULAC) 10 GRAM/15 ML SOLUTION    Take 30 mLs (20 g total) by mouth 2 (two) times daily.    METOPROLOL SUCCINATE (TOPROL-XL) 100 MG 24 HR TABLET        MOMETASONE-FORMOTEROL (DULERA) 200-5 MCG/ACTUATION INHALER    Inhale 2 puffs into the lungs 2 (two) times daily. Controller    MULTIVITAMIN (MULTIPLE VITAMIN ORAL)    Take 1 tablet by mouth Daily.    NALOXONE (NARCAN) 4 MG/ACTUATION SPRY        NITROGLYCERIN (NITROSTAT) 0.4 MG SL TABLET    DISSOLVE 1 TABLET UNDER THE TONGUE EVERY 5 MINUTES FOR 3 DOSES AS NEEDED FOR CHEST PAIN    PANTOPRAZOLE (PROTONIX) 20 MG TABLET    Take 1 tablet (20 mg total) by mouth 2 (two) times daily before meals.    SOLIFENACIN (VESICARE) 10 MG TABLET    Take 1 tablet (10 mg total) by mouth once daily.    VERAPAMIL (VERELAN) 360 MG C24P    Take 1 capsule (360 mg total) by mouth once daily.     Objective:     Vitals:    04/14/23 1557   BP: (!) 121/55   Pulse: 69   Temp: 97 °F (36.1 °C)     Lab Results   Component Value Date    WBC 7.35 04/12/2023    HGB 14.8 04/12/2023    HCT 44.1 04/12/2023    MCV 91 04/12/2023     04/12/2023       BMP  Lab Results   Component Value Date     03/02/2023    K 4.8 03/02/2023     03/02/2023    CO2 28 03/02/2023    BUN 17 03/02/2023    CREATININE 0.8 03/02/2023    CALCIUM  9.4 03/02/2023    ANIONGAP 5 (L) 03/02/2023    ESTGFRAFRICA >60.0 11/19/2021    EGFRNONAA >60.0 11/19/2021     Lab Results   Component Value Date    ALT 14 03/02/2023    AST 23 03/02/2023    ALKPHOS 68 03/02/2023    BILITOT 0.6 03/02/2023     Lab Results   Component Value Date    IRON 63 04/12/2023    TIBC 349 04/12/2023    FERRITIN 249 04/12/2023         Physical Exam  Vitals reviewed.   Constitutional:       Appearance: She is well-developed.   HENT:      Head: Normocephalic.      Right Ear: External ear normal.      Left Ear: External ear normal.   Eyes:      General: Lids are normal. No scleral icterus.        Right eye: No discharge.         Left eye: No discharge.      Conjunctiva/sclera: Conjunctivae normal.   Neck:      Thyroid: No thyroid mass.   Cardiovascular:      Rate and Rhythm: Normal rate.   Pulmonary:      Effort: Pulmonary effort is normal. No respiratory distress.      Breath sounds: Normal breath sounds. No wheezing or rales.   Abdominal:      General: Bowel sounds are normal. There is no distension.      Palpations: Abdomen is soft.      Tenderness: There is no abdominal tenderness.   Genitourinary:     Comments: deferred  Musculoskeletal:         General: Normal range of motion.      Cervical back: Normal range of motion.   Skin:     General: Skin is warm and dry.   Neurological:      Mental Status: She is alert and oriented to person, place, and time.   Psychiatric:         Speech: Speech normal.         Behavior: Behavior normal. Behavior is cooperative.         Thought Content: Thought content normal.        Assessment:     Problem List Items Addressed This Visit          Pulmonary    Multiple lung nodules     Followed by Pulmonology            Chronic obstructive pulmonary disease, unspecified COPD type     Continue Pulmonology f/u              Oncology    Iron deficiency anemia due to chronic blood loss     Hemoglobin remains normal. Interval increase in saturated iron level 18>>16%. TIBC  normal. Ferritin normal    Hold off on iron supplement at present time. Encourage iron rich foods    F/u 6 months with cbc, iron studies per patient request. Discussed S&S to report sooner.              Relevant Orders    CBC Auto Differential    Iron and TIBC    Ferritin       Endocrine    Severe obesity (BMI 35.0-35.9 with comorbidity) - Primary         Plan:     Severe obesity (BMI 35.0-35.9 with comorbidity)    Iron deficiency anemia due to chronic blood loss  -     CBC Auto Differential; Future; Expected date: 04/14/2023  -     Iron and TIBC; Future; Expected date: 04/14/2023  -     Ferritin; Future; Expected date: 04/14/2023    Chronic obstructive pulmonary disease, unspecified COPD type    Multiple lung nodules          Med Onc Chart Routing      Follow up with physician    Follow up with [facility] 6 months.   Infusion scheduling note    Injection scheduling note    Labs CBC, ferritin and iron and TIBC   Scheduling:  Preferred lab:  Lab interval:  1-2 days prior   Imaging None      Pharmacy appointment No pharmacy appointment needed      Other referrals  No additional referrals needed           KALA JhaC

## 2023-05-12 ENCOUNTER — CLINICAL SUPPORT (OUTPATIENT)
Dept: PULMONOLOGY | Facility: CLINIC | Age: 68
End: 2023-05-12
Payer: MEDICARE

## 2023-05-12 ENCOUNTER — OFFICE VISIT (OUTPATIENT)
Dept: PULMONOLOGY | Facility: CLINIC | Age: 68
End: 2023-05-12
Payer: MEDICARE

## 2023-05-12 VITALS
RESPIRATION RATE: 20 BRPM | OXYGEN SATURATION: 93 % | WEIGHT: 218.06 LBS | HEART RATE: 67 BPM | BODY MASS INDEX: 36.33 KG/M2 | SYSTOLIC BLOOD PRESSURE: 138 MMHG | DIASTOLIC BLOOD PRESSURE: 80 MMHG | HEIGHT: 65 IN

## 2023-05-12 DIAGNOSIS — J45.20 MILD INTERMITTENT ASTHMA WITHOUT COMPLICATION: ICD-10-CM

## 2023-05-12 DIAGNOSIS — F17.211 CIGARETTE NICOTINE DEPENDENCE IN REMISSION: ICD-10-CM

## 2023-05-12 DIAGNOSIS — G47.33 OSA ON CPAP: Primary | Chronic | ICD-10-CM

## 2023-05-12 DIAGNOSIS — J44.9 CHRONIC OBSTRUCTIVE PULMONARY DISEASE, UNSPECIFIED COPD TYPE: ICD-10-CM

## 2023-05-12 DIAGNOSIS — E66.01 CLASS 2 SEVERE OBESITY WITH SERIOUS COMORBIDITY AND BODY MASS INDEX (BMI) OF 36.0 TO 36.9 IN ADULT, UNSPECIFIED OBESITY TYPE: ICD-10-CM

## 2023-05-12 DIAGNOSIS — R91.8 MULTIPLE LUNG NODULES: ICD-10-CM

## 2023-05-12 PROCEDURE — 3288F PR FALLS RISK ASSESSMENT DOCUMENTED: ICD-10-PCS | Mod: HCNC,CPTII,S$GLB, | Performed by: NURSE PRACTITIONER

## 2023-05-12 PROCEDURE — 1160F RVW MEDS BY RX/DR IN RCRD: CPT | Mod: HCNC,CPTII,S$GLB, | Performed by: NURSE PRACTITIONER

## 2023-05-12 PROCEDURE — 3044F HG A1C LEVEL LT 7.0%: CPT | Mod: HCNC,CPTII,S$GLB, | Performed by: NURSE PRACTITIONER

## 2023-05-12 PROCEDURE — 3008F BODY MASS INDEX DOCD: CPT | Mod: HCNC,CPTII,S$GLB, | Performed by: NURSE PRACTITIONER

## 2023-05-12 PROCEDURE — 99999 PR PBB SHADOW E&M-EST. PATIENT-LVL IV: ICD-10-PCS | Mod: PBBFAC,HCNC,, | Performed by: NURSE PRACTITIONER

## 2023-05-12 PROCEDURE — 1126F PR PAIN SEVERITY QUANTIFIED, NO PAIN PRESENT: ICD-10-PCS | Mod: HCNC,CPTII,S$GLB, | Performed by: NURSE PRACTITIONER

## 2023-05-12 PROCEDURE — 3079F DIAST BP 80-89 MM HG: CPT | Mod: HCNC,CPTII,S$GLB, | Performed by: NURSE PRACTITIONER

## 2023-05-12 PROCEDURE — 3008F PR BODY MASS INDEX (BMI) DOCUMENTED: ICD-10-PCS | Mod: HCNC,CPTII,S$GLB, | Performed by: NURSE PRACTITIONER

## 2023-05-12 PROCEDURE — 99214 PR OFFICE/OUTPT VISIT, EST, LEVL IV, 30-39 MIN: ICD-10-PCS | Mod: HCNC,S$GLB,, | Performed by: NURSE PRACTITIONER

## 2023-05-12 PROCEDURE — 3075F PR MOST RECENT SYSTOLIC BLOOD PRESS GE 130-139MM HG: ICD-10-PCS | Mod: HCNC,CPTII,S$GLB, | Performed by: NURSE PRACTITIONER

## 2023-05-12 PROCEDURE — 3079F PR MOST RECENT DIASTOLIC BLOOD PRESSURE 80-89 MM HG: ICD-10-PCS | Mod: HCNC,CPTII,S$GLB, | Performed by: NURSE PRACTITIONER

## 2023-05-12 PROCEDURE — 1159F PR MEDICATION LIST DOCUMENTED IN MEDICAL RECORD: ICD-10-PCS | Mod: HCNC,CPTII,S$GLB, | Performed by: NURSE PRACTITIONER

## 2023-05-12 PROCEDURE — 1159F MED LIST DOCD IN RCRD: CPT | Mod: HCNC,CPTII,S$GLB, | Performed by: NURSE PRACTITIONER

## 2023-05-12 PROCEDURE — 99214 OFFICE O/P EST MOD 30 MIN: CPT | Mod: HCNC,S$GLB,, | Performed by: NURSE PRACTITIONER

## 2023-05-12 PROCEDURE — 3075F SYST BP GE 130 - 139MM HG: CPT | Mod: HCNC,CPTII,S$GLB, | Performed by: NURSE PRACTITIONER

## 2023-05-12 PROCEDURE — 1101F PT FALLS ASSESS-DOCD LE1/YR: CPT | Mod: HCNC,CPTII,S$GLB, | Performed by: NURSE PRACTITIONER

## 2023-05-12 PROCEDURE — 95012 PR NITRIC OXIDE EXPIRED GAS DETERMINATION: ICD-10-PCS | Mod: HCNC,S$GLB,, | Performed by: INTERNAL MEDICINE

## 2023-05-12 PROCEDURE — 1101F PR PT FALLS ASSESS DOC 0-1 FALLS W/OUT INJ PAST YR: ICD-10-PCS | Mod: HCNC,CPTII,S$GLB, | Performed by: NURSE PRACTITIONER

## 2023-05-12 PROCEDURE — 99999 PR PBB SHADOW E&M-EST. PATIENT-LVL IV: CPT | Mod: PBBFAC,HCNC,, | Performed by: NURSE PRACTITIONER

## 2023-05-12 PROCEDURE — 3044F PR MOST RECENT HEMOGLOBIN A1C LEVEL <7.0%: ICD-10-PCS | Mod: HCNC,CPTII,S$GLB, | Performed by: NURSE PRACTITIONER

## 2023-05-12 PROCEDURE — 1126F AMNT PAIN NOTED NONE PRSNT: CPT | Mod: HCNC,CPTII,S$GLB, | Performed by: NURSE PRACTITIONER

## 2023-05-12 PROCEDURE — 3288F FALL RISK ASSESSMENT DOCD: CPT | Mod: HCNC,CPTII,S$GLB, | Performed by: NURSE PRACTITIONER

## 2023-05-12 PROCEDURE — 1160F PR REVIEW ALL MEDS BY PRESCRIBER/CLIN PHARMACIST DOCUMENTED: ICD-10-PCS | Mod: HCNC,CPTII,S$GLB, | Performed by: NURSE PRACTITIONER

## 2023-05-12 PROCEDURE — 95012 NITRIC OXIDE EXP GAS DETER: CPT | Mod: HCNC,S$GLB,, | Performed by: INTERNAL MEDICINE

## 2023-05-12 RX ORDER — VERAPAMIL HYDROCHLORIDE 360 MG/1
360 CAPSULE, DELAYED RELEASE PELLETS ORAL DAILY
COMMUNITY

## 2023-05-12 NOTE — PROGRESS NOTES
Subjective:      Patient ID: Ro Hernandez is a 67 y.o. female.    Patient Active Problem List   Diagnosis    Class 2 severe obesity with serious comorbidity and body mass index (BMI) of 36.0 to 36.9 in adult    Unspecified essential hypertension    Other and unspecified hyperlipidemia    GERD (gastroesophageal reflux disease)    Arthritis    Benign heart murmur    HERB on CPAP    Mild intermittent asthma without complication    Multiple lung nodules    Renal cyst    Prinzmetal's angina    Epigastric abdominal pain    Iron deficiency anemia due to chronic blood loss    Iron deficiency anemia    History of 2019 novel coronavirus disease (COVID-19)    Acute cystitis without hematuria    Urge incontinence    Atherosclerosis of aorta    Obstructive hypertrophic cardiomyopathy    Chronic obstructive pulmonary disease, unspecified COPD type       Problem list has been reviewed.      Chief Complaint   Patient presents with    Sleep Apnea       Chief Complaint: Sleep Apnea      HPI:  Ro Hernandez 67 y.o. female returns to [facility] with review 3/16/2023 LDCT chest reevaluate mediastinum/hilar lymphadenopathy and pulmonary nodules.     3/16/2023 CT chest Benign Appearance or Behavior - continue annual screening with LDCT in 12 months.    6/14/2021 PET CT   1. No abnormal elevated FDG uptake demonstrated with previously described pulmonary nodules.  2. Nonspecific low level FDG uptake noted within stable mediastinal lymph nodes as above.     Former smoker 32 pack years. Quit 2016.      Shortness of breath chronic long standing, stable.    Shortness of breath had improved in past after iron transfusions. Managed by hematology     3/3/2021 Spirometry is normal    1/6/2021 6mwd No desaturations requiring supplemental oxygen at rest or exertion.      No cough, no wheezing, no mucous.     Dyspnea Characteristics:   Disproportionate Exertional Dyspnea   Dyspnea Duration:  Chronic  Dyspnea Severity:  JESSICA 2  Dyspnea  Timing:  Exertional only  Dyspnea Contributing Factors:  Tobacco Abuse , 30 pack year former smoker. Cigarette smoker for 25 yrs. quit 16yrs. then smoked again for 5-6 before quitting in 2016.  Dyspnea Associated Symptoms:  none, only has sensation of not enough air with exertion and lying down , some times with talking        Has diagnosis of Asthma with COPD off inhalers over the past 5 years.     Patient denies recent sick contacts.   Patient does not have new pets. Patient does have a history of asthma.   Patient does have a history of environmental allergens.   Daily allergy regimen = CLARITIN / ZYRTEC PRN.   Patient has not traveled recently. Patient does have a history of smoking. She smoked 1 PPD for approximately 32 years.    Patient has had a previous chest x-ray. Patient has had a PPD done.  PPD was Negative      HERB on CPAP  Diagnosed with HERB by PSG 2011.     She is on APAP 4 - 20 CMWP. She is complaince with her PAP.     She  definitely thinks PAP is beneficial to her health and she wants to continue with PAP therapy.  Nasal mask is used.  5/12/2023 at this visit patient request change of [employer] for CPAP supplies. Not pleased with [facility], request change to [facility].    11/22/2011 PSG The diagnostic polysomnography revealed a mild obstructive sleep apnea / hypopnea syndrome (A + H Index = 10.4 events / hr asleep.    Compliance Report  Compliance  Payor Standard  Usage 02/11/2023 - 05/11/2023  Usage days 90/90 days (100%)  >= 4 hours 89 days (99%)  < 4 hours 1 days (1%)  Usage hours 799 hours 44 minutes  Average usage (total days) 8 hours 53 minutes  Average usage (days used) 8 hours 53 minutes  Median usage (days used) 8 hours 50 minutes  AirSense 10 AutoSet  Serial number [id number]  Mode AutoSet  Min Pressure 5 cmH2O  Max Pressure 12 cmH2O  EPR Fulltime  EPR level 3  Response Standard  Therapy  Pressure - cmH2O Median: 8.2 95th percentile: 11.2 Maximum: 11.8  Leaks - L/min Median: 5.3 95th  percentile: 21.8 Maximum: 33.3  Events per hour AI: 0.4 HI: 1.1 AHI: 1.5  Apnea Index Central: 0.0 Obstructive: 0.3 Unknown: 0.0  RERA Index 0.2      [place] Sleepiness Scale   EPWORTH SLEEPINESS SCALE 5/12/2023 3/4/2022 6/3/2021 3/3/2021 1/6/2021 8/7/2020 6/7/2018   Sitting and reading 0 0 0 0 0 0 0   Watching TV 0 0 0 0 0 0 0   Sitting, inactive in a public place (e.g. a theatre or a meeting) 0 0 0 0 0 0 0   As a passenger in a car for an hour without a break 0 0 0 0 0 0 0   Lying down to rest in the afternoon when circumstances permit 2 3 2 2 2 2 2   Sitting and talking to someone 0 0 0 0 0 0 0   Sitting quietly after a lunch without alcohol 0 0 0 0 0 0 0   In a car, while stopped for a few minutes in traffic 0 0 0 0 0 0 0   Total score 2 3 2 2 2 2 2       Previous Report Reviewed: historical medical records, office notes and radiology reports     Past Medical History: The following portions of the patient's history were reviewed and updated as appropriate:   She  has a past surgical history that includes Eye surgery; Foot surgery; Cholecystectomy; Tonsillectomy; Tubal ligation; Abdominal surgery; Esophagogastroduodenoscopy (N/A, 12/10/2018); Colonoscopy (N/A, 12/6/2019); Esophagogastroduodenoscopy (N/A, 9/8/2020); and Intraluminal gastrointestinal tract imaging via capsule (N/A, 9/18/2020).  Her family history includes Breast cancer in her maternal grandmother; COPD in her father; Cancer in her mother; Heart disease in her sister; Hyperlipidemia in her son; Hypertension in her father and mother; Kidney cancer in her mother.  She  reports that she quit smoking about 6 years ago. Her smoking use included cigarettes. She started smoking about 55 years ago. She has a 32.00 pack-year smoking history. She has never used smokeless tobacco. She reports that she does not drink alcohol and does not use drugs.  She has a current medication list which includes the following prescription(s): aspirin, ezetimibe, fluticasone  "propionate, hydrocodone-acetaminophen, isosorbide mononitrate, lactulose, metoprolol succinate, multivitamin, naloxone, nitroglycerin, solifenacin, verapamil, albuterol, dulera, and pantoprazole.  She is allergic to ace inhibitors, lisinopril, pravastatin, rosuvastatin, and simvastatin..    Review of Systems   Constitutional:  Negative for fever, chills, appetite change, fatigue and night sweats.   HENT:  Negative for nosebleeds, postnasal drip, sore throat, trouble swallowing, congestion and hearing loss.    Eyes:  Negative for itching.   Respiratory:  Negative for snoring, cough, chest tightness, shortness of breath, wheezing, orthopnea and dyspnea on extertion.    Cardiovascular:  Negative for chest pain, palpitations and leg swelling.   Genitourinary:  Negative for difficulty urinating.   Endocrine:  Negative for cold intolerance and heat intolerance.    Musculoskeletal:  Positive for arthralgias and back pain.   Skin:  Negative for rash.   Gastrointestinal:  Negative for nausea, vomiting, abdominal pain, abdominal distention and acid reflux.        Constipation   Neurological:  Negative for dizziness, syncope, light-headedness and headaches.   Hematological:  Excessive bruising.   Psychiatric/Behavioral:  The patient is not nervous/anxious.    All other systems reviewed and are negative.       Occupational History:    Denies occupational exposure to asbestos, silica and petrochemicals.    Avocational Exposures:  none    Pet Exposures:  none      Objective:     Vitals:    05/12/23 0853   BP: 138/80   Pulse: 67   Resp: 20   SpO2: (!) 93%   Weight: 98.9 kg (218 lb 0.6 oz)   Height: 5' 5" (1.651 m)   PainSc: 0-No pain       Body mass index is 36.28 kg/m².    Physical Exam  Vitals and nursing note reviewed.   Constitutional:       General: She is not in acute distress.     Appearance: Normal appearance. She is well-developed and well-groomed. She is obese. She is not ill-appearing or toxic-appearing. "   HENT:      Head: Normocephalic.      Right Ear: External ear normal.      Left Ear: External ear normal.      Nose: Nose normal.      Mouth/Throat:      Pharynx: No oropharyngeal exudate.   Eyes:      Conjunctiva/sclera: Conjunctivae normal.   Cardiovascular:      Rate and Rhythm: Normal rate and regular rhythm.      Heart sounds: Murmur heard.   Systolic murmur is present.   Pulmonary:      Effort: Pulmonary effort is normal.      Breath sounds: Normal breath sounds. No stridor.   Abdominal:      Palpations: Abdomen is soft.   Musculoskeletal:         General: Normal range of motion.      Cervical back: Normal range of motion and neck supple.   Lymphadenopathy:      Cervical: No cervical adenopathy.   Skin:     General: Skin is warm and dry.   Neurological:      Mental Status: She is alert and oriented to person, place, and time.   Psychiatric:         Behavior: Behavior normal. Behavior is cooperative.         Thought Content: Thought content normal.         Judgment: Judgment normal.     Personal Diagnostic Review      3/3/2021 Spirometry is normal    1/6/2021 6mwd No desaturations requiring supplemental oxygen at rest or exertion.    Pulmonary function tests: 06/07/18: Normal spirometry.  FEV1: 2.23  (91.8 % predicted), FVC: 3.10 (99.4 % predicted), FEV1/FVC:  72 %. NORMAL SPIROMETRY    CT Chest Lung Screening Low Dose  Narrative: EXAMINATION:  CT CHEST LUNG SCREENING LOW DOSE    CLINICAL HISTORY:  Lung cancer screening, >= 30 pk-yr smoking history in last 15 yrs (Age 55-80y);Lung nodule, < 6mm, high cancer risk; Solitary pulmonary nodule    TECHNIQUE:  CT of the thorax was performed with low dose, lung screening protocol.  No contrast was administered.  Sagittal and coronal reconstructions were obtained.    COMPARISON:  03/03/2022    FINDINGS:  Lungs: There are no abnormal opacities that require further evaluation.  The largest opacity in the right lung appears solid and measures an average of 0.43 cm on  series 4, image 149 in the right upper lobe.  The largest opacity in the left lung appears solid and measures an average of 0.72 cm on series 4, image 306 in the left lower lobe.  These nodules are unchanged by my measurements.  Several additional smaller solid noncalcified nodules are present throughout the lungs bilaterally, several of which were not definitely present on prior.  The largest of these potentially new nodules measures an average of 0.28 cm in the posterior left lower lobe as seen on series 4, image 211 and 0.28 cm in the posterior right upper lobe as seen on series 4, image 120.  The lungs show no findings consistent with emphysema.  Diffuse mosaic attenuation of the lung parenchyma is again noted and may be related to small airway versus small vessel disease.    Pleura:   No effusion..    Heart and pericardium: Normal size without effusion.    Lymph nodes: Nonspecific enlarged prevascular, right paratracheal, and subcarinal mediastinal lymph nodes do not appear significantly changed in size by my measurements at 11 mm, 15 mm, and 16 mm respectively in axial short dimension.  Multiple calcified mediastinal and left hilar lymph nodes are again noted.    Aorta and vasculature: Atherosclerosis including coronary arteries.    Chest wall and skeletal structures: Unremarkable except age-appropriate degenerative changes.    Upper abdomen: Prior cholecystectomy.  No acute findings.  Impression: Lung-RADS Category:  2 - Benign Appearance or Behavior - continue annual screening with LDCT in 12 months.    Clinically or potentially clinically significant non lung cancer finding:  None.    Prior Lung Cancer Modifier:  No history of prior lung cancer.    No significant change in mild nonspecific mediastinal adenopathy.    Electronically signed by: Winston Self MD  Date:    03/16/2023  Time:    12:57      3/2/2021 LDCT revealed: Mediastinum/hilar: Enlarged lymph nodes with short axis diameters of 14 mm in the  right paratracheal space and 15 mm in the subcarinal space.  Mild fullness of the bilateral hilar which could represent underlying enlarged lymph nodes.  Calcified left hilar nodes.   Lung-RADS Category:  4A - Suspicious - consultation advised - possible next steps 3 month LDCT - in some scenarios PET/CT.   Clinically or potentially clinically significant non lung cancer finding:  None.    EXAMINATION: 6/14/2021  NM PET CT ROUTINE     CLINICAL HISTORY:  Mediastinal lymphadenopathy. Multiple pulmonary nodules largest left lung 9.4 cm. Localized enlarged lymph nodes; Localized enlarged lymph nodes     TECHNIQUE:  11.9 mCi of F18-FDG was administered intravenously in the right antecubital fossa.  After an approximately 60 min distribution time, PET/CT images were acquired from the skull base to the mid thigh. Transmission images were acquired to correct for attenuation using a whole body low-dose CT scan without contrast with the arms positioned above the head.     COMPARISON:  CT dated 06/03/2021     FINDINGS:  Quality of the study: Adequate.     Head neck: No abnormal foci of increased tracer uptake are present.     Chest: There is nonspecific low level tracer uptake seen with previously described right paratracheal and subcarinal lymph nodes with SUV max of 2.7 and 2.2 respectively.  No significant FDG uptake seen associated with the previously described 9 mm left lower lobe pulmonary nodule.  Smaller right upper lobe pulmonary nodule also demonstrates no significant FDG uptake but may be below resolution for PET.  No FDG avid pulmonary nodules visualized.     Abdomen and pelvis: No abnormal foci of increased tracer uptake are present.     Skeletal: No abnormal foci of increased tracer uptake are present.     Physiologic uptake of the tracer is present within the brain, salivary glands, myocardium, GI and  tracts.     Incidental CT findings: There are bilateral renal cyst present without associated FDG uptake.   Numerous sigmoid colonic diverticular present without evidence of acute diverticulitis.  Prior cholecystectomy noted.     Impression:     1. No abnormal elevated FDG uptake demonstrated with previously described pulmonary nodules.  2. Nonspecific low level FDG uptake noted within stable mediastinal lymph nodes as above.        Electronically signed by: Winston Self MD  Date:                                            06/14/2021  Time:                                           13:56    No results found for this or any previous visit.  Results for orders placed during the hospital encounter of 06/13/18   CT Chest With Contrast    Narrative EXAMINATION:  CT CHEST WITH CONTRAST    CLINICAL HISTORY:  Lung nodule, <1cm;Solitary pulmonary nodule    TECHNIQUE:  Low dose axial images, sagittal and coronal reformations were obtained from the thoracic inlet to the lung bases following the IV administration of 75 mL of Omnipaque 350.    COMPARISON:  Chest x-ray from 6 days earlier and a chest CT from 10/02/2014 and a chest CT from September 2006    FINDINGS:  There is a nodule seen in the left lower lobe that measures approximately 7 mm.  This was also present on the CT from 2006 and at that time it also measured approximately 7 mm.  Given stability for several years this is most compatible with benign etiology.  This may relate to a noncalcified granuloma.  Additional tiny scattered nodular densities in the left lower lobe seen on exam from 2006 are no longer visualized consistent with resolved inflammation/infection.  A tiny calcified granuloma in the left upper lobe is seen.  No new pulmonary nodule or mass is identified.    There is ground-glass attenuation noted along the bronchovascular bundles which may suggest mild edema.  Respiratory motion artifact is present.  Bibasilar areas of subsegmental atelectasis are noted.  Trachea is patent.  Thyroid gland appears unremarkable.    Limited imaging through the upper  abdomen demonstrates cholecystectomy changes, calcified splenic granulomas and partial visualization of a left renal cyst which appears to contain a thin septation with calcification.  No nodular components.    Heart size is normal.  No pericardial effusion.  Great vessels remain patent.  Small scattered mediastinal and hilar lymph nodes.  No bulky adenopathy.  Calcified left subcarinal region granuloma is noted.    Review of bone windows demonstrate the osseous structures to be grossly intact.  No destructive osseous lesion is evident.      Impression Left lower lobe pulmonary nodule which has been stable for multiple years and is most suggestive of benign etiology likely a noncalcified granuloma.  Additional calcified granuloma in the left upper lung.  No new or suspicious nodule is seen at this time.  Partially visualized mildly complex appearing left renal cyst.  Consider renal sonogram for further characterization.  Other findings as detailed above.      Electronically signed by: Arnel Latif MD  Date:    06/13/2018  Time:    13:31       Assessment /Plan:     Orders Placed This Encounter   Procedures    CPAP/BIPAP SUPPLIES     Benefits and compliant  90 day supply. 4 refills  [facility]ME: [facility]. PATIENT REQUEST CHANGE TO [facility] FOR SUPPLIES. 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  11/22/2011 PSG The diagnostic polysomnography revealed a mild obstructive sleep apnea / hypopnea syndrome (A + H Index = 10.4 events / hr asleep. (Report seen in legacy documents in EPIC)     Order Specific Question:   Length of need (1-99 months):     Answer:   99     Order Specific Question:   Choose ONE mask type and its corresponding cushions and/or pillows:     Answer:    Nasal Mask, 1 per 90 days:  Nasal Cushions, (6 per 90 days):  Nasal Pillows, (6 per 90 days)     Order Specific Question:   Choose EITHER Heated or Non-Heated Tubjing     Answer:    Non-Heated Tubing, 1 per 90 days     Order Specific Question:    Number of Days Needed:     Answer:   99     Order Specific Question:   All other supplies as needed as listed below:     Answer:    Headgear, 1 per 180 days     Order Specific Question:   All other supplies as needed as listed below:     Answer:    Chin Strap, 1 per 180 days     Order Specific Question:   All other supplies as needed as listed below:     Answer:    Disposable Filter, 6 per 90 days     Order Specific Question:   All other supplies as needed as listed below:     Answer:    Humidifier Chamber, 1 per 180 days     Order Specific Question:   All other supplies as needed as listed below:     Answer:    Non-Disposable Filter, 1 per 180 days    CT Chest Lung Screening Low Dose     Standing Status:   Future     Standing Expiration Date:   5/12/2025     Order Specific Question:   Is there documentation of shared decision making for this lung screening exam?     Answer:   Yes     Order Specific Question:   Is the patient a current smoker?     Answer:   No     Order Specific Question:   How many years has the patient quit smoking?     Answer:   6     Order Specific Question:   Does the patient have a 20-pack/year or greater smoke history?     Answer:   Yes     Order Specific Question:   Is the patient between the age 50-80 years old?     Answer:   Yes     Order Specific Question:   Does the patient show any signs or symptoms of lung cancer?     Answer:   No     Order Specific Question:   Is this the first (baseline) CT or an annual exam?     Answer:   Annual [2]     Order Specific Question:   May the Radiologist modify the order per protocol to meet the clinical needs of the patient?     Answer:   Yes     Order Specific Question:   Is this a low dose screening chest CT?     Answer:   Yes    Spirometry with/without bronchodilator     Standing Status:   Future     Standing Expiration Date:   5/12/2024     Order Specific Question:   Release to patient     Answer:   Immediate    Fraction of   Nitric Oxide     Standing Status:   Future     Standing Expiration Date:   2024     Order Specific Question:   Release to patient     Answer:   Immediate    Fraction of  Nitric Oxide     Standing Status:   Future     Number of Occurrences:   1     Standing Expiration Date:   2024     Order Specific Question:   Release to patient     Answer:   Immediate       Discussed diagnosis, its evaluation, treatment and usual course. All questions answered.    Problem List Items Addressed This Visit       HERB on CPAP - Primary (Chronic)    Overview     On APAP 5-12 cm with optimal control AHI: 1.5   HME: [facility]   Changed to [facility] for supplies            Current Assessment & Plan     On APAP 5-12 cm with optimal control AHI: 1.5   [facility]: [facility]   Changed to [facility] for supplies   She needs to bring her SD card for downloads.            Relevant Orders    CPAP/BIPAP SUPPLIES    Multiple lung nodules    Current Assessment & Plan     3/2023 LDCT benign follow up annual LDCT            Relevant Orders    CT Chest Lung Screening Low Dose    Mild intermittent asthma without complication    Current Assessment & Plan     Controlled  3/3/2021 spirometry normal.  Has dulera, does not need to use  Has Albuterol inhaler rare use.   Continued current regimen   2023 FeNO 15, no inflammation of lungs           Relevant Orders    Spirometry with/without bronchodilator    Fraction of  Nitric Oxide    Fraction of  Nitric Oxide (Completed)    Class 2 severe obesity with serious comorbidity and body mass index (BMI) of 36.0 to 36.9 in adult    Current Assessment & Plan     Encouraged calorie reduction and 30 minutes of exercise daily. Discussed impact of obesity on general health.  Wt Readings from Last 9 Encounters:   23 98.9 kg (218 lb 0.6 oz)   23 100.7 kg (222 lb 0.1 oz)   23 100.7 kg (222 lb)   23 101.1 kg (222 lb 14.2 oz)   23 100.9 kg (222 lb 7.1 oz)   10/26/22 96.2 kg  (212 lb)   10/21/22 98.9 kg (218 lb 0.6 oz)   09/14/22 100.2 kg (221 lb)   08/09/22 100.4 kg (221 lb 5.5 oz)   Body mass index is 36.28 kg/m².           Chronic obstructive pulmonary disease, unspecified COPD type    Relevant Orders    Spirometry with/without bronchodilator     Other Visit Diagnoses       Cigarette nicotine dependence in remission        Relevant Orders    CT Chest Lung Screening Low Dose            Follow up in about 1 year (around 5/12/2024) for Asthma, COPD, w/review luis/feno/LDCT.

## 2023-05-12 NOTE — ASSESSMENT & PLAN NOTE
Controlled  3/3/2021 spirometry normal.  Has dulera, does not need to use  Has Albuterol inhaler rare use.   Continued current regimen   5/12/2023 FeNO 15, no inflammation of lungs

## 2023-05-12 NOTE — PROCEDURES
Clinical Guide to Interpretation or FeNO Levels :    FeNO  (ppb) LOW INTERMEDIATE HIGH   ADULT VALUES < 25 25-50          > 50   Th2-driven Inflammation Unlikely Likely Significant     Patients FeNO level at this visit : _15___ (ppb)    Interpretation of FeNO measurement in adults:  [] FENO is less than 25 ppb implies non eosinophilic airway inflammation or the absence of airway inflammation.    Comment: Low FENO (<25 ppb) in adult asthmatics with persistent symptoms suggests other etiologies for these symptoms and a lower likelihood of benefit from adding or increasing inhaled glucocorticoids.    [] FENO between 25 and 50 ppb in adults should be interpreted cautiously with reference to the clinical situation (eg, symptomatic, on or off therapy, current smoking).    [] FENO greater than 50 ppb in adults  suggests eosinophilic airway inflammation   Comment: High FENO (>50 ppb) in adult asthmatics even with atypical symptoms suggests glucocorticoid responsiveness. High FENO (>50 ppb) can help identify poor adherence or uncontrolled inflammation in asthma patients with otherwise seemingly controlled asthma.    Discussion:  A FENO less than 25 ppb in adults and less than 20 ppb in children younger than 12 years of age implies noneosinophilic airway inflammation or the absence of airway inflammation.  A FENO greater than 50 ppb in adults or greater than 35 ppb in children suggests eosinophilic airway inflammation.   Values of FENO between 25 and 50 ppb in adults (20 to 35 ppb in children) should be interpreted cautiously with reference to the clinical situation (eg, symptomatic, on or off therapy, current smoking).  A rising FENO with a greater than 20 percent change and more than 25 ppb (20 ppb in children) from a previously stable level suggests increasing eosinophilic airway inflammation, but there are wide inter-individual differences.  A decrease in FENO greater than 20 percent for values over 50 ppb or more than  10 ppb for values less than 50 ppb may be clinically important.  ?FENO in other respiratory diseases - Several other diseases are associated with altered levels of exhaled NO: low levels of FENO have been noted in cystic fibrosis, current smoking, pulmonary hypertension, hypothermia, primary ciliary dyskinesia, and bronchopulmonary dysplasia. Elevated FENO has been noted in atopy, nonasthmatic eosinophilic bronchitis, COPD exacerbations, noncystic fibrosis bronchiectasis, and viral upper respiratory infections.    REFERENCE:  ATS Board of Directors, December 2004, and by the ERS Executive Committee, June 2004. ATS/ERS Recommendations for Standardized Procedures for the Online and Offline Measurement of Exhaled Lower Respiratory Nitric Oxide and Nasal Nitric Oxide. Guideline 2005

## 2023-05-12 NOTE — ASSESSMENT & PLAN NOTE
Encouraged calorie reduction and 30 minutes of exercise daily. Discussed impact of obesity on general health.  Wt Readings from Last 9 Encounters:   05/12/23 98.9 kg (218 lb 0.6 oz)   04/14/23 100.7 kg (222 lb 0.1 oz)   03/22/23 100.7 kg (222 lb)   03/02/23 101.1 kg (222 lb 14.2 oz)   01/12/23 100.9 kg (222 lb 7.1 oz)   10/26/22 96.2 kg (212 lb)   10/21/22 98.9 kg (218 lb 0.6 oz)   09/14/22 100.2 kg (221 lb)   08/09/22 100.4 kg (221 lb 5.5 oz)   Body mass index is 36.28 kg/m².

## 2023-05-12 NOTE — ASSESSMENT & PLAN NOTE
On APAP 5-12 cm with optimal control AHI: 1.5   HME: MIGUEL ANGEL   Changed to [facility] for supplies   She needs to bring her SD card for downloads.

## 2023-06-21 ENCOUNTER — OFFICE VISIT (OUTPATIENT)
Dept: UROLOGY | Facility: CLINIC | Age: 68
End: 2023-06-21
Payer: MEDICARE

## 2023-06-21 VITALS
DIASTOLIC BLOOD PRESSURE: 60 MMHG | SYSTOLIC BLOOD PRESSURE: 137 MMHG | WEIGHT: 218.06 LBS | BODY MASS INDEX: 36.33 KG/M2 | HEART RATE: 67 BPM | HEIGHT: 65 IN

## 2023-06-21 DIAGNOSIS — N28.1 RENAL CYST: Primary | ICD-10-CM

## 2023-06-21 DIAGNOSIS — N39.41 URGE INCONTINENCE: ICD-10-CM

## 2023-06-21 DIAGNOSIS — N30.00 ACUTE CYSTITIS WITHOUT HEMATURIA: ICD-10-CM

## 2023-06-21 PROCEDURE — 3078F DIAST BP <80 MM HG: CPT | Mod: CPTII,S$GLB,, | Performed by: UROLOGY

## 2023-06-21 PROCEDURE — 1160F PR REVIEW ALL MEDS BY PRESCRIBER/CLIN PHARMACIST DOCUMENTED: ICD-10-PCS | Mod: CPTII,S$GLB,, | Performed by: UROLOGY

## 2023-06-21 PROCEDURE — 3044F HG A1C LEVEL LT 7.0%: CPT | Mod: CPTII,S$GLB,, | Performed by: UROLOGY

## 2023-06-21 PROCEDURE — 99213 PR OFFICE/OUTPT VISIT, EST, LEVL III, 20-29 MIN: ICD-10-PCS | Mod: S$GLB,,, | Performed by: UROLOGY

## 2023-06-21 PROCEDURE — 1101F PR PT FALLS ASSESS DOC 0-1 FALLS W/OUT INJ PAST YR: ICD-10-PCS | Mod: CPTII,S$GLB,, | Performed by: UROLOGY

## 2023-06-21 PROCEDURE — 99999 PR PBB SHADOW E&M-EST. PATIENT-LVL III: CPT | Mod: PBBFAC,,, | Performed by: UROLOGY

## 2023-06-21 PROCEDURE — 3288F FALL RISK ASSESSMENT DOCD: CPT | Mod: CPTII,S$GLB,, | Performed by: UROLOGY

## 2023-06-21 PROCEDURE — 3288F PR FALLS RISK ASSESSMENT DOCUMENTED: ICD-10-PCS | Mod: CPTII,S$GLB,, | Performed by: UROLOGY

## 2023-06-21 PROCEDURE — 1159F PR MEDICATION LIST DOCUMENTED IN MEDICAL RECORD: ICD-10-PCS | Mod: CPTII,S$GLB,, | Performed by: UROLOGY

## 2023-06-21 PROCEDURE — 3008F PR BODY MASS INDEX (BMI) DOCUMENTED: ICD-10-PCS | Mod: CPTII,S$GLB,, | Performed by: UROLOGY

## 2023-06-21 PROCEDURE — 1159F MED LIST DOCD IN RCRD: CPT | Mod: CPTII,S$GLB,, | Performed by: UROLOGY

## 2023-06-21 PROCEDURE — 99999 PR PBB SHADOW E&M-EST. PATIENT-LVL III: ICD-10-PCS | Mod: PBBFAC,,, | Performed by: UROLOGY

## 2023-06-21 PROCEDURE — 3078F PR MOST RECENT DIASTOLIC BLOOD PRESSURE < 80 MM HG: ICD-10-PCS | Mod: CPTII,S$GLB,, | Performed by: UROLOGY

## 2023-06-21 PROCEDURE — 1160F RVW MEDS BY RX/DR IN RCRD: CPT | Mod: CPTII,S$GLB,, | Performed by: UROLOGY

## 2023-06-21 PROCEDURE — 3044F PR MOST RECENT HEMOGLOBIN A1C LEVEL <7.0%: ICD-10-PCS | Mod: CPTII,S$GLB,, | Performed by: UROLOGY

## 2023-06-21 PROCEDURE — 3075F PR MOST RECENT SYSTOLIC BLOOD PRESS GE 130-139MM HG: ICD-10-PCS | Mod: CPTII,S$GLB,, | Performed by: UROLOGY

## 2023-06-21 PROCEDURE — 3075F SYST BP GE 130 - 139MM HG: CPT | Mod: CPTII,S$GLB,, | Performed by: UROLOGY

## 2023-06-21 PROCEDURE — 99213 OFFICE O/P EST LOW 20 MIN: CPT | Mod: S$GLB,,, | Performed by: UROLOGY

## 2023-06-21 PROCEDURE — 1101F PT FALLS ASSESS-DOCD LE1/YR: CPT | Mod: CPTII,S$GLB,, | Performed by: UROLOGY

## 2023-06-21 PROCEDURE — 3008F BODY MASS INDEX DOCD: CPT | Mod: CPTII,S$GLB,, | Performed by: UROLOGY

## 2023-06-21 RX ORDER — SOLIFENACIN SUCCINATE 10 MG/1
10 TABLET, FILM COATED ORAL DAILY
Qty: 90 TABLET | Refills: 3 | Status: SHIPPED | OUTPATIENT
Start: 2023-06-21

## 2023-06-21 NOTE — PROGRESS NOTES
Chief Complaint:   Encounter Diagnoses   Name Primary?    Renal cyst Yes    Acute cystitis without hematuria     Urge incontinence          HPI:   6/21/23- patient is here today for follow-up, VESIcare appears to be doing well, no evidence of UTI.      Allergies:  Ace inhibitors, Lisinopril, Pravastatin, Rosuvastatin, and Simvastatin    Medications: has a current medication list which includes the following prescription(s): aspirin, ezetimibe, fluticasone propionate, hydrocodone-acetaminophen, isosorbide mononitrate, lactulose, metoprolol succinate, multivitamin, naloxone, nitroglycerin, solifenacin, verapamil, albuterol, dulera, and pantoprazole.    Review of Systems:  General: No fever, chills, fatigability, or weight loss.  Skin: No rashes, itching, or changes in color or texture of skin.  Chest: Denies MALCOLM, cyanosis, wheezing, cough, and sputum production.  Abdomen: Appetite fine. No weight loss. Denies diarrhea, abdominal pain, hematemesis, or blood in stool.  Musculoskeletal: No joint stiffness or swelling. Denies back pain.  : As above.  All other review of systems negative.    PMH:   has a past medical history of Allergy, Angina, Arthritis (6/11/2013), Asthma, COPD (chronic obstructive pulmonary disease), GERD (gastroesophageal reflux disease), Prinzmetal angina, Hypertension, Obesity (6/11/2013), Other and unspecified hyperlipidemia (6/11/2013), Prinzmetal's angina, and Sleep apnea (6/11/2013).    PSH:   has a past surgical history that includes Eye surgery; Foot surgery; Cholecystectomy; Tonsillectomy; Tubal ligation; Abdominal surgery; Esophagogastroduodenoscopy (N/A, 12/10/2018); Colonoscopy (N/A, 12/6/2019); Esophagogastroduodenoscopy (N/A, 9/8/2020); and Intraluminal gastrointestinal tract imaging via capsule (N/A, 9/18/2020).    FamHx: family history includes Breast cancer in her maternal grandmother; COPD in her father; Cancer in her mother; Heart disease in her sister; Hyperlipidemia in her son;  Hypertension in her father and mother; Kidney cancer in her mother.    SocHx:  reports that she quit smoking about 7 years ago. Her smoking use included cigarettes. She started smoking about 55 years ago. She has a 32.00 pack-year smoking history. She has never used smokeless tobacco. She reports that she does not drink alcohol and does not use drugs.     Physical Exam:  Vitals:   Vitals:    06/21/23 0927   BP: 137/60   Pulse: 67     General: A&Ox3. No apparent distress. No deformities.  Neck: No masses. Normal thyroid.  Lungs: normal inspiration. No use of accessory muscles.  Heart: normal pulse. No arrhythmias.  Abdomen: Soft. NT. ND  Skin: The skin is warm and dry. No jaundice.  Ext: No c/c/e.     Labs/Studies:   UA normal 6/22  pvr 74 ml 6/22  KASHIF 3.7 cm left complex renal cyst 10/21  CT renal protocol 4.8 cm left Bosniak 2 renal cyst 11/19    Impression/Plan:      1. Renal cyst- in regards to the cyst, this was classified as a Bosniak 2 cyst years ago with stability ever since then.  No need to follow further.      2. UTI- no evidence of recurrent UTI, call with any issues.    3. Urge incontinence- VESIcare 10 mg has assisted her symptoms, she will continue.  A refill has been sent in to her pharmacy today, I will see her in 1 year unless she needs to be seen sooner.

## 2023-06-28 ENCOUNTER — TELEPHONE (OUTPATIENT)
Dept: ADMINISTRATIVE | Facility: HOSPITAL | Age: 68
End: 2023-06-28
Payer: MEDICARE

## 2023-06-28 ENCOUNTER — PATIENT MESSAGE (OUTPATIENT)
Dept: ADMINISTRATIVE | Facility: HOSPITAL | Age: 68
End: 2023-06-28
Payer: MEDICARE

## 2023-07-21 NOTE — TELEPHONE ENCOUNTER
Endoscopy Scheduling Questionnaire:    1. Have you been admitted overnight to the hospital in the past 3 months? no  2. Have you had a cardiac stent placed in the past 12 months? no  3. Have you had a stroke or heart attack in the past 6 months? no  4. Have you had any chest pain in the past 3 months? no      If so, have you been evaluated by your PCP or Cardiologist? no  5. Do you take prescription weight loss medication? no  6. Have you been diagnosed with Diverticulitis within the past 3 months? no  7. Are you on dialysis? no  8. Are you diabetic? no Do you have an insulin pump? no  9. Do you have any other health issues that you feel might limit your ability to safely have the procedure and/or sedation? no  10. Is the patient over 79 yo? no        If so, has the patient been seen by their PCP or GI in the last 6 months? N/A       -I have reviewed the last colonoscopy for recommendations regarding surveillance and bowel prep  Yes  -I have reviewed the patient's medications and allergies. She is not on high risk medication, A clearance request NA  -I have verified the pharmacy information. The prep being used is Miralax. The patient's prep instructions were sent via [facility] patient portal..    Date Endoscopy Scheduled: Date: 12-6-2019 [email or web address]      
4 = No assist / stand by assistance

## 2023-08-14 ENCOUNTER — PATIENT MESSAGE (OUTPATIENT)
Dept: ADMINISTRATIVE | Facility: HOSPITAL | Age: 68
End: 2023-08-14
Payer: MEDICARE

## 2023-08-14 ENCOUNTER — TELEPHONE (OUTPATIENT)
Dept: ADMINISTRATIVE | Facility: HOSPITAL | Age: 68
End: 2023-08-14
Payer: MEDICARE

## 2023-09-05 ENCOUNTER — OFFICE VISIT (OUTPATIENT)
Dept: FAMILY MEDICINE | Facility: CLINIC | Age: 68
End: 2023-09-05
Payer: MEDICARE

## 2023-09-05 VITALS
TEMPERATURE: 97 F | HEIGHT: 65 IN | WEIGHT: 212.88 LBS | HEART RATE: 75 BPM | BODY MASS INDEX: 35.47 KG/M2 | OXYGEN SATURATION: 90 % | SYSTOLIC BLOOD PRESSURE: 134 MMHG | DIASTOLIC BLOOD PRESSURE: 70 MMHG

## 2023-09-05 DIAGNOSIS — I10 PRIMARY HYPERTENSION: ICD-10-CM

## 2023-09-05 DIAGNOSIS — Z78.9 STATIN INTOLERANCE: ICD-10-CM

## 2023-09-05 DIAGNOSIS — E78.2 MIXED HYPERLIPIDEMIA: ICD-10-CM

## 2023-09-05 DIAGNOSIS — I35.0 AORTIC VALVE STENOSIS, ETIOLOGY OF CARDIAC VALVE DISEASE UNSPECIFIED: Primary | ICD-10-CM

## 2023-09-05 DIAGNOSIS — E66.01 CLASS 2 SEVERE OBESITY WITH SERIOUS COMORBIDITY AND BODY MASS INDEX (BMI) OF 36.0 TO 36.9 IN ADULT, UNSPECIFIED OBESITY TYPE: ICD-10-CM

## 2023-09-05 DIAGNOSIS — R91.8 MULTIPLE LUNG NODULES: ICD-10-CM

## 2023-09-05 DIAGNOSIS — J44.1 ASTHMA WITH COPD WITH EXACERBATION: ICD-10-CM

## 2023-09-05 DIAGNOSIS — G47.33 OSA ON CPAP: Chronic | ICD-10-CM

## 2023-09-05 DIAGNOSIS — J45.901 ASTHMA WITH COPD WITH EXACERBATION: ICD-10-CM

## 2023-09-05 PROCEDURE — 99214 OFFICE O/P EST MOD 30 MIN: CPT | Mod: HCNC,S$GLB,, | Performed by: FAMILY MEDICINE

## 2023-09-05 PROCEDURE — 3044F PR MOST RECENT HEMOGLOBIN A1C LEVEL <7.0%: ICD-10-PCS | Mod: HCNC,CPTII,S$GLB, | Performed by: FAMILY MEDICINE

## 2023-09-05 PROCEDURE — 3075F PR MOST RECENT SYSTOLIC BLOOD PRESS GE 130-139MM HG: ICD-10-PCS | Mod: HCNC,CPTII,S$GLB, | Performed by: FAMILY MEDICINE

## 2023-09-05 PROCEDURE — 3008F PR BODY MASS INDEX (BMI) DOCUMENTED: ICD-10-PCS | Mod: HCNC,CPTII,S$GLB, | Performed by: FAMILY MEDICINE

## 2023-09-05 PROCEDURE — 99999 PR PBB SHADOW E&M-EST. PATIENT-LVL V: CPT | Mod: PBBFAC,HCNC,, | Performed by: FAMILY MEDICINE

## 2023-09-05 PROCEDURE — 3078F PR MOST RECENT DIASTOLIC BLOOD PRESSURE < 80 MM HG: ICD-10-PCS | Mod: HCNC,CPTII,S$GLB, | Performed by: FAMILY MEDICINE

## 2023-09-05 PROCEDURE — 3078F DIAST BP <80 MM HG: CPT | Mod: HCNC,CPTII,S$GLB, | Performed by: FAMILY MEDICINE

## 2023-09-05 PROCEDURE — 3288F PR FALLS RISK ASSESSMENT DOCUMENTED: ICD-10-PCS | Mod: HCNC,CPTII,S$GLB, | Performed by: FAMILY MEDICINE

## 2023-09-05 PROCEDURE — 99999 PR PBB SHADOW E&M-EST. PATIENT-LVL V: ICD-10-PCS | Mod: PBBFAC,HCNC,, | Performed by: FAMILY MEDICINE

## 2023-09-05 PROCEDURE — 1159F PR MEDICATION LIST DOCUMENTED IN MEDICAL RECORD: ICD-10-PCS | Mod: HCNC,CPTII,S$GLB, | Performed by: FAMILY MEDICINE

## 2023-09-05 PROCEDURE — 3075F SYST BP GE 130 - 139MM HG: CPT | Mod: HCNC,CPTII,S$GLB, | Performed by: FAMILY MEDICINE

## 2023-09-05 PROCEDURE — 3008F BODY MASS INDEX DOCD: CPT | Mod: HCNC,CPTII,S$GLB, | Performed by: FAMILY MEDICINE

## 2023-09-05 PROCEDURE — 1159F MED LIST DOCD IN RCRD: CPT | Mod: HCNC,CPTII,S$GLB, | Performed by: FAMILY MEDICINE

## 2023-09-05 PROCEDURE — 3288F FALL RISK ASSESSMENT DOCD: CPT | Mod: HCNC,CPTII,S$GLB, | Performed by: FAMILY MEDICINE

## 2023-09-05 PROCEDURE — 99214 PR OFFICE/OUTPT VISIT, EST, LEVL IV, 30-39 MIN: ICD-10-PCS | Mod: HCNC,S$GLB,, | Performed by: FAMILY MEDICINE

## 2023-09-05 PROCEDURE — 1101F PR PT FALLS ASSESS DOC 0-1 FALLS W/OUT INJ PAST YR: ICD-10-PCS | Mod: HCNC,CPTII,S$GLB, | Performed by: FAMILY MEDICINE

## 2023-09-05 PROCEDURE — 1101F PT FALLS ASSESS-DOCD LE1/YR: CPT | Mod: HCNC,CPTII,S$GLB, | Performed by: FAMILY MEDICINE

## 2023-09-05 PROCEDURE — 3044F HG A1C LEVEL LT 7.0%: CPT | Mod: HCNC,CPTII,S$GLB, | Performed by: FAMILY MEDICINE

## 2023-09-05 RX ORDER — TRIAMCINOLONE ACETONIDE 40 MG/ML
40 INJECTION, SUSPENSION INTRA-ARTICULAR; INTRAMUSCULAR DAILY
COMMUNITY
Start: 2023-03-22

## 2023-09-05 RX ORDER — CELECOXIB 200 MG/1
200 CAPSULE ORAL DAILY
COMMUNITY
Start: 2023-08-07

## 2023-09-05 NOTE — PROGRESS NOTES
Subjective:       Patient ID: Ro Hernandez is a 68 y.o. female.    Chief Complaint: Follow-up      HPI Comments:       Current Outpatient Medications:     aspirin (ECOTRIN) 81 MG EC tablet, Take 81 mg by mouth once daily., Disp: , Rfl:     celecoxib (CELEBREX) 200 MG capsule, Take 200 mg by mouth once daily., Disp: , Rfl:     ezetimibe (ZETIA) 10 mg tablet, Take 1 tablet (10 mg total) by mouth once daily., Disp: 90 tablet, Rfl: 3    fluticasone propionate (FLONASE) 50 mcg/actuation nasal spray, USE 2 SPRAYS IN EACH NOSTRIL ONCE DAILY, Disp: 48 g, Rfl: 3    HYDROcodone-acetaminophen (NORCO)  mg per tablet, Take 1 tablet by mouth. One tablet twice a day, Disp: , Rfl:     isosorbide mononitrate (IMDUR) 30 MG 24 hr tablet, Take 1 tab po in the AM and 1/2 tab po in the PM, Disp: 135 tablet, Rfl: 1    lactulose (CHRONULAC) 10 gram/15 mL solution, Take 30 mLs (20 g total) by mouth 2 (two) times daily., Disp: 1892 mL, Rfl: 2    metoprolol succinate (TOPROL-XL) 100 MG 24 hr tablet, , Disp: , Rfl:     MULTIVITAMIN (MULTIPLE VITAMIN ORAL), Take 1 tablet by mouth Daily., Disp: , Rfl:     naloxone (NARCAN) 4 mg/actuation [place], , Disp: , Rfl:     nitroGLYCERIN (NITROSTAT) 0.4 MG SL tablet, DISSOLVE 1 TABLET UNDER THE TONGUE EVERY 5 MINUTES FOR 3 DOSES AS NEEDED FOR CHEST PAIN, Disp: 25 tablet, Rfl: 0    solifenacin (VESICARE) 10 MG tablet, Take 1 tablet (10 mg total) by mouth once daily., Disp: 90 tablet, Rfl: 3    triamcinolone acetonide (KENALOG-40) 40 mg/mL injection, 40 mg by Other route once daily., Disp: , Rfl:     verapamiL (VERELAN) 360 MG C24P, Take 360 mg by mouth once daily., Disp: , Rfl:     albuterol (PROVENTIL) 2.5 mg /3 mL (0.083 %) nebulizer solution, Take 3 mLs (2.5 mg total) by nebulization every 4 (four) hours as needed. Rescue, Disp: 150 mL, Rfl: 11    mometasone-formoterol (DULERA) 200-5 mcg/actuation inhaler, Inhale 2 puffs into the lungs 2 (two) times daily. Controller (Patient not taking: Reported  "on 3/4/2022), Disp: 13 g, Rfl: 11    pantoprazole (PROTONIX) 20 MG tablet, Take 1 tablet (20 mg total) by mouth 2 (two) times daily before meals., Disp: 180 tablet, Rfl: 3    Routine six-month follow-up.  She is doing well.      Using CPAP every night.      Low saturated iron.  Followed by hematologist.  Not on any iron currently.    Trigger finger better after injection.  Cardiologist is Dr. Quiles.  She is on Zetia.  ASCVD 10 year risk is 16%.  Can not tolerate statins.  Being watched for her aortic stenosis.  Still has some dyspnea on exertion.    Takes Celebrex or Naprosyn about every day.  Got off the pantoprazole b.i.d. and is now taking Prilosec once a day.  No GI irritation.  Cautioned about this with the use of anti-inflammatories.    Uses lactulose fairly often for constipation.  Cautioned her that verapamil can be constipating.  She will discuss this with her cardiologist     Her concern:  Can not lose weight.  A1c 5.0      Review of Systems   Constitutional:  Positive for unexpected weight change. Negative for activity change, appetite change and fever.   HENT:  Negative for sore throat.    Respiratory:  Positive for shortness of breath. Negative for cough.    Cardiovascular:  Negative for chest pain.   Gastrointestinal:  Negative for abdominal pain, diarrhea and nausea.   Genitourinary:  Negative for difficulty urinating.   Musculoskeletal:  Negative for arthralgias and myalgias.   Neurological:  Negative for dizziness and headaches.       Objective:      Vitals:    09/05/23 0810 09/05/23 0823   BP: (!) 140/62 134/70   Pulse: 75    Temp: 97.4 °F (36.3 °C)    TempSrc: Tympanic    SpO2: (!) 90%    Weight: 96.6 kg (212 lb 13.7 oz)    Height: 5' 5" (1.651 m)      Physical Exam  Vitals and nursing note reviewed.   Constitutional:       General: She is not in acute distress.     Appearance: She is well-developed. She is not diaphoretic.   HENT:      Head: Normocephalic.      Mouth/Throat:      Pharynx: No " oropharyngeal exudate.   Neck:      Thyroid: No thyromegaly.   Cardiovascular:      Rate and Rhythm: Normal rate and regular rhythm.      Heart sounds: Murmur heard.      Systolic murmur is present with a grade of 3/6.   Pulmonary:      Effort: Pulmonary effort is normal.      Breath sounds: Normal breath sounds. No wheezing or rales.   Abdominal:      General: There is no distension.      Palpations: Abdomen is soft.   Musculoskeletal:      Cervical back: Neck supple.      Right lower leg: No edema.      Left lower leg: No edema.   Lymphadenopathy:      Cervical: No cervical adenopathy.   Skin:     General: Skin is warm and dry.   Neurological:      Mental Status: She is alert and oriented to person, place, and time.   Psychiatric:         Mood and Affect: Mood normal.         Behavior: Behavior normal.         Thought Content: Thought content normal.         Judgment: Judgment normal.         Assessment:       1. Aortic valve stenosis, etiology of cardiac valve disease unspecified    2. Multiple lung nodules    3. Asthma with COPD with exacerbation    4. Class 2 severe obesity with serious comorbidity and body mass index (BMI) of 36.0 to 36.9 in adult, unspecified obesity type    5. Mixed hyperlipidemia    6. HERB on CPAP    7. Statin intolerance    8. Primary hypertension        Plan:   Aortic valve stenosis, etiology of cardiac valve disease unspecified  Comments:  Followed by Cardiology.  Possibly symptomatic    Multiple lung nodules  Comments:  Recent low-dose CT scan okay.  Follow-up 1 year.  Followed by pulmonology    Asthma with COPD with exacerbation  Comments:  does not use dulera    Class 2 severe obesity with serious comorbidity and body mass index (BMI) of 36.0 to 36.9 in adult, unspecified obesity type  Comments:  Lifestyle and wellness referral.  Orders:  -     Ambulatory referral/consult to [facility]; Future; Expected date: 09/12/2023    Mixed hyperlipidemia  Comments:  LDL  133    HERB on CPAP  Comments:  Nightly compliance    Statin intolerance  Comments:  On Zetia    Primary hypertension  Comments:  Controlled.  Follow-up 6 months

## 2023-10-07 ENCOUNTER — OFFICE VISIT (OUTPATIENT)
Dept: URGENT CARE | Facility: CLINIC | Age: 68
End: 2023-10-07
Payer: MEDICARE

## 2023-10-07 VITALS
DIASTOLIC BLOOD PRESSURE: 75 MMHG | HEART RATE: 79 BPM | SYSTOLIC BLOOD PRESSURE: 155 MMHG | OXYGEN SATURATION: 95 % | TEMPERATURE: 98 F | RESPIRATION RATE: 18 BRPM

## 2023-10-07 DIAGNOSIS — G47.33 OSA ON CPAP: ICD-10-CM

## 2023-10-07 DIAGNOSIS — U07.1 COVID-19 VIRUS DETECTED: ICD-10-CM

## 2023-10-07 DIAGNOSIS — U07.1 COVID: ICD-10-CM

## 2023-10-07 DIAGNOSIS — Z87.891 FORMER SMOKER: ICD-10-CM

## 2023-10-07 DIAGNOSIS — Z87.09 HISTORY OF COPD: ICD-10-CM

## 2023-10-07 DIAGNOSIS — R05.9 COUGH, UNSPECIFIED TYPE: Primary | ICD-10-CM

## 2023-10-07 LAB
CTP QC/QA: YES
SARS-COV-2 AG RESP QL IA.RAPID: POSITIVE

## 2023-10-07 PROCEDURE — 87811 SARS CORONAVIRUS 2 ANTIGEN POCT, MANUAL READ: ICD-10-PCS | Mod: QW,S$GLB,, | Performed by: NURSE PRACTITIONER

## 2023-10-07 PROCEDURE — 87811 SARS-COV-2 COVID19 W/OPTIC: CPT | Mod: QW,S$GLB,, | Performed by: NURSE PRACTITIONER

## 2023-10-07 PROCEDURE — 99214 OFFICE O/P EST MOD 30 MIN: CPT | Mod: S$GLB,,, | Performed by: NURSE PRACTITIONER

## 2023-10-07 PROCEDURE — 99214 PR OFFICE/OUTPT VISIT, EST, LEVL IV, 30-39 MIN: ICD-10-PCS | Mod: S$GLB,,, | Performed by: NURSE PRACTITIONER

## 2023-10-07 RX ORDER — BENZONATATE 200 MG/1
200 CAPSULE ORAL 3 TIMES DAILY PRN
Qty: 25 CAPSULE | Refills: 0 | Status: SHIPPED | OUTPATIENT
Start: 2023-10-07 | End: 2023-10-17

## 2023-10-07 RX ORDER — DEXTROMETHORPHAN HYDROBROMIDE AND GUAIFENESIN 10; 200 MG/1; MG/1
2 CAPSULE, GELATIN COATED ORAL
Qty: 40 EACH | Refills: 0 | Status: SHIPPED | OUTPATIENT
Start: 2023-10-07 | End: 2023-10-17

## 2023-10-07 RX ORDER — NIRMATRELVIR AND RITONAVIR 300-100 MG
KIT ORAL
Qty: 30 TABLET | Refills: 0 | Status: SHIPPED | OUTPATIENT
Start: 2023-10-07 | End: 2024-01-02

## 2023-10-07 RX ORDER — ALBUTEROL SULFATE 90 UG/1
2 AEROSOL, METERED RESPIRATORY (INHALATION) EVERY 6 HOURS PRN
Qty: 8 G | Refills: 0 | Status: SHIPPED | OUTPATIENT
Start: 2023-10-07

## 2023-10-07 NOTE — PATIENT INSTRUCTIONS
POSITIVE COVID TEST    You have tested positive for COVID-19 today.  Please note that patients who test positive for COVID-19 are required by the CDC to undergo isolation for 5 days after their symptoms first began.     This isolation starts from the day you first developed symptoms, not the day of your positive test. For example, if your symptoms began on a Monday but tested positive on the following Wednesday, your 5-day isolation begins from that Monday, not the Wednesday you tested positive.     However, if you are asymptomatic (a person who does not have any symptoms) and COVID-19 positive, your 5-day isolation begins on the day you tested positive, regardless of exposure date.     Also, per the CDC guidelines, once your 5 days have passed, and you have not had fever greater than 100.4F in the last 24 hours without taking any fever reducers such as Tylenol (Acetaminophen) or Motrin (Ibuprofen), you may return to your normal activities including social distancing, wearing masks continually for 5 more days, and frequent handwashing -     YOU DO NOT NEED ANOTHER TEST IN ORDER TO END YOUR QUARANTINE.  Treatment: There is no cure for the COVID there is only symptomatic care.     Try these tips to keep yourself comfortable:                   -Get plenty of rest.                   -Drink plenty of fluids, at least 8 large glasses of fluid a day. Good fluid choices are water, fruit juices high in Vitamin C, tea, gelatin, or broths and soups. These help to keep mucus thin and ease congestion.                  -Use salt water gargle, cough drops or throat sprays to relieve throat pain. Mi ¼ to ½ teaspoon of salt in 1 cup of warm water for a salt water gargle  solution.                  -Use petroleum jelly or lip balm around lips and nose to prevent chapping.                  -Use saline nose drops or spray to help ease congestion.    Over the Counter (OTC) Medicines:  Take over the counter medicines as needed to ease  your signs.  Read labels carefully.  Use a product that treats only the signs that you have. Ask your pharmacist  for recommendations. Be sure to ask about possible interactions with other  medicines you are taking.  Common medicines used to treat signs of covid include:     -Flonase daily.  -Claritin or Zyrtec daily.  -Mucinex every 12 hours -- Drink plenty of water while taking this medication.    - Cough suppressant, also called antitussive, such as dextromethorphan.  This medicine decreases your reflex and sensitivity to cough. This  medicine may be kept behind the pharmacy counter for purchase.    Cold and cough medicines often contain more than one type of medicine.  Ask the pharmacist for help to confirm that you are not using more than one  product with the same or similar ingredient. For example, some cold and  cough medicines have acetaminophen or ibuprofen in them to help lower a  fever or ease muscle aches. Do not take extra acetaminophen (Tylenol) or  ibuprofen (Advil, Motrin) if the cold or cough medicine has it as an  ingredient. Too much medicine could be harmful.    Take the correct dose as listed on the package. Do not take more than  recommended.    Use a Humidifier:  A cool mist humidifier can make breathing easier by thinning mucus. Do not use  a steam humidifier as hot water can cause burns if spilled.  Place the humidifier a few feet from the bed. Drain and clean each day with  soap and water to prevent bacteria and mold from growing.  Indoor humidity should not be above 50%. Stop using the humidifier if you  notice moisture on windows, walls or pictures.  You do not need to add any medicine to the humidifier.  If you cannot get a humidifier, place a pan of water next to heating vents and  refill the water level daily. The water will evaporate and add moisture to the  Room.    How to prevent the spread of COVID  -Wash your hands with soap and water or use alcohol based hand   often.  Dry hands wet from washing with soap on a paper towel instead of cloth towel.  -Cough or sneeze into your elbow to avoid spreading germs.  -Wipe down common surfaces, such as door knobs and faucet handles, with a disinfectant spray.  -Do not share cups or utensils.        Please follow up with your Primary care provider within 2-5 days if your signs and symptoms have not resolved or worsen.      If your condition worsens or fails to improve we recommend that you receive another evaluation at the emergency room immediately or contact your primary medical clinic to discuss your concerns.   You must understand that you have received an Urgent Care treatment only and that you may be released before all of your medical problems are known or treated. You, the patient, will arrange for follow up care as instructed.     We do have a drug called Paxlovid that has an Emergency Authorization Use that [facility] is currently advising and using for Covid-19. I can send over the info about it for you to look over if you'd like. I can also send the medicine to your pharmacy so you have it.. The main thing with it is that it could cause some liver or kidney injuries. If you were on any type of cholesterol medicine like a statin we would want to stop that as well. There are some other drug interactions as well. Since it is new we do not know everything about it yet but this is the current recommendation to help prevent severe disease in patients with a mild-moderate symptoms within 5 days of symptom-onset who are not in the hospital being treated for covid.

## 2023-10-07 NOTE — LETTER
October 7, 2023      [facility]  [address], [address]  [facility] [place] [facility] [address]  Phone: [phone]  Fax: [phone]       Patient: Ro Hernandez   YOB: 1955  Date of Visit: 10/07/2023    To Whom It May Concern:    Shane Hernandez  was at [facility] on 10/07/2023. The patient may return to work/school on 10/12/23 with no restrictions. If you have any questions or concerns, or if I can be of further assistance, please do not hesitate to contact me.    Sincerely,      Adamaris Wilson, NP

## 2023-10-07 NOTE — PROGRESS NOTES
Subjective:      Patient ID: Ro Hernandez is a 68 y.o. female.    Vitals:  oral temperature is 98.4 °F (36.9 °C). Her blood pressure is 155/75 (abnormal) and her pulse is 79. Her respiration is 18 and oxygen saturation is 95%.     Chief Complaint: Cough    Patient is 68-year-old female with a history significant for COPD with HERB on CPAP who presents for evaluation of cough with congestion and sore throat.  Onset yesterday.  She does report exposure to COVID by a co-worker.  She is been taking over-the-counter medication with mild relief.  She denies any associated fever, chills, ear pain, dyspnea, chest pain, nausea, vomiting.  She does report that she is out of her albuterol inhaler.  She denies any recent travel.  No other concerns are voiced.    Cough  This is a new problem. The current episode started yesterday. The problem has been waxing and waning. The problem occurs constantly. The cough is Productive of sputum. Associated symptoms include headaches, nasal congestion, postnasal drip, a sore throat and wheezing. Pertinent negatives include no chest pain, chills, ear pain, fever, rash or shortness of breath. Nothing aggravates the symptoms. She has tried nothing for the symptoms.       Constitution: Negative for chills and fever.   HENT:  Positive for congestion, postnasal drip and sore throat. Negative for ear pain, sinus pain, sinus pressure and trouble swallowing.    Cardiovascular:  Negative for chest pain.   Respiratory:  Positive for cough, sputum production, COPD and wheezing. Negative for shortness of breath.    Gastrointestinal:  Negative for nausea and vomiting.   Skin:  Negative for rash.   Neurological:  Positive for headaches.      Objective:     Physical Exam   Constitutional: She is oriented to person, place, and time. She appears well-developed. She is cooperative.  Non-toxic appearance. She does not appear ill. No distress.   HENT:   Head: Normocephalic and atraumatic.   Ears:   Right Ear:  Hearing and external ear normal.   Left Ear: Hearing and external ear normal.   Nose: Congestion present. No mucosal edema, rhinorrhea or nasal deformity. No epistaxis. Right sinus exhibits no maxillary sinus tenderness and no frontal sinus tenderness. Left sinus exhibits no maxillary sinus tenderness and no frontal sinus tenderness.   Mouth/Throat: Uvula is midline, oropharynx is clear and moist and mucous membranes are normal. No trismus in the jaw. Normal dentition. No uvula swelling. No oropharyngeal exudate, posterior oropharyngeal edema or posterior oropharyngeal erythema.   Eyes: Conjunctivae and lids are normal. No scleral icterus.   Neck: Trachea normal and phonation normal. Neck supple. No edema present. No erythema present. No neck rigidity present.   Cardiovascular: Normal rate, regular rhythm, normal heart sounds and normal pulses.   Pulmonary/Chest: Effort normal and breath sounds normal. No respiratory distress. She has no decreased breath sounds. She has no wheezes. She has no rhonchi. She has no rales.   Abdominal: Normal appearance.   Musculoskeletal: Normal range of motion.         General: No deformity. Normal range of motion.   Neurological: She is alert and oriented to person, place, and time. She exhibits normal muscle tone. Coordination normal.   Skin: Skin is warm, dry, intact, not diaphoretic and not pale.   Psychiatric: Her speech is normal and behavior is normal. Judgment and thought content normal.   Nursing note and vitals reviewed.      Assessment:     1. Cough, unspecified type    2. COVID    3. History of COPD    4. Former smoker    5. HREB on CPAP        Plan:       Cough, unspecified type  -     SARS Coronavirus 2 Antigen, POCT Manual Read    COVID    History of COPD    Former smoker    HERB on CPAP    Other orders  -     nirmatrelvir-ritonavir (PAXLOVID) 300 mg (150 mg x 2)-100 mg copackaged tablets (EUA); Take 3 tablets by mouth 2 (two) times daily. Each dose contains 2  nirmatrelvir (pink tablets) and 1 ritonavir (white tablet). Take all 3 tablets together  Dispense: 30 tablet; Refill: 0  -     benzonatate (TESSALON) 200 MG capsule; Take 1 capsule (200 mg total) by mouth 3 (three) times daily as needed for Cough.  Dispense: 25 capsule; Refill: 0  -     dextromethorphan-guaiFENesin (CORICIDIN HBP CHEST HENRY-COUGH)  mg Cap; Take 2 capsules by mouth every 6 (six) hours while awake. for 10 days  Dispense: 40 each; Refill: 0  -     albuterol (PROVENTIL HFA) 90 mcg/actuation inhaler; Inhale 2 puffs into the lungs every 6 (six) hours as needed for Wheezing. Rescue  Dispense: 8 g; Refill: 0  -     inhalation spacing device; Use as directed for inhalation.  Dispense: 1 each; Refill: 0          Medical Decision Making:   Initial Assessment:   Nontoxic appearing 67 yo female c/o cough/congestion.  After complete evaluation, including thorough history and physical exam, the patient's symptoms are most likely due to viral upper respiratory infection. There are no concerning features on physical exam to suggest bacterial otitis media/externa, sinusitis, strep pharyngitis, or peritonsillar abscess. Vital signs do not suggest sepsis. Lung sounds are clear and not consistent with pneumonia. There is no neck pain or limited ROM to suggest retropharyngeal abscess or meningitis. In clinic testing for covid is positive.The patient will be treated with supportive care. Will provide RX for paxlovid upon D/C. Follow up instructions and ED precautions provided.     Clinical Tests:   Lab Tests: Reviewed       <> Summary of Lab: Covid positive  Patient is seen in clinic with known history of essential hypertension noted to be elevated above goal today in clinic.  Patient was counseled that blood pressure was elevated. I advised adherence to current medication regime, low-salt heart smart diet, exercise and self-monitoring.  Patient follow up with primary physician for long-term management adjustments  as needed.    Patient has history of respiratory disease (copd/asthma/HERB/CPaP use) noted to have increase risk of pneumonia (bacterial infection) development with acute respiratory illness. Patient was advised close monitoring and is to continue current medications including inhalers. Patient to follow up with PCP and given strict ER precautions.     Additional MDM:     Heart Failure Score:   COPD = Yes        Results for orders placed or performed in visit on 10/07/23   SARS Coronavirus 2 Antigen, POCT Manual Read   Result Value Ref Range    SARS Coronavirus 2 Antigen Positive (A) Negative     Acceptable Yes      Patient Instructions   POSITIVE COVID TEST    You have tested positive for COVID-19 today.  Please note that patients who test positive for COVID-19 are required by the CDC to undergo isolation for 5 days after their symptoms first began.     This isolation starts from the day you first developed symptoms, not the day of your positive test. For example, if your symptoms began on a Monday but tested positive on the following Wednesday, your 5-day isolation begins from that Monday, not the Wednesday you tested positive.     However, if you are asymptomatic (a person who does not have any symptoms) and COVID-19 positive, your 5-day isolation begins on the day you tested positive, regardless of exposure date.     Also, per the CDC guidelines, once your 5 days have passed, and you have not had fever greater than 100.4F in the last 24 hours without taking any fever reducers such as Tylenol (Acetaminophen) or Motrin (Ibuprofen), you may return to your normal activities including social distancing, wearing masks continually for 5 more days, and frequent handwashing -     YOU DO NOT NEED ANOTHER TEST IN ORDER TO END YOUR QUARANTINE.  Treatment: There is no cure for the COVID there is only symptomatic care.     Try these tips to keep yourself comfortable:                   -Get plenty of rest.                    -Drink plenty of fluids, at least 8 large glasses of fluid a day. Good fluid choices are water, fruit juices high in Vitamin C, tea, gelatin, or broths and soups. These help to keep mucus thin and ease congestion.                  -Use salt water gargle, cough drops or throat sprays to relieve throat pain. Mi ¼ to ½ teaspoon of salt in 1 cup of warm water for a salt water gargle  solution.                  -Use petroleum jelly or lip balm around lips and nose to prevent chapping.                  -Use saline nose drops or spray to help ease congestion.    Over the Counter (OTC) Medicines:  Take over the counter medicines as needed to ease your signs.  Read labels carefully.  Use a product that treats only the signs that you have. Ask your pharmacist  for recommendations. Be sure to ask about possible interactions with other  medicines you are taking.  Common medicines used to treat signs of covid include:     -Flonase daily.  -Claritin or Zyrtec daily.  -Mucinex every 12 hours -- Drink plenty of water while taking this medication.    - Cough suppressant, also called antitussive, such as dextromethorphan.  This medicine decreases your reflex and sensitivity to cough. This  medicine may be kept behind the pharmacy counter for purchase.    Cold and cough medicines often contain more than one type of medicine.  Ask the pharmacist for help to confirm that you are not using more than one  product with the same or similar ingredient. For example, some cold and  cough medicines have acetaminophen or ibuprofen in them to help lower a  fever or ease muscle aches. Do not take extra acetaminophen (Tylenol) or  ibuprofen (Advil, Motrin) if the cold or cough medicine has it as an  ingredient. Too much medicine could be harmful.    Take the correct dose as listed on the package. Do not take more than  recommended.    Use a Humidifier:  A cool mist humidifier can make breathing easier by thinning mucus. Do not use  a  steam humidifier as hot water can cause burns if spilled.  Place the humidifier a few feet from the bed. Drain and clean each day with  soap and water to prevent bacteria and mold from growing.  Indoor humidity should not be above 50%. Stop using the humidifier if you  notice moisture on windows, walls or pictures.  You do not need to add any medicine to the humidifier.  If you cannot get a humidifier, place a pan of water next to heating vents and  refill the water level daily. The water will evaporate and add moisture to the  Room.    How to prevent the spread of COVID  -Wash your hands with soap and water or use alcohol based hand   often. Dry hands wet from washing with soap on a paper towel instead of cloth towel.  -Cough or sneeze into your elbow to avoid spreading germs.  -Wipe down common surfaces, such as door knobs and faucet handles, with a disinfectant spray.  -Do not share cups or utensils.        Please follow up with your Primary care provider within 2-5 days if your signs and symptoms have not resolved or worsen.      If your condition worsens or fails to improve we recommend that you receive another evaluation at the emergency room immediately or contact your primary medical clinic to discuss your concerns.   You must understand that you have received an Urgent Care treatment only and that you may be released before all of your medical problems are known or treated. You, the patient, will arrange for follow up care as instructed.     We do have a drug called Paxlovid that has an Emergency Authorization Use that [facility] is currently advising and using for Covid-19. I can send over the info about it for you to look over if you'd like. I can also send the medicine to your pharmacy so you have it.. The main thing with it is that it could cause some liver or kidney injuries. If you were on any type of cholesterol medicine like a statin we would want to stop that as well. There are some other  drug interactions as well. Since it is new we do not know everything about it yet but this is the current recommendation to help prevent severe disease in patients with a mild-moderate symptoms within 5 days of symptom-onset who are not in the hospital being treated for covid.

## 2023-10-13 ENCOUNTER — LAB VISIT (OUTPATIENT)
Dept: LAB | Facility: HOSPITAL | Age: 68
End: 2023-10-13
Attending: NURSE PRACTITIONER
Payer: MEDICARE

## 2023-10-13 DIAGNOSIS — D50.0 IRON DEFICIENCY ANEMIA DUE TO CHRONIC BLOOD LOSS: ICD-10-CM

## 2023-10-13 LAB
BASOPHILS # BLD AUTO: 0.02 K/UL (ref 0–0.2)
BASOPHILS NFR BLD: 0.3 % (ref 0–1.9)
DIFFERENTIAL METHOD: NORMAL
EOSINOPHIL # BLD AUTO: 0.1 K/UL (ref 0–0.5)
EOSINOPHIL NFR BLD: 1.9 % (ref 0–8)
ERYTHROCYTE [DISTWIDTH] IN BLOOD BY AUTOMATED COUNT: 12.7 % (ref 11.5–14.5)
HCT VFR BLD AUTO: 40.7 % (ref 37–48.5)
HGB BLD-MCNC: 13.6 G/DL (ref 12–16)
IMM GRANULOCYTES # BLD AUTO: 0.02 K/UL (ref 0–0.04)
IMM GRANULOCYTES NFR BLD AUTO: 0.3 % (ref 0–0.5)
LYMPHOCYTES # BLD AUTO: 1.8 K/UL (ref 1–4.8)
LYMPHOCYTES NFR BLD: 29.1 % (ref 18–48)
MCH RBC QN AUTO: 30.9 PG (ref 27–31)
MCHC RBC AUTO-ENTMCNC: 33.4 G/DL (ref 32–36)
MCV RBC AUTO: 93 FL (ref 82–98)
MONOCYTES # BLD AUTO: 0.4 K/UL (ref 0.3–1)
MONOCYTES NFR BLD: 6.2 % (ref 4–15)
NEUTROPHILS # BLD AUTO: 3.9 K/UL (ref 1.8–7.7)
NEUTROPHILS NFR BLD: 62.2 % (ref 38–73)
NRBC BLD-RTO: 0 /100 WBC
PLATELET # BLD AUTO: 232 K/UL (ref 150–450)
PMV BLD AUTO: 9.3 FL (ref 9.2–12.9)
RBC # BLD AUTO: 4.4 M/UL (ref 4–5.4)
WBC # BLD AUTO: 6.33 K/UL (ref 3.9–12.7)

## 2023-10-13 PROCEDURE — 36415 COLL VENOUS BLD VENIPUNCTURE: CPT | Mod: HCNC,PO | Performed by: NURSE PRACTITIONER

## 2023-10-13 PROCEDURE — 82728 ASSAY OF FERRITIN: CPT | Mod: HCNC | Performed by: NURSE PRACTITIONER

## 2023-10-13 PROCEDURE — 85025 COMPLETE CBC W/AUTO DIFF WBC: CPT | Mod: HCNC | Performed by: NURSE PRACTITIONER

## 2023-10-13 PROCEDURE — 84466 ASSAY OF TRANSFERRIN: CPT | Mod: HCNC | Performed by: NURSE PRACTITIONER

## 2023-10-13 PROCEDURE — 83540 ASSAY OF IRON: CPT | Mod: HCNC | Performed by: NURSE PRACTITIONER

## 2023-10-14 LAB
FERRITIN SERPL-MCNC: 249 NG/ML (ref 20–300)
IRON SERPL-MCNC: 77 UG/DL (ref 30–160)
SATURATED IRON: 23 % (ref 20–50)
TOTAL IRON BINDING CAPACITY: 340 UG/DL (ref 250–450)
TRANSFERRIN SERPL-MCNC: 230 MG/DL (ref 200–375)

## 2023-10-16 ENCOUNTER — OFFICE VISIT (OUTPATIENT)
Dept: HEMATOLOGY/ONCOLOGY | Facility: CLINIC | Age: 68
End: 2023-10-16
Payer: MEDICARE

## 2023-10-16 VITALS
DIASTOLIC BLOOD PRESSURE: 62 MMHG | BODY MASS INDEX: 35.43 KG/M2 | OXYGEN SATURATION: 92 % | HEIGHT: 65 IN | TEMPERATURE: 98 F | HEART RATE: 66 BPM | WEIGHT: 212.63 LBS | SYSTOLIC BLOOD PRESSURE: 119 MMHG

## 2023-10-16 DIAGNOSIS — R91.8 MULTIPLE LUNG NODULES: ICD-10-CM

## 2023-10-16 DIAGNOSIS — D50.9 IRON DEFICIENCY ANEMIA, UNSPECIFIED IRON DEFICIENCY ANEMIA TYPE: Primary | ICD-10-CM

## 2023-10-16 DIAGNOSIS — J44.9 CHRONIC OBSTRUCTIVE PULMONARY DISEASE, UNSPECIFIED COPD TYPE: ICD-10-CM

## 2023-10-16 DIAGNOSIS — E66.01 CLASS 2 SEVERE OBESITY WITH SERIOUS COMORBIDITY AND BODY MASS INDEX (BMI) OF 36.0 TO 36.9 IN ADULT, UNSPECIFIED OBESITY TYPE: ICD-10-CM

## 2023-10-16 PROCEDURE — 1159F MED LIST DOCD IN RCRD: CPT | Mod: HCNC,CPTII,S$GLB, | Performed by: NURSE PRACTITIONER

## 2023-10-16 PROCEDURE — 1126F AMNT PAIN NOTED NONE PRSNT: CPT | Mod: HCNC,CPTII,S$GLB, | Performed by: NURSE PRACTITIONER

## 2023-10-16 PROCEDURE — 1159F PR MEDICATION LIST DOCUMENTED IN MEDICAL RECORD: ICD-10-PCS | Mod: HCNC,CPTII,S$GLB, | Performed by: NURSE PRACTITIONER

## 2023-10-16 PROCEDURE — 3074F PR MOST RECENT SYSTOLIC BLOOD PRESSURE < 130 MM HG: ICD-10-PCS | Mod: HCNC,CPTII,S$GLB, | Performed by: NURSE PRACTITIONER

## 2023-10-16 PROCEDURE — 3044F HG A1C LEVEL LT 7.0%: CPT | Mod: HCNC,CPTII,S$GLB, | Performed by: NURSE PRACTITIONER

## 2023-10-16 PROCEDURE — 3288F PR FALLS RISK ASSESSMENT DOCUMENTED: ICD-10-PCS | Mod: HCNC,CPTII,S$GLB, | Performed by: NURSE PRACTITIONER

## 2023-10-16 PROCEDURE — 1101F PR PT FALLS ASSESS DOC 0-1 FALLS W/OUT INJ PAST YR: ICD-10-PCS | Mod: HCNC,CPTII,S$GLB, | Performed by: NURSE PRACTITIONER

## 2023-10-16 PROCEDURE — 1160F RVW MEDS BY RX/DR IN RCRD: CPT | Mod: HCNC,CPTII,S$GLB, | Performed by: NURSE PRACTITIONER

## 2023-10-16 PROCEDURE — 3078F PR MOST RECENT DIASTOLIC BLOOD PRESSURE < 80 MM HG: ICD-10-PCS | Mod: HCNC,CPTII,S$GLB, | Performed by: NURSE PRACTITIONER

## 2023-10-16 PROCEDURE — 3044F PR MOST RECENT HEMOGLOBIN A1C LEVEL <7.0%: ICD-10-PCS | Mod: HCNC,CPTII,S$GLB, | Performed by: NURSE PRACTITIONER

## 2023-10-16 PROCEDURE — 99214 OFFICE O/P EST MOD 30 MIN: CPT | Mod: HCNC,S$GLB,, | Performed by: NURSE PRACTITIONER

## 2023-10-16 PROCEDURE — 99999 PR PBB SHADOW E&M-EST. PATIENT-LVL IV: ICD-10-PCS | Mod: PBBFAC,HCNC,, | Performed by: NURSE PRACTITIONER

## 2023-10-16 PROCEDURE — 3008F PR BODY MASS INDEX (BMI) DOCUMENTED: ICD-10-PCS | Mod: HCNC,CPTII,S$GLB, | Performed by: NURSE PRACTITIONER

## 2023-10-16 PROCEDURE — 99999 PR PBB SHADOW E&M-EST. PATIENT-LVL IV: CPT | Mod: PBBFAC,HCNC,, | Performed by: NURSE PRACTITIONER

## 2023-10-16 PROCEDURE — 3288F FALL RISK ASSESSMENT DOCD: CPT | Mod: HCNC,CPTII,S$GLB, | Performed by: NURSE PRACTITIONER

## 2023-10-16 PROCEDURE — 1160F PR REVIEW ALL MEDS BY PRESCRIBER/CLIN PHARMACIST DOCUMENTED: ICD-10-PCS | Mod: HCNC,CPTII,S$GLB, | Performed by: NURSE PRACTITIONER

## 2023-10-16 PROCEDURE — 1101F PT FALLS ASSESS-DOCD LE1/YR: CPT | Mod: HCNC,CPTII,S$GLB, | Performed by: NURSE PRACTITIONER

## 2023-10-16 PROCEDURE — 1126F PR PAIN SEVERITY QUANTIFIED, NO PAIN PRESENT: ICD-10-PCS | Mod: HCNC,CPTII,S$GLB, | Performed by: NURSE PRACTITIONER

## 2023-10-16 PROCEDURE — 99214 PR OFFICE/OUTPT VISIT, EST, LEVL IV, 30-39 MIN: ICD-10-PCS | Mod: HCNC,S$GLB,, | Performed by: NURSE PRACTITIONER

## 2023-10-16 PROCEDURE — 3074F SYST BP LT 130 MM HG: CPT | Mod: HCNC,CPTII,S$GLB, | Performed by: NURSE PRACTITIONER

## 2023-10-16 PROCEDURE — 3008F BODY MASS INDEX DOCD: CPT | Mod: HCNC,CPTII,S$GLB, | Performed by: NURSE PRACTITIONER

## 2023-10-16 PROCEDURE — 3078F DIAST BP <80 MM HG: CPT | Mod: HCNC,CPTII,S$GLB, | Performed by: NURSE PRACTITIONER

## 2023-10-16 NOTE — PROGRESS NOTES
Subjective:       Patient ID: Ro Hernandez is a 68 y.o. female.    Chief Complaint: f/u JAQUELINE    HPI: 67 y.o female with h/o HTN, COPD, HLD, JAQUELINE previously treated with Injectafer    Patient has undergone prior GI evaluation including EGD (2020) Colonoscopy (2019) VCE (2020). EGD unremarkable. Colonoscopy with polyp, benign pathology, recommended repeat in 5 years. VCE notes angioectasia without bleeding. GI recommends repeat endoscopy if recurrent anemia.     Today she presents for follow up of her h/o JAQUELINE. She notes chronic intermittent SOB with exertion. Denies CP, dizziness, syncope. She does have h/o small bilateral lung nodules followed by Pulmonology yearly  Social History     Socioeconomic History    Marital status:    Tobacco Use    Smoking status: Former     Current packs/day: 0.00     Average packs/day: 1 pack/day for 48.4 years (48.4 ttl pk-yrs)     Types: Cigarettes     Start date:      Quit date: 2016     Years since quittin.3    Tobacco comments:     smoked for 25 yrs. quit 16yrs. then smoked again for 5-6 before quitting in .   Substance and Sexual Activity    Alcohol use: No     Alcohol/week: 1.0 standard drink of alcohol     Types: 1 Standard drinks or equivalent per week    Drug use: No    Sexual activity: Not Currently     Social Determinants of Health     Financial Resource Strain: Low Risk  (2019)    Overall Financial Resource Strain (CARDIA)     Difficulty of Paying Living Expenses: Not hard at all   Food Insecurity: No Food Insecurity (2019)    Hunger Vital Sign     Worried About Running Out of Food in the Last Year: Never true     Ran Out of Food in the Last Year: Never true   Transportation Needs: No Transportation Needs (2019)    PRAPARE - Transportation     Lack of Transportation (Medical): No     Lack of Transportation (Non-Medical): No   Physical Activity: Unknown (2019)    Exercise Vital Sign     Days of Exercise per Week: 0  days   Stress: No Stress Concern Present (9/29/2019)    [employer] - Occupational Stress Questionnaire     Feeling of Stress : Only a little   Social Connections: Unknown (9/29/2019)    Social Connection and Isolation Panel [NHANES]     Frequency of Communication with Friends and Family: More than three times a week     Frequency of Social Gatherings with Friends and Family: More than three times a week     Active Member of Clubs or Organizations: No     Attends Club or Organization Meetings: Never     Marital Status:        Past Medical History:   Diagnosis Date    Allergy     Angina     Arthritis 6/11/2013    Asthma     COPD (chronic obstructive pulmonary disease)     GERD (gastroesophageal reflux disease)     Hx of Prinzmetal angina     Hypertension     Obesity 6/11/2013    Other and unspecified hyperlipidemia 6/11/2013    Prinzmetal's angina     Sleep apnea 6/11/2013       Family History   Problem Relation Age of Onset    Hypertension Mother     Cancer Mother     Kidney cancer Mother     COPD Father     Hypertension Father     Heart disease Sister     Hyperlipidemia Son     Breast cancer Maternal Grandmother     Colon cancer Neg Hx        Past Surgical History:   Procedure Laterality Date    ABDOMINAL SURGERY      CHOLECYSTECTOMY      COLONOSCOPY N/A 12/6/2019    Procedure: COLONOSCOPY;  Surgeon: Winston Smith MD;  Location: [facility];  Service: Endoscopy;  Laterality: N/A;    ESOPHAGOGASTRODUODENOSCOPY N/A 12/10/2018    Procedure: EGD (ESOPHAGOGASTRODUODENOSCOPY);  Surgeon: Raúl Polanco III, MD;  Location: [facility];  Service: Endoscopy;  Laterality: N/A;    ESOPHAGOGASTRODUODENOSCOPY N/A 9/8/2020    Procedure: EGD (ESOPHAGOGASTRODUODENOSCOPY);  Surgeon: Raúl Polanco III, MD;  Location: [facility];  Service: Endoscopy;  Laterality: N/A;    EYE SURGERY      FOOT SURGERY      INTRALUMINAL GASTROINTESTINAL TRACT IMAGING VIA CAPSULE  N/A 9/18/2020    Procedure: IMAGING PROCEDURE, GI TRACT, INTRALUMINAL, VIA CAPSULE;  Surgeon: Blade Holley RN;  Location: [facility];  Service: Endoscopy;  Laterality: N/A;    TONSILLECTOMY      TUBAL LIGATION         Review of Systems   Constitutional:  Negative for activity change, appetite change, chills, diaphoresis, fatigue, fever and unexpected weight change.   HENT:  Negative for congestion, mouth sores, nosebleeds, sore throat, trouble swallowing and voice change.    Eyes:  Negative for visual disturbance.   Respiratory:  Positive for shortness of breath (chronic, intermittent). Negative for cough, chest tightness and wheezing.    Cardiovascular:  Negative for chest pain and leg swelling.   Gastrointestinal:  Negative for abdominal distention, abdominal pain, blood in stool, constipation, diarrhea, nausea and vomiting.   Genitourinary:  Negative for difficulty urinating, dysuria and hematuria.   Musculoskeletal:  Negative for arthralgias, back pain and myalgias.   Skin:  Negative for pallor, rash and wound.   Neurological:  Negative for dizziness, syncope, weakness and headaches.   Hematological:  Negative for adenopathy. Does not bruise/bleed easily.   Psychiatric/Behavioral:  The patient is not nervous/anxious.        Medication List with Changes/Refills   Current Medications    ALBUTEROL (PROVENTIL HFA) 90 MCG/ACTUATION INHALER    Inhale 2 puffs into the lungs every 6 (six) hours as needed for Wheezing. Rescue    ALBUTEROL (PROVENTIL) 2.5 MG /3 ML (0.083 %) NEBULIZER SOLUTION    Take 3 mLs (2.5 mg total) by nebulization every 4 (four) hours as needed. Rescue    ASPIRIN (ECOTRIN) 81 MG EC TABLET    Take 81 mg by mouth once daily.    BENZONATATE (TESSALON) 200 MG CAPSULE    Take 1 capsule (200 mg total) by mouth 3 (three) times daily as needed for Cough.    CELECOXIB (CELEBREX) 200 MG CAPSULE    Take 200 mg by mouth once daily.    DEXTROMETHORPHAN-GUAIFENESIN (CORICIDIN HBP CHEST HENRY-COUGH)  MG CAP     Take 2 capsules by mouth every 6 (six) hours while awake. for 10 days    EZETIMIBE (ZETIA) 10 MG TABLET    Take 1 tablet (10 mg total) by mouth once daily.    FLUTICASONE PROPIONATE (FLONASE) 50 MCG/ACTUATION NASAL SPRAY    USE 2 SPRAYS IN EACH NOSTRIL ONCE DAILY    HYDROCODONE-ACETAMINOPHEN (NORCO)  MG PER TABLET    Take 1 tablet by mouth. One tablet twice a day    INHALATION SPACING DEVICE    Use as directed for inhalation.    ISOSORBIDE MONONITRATE (IMDUR) 30 MG 24 HR TABLET    Take 1 tab po in the AM and 1/2 tab po in the PM    LACTULOSE (CHRONULAC) 10 GRAM/15 ML SOLUTION    Take 30 mLs (20 g total) by mouth 2 (two) times daily.    METOPROLOL SUCCINATE (TOPROL-XL) 100 MG 24 HR TABLET        MOMETASONE-FORMOTEROL (DULERA) 200-5 MCG/ACTUATION INHALER    Inhale 2 puffs into the lungs 2 (two) times daily. Controller    MULTIVITAMIN (MULTIPLE VITAMIN ORAL)    Take 1 tablet by mouth Daily.    NALOXONE (NARCAN) 4 MG/ACTUATION SPRY        NIRMATRELVIR-RITONAVIR (PAXLOVID) 300 MG (150 MG X 2)-100 MG COPACKAGED TABLETS (EUA)    Take 3 tablets by mouth 2 (two) times daily. Each dose contains 2 nirmatrelvir (pink tablets) and 1 ritonavir (white tablet). Take all 3 tablets together    NITROGLYCERIN (NITROSTAT) 0.4 MG SL TABLET    DISSOLVE 1 TABLET UNDER THE TONGUE EVERY 5 MINUTES FOR 3 DOSES AS NEEDED FOR CHEST PAIN    PANTOPRAZOLE (PROTONIX) 20 MG TABLET    Take 1 tablet (20 mg total) by mouth 2 (two) times daily before meals.    SOLIFENACIN (VESICARE) 10 MG TABLET    Take 1 tablet (10 mg total) by mouth once daily.    TRIAMCINOLONE ACETONIDE (KENALOG-40) 40 MG/ML INJECTION    40 mg by Other route once daily.    VERAPAMIL (VERELAN) 360 MG C24P    Take 360 mg by mouth once daily.     Objective:     Vitals:    10/16/23 0908   BP: 119/62   Pulse: 66   Temp: 98 °F (36.7 °C)     Lab Results   Component Value Date    WBC 6.33 10/13/2023    HGB 13.6 10/13/2023    HCT 40.7 10/13/2023    MCV 93 10/13/2023      10/13/2023       BMP  Lab Results   Component Value Date     03/02/2023    K 4.8 03/02/2023     03/02/2023    CO2 28 03/02/2023    BUN 17 03/02/2023    CREATININE 0.8 03/02/2023    CALCIUM 9.4 03/02/2023    ANIONGAP 5 (L) 03/02/2023    ESTGFRAFRICA >60.0 11/19/2021    EGFRNONAA >60.0 11/19/2021     Lab Results   Component Value Date    ALT 14 03/02/2023    AST 23 03/02/2023    ALKPHOS 68 03/02/2023    BILITOT 0.6 03/02/2023     Lab Results   Component Value Date    IRON 77 10/13/2023    TIBC 340 10/13/2023    FERRITIN 249 10/13/2023         Physical Exam  Vitals reviewed.   Constitutional:       Appearance: She is well-developed.   HENT:      Head: Normocephalic.      Right Ear: External ear normal.      Left Ear: External ear normal.   Eyes:      General: Lids are normal. No scleral icterus.        Right eye: No discharge.         Left eye: No discharge.      Conjunctiva/sclera: Conjunctivae normal.   Neck:      Thyroid: No thyroid mass.   Cardiovascular:      Rate and Rhythm: Normal rate.   Pulmonary:      Effort: Pulmonary effort is normal. No respiratory distress.      Breath sounds: Normal breath sounds. No wheezing or rales.   Abdominal:      General: Bowel sounds are normal. There is no distension.      Palpations: Abdomen is soft.      Tenderness: There is no abdominal tenderness.   Genitourinary:     Comments: deferred  Musculoskeletal:         General: Normal range of motion.      Cervical back: Normal range of motion.   Skin:     General: Skin is warm and dry.   Neurological:      Mental Status: She is alert and oriented to person, place, and time.   Psychiatric:         Speech: Speech normal.         Behavior: Behavior normal. Behavior is cooperative.         Thought Content: Thought content normal.        Assessment:     Problem List Items Addressed This Visit        Pulmonary    Multiple lung nodules     Followed by Pulmonology          Chronic obstructive pulmonary disease, unspecified  COPD type     Continue Pulmonology f/u            Oncology    Iron deficiency anemia - Primary     Hemoglobin remains normal. Iron levels wnl    Hold off on iron supplement at present time. Encourage iron rich foods    F/u 6 months with cbc, iron studies per patient request. Discussed S&S to report sooner.            Relevant Orders    CBC Auto Differential    Iron and TIBC    Ferritin       Endocrine    Class 2 severe obesity with serious comorbidity and body mass index (BMI) of 36.0 to 36.9 in adult     Encourage healthy nutrition along with portion control. Encourage daily excercise               Plan:     Iron deficiency anemia, unspecified iron deficiency anemia type  -     CBC Auto Differential; Future; Expected date: 10/16/2023  -     Iron and TIBC; Future; Expected date: 10/16/2023  -     Ferritin; Future; Expected date: 10/16/2023    Chronic obstructive pulmonary disease, unspecified COPD type    Multiple lung nodules    Class 2 severe obesity with serious comorbidity and body mass index (BMI) of 36.0 to 36.9 in adult, unspecified obesity type          Med Onc Chart Routing      Follow up with physician    Follow up with [facility] 6 months.   Infusion scheduling note    Injection scheduling note    Labs CBC, ferritin and iron and TIBC   Scheduling:  Preferred lab:  Lab interval:  1-2 days prior   Imaging None      Pharmacy appointment No pharmacy appointment needed      Other referrals       No additional referrals needed             YANETH Jha

## 2023-10-16 NOTE — ASSESSMENT & PLAN NOTE
Hemoglobin remains normal. Iron levels wnl    Hold off on iron supplement at present time. Encourage iron rich foods    F/u 6 months with cbc, iron studies per patient request. Discussed S&S to report sooner.

## 2023-10-17 ENCOUNTER — PATIENT MESSAGE (OUTPATIENT)
Dept: PULMONOLOGY | Facility: CLINIC | Age: 68
End: 2023-10-17
Payer: MEDICARE

## 2023-11-14 ENCOUNTER — PATIENT MESSAGE (OUTPATIENT)
Dept: GASTROENTEROLOGY | Facility: CLINIC | Age: 68
End: 2023-11-14
Payer: MEDICARE

## 2023-11-15 RX ORDER — PANTOPRAZOLE SODIUM 20 MG/1
20 TABLET, DELAYED RELEASE ORAL
Qty: 60 TABLET | Refills: 0 | Status: SHIPPED | OUTPATIENT
Start: 2023-11-15 | End: 2024-01-02 | Stop reason: SDUPTHER

## 2023-12-05 ENCOUNTER — OFFICE VISIT (OUTPATIENT)
Dept: ORTHOPEDICS | Facility: CLINIC | Age: 68
End: 2023-12-05
Payer: MEDICARE

## 2023-12-05 VITALS — BODY MASS INDEX: 34.82 KG/M2 | HEIGHT: 65 IN | WEIGHT: 209 LBS

## 2023-12-05 DIAGNOSIS — M65.30 TRIGGER FINGER, ACQUIRED: Primary | ICD-10-CM

## 2023-12-05 PROCEDURE — 1125F PR PAIN SEVERITY QUANTIFIED, PAIN PRESENT: ICD-10-PCS | Mod: HCNC,CPTII,S$GLB, | Performed by: ORTHOPAEDIC SURGERY

## 2023-12-05 PROCEDURE — 99213 PR OFFICE/OUTPT VISIT, EST, LEVL III, 20-29 MIN: ICD-10-PCS | Mod: HCNC,25,S$GLB, | Performed by: ORTHOPAEDIC SURGERY

## 2023-12-05 PROCEDURE — 99999 PR PBB SHADOW E&M-EST. PATIENT-LVL III: CPT | Mod: PBBFAC,HCNC,, | Performed by: ORTHOPAEDIC SURGERY

## 2023-12-05 PROCEDURE — 3008F PR BODY MASS INDEX (BMI) DOCUMENTED: ICD-10-PCS | Mod: HCNC,CPTII,S$GLB, | Performed by: ORTHOPAEDIC SURGERY

## 2023-12-05 PROCEDURE — 99213 OFFICE O/P EST LOW 20 MIN: CPT | Mod: HCNC,25,S$GLB, | Performed by: ORTHOPAEDIC SURGERY

## 2023-12-05 PROCEDURE — 3008F BODY MASS INDEX DOCD: CPT | Mod: HCNC,CPTII,S$GLB, | Performed by: ORTHOPAEDIC SURGERY

## 2023-12-05 PROCEDURE — 1160F RVW MEDS BY RX/DR IN RCRD: CPT | Mod: HCNC,CPTII,S$GLB, | Performed by: ORTHOPAEDIC SURGERY

## 2023-12-05 PROCEDURE — 20550 TENDON SHEATH: ICD-10-PCS | Mod: HCNC,RT,S$GLB, | Performed by: ORTHOPAEDIC SURGERY

## 2023-12-05 PROCEDURE — 3288F PR FALLS RISK ASSESSMENT DOCUMENTED: ICD-10-PCS | Mod: HCNC,CPTII,S$GLB, | Performed by: ORTHOPAEDIC SURGERY

## 2023-12-05 PROCEDURE — 1101F PT FALLS ASSESS-DOCD LE1/YR: CPT | Mod: HCNC,CPTII,S$GLB, | Performed by: ORTHOPAEDIC SURGERY

## 2023-12-05 PROCEDURE — 1160F PR REVIEW ALL MEDS BY PRESCRIBER/CLIN PHARMACIST DOCUMENTED: ICD-10-PCS | Mod: HCNC,CPTII,S$GLB, | Performed by: ORTHOPAEDIC SURGERY

## 2023-12-05 PROCEDURE — 3044F PR MOST RECENT HEMOGLOBIN A1C LEVEL <7.0%: ICD-10-PCS | Mod: HCNC,CPTII,S$GLB, | Performed by: ORTHOPAEDIC SURGERY

## 2023-12-05 PROCEDURE — 3044F HG A1C LEVEL LT 7.0%: CPT | Mod: HCNC,CPTII,S$GLB, | Performed by: ORTHOPAEDIC SURGERY

## 2023-12-05 PROCEDURE — 20550 NJX 1 TENDON SHEATH/LIGAMENT: CPT | Mod: HCNC,RT,S$GLB, | Performed by: ORTHOPAEDIC SURGERY

## 2023-12-05 PROCEDURE — 1159F PR MEDICATION LIST DOCUMENTED IN MEDICAL RECORD: ICD-10-PCS | Mod: HCNC,CPTII,S$GLB, | Performed by: ORTHOPAEDIC SURGERY

## 2023-12-05 PROCEDURE — 99999 PR PBB SHADOW E&M-EST. PATIENT-LVL III: ICD-10-PCS | Mod: PBBFAC,HCNC,, | Performed by: ORTHOPAEDIC SURGERY

## 2023-12-05 PROCEDURE — 1125F AMNT PAIN NOTED PAIN PRSNT: CPT | Mod: HCNC,CPTII,S$GLB, | Performed by: ORTHOPAEDIC SURGERY

## 2023-12-05 PROCEDURE — 1101F PR PT FALLS ASSESS DOC 0-1 FALLS W/OUT INJ PAST YR: ICD-10-PCS | Mod: HCNC,CPTII,S$GLB, | Performed by: ORTHOPAEDIC SURGERY

## 2023-12-05 PROCEDURE — 3288F FALL RISK ASSESSMENT DOCD: CPT | Mod: HCNC,CPTII,S$GLB, | Performed by: ORTHOPAEDIC SURGERY

## 2023-12-05 PROCEDURE — 1159F MED LIST DOCD IN RCRD: CPT | Mod: HCNC,CPTII,S$GLB, | Performed by: ORTHOPAEDIC SURGERY

## 2023-12-05 RX ORDER — TRIAMCINOLONE ACETONIDE 40 MG/ML
40 INJECTION, SUSPENSION INTRA-ARTICULAR; INTRAMUSCULAR
Status: DISCONTINUED | OUTPATIENT
Start: 2023-12-05 | End: 2023-12-05 | Stop reason: HOSPADM

## 2023-12-05 RX ADMIN — TRIAMCINOLONE ACETONIDE 40 MG: 40 INJECTION, SUSPENSION INTRA-ARTICULAR; INTRAMUSCULAR at 09:12

## 2023-12-05 NOTE — PROCEDURES
Tendon Sheath    Date/Time: 12/5/2023 9:15 AM    Performed by: Alejandro Romero MD  Authorized by: Alejandro Romero MD    Consent Done?:  Yes (Verbal)  Indications:  Pain  Timeout: prior to procedure the correct patient, procedure, and site was verified    Prep: patient was prepped and draped in usual sterile fashion      Local anesthesia used?: Yes    Local anesthetic:  Lidocaine 2% without epinephrine  Anesthetic total (ml):  0.5    Location:  Thumb  Site:  R thumb flexor tendon sheath  Ultrasonic guidance for needle placement?: No    Needle size:  25 G  Approach:  Volar  Medications:  40 mg triamcinolone acetonide 40 mg/mL

## 2023-12-05 NOTE — PROGRESS NOTES
Subjective:     Patient ID: Ro Hernandez is a 68 y.o. female.    Chief Complaint: Pain of the Right Hand      HPI:  The patient is a 68-year-old female with a right trigger thumb last injected 03/22/2023 with good results until recently.  She requests reinjection today    Past Medical History:   Diagnosis Date    Allergy     Angina     Arthritis 6/11/2013    Asthma     COPD (chronic obstructive pulmonary disease)     GERD (gastroesophageal reflux disease)     Hx of Prinzmetal angina     Hypertension     Obesity 6/11/2013    Other and unspecified hyperlipidemia 6/11/2013    Prinzmetal's angina     Sleep apnea 6/11/2013     Past Surgical History:   Procedure Laterality Date    ABDOMINAL SURGERY      CHOLECYSTECTOMY      COLONOSCOPY N/A 12/6/2019    Procedure: COLONOSCOPY;  Surgeon: Winston Smith MD;  Location: [facility];  Service: Endoscopy;  Laterality: N/A;    ESOPHAGOGASTRODUODENOSCOPY N/A 12/10/2018    Procedure: EGD (ESOPHAGOGASTRODUODENOSCOPY);  Surgeon: Raúl Polanco III, MD;  Location: [facility];  Service: Endoscopy;  Laterality: N/A;    ESOPHAGOGASTRODUODENOSCOPY N/A 9/8/2020    Procedure: EGD (ESOPHAGOGASTRODUODENOSCOPY);  Surgeon: Raúl Polanco III, MD;  Location: [facility];  Service: Endoscopy;  Laterality: N/A;    EYE SURGERY      FOOT SURGERY      INTRALUMINAL GASTROINTESTINAL TRACT IMAGING VIA CAPSULE N/A 9/18/2020    Procedure: IMAGING PROCEDURE, GI TRACT, INTRALUMINAL, VIA CAPSULE;  Surgeon: Blade Holley RN;  Location: [facility];  Service: Endoscopy;  Laterality: N/A;    TONSILLECTOMY      TUBAL LIGATION       Family History   Problem Relation Age of Onset    Hypertension Mother     Cancer Mother     Kidney cancer Mother     COPD Father     Hypertension Father     Heart disease Sister     Hyperlipidemia Son     Breast cancer Maternal Grandmother     Colon cancer Neg Hx      Social History     Socioeconomic History    Marital status:    Tobacco Use    Smoking status: Former      Current packs/day: 0.00     Average packs/day: 1 pack/day for 48.4 years (48.4 ttl pk-yrs)     Types: Cigarettes     Start date:      Quit date: 2016     Years since quittin.4    Smokeless tobacco: Never    Tobacco comments:     smoked for 25 yrs. quit 16yrs. then smoked again for 5-6 before quitting in 2016.   Substance and Sexual Activity    Alcohol use: No     Alcohol/week: 1.0 standard drink of alcohol     Types: 1 Standard drinks or equivalent per week    Drug use: No    Sexual activity: Not Currently     Social Determinants of Health     Financial Resource Strain: Low Risk  (2019)    Overall Financial Resource Strain (CARDIA)     Difficulty of Paying Living Expenses: Not hard at all   Food Insecurity: No Food Insecurity (2019)    Hunger Vital Sign     Worried About Running Out of Food in the Last Year: Never true     Ran Out of Food in the Last Year: Never true   Transportation Needs: No Transportation Needs (2019)    PRAPARE - Transportation     Lack of Transportation (Medical): No     Lack of Transportation (Non-Medical): No   Physical Activity: Unknown (2019)    Exercise Vital Sign     Days of Exercise per Week: 0 days   Stress: No Stress Concern Present (2019)    [employer] - Occupational Stress Questionnaire     Feeling of Stress : Only a little   Social Connections: Unknown (2019)    Social Connection and Isolation Panel [NHANES]     Frequency of Communication with Friends and Family: More than three times a week     Frequency of Social Gatherings with Friends and Family: More than three times a week     Active Member of Clubs or Organizations: No     Attends Club or Organization Meetings: Never     Marital Status:      Medication List with Changes/Refills   Current Medications    ALBUTEROL (PROVENTIL HFA) 90 MCG/ACTUATION INHALER    Inhale 2 puffs into the lungs every 6 (six) hours as needed for Wheezing. Rescue    ASPIRIN  (ECOTRIN) 81 MG EC TABLET    Take 81 mg by mouth once daily.    CELECOXIB (CELEBREX) 200 MG CAPSULE    Take 200 mg by mouth once daily.    EZETIMIBE (ZETIA) 10 MG TABLET    Take 1 tablet (10 mg total) by mouth once daily.    FLUTICASONE PROPIONATE (FLONASE) 50 MCG/ACTUATION NASAL SPRAY    USE 2 SPRAYS IN EACH NOSTRIL ONCE DAILY    HYDROCODONE-ACETAMINOPHEN (NORCO)  MG PER TABLET    Take 1 tablet by mouth. One tablet twice a day    INHALATION SPACING DEVICE    Use as directed for inhalation.    ISOSORBIDE MONONITRATE (IMDUR) 30 MG 24 HR TABLET    Take 1 tab po in the AM and 1/2 tab po in the PM    LACTULOSE (CHRONULAC) 10 GRAM/15 ML SOLUTION    Take 30 mLs (20 g total) by mouth 2 (two) times daily.    METOPROLOL SUCCINATE (TOPROL-XL) 100 MG 24 HR TABLET        MOMETASONE-FORMOTEROL (DULERA) 200-5 MCG/ACTUATION INHALER    Inhale 2 puffs into the lungs 2 (two) times daily. Controller    MULTIVITAMIN (MULTIPLE VITAMIN ORAL)    Take 1 tablet by mouth Daily.    NALOXONE (NARCAN) 4 MG/ACTUATION SPRY        NIRMATRELVIR-RITONAVIR (PAXLOVID) 300 MG (150 MG X 2)-100 MG COPACKAGED TABLETS (EUA)    Take 3 tablets by mouth 2 (two) times daily. Each dose contains 2 nirmatrelvir (pink tablets) and 1 ritonavir (white tablet). Take all 3 tablets together    NITROGLYCERIN (NITROSTAT) 0.4 MG SL TABLET    DISSOLVE 1 TABLET UNDER THE TONGUE EVERY 5 MINUTES FOR 3 DOSES AS NEEDED FOR CHEST PAIN    PANTOPRAZOLE (PROTONIX) 20 MG TABLET    Take 1 tablet (20 mg total) by mouth 2 (two) times daily before meals.    SOLIFENACIN (VESICARE) 10 MG TABLET    Take 1 tablet (10 mg total) by mouth once daily.    TRIAMCINOLONE ACETONIDE (KENALOG-40) 40 MG/ML INJECTION    40 mg by Other route once daily.    VERAPAMIL (VERELAN) 360 MG C24P    Take 360 mg by mouth once daily.     Review of patient's allergies indicates:   Allergen Reactions    Ace inhibitors      Other reaction(s): Hives    Lisinopril      Other reaction(s): rash    Pravastatin       Other reaction(s): Mental Changes    Rosuvastatin Other (See Comments)     Other reaction(s): Muscle pain    Simvastatin Other (See Comments)     Other reaction(s): leg cramps     Review of Systems   Constitutional: Negative for malaise/fatigue.   HENT:  Negative for hearing loss.    Eyes:  Negative for double vision and visual disturbance.   Cardiovascular:  Positive for chest pain.   Respiratory:  Positive for shortness of breath, sleep disturbances due to breathing and wheezing.    Endocrine: Negative for cold intolerance.   Hematologic/Lymphatic: Does not bruise/bleed easily.   Skin:  Negative for poor wound healing and suspicious lesions.   Musculoskeletal:  Positive for arthritis, joint pain and joint swelling. Negative for gout.   Gastrointestinal:  Positive for abdominal pain and heartburn. Negative for nausea and vomiting.   Genitourinary:  Positive for bladder incontinence. Negative for dysuria.   Neurological:  Negative for numbness, paresthesias and sensory change.   Psychiatric/Behavioral:  Negative for depression, memory loss and substance abuse. The patient is not nervous/anxious.    Allergic/Immunologic: Negative for persistent infections.       Objective:   Body mass index is 34.78 kg/m².  There were no vitals filed for this visit.             General    Constitutional: She is oriented to person, place, and time. She appears well-developed and well-nourished. No distress.   HENT:   Head: Normocephalic.   Eyes: EOM are normal.   Pulmonary/Chest: Effort normal.   Neurological: She is oriented to person, place, and time.   Psychiatric: She has a normal mood and affect.             Right Hand/Wrist Exam     Inspection   Scars: Wrist - absent Hand -  absent  Effusion: Wrist - absent Hand -  absent    Pain   Hand - The patient exhibits pain of the thumb MCP.    Other     Neuorologic Exam    Median Distribution: normal  Ulnar Distribution: normal  Radial Distribution: normal    Comments:  The patient has  active triggering right thumb with a palpable nodule that moves with tendon excursion.          Vascular Exam       Capillary Refill  Right Hand: normal capillary refill          radiographs were not obtained today  Assessment:     Encounter Diagnosis   Name Primary?    Trigger finger, acquired Yes    Right thumb    Plan:       The patient injected right trigger thumb with 0.5 cc of Kenalog and 0.5 cc of 2% plain lidocaine under sterile technique.  She will wait at least 3 months between injections.              Disclaimer: This note was prepared using a voice recognition system and is likely to have sound alike errors within the text.

## 2023-12-23 DIAGNOSIS — E78.2 MIXED HYPERLIPIDEMIA: ICD-10-CM

## 2023-12-23 NOTE — TELEPHONE ENCOUNTER
Care Due:                  Date            Visit Type   Department     Provider  --------------------------------------------------------------------------------                                EP -                              PRIMARY      [facility] INTERNAL  Last Visit: 09-      CARE ([facility])   MEDICINE       Minor Charlton  Next Visit: None Scheduled  None         None Found                                                            Last  Test          Frequency    Reason                     Performed    Due Date  --------------------------------------------------------------------------------    CMP.........  12 months..  ezetimibe................  03- 02-    Lipid Panel.  12 months..  ezetimibe................  03- 02-    Health [facility] Embedded Care Due Messages. Reference number: [id number].   12/23/2023 5:31:14 PM CST

## 2023-12-25 RX ORDER — EZETIMIBE 10 MG/1
10 TABLET ORAL
Qty: 90 TABLET | Refills: 0 | Status: SHIPPED | OUTPATIENT
Start: 2023-12-25 | End: 2024-03-22

## 2023-12-25 NOTE — TELEPHONE ENCOUNTER
Provider Staff:  Action required for this patient    Requires labs      Please see care gap opportunities below in Care Due Message.    Thanks!  [facility]     Appointments      Date Provider   Last Visit   9/5/2023 Minor Charlton MD   Next Visit   Visit date not found Minor Charlton MD     Refill Decision Note   Ro Hernandez  is requesting a refill authorization.  Brief Assessment and Rationale for Refill:  Approve     Medication Therapy Plan:         Comments:     Note composed:2:30 PM 12/25/2023

## 2024-01-02 ENCOUNTER — OFFICE VISIT (OUTPATIENT)
Dept: GASTROENTEROLOGY | Facility: CLINIC | Age: 69
End: 2024-01-02
Payer: MEDICARE

## 2024-01-02 VITALS — WEIGHT: 205 LBS | HEIGHT: 65 IN | BODY MASS INDEX: 34.16 KG/M2

## 2024-01-02 DIAGNOSIS — E66.01 CLASS 2 SEVERE OBESITY WITH SERIOUS COMORBIDITY AND BODY MASS INDEX (BMI) OF 36.0 TO 36.9 IN ADULT, UNSPECIFIED OBESITY TYPE: ICD-10-CM

## 2024-01-02 DIAGNOSIS — K59.09 CHRONIC CONSTIPATION: ICD-10-CM

## 2024-01-02 DIAGNOSIS — K55.20 AVM (ARTERIOVENOUS MALFORMATION) OF SMALL BOWEL, ACQUIRED: ICD-10-CM

## 2024-01-02 DIAGNOSIS — K21.9 GASTROESOPHAGEAL REFLUX DISEASE, UNSPECIFIED WHETHER ESOPHAGITIS PRESENT: Primary | ICD-10-CM

## 2024-01-02 PROCEDURE — 1159F MED LIST DOCD IN RCRD: CPT | Mod: HCNC,CPTII,95, | Performed by: PHYSICIAN ASSISTANT

## 2024-01-02 PROCEDURE — 99213 OFFICE O/P EST LOW 20 MIN: CPT | Mod: HCNC,95,, | Performed by: PHYSICIAN ASSISTANT

## 2024-01-02 PROCEDURE — 3008F BODY MASS INDEX DOCD: CPT | Mod: HCNC,CPTII,95, | Performed by: PHYSICIAN ASSISTANT

## 2024-01-02 PROCEDURE — 3288F FALL RISK ASSESSMENT DOCD: CPT | Mod: HCNC,CPTII,95, | Performed by: PHYSICIAN ASSISTANT

## 2024-01-02 PROCEDURE — 1126F AMNT PAIN NOTED NONE PRSNT: CPT | Mod: HCNC,CPTII,95, | Performed by: PHYSICIAN ASSISTANT

## 2024-01-02 PROCEDURE — 1101F PT FALLS ASSESS-DOCD LE1/YR: CPT | Mod: HCNC,CPTII,95, | Performed by: PHYSICIAN ASSISTANT

## 2024-01-02 RX ORDER — LACTULOSE 10 G/15ML
20 SOLUTION ORAL; RECTAL 2 TIMES DAILY
Qty: 1892 ML | Refills: 2 | Status: SHIPPED | OUTPATIENT
Start: 2024-01-02 | End: 2024-01-02 | Stop reason: CLARIF

## 2024-01-02 RX ORDER — PANTOPRAZOLE SODIUM 20 MG/1
20 TABLET, DELAYED RELEASE ORAL
Qty: 60 TABLET | Refills: 0 | Status: SHIPPED | OUTPATIENT
Start: 2024-01-02 | End: 2024-01-02 | Stop reason: SDUPTHER

## 2024-01-02 RX ORDER — PANTOPRAZOLE SODIUM 20 MG/1
20 TABLET, DELAYED RELEASE ORAL
Qty: 180 TABLET | Refills: 3 | Status: SHIPPED | OUTPATIENT
Start: 2024-01-02 | End: 2024-01-04 | Stop reason: SDUPTHER

## 2024-01-02 RX ORDER — LACTULOSE 10 G/15ML
10 SOLUTION ORAL; RECTAL 2 TIMES DAILY
Qty: 1892 ML | Refills: 11 | Status: SHIPPED | OUTPATIENT
Start: 2024-01-02

## 2024-01-02 NOTE — PROGRESS NOTES
Subjective:      Patient ID: Ro Hernandez is a 68 y.o. female.    Chief Complaint: Constipation (Needs refill on Lactulose) and Gastroesophageal Reflux (Needs refill to Local then 90 day supply to mail order in med card/Pantoprazole)    The patient location is: [place]  The chief complaint leading to consultation is: See above    Visit type: audiovisual    Face to Face time with patient: 8 minutes   10 minutes of total time spent on the encounter, which includes face to face time and non-face to face time preparing to see the patient (eg, review of tests), Obtaining and/or reviewing separately obtained history, Documenting clinical information in the electronic or other health record, Independently interpreting results (not separately reported) and communicating results to the patient/family/caregiver, or Care coordination (not separately reported).         Each patient to whom he or she provides medical services by telemedicine is:  (1) informed of the relationship between the physician and patient and the respective role of any other health care provider with respect to management of the patient; and (2) notified that he or she may decline to receive medical services by telemedicine and may withdraw from such care at any time.    Notes:     HPI  The patient has a history of anemia, SB angioectasia, GERD, belching and chronic constipation. She was last seen April 2022.       Today, she reports doing well. She is needing refills of Protonix and Lactulose. She ran out of her Protonix about three days ago and had issues last night, especially after eating too much for New Year's. She otherwise denies nausea, vomiting, abdominal pain, change in bowel habits, hematochezia or melena. Her last Hgb was normal. She is using Lactulose PRN.     VCE (09/2020): single angioectasia without bleeding in the proximal small bowel.  EGD (09/2020): bilious gastric fluid, scar in gastric antrum, gastric mucosal variant.   Colonoscopy  (12/06/19) - 3 mm polyp, diverticulosis, non-bleeding internal hemorrhoids. Repeat 5 years.     Review of Systems  As per HPI.     Objective:     Physical Exam  Constitutional:       General: She is not in acute distress.     Appearance: She is well-developed.   HENT:      Head: Normocephalic and atraumatic.   Pulmonary:      Effort: Pulmonary effort is normal.   Neurological:      Mental Status: She is alert and oriented to person, place, and time.      Cranial Nerves: No cranial nerve deficit.   Psychiatric:         Behavior: Behavior normal.         Assessment:     1. Gastroesophageal reflux disease, unspecified whether esophagitis present    2. Chronic constipation    3. AVM (arteriovenous malformation) of small bowel, acquired    4. Class 2 severe obesity with serious comorbidity and body mass index (BMI) of 36.0 to 36.9 in adult, unspecified obesity type        Plan:     The patient is doing well and medications were refilled.   Discussed daily or regular use of Lactulose to try and prevent sx of constipation.  Discussed general lifestyle modifications to aid in weight loss. She has lost purposeful weight since October. Her A1C is normal. She is scheduled to see Dr. Kim in a few months. In the meantime, continue to monitor weight and follow dietary modifications.     Medications Ordered This Encounter   Medications    lactulose (CHRONULAC) 10 gram/15 mL solution     Sig: Take 15 mLs (10 g total) by mouth 2 (two) times daily.     Dispense:  1892 mL     Refill:  11    pantoprazole (PROTONIX) 20 MG tablet     Sig: Take 1 tablet (20 mg total) by mouth 2 (two) times daily before meals.     Dispense:  180 tablet     Refill:  3         Follow up in about 1 year (around 1/2/2025), or if symptoms worsen or fail to improve.    Thank you for the opportunity to participate in the care of this patient.   Clarence Barboza PA-C.

## 2024-01-03 ENCOUNTER — PATIENT MESSAGE (OUTPATIENT)
Dept: GASTROENTEROLOGY | Facility: CLINIC | Age: 69
End: 2024-01-03
Payer: MEDICARE

## 2024-01-04 RX ORDER — PANTOPRAZOLE SODIUM 20 MG/1
20 TABLET, DELAYED RELEASE ORAL
Qty: 60 TABLET | Refills: 0 | Status: SHIPPED | OUTPATIENT
Start: 2024-01-04

## 2024-01-04 RX ORDER — CYCLOBENZAPRINE HCL 5 MG
TABLET ORAL
COMMUNITY
Start: 2023-11-18

## 2024-01-11 ENCOUNTER — PATIENT MESSAGE (OUTPATIENT)
Dept: PULMONOLOGY | Facility: CLINIC | Age: 69
End: 2024-01-11
Payer: MEDICARE

## 2024-01-11 DIAGNOSIS — Z00.00 ENCOUNTER FOR MEDICARE ANNUAL WELLNESS EXAM: ICD-10-CM

## 2024-01-12 DIAGNOSIS — J30.9 ALLERGIC RHINITIS, UNSPECIFIED SEASONALITY, UNSPECIFIED TRIGGER: ICD-10-CM

## 2024-01-16 RX ORDER — FLUTICASONE PROPIONATE 50 MCG
2 SPRAY, SUSPENSION (ML) NASAL DAILY
Qty: 48 G | Refills: 3 | Status: SHIPPED | OUTPATIENT
Start: 2024-01-16

## 2024-03-13 DIAGNOSIS — I10 ESSENTIAL HYPERTENSION: ICD-10-CM

## 2024-03-21 DIAGNOSIS — E78.2 MIXED HYPERLIPIDEMIA: ICD-10-CM

## 2024-03-21 NOTE — TELEPHONE ENCOUNTER
Care Due:                  Date            Visit Type   Department     Provider  --------------------------------------------------------------------------------                                EP -                              PRIMARY      [facility] INTERNAL  Last Visit: 09-      CARE ([facility])   MEDICINE       Minor Charlton  Next Visit: None Scheduled  None         None Found                                                            Last  Test          Frequency    Reason                     Performed    Due Date  --------------------------------------------------------------------------------    CMP.........  12 months..  ezetimibe................  03- 02-    Lipid Panel.  12 months..  ezetimibe................  03- 02-    Health [facility] Embedded Care Due Messages. Reference number: [id number].   3/21/2024 10:52:18 AM CDT

## 2024-03-22 RX ORDER — EZETIMIBE 10 MG/1
10 TABLET ORAL
Qty: 90 TABLET | Refills: 1 | Status: SHIPPED | OUTPATIENT
Start: 2024-03-22

## 2024-03-22 NOTE — TELEPHONE ENCOUNTER
Refill Routing Note   Medication(s) are not appropriate for processing by [facility] for the following reason(s):        Required labs outdated    [facility] action(s):  Defer     Requires labs : Yes             Appointments  past 12m or future 3m with PCP    Date Provider   Last Visit   9/5/2023 Minor Charlton MD   Next Visit   Visit date not found Minor Charlton MD   ED visits in past 90 days: 0        Note composed:10:35 PM 03/21/2024

## 2024-04-02 ENCOUNTER — LAB VISIT (OUTPATIENT)
Dept: LAB | Facility: HOSPITAL | Age: 69
End: 2024-04-02
Attending: FAMILY MEDICINE
Payer: MEDICARE

## 2024-04-02 ENCOUNTER — OFFICE VISIT (OUTPATIENT)
Dept: PRIMARY CARE CLINIC | Facility: CLINIC | Age: 69
End: 2024-04-02
Payer: MEDICARE

## 2024-04-02 VITALS
HEIGHT: 65 IN | TEMPERATURE: 97 F | SYSTOLIC BLOOD PRESSURE: 122 MMHG | WEIGHT: 207.88 LBS | DIASTOLIC BLOOD PRESSURE: 72 MMHG | BODY MASS INDEX: 34.63 KG/M2 | RESPIRATION RATE: 18 BRPM | HEART RATE: 82 BPM | OXYGEN SATURATION: 85 %

## 2024-04-02 DIAGNOSIS — I42.1 OBSTRUCTIVE HYPERTROPHIC CARDIOMYOPATHY: ICD-10-CM

## 2024-04-02 DIAGNOSIS — G47.33 OSA ON CPAP: Chronic | ICD-10-CM

## 2024-04-02 DIAGNOSIS — E66.01 CLASS 2 SEVERE OBESITY WITH SERIOUS COMORBIDITY AND BODY MASS INDEX (BMI) OF 36.0 TO 36.9 IN ADULT, UNSPECIFIED OBESITY TYPE: ICD-10-CM

## 2024-04-02 DIAGNOSIS — I10 ESSENTIAL HYPERTENSION: ICD-10-CM

## 2024-04-02 DIAGNOSIS — Z86.2 HISTORY OF ANEMIA: ICD-10-CM

## 2024-04-02 DIAGNOSIS — R63.5 WEIGHT GAIN: ICD-10-CM

## 2024-04-02 DIAGNOSIS — K21.9 GASTROESOPHAGEAL REFLUX DISEASE, UNSPECIFIED WHETHER ESOPHAGITIS PRESENT: ICD-10-CM

## 2024-04-02 DIAGNOSIS — I20.1 PRINZMETAL'S ANGINA: ICD-10-CM

## 2024-04-02 DIAGNOSIS — E66.09 CLASS 1 OBESITY DUE TO EXCESS CALORIES WITH SERIOUS COMORBIDITY AND BODY MASS INDEX (BMI) OF 34.0 TO 34.9 IN ADULT: ICD-10-CM

## 2024-04-02 LAB
ALBUMIN SERPL BCP-MCNC: 3.7 G/DL (ref 3.5–5.2)
ALP SERPL-CCNC: 71 U/L (ref 55–135)
ALT SERPL W/O P-5'-P-CCNC: 13 U/L (ref 10–44)
ANION GAP SERPL CALC-SCNC: 11 MMOL/L (ref 8–16)
AST SERPL-CCNC: 23 U/L (ref 10–40)
BASOPHILS # BLD AUTO: 0.02 K/UL (ref 0–0.2)
BASOPHILS NFR BLD: 0.3 % (ref 0–1.9)
BILIRUB SERPL-MCNC: 0.6 MG/DL (ref 0.1–1)
BUN SERPL-MCNC: 22 MG/DL (ref 8–23)
CALCIUM SERPL-MCNC: 9.6 MG/DL (ref 8.7–10.5)
CHLORIDE SERPL-SCNC: 107 MMOL/L (ref 95–110)
CHOLEST SERPL-MCNC: 178 MG/DL (ref 120–199)
CHOLEST/HDLC SERPL: 4.5 {RATIO} (ref 2–5)
CO2 SERPL-SCNC: 24 MMOL/L (ref 23–29)
CREAT SERPL-MCNC: 0.8 MG/DL (ref 0.5–1.4)
DIFFERENTIAL METHOD BLD: NORMAL
EOSINOPHIL # BLD AUTO: 0.1 K/UL (ref 0–0.5)
EOSINOPHIL NFR BLD: 1.8 % (ref 0–8)
ERYTHROCYTE [DISTWIDTH] IN BLOOD BY AUTOMATED COUNT: 13.1 % (ref 11.5–14.5)
EST. GFR  (NO RACE VARIABLE): >60 ML/MIN/1.73 M^2
GLUCOSE SERPL-MCNC: 89 MG/DL (ref 70–110)
HCT VFR BLD AUTO: 41.6 % (ref 37–48.5)
HDLC SERPL-MCNC: 40 MG/DL (ref 40–75)
HDLC SERPL: 22.5 % (ref 20–50)
HGB BLD-MCNC: 13.3 G/DL (ref 12–16)
IMM GRANULOCYTES # BLD AUTO: 0.03 K/UL (ref 0–0.04)
IMM GRANULOCYTES NFR BLD AUTO: 0.5 % (ref 0–0.5)
INSULIN COLLECTION INTERVAL: NORMAL
INSULIN SERPL-ACNC: 5.7 UU/ML
LDLC SERPL CALC-MCNC: 114.6 MG/DL (ref 63–159)
LYMPHOCYTES # BLD AUTO: 1.3 K/UL (ref 1–4.8)
LYMPHOCYTES NFR BLD: 19.6 % (ref 18–48)
MCH RBC QN AUTO: 30.4 PG (ref 27–31)
MCHC RBC AUTO-ENTMCNC: 32 G/DL (ref 32–36)
MCV RBC AUTO: 95 FL (ref 82–98)
MONOCYTES # BLD AUTO: 0.4 K/UL (ref 0.3–1)
MONOCYTES NFR BLD: 5.9 % (ref 4–15)
NEUTROPHILS # BLD AUTO: 4.8 K/UL (ref 1.8–7.7)
NEUTROPHILS NFR BLD: 71.9 % (ref 38–73)
NONHDLC SERPL-MCNC: 138 MG/DL
NRBC BLD-RTO: 0 /100 WBC
PLATELET # BLD AUTO: 196 K/UL (ref 150–450)
PMV BLD AUTO: 10.2 FL (ref 9.2–12.9)
POTASSIUM SERPL-SCNC: 4.5 MMOL/L (ref 3.5–5.1)
PROT SERPL-MCNC: 7 G/DL (ref 6–8.4)
RBC # BLD AUTO: 4.37 M/UL (ref 4–5.4)
SODIUM SERPL-SCNC: 142 MMOL/L (ref 136–145)
TRIGL SERPL-MCNC: 117 MG/DL (ref 30–150)
TSH SERPL DL<=0.005 MIU/L-ACNC: 0.79 UIU/ML (ref 0.4–4)
WBC # BLD AUTO: 6.64 K/UL (ref 3.9–12.7)

## 2024-04-02 PROCEDURE — 99999 PR PBB SHADOW E&M-EST. PATIENT-LVL V: CPT | Mod: PBBFAC,HCNC,, | Performed by: FAMILY MEDICINE

## 2024-04-02 PROCEDURE — 3008F BODY MASS INDEX DOCD: CPT | Mod: HCNC,CPTII,S$GLB, | Performed by: FAMILY MEDICINE

## 2024-04-02 PROCEDURE — 1126F AMNT PAIN NOTED NONE PRSNT: CPT | Mod: HCNC,CPTII,S$GLB, | Performed by: FAMILY MEDICINE

## 2024-04-02 PROCEDURE — 80061 LIPID PANEL: CPT | Mod: HCNC | Performed by: FAMILY MEDICINE

## 2024-04-02 PROCEDURE — 84443 ASSAY THYROID STIM HORMONE: CPT | Mod: HCNC | Performed by: FAMILY MEDICINE

## 2024-04-02 PROCEDURE — 1101F PT FALLS ASSESS-DOCD LE1/YR: CPT | Mod: HCNC,CPTII,S$GLB, | Performed by: FAMILY MEDICINE

## 2024-04-02 PROCEDURE — 80053 COMPREHEN METABOLIC PANEL: CPT | Mod: HCNC | Performed by: FAMILY MEDICINE

## 2024-04-02 PROCEDURE — 1159F MED LIST DOCD IN RCRD: CPT | Mod: HCNC,CPTII,S$GLB, | Performed by: FAMILY MEDICINE

## 2024-04-02 PROCEDURE — 85025 COMPLETE CBC W/AUTO DIFF WBC: CPT | Mod: HCNC | Performed by: FAMILY MEDICINE

## 2024-04-02 PROCEDURE — 1160F RVW MEDS BY RX/DR IN RCRD: CPT | Mod: HCNC,CPTII,S$GLB, | Performed by: FAMILY MEDICINE

## 2024-04-02 PROCEDURE — 36415 COLL VENOUS BLD VENIPUNCTURE: CPT | Mod: HCNC | Performed by: FAMILY MEDICINE

## 2024-04-02 PROCEDURE — 3078F DIAST BP <80 MM HG: CPT | Mod: HCNC,CPTII,S$GLB, | Performed by: FAMILY MEDICINE

## 2024-04-02 PROCEDURE — 99214 OFFICE O/P EST MOD 30 MIN: CPT | Mod: HCNC,S$GLB,, | Performed by: FAMILY MEDICINE

## 2024-04-02 PROCEDURE — 83525 ASSAY OF INSULIN: CPT | Mod: HCNC | Performed by: FAMILY MEDICINE

## 2024-04-02 PROCEDURE — 3074F SYST BP LT 130 MM HG: CPT | Mod: HCNC,CPTII,S$GLB, | Performed by: FAMILY MEDICINE

## 2024-04-02 PROCEDURE — 3288F FALL RISK ASSESSMENT DOCD: CPT | Mod: HCNC,CPTII,S$GLB, | Performed by: FAMILY MEDICINE

## 2024-04-02 RX ORDER — PANTOPRAZOLE SODIUM 20 MG/1
20 TABLET, DELAYED RELEASE ORAL
Qty: 180 TABLET | Refills: 3 | OUTPATIENT
Start: 2024-04-02

## 2024-04-02 NOTE — PROGRESS NOTES
Subjective     Referring MD: Latesha  PCP: same  BMI noted 34  Diet: variable  Exercise/Activity: limited  Sleep: good  Stressors: sister passed 2 weeks ago/was on hospice  Anxiety/Depression Screen/PHQ-2: dk  Works for an  x 24 years; she will return this summer  No hx of prediabetes    Labs reviewed: cbc 10/23  Hx of anemia  Cmp wn l   Patient ID: Ro Hernandez is a 68 y.o. female.    Chief Complaint: Establish Care (Weight loss)  Pt reports chronic health conditions (as below)  Cardiology: Dr JOELLEN Quiles  Dx'd with angina; obstructive CM  May start new medication--pt did not do assistance program  Affects her energy    Has pfts next month. Compliant with cpap    Hx of fe def anemia; has been good. Hx of infusions.      She feels issues is that she is craving sugar: jelly beans, circus peanuts, etc. She has tried fruit. Noticed weight gain around abdominal region.     No known fam hx of DM II.       Food recall:  Bfast: greek yogurt, 100 sary pastry crisp  Lunch: peanut butter with crackers (8) or chicken pot pie  Dinner: meat, veggie, salad  Snack: sugary treats as above  Water: adequate, uses flavors from [employer]  Sugar Sweetened beverages: none  Etoh: none snce 12/31/2016 (pt sobriety date prior to this)        HPI       Objective     PAST MEDICAL HISTORY:  Past Medical History:   Diagnosis Date    Allergy     Angina     Arthritis 6/11/2013    Asthma     COPD (chronic obstructive pulmonary disease)     GERD (gastroesophageal reflux disease)     Hx of Prinzmetal angina     Hypertension     Obesity 6/11/2013    Other and unspecified hyperlipidemia 6/11/2013    Prinzmetal's angina     Sleep apnea 6/11/2013         PAST SURGICAL HISTORY:  Past Surgical History:   Procedure Laterality Date    ABDOMINAL SURGERY      CHOLECYSTECTOMY      COLONOSCOPY N/A 12/6/2019    Procedure: COLONOSCOPY;  Surgeon: Winston Smith MD;  Location: [facility];  Service: Endoscopy;  Laterality: N/A;    ESOPHAGOGASTRODUODENOSCOPY N/A  12/10/2018    Procedure: EGD (ESOPHAGOGASTRODUODENOSCOPY);  Surgeon: Raúl Polanco III, MD;  Location: [facility] ENDO;  Service: Endoscopy;  Laterality: N/A;    ESOPHAGOGASTRODUODENOSCOPY N/A 9/8/2020    Procedure: EGD (ESOPHAGOGASTRODUODENOSCOPY);  Surgeon: Raúl Polanco III, MD;  Location: [facility] ENDO;  Service: Endoscopy;  Laterality: N/A;    EYE SURGERY      FOOT SURGERY      INTRALUMINAL GASTROINTESTINAL TRACT IMAGING VIA CAPSULE N/A 9/18/2020    Procedure: IMAGING PROCEDURE, GI TRACT, INTRALUMINAL, VIA CAPSULE;  Surgeon: Blade Holley RN;  Location: [facility] ENDO;  Service: Endoscopy;  Laterality: N/A;    TONSILLECTOMY      TUBAL LIGATION         FAMILY HISTORY:  Family History   Problem Relation Age of Onset    Hypertension Mother     Cancer Mother     Kidney cancer Mother     COPD Father     Hypertension Father     Heart disease Sister     Hyperlipidemia Son     Breast cancer Maternal Grandmother     Colon cancer Neg Hx           SOCIAL HISTORY:  Social History     Social History Narrative    Not on file       MEDICATIONS:  Medications have been reviewed.    ALLERGIES:  Allergies have been reviewed.    Vitals:    04/02/24 0811   BP: 122/72   Pulse: 82   Resp: 18   Temp: 97 °F (36.1 °C)     Wt Readings from Last 10 Encounters:   04/02/24 94.3 kg (207 lb 14.3 oz)   01/02/24 93 kg (205 lb)   12/05/23 94.8 kg (209 lb)   11/29/23 94.8 kg (209 lb)   10/16/23 96.5 kg (212 lb 10.1 oz)   09/05/23 96.6 kg (212 lb 13.7 oz)   06/21/23 98.9 kg (218 lb 0.6 oz)   05/12/23 98.9 kg (218 lb 0.6 oz)   04/14/23 100.7 kg (222 lb 0.1 oz)   03/22/23 100.7 kg (222 lb)       Lab Results   Component Value Date    WBC 6.33 10/13/2023    HGB 13.6 10/13/2023    HCT 40.7 10/13/2023     10/13/2023    CHOL 198 03/02/2023    TRIG 130 03/02/2023    HDL 39 (L) 03/02/2023    ALT 14 03/02/2023    AST 23 03/02/2023     03/02/2023    K 4.8 03/02/2023     03/02/2023    CREATININE 0.8 03/02/2023    BUN 17 03/02/2023    CO2 28  03/02/2023    TSH 0.886 08/05/2022    INR 1.0 01/02/2007    HGBA1C 5.0 03/02/2023       Review of Systems   Constitutional:  Positive for fatigue. Negative for activity change, appetite change and fever.   HENT:  Negative for mouth dryness, nosebleeds, postnasal drip, rhinorrhea and goiter.    Eyes:  Negative for visual disturbance.   Respiratory:  Negative for apnea, cough, chest tightness and shortness of breath.    Cardiovascular:  Negative for chest pain, palpitations and leg swelling.   Gastrointestinal:  Negative for abdominal pain, constipation, diarrhea, nausea, vomiting and reflux.   Endocrine: Negative for cold intolerance, heat intolerance, polydipsia, polyphagia and polyuria.   Genitourinary:  Negative for frequency and menstrual problem.   Musculoskeletal:  Negative for arthralgias and myalgias.   Integumentary:  Negative for color change and rash.   Neurological:  Negative for dizziness, vertigo, tremors, seizures, syncope, weakness, light-headedness, headaches and memory loss.   Psychiatric/Behavioral:  Negative for self-injury, sleep disturbance and suicidal ideas. The patient is not nervous/anxious.        Physical Exam  Vitals and nursing note reviewed.   Constitutional:       General: She is not in acute distress.  HENT:      Head: Normocephalic and atraumatic.      Mouth/Throat:      Pharynx: Oropharynx is clear.   Eyes:      General: No scleral icterus.     Pupils: Pupils are equal, round, and reactive to light.   Neck:      Comments: No TM  Cardiovascular:      Rate and Rhythm: Normal rate and regular rhythm.      Pulses: Normal pulses.      Heart sounds: Murmur (III/VI) heard.      No friction rub. No gallop.   Pulmonary:      Effort: Pulmonary effort is normal. No respiratory distress.      Breath sounds: Normal breath sounds. No wheezing, rhonchi or rales.   Abdominal:      General: Bowel sounds are normal. There is no distension.      Palpations: Abdomen is soft.      Tenderness: There is  no abdominal tenderness.   Musculoskeletal:         General: No swelling.      Cervical back: Normal range of motion and neck supple. No tenderness.   Lymphadenopathy:      Cervical: No cervical adenopathy.   Skin:     General: Skin is warm.      Findings: No erythema or rash.   Neurological:      Mental Status: She is alert and oriented to person, place, and time.   Psychiatric:         Mood and Affect: Mood normal.         Behavior: Behavior normal.              Assessment and Plan       1. History of anemia    2. Weight gain    3. Class 2 severe obesity with serious comorbidity and body mass index (BMI) of 36.0 to 36.9 in adult, unspecified obesity type    4. Unspecified essential hypertension    5. Gastroesophageal reflux disease, unspecified whether esophagitis present    6. HERB on CPAP    7. Class 1 obesity due to excess calories with serious comorbidity and body mass index (BMI) of 34.0 to 34.9 in adult    8. Prinzmetal's angina    9. Obstructive hypertrophic cardiomyopathy      History of anemia  -     CBC Auto Differential; Future; Expected date: 04/02/2024    Weight gain  -     Lipid Panel; Future; Expected date: 04/02/2024  -     TSH; Future; Expected date: 04/02/2024  -     Insulin, Random; Future; Expected date: 04/02/2024  -     Comprehensive Metabolic Panel; Future; Expected date: 04/02/2024    Class 2 severe obesity with serious comorbidity and body mass index (BMI) of 36.0 to 36.9 in adult, unspecified obesity type  Comments:  Lifestyle and wellness referral.  Orders:  -     Ambulatory referral/consult to [facility]    Unspecified essential hypertension  -     Comprehensive Metabolic Panel; Future; Expected date: 04/02/2024  -     CBC Auto Differential; Future; Expected date: 04/02/2024    Gastroesophageal reflux disease, unspecified whether esophagitis present  -     Comprehensive Metabolic Panel; Future; Expected date: 04/02/2024  -     CBC Auto Differential; Future; Expected  date: 04/02/2024    HERB on CPAP    Class 1 obesity due to excess calories with serious comorbidity and body mass index (BMI) of 34.0 to 34.9 in adult  -     Lipid Panel; Future; Expected date: 04/02/2024  -     TSH; Future; Expected date: 04/02/2024  -     Insulin, Random; Future; Expected date: 04/02/2024  -     Comprehensive Metabolic Panel; Future; Expected date: 04/02/2024    Prinzmetal's angina  Obstructive hypertrophic cardiomyopathy  Keep appt with Dr Quiles  Activity is more limited       Reviewed with pt cost of nutrition appt and lack of glp1 coverage  Pt declines nutrition coverage  Will start working on extra protein, less sugar in evening  Check insulin level -- pt declines consideration of metformin therapy at this time    Consider fruit instead of sugary treats  Smart sweets  1 serving daily of any snack    Pt declines any other AOMS    Follow up in about 3 months (around 7/2/2024), or if symptoms worsen or fail to improve.

## 2024-04-02 NOTE — PATIENT INSTRUCTIONS
Please make/keep primary care appointment    Work on getting enough protein    Goal 25-30 grams protein per meal.     It is okay protein snack in afternoon: look at list      Goal: < 25 grams added sugar daily

## 2024-04-03 ENCOUNTER — PATIENT MESSAGE (OUTPATIENT)
Dept: PULMONOLOGY | Facility: CLINIC | Age: 69
End: 2024-04-03
Payer: MEDICARE

## 2024-04-03 RX ORDER — PANTOPRAZOLE SODIUM 20 MG/1
20 TABLET, DELAYED RELEASE ORAL
Qty: 60 TABLET | Refills: 0 | Status: SHIPPED | OUTPATIENT
Start: 2024-04-03 | End: 2024-05-17 | Stop reason: SDUPTHER

## 2024-05-10 ENCOUNTER — CLINICAL SUPPORT (OUTPATIENT)
Dept: PULMONOLOGY | Facility: CLINIC | Age: 69
End: 2024-05-10
Payer: MEDICARE

## 2024-05-10 ENCOUNTER — HOSPITAL ENCOUNTER (OUTPATIENT)
Dept: RADIOLOGY | Facility: HOSPITAL | Age: 69
Discharge: HOME | End: 2024-05-10
Attending: NURSE PRACTITIONER
Payer: MEDICARE

## 2024-05-10 ENCOUNTER — PATIENT MESSAGE (OUTPATIENT)
Dept: GASTROENTEROLOGY | Facility: CLINIC | Age: 69
End: 2024-05-10
Payer: MEDICARE

## 2024-05-10 DIAGNOSIS — R91.8 MULTIPLE LUNG NODULES: ICD-10-CM

## 2024-05-10 DIAGNOSIS — J45.20 MILD INTERMITTENT ASTHMA WITHOUT COMPLICATION: ICD-10-CM

## 2024-05-10 DIAGNOSIS — F17.211 CIGARETTE NICOTINE DEPENDENCE IN REMISSION: ICD-10-CM

## 2024-05-10 DIAGNOSIS — J44.9 CHRONIC OBSTRUCTIVE PULMONARY DISEASE, UNSPECIFIED COPD TYPE: ICD-10-CM

## 2024-05-10 LAB
BRPFT: ABNORMAL
FEF 25 75 CHG: 23.4 %
FEF 25 75 LLN: 0.86
FEF 25 75 POST REF: 141.1 %
FEF 25 75 PRE REF: 114.4 %
FEF 25 75 REF: 1.88
FET100 CHG: -1.1 %
FEV1 CHG: 4.5 %
FEV1 FVC CHG: 2.7 %
FEV1 FVC LLN: 65
FEV1 FVC POST REF: 112.4 %
FEV1 FVC PRE REF: 109.5 %
FEV1 FVC REF: 78
FEV1 LLN: 1.64
FEV1 POST REF: 69.6 %
FEV1 PRE REF: 66.6 %
FEV1 REF: 2.26
FVC CHG: 1.8 %
FVC LLN: 2.13
FVC POST REF: 61.3 %
FVC PRE REF: 60.3 %
FVC REF: 2.92
PEF CHG: -2 %
PEF LLN: 4.03
PEF POST REF: 77.7 %
PEF PRE REF: 79.3 %
PEF REF: 5.8
POST FEF 25 75: 2.66 L/S (ref 0.86–2.9)
POST FET 100: 7.05 SEC
POST FEV1 FVC: 87.6 % (ref 64.55–91.27)
POST FEV1: 1.57 L (ref 1.64–2.87)
POST FVC: 1.79 L (ref 2.13–3.72)
POST PEF: 4.5 L/S (ref 4.03–7.56)
PRE FEF 25 75: 2.15 L/S (ref 0.86–2.9)
PRE FET 100: 7.13 SEC
PRE FEV1 FVC: 85.34 % (ref 64.55–91.27)
PRE FEV1: 1.5 L (ref 1.64–2.87)
PRE FVC: 1.76 L (ref 2.13–3.72)
PRE PEF: 4.59 L/S (ref 4.03–7.56)

## 2024-05-10 PROCEDURE — 94060 EVALUATION OF WHEEZING: CPT | Mod: HCNC,S$GLB,, | Performed by: INTERNAL MEDICINE

## 2024-05-10 PROCEDURE — 71271 CT THORAX LUNG CANCER SCR C-: CPT | Mod: 26,HCNC,, | Performed by: RADIOLOGY

## 2024-05-10 PROCEDURE — 71271 CT THORAX LUNG CANCER SCR C-: CPT | Mod: TC,HCNC

## 2024-05-10 PROCEDURE — 95012 NITRIC OXIDE EXP GAS DETER: CPT | Mod: HCNC,S$GLB,, | Performed by: INTERNAL MEDICINE

## 2024-05-10 NOTE — PROCEDURES
Clinical Guide to Interpretation or FeNO Levels :    FeNO  (ppb) LOW INTERMEDIATE HIGH   ADULT VALUES < 25 25-50          > 50   Th2-driven Inflammation Unlikely Likely Significant     Patients FeNO level at this visit : ___14_ (ppb)    Interpretation of FeNO measurement in adults:  [x] FENO is less than 25 ppb implies non eosinophilic airway inflammation or the absence of airway inflammation.    Comment: Low FENO (<25 ppb) in adult asthmatics with persistent symptoms suggests other etiologies for these symptoms and a lower likelihood of benefit from adding or increasing inhaled glucocorticoids.    [] FENO between 25 and 50 ppb in adults should be interpreted cautiously with reference to the clinical situation (eg, symptomatic, on or off therapy, current smoking).    [] FENO greater than 50 ppb in adults  suggests eosinophilic airway inflammation   Comment: High FENO (>50 ppb) in adult asthmatics even with atypical symptoms suggests glucocorticoid responsiveness. High FENO (>50 ppb) can help identify poor adherence or uncontrolled inflammation in asthma patients with otherwise seemingly controlled asthma.    Discussion:  A FENO less than 25 ppb in adults and less than 20 ppb in children younger than 12 years of age implies noneosinophilic airway inflammation or the absence of airway inflammation.  A FENO greater than 50 ppb in adults or greater than 35 ppb in children suggests eosinophilic airway inflammation.   Values of FENO between 25 and 50 ppb in adults (20 to 35 ppb in children) should be interpreted cautiously with reference to the clinical situation (eg, symptomatic, on or off therapy, current smoking).  A rising FENO with a greater than 20 percent change and more than 25 ppb (20 ppb in children) from a previously stable level suggests increasing eosinophilic airway inflammation, but there are wide inter-individual differences.  A decrease in FENO greater than 20 percent for values over 50 ppb or more than  10 ppb for values less than 50 ppb may be clinically important.  ?FENO in other respiratory diseases - Several other diseases are associated with altered levels of exhaled NO: low levels of FENO have been noted in cystic fibrosis, current smoking, pulmonary hypertension, hypothermia, primary ciliary dyskinesia, and bronchopulmonary dysplasia. Elevated FENO has been noted in atopy, nonasthmatic eosinophilic bronchitis, COPD exacerbations, noncystic fibrosis bronchiectasis, and viral upper respiratory infections.    REFERENCE:  ATS Board of Directors, December 2004, and by the ERS Executive Committee, June 2004. ATS/ERS Recommendations for Standardized Procedures for the Online and Offline Measurement of Exhaled Lower Respiratory Nitric Oxide and Nasal Nitric Oxide. Guideline 2005

## 2024-05-14 ENCOUNTER — PATIENT MESSAGE (OUTPATIENT)
Dept: PULMONOLOGY | Facility: CLINIC | Age: 69
End: 2024-05-14
Payer: MEDICARE

## 2024-05-17 ENCOUNTER — OFFICE VISIT (OUTPATIENT)
Dept: PULMONOLOGY | Facility: CLINIC | Age: 69
End: 2024-05-17
Payer: MEDICARE

## 2024-05-17 ENCOUNTER — CLINICAL SUPPORT (OUTPATIENT)
Dept: PULMONOLOGY | Facility: CLINIC | Age: 69
End: 2024-05-17
Payer: MEDICARE

## 2024-05-17 VITALS
DIASTOLIC BLOOD PRESSURE: 60 MMHG | WEIGHT: 205 LBS | SYSTOLIC BLOOD PRESSURE: 140 MMHG | HEIGHT: 65 IN | BODY MASS INDEX: 34.16 KG/M2 | HEIGHT: 65 IN | WEIGHT: 205 LBS | BODY MASS INDEX: 34.16 KG/M2

## 2024-05-17 DIAGNOSIS — R91.8 MULTIPLE LUNG NODULES: ICD-10-CM

## 2024-05-17 DIAGNOSIS — R06.02 SOB (SHORTNESS OF BREATH) ON EXERTION: ICD-10-CM

## 2024-05-17 DIAGNOSIS — J96.11 CHRONIC RESPIRATORY FAILURE WITH HYPOXIA: ICD-10-CM

## 2024-05-17 DIAGNOSIS — R09.02 EXERCISE HYPOXEMIA: ICD-10-CM

## 2024-05-17 DIAGNOSIS — J45.20 MILD INTERMITTENT ASTHMA WITHOUT COMPLICATION: ICD-10-CM

## 2024-05-17 DIAGNOSIS — J44.9 CHRONIC OBSTRUCTIVE PULMONARY DISEASE, UNSPECIFIED COPD TYPE: ICD-10-CM

## 2024-05-17 DIAGNOSIS — G47.34 NOCTURNAL HYPOXEMIA: ICD-10-CM

## 2024-05-17 DIAGNOSIS — G47.33 OSA ON CPAP: Chronic | ICD-10-CM

## 2024-05-17 DIAGNOSIS — J43.2 CENTRILOBULAR EMPHYSEMA: ICD-10-CM

## 2024-05-17 DIAGNOSIS — I70.0 ATHEROSCLEROSIS OF AORTA: ICD-10-CM

## 2024-05-17 DIAGNOSIS — J96.11 CHRONIC RESPIRATORY FAILURE WITH HYPOXIA: Primary | ICD-10-CM

## 2024-05-17 DIAGNOSIS — E66.09 CLASS 1 OBESITY DUE TO EXCESS CALORIES WITH SERIOUS COMORBIDITY AND BODY MASS INDEX (BMI) OF 34.0 TO 34.9 IN ADULT: ICD-10-CM

## 2024-05-17 DIAGNOSIS — F17.211 CIGARETTE NICOTINE DEPENDENCE IN REMISSION: ICD-10-CM

## 2024-05-17 DIAGNOSIS — R01.0 BENIGN HEART MURMUR: ICD-10-CM

## 2024-05-17 PROBLEM — J43.9 EMPHYSEMA LUNG: Status: ACTIVE | Noted: 2023-03-01

## 2024-05-17 PROCEDURE — 94618 PULMONARY STRESS TESTING: CPT | Mod: HCNC,S$GLB,, | Performed by: INTERNAL MEDICINE

## 2024-05-17 PROCEDURE — 3078F DIAST BP <80 MM HG: CPT | Mod: HCNC,CPTII,S$GLB, | Performed by: NURSE PRACTITIONER

## 2024-05-17 PROCEDURE — 3077F SYST BP >= 140 MM HG: CPT | Mod: HCNC,CPTII,S$GLB, | Performed by: NURSE PRACTITIONER

## 2024-05-17 PROCEDURE — 3288F FALL RISK ASSESSMENT DOCD: CPT | Mod: HCNC,CPTII,S$GLB, | Performed by: NURSE PRACTITIONER

## 2024-05-17 PROCEDURE — 99999 PR PBB SHADOW E&M-EST. PATIENT-LVL IV: CPT | Mod: PBBFAC,HCNC,, | Performed by: NURSE PRACTITIONER

## 2024-05-17 PROCEDURE — 1159F MED LIST DOCD IN RCRD: CPT | Mod: HCNC,CPTII,S$GLB, | Performed by: NURSE PRACTITIONER

## 2024-05-17 PROCEDURE — 3008F BODY MASS INDEX DOCD: CPT | Mod: HCNC,CPTII,S$GLB, | Performed by: NURSE PRACTITIONER

## 2024-05-17 PROCEDURE — 94762 N-INVAS EAR/PLS OXIMTRY CONT: CPT | Mod: HCNC,59,S$GLB, | Performed by: INTERNAL MEDICINE

## 2024-05-17 PROCEDURE — 99999 PR PBB SHADOW E&M-EST. PATIENT-LVL I: CPT | Mod: PBBFAC,HCNC,,

## 2024-05-17 PROCEDURE — 1160F RVW MEDS BY RX/DR IN RCRD: CPT | Mod: HCNC,CPTII,S$GLB, | Performed by: NURSE PRACTITIONER

## 2024-05-17 PROCEDURE — 99215 OFFICE O/P EST HI 40 MIN: CPT | Mod: 25,HCNC,S$GLB, | Performed by: NURSE PRACTITIONER

## 2024-05-17 PROCEDURE — 1101F PT FALLS ASSESS-DOCD LE1/YR: CPT | Mod: HCNC,CPTII,S$GLB, | Performed by: NURSE PRACTITIONER

## 2024-05-17 PROCEDURE — 99417 PROLNG OP E/M EACH 15 MIN: CPT | Mod: HCNC,S$GLB,, | Performed by: NURSE PRACTITIONER

## 2024-05-17 RX ORDER — ALBUTEROL SULFATE 0.83 MG/ML
2.5 SOLUTION RESPIRATORY (INHALATION) EVERY 4 HOURS PRN
Qty: 360 ML | Refills: 11 | Status: SHIPPED | OUTPATIENT
Start: 2024-05-17 | End: 2025-05-17

## 2024-05-17 RX ORDER — TIOTROPIUM BROMIDE AND OLODATEROL 3.124; 2.736 UG/1; UG/1
2 SPRAY, METERED RESPIRATORY (INHALATION) DAILY
Qty: 12 G | Refills: 3 | Status: SHIPPED | OUTPATIENT
Start: 2024-05-17

## 2024-05-17 NOTE — ASSESSMENT & PLAN NOTE
On APAP 5-12 cm with optimal control AHI: 1.5   5/17/2024 Overnight oximetry on room air on APAP 5-12 cm abnormal  Proceed with 2 L oxygen blended in to CPAP   Proceed with CPAP/bipap retitration   HME: former [facility]  changed to [facility] for supplies   She needs to bring her SD card for downloads.

## 2024-05-17 NOTE — PROGRESS NOTES
Subjective:      Patient ID: Ro Hernandez is a 68 y.o. female.    Patient Active Problem List   Diagnosis    Class 1 obesity due to excess calories with serious comorbidity and body mass index (BMI) of 34.0 to 34.9 in adult    Unspecified essential hypertension    Other and unspecified hyperlipidemia    GERD (gastroesophageal reflux disease)    Arthritis    Benign heart murmur    HERB on CPAP    Mild intermittent asthma without complication    Multiple lung nodules    Renal cyst    Prinzmetal's angina    Epigastric abdominal pain    Iron deficiency anemia due to chronic blood loss    Iron deficiency anemia    History of 2019 novel coronavirus disease (COVID-19)    Acute cystitis without hematuria    Urge incontinence    Atherosclerosis of aorta    Obstructive hypertrophic cardiomyopathy    Emphysema lung    Cigarette nicotine dependence in remission    Chronic respiratory failure with hypoxia    Exercise hypoxemia       Problem list has been reviewed.      Chief Complaint   Patient presents with    Sleep Apnea       Chief Complaint: Sleep Apnea      HPI:  Ro Hernandez 68 y.o. female returns to Pulmonary Clinic with review 5/10/2024 LDCT/HERB on CPAP compliance assessment.     5/10/2024 LDCT Benign Appearance or Behavior - continue annual screening with LDCT in 12 months.    6/14/2021 PET CT   1. No abnormal elevated FDG uptake demonstrated with previously described pulmonary nodules.  2. Nonspecific low level FDG uptake noted within stable mediastinal lymph nodes as above.     Former smoker 48 pack year former smoker.  Quit 2016.      Shortness of breath chronic long standing, worsening with patient noticing O2 sats to 82 - 88 after activity such as bathing or showering.     No cough, no wheezing, no mucous.     Dyspnea Characteristics:   Disproportionate Exertional Dyspnea   Dyspnea Duration:  Chronic  Dyspnea Severity:  JESSICA 4  Dyspnea Timing:  Exertional only  Dyspnea Contributing Factors:  Tobacco Abuse , 30  "pack year former smoker. Cigarette smoker for 25 yrs. quit 16yrs. then smoked again for 5-6 before quitting in 2016.  Dyspnea Associated Symptoms:  none, only has sensation of not enough air with exertion and lying down , some times with talking        Has diagnosis of Asthma with COPD off inhalers over the past 5 years.   Symptomatic shortness of breath   5/17/2024 begin control Stiolto  5/17/2024 6MWD abnormal new order NC 2 L activity  5/17/2024 proceed with Overnight oximetry on room air Auto CPAP 5-12 cm determine need for home O2 with CPAP. Patient is agreeable to proceed with PAP re-titration if abnormal Overnight oximetry on CPAP.   Referral pul rehab   Referral pul dis mgmt     Patient denies recent sick contacts.  Patient does not have new pets. Patient does have a history of asthma.   Patient does have a history of environmental allergens.   Daily allergy regimen = CLARITIN / ZYRTEC PRN.   Patient has not traveled recently. Patient does have a history of smoking. She smoked 1 PPD for approximately 48 years.    Patient has had a previous chest x-ray. Patient has had a PPD done.  PPD Negative.      HERB on CPAP  Diagnosed with HERB by PSG 2011.     She is on APAP 5-12 CMWP with optimal use AHI 1.7. She is complaince with her PAP.    She  definitely thinks PAP is beneficial to her health and she wants to continue with PAP therapy.  Nasal mask is used.  2023 changed from [facility] to [facility] for supplies  Current machine obtained 2018 from [employer] LED reading "motor life exceeded, contact your provider". Visualized by me today. Ready for replacement CPAP machine. Patient would like to obtain replacement CPAP machine from [facility].   Continue Auto CPAP 5-12 cm     11/22/2011 PSG The diagnostic polysomnography revealed a mild obstructive sleep apnea / hypopnea syndrome (A + H Index = 10.4 events / hr asleep.    Compliance  Payor Standard  Usage 02/17/2024 - 05/16/2024  Usage days 90/90 days (100%)  >= 4 hours 89 " days (99%)  < 4 hours 1 days (1%)  Usage hours 818 hours 58 minutes  Average usage (total days) 9 hours 6 minutes  Average usage (days used) 9 hours 6 minutes  Median usage (days used) 9 hours 7 minutes  AirSense 10 AutoSet  Serial number [id number]  Mode AutoSet  Min Pressure 5 cmH2O  Max Pressure 12 cmH2O  EPR Fulltime  EPR level 3  Response Standard  Therapy  Pressure - cmH2O Median: 6.8 95th percentile: 9.7 Maximum: 10.9  Leaks - L/min Median: 18.4 95th percentile: 41.8 Maximum: 59.1  Events per hour AI: 0.2 HI: 1.5 AHI: 1.7  Apnea Index Central: 0.0 Obstructive: 0.2 Unknown: 0.0  RERA Index 0.2  Cheyne-Gamboa respiration (average duration per night) 0 minutes (0%)    Mayer Sleepiness Scale       5/17/2024     7:50 AM 5/12/2023     8:57 AM 3/4/2022    11:00 AM 6/3/2021    11:00 AM 3/3/2021     9:00 AM 1/6/2021    10:00 AM 8/7/2020     1:00 PM   EPWORTH SLEEPINESS SCALE   Sitting and reading 0 0 0 0 0 0 0   Watching TV 0 0 0 0 0 0 0   Sitting, inactive in a public place (e.g. a theatre or a meeting) 0 0 0 0 0 0 0   As a passenger in a car for an hour without a break 0 0 0 0 0 0 0   Lying down to rest in the afternoon when circumstances permit 3 2 3 2 2 2 2   Sitting and talking to someone 0 0 0 0 0 0 0   Sitting quietly after a lunch without alcohol 0 0 0 0 0 0 0   In a car, while stopped for a few minutes in traffic 0 0 0 0 0 0 0   Total score 3 2 3 2 2 2 2       Previous Report Reviewed: historical medical records, office notes and radiology reports     Past Medical History: The following portions of the patient's history were reviewed and updated as appropriate:   She  has a past surgical history that includes Eye surgery; Foot surgery; Cholecystectomy; Tonsillectomy; Tubal ligation; Abdominal surgery; Esophagogastroduodenoscopy (N/A, 12/10/2018); Colonoscopy (N/A, 12/6/2019); Esophagogastroduodenoscopy (N/A, 9/8/2020); and Intraluminal gastrointestinal tract imaging via capsule (N/A, 9/18/2020).  Her  family history includes Breast cancer in her maternal grandmother; COPD in her father; Cancer in her mother; Heart disease in her sister; Hyperlipidemia in her son; Hypertension in her father and mother; Kidney cancer in her mother.  She  reports that she quit smoking about 7 years ago. Her smoking use included cigarettes. She started smoking about 56 years ago. She has a 48.4 pack-year smoking history. She has never used smokeless tobacco. She reports that she does not drink alcohol and does not use drugs.  She has a current medication list which includes the following prescription(s): albuterol, aspirin, celecoxib, cyclobenzaprine, ezetimibe, fluticasone propionate, hydrocodone-acetaminophen, inhalation spacing device, isosorbide mononitrate, lactulose, metoprolol succinate, multivitamin, naloxone, nitroglycerin, solifenacin, triamcinolone acetonide, verapamil, albuterol, pantoprazole, and stiolto respimat.  She is allergic to ace inhibitors, lisinopril, pravastatin, rosuvastatin, and simvastatin..    Review of Systems   Constitutional:  Negative for fever, chills, appetite change, fatigue and night sweats.   HENT:  Negative for nosebleeds, postnasal drip, sore throat, trouble swallowing, congestion and hearing loss.    Eyes:  Negative for itching.   Respiratory:  Negative for snoring, cough, chest tightness, shortness of breath, wheezing, orthopnea and dyspnea on extertion.    Cardiovascular:  Negative for chest pain, palpitations and leg swelling.   Genitourinary:  Negative for difficulty urinating.   Endocrine:  Negative for cold intolerance and heat intolerance.    Musculoskeletal:  Positive for arthralgias and back pain.   Skin:  Negative for rash.   Gastrointestinal:  Negative for nausea, vomiting, abdominal pain, abdominal distention and acid reflux.        Constipation   Neurological:  Negative for dizziness, syncope, light-headedness and headaches.   Hematological:  Excessive bruising.  "  Psychiatric/Behavioral:  The patient is not nervous/anxious.    All other systems reviewed and are negative.     Occupational History:    Denies occupational exposure to asbestos, silica and petrochemicals.    Avocational Exposures:  none    Pet Exposures:  none    Objective:     Vitals:    05/17/24 0747   BP: (!) 140/60   Weight: 93 kg (205 lb 0.4 oz)   Height: 5' 5" (1.651 m)       Body mass index is 34.12 kg/m².    Physical Exam  Vitals and nursing note reviewed.   Constitutional:       General: She is not in acute distress.     Appearance: Normal appearance. She is well-developed and well-groomed. She is obese. She is not ill-appearing or toxic-appearing.   HENT:      Head: Normocephalic.      Right Ear: External ear normal.      Left Ear: External ear normal.      Nose: Nose normal.      Mouth/Throat:      Pharynx: No oropharyngeal exudate.   Eyes:      Conjunctiva/sclera: Conjunctivae normal.   Cardiovascular:      Rate and Rhythm: Normal rate and regular rhythm.      Heart sounds: Murmur heard.      Systolic murmur is present.   Pulmonary:      Effort: Pulmonary effort is normal.      Breath sounds: Normal breath sounds. No stridor.   Abdominal:      Palpations: Abdomen is soft.   Musculoskeletal:         General: Normal range of motion.      Cervical back: Normal range of motion and neck supple.   Lymphadenopathy:      Cervical: No cervical adenopathy.   Skin:     General: Skin is warm and dry.   Neurological:      Mental Status: She is alert and oriented to person, place, and time.   Psychiatric:         Behavior: Behavior normal. Behavior is cooperative.         Thought Content: Thought content normal.         Judgment: Judgment normal.       Personal Diagnostic Review    OVERNIGHT OXIMETRY REPORT:     Dictated by: Laurent Jain MD  Test date: 05/17/2024  Dictated on: 05/20/2024        Comment: This test was performed on CPAP and Room Air      A desaturation event was defined as a " decrease of saturation by 4 or more.     REPORT SUMMARY  Total valid samplin:56:36   High SpO2: 92%    Low SpO2: 70%    Mean SpO2  81.5 %  Cumulative time with oxygen saturation less than 88% (TC88): 6:55:12     CLINICAL INTERPRETATION   There is  significant nocturnal oxygen desaturation and  Clinical correlation is advised     Medicare Criteria Comments:   Oximetry test results suggest the patient falls under Medicare Group 1 Criteria. ( Arterial oxygen saturation at or below 88% for at least 5 minutes taken during sleep)  Laurent Jain MD    2024 6MWD  Desaturations requiring supplemental oxygen NC 2 L with activity.    Phase Oxygen Assessment Supplemental O2 Heart   Rate Blood Pressure Pramod Dyspnea Scale Rating   Resting 95 % Room Air 63 bpm 144/63 2   Exercise             Minute             1 90 % Room Air 79 bpm       2 88 % Room Air 87 bpm       3 95 % 2 L/M 83 bpm       4 93 % 2 L/M 85 bpm       5 92 % 2 L/M 93 bpm       6  92 % 2 L/M 97 bpm 179/72 7-8   Recovery             Minute             1 91 % 2 L/M 81 bpm       2 93 % 2 L/M 73 bpm       3 97 % 2 L/M 70 bpm       4 97 % 2 L/M 70 bpm 161/65 3      Six Minute Walk Summary  6MWT Status: completed without stopping  Patient Reported: Dyspnea, Other (Comment) (Hip pain)          Interpretation:  Did the patient stop or pause?: No  Total Time Walked (Calculated): 360 seconds  Final Partial Lap Distance (feet): 100 feet  Total Distance Meters (Calculated): 335.28 meters  Predicted Distance Meters (Calculated): 397.79 meters  Percentage of Predicted (Calculated): 84.29  Peak VO2 (Calculated): 14.04  Mets: 4.01  Has The Patient Had a Previous Six Minute Walk Test?: No     Previous 6MWT Results  Has The Patient Had a Previous Six Minute Walk Test?: No     5/10/2024 Spirometry shows moderate restriction based on the reduction in FVC. Spirometry remains unimproved following bronchodilator.     5/10/2024 FeNO 14  Clinical Guide to Interpretation or  FeNO Levels :     FeNO  (ppb) LOW INTERMEDIATE HIGH   ADULT VALUES < 25 25-50          > 50   Th2-driven Inflammation Unlikely Likely Significant      Patients FeNO level at this visit : ___14_ (ppb)     Interpretation of FeNO measurement in adults:  [x] FENO is less than 25 ppb implies non eosinophilic airway inflammation or the absence of airway inflammation.               Comment: Low FENO (<25 ppb) in adult asthmatics with persistent symptoms suggests other etiologies for these symptoms and a lower likelihood of benefit from adding or increasing inhaled glucocorticoids.    3/3/2021 Spirometry is normal    1/6/2021 6mwd No desaturations requiring supplemental oxygen at rest or exertion.    Pulmonary function tests: 06/07/18: Normal spirometry.  FEV1: 2.23  (91.8 % predicted), FVC: 3.10 (99.4 % predicted), FEV1/FVC:  72 %. NORMAL SPIROMETRY    CT Chest Lung Screening Low Dose  Narrative: EXAMINATION:  CT CHEST LUNG SCREENING LOW DOSE    CLINICAL HISTORY:  Lung cancer screening, >= 30 pk-yr smoking history in last 15 yrs (Age 55-80y); Other nonspecific abnormal finding of lung field    TECHNIQUE:  CT of the thorax was performed with low dose, lung screening protocol.  No contrast was administered.  Sagittal and coronal reconstructions were obtained.    COMPARISON:  03/16/2023    FINDINGS:  Lungs: There are no abnormal opacities that require further evaluation.  The largest opacity in the right lung appears solid and measures an average of 0.43 cm on series 4, image 151 in the right upper lobe.  This nodule is unchanged.  The largest opacity in the left lung appears solid and measures an average of 0.77 cm on series 4, image 314 in the left lower lobe (previously 0.72 cm).  This is subthreshold growth.  No new or significantly enlarging pulmonary nodules demonstrated.  The lungs show findings consistent with emphysema.  Diffuse mosaic attenuation of the lung parenchyma is again noted and unchanged.    Pleura:   No  effusion..    Heart and pericardium: Normal size without effusion.    Aorta and vasculature: Atherosclerosis including coronary arteries.    Lymph nodes: Previously described mediastinal adenopathy appears unchanged.    Chest wall and skeletal structures: Unremarkable except age-appropriate degenerative changes.    Upper abdomen: Prior cholecystectomy.  No acute findings.  Impression: Lung-RADS Category:  2 - Benign Appearance or Behavior - continue annual screening with LDCT in 12 months.    Clinically or potentially clinically significant non lung cancer finding:  None.    Prior Lung Cancer Modifier:  No history of prior lung cancer.    Electronically signed by: Winston Self MD  Date:    05/10/2024  Time:    13:45      Assessment /Plan:     Orders Placed This Encounter   Procedures    CPAP/BIPAP SUPPLIES     Benefits and compliant  90 day supply. 4 refills  HME: [facility]     Order Specific Question:   Length of need (1-99 months):     Answer:   99     Order Specific Question:   Choose ONE mask type and its corresponding cushions and/or pillows:     Answer:    Nasal Mask, 1 per 90 days:  Nasal Cushions, (6 per 90 days):  Nasal Pillows, (6 per 90 days)     Order Specific Question:   Choose EITHER Heated or Non-Heated Tubjing     Answer:    Non-Heated Tubing, 1 per 90 days     Order Specific Question:   Number of Days Needed:     Answer:   99     Order Specific Question:   All other supplies as needed as listed below:     Answer:    Headgear, 1 per 180 days     Order Specific Question:   All other supplies as needed as listed below:     Answer:    Chin Strap, 1 per 180 days     Order Specific Question:   All other supplies as needed as listed below:     Answer:    Disposable Filter, 6 per 90 days     Order Specific Question:   All other supplies as needed as listed below:     Answer:    Humidifier Chamber, 1 per 180 days     Order Specific Question:   All other supplies as  "needed as listed below:     Answer:    Non-Disposable Filter, 1 per 180 days    CPAP FOR HOME USE     Current machine obtained 2018 LED reading "motor life exceeded, contact your provider". Visualized by me. Ready for replacement CPAP machine. Patient would like to obtain replacement with [facility].     Order Specific Question:   Length of need (1-99 months):     Answer:   99     Order Specific Question:   Type ():     Answer:   Auto CPAP     Order Specific Question:   Auto CPAP pressure setting range (cmH20):     Answer:   5-12     Order Specific Question:   Fulfillment Priority:     Answer:   Level 4:  all others     Order Specific Question:   Humidification ():     Answer:   Heated     Order Specific Question:   Choose ONE mask type and its corresponding cushions and/or pillows:     Answer:    Nasal Mask, 1 per 90 days:  Nasal Cushions, (6 per 90 days):  Nasal Pillows, (6 per 90 days)     Order Specific Question:   Choose EITHER Heated or Non-Heated Tubjing     Answer:    Non-Heated Tubing, 1 per 90 days     Order Specific Question:   Number of Days Needed:     Answer:   99     Order Specific Question:   All other supplies as needed as listed below:     Answer:    Headgear, 1 per 180 days     Order Specific Question:   All other supplies as needed as listed below:     Answer:    Chin Strap, 1 per 180 days     Order Specific Question:   All other supplies as needed as listed below:     Answer:    Disposable Filter, 6 per 90 days     Order Specific Question:   All other supplies as needed as listed below:     Answer:    Non-Disposable Filter, 1 per 180 days     Order Specific Question:   All other supplies as needed as listed below:     Answer:    Humidifier Chamber, 1 per 180 days    NEBULIZER KIT (SUPPLIES) FOR HOME USE     Medical Necessity of Nebulizer Treatment:  The use of a nebulizer is explicitly recommended on a daily basis for treatment of " "Chronic Obstructive Pulmonary Disease. Use of the nebulizer is medically necessary for mobilization of secretions for relief of pulmonary symptoms of coughing and airway obstruction. Additionally the use of nebulizer is medically necessary for administration of bronchodilator medications and in some cases inhaled steroids and inhaled antibiotics. The use of nebulizer is recommended at least twice a day and may be used as often as every 4- 6 hours depending on the clinical condition.     Order Specific Question:   Height:     Answer:   5' 5" (1.651 m)     Order Specific Question:   Weight:     Answer:   93 kg (205 lb 0.4 oz)     Order Specific Question:   Length of need (1-99 months):     Answer:   99     Order Specific Question:   Mask or Mouthpiece?     Answer:   Mouthpiece    NEBULIZER FOR HOME USE     Medical Necessity of Nebulizer Treatment:  The use of a nebulizer is explicitly recommended on a daily basis for treatment of Chronic Obstructive Pulmonary Disease. Use of the nebulizer is medically necessary for mobilization of secretions for relief of pulmonary symptoms of coughing and airway obstruction. Additionally the use of nebulizer is medically necessary for administration of bronchodilator medications and in some cases inhaled steroids and inhaled antibiotics. The use of nebulizer is recommended at least twice a day and may be used as often as every 4- 6 hours depending on the clinical condition.     Order Specific Question:   Height:     Answer:   5' 5" (1.651 m)     Order Specific Question:   Weight:     Answer:   93 kg (205 lb 0.4 oz)     Order Specific Question:   Length of need (1-99 months):     Answer:   99    OXYGEN FOR HOME USE     Patient would benefit from battery operated portable oxygen system. Please provide portable battery concentrator for activity.     Order Specific Question:   Liter Flow     Answer:   2     Order Specific Question:   Duration     Answer:   With activity     Comments:   " "and sleep blended into cpap     Order Specific Question:   Qualifying Test Performed at:     Answer:   Activity     Order Specific Question:   Oxygen saturation at rest     Answer:   95     Order Specific Question:   Oxygen saturation with activity     Answer:   88     Order Specific Question:   Oxygen saturation with activity on oxygen     Answer:   95     Order Specific Question:   Portable mode:     Answer:   pulse dose acceptable     Order Specific Question:   Mode:     Answer:   Portable concentrator     Order Specific Question:   Route     Answer:   nasal cannula     Order Specific Question:   Device:     Answer:   home concentrator with portable concentrator     Order Specific Question:   Length of need (in months):     Answer:   99 mos     Order Specific Question:   Patient condition with qualifying saturation     Answer:   Other - List qualifying diagnosis and code     Order Specific Question:   Select a diagnosis & list the code in the comments     Answer:   COPD (chronic obstructive pulmonary disease) [385942]     Order Specific Question:   Select a diagnosis & list the code in the comments     Answer:   Chronic respiratory failure with hypoxia [764309]     Order Specific Question:   Select a diagnosis & list the code in the comments     Answer:   Exercise hypoxemia [489987]     Order Specific Question:   Height:     Answer:   5' 5" (1.651 m)     Order Specific Question:   Weight:     Answer:   93 kg (205 lb 0.4 oz)     Order Specific Question:   Alternative treatment measures have been tried or considered and deemed clinically ineffective.     Answer:   Yes    CT Chest Lung Screening Low Dose     Standing Status:   Future     Standing Expiration Date:   5/17/2025     Order Specific Question:   Is there documentation of shared decision making for this lung screening exam?     Answer:   Yes     Order Specific Question:   Is the patient a current smoker?     Answer:   No     Order Specific Question:   How " many years has the patient quit smoking?     Answer:   8     Order Specific Question:   Does the patient have a 20-pack/year or greater smoke history?     Answer:   Yes     Order Specific Question:   Is the patient between the ages 50-80 years old?     Answer:   Yes     Order Specific Question:   Does the patient show any signs or symptoms of lung cancer?     Answer:   No     Order Specific Question:   Is this the first (baseline) CT or an annual exam?     Answer:   Annual [2]     Order Specific Question:   May the Radiologist modify the order per protocol to meet the clinical needs of the patient?     Answer:   Yes     Order Specific Question:   Is this a low dose screening chest CT?     Answer:   Yes    Ambulatory referral/consult to Pulmonary Rehab     Standing Status:   Future     Standing Expiration Date:   6/17/2025     Referral Priority:   Routine     Referral Type:   Consultation     Referral Reason:   Specialty Services Required     Requested Specialty:   Pulmonary Disease     Number of Visits Requested:   1    Ambulatory referral/consult to Pulmonary Disease Management w/ Respiratory Therapist     Standing Status:   Future     Standing Expiration Date:   6/17/2025     Referral Priority:   Routine     Referral Type:   Consultation     Referral Reason:   Specialty Services Required     Requested Specialty:   Pulmonary Disease     Number of Visits Requested:   1    Stress test, pulmonary     Standing Status:   Future     Number of Occurrences:   1     Standing Expiration Date:   5/17/2025     Order Specific Question:   Reason for study     Answer:   Functional status     Order Specific Question:   Release to patient     Answer:   Immediate    Complete PFT with bronchodilator     Standing Status:   Future     Standing Expiration Date:   5/17/2025     Order Specific Question:   Release to patient     Answer:   Immediate    PFT - related Arterial Blood Gas     Standing Status:   Future     Standing Expiration  Date:   5/17/2025     Order Specific Question:   Release to patient     Answer:   Immediate    PULSE OXIMETRY OVERNIGHT     Standing Status:   Future     Number of Occurrences:   1     Standing Expiration Date:   5/17/2025     Order Specific Question:   Reason for Test?     Answer:   BiPAP/CPAP Assessment     Order Specific Question:   Symptoms:     Answer:   Obesity     Comments:   sob     Order Specific Question:   Oxygen Settings:     Answer:   na     Order Specific Question:   BiPAP Settings:     Answer:   na     Order Specific Question:   CPAP Settings:     Answer:   Auto cpap 5-12 cm     Order Specific Question:   Room Air:     Answer:   Yes    BiPAP/CPAP Titration ((Must have dx of HERB from previous sleep study)     Standing Status:   Future     Standing Expiration Date:   5/20/2025     Scheduling Instructions:      Begin with CPAP progress to BIPAP if indicated, nocturnal hypoxemia seen on Auto CPAP 5-12 cm     Order Specific Question:   Titration Type:     Answer:   CPAP       Discussed diagnosis, its evaluation, treatment and usual course. All questions answered.    Problem List Items Addressed This Visit       HERB on CPAP (Chronic)     On APAP 5-12 cm with optimal control AHI: 1.5   5/17/2024 Overnight oximetry on room air on APAP 5-12 cm abnormal  Proceed with 2 L oxygen blended in to CPAP   Proceed with CPAP/bipap retitration   HME: former [facility]  changed to [facility] for supplies   She needs to bring her SD card for downloads.          Relevant Orders    CPAP/BIPAP SUPPLIES    CPAP FOR HOME USE    PULSE OXIMETRY OVERNIGHT (Completed)    BiPAP/CPAP Titration ((Must have dx of HERB from previous sleep study)    Multiple lung nodules     5/10/2024 : LDCT There are no abnormal opacities that require further evaluation. The largest opacity in the right lung appears solid and measures an average of 0.43 cm on series 4, image 151 in the right upper lobe. This nodule is unchanged. The largest opacity in the left  lung appears solid and measures an average of 0.77 cm on series 4, image 314 in the left lower lobe (previously 0.72 cm). This is subthreshold growth. No new or significantly enlarging pulmonary nodules demonstrated. The lungs show findings consistent with emphysema. Diffuse mosaic attenuation of the lung parenchyma is again noted and unchanged.   LDCT Benign Appearance or Behavior - continue annual screening with LDCT in 12 months.          Mild intermittent asthma without complication     Asthma controlled   5/12/2023 FeNO 14, no inflammation of lungs no indication to resume ICS controller  COPD symptomatic shortness of breath begin STIOLTO           Relevant Medications    albuterol (PROVENTIL) 2.5 mg /3 mL (0.083 %) nebulizer solution    Other Relevant Orders    Ambulatory referral/consult to Pulmonary Rehab    NEBULIZER KIT (SUPPLIES) FOR HOME USE    NEBULIZER FOR HOME USE    Complete PFT with bronchodilator    Ambulatory referral/consult to Pulmonary Disease Management w/ Respiratory Therapist    Exercise hypoxemia     5/17/2024 6mwd abnormal begin NC 2L activity         Relevant Orders    Ambulatory referral/consult to Pulmonary Rehab    OXYGEN FOR HOME USE    Emphysema lung     Symptomatic shortness of breath with exercise hypoxemia seen on 6 minute walk distance  Begin nasal cannula 2 L activity  Begin Stiolto Respimat 2 puffs daily  Referral to Pulmonary Disease Management   Referral to pulmonary rehab         Relevant Medications    tiotropium-olodateroL (STIOLTO RESPIMAT) 2.5-2.5 mcg/actuation Mist    albuterol (PROVENTIL) 2.5 mg /3 mL (0.083 %) nebulizer solution    Other Relevant Orders    Ambulatory referral/consult to Pulmonary Rehab    Complete PFT with bronchodilator    PFT - related Arterial Blood Gas    Class 1 obesity due to excess calories with serious comorbidity and body mass index (BMI) of 34.0 to 34.9 in adult     Encouraged calorie reduction and 30 minutes of exercise daily. Discussed  impact of obesity on general health.  Wt Readings from Last 9 Encounters:   05/17/24 93 kg (205 lb 0.4 oz)   04/02/24 94.3 kg (207 lb 14.3 oz)   01/02/24 93 kg (205 lb)   12/05/23 94.8 kg (209 lb)   11/29/23 94.8 kg (209 lb)   10/16/23 96.5 kg (212 lb 10.1 oz)   09/05/23 96.6 kg (212 lb 13.7 oz)   06/21/23 98.9 kg (218 lb 0.6 oz)   05/12/23 98.9 kg (218 lb 0.6 oz)   Body mass index is 34.12 kg/m².           Cigarette nicotine dependence in remission     48 pack year former smoker. Quit 2016.  5/10/2024 : LDCT Benign Appearance or Behavior - continue annual screening with LDCT in 12 months, May 2025.  Patient is agreeable to this plan of care.         Relevant Orders    CT Chest Lung Screening Low Dose    Chronic respiratory failure with hypoxia - Primary     5/17/2024 6MWD abnormal begin NC 2L activity and blended into CPAP.   Begin Stiolto for COPD/emphysema/sob   Referral pul rehab         Relevant Orders    PULSE OXIMETRY OVERNIGHT (Completed)    Ambulatory referral/consult to Pulmonary Rehab    Complete PFT with bronchodilator    PFT - related Arterial Blood Gas    OXYGEN FOR HOME USE    Ambulatory referral/consult to Pulmonary Disease Management w/ Respiratory Therapist    BiPAP/CPAP Titration ((Must have dx of HERB from previous sleep study)    Benign heart murmur     Stable, continue management with Cardiology           Atherosclerosis of aorta     Seen on imaging. Follow heart healthy diet. regular exercise.   Managed by cardiology           Other Visit Diagnoses       SOB (shortness of breath) on exertion        Relevant Medications    tiotropium-olodateroL (STIOLTO RESPIMAT) 2.5-2.5 mcg/actuation Mist    Other Relevant Orders    Stress test, pulmonary (Completed)    PULSE OXIMETRY OVERNIGHT (Completed)    Nocturnal hypoxemia        Relevant Orders    BiPAP/CPAP Titration ((Must have dx of HERB from previous sleep study)          I spent a total of 80 minutes on the day of the visit.  This includes face to  face time and non-face to face time preparing to see the patient (eg, review of tests), obtaining and/or reviewing separately obtained history, documenting clinical information in the electronic or other health record, independently interpreting results and communicating results to the patient/family/caregiver, or care coordinator.    Follow up in about 8 weeks (around 7/12/2024) for Asthma, COPD cpft/abg .

## 2024-05-17 NOTE — PROCEDURES
"[facility]  Six Minute Walk     SUMMARY     Ordering Provider: Brionna Iqbal NP   Interpreting Provider: Laurent Jain MD  Performing nurse/tech/RT: VT, RT  Diagnosis:  (SOB (shortness of breath) on exertion)  Height: 5' 5" (165.1 cm)  Weight: 93 kg (205 lb 0.4 oz)  BMI (Calculated): 34.1   Patient Race:             Phase Oxygen Assessment Supplemental O2 Heart   Rate Blood Pressure Pramod Dyspnea Scale Rating   Resting 95 % Room Air 63 bpm 144/63 2   Exercise        Minute        1 90 % Room Air 79 bpm     2 88 % Room Air 87 bpm     3 95 % 2 L/M 83 bpm     4 93 % 2 L/M 85 bpm     5 92 % 2 L/M 93 bpm     6  92 % 2 L/M 97 bpm 179/72 7-8   Recovery        Minute        1 91 % 2 L/M 81 bpm     2 93 % 2 L/M 73 bpm     3 97 % 2 L/M 70 bpm     4 97 % 2 L/M 70 bpm 161/65 3     Six Minute Walk Summary  6MWT Status: completed without stopping  Patient Reported: Dyspnea, Other (Comment) (Hip pain)     Interpretation:  Did the patient stop or pause?: No            Total Time Walked (Calculated): 360 seconds  Final Partial Lap Distance (feet): 100 feet  Total Distance Meters (Calculated): 335.28 meters  Predicted Distance Meters (Calculated): 397.79 meters  Percentage of Predicted (Calculated): 84.29  Peak VO2 (Calculated): 14.04  Mets: 4.01  Has The Patient Had a Previous Six Minute Walk Test?: No       Previous 6MWT Results  Has The Patient Had a Previous Six Minute Walk Test?: No           CLINICAL INTERPRETATION:  Six minute walk distance is 335.28m (84.29 % predicted) with heavy dyspnea.  During exercise, there was significant desaturation while breathing room air.  Oxygen was added 2.0 LPM  Blood pressure increased significantly and Heart rate remained stable with walking.  The patient reported non-pulmonary symptoms during exercise.  The patient did complete the study, walking 360 seconds of the 360 second test.  The patient may benefit from using supplemental oxygen during " exertion.  Based upon age and body mass index, exercise capacity is normal.      [] Mild exercise-induced hypoxemia described as an arterial oxygen saturation of 93-95% (or 3-4% less than at rest)  []  Moderate exercise-induced hypoxemia as 89-93%  [x]  Severe exercise induced hypoxemia as < 89% O2 saturation.  Medicare Criteria for oxygen prescription comments:   [x]  When arterial oxygen saturation is at or below 88% during exercise (severe exercise induced hypoxemia) then the patient falls under Medicare Group 1 criteria for supplemental oxygen

## 2024-05-17 NOTE — ASSESSMENT & PLAN NOTE
48 pack year former smoker. Quit 2016.  5/10/2024 : LDCT Benign Appearance or Behavior - continue annual screening with LDCT in 12 months, May 2025.  Patient is agreeable to this plan of care.

## 2024-05-17 NOTE — ASSESSMENT & PLAN NOTE
5/17/2024 6MWD abnormal begin NC 2L activity and blended into CPAP.   Begin Stiolto for COPD/emphysema/sob   Referral pul rehab

## 2024-05-17 NOTE — ASSESSMENT & PLAN NOTE
Asthma controlled   5/12/2023 FeNO 14, no inflammation of lungs no indication to resume ICS controller  COPD symptomatic shortness of breath begin STIOLTO

## 2024-05-17 NOTE — ASSESSMENT & PLAN NOTE
5/10/2024 : LDCT There are no abnormal opacities that require further evaluation. The largest opacity in the right lung appears solid and measures an average of 0.43 cm on series 4, image 151 in the right upper lobe. This nodule is unchanged. The largest opacity in the left lung appears solid and measures an average of 0.77 cm on series 4, image 314 in the left lower lobe (previously 0.72 cm). This is subthreshold growth. No new or significantly enlarging pulmonary nodules demonstrated. The lungs show findings consistent with emphysema. Diffuse mosaic attenuation of the lung parenchyma is again noted and unchanged.   LDCT Benign Appearance or Behavior - continue annual screening with LDCT in 12 months.

## 2024-05-17 NOTE — ASSESSMENT & PLAN NOTE
Symptomatic shortness of breath with exercise hypoxemia seen on 6 minute walk distance  Begin nasal cannula 2 L activity  Begin Stiolto Respimat 2 puffs daily  Referral to Pulmonary Disease Management   Referral to pulmonary rehab

## 2024-05-17 NOTE — ASSESSMENT & PLAN NOTE
Encouraged calorie reduction and 30 minutes of exercise daily. Discussed impact of obesity on general health.  Wt Readings from Last 9 Encounters:   05/17/24 93 kg (205 lb 0.4 oz)   04/02/24 94.3 kg (207 lb 14.3 oz)   01/02/24 93 kg (205 lb)   12/05/23 94.8 kg (209 lb)   11/29/23 94.8 kg (209 lb)   10/16/23 96.5 kg (212 lb 10.1 oz)   09/05/23 96.6 kg (212 lb 13.7 oz)   06/21/23 98.9 kg (218 lb 0.6 oz)   05/12/23 98.9 kg (218 lb 0.6 oz)   Body mass index is 34.12 kg/m².

## 2024-05-20 RX ORDER — PANTOPRAZOLE SODIUM 20 MG/1
20 TABLET, DELAYED RELEASE ORAL
Qty: 180 TABLET | Refills: 3 | Status: SHIPPED | OUTPATIENT
Start: 2024-05-20 | End: 2025-05-20

## 2024-05-20 RX ORDER — PANTOPRAZOLE SODIUM 20 MG/1
20 TABLET, DELAYED RELEASE ORAL
Qty: 180 TABLET | Refills: 3 | Status: SHIPPED | OUTPATIENT
Start: 2024-05-20 | End: 2024-05-20 | Stop reason: SDUPTHER

## 2024-05-20 NOTE — PROCEDURES
[facility]  [address] * [place] [address]  Telephone: [phone]  Test date: 24 Start: 24 23:02:16 Ro Hernandez  Doctor: /MEJIA Iqbal End: 24 05:59:44 [id number]  Oximetry: Summary Report  Comments: CPAP 5-12cm on Room Air  Recording time: 06:57:28 Highest pulse: 89 Highest SpO2: 92%  Excluded samplin:00:52 Lowest pulse: 62 Lowest SpO2: 70%  Total valid samplin:56:36 Mean pulse: 72 Mean SpO2: 81.5%  1 S.D.: 3.5 1 S.D.: 2.7  Time with SpO2<90: 6:55:48, 99.8%  Time with SpO2<80: 2:02:12, 29.3%  Time with SpO2<70: 0:00:00, 0.0%  Time with SpO2<60: 0:00:00, 0.0%  Time with SpO2<89: 6:55:12, 99.7%  Time with SpO2 =>90: 0:00:48, 0.2%  Time with SpO2=>80 & <90: 4:53:36, 70.5%  Time with SpO2=>70 & <80: 2:02:12, 29.3%  Time with SpO2=>60 & <70: 0:00:00, 0.0%  The longest continuous time with saturation <=88 was 04:52:16, which started at  24 23:42:04.  A desaturation event was defined as a decrease of saturation by 4 or more.  No events were excluded due to artifact.  There were 18 desaturation events over 3 minutes duration.  There were 43 desaturation events of less than 3 minutes duration during which:  The mean high was 84.3%. The mean low was 78.0%.  The number of these events that were:  > 0 & <10 seconds: 0 > 0 seconds: 43  =>10 & <20 seconds: 2 =>10 seconds: 43  =>20 & <30 seconds: 9 =>20 seconds: 41  =>30 & <40 seconds: 5 =>30 seconds: 32  =>40 & <50 seconds: 5 =>40 seconds: 27  =>50 & <60 seconds: 4 =>50 seconds: 22  =>60 seconds: 18 =>60 seconds: 18  The mean length of desaturation events that were >=10 sec & <=3 mins was: 58.4 sec.  Desaturation event index (events >=10 sec per sampled hour): 6.2  Desaturation event index (events >= 0 sec per sampled hour): 6.2    OVERNIGHT OXIMETRY REPORT:    Dictated by: Laurent Jain MD  Test date: 2024  Dictated on: 2024      Comment: This test was performed on CPAP and Room Air      A desaturation event was defined as a decrease of saturation by 4 or more.    REPORT SUMMARY  Total valid samplin:56:36   High SpO2: 92%    Low SpO2: 70%    Mean SpO2  81.5 %  Cumulative time with oxygen saturation less than 88% (TC88): 6:55:12    CLINICAL INTERPRETATION   There is  significant nocturnal oxygen desaturation and  Clinical correlation is advised    Medicare Criteria Comments:   Oximetry test results suggest the patient falls under Medicare Group 1 Criteria. ( Arterial oxygen saturation at or below 88% for at least 5 minutes taken during sleep)  Laurent Jain MD    Details about Medicare Group Criteria coverage can be found at http://www.cms.hhs.gov/manuals/downloads/

## 2024-05-28 ENCOUNTER — TELEPHONE (OUTPATIENT)
Dept: PRIMARY CARE CLINIC | Facility: CLINIC | Age: 69
End: 2024-05-28
Payer: MEDICARE

## 2024-05-28 NOTE — TELEPHONE ENCOUNTER
Spoke with pt   Pt made aware I will be leaving [facility] in June. She has established pcp so have advised her f/u with them this summer as well as consideration to meet new provider when they start (anticipated in Aug)    jennifer

## 2024-05-31 ENCOUNTER — HOSPITAL ENCOUNTER (OUTPATIENT)
Dept: SLEEP MEDICINE | Facility: HOSPITAL | Age: 69
Discharge: HOME | End: 2024-05-31
Attending: NURSE PRACTITIONER
Payer: MEDICARE

## 2024-05-31 DIAGNOSIS — J96.11 CHRONIC RESPIRATORY FAILURE WITH HYPOXIA: ICD-10-CM

## 2024-05-31 DIAGNOSIS — G47.33 OSA ON CPAP: Chronic | ICD-10-CM

## 2024-05-31 DIAGNOSIS — G47.34 NOCTURNAL HYPOXEMIA: ICD-10-CM

## 2024-05-31 PROCEDURE — 95811 POLYSOM 6/>YRS CPAP 4/> PARM: CPT | Mod: HCNC

## 2024-06-03 DIAGNOSIS — J96.11 CHRONIC RESPIRATORY FAILURE WITH HYPOXIA: ICD-10-CM

## 2024-06-03 DIAGNOSIS — R09.02 EXERCISE HYPOXEMIA: ICD-10-CM

## 2024-06-03 DIAGNOSIS — J45.20 MILD INTERMITTENT ASTHMA WITHOUT COMPLICATION: Primary | ICD-10-CM

## 2024-06-03 DIAGNOSIS — J43.2 CENTRILOBULAR EMPHYSEMA: ICD-10-CM

## 2024-06-09 NOTE — ANESTHESIA POSTPROCEDURE EVALUATION
Anesthesia Post Evaluation    Patient: Ro Hernandez    Procedure(s) Performed: Procedure(s) (LRB):  COLONOSCOPY (N/A)    Final Anesthesia Type: MAC    Patient location during evaluation: GI PACU  Patient participation: Yes- Able to Participate  Level of consciousness: awake and alert  Post-procedure vital signs: reviewed and stable  Pain management: adequate  Airway patency: patent    PONV status at discharge: No PONV  Anesthetic complications: no      Cardiovascular status: stable  Respiratory status: unassisted and spontaneous ventilation  Hydration status: euvolemic  Follow-up not needed.          Vitals Value Taken Time   /68 12/6/2019  7:42 AM   Temp 37.1 °C (98.7 °F) 12/6/2019  7:42 AM   Pulse 89 12/6/2019  7:42 AM   Resp 18 12/6/2019  7:42 AM   SpO2 96 % 12/6/2019  7:42 AM         No case tracking events are documented in the log.      Pain/Trinh Score: No data recorded       Hospice referral received and reviewed hospice benefit with pt and spouse and answered all questions.  Spouse is requesting dme, bed, o2, transport chair and trapeze) to be delivered Monday afternoon with a Tuesday am dc with a Tuesday hospice soc.  Liaison will continue to follow.

## 2024-06-11 ENCOUNTER — TELEPHONE (OUTPATIENT)
Dept: PULMONOLOGY | Facility: CLINIC | Age: 69
End: 2024-06-11
Payer: MEDICARE

## 2024-06-11 DIAGNOSIS — G47.33 OSA ON CPAP: Primary | Chronic | ICD-10-CM

## 2024-06-11 PROCEDURE — 95811 POLYSOM 6/>YRS CPAP 4/> PARM: CPT | Mod: 26,HCNC,, | Performed by: INTERNAL MEDICINE

## 2024-06-11 NOTE — TELEPHONE ENCOUNTER
5/31/2024 CPAP titration revealed Therapy with CPAP 9 cm water pressure with supplementary oxygen bleed 2 liters/minute and suitable mask.     Patient would rather stay on Auto CPAP 5-12 cm if possible.   Will obtain remote CPAP download on APAP 5-12 cm and determine if remains optimal to continue with 2 L oxygen blended in.     6/12/2024 telephoned spoke advised Auto CPAP 5-12 cm controlling obstructive sleep apnea AHI 1.1, per patient request to remain on Auto CPAP instead of set pressure CPAP 9 cm, continue Auto CPAP 5-12 cm with 2 L oxygen blended in, no new order, 5/17/2024 home O2 ordered NC 2 Lactivity and sleep blended into CPAP. 5/27/2024 Obtained Home O2. [facility].     6/11/2024 patient request stay on Auto CPAP 5-12 with 2 L blended in, prefer auto over set pressure.   5/31/2024 CPAP titration CPAP 9 cm optimal, still needed 2 L blended in   5/27/2024 obtained home O2 NC 2L activity and sleep blended into CPAP   5/21/2024 [facility]: Airsense 11 setup [facility]         Compliance Report  Initial compliance period 05/21/2024 - 06/19/2024  Compliance met Yes  Compliance percentage 73%  Payor Standard  Usage 05/21/2024 - 06/19/2024  Usage days 22/30 days (73%)  >= 4 hours 22 days (73%)  < 4 hours 0 days (0%)  Usage hours 208 hours 56 minutes  Average usage (total days) 6 hours 58 minutes  Average usage (days used) 9 hours 30 minutes  Median usage (days used) 9 hours 19 minutes  Total used hours (value since last reset - 06/19/2024) 208 hours  AirSense 11 AutoSet  Serial number [id number]  Mode AutoSet  Min Pressure 5 cmH2O  Max Pressure 12 cmH2O  EPR Fulltime  EPR level 3  Response Standard  Therapy  Pressure - cmH2O Median: 8.1 95th percentile: 11.2 Maximum: 11.7  Leaks - L/min Median: 10.6 95th percentile: 32.5 Maximum: 94.0  Events per hour AI: 0.2 HI: 0.9 AHI: 1.1  Apnea Index Central: 0.0 Obstructive: 0.1 Unknown: 0.0  RERA Index 0.9  [place] respiration (average duration per night) 0 minutes (0%)

## 2024-06-11 NOTE — PROCEDURES
"Patient Name: Ro Hernandez Study Date: 5/31/2024   YOB: 1955 Study Type: CPAP   Age: 68 year Patient #: [id number]   Sex: Female Billing ID: -   Height: 5' 5" Interpreting Physician: Laurent Jain MD   Weight: 205.0 lbs Recording Tech: Twyla Matamoros   BMI: 34.2 Scoring Tech: Odilia Morel   [place]: - Neck Circumference: 17.5     Indications for Polysomnography  The patient is a 68 year-old Female who is 5' 5" and weighs 205.0 lbs. Her BMI equals 34.2.  A full night titration study was performed.CPAP titration    Referring provider: Brionna Iqbal NP    Medical History:   HERB on CPAP  Diagnosed with HERB by PSG 2011.      She is on APAP 5-12 CMWP with optimal use AHI 1.7. She is complaince with her PAP.     She  definitely thinks PAP is beneficial to her health and she wants to continue with PAP therapy.  Nasal mask is used.  2023 changed from [facility] to [facility] for supplies  Current machine obtained 2018 from [employer] reading "motor life exceeded, contact your provider". Visualized by me today. Ready for replacement CPAP machine. Patient would like to obtain replacement CPAP machine from [facility].   Continue Auto CPAP 5-12 cm      11/22/2011 PSG The diagnostic polysomnography revealed a mild obstructive sleep apnea / hypopnea syndrome (A + H Index = 10.4 events / hr asleep.    Current therapy APAP 5 to12cm water pressure with a residual AHI 1.7.    5/17/2024 proceed with Overnight oximetry on room air Auto CPAP 5-12 cm determine need for home O2 with CPAP. Patient is agreeable to proceed with PAP re-titration if abnormal Overnight oximetry on CPAP.       Medication   albuterol (PROVENTIL) 2.5 mg /3 mL (0.083 %) nebulizer solution    tiotropium-olodateroL (STIOLTO RESPIMAT) 2.5-2.5 mcg/actuation Mist    ezetimibe (ZETIA) 10 mg tablet    fluticasone propionate (FLONASE) 50 mcg/actuation nasal spray    lactulose (CHRONULAC) 10 gram/15 mL solution    cyclobenzaprine (FLEXERIL) 5 MG tablet "   albuterol (PROVENTIL HFA) 90 mcg/actuation inhaler   celecoxib (CELEBREX) 200 MG capsule    solifenacin (VESICARE) 10 MG tablet   triamcinolone acetonide (KENALOG-40) 40 mg/mL injection   naloxone (NARCAN) 4 mg/actuation Spry   aspirin (ECOTRIN) 81 MG EC tablet   HYDROcodone-acetaminophen (NORCO)  mg per tablet   pantoprazole (PROTONIX) 20 MG tablet      Test Description  Patient was connected to a full set of leads with a sleep technician in attendance.  Parameters used were EEG derivations (F4-A1, C4-A1, O2-A1), EOG derivations (LOC-A2, KRISTOFER-A2), EKG, EMG - extremity (leg) & chin, oxygen saturation, effort parameters + abdominal/thoracic (RIP), and airflow parameters - nasal pressure/thermal.  Body position was visually monitored and noted.  Audio & video monitoring was performed during study.    Scoring is done in accordance with the American Academy of Sleep Medicine Manual for the Scoring of Sleep and Associated Events version 2.6.  Hypopneas are scored using the 4% desaturation rule.    Sleep Architecture  The total recording time of the polysomnogram was 397.8 minutes.  The total sleep time was 155.5 minutes.  The patient spent 29.6% of total sleep time in Stage N1, 68.2% in Stage N2, 2.3% in Stages N3, and 0.0% in REM.  Sleep latency was 53.6 minutes.  REM latency was - minutes.  Sleep Efficiency was 39.1%.  Wake after Sleep Onset time was 188.5 minutes.    Titration Summary  The patient was titrated at pressures ranging from 5* cm/H20 with supplemental oxygen at - up to 13* cm/H20 with supplemental oxygen at O2: 2.  The last pressure used in the study was 13* cm/H20 with supplemental oxygen at O2: 2.    Respiratory Events  The polysomnogram revealed a presence of - obstructive, - central, and - mixed apneas resulting in an Apnea index of - events per hour.  There were - hypopneas (?3% desat and/or Ar.) resulting in an Apnea\Hypopnea Index (AHI ?3% desat and/or Ar.) of - events per hour.  There were -  hypopneas (?4% desat) resulting in an Apnea\Hypopnea Index (AHI ?4% desat) of - events per hour.  There were - Respiratory Effort Related Arousals resulting in a RERA index of - events per hour. The Respiratory Disturbance Index is - events per hour.     Mean oxygen saturation was 85.5%.  The lowest oxygen saturation during sleep was 74.0%.  Time spent ?88% oxygen saturation was 277.0 minutes (72.0%).    Limb Activity  There were - limb movements recorded.  Of this total, - were classified as PLMs.  Of the PLMs, - were associated with arousals.  The Limb Movement index was - per hour while the PLM index was - per hour.    Cardiac Summary  The average pulse rate was 82.1 bpm.  The minimum pulse rate was 74.0 bpm while the maximum pulse rate was 150.0 bpm.  Cardiac rhythm was normal/abnormal. PVC noted.                                  Behavioral observations:   Awake 21:24 hrs to 22:16 hrs, 23:16 hrs to 00:59 hrs, 01:55 hrs to 02:29 hrs, 03:31 hrs to 04:02 hrs  Oxygen was added epoch 639 2.0 LPM    Conclusion:   CPAP was titrated from 7 cm water pressure to 13 cm water pressure    Oxygen was added during test at 2:22 a.m.   CPAP 9.0 cm water pressure was Optimal.  Minimum O2 saturation was 88%.   Oxygen saturations below 88% for 277 minutes  Flex: 2  Humidity: Heated Mask & Size: N30i Medium           Supplimental O2: 2L/min    No REM sleep was detected during the entire study.      Diagnosis: Obstructive Sleep Apnea / 327.23   Obstructive Sleep Apnea / 327.23   Pulmonary Parenchymal or Vascular Pathology / 327.26     Sleep related hypoxemia    Recommendations:    Weight loss   Therapy with CPAP 9 cm water pressure with supplementary oxygen bleed 2 liters/minute and suitable mask   Close follow-[place]  [id number] [facility]  [place] [id number]/Minor Charlton MD         The sleep study that you ordered is complete.  You have ordered sleep LAB services to perform the sleep study for  "Ro Hernandez .      Please find Sleep Study result in  the "Media tab" of Chart Review menu.        You can look  for the report in the  Media by the document type "Sleep Study Documents". Alphabetizing  "Document type" column helps to find the SLEEP STUDY report  Faster.       As the ordering provider, you are responsible for reviewing the results and implementing a treatment plan with your patient.    If you need a Sleep Medicine provider to explain the sleep study findings and arrange treatment for the patient, please refer patient for consultation to our Sleep Clinic via [facility] with Ambulatory Consult Sleep.     To do that please place an order for an  "Ambulatory Consult Sleep" -  order , it will go to our clinic work queue for our staff  to contact the patient for an appointment.      For any questions, please contact our sleep lab  staff at [phone] to talk to clinical staff          Laurent Jain MD   "

## 2024-06-11 NOTE — ASSESSMENT & PLAN NOTE
5/21/2024 [facility]: Air[facility] 11 setup [facility]   5/27/2024 obtained home O2 NC 2L and sleep blended into CPAP   5/31/2024 CPAP titration CPAP 9 cm optimal, still needed 2 L blended in   6/11/2024 patient request stay on Auto CPAP 5-12 with 2 L blended in, prefers auto CPAP over set pressure. Requested remote on [employer]Med Auto CPAP 5-12 cm   6/12/2024 remote download on APAP 5-12 cm AHI 1.1, okay to stay on patient requested Auto CPAP 5-12 cm with 2 L oxygen blended in

## 2024-06-20 ENCOUNTER — TELEPHONE (OUTPATIENT)
Dept: PULMONOLOGY | Facility: HOSPITAL | Age: 69
End: 2024-06-20
Payer: MEDICARE

## 2024-06-21 ENCOUNTER — HOSPITAL ENCOUNTER (OUTPATIENT)
Dept: PULMONOLOGY | Facility: HOSPITAL | Age: 69
Discharge: HOME | End: 2024-06-21
Payer: MEDICARE

## 2024-06-21 ENCOUNTER — CLINICAL SUPPORT (OUTPATIENT)
Dept: PULMONOLOGY | Facility: CLINIC | Age: 69
End: 2024-06-21
Payer: MEDICARE

## 2024-06-21 ENCOUNTER — HOSPITAL ENCOUNTER (OUTPATIENT)
Dept: CARDIOLOGY | Facility: HOSPITAL | Age: 69
Discharge: HOME | End: 2024-06-21
Attending: INTERNAL MEDICINE
Payer: MEDICARE

## 2024-06-21 VITALS — WEIGHT: 201.5 LBS | HEIGHT: 65 IN | BODY MASS INDEX: 33.57 KG/M2

## 2024-06-21 DIAGNOSIS — R09.02 EXERCISE HYPOXEMIA: ICD-10-CM

## 2024-06-21 DIAGNOSIS — J44.9 CHRONIC OBSTRUCTIVE PULMONARY DISEASE, UNSPECIFIED COPD TYPE: ICD-10-CM

## 2024-06-21 DIAGNOSIS — J45.20 MILD INTERMITTENT ASTHMA WITHOUT COMPLICATION: ICD-10-CM

## 2024-06-21 DIAGNOSIS — J43.2 CENTRILOBULAR EMPHYSEMA: ICD-10-CM

## 2024-06-21 DIAGNOSIS — J96.11 CHRONIC RESPIRATORY FAILURE WITH HYPOXIA: ICD-10-CM

## 2024-06-21 LAB
ALLENS TEST: ABNORMAL
BRPFT: ABNORMAL
DELSYS: ABNORMAL
DLCO ADJ PRE: 10.36 ML/(MIN*MMHG) (ref 16.15–27.61)
DLCO SINGLE BREATH LLN: 16.15
DLCO SINGLE BREATH PRE REF: 47.3 %
DLCO SINGLE BREATH REF: 21.88
DLCOC SBVA LLN: 2.89
DLCOC SBVA PRE REF: 74.1 %
DLCOC SBVA REF: 4.29
DLCOC SINGLE BREATH LLN: 16.15
DLCOC SINGLE BREATH PRE REF: 47.3 %
DLCOC SINGLE BREATH REF: 21.88
DLCOVA LLN: 2.89
DLCOVA PRE REF: 74.1 %
DLCOVA PRE: 3.18 ML/(MIN*MMHG*L) (ref 2.89–5.69)
DLCOVA REF: 4.29
DLVAADJ PRE: 3.18 ML/(MIN*MMHG*L) (ref 2.89–5.69)
ERV LLN: -16449.34
ERV PRE REF: 62.2 %
ERV REF: 0.66
FEF 25 75 CHG: 22.2 %
FEF 25 75 LLN: 0.86
FEF 25 75 POST REF: 133.9 %
FEF 25 75 PRE REF: 109.6 %
FEF 25 75 REF: 1.88
FET100 CHG: -5.8 %
FEV1 CHG: 8.5 %
FEV1 FVC CHG: 3.8 %
FEV1 FVC LLN: 65
FEV1 FVC POST REF: 110.3 %
FEV1 FVC PRE REF: 106.2 %
FEV1 FVC REF: 78
FEV1 LLN: 1.64
FEV1 POST REF: 78.6 %
FEV1 PRE REF: 72.5 %
FEV1 REF: 2.25
FIO2: 21
FRCPLETH LLN: 1.94
FRCPLETH PREREF: 124.5 %
FRCPLETH REF: 2.77
FVC CHG: 4.5 %
FVC LLN: 2.12
FVC POST REF: 70.7 %
FVC PRE REF: 67.6 %
FVC REF: 2.92
HCO3 UR-SCNC: 22.8 MMOL/L (ref 24–28)
IVC PRE: 1.79 L (ref 2.12–3.71)
IVC SINGLE BREATH LLN: 2.12
IVC SINGLE BREATH PRE REF: 61.3 %
IVC SINGLE BREATH REF: 2.92
MODE: ABNORMAL
MVV LLN: 74
MVV PRE REF: 75 %
MVV REF: 87
PCO2 BLDA: 35.5 MMHG (ref 35–45)
PEF CHG: -0.4 %
PEF LLN: 4.03
PEF POST REF: 85.5 %
PEF PRE REF: 85.9 %
PEF REF: 5.8
PH SMN: 7.42 [PH] (ref 7.35–7.45)
PO2 BLDA: 76 MMHG (ref 80–100)
POC BE: -2 MMOL/L
POC SATURATED O2: 95 % (ref 95–100)
POST FEF 25 75: 2.52 L/S (ref 0.86–2.9)
POST FET 100: 6.78 SEC
POST FEV1 FVC: 85.88 % (ref 64.52–91.27)
POST FEV1: 1.77 L (ref 1.64–2.87)
POST FVC: 2.06 L (ref 2.12–3.71)
POST PEF: 4.96 L/S (ref 4.03–7.56)
PRE DLCO: 10.36 ML/(MIN*MMHG) (ref 16.15–27.61)
PRE ERV: 0.41 L (ref -16449.34–16450.66)
PRE FEF 25 75: 2.06 L/S (ref 0.86–2.9)
PRE FET 100: 7.2 SEC
PRE FEV1 FVC: 82.72 % (ref 64.52–91.27)
PRE FEV1: 1.63 L (ref 1.64–2.87)
PRE FRC PL: 3.44 L
PRE FVC: 1.97 L (ref 2.12–3.71)
PRE MVV: 65 L/MIN (ref 73.69–99.7)
PRE PEF: 4.98 L/S (ref 4.03–7.56)
PRE RV: 3.04 L (ref 1.53–2.68)
PRE TLC: 5.04 L (ref 4.11–6.09)
RAW LLN: 3.06
RAW PRE REF: 126.2 %
RAW PRE: 3.86 CMH2O*S/L (ref 3.06–3.06)
RAW REF: 3.06
RV LLN: 1.53
RV PRE REF: 144.1 %
RV REF: 2.11
RVTLC LLN: 33
RVTLC PRE REF: 140.7 %
RVTLC PRE: 60.16 % (ref 33.17–52.35)
RVTLC REF: 43
SAMPLE: ABNORMAL
SITE: ABNORMAL
TLC LLN: 4.11
TLC PRE REF: 98.9 %
TLC REF: 5.1
VA PRE: 3.26 L (ref 4.95–4.95)
VA SINGLE BREATH LLN: 4.95
VA SINGLE BREATH PRE REF: 65.9 %
VA SINGLE BREATH REF: 4.95
VC LLN: 2.12
VC PRE REF: 68.9 %
VC PRE: 2.01 L (ref 2.12–3.71)
VC REF: 2.92
VTGRAWPRE: 3.23 L

## 2024-06-21 PROCEDURE — 93010 ELECTROCARDIOGRAM REPORT: CPT | Mod: HCNC,,, | Performed by: INTERNAL MEDICINE

## 2024-06-21 PROCEDURE — G0239 OTH RESP PROC, GROUP: HCPCS | Mod: HCNC

## 2024-06-21 PROCEDURE — 93005 ELECTROCARDIOGRAM TRACING: CPT | Mod: HCNC

## 2024-06-22 LAB
OHS QRS DURATION: 72 MS
OHS QTC CALCULATION: 437 MS

## 2024-06-23 LAB
OHS QRS DURATION: 72 MS
OHS QTC CALCULATION: 437 MS

## 2024-06-24 NOTE — ANESTHESIA POSTPROCEDURE EVALUATION
"Anesthesia Post Evaluation    Patient: Ro Hernandez    Procedure(s) Performed: Procedure(s) (LRB):  EGD (ESOPHAGOGASTRODUODENOSCOPY) (N/A)    Final Anesthesia Type: MAC  Patient location during evaluation: PACU  Patient participation: Yes- Able to Participate  Level of consciousness: awake and alert  Post-procedure vital signs: reviewed and stable  Pain management: adequate  Airway patency: patent  PONV status at discharge: No PONV  Anesthetic complications: no      Cardiovascular status: blood pressure returned to baseline  Respiratory status: unassisted, spontaneous ventilation and room air  Hydration status: euvolemic  Follow-up not needed.        Visit Vitals  BP (!) 158/66 (BP Location: Left arm, Patient Position: Lying)   Pulse 86   Temp 36.7 °C (98.1 °F) (Tympanic)   Resp 20   Ht 5' 5" (1.651 m)   Wt 100.7 kg (222 lb)   SpO2 95%   Breastfeeding? No   BMI 36.94 kg/m²       Pain/Trinh Score: No Data Recorded      "
Martínez Alejandro, 4th grade, regular education

## 2024-06-28 ENCOUNTER — OFFICE VISIT (OUTPATIENT)
Dept: UROLOGY | Facility: CLINIC | Age: 69
End: 2024-06-28
Payer: MEDICARE

## 2024-06-28 VITALS — RESPIRATION RATE: 18 BRPM | WEIGHT: 201.94 LBS | BODY MASS INDEX: 33.65 KG/M2 | HEIGHT: 65 IN

## 2024-06-28 DIAGNOSIS — N39.41 URGE INCONTINENCE: ICD-10-CM

## 2024-06-28 DIAGNOSIS — N28.1 RENAL CYST: Primary | ICD-10-CM

## 2024-06-28 DIAGNOSIS — N30.00 ACUTE CYSTITIS WITHOUT HEMATURIA: ICD-10-CM

## 2024-06-28 PROCEDURE — 99999 PR PBB SHADOW E&M-EST. PATIENT-LVL III: CPT | Mod: PBBFAC,HCNC,, | Performed by: UROLOGY

## 2024-06-28 RX ORDER — SOLIFENACIN SUCCINATE 10 MG/1
10 TABLET, FILM COATED ORAL DAILY
Qty: 90 TABLET | Refills: 3 | Status: SHIPPED | OUTPATIENT
Start: 2024-06-28

## 2024-06-28 NOTE — PROGRESS NOTES
Chief Complaint:   Encounter Diagnoses   Name Primary?    Renal cyst Yes    Acute cystitis without hematuria     Urge incontinence          HPI:   6/28/24- currently doing well on VESIcare, no evidence UTI.  Today is her last day of work before custodial.      Allergies:  Ace inhibitors, Lisinopril, Pravastatin, Rosuvastatin, and Simvastatin    Medications: has a current medication list which includes the following prescription(s): albuterol, albuterol, aspirin, celecoxib, cyclobenzaprine, ezetimibe, fluticasone propionate, hydrocodone-acetaminophen, inhalation spacing device, isosorbide mononitrate, lactulose, metoprolol succinate, multivitamin, naloxone, nitroglycerin, pantoprazole, solifenacin, stiolto respimat, triamcinolone acetonide, and verapamil.    Review of Systems:  General: No fever, chills, fatigability, or weight loss.  Skin: No rashes, itching, or changes in color or texture of skin.  Chest: Denies MALCOLM, cyanosis, wheezing, cough, and sputum production.  Abdomen: Appetite fine. No weight loss. Denies diarrhea, abdominal pain, hematemesis, or blood in stool.  Musculoskeletal: No joint stiffness or swelling. Denies back pain.  : As above.  All other review of systems negative.    PMH:   has a past medical history of Allergy, Angina, Arthritis (6/11/2013), Asthma, COPD (chronic obstructive pulmonary disease), GERD (gastroesophageal reflux disease), Prinzmetal angina, Hypertension, Obesity (6/11/2013), Other and unspecified hyperlipidemia (6/11/2013), Prinzmetal's angina, and Sleep apnea (6/11/2013).    PSH:   has a past surgical history that includes Eye surgery; Foot surgery; Cholecystectomy; Tonsillectomy; Tubal ligation; Abdominal surgery; Esophagogastroduodenoscopy (N/A, 12/10/2018); Colonoscopy (N/A, 12/6/2019); Esophagogastroduodenoscopy (N/A, 9/8/2020); and Intraluminal gastrointestinal tract imaging via capsule (N/A, 9/18/2020).    FamHx: family history includes Breast cancer in her maternal  grandmother; COPD in her father; Cancer in her mother; Heart disease in her sister; Hyperlipidemia in her son; Hypertension in her father and mother; Kidney cancer in her mother.    SocHx:  reports that she quit smoking about 8 years ago. Her smoking use included cigarettes. She started smoking about 56 years ago. She has a 48.4 pack-year smoking history. She has never used smokeless tobacco. She reports that she does not drink alcohol and does not use drugs.     Physical Exam:  Vitals:   There were no vitals filed for this visit.    General: A&Ox3. No apparent distress. No deformities.  Neck: No masses. Normal thyroid.  Lungs: normal inspiration. No use of accessory muscles.  Heart: normal pulse. No arrhythmias.  Abdomen: Soft. NT. ND  Skin: The skin is warm and dry. No jaundice.  Ext: No c/c/e.     Labs/Studies:   UA normal 6/22  pvr 74 ml 6/22  KASHIF 3.7 cm left complex renal cyst 10/21  CT renal protocol 4.8 cm left Bosniak 2 renal cyst 11/19    Impression/Plan:      1. Renal cyst- in regards to the cyst, this was classified as a Bosniak 2 cyst years ago with stability ever since then.  No need to follow further.      2. UTI- no evidence of recurrent UTI, call with any issues.    3. Urge incontinence- VESIcare 10 mg continues to work well, refills have been sent in to the pharmacy.  Call with any issues prior to the next appointment in 1 year.

## 2024-07-02 ENCOUNTER — HOSPITAL ENCOUNTER (OUTPATIENT)
Dept: PULMONOLOGY | Facility: HOSPITAL | Age: 69
Discharge: HOME | End: 2024-07-02
Payer: MEDICARE

## 2024-07-02 VITALS — WEIGHT: 203 LBS | BODY MASS INDEX: 33.78 KG/M2

## 2024-07-02 PROCEDURE — G0239 OTH RESP PROC, GROUP: HCPCS | Mod: HCNC

## 2024-07-02 NOTE — PROGRESS NOTES
[facility]  Pulmonary Rehab  Session Summary    SUMMARY     Session Data  Session Number: 2  Time In: 1100  Time Out: 1200  Weight: 92.1 kg (203 lb)  Target Heart Rate Zone: Minimum: 90 bpm  Target Heart Rate Zone: Maximum: 120 bpm  Patient Motivation: Excellent  Patient Effort: Excellent      Pre Exercise Vitals  SpO2: 96 %  Supplemental O2?: Yes  O2 Device: nasal cannula  O2 Flow (L/min): 2  Pulse: 65  BP: 140/58  Pramod Dyspnea Rating : 3      During Exercise Vitals  SpO2: 95 %  Supplemental O2?: Yes  O2 Device: nasal cannula  O2 Flow (L/min): 2  Pulse: 66  BP: 123/58  Pramod Dyspnea Rating : 3      Post Exercise Vitals  SpO2: 93 %  Supplemental O2?: Yes  O2 Device: nasal cannula  O2 Flow (L/min): 2  Pulse: 68  BP: 138/62  Pramod Dyspnea Rating : 4       Modality  Modality: Arm Ergometer, Hand Free Weights, Nustep, Recumbent Bike      Arm Ergometer  Time: 10 minutes  Level: 1      Nustep  Time: 20 minutes  Steps: 1072  Load: 3  Mets: 1.5      Recumbent Bike  Time: 5 minutes (.29 miles)  Level: 1  Mets: 1.4    Biceps  lbs: 2 lbs  Sets: 2  Reps: 10  Weight Type : dumbbell      Chest  lbs: 2 lbs  Sets: 2  Reps: 10  Weight Type : dumbbell      Education  Equipment use and safety  Pursed lip breathing  Maximizing energy  Proper heart rate zone      Therapist Notes   Excellent effort. Today was pt's first exercise day. She tolerated all exercises well with stable vitals. She met initial goals on all but recumbent bike. Will continue to motivate and educate pt in rehab.    Dr. Jain immediately available as needed.

## 2024-07-09 ENCOUNTER — HOSPITAL ENCOUNTER (OUTPATIENT)
Dept: PULMONOLOGY | Facility: HOSPITAL | Age: 69
Discharge: HOME | End: 2024-07-09
Payer: MEDICARE

## 2024-07-09 VITALS — BODY MASS INDEX: 33.56 KG/M2 | WEIGHT: 201.69 LBS

## 2024-07-09 PROCEDURE — G0239 OTH RESP PROC, GROUP: HCPCS | Mod: HCNC

## 2024-07-09 NOTE — PROGRESS NOTES
[facility]  Pulmonary Rehab  Session Summary    SUMMARY     Session Data  Session Number: 3  Time In: 1100  Time Out: 1200  Weight: 91.5 kg (201 lb 11.2 oz)  Target Heart Rate Zone: Minimum: 90 bpm  Target Heart Rate Zone: Maximum: 120 bpm  Patient Motivation: Excellent  Patient Effort: Excellent      Pre Exercise Vitals  SpO2: 95 %  Supplemental O2?: Yes  O2 Device: nasal cannula  O2 Flow (L/min): 2  Pulse: 73  BP: 141/60  Pramod Dyspnea Rating : 3      During Exercise Vitals  SpO2: 94 %  Supplemental O2?: Yes  O2 Device: nasal cannula  O2 Flow (L/min): 2  Pulse: 75  BP: 141/51  Pramod Dyspnea Rating : 2      Post Exercise Vitals  SpO2: 93 %  Supplemental O2?: Yes  O2 Device: nasal cannula  O2 Flow (L/min): 2  Pulse: 72  BP: 137/63  Pramod Dyspnea Rating : 2       Modality  Modality: Arm Ergometer, Hand Free Weights, Nustep, Treadmill      Arm Ergometer  Time: 10 minutes  Level: 1    Nustep  Time: 20 minutes  Steps: 1176  Load: 3  Mets: 1.6    Treadmill  Time: 5 minutes  MPH: 0.9 MPH  stGstrstastdstest:st st1st Biceps  lbs: 3 lbs  Sets: 2  Reps: 10  Weight Type : dumbbell      Chest  lbs: 3 lbs  Sets: 2  Reps: 10  Weight Type : dumbbell      Education  Maximizing energy  Pursed lip breathing      Therapist Notes     Excellent effort. Pt tolerated all exercises well with stable vitals. Introduced treadmill today. She exercised 5 mins at 0.9  mph, 0 incline.  Will continue to motivate and educate pt in rehab.     Dr. Jain immediately available as needed.

## 2024-07-11 ENCOUNTER — HOSPITAL ENCOUNTER (OUTPATIENT)
Dept: PULMONOLOGY | Facility: HOSPITAL | Age: 69
Discharge: HOME | End: 2024-07-11
Payer: MEDICARE

## 2024-07-11 VITALS — BODY MASS INDEX: 33.56 KG/M2 | WEIGHT: 201.69 LBS

## 2024-07-11 PROCEDURE — G0239 OTH RESP PROC, GROUP: HCPCS | Mod: HCNC

## 2024-07-11 NOTE — PROGRESS NOTES
[facility]  Pulmonary Rehab  Session Summary    SUMMARY     Session Data  Session Number: 4  Time In: 1100  Time Out: 1200  Weight: 91.5 kg (201 lb 11.2 oz)  Target Heart Rate Zone: Minimum: 90 bpm  Target Heart Rate Zone: Maximum: 120 bpm  Patient Motivation: Excellent  Patient Effort: Excellent      Pre Exercise Vitals  SpO2: 93 %  Supplemental O2?: Yes  O2 Device: nasal cannula  O2 Flow (L/min): 2  Pulse: 75  BP: 137/63  Pramod Dyspnea Rating : 3      During Exercise Vitals  SpO2: 92 %  Supplemental O2?: Yes  O2 Device: nasal cannula  O2 Flow (L/min): 2  Pulse: 71  BP: 134/58  Pramod Dyspnea Rating : 2      Post Exercise Vitals  SpO2: 96 %  Supplemental O2?: Yes  O2 Device: nasal cannula  O2 Flow (L/min): 2  Pulse: 65  BP: 135/58  Pramod Dyspnea Rating : 2       Modality  Modality: Arm Ergometer, Hand Free Weights, Nustep, Recumbent Bike      Arm Ergometer  Time: 10 minutes  Level: 1      Nustep  Time: 20 minutes  Steps: 1261  Load: 3  Mets: 1.6      Recumbent Bike  Time: 5 minutes (.31 miles)  Level: 1  Mets: 1.5      Biceps  lbs: 2 lbs  Sets: 2  Reps: 10  Weight Type : dumbbell      Chest  lbs: 2 lbs  Sets: 2  Reps: 10  Weight Type : dumbbell      Education  Avoiding allergens and irritants  Preventing lung infections      Therapist Notes     Excellent effort. Pt tolerated all exercises well with stable vitals. Pt wore NC 2lpm with exercise. Will continue to motivate and educate pt in rehab.     Dr. Garcia immediately available as needed.

## 2024-07-13 ENCOUNTER — HOSPITAL ENCOUNTER (EMERGENCY)
Facility: HOSPITAL | Age: 69
Discharge: HOME | End: 2024-07-13
Attending: EMERGENCY MEDICINE
Payer: MEDICARE

## 2024-07-13 VITALS
RESPIRATION RATE: 20 BRPM | SYSTOLIC BLOOD PRESSURE: 161 MMHG | WEIGHT: 204.13 LBS | HEART RATE: 76 BPM | OXYGEN SATURATION: 95 % | DIASTOLIC BLOOD PRESSURE: 80 MMHG | BODY MASS INDEX: 33.97 KG/M2 | TEMPERATURE: 98 F

## 2024-07-13 DIAGNOSIS — R06.02 SOB (SHORTNESS OF BREATH): Primary | ICD-10-CM

## 2024-07-13 DIAGNOSIS — R07.9 CHEST PAIN: ICD-10-CM

## 2024-07-13 LAB
ALBUMIN SERPL BCP-MCNC: 3.6 G/DL (ref 3.5–5.2)
ALP SERPL-CCNC: 79 U/L (ref 55–135)
ALT SERPL W/O P-5'-P-CCNC: 12 U/L (ref 10–44)
ANION GAP SERPL CALC-SCNC: 10 MMOL/L (ref 8–16)
AST SERPL-CCNC: 19 U/L (ref 10–40)
BASOPHILS # BLD AUTO: 0.01 K/UL (ref 0–0.2)
BASOPHILS NFR BLD: 0.2 % (ref 0–1.9)
BILIRUB SERPL-MCNC: 0.8 MG/DL (ref 0.1–1)
BILIRUB UR QL STRIP: NEGATIVE
BNP SERPL-MCNC: 560 PG/ML (ref 0–99)
BUN SERPL-MCNC: 19 MG/DL (ref 8–23)
CALCIUM SERPL-MCNC: 9.6 MG/DL (ref 8.7–10.5)
CHLORIDE SERPL-SCNC: 107 MMOL/L (ref 95–110)
CK SERPL-CCNC: 49 U/L (ref 20–180)
CLARITY UR: CLEAR
CO2 SERPL-SCNC: 25 MMOL/L (ref 23–29)
COLOR UR: COLORLESS
CREAT SERPL-MCNC: 0.8 MG/DL (ref 0.5–1.4)
DIFFERENTIAL METHOD BLD: ABNORMAL
EOSINOPHIL # BLD AUTO: 0.1 K/UL (ref 0–0.5)
EOSINOPHIL NFR BLD: 2 % (ref 0–8)
ERYTHROCYTE [DISTWIDTH] IN BLOOD BY AUTOMATED COUNT: 13.7 % (ref 11.5–14.5)
EST. GFR  (NO RACE VARIABLE): >60 ML/MIN/1.73 M^2
GLUCOSE SERPL-MCNC: 91 MG/DL (ref 70–110)
GLUCOSE UR QL STRIP: NEGATIVE
HCT VFR BLD AUTO: 36.2 % (ref 37–48.5)
HCV AB SERPL QL IA: NEGATIVE
HEP C VIRUS HOLD SPECIMEN: NORMAL
HGB BLD-MCNC: 12.2 G/DL (ref 12–16)
HGB UR QL STRIP: NEGATIVE
HIV 1+2 AB+HIV1 P24 AG SERPL QL IA: NEGATIVE
IMM GRANULOCYTES # BLD AUTO: 0.01 K/UL (ref 0–0.04)
IMM GRANULOCYTES NFR BLD AUTO: 0.2 % (ref 0–0.5)
INFLUENZA A, MOLECULAR: NEGATIVE
INFLUENZA B, MOLECULAR: NEGATIVE
KETONES UR QL STRIP: NEGATIVE
LEUKOCYTE ESTERASE UR QL STRIP: NEGATIVE
LYMPHOCYTES # BLD AUTO: 1.2 K/UL (ref 1–4.8)
LYMPHOCYTES NFR BLD: 19.7 % (ref 18–48)
MCH RBC QN AUTO: 30.4 PG (ref 27–31)
MCHC RBC AUTO-ENTMCNC: 33.7 G/DL (ref 32–36)
MCV RBC AUTO: 90 FL (ref 82–98)
MONOCYTES # BLD AUTO: 0.4 K/UL (ref 0.3–1)
MONOCYTES NFR BLD: 6.2 % (ref 4–15)
NEUTROPHILS # BLD AUTO: 4.4 K/UL (ref 1.8–7.7)
NEUTROPHILS NFR BLD: 71.7 % (ref 38–73)
NITRITE UR QL STRIP: NEGATIVE
NRBC BLD-RTO: 0 /100 WBC
OHS QRS DURATION: 80 MS
OHS QTC CALCULATION: 458 MS
PH UR STRIP: 8 [PH] (ref 5–8)
PLATELET # BLD AUTO: 173 K/UL (ref 150–450)
PMV BLD AUTO: 8.9 FL (ref 9.2–12.9)
POTASSIUM SERPL-SCNC: 4.1 MMOL/L (ref 3.5–5.1)
PROT SERPL-MCNC: 6.9 G/DL (ref 6–8.4)
PROT UR QL STRIP: NEGATIVE
RBC # BLD AUTO: 4.01 M/UL (ref 4–5.4)
SARS-COV-2 RDRP RESP QL NAA+PROBE: NEGATIVE
SODIUM SERPL-SCNC: 142 MMOL/L (ref 136–145)
SP GR UR STRIP: 1.01 (ref 1–1.03)
SPECIMEN SOURCE: NORMAL
TROPONIN I SERPL DL<=0.01 NG/ML-MCNC: 0.01 NG/ML (ref 0–0.03)
URN SPEC COLLECT METH UR: ABNORMAL
UROBILINOGEN UR STRIP-ACNC: NEGATIVE EU/DL
WBC # BLD AUTO: 6.14 K/UL (ref 3.9–12.7)

## 2024-07-13 PROCEDURE — 63600175 PHARM REV CODE 636 W HCPCS: Mod: HCNC | Performed by: EMERGENCY MEDICINE

## 2024-07-13 PROCEDURE — 99285 EMERGENCY DEPT VISIT HI MDM: CPT | Mod: 25,HCNC

## 2024-07-13 PROCEDURE — 36415 COLL VENOUS BLD VENIPUNCTURE: CPT | Mod: HCNC | Performed by: EMERGENCY MEDICINE

## 2024-07-13 PROCEDURE — 84484 ASSAY OF TROPONIN QUANT: CPT | Mod: HCNC | Performed by: EMERGENCY MEDICINE

## 2024-07-13 PROCEDURE — 25500020 PHARM REV CODE 255: Mod: HCNC | Performed by: EMERGENCY MEDICINE

## 2024-07-13 PROCEDURE — 85025 COMPLETE CBC W/AUTO DIFF WBC: CPT | Mod: HCNC | Performed by: EMERGENCY MEDICINE

## 2024-07-13 PROCEDURE — 93010 ELECTROCARDIOGRAM REPORT: CPT | Mod: HCNC,,, | Performed by: INTERNAL MEDICINE

## 2024-07-13 PROCEDURE — 83880 ASSAY OF NATRIURETIC PEPTIDE: CPT | Mod: HCNC | Performed by: EMERGENCY MEDICINE

## 2024-07-13 PROCEDURE — 27000221 HC OXYGEN, UP TO 24 HOURS: Mod: HCNC

## 2024-07-13 PROCEDURE — 81003 URINALYSIS AUTO W/O SCOPE: CPT | Mod: HCNC | Performed by: EMERGENCY MEDICINE

## 2024-07-13 PROCEDURE — 87389 HIV-1 AG W/HIV-1&-2 AB AG IA: CPT | Mod: HCNC | Performed by: EMERGENCY MEDICINE

## 2024-07-13 PROCEDURE — 86803 HEPATITIS C AB TEST: CPT | Mod: HCNC | Performed by: EMERGENCY MEDICINE

## 2024-07-13 PROCEDURE — 25000242 PHARM REV CODE 250 ALT 637 W/ HCPCS: Mod: HCNC | Performed by: EMERGENCY MEDICINE

## 2024-07-13 PROCEDURE — 96375 TX/PRO/DX INJ NEW DRUG ADDON: CPT | Mod: HCNC

## 2024-07-13 PROCEDURE — 80053 COMPREHEN METABOLIC PANEL: CPT | Mod: HCNC | Performed by: EMERGENCY MEDICINE

## 2024-07-13 PROCEDURE — 94761 N-INVAS EAR/PLS OXIMETRY MLT: CPT | Mod: HCNC

## 2024-07-13 PROCEDURE — 96374 THER/PROPH/DIAG INJ IV PUSH: CPT | Mod: HCNC

## 2024-07-13 PROCEDURE — 82550 ASSAY OF CK (CPK): CPT | Mod: HCNC | Performed by: EMERGENCY MEDICINE

## 2024-07-13 PROCEDURE — 93005 ELECTROCARDIOGRAM TRACING: CPT | Mod: HCNC

## 2024-07-13 PROCEDURE — 94640 AIRWAY INHALATION TREATMENT: CPT | Mod: HCNC

## 2024-07-13 PROCEDURE — 87502 INFLUENZA DNA AMP PROBE: CPT | Mod: HCNC | Performed by: EMERGENCY MEDICINE

## 2024-07-13 PROCEDURE — U0002 COVID-19 LAB TEST NON-CDC: HCPCS | Mod: HCNC | Performed by: EMERGENCY MEDICINE

## 2024-07-13 RX ORDER — IPRATROPIUM BROMIDE AND ALBUTEROL SULFATE 2.5; .5 MG/3ML; MG/3ML
3 SOLUTION RESPIRATORY (INHALATION)
Status: COMPLETED | OUTPATIENT
Start: 2024-07-13 | End: 2024-07-13

## 2024-07-13 RX ORDER — METHYLPREDNISOLONE SOD SUCC 125 MG
125 VIAL (EA) INJECTION
Status: COMPLETED | OUTPATIENT
Start: 2024-07-13 | End: 2024-07-13

## 2024-07-13 RX ORDER — FUROSEMIDE 10 MG/ML
40 INJECTION INTRAMUSCULAR; INTRAVENOUS
Status: COMPLETED | OUTPATIENT
Start: 2024-07-13 | End: 2024-07-13

## 2024-07-13 RX ORDER — FUROSEMIDE 40 MG/1
40 TABLET ORAL DAILY
Qty: 30 TABLET | Refills: 0 | Status: SHIPPED | OUTPATIENT
Start: 2024-07-13 | End: 2025-07-13

## 2024-07-13 RX ADMIN — FUROSEMIDE 40 MG: 10 INJECTION, SOLUTION INTRAMUSCULAR; INTRAVENOUS at 02:07

## 2024-07-13 RX ADMIN — IPRATROPIUM BROMIDE AND ALBUTEROL SULFATE 3 ML: 2.5; .5 SOLUTION RESPIRATORY (INHALATION) at 11:07

## 2024-07-13 RX ADMIN — IOHEXOL 100 ML: 350 INJECTION, SOLUTION INTRAVENOUS at 01:07

## 2024-07-13 RX ADMIN — METHYLPREDNISOLONE SODIUM SUCCINATE 125 MG: 125 INJECTION, POWDER, FOR SOLUTION INTRAMUSCULAR; INTRAVENOUS at 12:07

## 2024-07-13 NOTE — ED PROVIDER NOTES
SCRIBE #1 NOTE: I, James Flores, am scribing for, and in the presence of, Anshul Maier MD. I have scribed the entire note.       History     Chief Complaint   Patient presents with    Shortness of Breath     Short of breath since 7 pm last night, hx of COPD and uses oxygen at home. States can't catch her breath, oxygen on 2 lpm per N/C. + chest pain, + cough. Denies fever.     Review of patient's allergies indicates:   Allergen Reactions    Ace inhibitors      Other reaction(s): Hives    Lisinopril      Other reaction(s): rash    Pravastatin      Other reaction(s): Mental Changes    Rosuvastatin Other (See Comments)     Other reaction(s): Muscle pain    Simvastatin Other (See Comments)     Other reaction(s): leg cramps         History of Present Illness     HPI    7/13/2024, 11:58 AM  History obtained from the patient      History of Present Illness: Ro Hernandez is a 68 y.o. female patient with a PMHx of HTN, GERD, and COPD who presents to the Emergency Department for evaluation of SOB which onset gradually since 7 PM last night. Pt reports being unable to catch her breath and is on 2 lpm per N/C. Symptoms are constant and moderate in severity. No mitigating or exacerbating factors reported. Associated sxs include chest pain and cough. Patient denies any fever, n/v, dysuria, HA, and all other sxs at this time. No prior Tx. Pt has a Hx of COPD and uses oxygen at home. No further complaints or concerns at this time.       Arrival mode: Personal vehicle      PCP: Minor Charlton MD        Past Medical History:  Past Medical History:   Diagnosis Date    Allergy     Angina     Arthritis 6/11/2013    Asthma     COPD (chronic obstructive pulmonary disease)     GERD (gastroesophageal reflux disease)     Hx of Prinzmetal angina     Hypertension     Obesity 6/11/2013    Other and unspecified hyperlipidemia 6/11/2013    Prinzmetal's angina     Sleep apnea 6/11/2013       Past Surgical History:  Past Surgical  History:   Procedure Laterality Date    ABDOMINAL SURGERY      CHOLECYSTECTOMY      COLONOSCOPY N/A 2019    Procedure: COLONOSCOPY;  Surgeon: Winston Smith MD;  Location: [facility];  Service: Endoscopy;  Laterality: N/A;    ESOPHAGOGASTRODUODENOSCOPY N/A 12/10/2018    Procedure: EGD (ESOPHAGOGASTRODUODENOSCOPY);  Surgeon: Raúl Polanco III, MD;  Location: [facility] ENDO;  Service: Endoscopy;  Laterality: N/A;    ESOPHAGOGASTRODUODENOSCOPY N/A 2020    Procedure: EGD (ESOPHAGOGASTRODUODENOSCOPY);  Surgeon: Raúl Polanco III, MD;  Location: [facility];  Service: Endoscopy;  Laterality: N/A;    EYE SURGERY      FOOT SURGERY      INTRALUMINAL GASTROINTESTINAL TRACT IMAGING VIA CAPSULE N/A 2020    Procedure: IMAGING PROCEDURE, GI TRACT, INTRALUMINAL, VIA CAPSULE;  Surgeon: Blade Holley RN;  Location: [facility];  Service: Endoscopy;  Laterality: N/A;    TONSILLECTOMY      TUBAL LIGATION           Family History:  Family History   Problem Relation Name Age of Onset    Hypertension Mother      Cancer Mother      Kidney cancer Mother      COPD Father      Hypertension Father      Heart disease Sister      Hyperlipidemia Son      Breast cancer Maternal Grandmother      Colon cancer Neg Hx         Social History:  Social History     Tobacco Use    Smoking status: Former     Current packs/day: 0.00     Average packs/day: 1 pack/day for 48.4 years (48.4 ttl pk-yrs)     Types: Cigarettes     Start date:      Quit date: 2016     Years since quittin.0    Smokeless tobacco: Never    Tobacco comments:     smoked for 25 yrs. quit 16yrs. then smoked again for 5-6 before quitting in .   Substance and Sexual Activity    Alcohol use: No     Alcohol/week: 1.0 standard drink of alcohol     Types: 1 Standard drinks or equivalent per week    Drug use: No    Sexual activity: Not Currently        Review of Systems     Review of Systems   Constitutional:  Negative for chills and fever.   HENT:  Negative for  sore throat.    Respiratory:  Positive for cough and shortness of breath.    Cardiovascular:  Positive for chest pain.   Gastrointestinal:  Negative for nausea.   Genitourinary:  Negative for dysuria.   Musculoskeletal:  Negative for back pain.   Skin:  Negative for rash.   Neurological:  Negative for weakness, light-headedness and headaches.   Hematological:  Does not bruise/bleed easily.   All other systems reviewed and are negative.       Physical Exam     Initial Vitals [07/13/24 1106]   BP Pulse Resp Temp SpO2   137/67 74 (!) 30 97.8 °F (36.6 °C) (!) 87 %      MAP       --          Physical Exam  Nursing Notes and Vital Signs Reviewed.  Constitutional: Patient is in no acute distress. Well-developed and well-nourished.  Head: Atraumatic. Normocephalic.  Eyes: PERRL. EOM intact. Conjunctivae are not pale. No scleral icterus.  ENT: Mucous membranes are moist. Oropharynx is clear and symmetric.    Neck: Supple. Full ROM. No lymphadenopathy.  Cardiovascular: Regular rate. Regular rhythm. No murmurs, rubs, or gallops. Distal pulses are 2+ and symmetric.  Pulmonary/Chest: Diffuse wheezing. Clear to auscultation bilaterally.   Abdominal: Soft and non-distended.  There is no tenderness.  No rebound, guarding, or rigidity. Good bowel sounds.  Genitourinary: No CVA tenderness  Musculoskeletal: Moves all extremities. No obvious deformities. No edema. No calf tenderness.  Skin: Warm and dry.  Neurological:  Alert, awake, and appropriate.  Normal speech.  No acute focal neurological deficits are appreciated.  Psychiatric: Normal affect. Good eye contact. Appropriate in content.     ED Course   Procedures  ED Vital Signs:  Vitals:    07/13/24 1106 07/13/24 1140 07/13/24 1143 07/13/24 1147   BP: 137/67 135/61     Pulse: 74 70 72 70   Resp: (!) 30 (!) 26  (!) 24   Temp: 97.8 °F (36.6 °C)      TempSrc: Oral      SpO2: (!) 87% (!) 94%  96%   Weight: 92.6 kg (204 lb 2.3 oz)       07/13/24 1215 07/13/24 1300 07/13/24 1430   BP:  137/68 (!) 150/65 133/64   Pulse: 74 71 71   Resp: (!) 21 20 20   Temp:      TempSrc:      SpO2: 96% 96% 95%   Weight:          Abnormal Lab Results:  Labs Reviewed   URINALYSIS, REFLEX TO URINE CULTURE - Abnormal; Notable for the following components:       Result Value    Color, UA Colorless (*)     All other components within normal limits    Narrative:     Specimen Source->Urine   CBC W/ AUTO DIFFERENTIAL - Abnormal; Notable for the following components:    Hematocrit 36.2 (*)     MPV 8.9 (*)     All other components within normal limits   B-TYPE NATRIURETIC PEPTIDE - Abnormal; Notable for the following components:     (*)     All other components within normal limits   INFLUENZA A & B BY MOLECULAR   HIV 1 / 2 ANTIBODY    Narrative:     Release to patient->Immediate   HEPATITIS C ANTIBODY    Narrative:     Release to patient->Immediate   HEP C VIRUS HOLD SPECIMEN    Narrative:     Release to patient->Immediate   SARS-COV-2 RNA AMPLIFICATION, QUAL   COMPREHENSIVE METABOLIC PANEL   CK   TROPONIN I        All Lab Results:  Results for orders placed or performed during the hospital encounter of 07/13/24   Influenza A & B by Molecular    Specimen: Nasal Swab   Result Value Ref Range    Influenza A, Molecular Negative Negative    Influenza B, Molecular Negative Negative    Flu A & B Source Nasal swab    HIV 1/2 Ag/Ab (4th Gen)   Result Value Ref Range    HIV 1/2 Ag/Ab Negative Negative   Hepatitis C Antibody   Result Value Ref Range    Hepatitis C Ab Negative Negative   HCV Virus Hold Specimen   Result Value Ref Range    HEP C Virus Hold Specimen Hold for HCV sendout    Urinalysis, Reflex to Urine Culture Urine, Clean Catch    Specimen: Urine   Result Value Ref Range    Specimen UA Urine, Clean Catch     Color, UA Colorless (A) Yellow, Straw, Koki    Appearance, UA Clear Clear    pH, UA 8.0 5.0 - 8.0    Specific Gravity, UA 1.010 1.005 - 1.030    Protein, UA Negative Negative    Glucose, UA Negative Negative     Ketones, UA Negative Negative    Bilirubin (UA) Negative Negative    Occult Blood UA Negative Negative    Nitrite, UA Negative Negative    Urobilinogen, UA Negative <2.0 EU/dL    Leukocytes, UA Negative Negative   COVID-19 Rapid Screening   Result Value Ref Range    SARS-CoV-2 RNA, Amplification, Qual Negative Negative   CBC auto differential   Result Value Ref Range    WBC 6.14 3.90 - 12.70 K/uL    RBC 4.01 4.00 - 5.40 M/uL    Hemoglobin 12.2 12.0 - 16.0 g/dL    Hematocrit 36.2 (L) 37.0 - 48.5 %    MCV 90 82 - 98 fL    MCH 30.4 27.0 - 31.0 pg    MCHC 33.7 32.0 - 36.0 g/dL    RDW 13.7 11.5 - 14.5 %    Platelets 173 150 - 450 K/uL    MPV 8.9 (L) 9.2 - 12.9 fL    Immature Granulocytes 0.2 0.0 - 0.5 %    Gran # (ANC) 4.4 1.8 - 7.7 K/uL    Immature Grans (Abs) 0.01 0.00 - 0.04 K/uL    Lymph # 1.2 1.0 - 4.8 K/uL    Mono # 0.4 0.3 - 1.0 K/uL    Eos # 0.1 0.0 - 0.5 K/uL    Baso # 0.01 0.00 - 0.20 K/uL    nRBC 0 0 /100 WBC    Gran % 71.7 38.0 - 73.0 %    Lymph % 19.7 18.0 - 48.0 %    Mono % 6.2 4.0 - 15.0 %    Eosinophil % 2.0 0.0 - 8.0 %    Basophil % 0.2 0.0 - 1.9 %    Differential Method Automated    Comprehensive metabolic panel   Result Value Ref Range    Sodium 142 136 - 145 mmol/L    Potassium 4.1 3.5 - 5.1 mmol/L    Chloride 107 95 - 110 mmol/L    CO2 25 23 - 29 mmol/L    Glucose 91 70 - 110 mg/dL    BUN 19 8 - 23 mg/dL    Creatinine 0.8 0.5 - 1.4 mg/dL    Calcium 9.6 8.7 - 10.5 mg/dL    Total Protein 6.9 6.0 - 8.4 g/dL    Albumin 3.6 3.5 - 5.2 g/dL    Total Bilirubin 0.8 0.1 - 1.0 mg/dL    Alkaline Phosphatase 79 55 - 135 U/L    AST 19 10 - 40 U/L    ALT 12 10 - 44 U/L    eGFR >60 >60 mL/min/1.73 m^2    Anion Gap 10 8 - 16 mmol/L   Brain natriuretic peptide   Result Value Ref Range     (H) 0 - 99 pg/mL   CK   Result Value Ref Range    CPK 49 20 - 180 U/L   Troponin I   Result Value Ref Range    Troponin I 0.010 0.000 - 0.026 ng/mL   EKG 12-lead   Result Value Ref Range    QRS Duration 80 ms    [facility] QTC  Calculation 458 ms        Imaging Results:  Imaging Results              CTA Chest Non-Coronary (PE Studies) (Final result)  Result time 07/13/24 13:57:19      Final result by Jesus Collado MD (07/13/24 13:57:19)                   Impression:      1.  Negative for pulmonary embolism, aneurysm or dissection.    2.  Interval development of bilateral pleural effusions, with worsening ground-glass opacities throughout the lungs and development of interlobular septal thickening, most consistent with CHF.    3.  Numerous stable findings as noted above.    All CT scans at this facility are performed  using dose modulation techniques as appropriate to performed exam including the following:  automated exposure control; adjustment of mA and/or kV according to the patients size (this includes techniques or standardized protocols for targeted exams where dose is matched to indication/reason for exam: i.e. extremities or head);  iterative reconstruction technique.      Electronically signed by: Jesus Collado MD  Date:    07/13/2024  Time:    13:57               Narrative:    EXAMINATION:  CTA CHEST NON CORONARY (PE STUDIES), multiplanar and 3D MIP reconstructions    CLINICAL HISTORY:  PE suspected, intermediate prob, neg D-dimer;    TECHNIQUE:  Axial images through the chest were obtained with the use of IV contrast. Sagittal, coronal and 3D MIP <reconstructions are provided for review and permanently archived.>    COMPARISON:  May 10, 2024    FINDINGS:  The patchy, amorphous ground-glass appearance to the lung parenchyma has progressed, with mild interlobular septal thickening noted.  Interval development of bilateral pleural effusions.  Stable anterior and lateral middle lobe and inferior lingular scarring or chronic atelectasis.  Stable calcified granuloma in the left upper lobe.  The lungs are otherwise free of new pulmonary opacities.  Negative for pneumothorax.    There is adequate opacification of the pulmonary  arteries with visualization of the 1st through 4th order pulmonary arteries without pulmonary emboli.  The aorta is intact without evidence for aneurysm or dissection.  Stable aortic calcifications without aneurysmal change.    Again seen are multiple enlarged hilar and mediastinal lymph nodes, some of which are calcified.  There are no hilar or mediastinal masses or lymphadenopathy.    The airways are patent.  The thyroid gland is normal.  The esophagus is normal.    Stable cholecystectomy clips.  The upper abdominal organs are otherwise normal.    Stable osteopenia.  Stable age-appropriate degenerative changes of the spine with mild anterior wedging of a midthoracic vertebra.                                       X-Ray Chest AP Portable (Final result)  Result time 07/13/24 11:52:58      Final result by Jesus Collado MD (07/13/24 11:52:58)                   Impression:      1.  Chronic or recurrent vascular congestion versus reticular interstitial changes throughout the lungs.  Mild interstitial pulmonary edema or interstitial infectious process such as atypical viral pneumonia must be considered.    2.  Stable findings as noted above.      Electronically signed by: Jesus Collado MD  Date:    07/13/2024  Time:    11:52               Narrative:    EXAMINATION:  XR CHEST AP PORTABLE    CLINICAL HISTORY:  sob;    COMPARISON:  January 12, 2023    FINDINGS:  Similar degree of vascular congestion versus reticular interstitial changes throughout the mid lower lungs.  The lungs are free of new pulmonary opacities.  The cardiac silhouette size is normal. The trachea is midline and the mediastinal width is normal. Negative for focal infiltrate, effusion or pneumothorax. Pulmonary vasculature is normal. Negative for osseous abnormalities. Mildly tortuous aorta with aortic arch calcifications.  Evidence for old granulomatous disease.  There are degenerative changes of the spine and both shoulder girdles.                                        The EKG was ordered, reviewed, and independently interpreted by the ED provider.  Interpretation time: 11:11  Rate: 71 BPM  Rhythm: normal sinus rhythm  Interpretation: Septal infarct, age undetermined. No STEMI.           The Emergency Provider reviewed the vital signs and test results, which are outlined above.     ED Discussion     2:55 PM: Reassessed pt at this time. Discussed with pt all pertinent ED information and results. Discussed pt dx and plan of tx. Gave pt all f/u and return to the ED instructions. All questions and concerns were addressed at this time. Pt expresses understanding of information and instructions, and is comfortable with plan to discharge. Pt is stable for discharge.    I discussed with patient and/or family/caretaker that evaluation in the ED does not suggest any emergent or life threatening medical conditions requiring immediate intervention beyond what was provided in the ED, and I believe patient is safe for discharge.  Regardless, an unremarkable evaluation in the ED does not preclude the development or presence of a serious of life threatening condition. As such, patient was instructed to return immediately for any worsening or change in current symptoms.        Medical Decision Making  DDx: copd, pneumonia, chf    Amount and/or Complexity of Data Reviewed  Labs: ordered. Decision-making details documented in ED Course.  Radiology: ordered. Decision-making details documented in ED Course.  ECG/medicine tests: ordered and independent interpretation performed. Decision-making details documented in ED Course.  Discussion of management or test interpretation with external provider(s): Feeling better after lasix.  Has home O2.  Ready to go home.  Will rx lasix and follow up with PCP    Risk  Prescription drug management.                ED Medication(s):  Medications   albuterol-ipratropium 2.5 mg-0.5 mg/3 mL nebulizer solution 3 mL (3 mLs Nebulization Given 7/13/24  1147)   methylPREDNISolone sodium succinate injection 125 mg (125 mg Intravenous Given 7/13/24 1250)   iohexoL (OMNIPAQUE 350) injection 100 mL (100 mLs Intravenous Given 7/13/24 1323)   furosemide injection 40 mg (40 mg Intravenous Given 7/13/24 1419)       New Prescriptions    FUROSEMIDE (LASIX) 40 MG TABLET    Take 1 tablet (40 mg total) by mouth once daily.        Follow-up Information       Minor Charlton MD.    Specialty: Family Medicine  Contact information:  [address] [place] [id number] [phone]                                 Scribe Attestation:   Scribe #1: I performed the above scribed service and the documentation accurately describes the services I performed. I attest to the accuracy of the note.     Attending:   Physician Attestation Statement for Scribe #1: I, Anshul Maier MD, personally performed the services described in this documentation, as scribed by James Flores, in my presence, and it is both accurate and complete.           Clinical Impression       ICD-10-CM ICD-9-CM   1. SOB (shortness of breath)  R06.02 786.05   2. Chest pain  R07.9 786.50       Disposition:   Disposition: Discharged  Condition: Stable        Anshul Maier MD  07/13/24 [id number]

## 2024-07-16 ENCOUNTER — CLINICAL SUPPORT (OUTPATIENT)
Dept: PULMONOLOGY | Facility: CLINIC | Age: 69
End: 2024-07-16
Payer: MEDICARE

## 2024-07-16 ENCOUNTER — OFFICE VISIT (OUTPATIENT)
Dept: PULMONOLOGY | Facility: CLINIC | Age: 69
End: 2024-07-16
Payer: MEDICARE

## 2024-07-16 ENCOUNTER — HOSPITAL ENCOUNTER (OUTPATIENT)
Dept: PULMONOLOGY | Facility: HOSPITAL | Age: 69
Discharge: HOME | End: 2024-07-16
Payer: MEDICARE

## 2024-07-16 ENCOUNTER — TELEPHONE (OUTPATIENT)
Dept: FAMILY MEDICINE | Facility: CLINIC | Age: 69
End: 2024-07-16
Payer: MEDICARE

## 2024-07-16 VITALS
HEART RATE: 70 BPM | RESPIRATION RATE: 18 BRPM | OXYGEN SATURATION: 92 % | HEIGHT: 65 IN | SYSTOLIC BLOOD PRESSURE: 120 MMHG | BODY MASS INDEX: 33.81 KG/M2 | WEIGHT: 202.94 LBS | DIASTOLIC BLOOD PRESSURE: 60 MMHG

## 2024-07-16 VITALS — BODY MASS INDEX: 33.61 KG/M2 | WEIGHT: 202 LBS

## 2024-07-16 DIAGNOSIS — G47.33 OSA ON CPAP: Chronic | ICD-10-CM

## 2024-07-16 DIAGNOSIS — J43.9 EMPHYSEMA LUNG: Primary | ICD-10-CM

## 2024-07-16 DIAGNOSIS — J45.20 MILD INTERMITTENT ASTHMA WITHOUT COMPLICATION: ICD-10-CM

## 2024-07-16 DIAGNOSIS — J96.11 CHRONIC RESPIRATORY FAILURE WITH HYPOXIA: ICD-10-CM

## 2024-07-16 DIAGNOSIS — J43.2 CENTRILOBULAR EMPHYSEMA: Primary | ICD-10-CM

## 2024-07-16 DIAGNOSIS — I50.9 CONGESTIVE HEART FAILURE, UNSPECIFIED HF CHRONICITY, UNSPECIFIED HEART FAILURE TYPE: ICD-10-CM

## 2024-07-16 DIAGNOSIS — R91.8 MULTIPLE LUNG NODULES: ICD-10-CM

## 2024-07-16 DIAGNOSIS — J44.9 CHRONIC OBSTRUCTIVE PULMONARY DISEASE, UNSPECIFIED COPD TYPE: ICD-10-CM

## 2024-07-16 PROCEDURE — 99214 OFFICE O/P EST MOD 30 MIN: CPT | Mod: HCNC,S$GLB,, | Performed by: PHYSICIAN ASSISTANT

## 2024-07-16 PROCEDURE — 1101F PT FALLS ASSESS-DOCD LE1/YR: CPT | Mod: HCNC,CPTII,S$GLB, | Performed by: PHYSICIAN ASSISTANT

## 2024-07-16 PROCEDURE — 3288F FALL RISK ASSESSMENT DOCD: CPT | Mod: HCNC,CPTII,S$GLB, | Performed by: PHYSICIAN ASSISTANT

## 2024-07-16 PROCEDURE — G0239 OTH RESP PROC, GROUP: HCPCS | Mod: HCNC

## 2024-07-16 PROCEDURE — 3074F SYST BP LT 130 MM HG: CPT | Mod: HCNC,CPTII,S$GLB, | Performed by: PHYSICIAN ASSISTANT

## 2024-07-16 PROCEDURE — 1160F RVW MEDS BY RX/DR IN RCRD: CPT | Mod: HCNC,CPTII,S$GLB, | Performed by: PHYSICIAN ASSISTANT

## 2024-07-16 PROCEDURE — 99999 PR PBB SHADOW E&M-EST. PATIENT-LVL V: CPT | Mod: PBBFAC,HCNC,, | Performed by: PHYSICIAN ASSISTANT

## 2024-07-16 PROCEDURE — 1126F AMNT PAIN NOTED NONE PRSNT: CPT | Mod: HCNC,CPTII,S$GLB, | Performed by: PHYSICIAN ASSISTANT

## 2024-07-16 PROCEDURE — 3008F BODY MASS INDEX DOCD: CPT | Mod: HCNC,CPTII,S$GLB, | Performed by: PHYSICIAN ASSISTANT

## 2024-07-16 PROCEDURE — 1159F MED LIST DOCD IN RCRD: CPT | Mod: HCNC,CPTII,S$GLB, | Performed by: PHYSICIAN ASSISTANT

## 2024-07-16 PROCEDURE — 3078F DIAST BP <80 MM HG: CPT | Mod: HCNC,CPTII,S$GLB, | Performed by: PHYSICIAN ASSISTANT

## 2024-07-16 RX ORDER — ALBUTEROL SULFATE 90 UG/1
2 AEROSOL, METERED RESPIRATORY (INHALATION) EVERY 6 HOURS PRN
Qty: 18 G | Refills: 2 | Status: SHIPPED | OUTPATIENT
Start: 2024-07-16

## 2024-07-16 NOTE — ASSESSMENT & PLAN NOTE
5/17/2024 6MWD abnormal begin NC 2L activity and blended into CPAP.  Continue Stiolto for COPD/emphysema/sob   Enrolled in pul rehab

## 2024-07-16 NOTE — ASSESSMENT & PLAN NOTE
Symptomatic shortness of breath with exercise hypoxemia seen on 6 minute walk distance  Continue nasal cannula 2 L activity  continue Stiolto Respimat 2 puffs daily  Pulmonary Disease Management    pulmonary rehab

## 2024-07-16 NOTE — PROGRESS NOTES
[facility]  Pulmonary Rehab  Session Summary    SUMMARY     Session Data  Session Number: 5  Time In: 1100  Time Out: 1200  Weight: 91.6 kg (202 lb)  Target Heart Rate Zone: Minimum: 90 bpm  Target Heart Rate Zone: Maximum: 120 bpm  Patient Motivation: Excellent  Patient Effort: Excellent      Pre Exercise Vitals  SpO2: 92 %  Supplemental O2?: Yes  O2 Device: nasal cannula  O2 Flow (L/min): 2  Pulse: 68  BP: 138/65  Pramod Dyspnea Rating : 3      During Exercise Vitals  SpO2: 95 %  Supplemental O2?: Yes  O2 Device: nasal cannula  O2 Flow (L/min): 2  Pulse: 67  BP: 115/61  Pramod Dyspnea Rating : 3      Post Exercise Vitals  SpO2: 94 %  Supplemental O2?: Yes  O2 Device: nasal cannula  O2 Flow (L/min): 2  Pulse: 68  BP: 109/50  Pramod Dyspnea Rating : 3       Modality  Modality: Arm Ergometer, Hand Free Weights, Nustep, Recumbent Bike      Arm Ergometer  Time: 10 minutes  Level: 1  Mets: 1.7  Distance: 0.85 Miles      Nustep  Time: 20 minutes  Steps: 1221  Load: 3  Mets: 1.7      Recumbent Bike  Time: 5 minutes (.31 miles)  Level: 1  Mets: 1.3        Biceps  lbs: 2 lbs  Sets: 2  Reps: 10  Weight Type : dumbbell      Chest  lbs: 2 lbs  Sets: 2  Reps: 10  Weight Type : dumbbell      Education  Tips for safe exercise      Therapist Notes     Excellent effort. Pt tolerated all exercises well with stable vitals. Pt wore NC 2lpm with exercise. Will continue to motivate and educate pt in rehab. Will work on 30 day goals next session.     Dr. Jain immediately available as needed.

## 2024-07-16 NOTE — ASSESSMENT & PLAN NOTE
Asthma controlled   5/12/2023 FeNO 14, no inflammation of lungs no indication to resume ICS controller  COPD symptomatic shortness of breath continue STIOLTO

## 2024-07-16 NOTE — TELEPHONE ENCOUNTER
Called pt n/a lvm to call to reschedule appt. Sent message via My Chart as well. Informed pt Dr. Charlton  would be out of office on tomorrow. Cancelled appt

## 2024-07-16 NOTE — PROGRESS NOTES
Subjective:       Patient ID: Ro Hernandez is a 68 y.o. female.    Chief Complaint: Asthma and COPD      7/16/2024  Established patient here for COPD/asthma, HERB follow up  Last seen by other pulmonary NP 5/2024, new patient to me  At this visit she had increased SOB, qualified for oxygen 2L on exertion  Started on Stiolto  Now enrolled in pulmonary rehab  Still with SOB but feels better with o2; went to ER for worsening SOB 7/13 and diagnosed with CHF exacerbation, congestion in lungs, prescribed lasix 40mg but she has not started this yet  No leg swelling, cough, chest pain, or fever  Has follow up with PCP this week  Had pulmonary rehab, no worsening SOB  Using AutoPAP nightly with o2 blended in, benefits from use    Immunization History   Administered Date(s) Administered    Influenza 11/01/2011    Influenza (FLUAD) - Quadrivalent - Adjuvanted - PF *Preferred* (65+) 12/14/2020, 11/19/2021    Influenza - High Dose - PF (65 years and older) 11/12/2018, 09/30/2019    Influenza - Quadrivalent 10/29/2014, 01/09/2017    Influenza - Quadrivalent - PF *Preferred* (6 months and older) 11/16/2015, 12/18/2017    Pneumococcal Conjugate - 13 Valent 09/30/2019    Pneumococcal Polysaccharide - 23 Valent 12/11/2012, 12/14/2020    Tdap 08/06/2007, 08/19/2016      Tobacco Use: Medium Risk (7/16/2024)    Patient History     Smoking Tobacco Use: Former     Smokeless Tobacco Use: Never     Passive Exposure: Not on file      Past Medical History:   Diagnosis Date    Allergy     Angina     Arthritis 6/11/2013    Asthma     Congestive heart failure 7/16/2024    COPD (chronic obstructive pulmonary disease)     GERD (gastroesophageal reflux disease)     Hx of Prinzmetal angina     Hypertension     Obesity 6/11/2013    Other and unspecified hyperlipidemia 6/11/2013    Prinzmetal's angina     Sleep apnea 6/11/2013      Current Outpatient Medications on File Prior to Visit   Medication Sig Dispense Refill    albuterol (PROVENTIL HFA) 90  mcg/actuation inhaler Inhale 2 puffs into the lungs every 6 (six) hours as needed for Wheezing. Rescue 8 g 0    albuterol (PROVENTIL) 2.5 mg /3 mL (0.083 %) nebulizer solution Take 3 mLs (2.5 mg total) by nebulization every 4 (four) hours as needed for Wheezing or Shortness of Breath. Rescue 360 mL 11    aspirin (ECOTRIN) 81 MG EC tablet Take 81 mg by mouth once daily.      celecoxib (CELEBREX) 200 MG capsule Take 200 mg by mouth once daily.      ezetimibe (ZETIA) 10 mg tablet TAKE 1 TABLET EVERY DAY 90 tablet 1    fluticasone propionate (FLONASE) 50 mcg/actuation nasal spray 2 sprays (100 mcg total) by Each Nostril route once daily. 48 g 3    furosemide (LASIX) 40 MG tablet Take 1 tablet (40 mg total) by mouth once daily. 30 tablet 0    HYDROcodone-acetaminophen (NORCO)  mg per tablet Take 1 tablet by mouth. One tablet twice a day      inhalation spacing device Use as directed for inhalation. 1 each 0    isosorbide mononitrate (IMDUR) 30 MG 24 hr tablet Take 1 tab po in the AM and 1/2 tab po in the  tablet 1    lactulose (CHRONULAC) 10 gram/15 mL solution Take 15 mLs (10 g total) by mouth 2 (two) times daily. 1892 mL 11    metoprolol succinate (TOPROL-XL) 100 MG 24 hr tablet       MULTIVITAMIN (MULTIPLE VITAMIN ORAL) Take 1 tablet by mouth Daily.      naloxone (NARCAN) 4 mg/actuation Spry       nitroGLYCERIN (NITROSTAT) 0.4 MG SL tablet DISSOLVE 1 TABLET UNDER THE TONGUE EVERY 5 MINUTES FOR 3 DOSES AS NEEDED FOR CHEST PAIN 25 tablet 0    pantoprazole (PROTONIX) 20 MG tablet Take 1 tablet (20 mg total) by mouth 2 (two) times daily before meals. 180 tablet 3    solifenacin (VESICARE) 10 MG tablet Take 1 tablet (10 mg total) by mouth once daily. 90 tablet 3    tiotropium-olodateroL (STIOLTO RESPIMAT) 2.5-2.5 mcg/actuation Mist Inhale 2 puffs into the lungs once daily. Controller 12 g 3    verapamiL (VERELAN) 360 MG C24P Take 360 mg by mouth once daily.      cyclobenzaprine (FLEXERIL) 5 MG tablet TAKE 1  "TABLET BY MOUTH THREE TIMES DAILY AS NEEDED FOR MUSCLE SPASM. MAKE YOU SLEEPY. OK TO TAKE ONLY AT NIGHT TOHELP YOU REST (Patient not taking: Reported on 7/16/2024)      triamcinolone acetonide (KENALOG-40) 40 mg/mL injection 40 mg by Other route once daily. (Patient not taking: Reported on 7/16/2024)       No current facility-administered medications on file prior to visit.        Review of Systems   Constitutional:  Positive for fatigue. Negative for fever.   HENT:  Negative for congestion.    Respiratory:  Positive for dyspnea on extertion and use of rescue inhaler. Negative for cough, hemoptysis, sputum production and wheezing.    Cardiovascular:  Negative for leg swelling.   Musculoskeletal:  Negative for gait problem.   Gastrointestinal:  Negative for nausea and vomiting.   Neurological:  Negative for headaches.   All other systems reviewed and are negative.      Objective:       Vitals:    07/16/24 1520   BP: 120/60   Pulse: 70   Resp: 18   SpO2: (!) 92%  Comment: on 2L O2   Weight: 92 kg (202 lb 14.9 oz)   Height: 5' 5" (1.651 m)       Physical Exam   Constitutional: She is oriented to person, place, and time. She appears well-developed and well-nourished. No distress.   HENT:   Head: Normocephalic.   Mouth/Throat: Oropharynx is clear and moist.   Cardiovascular: Normal rate and regular rhythm.   Pulmonary/Chest: Effort normal. No respiratory distress. She has no wheezes. She has no rhonchi. She has no rales.   Musculoskeletal:         General: No edema.      Cervical back: Normal range of motion and neck supple.   Neurological: She is alert and oriented to person, place, and time.   Skin: Skin is warm and dry.   Psychiatric: She has a normal mood and affect.   Vitals reviewed.    Personal Diagnostic Review    CTA Chest Non-Coronary (PE Studies)  Narrative: EXAMINATION:  CTA CHEST NON CORONARY (PE STUDIES), multiplanar and 3D MIP reconstructions    CLINICAL HISTORY:  PE suspected, intermediate prob, neg " D-dimer;    TECHNIQUE:  Axial images through the chest were obtained with the use of IV contrast. Sagittal, coronal and 3D MIP <reconstructions are provided for review and permanently archived.>    COMPARISON:  May 10, 2024    FINDINGS:  The patchy, amorphous ground-glass appearance to the lung parenchyma has progressed, with mild interlobular septal thickening noted.  Interval development of bilateral pleural effusions.  Stable anterior and lateral middle lobe and inferior lingular scarring or chronic atelectasis.  Stable calcified granuloma in the left upper lobe.  The lungs are otherwise free of new pulmonary opacities.  Negative for pneumothorax.    There is adequate opacification of the pulmonary arteries with visualization of the 1st through 4th order pulmonary arteries without pulmonary emboli.  The aorta is intact without evidence for aneurysm or dissection.  Stable aortic calcifications without aneurysmal change.    Again seen are multiple enlarged hilar and mediastinal lymph nodes, some of which are calcified.  There are no hilar or mediastinal masses or lymphadenopathy.    The airways are patent.  The thyroid gland is normal.  The esophagus is normal.    Stable cholecystectomy clips.  The upper abdominal organs are otherwise normal.    Stable osteopenia.  Stable age-appropriate degenerative changes of the spine with mild anterior wedging of a midthoracic vertebra.  Impression: 1.  Negative for pulmonary embolism, aneurysm or dissection.    2.  Interval development of bilateral pleural effusions, with worsening ground-glass opacities throughout the lungs and development of interlobular septal thickening, most consistent with CHF.    3.  Numerous stable findings as noted above.    All CT scans at this facility are performed  using dose modulation techniques as appropriate to performed exam including the following:  automated exposure control; adjustment of mA and/or kV according to the patients size (this  includes techniques or standardized protocols for targeted exams where dose is matched to indication/reason for exam: i.e. extremities or head);  iterative reconstruction technique.    Electronically signed by: Jesus Collado MD  Date:    07/13/2024  Time:    13:57  X-Ray Chest AP Portable  Narrative: EXAMINATION:  XR CHEST AP PORTABLE    CLINICAL HISTORY:  sob;    COMPARISON:  January 12, 2023    FINDINGS:  Similar degree of vascular congestion versus reticular interstitial changes throughout the mid lower lungs.  The lungs are free of new pulmonary opacities.  The cardiac silhouette size is normal. The trachea is midline and the mediastinal width is normal. Negative for focal infiltrate, effusion or pneumothorax. Pulmonary vasculature is normal. Negative for osseous abnormalities. Mildly tortuous aorta with aortic arch calcifications.  Evidence for old granulomatous disease.  There are degenerative changes of the spine and both shoulder girdles.  Impression: 1.  Chronic or recurrent vascular congestion versus reticular interstitial changes throughout the lungs.  Mild interstitial pulmonary edema or interstitial infectious process such as atypical viral pneumonia must be considered.    2.  Stable findings as noted above.    Electronically signed by: Jesus Collado MD  Date:    07/13/2024  Time:    11:52            Assessment/Plan:       Problem List Items Addressed This Visit          Pulmonary    Mild intermittent asthma without complication     Asthma controlled   5/12/2023 FeNO 14, no inflammation of lungs no indication to resume ICS controller  COPD symptomatic shortness of breath continue STIOLTO           Relevant Medications    albuterol (VENTOLIN HFA) 90 mcg/actuation inhaler    Multiple lung nodules     5/10/2024 : LDCT There are no abnormal opacities that require further evaluation. The largest opacity in the right lung appears solid and measures an average of 0.43 cm on series 4, image 151 in the right  upper lobe. This nodule is unchanged. The largest opacity in the left lung appears solid and measures an average of 0.77 cm on series 4, image 314 in the left lower lobe (previously 0.72 cm). This is subthreshold growth. No new or significantly enlarging pulmonary nodules demonstrated. The lungs show findings consistent with emphysema. Diffuse mosaic attenuation of the lung parenchyma is again noted and unchanged.   LDCT Benign Appearance or Behavior - continue annual screening with LDCT in 12 months.          Emphysema lung - Primary     Symptomatic shortness of breath with exercise hypoxemia seen on 6 minute walk distance  Continue nasal cannula 2 L activity  continue Stiolto Respimat 2 puffs daily  Pulmonary Disease Management    pulmonary rehab         Relevant Medications    albuterol (VENTOLIN HFA) 90 mcg/actuation inhaler    Chronic respiratory failure with hypoxia     5/17/2024 6MWD abnormal begin NC 2L activity and blended into CPAP.  Continue Stiolto for COPD/emphysema/sob   Enrolled in pul rehab            Cardiac/Vascular    Congestive heart failure     Take lasix as prescribed by ER, she has not started it yet but says she will start it tonight  40mg once daily  Referral to cardiology placed, she requests Dr. Marin         Relevant Orders    Ambulatory referral/consult to Cardiology       Other    HERB on CPAP (Chronic)     5/21/2024 [facility]: Airsense 11 setup [facility]   5/27/2024 obtained home O2 NC 2L and sleep blended into CPAP   5/31/2024 CPAP titration CPAP 9 cm optimal, still needed 2 L blended in   6/11/2024 patient request stay on Auto CPAP 5-12 with 2 L blended in, prefers auto CPAP over set pressure. Requested remote on [employer] Auto CPAP 5-12 cm   6/12/2024 remote download on APAP 5-12 cm AHI 1.1, okay to stay on patient requested Auto CPAP 5-12 cm with 2 L oxygen blended in                  Follow up in about 4 months (around 11/16/2024) for copd/asthma follow up.    Discussed diagnosis, its  evaluation, treatment and usual course. All questions answered.    Patient verbalized understanding of plan and left in no acute distress    Thank you for the courtesy of participating in the care of this patient    GERMAINE [facility]avezT[facility]

## 2024-07-16 NOTE — ASSESSMENT & PLAN NOTE
Take lasix as prescribed by ER, she has not started it yet but says she will start it tonight  40mg once daily  Referral to cardiology placed, she requests Dr. Marin

## 2024-07-18 ENCOUNTER — HOSPITAL ENCOUNTER (OUTPATIENT)
Dept: PULMONOLOGY | Facility: HOSPITAL | Age: 69
Discharge: HOME | End: 2024-07-18
Payer: MEDICARE

## 2024-07-18 VITALS — WEIGHT: 196.69 LBS | BODY MASS INDEX: 32.73 KG/M2

## 2024-07-18 PROCEDURE — G0239 OTH RESP PROC, GROUP: HCPCS | Mod: HCNC

## 2024-07-18 NOTE — PROGRESS NOTES
[facility]  Pulmonary Rehab  Session Summary    SUMMARY     Session Data  Session Number: 6  Time In: 1100  Time Out: 1200  Weight: 89.2 kg (196 lb 11.2 oz)  Target Heart Rate Zone: Minimum: 90 bpm  Target Heart Rate Zone: Maximum: 120 bpm  Patient Motivation: Excellent  Patient Effort: Excellent      Pre Exercise Vitals  SpO2: 96 %  Supplemental O2?: Yes  O2 Device: nasal cannula  O2 Flow (L/min): 2  Pulse: 70  BP: 120/53  Pramod Dyspnea Rating : 3      During Exercise Vitals  SpO2: 94 %  Supplemental O2?: Yes  O2 Device: nasal cannula  O2 Flow (L/min): 2  Pulse: 68  BP: 137/60  Pramod Dyspnea Rating : 2      Post Exercise Vitals  SpO2: 96 %  Supplemental O2?: Yes  O2 Device: nasal cannula  O2 Flow (L/min): 2  Pulse: 67  BP: 138/65  Pramod Dyspnea Rating : 3       Modality  Modality: Arm Ergometer, Hand Free Weights, Nustep, Recumbent Bike      Arm Ergometer  Time: 10 minutes  Level: 1      Nustep  Time: 20 minutes  Steps: 1300  Load: 3  Mets: 1.8      Recumbent Bike  Time: 5 minutes (.34 miles)  Level: 1  Mets: 1.6      Biceps  lbs: 2 lbs  Sets: 2  Reps: 10  Weight Type : dumbbell      Chest  lbs: 2 lbs  Sets: 2  Reps: 10  Weight Type : dumbbell      Education  COPD action plan  Recognizing flare ups      Therapist Notes   Excellent effort. Pt achieved more steps on nustep, load 3 in 20 mins today. She also went further on recumbent bike in 5 mins on level 1. She tolerated all exercises well. Vitals were stable. Will continue to motivate and educate pt in rehab.    Dr. ANDREIA Garcia immediately available as needed.

## 2024-07-19 ENCOUNTER — OFFICE VISIT (OUTPATIENT)
Dept: FAMILY MEDICINE | Facility: CLINIC | Age: 69
End: 2024-07-19
Payer: MEDICARE

## 2024-07-19 VITALS
BODY MASS INDEX: 32.91 KG/M2 | SYSTOLIC BLOOD PRESSURE: 112 MMHG | HEART RATE: 67 BPM | DIASTOLIC BLOOD PRESSURE: 64 MMHG | OXYGEN SATURATION: 97 % | WEIGHT: 197.56 LBS | HEIGHT: 65 IN | TEMPERATURE: 96 F

## 2024-07-19 DIAGNOSIS — E78.2 MIXED HYPERLIPIDEMIA: ICD-10-CM

## 2024-07-19 DIAGNOSIS — I35.0 AORTIC VALVE STENOSIS, ETIOLOGY OF CARDIAC VALVE DISEASE UNSPECIFIED: ICD-10-CM

## 2024-07-19 DIAGNOSIS — I50.9 CONGESTIVE HEART FAILURE, UNSPECIFIED HF CHRONICITY, UNSPECIFIED HEART FAILURE TYPE: Primary | ICD-10-CM

## 2024-07-19 DIAGNOSIS — G47.33 OSA ON CPAP: ICD-10-CM

## 2024-07-19 DIAGNOSIS — E66.01 CLASS 2 SEVERE OBESITY WITH SERIOUS COMORBIDITY AND BODY MASS INDEX (BMI) OF 36.0 TO 36.9 IN ADULT, UNSPECIFIED OBESITY TYPE: ICD-10-CM

## 2024-07-19 DIAGNOSIS — J44.1 COPD EXACERBATION: ICD-10-CM

## 2024-07-19 DIAGNOSIS — J96.11 CHRONIC RESPIRATORY FAILURE WITH HYPOXIA: ICD-10-CM

## 2024-07-19 PROCEDURE — 3074F SYST BP LT 130 MM HG: CPT | Mod: HCNC,CPTII,S$GLB, | Performed by: FAMILY MEDICINE

## 2024-07-19 PROCEDURE — 1159F MED LIST DOCD IN RCRD: CPT | Mod: HCNC,CPTII,S$GLB, | Performed by: FAMILY MEDICINE

## 2024-07-19 PROCEDURE — 3078F DIAST BP <80 MM HG: CPT | Mod: HCNC,CPTII,S$GLB, | Performed by: FAMILY MEDICINE

## 2024-07-19 PROCEDURE — 99999 PR PBB SHADOW E&M-EST. PATIENT-LVL IV: CPT | Mod: PBBFAC,HCNC,, | Performed by: FAMILY MEDICINE

## 2024-07-19 PROCEDURE — 1126F AMNT PAIN NOTED NONE PRSNT: CPT | Mod: HCNC,CPTII,S$GLB, | Performed by: FAMILY MEDICINE

## 2024-07-19 PROCEDURE — 99214 OFFICE O/P EST MOD 30 MIN: CPT | Mod: HCNC,S$GLB,, | Performed by: FAMILY MEDICINE

## 2024-07-19 PROCEDURE — 1101F PT FALLS ASSESS-DOCD LE1/YR: CPT | Mod: HCNC,CPTII,S$GLB, | Performed by: FAMILY MEDICINE

## 2024-07-19 PROCEDURE — 3008F BODY MASS INDEX DOCD: CPT | Mod: HCNC,CPTII,S$GLB, | Performed by: FAMILY MEDICINE

## 2024-07-19 PROCEDURE — 3288F FALL RISK ASSESSMENT DOCD: CPT | Mod: HCNC,CPTII,S$GLB, | Performed by: FAMILY MEDICINE

## 2024-07-19 PROCEDURE — G2211 COMPLEX E/M VISIT ADD ON: HCPCS | Mod: HCNC,S$GLB,, | Performed by: FAMILY MEDICINE

## 2024-07-19 NOTE — PROGRESS NOTES
Subjective:       Patient ID: Ro Hernandez is a 68 y.o. female.    Chief Complaint: Hospital Follow Up (Went to [facility] on [address] for SOB. )      HPI Comments:       Current Outpatient Medications:     albuterol (PROVENTIL HFA) 90 mcg/actuation inhaler, Inhale 2 puffs into the lungs every 6 (six) hours as needed for Wheezing. Rescue, Disp: 8 g, Rfl: 0    albuterol (PROVENTIL) 2.5 mg /3 mL (0.083 %) nebulizer solution, Take 3 mLs (2.5 mg total) by nebulization every 4 (four) hours as needed for Wheezing or Shortness of Breath. Rescue, Disp: 360 mL, Rfl: 11    albuterol (VENTOLIN HFA) 90 mcg/actuation inhaler, Inhale 2 puffs into the lungs every 6 (six) hours as needed for Wheezing or Shortness of Breath. Rescue, Disp: 18 g, Rfl: 2    aspirin (ECOTRIN) 81 MG EC tablet, Take 81 mg by mouth once daily., Disp: , Rfl:     celecoxib (CELEBREX) 200 MG capsule, Take 200 mg by mouth once daily., Disp: , Rfl:     cyclobenzaprine (FLEXERIL) 5 MG tablet, , Disp: , Rfl:     ezetimibe (ZETIA) 10 mg tablet, TAKE 1 TABLET EVERY DAY, Disp: 90 tablet, Rfl: 1    fluticasone propionate (FLONASE) 50 mcg/actuation nasal spray, 2 sprays (100 mcg total) by Each Nostril route once daily., Disp: 48 g, Rfl: 3    furosemide (LASIX) 40 MG tablet, Take 1 tablet (40 mg total) by mouth once daily., Disp: 30 tablet, Rfl: 0    HYDROcodone-acetaminophen (NORCO)  mg per tablet, Take 1 tablet by mouth. One tablet twice a day, Disp: , Rfl:     inhalation spacing device, Use as directed for inhalation., Disp: 1 each, Rfl: 0    isosorbide mononitrate (IMDUR) 30 MG 24 hr tablet, Take 1 tab po in the AM and 1/2 tab po in the PM, Disp: 135 tablet, Rfl: 1    lactulose (CHRONULAC) 10 gram/15 mL solution, Take 15 mLs (10 g total) by mouth 2 (two) times daily., Disp: 1892 mL, Rfl: 11    metoprolol succinate (TOPROL-XL) 100 MG 24 hr tablet, , Disp: , Rfl:     MULTIVITAMIN (MULTIPLE VITAMIN ORAL), Take 1 tablet by mouth Daily., Disp: , Rfl:     naloxone  (NARCAN) 4 mg/actuation Spry, , Disp: , Rfl:     nitroGLYCERIN (NITROSTAT) 0.4 MG SL tablet, DISSOLVE 1 TABLET UNDER THE TONGUE EVERY 5 MINUTES FOR 3 DOSES AS NEEDED FOR CHEST PAIN, Disp: 25 tablet, Rfl: 0    pantoprazole (PROTONIX) 20 MG tablet, Take 1 tablet (20 mg total) by mouth 2 (two) times daily before meals., Disp: 180 tablet, Rfl: 3    solifenacin (VESICARE) 10 MG tablet, Take 1 tablet (10 mg total) by mouth once daily., Disp: 90 tablet, Rfl: 3    tiotropium-olodateroL (STIOLTO RESPIMAT) 2.5-2.5 mcg/actuation Mist, Inhale 2 puffs into the lungs once daily. Controller, Disp: 12 g, Rfl: 3    triamcinolone acetonide (KENALOG-40) 40 mg/mL injection, 40 mg by Other route once daily., Disp: , Rfl:     verapamiL (VERELAN) 360 MG C24P, Take 360 mg by mouth once daily., Disp: , Rfl:       This my 1st visit with her in almost a year.    Has also an ER follow-up.  She was seen there about a week ago for shortness a breath.  Was felt to be due to CHF which she has a history of before.  Her BNP was 560.  She is now on Lasix and feeling better.  She was also given a albuterol and steroids in the ER    Says her GERD symptoms are doing fine.      Takes Celebrex every morning for pain in her neck or elsewhere.  Also takes hydrocodone p.r.n..  Her pain management doctor is Dr. Chung    Wears oxygen most of the time now.  Uses Stiolto every day    Now retired    History of hypertrophic cardiomyopathy.  She has a loud murmur which has been present for a long time she says    Previous cardiologist Dr. Bello.  She is now switching here has an appointment with Dr. MO next week      Review of Systems   Constitutional:  Negative for activity change, appetite change and fever.   HENT:  Negative for sore throat.    Respiratory:  Negative for cough and shortness of breath.    Cardiovascular:  Negative for chest pain.   Gastrointestinal:  Negative for abdominal pain, diarrhea and nausea.   Genitourinary:  Negative for difficulty  "urinating.   Musculoskeletal:  Negative for arthralgias and myalgias.   Neurological:  Negative for dizziness and headaches.       Objective:      Vitals:    07/19/24 1301   BP: 112/64   Pulse: 67   Temp: 96.3 °F (35.7 °C)   TempSrc: Tympanic   SpO2: 97%   Weight: 89.6 kg (197 lb 8.5 oz)   Height: 5' 5" (1.651 m)   PainSc: 0-No pain     Physical Exam  Vitals and nursing note reviewed.   Constitutional:       General: She is not in acute distress.     Appearance: She is well-developed. She is not ill-appearing or diaphoretic.   HENT:      Head: Normocephalic.   Neck:      Thyroid: No thyromegaly.   Cardiovascular:      Rate and Rhythm: Normal rate and regular rhythm.      Heart sounds: Murmur heard.      Systolic murmur is present with a grade of 3/6.      Comments: Harsh murmur  Pulmonary:      Effort: Pulmonary effort is normal.      Breath sounds: Examination of the left-lower field reveals rales. Rales present. No wheezing.   Abdominal:      General: There is no distension.      Palpations: Abdomen is soft.   Musculoskeletal:      Cervical back: Neck supple.      Right lower leg: No edema.      Left lower leg: No edema.   Lymphadenopathy:      Cervical: No cervical adenopathy.   Skin:     General: Skin is warm and dry.   Neurological:      Mental Status: She is alert and oriented to person, place, and time.   Psychiatric:         Behavior: Behavior normal.         Thought Content: Thought content normal.         Judgment: Judgment normal.         Assessment:       1. Congestive heart failure, unspecified HF chronicity, unspecified heart failure type    2. COPD exacerbation    3. Chronic respiratory failure with hypoxia    4. Class 2 severe obesity with serious comorbidity and body mass index (BMI) of 36.0 to 36.9 in adult, unspecified obesity type    5. Aortic valve stenosis, etiology of cardiac valve disease unspecified    6. Mixed hyperlipidemia    7. HERB on CPAP        Plan:   Congestive heart failure, " unspecified HF chronicity, unspecified heart failure type  Comments:  . Card appt next wed.    COPD exacerbation  Comments:  Now at baseline    Chronic respiratory failure with hypoxia  Comments:  Was 0 2 most of the time.  Recently saw pulmonology with no significant changes in treatment plan    Class 2 severe obesity with serious comorbidity and body mass index (BMI) of 36.0 to 36.9 in adult, unspecified obesity type  Comments:  Weight down 15 lb since our last visit    Aortic valve stenosis, etiology of cardiac valve disease unspecified  Comments:  Loud harsh murmur today.  Establish care with cardiology next week    Mixed hyperlipidemia  Comments:      HERB on CPAP  Comments:  Nightly compliance with O2 blended in

## 2024-07-23 ENCOUNTER — HOSPITAL ENCOUNTER (OUTPATIENT)
Dept: PULMONOLOGY | Facility: HOSPITAL | Age: 69
Discharge: HOME | End: 2024-07-23
Payer: MEDICARE

## 2024-07-23 ENCOUNTER — CLINICAL SUPPORT (OUTPATIENT)
Dept: PULMONOLOGY | Facility: CLINIC | Age: 69
End: 2024-07-23
Payer: MEDICARE

## 2024-07-23 VITALS — HEIGHT: 65 IN | WEIGHT: 192.63 LBS | OXYGEN SATURATION: 60 % | BODY MASS INDEX: 32.1 KG/M2 | HEART RATE: 94 BPM

## 2024-07-23 VITALS — BODY MASS INDEX: 32.07 KG/M2 | WEIGHT: 192.69 LBS

## 2024-07-23 DIAGNOSIS — J43.9 EMPHYSEMA LUNG: ICD-10-CM

## 2024-07-23 DIAGNOSIS — R09.02 EXERCISE HYPOXEMIA: ICD-10-CM

## 2024-07-23 DIAGNOSIS — J45.20 MILD INTERMITTENT ASTHMA WITHOUT COMPLICATION: Primary | ICD-10-CM

## 2024-07-23 PROCEDURE — G0239 OTH RESP PROC, GROUP: HCPCS | Mod: HCNC

## 2024-07-23 PROCEDURE — 99999 PR PBB SHADOW E&M-EST. PATIENT-LVL III: CPT | Mod: PBBFAC,HCNC,,

## 2024-07-23 NOTE — PROGRESS NOTES
[facility]  Pulmonary Rehab  Session Summary    SUMMARY     Session Data  Session Number: 7  Time In: 1100  Time Out: 1200  Weight: 87.4 kg (192 lb 11.2 oz)  Target Heart Rate Zone: Minimum: 89 bpm  Target Heart Rate Zone: Maximum: 119 bpm  Patient Motivation: Excellent  Patient Effort: Excellent      Pre Exercise Vitals  SpO2: 98 %  Supplemental O2?: Yes  O2 Device: nasal cannula  O2 Flow (L/min): 2  Pulse: 69  BP: 127/90  Pramod Dyspnea Rating : 2      During Exercise Vitals  SpO2: 97 %  Supplemental O2?: Yes  O2 Device: nasal cannula  O2 Flow (L/min): 2  Pulse: 61  BP: 102/58  Pramod Dyspnea Rating : 2      Post Exercise Vitals  SpO2: 96 %  Supplemental O2?: Yes  O2 Device: nasal cannula  O2 Flow (L/min): 2  Pulse: 65  BP: 113/59  Pramod Dyspnea Rating : 3       Modality  Modality: Arm Ergometer, Hand Free Weights, Nustep, Treadmill      Arm Ergometer  Time: 10 minutes  Level: 1  Mets: 1.6  Distance: 0.83 Miles      Nustep  Time: 20 minutes  Steps: 1160  Load: 4  Mets: 1.8    Treadmill  Time: 6 minutes  MPH: 1 MPH  stGstrstastdstest:st st1st Biceps  lbs: 2 lbs  Sets: 2  Reps: 10  Weight Type : dumbbell      Chest  lbs: 2 lbs  Sets: 2  Reps: 10  Weight Type : dumbbell      Education  Proper nutrition and breathing      Therapist Notes     Excellent effort. Pt increased to load 4 on nustep for 20 mins today, she also increased ttome and speed on treadmill. ( 6 mins, 1 mph, 0 incline) She tolerated all exercises well. Vitals were stable. Will continue to motivate and educate pt in rehab.     Dr. Jain immediately available as needed.

## 2024-07-23 NOTE — PATIENT INSTRUCTIONS
ACTION PLAN    GREEN ZONE  My sputum is clear/white/usual color and easily cleared.  My breathing is no harder than usual.  I can do my usual activities.  I can think clearly.   Take your usual medicines, including oxygen, as you are told to do so by your health care provider.   YELLOW ZONE  My sputum has change (color, thickness, amount).  I am more short of breath than usual.  I cough or wheeze more.  I weigh more and my legs/feet swell.  I cannot do my usual activities without resting.   Call your health care provider. You will probably be told to begin taking an antibiotic and prednisone. Have your pharmacy phone number available.   RED ZONE  I cannot cough out my sputum.  I am much more short of breath than normal.  I need to sit up to breathe  I cannot do my usual activities.  I am unable to speak more than one or two words at a time.  I am confused.   Call your health care provider. You may be asked to come in to be seen, told to go to the emergency room, or told to call 9-1-1.     HOW TO USE RESPIMAT INHALER      99833  Shortness of Breath: Maximizing Your Energy    Fear of shortness of breath may stop you from being as active as you once were. You don't have to live this way. Managing your time and pacing yourself can help you conserve energy and do more. It's even OK if you're short of breath sometimes. You can learn to work through this without limiting your activities.  Manage your time  Shortness of breath can make everyday tasks take longer. This means there's less time to do the things you enjoy. You can help prevent this by managing your time. Try these tips:  Plan ahead so your tasks are spaced throughout the day. As you plan, keep in mind the times of day you tend to have the most energy.  Do only one thing at a time. Finish one task before starting another.  Gather everything you need before you start a task. This cuts out unneeded steps while you're working.  Think about what you really need to  do. Be realistic about what you can get done in a day.      Balance activity and rest  When you're tired, your activities will take longer. Fatigue also makes you more likely to get an infection. Plan your day so that your tasks are spaced throughout the day. To have more energy:  Stop and rest when you need to. Don't wait until you're overtired.  Switch back and forth between hard tasks and easy ones.  Give yourself plenty of time for each task, so you don't have to hurry.  Take 20- to 30-minute rest breaks after meals and throughout the day.  If an activity takes a lot of energy, break it into smaller parts. For instance, fold the laundry first. Then take a break before putting it away.  Try not to exert yourself in extreme cold or heat.       Find ways to conserve energy  Conserving your energy can help you stay active and breathe better. The way you use your body during a task can help you conserve energy. Think of ways to make things easier, and take your time to ease shortness of breath.  For some tasks, you can also use special aids designed to reduce the amount of energy needed. Here are some tips:  Sit whenever possible, and keep your arms close to your body. Use slow, smooth motions.  Sit to dress and undress, shave, brush your teeth, and comb your hair. Use a long-handled reacher to pull on socks and shoes, and long-handled items like shoe horns.  Sit on a bench to shower. Use warm water, not hot. (Steam can make it harder to breathe.) Dry off by wrapping yourself in a terrycloth robe.  Use energy-saving appliances, such as an electric can opener, a power toothbrush, and a .  Use a cart with wheels to move groceries, laundry, dishes, and other items around the house. Some carts have seats so you can rest when you need to.  Use lightweight, nonstick pots and pans to cook. Soak dirty dishes instead of scrubbing them. Air-dry dishes, or use a .  Mix, cook, serve, and store foods in the  same dish.  Keep the things you use most at waist level, so you can get them without reaching or bending.  Use steps slowly, pausing at each step. If you have steps outside or in your home, think about adding ramps or stair lifts.  Think about ways that others can help you. You might get help from friends, family members, or home health aides.      Remember to breathe  Sometimes people with an illness or condition that affects the lungs try to rush through tasks so they won't get short of breath. This uses more energy and can actually increase shortness of breath. Instead, slow down and pace your breathing. These tips may help:  Move slowly during tasks that take a lot of effort, such as climbing stairs or pushing a shopping cart.  Use pursed-lip and diaphragmatic breathing while you go about a task.  Breathe out (exhale) when you exert effort. For example, breathe out as you lift up a grocery bag. Once you're holding the bag, breathe in. Ask the  at the [email or web address] to pack your grocery bags so they are light and easy to carry.  Focus on taking slow, deep breaths. If your breathing is shallow, you don't take in as much air.  Remember that it's OK to be short of breath. Just pace yourself and do pursed-lip breathing.      Talk with your healthcare provider about:  If you should use supplemental oxygen  If you need a referral to occupational and physical therapy. Therapists can help you with exercise and daily activities, and how to make things easier.      Pursed-lip breathing  This type of breathing helps you exhale better. Breathing this way during activity will help you reduce shortness of breath:  Relax your neck and shoulder muscles. Breathe in (inhale) slowly through your nose for 2 counts or more.  Pucker your lips as if you are going to blow out a candle. Breathe out slowly and gently through your lips for at least twice as long as you inhaled.  A pulse oximeter help assess your breathing by measuring  the oxygen saturation (SpO2) of arterial blood (hypoxemia). It also measures your heart rate.  Hypoxemia is a below-normal level of oxygen in your blood, specifically in the arteries. Hypoxemia is a sign of a problem related to breathing or circulation, and may result in various symptoms, such as shortness of breath.    Hypoxemia is determined by measuring the oxygen level in a blood sample taken from an artery (arterial blood gas). It can also be estimated by measuring the oxygen saturation of your blood using a pulse oximeter -- a small device that clips to your finger.    Normal arterial oxygen is approximately 75 to 100 millimeters of mercury (mm Hg). Values under 60 mm Hg usually indicate the need for supplemental oxygen. Normal pulse oximeter readings usually range from 95 to 100 percent. Values under 90 percent are considered low.    Your SpO2 reading is an estimation of the amount of oxygen in your blood. An SpO2 reading of 95% or greater is generally considered to be a normal oxygen level. However, an SpO2 reading of 90% or less (at sea level) suggests that your blood is poorly saturated. Insufficient saturation can cause a range of adverse health conditions--including chest pain, shortness of breath and increased heart rate.    There are many stores that sell the device such as [employer] ( pulse oximeter) and [employer] (Monitoring Device, Pulse Rate Meter, OLED Display,[employer])

## 2024-07-23 NOTE — PROGRESS NOTES
Pulmonary Disease Management  [facility]  Chronic Care Management  Initial Visit    Referring Provider: Susy  Diagnosis: Asthma, emphysema  Last Hospital Admission: 7/13/24  Last provider visit: 7/16/24      Current Outpatient Medications:     albuterol (PROVENTIL HFA) 90 mcg/actuation inhaler, Inhale 2 puffs into the lungs every 6 (six) hours as needed for Wheezing. Rescue, Disp: 8 g, Rfl: 0    albuterol (PROVENTIL) 2.5 mg /3 mL (0.083 %) nebulizer solution, Take 3 mLs (2.5 mg total) by nebulization every 4 (four) hours as needed for Wheezing or Shortness of Breath. Rescue, Disp: 360 mL, Rfl: 11    albuterol (VENTOLIN HFA) 90 mcg/actuation inhaler, Inhale 2 puffs into the lungs every 6 (six) hours as needed for Wheezing or Shortness of Breath. Rescue, Disp: 18 g, Rfl: 2    aspirin (ECOTRIN) 81 MG EC tablet, Take 81 mg by mouth once daily., Disp: , Rfl:     celecoxib (CELEBREX) 200 MG capsule, Take 200 mg by mouth once daily., Disp: , Rfl:     cyclobenzaprine (FLEXERIL) 5 MG tablet, , Disp: , Rfl:     ezetimibe (ZETIA) 10 mg tablet, TAKE 1 TABLET EVERY DAY, Disp: 90 tablet, Rfl: 1    fluticasone propionate (FLONASE) 50 mcg/actuation nasal spray, 2 sprays (100 mcg total) by Each Nostril route once daily., Disp: 48 g, Rfl: 3    furosemide (LASIX) 40 MG tablet, Take 1 tablet (40 mg total) by mouth once daily., Disp: 30 tablet, Rfl: 0    HYDROcodone-acetaminophen (NORCO)  mg per tablet, Take 1 tablet by mouth. One tablet twice a day, Disp: , Rfl:     inhalation spacing device, Use as directed for inhalation., Disp: 1 each, Rfl: 0    isosorbide mononitrate (IMDUR) 30 MG 24 hr tablet, Take 1 tab po in the AM and 1/2 tab po in the PM, Disp: 135 tablet, Rfl: 1    lactulose (CHRONULAC) 10 gram/15 mL solution, Take 15 mLs (10 g total) by mouth 2 (two) times daily., Disp: 1892 mL, Rfl: 11    metoprolol succinate (TOPROL-XL) 100 MG 24 hr tablet, , Disp: , Rfl:     MULTIVITAMIN (MULTIPLE VITAMIN ORAL), Take  "1 tablet by mouth Daily., Disp: , Rfl:     naloxone (NARCAN) 4 mg/actuation [place], , Disp: , Rfl:     nitroGLYCERIN (NITROSTAT) 0.4 MG SL tablet, DISSOLVE 1 TABLET UNDER THE TONGUE EVERY 5 MINUTES FOR 3 DOSES AS NEEDED FOR CHEST PAIN, Disp: 25 tablet, Rfl: 0    pantoprazole (PROTONIX) 20 MG tablet, Take 1 tablet (20 mg total) by mouth 2 (two) times daily before meals., Disp: 180 tablet, Rfl: 3    solifenacin (VESICARE) 10 MG tablet, Take 1 tablet (10 mg total) by mouth once daily., Disp: 90 tablet, Rfl: 3    tiotropium-olodateroL (STIOLTO RESPIMAT) 2.5-2.5 mcg/actuation Mist, Inhale 2 puffs into the lungs once daily. Controller, Disp: 12 g, Rfl: 3    triamcinolone acetonide (KENALOG-40) 40 mg/mL injection, 40 mg by Other route once daily., Disp: , Rfl:     verapamiL (VERELAN) 360 MG C24P, Take 360 mg by mouth once daily., Disp: , Rfl:     Review of patient's allergies indicates:   Allergen Reactions    Ace inhibitors      Other reaction(s): Hives    Lisinopril      Other reaction(s): rash    Pravastatin      Other reaction(s): Mental Changes    Rosuvastatin Other (See Comments)     Other reaction(s): Muscle pain    Simvastatin Other (See Comments)     Other reaction(s): leg cramps       Smoking history:   Social History     Tobacco Use   Smoking Status Former    Current packs/day: 0.00    Average packs/day: 1 pack/day for 48.4 years (48.4 ttl pk-yrs)    Types: Cigarettes    Start date:     Quit date: 2016    Years since quittin.1    Passive exposure: Never   Smokeless Tobacco Never   Tobacco Comments    smoked for 25 yrs. quit 16yrs. then smoked again for 5-6 before quitting in 2016.       Pulse: 94  SpO2: (!) 60 %        Height: 5' 5" (165.1 cm)  Weight: 87.4 kg (192 lb 9.6 oz)  BMI (kg/m2): 32.12                        Resting Pramod Dyspnea Rating : 0      Current Oxygen Use: Yes  Device: cannula  Liter Flow: 2  Oxygen Usage Duration: As needed (with activity)  DME Provider: [facility]     Does the patient " use BiPAP, CPAP or Trilogy?: Yes                                                 COPD Questionnaire:  COPD Questionnaire  How often do you cough?: A little of the time  How often do you have phlegm (mucus) in your chest?: Some of the time  How often does your chest feel tight?: Some of the time  When you walk up a hill or one flight of stairs, how often are you breathless?: All of the time  How often are you limited doing any activities at home?: Most of the time  How often are you confident leaving the house despite your lung condition?: All of the time  How often do you sleep soundly?: A little of the time  How often do you have energy?: A little of the time  Total score: 23    Has this patient had any pulmonary studies (PFTS)?: Yes  PFT Results:  Pre FVC   Date/Time Value Ref Range Status   06/21/2024 03:25 PM 1.97 (L) 2.12 - 3.71 L Final     Pre FEV1   Date/Time Value Ref Range Status   06/21/2024 03:25 PM 1.63 (L) 1.64 - 2.87 L Final     Pre FEV1 FVC   Date/Time Value Ref Range Status   06/21/2024 03:25 PM 82.72 64.52 - 91.27 % Final     Pre TLC   Date/Time Value Ref Range Status   06/21/2024 03:25 PM 5.04 4.11 - 6.09 L Final     Pre DLCO   Date/Time Value Ref Range Status   06/21/2024 03:25 PM 10.36 (L) 16.15 - 27.61 ml/(min*mmHg) Final           Is this patient a candidate for pulmonary rehab?: Yes     Method Used for Education: Literature, Demonstration, Verbal          Patient Concerns or Expectations:   na  Therapist Comments:   Ro Hernandez  was seen 7/23/2024  12:15 PM in the Pulmonary Disease management clinic for evaluation, education, reinforcement of self management techniques and exacerbation action plan.    Gayle Van    Past Medical History:   Diagnosis Date    Allergy     Angina     Arthritis 6/11/2013    Asthma     Congestive heart failure 7/16/2024    COPD (chronic obstructive pulmonary disease)     GERD (gastroesophageal reflux disease)     Hx of Prinzmetal angina     Hypertension      Obesity 6/11/2013    Other and unspecified hyperlipidemia 6/11/2013    Prinzmetal's angina     Sleep apnea 6/11/2013         Educational assessment:   [x]            Good  []            Fair  []            Poor    Readiness to learn:   [x]            Good  []            Fair  []            Poor    Vision Status:   [x]            Good  []            Fair  []            Poor    Reading Ability:  [x]            Good  []            Fair  []            Poor    Knowledge of condition:   [x]            Good  []            Fair  []            Poor    Language Barriers:   []            Good  []            Fair  []            Poor  [x]            None    Cognitive/ Physical Barriers:   []            Good  []            Fair  []            Poor  [x]            None    Learning best by:                       [x]            Seeing  [x]            Hearing  [x]            Reading                         [x]            Doing    Describe any barrier /Limitation or financial implications of care choices identified     []            Financial  []            Emotional  []            Education  []            Vision/Hearing  []            Physical  [x]            None  []                TOPIC /CONTENT FOR TODAY:    [x]            MDI with or without spacer  [x]            Respimat inhaler  []            Acapella   []           Peak Flow meter  [x]            COPD action plan  []            Nebulizer use  [x]            Oxygen use safety  [x]            Breathing and cough techniques  [x]            Energy conservation  [x]            Infection prevention  [x]            Asthma trigger checklist        Learner:    [x]            Patient   []            Caregiver    Method:    [x]            Verbal explanation  []            Audio visual    [x]            Literature  [x]            Teach back      Evaluation:    [x]            Teach back  []            Demonstrate  [x]            Follow up phone call    []            2 weeks     [x]             4 weeks   []            PRN    Additional comments:   Patient was seen today to review respiratory medication purpose and proper technique for use of inhalers. Patient practiced proper technique using MDI with valved holding chamber (spacer) and DPI inhalers. Patient demonstrated understanding. Literature was given to patient.     Reviewed the difference in controller (Stiolto) and rescue (Albuterol) medications.    Asthma trigger checklist was verbally reviewed and literature given to patient.     Infection prevention was discussed. Patient was reminded to get influenza vaccine. Hand hygiene and cleaning of respiratory equipment was also discussed. Patient verbalized understanding.      COPD action plan was reviewed and literature was given to patient. Patient verbalized understanding.   .  Reviewed breathing techniques such as pursed-lip breathing, wheatley-cough technique, and diaphragmatic breathing. Patient practiced proper technique and verbalized understanding. Literature given to patient.     Educated patient on the importance of utilizing supplemental oxygen when prescribed. Reviewed strategies for maximizing energy. Patient verbalized understanding. Literature given to patient.      Enrolled patient in [employer] inhaler program.      Plan:  Monthly Pulmonary Disease Management Questionnaire  Follow-up PDM appointment scheduled for 1/23/25  Reinforce education  Meds: Stiolto, Albuterol  DME Needs: [facility]  Action Plan  Immunization: Pneumococcal- CURRENT, Flu-CURRENT, [id number], RSV-DECLINED  Next Provider Visit: 11/11/24  Next Spirometry/CPFT: NOT SCHEDULED  Approximate time spent with patient: 90 MINUTES

## 2024-07-24 ENCOUNTER — OFFICE VISIT (OUTPATIENT)
Dept: CARDIOLOGY | Facility: CLINIC | Age: 69
End: 2024-07-24
Payer: MEDICARE

## 2024-07-24 VITALS
BODY MASS INDEX: 32.95 KG/M2 | HEART RATE: 71 BPM | SYSTOLIC BLOOD PRESSURE: 118 MMHG | HEIGHT: 65 IN | WEIGHT: 197.75 LBS | DIASTOLIC BLOOD PRESSURE: 68 MMHG

## 2024-07-24 DIAGNOSIS — I42.1 OBSTRUCTIVE HYPERTROPHIC CARDIOMYOPATHY: Primary | ICD-10-CM

## 2024-07-24 DIAGNOSIS — E78.2 MIXED HYPERLIPIDEMIA: ICD-10-CM

## 2024-07-24 DIAGNOSIS — I10 ESSENTIAL HYPERTENSION: ICD-10-CM

## 2024-07-24 DIAGNOSIS — J43.2 CENTRILOBULAR EMPHYSEMA: ICD-10-CM

## 2024-07-24 DIAGNOSIS — E66.09 CLASS 1 OBESITY DUE TO EXCESS CALORIES WITH SERIOUS COMORBIDITY AND BODY MASS INDEX (BMI) OF 34.0 TO 34.9 IN ADULT: ICD-10-CM

## 2024-07-24 DIAGNOSIS — I70.0 ATHEROSCLEROSIS OF AORTA: ICD-10-CM

## 2024-07-24 DIAGNOSIS — I50.9 CONGESTIVE HEART FAILURE, UNSPECIFIED HF CHRONICITY, UNSPECIFIED HEART FAILURE TYPE: ICD-10-CM

## 2024-07-24 PROCEDURE — 1159F MED LIST DOCD IN RCRD: CPT | Mod: HCNC,CPTII,S$GLB, | Performed by: INTERNAL MEDICINE

## 2024-07-24 PROCEDURE — 1160F RVW MEDS BY RX/DR IN RCRD: CPT | Mod: HCNC,CPTII,S$GLB, | Performed by: INTERNAL MEDICINE

## 2024-07-24 PROCEDURE — 3288F FALL RISK ASSESSMENT DOCD: CPT | Mod: HCNC,CPTII,S$GLB, | Performed by: INTERNAL MEDICINE

## 2024-07-24 PROCEDURE — 99204 OFFICE O/P NEW MOD 45 MIN: CPT | Mod: HCNC,S$GLB,, | Performed by: INTERNAL MEDICINE

## 2024-07-24 PROCEDURE — 1126F AMNT PAIN NOTED NONE PRSNT: CPT | Mod: HCNC,CPTII,S$GLB, | Performed by: INTERNAL MEDICINE

## 2024-07-24 PROCEDURE — 99999 PR PBB SHADOW E&M-EST. PATIENT-LVL V: CPT | Mod: PBBFAC,HCNC,, | Performed by: INTERNAL MEDICINE

## 2024-07-24 PROCEDURE — 3078F DIAST BP <80 MM HG: CPT | Mod: HCNC,CPTII,S$GLB, | Performed by: INTERNAL MEDICINE

## 2024-07-24 PROCEDURE — 1101F PT FALLS ASSESS-DOCD LE1/YR: CPT | Mod: HCNC,CPTII,S$GLB, | Performed by: INTERNAL MEDICINE

## 2024-07-24 PROCEDURE — 3008F BODY MASS INDEX DOCD: CPT | Mod: HCNC,CPTII,S$GLB, | Performed by: INTERNAL MEDICINE

## 2024-07-24 PROCEDURE — 3074F SYST BP LT 130 MM HG: CPT | Mod: HCNC,CPTII,S$GLB, | Performed by: INTERNAL MEDICINE

## 2024-07-24 RX ORDER — FUROSEMIDE 40 MG/1
40 TABLET ORAL DAILY
Qty: 90 TABLET | Refills: 2 | Status: SHIPPED | OUTPATIENT
Start: 2024-07-24 | End: 2025-07-24

## 2024-07-24 NOTE — PROGRESS NOTES
Subjective:   Patient ID:  Ro Hernandez is a 69 y.o. female who presents for evaluation of No chief complaint on file.      70 yo female, care establish.  PMH HOCM, HTN, GERD, and COPD home O2 for 6 weeks. No h/o MI DM CVA  Prior cardiologist Dr. Quiles. Declined new rx for HOCM  F/u pulm service for home O2  H/o cath nml in   H/o cardiac MRI done before    Recent ER visit in  for CHFpEF. 's and improved after Lasix added  EKG NSR  No faint syncope palpitation  Occasional chest pain NTG helps  Father  of massive heart attack at 53.  Sister  at 73 due to renal   Mother  at 81        No results found for this or any previous visit.     No results found for this or any previous visit.       Past Medical History:   Diagnosis Date    Allergy     Angina     Arthritis 2013    Asthma     Congestive heart failure 2024    COPD (chronic obstructive pulmonary disease)     GERD (gastroesophageal reflux disease)     Hx of Prinzmetal angina     Hypertension     Obesity 2013    Other and unspecified hyperlipidemia 2013    Prinzmetal's angina     Sleep apnea 2013       Past Surgical History:   Procedure Laterality Date    ABDOMINAL SURGERY      CHOLECYSTECTOMY      COLONOSCOPY N/A 2019    Procedure: COLONOSCOPY;  Surgeon: Winston Smith MD;  Location: [facility];  Service: Endoscopy;  Laterality: N/A;    ESOPHAGOGASTRODUODENOSCOPY N/A 12/10/2018    Procedure: EGD (ESOPHAGOGASTRODUODENOSCOPY);  Surgeon: Raúl Polanco III, MD;  Location: [facility];  Service: Endoscopy;  Laterality: N/A;    ESOPHAGOGASTRODUODENOSCOPY N/A 2020    Procedure: EGD (ESOPHAGOGASTRODUODENOSCOPY);  Surgeon: Raúl Polanco III, MD;  Location: [facility];  Service: Endoscopy;  Laterality: N/A;    EYE SURGERY      FOOT SURGERY      INTRALUMINAL GASTROINTESTINAL TRACT IMAGING VIA CAPSULE N/A 2020    Procedure: IMAGING PROCEDURE, GI TRACT, INTRALUMINAL, VIA CAPSULE;  Surgeon: Blade Holley  RN;  Location: [facility];  Service: Endoscopy;  Laterality: N/A;    TONSILLECTOMY      TUBAL LIGATION         Social History     Tobacco Use    Smoking status: Former     Current packs/day: 0.00     Average packs/day: 1 pack/day for 48.4 years (48.4 ttl pk-yrs)     Types: Cigarettes     Start date:      Quit date: 2016     Years since quittin.1     Passive exposure: Never    Smokeless tobacco: Never    Tobacco comments:     smoked for 25 yrs. quit 16yrs. then smoked again for 5-6 before quitting in 2016.   Substance Use Topics    Alcohol use: No     Alcohol/week: 1.0 standard drink of alcohol     Types: 1 Standard drinks or equivalent per week    Drug use: No       Family History   Problem Relation Name Age of Onset    Hypertension Mother      Cancer Mother      Kidney cancer Mother      COPD Father      Hypertension Father      Heart disease Sister      Hyperlipidemia Son      Breast cancer Maternal Grandmother      Colon cancer Neg Hx         Review of Systems   Constitutional: Negative for decreased appetite, diaphoresis, fever, malaise/fatigue and night sweats.   HENT:  Negative for nosebleeds.    Eyes:  Negative for blurred vision and double vision.   Cardiovascular:  Positive for dyspnea on exertion. Negative for chest pain, claudication, irregular heartbeat, leg swelling, near-syncope, orthopnea, palpitations, paroxysmal nocturnal dyspnea and syncope.   Respiratory:  Negative for cough, shortness of breath, sleep disturbances due to breathing, snoring, sputum production and wheezing.    Endocrine: Negative for cold intolerance and polyuria.   Hematologic/Lymphatic: Does not bruise/bleed easily.   Skin:  Negative for rash.   Musculoskeletal:  Negative for back pain, falls, joint pain, joint swelling and neck pain.   Gastrointestinal:  Negative for abdominal pain, heartburn, nausea and vomiting.   Genitourinary:  Negative for dysuria, frequency and hematuria.   Neurological:  Negative for  difficulty with concentration, dizziness, focal weakness, headaches, light-headedness, numbness, seizures and weakness.   Psychiatric/Behavioral:  Negative for depression, memory loss and substance abuse. The patient does not have insomnia.    Allergic/Immunologic: Negative for HIV exposure and hives.       Objective:   Physical Exam  HENT:      Head: Normocephalic.   Eyes:      Pupils: Pupils are equal, round, and reactive to light.   Neck:      Thyroid: No thyromegaly.      Vascular: Normal carotid pulses. No carotid bruit or JVD.   Cardiovascular:      Rate and Rhythm: Normal rate and regular rhythm. No extrasystoles are present.     Chest Wall: PMI is not displaced.      Pulses: Normal pulses.      Heart sounds: Murmur heard.      Harsh midsystolic murmur is present with a grade of 2/6 at the upper right sternal border radiating to the neck.      No gallop. No S3 sounds.   Pulmonary:      Effort: No respiratory distress.      Breath sounds: Normal breath sounds. No stridor.   Abdominal:      General: Bowel sounds are normal.      Palpations: Abdomen is soft.      Tenderness: There is no abdominal tenderness. There is no rebound.   Musculoskeletal:         General: Normal range of motion.   Skin:     Findings: No rash.   Neurological:      Mental Status: She is alert and oriented to person, place, and time.   Psychiatric:         Behavior: Behavior normal.         Lab Results   Component Value Date    CHOL 178 04/02/2024    CHOL 198 03/02/2023    CHOL 217 (H) 11/19/2021     Lab Results   Component Value Date    HDL 40 04/02/2024    HDL 39 (L) 03/02/2023    HDL 49 11/19/2021     Lab Results   Component Value Date    LDLCALC 114.6 04/02/2024    LDLCALC 133.0 03/02/2023    LDLCALC 133.2 11/19/2021     Lab Results   Component Value Date    TRIG 117 04/02/2024    TRIG 130 03/02/2023    TRIG 174 (H) 11/19/2021     Lab Results   Component Value Date    CHOLHDL 22.5 04/02/2024    CHOLHDL 19.7 (L) 03/02/2023    CHOLHDL  22.6 11/19/2021       Chemistry        Component Value Date/Time     07/13/2024 1234    K 4.1 07/13/2024 1234     07/13/2024 1234    CO2 25 07/13/2024 1234    BUN 19 07/13/2024 1234    CREATININE 0.8 07/13/2024 1234    GLU 91 07/13/2024 1234        Component Value Date/Time    CALCIUM 9.6 07/13/2024 1234    ALKPHOS 79 07/13/2024 1234    AST 19 07/13/2024 1234    ALT 12 07/13/2024 1234    BILITOT 0.8 07/13/2024 1234    ESTGFRAFRICA >60.0 11/19/2021 0908    EGFRNONAA >60.0 11/19/2021 0908          Lab Results   Component Value Date    HGBA1C 5.0 03/02/2023     Lab Results   Component Value Date    TSH 0.791 04/02/2024     Lab Results   Component Value Date    INR 1.0 01/02/2007     Lab Results   Component Value Date    WBC 6.14 07/13/2024    HGB 12.2 07/13/2024    HCT 36.2 (L) 07/13/2024    MCV 90 07/13/2024     07/13/2024     BNP  @LABRCNTIP(BNP,BNPTRIAGEBLO)@  CrCl cannot be calculated (Patient's most recent lab result is older than the maximum 7 days allowed.).  No results found in the last 24 hours.  No results found in the last 24 hours.  No results found in the last 24 hours.    Assessment:      1. Obstructive hypertrophic cardiomyopathy    2. Congestive heart failure, unspecified HF chronicity, unspecified heart failure type    3. Atherosclerosis of aorta    4. Unspecified essential hypertension    5. Mixed hyperlipidemia    6. Class 1 obesity due to excess calories with serious comorbidity and body mass index (BMI) of 34.0 to 34.9 in adult    7. Centrilobular emphysema        Plan:   Echo for h/o hocm  VITALS r/o afib  Obtain old record from Dr. Quiles's office  Bnp and BMP in 1 week  Continue asa zetia imdur torpolXL verapamil and lasix    Counseled DASH  Check Lipid profile with PCP in 6 months  Recommend heart-healthy diet, weight control   Isidro. Risk modification.   I have reviewed all pertinent labs and cardiac studies independently. Plans and recommendations have been formulated under  my direct supervision. All questions answered and patient voiced understanding.   If symptoms persist go to the ED  RTC in 3 months

## 2024-07-25 ENCOUNTER — HOSPITAL ENCOUNTER (OUTPATIENT)
Dept: PULMONOLOGY | Facility: HOSPITAL | Age: 69
Discharge: HOME | End: 2024-07-25
Payer: MEDICARE

## 2024-07-25 VITALS — WEIGHT: 196.38 LBS | BODY MASS INDEX: 32.68 KG/M2

## 2024-07-25 PROCEDURE — G0239 OTH RESP PROC, GROUP: HCPCS | Mod: HCNC

## 2024-07-25 NOTE — PROGRESS NOTES
[facility]  Pulmonary Rehab  Session Summary    SUMMARY     Session Data  Session Number: 8  Time In: 1100  Time Out: 1200  Weight: 89.1 kg (196 lb 6.4 oz)  Target Heart Rate Zone: Minimum: 89 bpm  Target Heart Rate Zone: Maximum: 119 bpm  Patient Motivation: Excellent  Patient Effort: Excellent      Pre Exercise Vitals  SpO2: 98 %  Supplemental O2?: Yes  O2 Device: nasal cannula  O2 Flow (L/min): 2  Pulse: 65  BP: 112/74  Pramod Dyspnea Rating : 2      During Exercise Vitals  SpO2: 95 %  Supplemental O2?: Yes  O2 Device: nasal cannula  O2 Flow (L/min): 2  Pulse: 67  BP: 103/52  Pramod Dyspnea Rating : 3      Post Exercise Vitals  SpO2: 97 %  Supplemental O2?: Yes  O2 Device: nasal cannula  O2 Flow (L/min): 2  Pulse: 68  BP: 119/57  Pramod Dyspnea Rating : 3       Modality  Modality: Arm Ergometer, Hand Free Weights, Nustep, Treadmill    Arm Ergometer  Time: 10 minutes  Level: 1  Mets: 1.7  Distance: 0.92 Miles      Nustep  Time: 20 minutes  Steps: 1224  Load: 4  Mets: 1.9    Treadmill  Time: 6 minutes  MPH: 1.2 MPH  stGstrstastdstest:st st1st Biceps  lbs: 3 lbs  Sets: 2  Reps: 10  Weight Type : dumbbell      Chest  lbs: 3 lbs  Sets: 2  Reps: 10  Weight Type : dumbbell      Education  Controlling stress and SOB      Therapist Notes     Excellent effort. Pt increased to 1.2 mph on treadmill today for 6 mins, no incline. She also increased to 3 lb dumbbells for chest and bicep exercises. She tolerated all exercises well. Vitals were stable. Will continue to motivate and educate pt in rehab.     Dr. Garcia immediately available as needed.

## 2024-07-30 ENCOUNTER — HOSPITAL ENCOUNTER (OUTPATIENT)
Dept: PULMONOLOGY | Facility: HOSPITAL | Age: 69
Discharge: HOME | End: 2024-07-30
Payer: MEDICARE

## 2024-07-30 ENCOUNTER — PATIENT MESSAGE (OUTPATIENT)
Dept: CARDIOLOGY | Facility: CLINIC | Age: 69
End: 2024-07-30
Payer: MEDICARE

## 2024-07-30 VITALS — WEIGHT: 202 LBS | BODY MASS INDEX: 33.61 KG/M2

## 2024-07-30 PROCEDURE — G0239 OTH RESP PROC, GROUP: HCPCS | Mod: HCNC

## 2024-07-30 RX ORDER — FUROSEMIDE 40 MG/1
40 TABLET ORAL 2 TIMES DAILY
Qty: 180 TABLET | Refills: 3 | Status: SHIPPED | OUTPATIENT
Start: 2024-07-30 | End: 2024-07-30 | Stop reason: SDUPTHER

## 2024-07-30 RX ORDER — FUROSEMIDE 40 MG/1
40 TABLET ORAL DAILY
Qty: 90 TABLET | Refills: 3 | Status: SHIPPED | OUTPATIENT
Start: 2024-07-30 | End: 2025-07-30

## 2024-07-30 NOTE — PROGRESS NOTES
[facility]  Pulmonary Rehab  Session Summary    SUMMARY     Session Data  Session Number: 9  Time In: 1100  Time Out: 1200  Weight: 91.6 kg (202 lb)  Target Heart Rate Zone: Minimum: 89 bpm  Target Heart Rate Zone: Maximum: 119 bpm  Patient Motivation: Excellent  Patient Effort: Excellent      Pre Exercise Vitals  SpO2: 98 %  Supplemental O2?: Yes  O2 Device: nasal cannula  O2 Flow (L/min): 2  Pulse: 70  BP: 114/50  Pramod Dyspnea Rating : 5-6      During Exercise Vitals  SpO2: 98 %  Supplemental O2?: Yes  O2 Device: nasal cannula  O2 Flow (L/min): 2  Pulse: 69  BP: 150/71  Pramod Dyspnea Rating : 3      Post Exercise Vitals  SpO2: 98 %  Supplemental O2?: Yes  O2 Device: nasal cannula  O2 Flow (L/min): 2  Pulse: 70  BP: 129/57  Pramod Dyspnea Rating : 3       Modality  Modality: Arm Ergometer, Hand Free Weights, Nustep, Treadmill      Arm Ergometer  Time: 10 minutes  Level: 1.5  Mets: 2  Distance: 1.03 Miles      Nustep  Time: 20 minutes  Steps: 1224  Load: 4  Mets: 2      Recumbent Bike  Time: 7 minutes  RPM: 1.3 RPMs  Level: 0      Biceps  lbs: 3 lbs  Sets: 2  Reps: 10  Weight Type : dumbbell      Chest  lbs: 3 lbs  Sets: 2  Reps: 10  Weight Type : dumbbell    Education  Smoking cessation  Fluid retention    Therapist Notes     Excellent effort. Pt increased to 1.3 mph on treadmill today for 7 mins, no incline. She tolerated all exercises well. Vitals were stable. Pt gained 6 lbs since last session. Advised to contact her cardiologist. Educated on fluid retention.  Will continue to motivate and educate pt in rehab.     Dr. Jain immediately available as needed.

## 2024-07-31 ENCOUNTER — LAB VISIT (OUTPATIENT)
Dept: LAB | Facility: HOSPITAL | Age: 69
End: 2024-07-31
Attending: INTERNAL MEDICINE
Payer: MEDICARE

## 2024-07-31 DIAGNOSIS — I50.9 CONGESTIVE HEART FAILURE, UNSPECIFIED HF CHRONICITY, UNSPECIFIED HEART FAILURE TYPE: ICD-10-CM

## 2024-07-31 LAB
ANION GAP SERPL CALC-SCNC: 10 MMOL/L (ref 8–16)
BNP SERPL-MCNC: 472 PG/ML (ref 0–99)
BUN SERPL-MCNC: 25 MG/DL (ref 8–23)
CALCIUM SERPL-MCNC: 9.3 MG/DL (ref 8.7–10.5)
CHLORIDE SERPL-SCNC: 103 MMOL/L (ref 95–110)
CO2 SERPL-SCNC: 29 MMOL/L (ref 23–29)
CREAT SERPL-MCNC: 1 MG/DL (ref 0.5–1.4)
EST. GFR  (NO RACE VARIABLE): >60 ML/MIN/1.73 M^2
GLUCOSE SERPL-MCNC: 85 MG/DL (ref 70–110)
POTASSIUM SERPL-SCNC: 4.6 MMOL/L (ref 3.5–5.1)
SODIUM SERPL-SCNC: 142 MMOL/L (ref 136–145)

## 2024-07-31 PROCEDURE — 36415 COLL VENOUS BLD VENIPUNCTURE: CPT | Mod: HCNC,PO | Performed by: INTERNAL MEDICINE

## 2024-07-31 PROCEDURE — 83880 ASSAY OF NATRIURETIC PEPTIDE: CPT | Mod: HCNC | Performed by: INTERNAL MEDICINE

## 2024-07-31 PROCEDURE — 80048 BASIC METABOLIC PNL TOTAL CA: CPT | Mod: HCNC | Performed by: INTERNAL MEDICINE

## 2024-08-01 ENCOUNTER — HOSPITAL ENCOUNTER (OUTPATIENT)
Dept: CARDIOLOGY | Facility: HOSPITAL | Age: 69
Discharge: HOME | End: 2024-08-01
Attending: INTERNAL MEDICINE
Payer: MEDICARE

## 2024-08-01 ENCOUNTER — HOSPITAL ENCOUNTER (OUTPATIENT)
Dept: PULMONOLOGY | Facility: HOSPITAL | Age: 69
Discharge: HOME | End: 2024-08-01
Payer: MEDICARE

## 2024-08-01 VITALS
DIASTOLIC BLOOD PRESSURE: 68 MMHG | BODY MASS INDEX: 33.66 KG/M2 | HEIGHT: 65 IN | HEART RATE: 68 BPM | WEIGHT: 202 LBS | SYSTOLIC BLOOD PRESSURE: 118 MMHG

## 2024-08-01 VITALS — BODY MASS INDEX: 33.22 KG/M2 | WEIGHT: 199.63 LBS

## 2024-08-01 DIAGNOSIS — I42.1 OBSTRUCTIVE HYPERTROPHIC CARDIOMYOPATHY: ICD-10-CM

## 2024-08-01 LAB
AORTIC ROOT ANNULUS: 2.14 CM
ASCENDING AORTA: 2.53 CM
AV INDEX (PROSTH): 0.33
AV MEAN GRADIENT: 76 MMHG
AV PEAK GRADIENT: 127 MMHG
AV VALVE AREA BY VELOCITY RATIO: 0.83 CM²
AV VALVE AREA: 0.91 CM²
AV VELOCITY RATIO: 0.3
BSA FOR ECHO PROCEDURE: 2.05 M2
CV ECHO LV RWT: 0.6 CM
DOP CALC AO PEAK VEL: 5.64 M/S
DOP CALC AO VTI: 137.2 CM
DOP CALC LVOT AREA: 2.8 CM2
DOP CALC LVOT DIAMETER: 1.88 CM
DOP CALC LVOT PEAK VEL: 1.68 M/S
DOP CALC LVOT STROKE VOLUME: 124.85 CM3
DOP CALC MV VTI: 48.4 CM
DOP CALC RVOT PEAK VEL: 0.63 M/S
DOP CALC RVOT VTI: 15.9 CM
DOP CALCLVOT PEAK VEL VTI: 45 CM
E WAVE DECELERATION TIME: 329.13 MSEC
E/A RATIO: 1.32
E/E' RATIO: 17.41 M/S
ECHO LV POSTERIOR WALL: 1.22 CM (ref 0.6–1.1)
FRACTIONAL SHORTENING: 38 % (ref 28–44)
INTERVENTRICULAR SEPTUM: 1.44 CM (ref 0.6–1.1)
IVRT: 56.14 MSEC
LA MAJOR: 5.75 CM
LA MINOR: 5.05 CM
LA WIDTH: 4 CM
LEFT ATRIUM AREA SYSTOLIC (APICAL 2 CHAMBER): 19.28 CM2
LEFT ATRIUM AREA SYSTOLIC (APICAL 4 CHAMBER): 21.16 CM2
LEFT ATRIUM SIZE: 4.19 CM
LEFT ATRIUM VOLUME INDEX MOD: 28.7 ML/M2
LEFT ATRIUM VOLUME INDEX: 38.5 ML/M2
LEFT ATRIUM VOLUME MOD: 57.15 CM3
LEFT ATRIUM VOLUME: 76.61 CM3
LEFT INTERNAL DIMENSION IN SYSTOLE: 2.5 CM (ref 2.1–4)
LEFT VENTRICLE DIASTOLIC VOLUME INDEX: 35.93 ML/M2
LEFT VENTRICLE DIASTOLIC VOLUME: 71.5 ML
LEFT VENTRICLE END SYSTOLIC VOLUME APICAL 2 CHAMBER: 52.07 ML
LEFT VENTRICLE END SYSTOLIC VOLUME APICAL 4 CHAMBER: 58.53 ML
LEFT VENTRICLE MASS INDEX: 98 G/M2
LEFT VENTRICLE SYSTOLIC VOLUME INDEX: 11.2 ML/M2
LEFT VENTRICLE SYSTOLIC VOLUME: 22.29 ML
LEFT VENTRICULAR INTERNAL DIMENSION IN DIASTOLE: 4.04 CM (ref 3.5–6)
LEFT VENTRICULAR MASS: 195.97 G
LV LATERAL E/E' RATIO: 14.8 M/S
LV SEPTAL E/E' RATIO: 21.14 M/S
LVED V (TEICH): 71.5 ML
LVES V (TEICH): 22.29 ML
LVOT MG: 6.79 MMHG
LVOT MV: 1.24 CM/S
MV MEAN GRADIENT: 4 MMHG
MV PEAK A VEL: 1.12 M/S
MV PEAK E VEL: 1.48 M/S
MV PEAK GRADIENT: 9 MMHG
MV STENOSIS PRESSURE HALF TIME: 95.45 MS
MV VALVE AREA BY CONTINUITY EQUATION: 2.58 CM2
MV VALVE AREA P 1/2 METHOD: 2.3 CM2
OHS CV RV/LV RATIO: 0.88 CM
PISA TR MAX VEL: 3.75 M/S
PULM VEIN S/D RATIO: 0.7
PV MEAN GRADIENT: 1 MMHG
PV PEAK D VEL: 0.6 M/S
PV PEAK GRADIENT: 6 MMHG
PV PEAK S VEL: 0.42 M/S
PV PEAK VELOCITY: 1.22 M/S
RA MAJOR: 5.08 CM
RA PRESSURE ESTIMATED: 3 MMHG
RA WIDTH: 4 CM
RIGHT VENTRICULAR END-DIASTOLIC DIMENSION: 3.55 CM
RV TB RVSP: 7 MMHG
SINUS: 1.88 CM
STJ: 1.71 CM
TDI LATERAL: 0.1 M/S
TDI SEPTAL: 0.07 M/S
TDI: 0.09 M/S
TR MAX PG: 56 MMHG
TRICUSPID ANNULAR PLANE SYSTOLIC EXCURSION: 2.24 CM
TV REST PULMONARY ARTERY PRESSURE: 59 MMHG
Z-SCORE OF LEFT VENTRICULAR DIMENSION IN END DIASTOLE: -3.57
Z-SCORE OF LEFT VENTRICULAR DIMENSION IN END SYSTOLE: -2.76

## 2024-08-01 PROCEDURE — 93306 TTE W/DOPPLER COMPLETE: CPT | Mod: HCNC

## 2024-08-01 PROCEDURE — 93306 TTE W/DOPPLER COMPLETE: CPT | Mod: 26,HCNC,, | Performed by: INTERNAL MEDICINE

## 2024-08-01 PROCEDURE — G0239 OTH RESP PROC, GROUP: HCPCS | Mod: HCNC

## 2024-08-01 NOTE — PROGRESS NOTES
[facility]  Pulmonary Rehab  Session Summary    SUMMARY     Session Data  Session Number: 10  Time In: 1100  Time Out: 1200  Weight: 90.5 kg (199 lb 9.6 oz)  Target Heart Rate Zone: Minimum: 89 bpm  Target Heart Rate Zone: Maximum: 119 bpm  Patient Motivation: Excellent  Patient Effort: Excellent      Pre Exercise Vitals  SpO2: 94 %  Supplemental O2?: Yes  O2 Device: nasal cannula  O2 Flow (L/min): 2  Pulse: 70  BP: 136/84  Pramod Dyspnea Rating : 3      During Exercise Vitals  SpO2: 96 %  Supplemental O2?: Yes  O2 Device: nasal cannula  O2 Flow (L/min): 2  Pulse: 75  BP: (!) 119/38  Pramod Dyspnea Rating : 3      Post Exercise Vitals  SpO2: 94 %  Supplemental O2?: Yes  O2 Device: nasal cannula  O2 Flow (L/min): 2  Pulse: 73  BP: 138/64  Pramod Dyspnea Rating : 3       Modality  Modality: Arm Ergometer, Hand Free Weights, Nustep, Recumbent Bike, Treadmill      Arm Ergometer  Time: 10 minutes  Level: 1.5  Mets: 1.8  Distance: 1.06 Miles    Nustep  Time: 20 minutes  Steps: 1328  Load: 4  Mets: 2.2      Recumbent Bike  Time: 10 minutes (.86 miles)  Level: 1  Mets: 1.7      Treadmill  Time: 6 minutes  MPH: 1.3 MPH  stGstrstastdstest:st st1st Biceps  lbs: 3 lbs  Sets: 2  Reps: 10  Weight Type : dumbbell      Chest  lbs: 3 lbs  Sets: 2  Reps: 10    Education  Pulm knowledge test review.       Therapist Notes   Excellent effort. She tolerated all exercises well and maintained changes well. Vitals were stable. Will continue to motivate and educate pt in rehab.     Dr. Holden immediately available as needed.

## 2024-08-02 ENCOUNTER — TELEPHONE (OUTPATIENT)
Dept: CARDIOLOGY | Facility: CLINIC | Age: 69
End: 2024-08-02
Payer: MEDICARE

## 2024-08-02 DIAGNOSIS — I50.32 CHRONIC DIASTOLIC CONGESTIVE HEART FAILURE: Primary | ICD-10-CM

## 2024-08-02 NOTE — TELEPHONE ENCOUNTER
Spoke with pt in regards to lab results pt VB understanding.      ----- Message from Josue Jose MD sent at 8/2/2024  2:05 PM CDT -----  The lab showed CHF stable  Continue current Rx.   Repeat BNP and BMP in 4 weeks

## 2024-08-06 ENCOUNTER — HOSPITAL ENCOUNTER (OUTPATIENT)
Dept: PULMONOLOGY | Facility: HOSPITAL | Age: 69
Discharge: HOME | End: 2024-08-06
Payer: MEDICARE

## 2024-08-06 VITALS — WEIGHT: 201.31 LBS | BODY MASS INDEX: 33.5 KG/M2

## 2024-08-06 PROCEDURE — G0239 OTH RESP PROC, GROUP: HCPCS | Mod: HCNC

## 2024-08-08 ENCOUNTER — HOSPITAL ENCOUNTER (OUTPATIENT)
Dept: PULMONOLOGY | Facility: HOSPITAL | Age: 69
Discharge: HOME | End: 2024-08-08
Payer: MEDICARE

## 2024-08-08 VITALS — BODY MASS INDEX: 33.33 KG/M2 | WEIGHT: 200.31 LBS

## 2024-08-08 PROCEDURE — G0239 OTH RESP PROC, GROUP: HCPCS | Mod: HCNC

## 2024-08-13 ENCOUNTER — HOSPITAL ENCOUNTER (OUTPATIENT)
Dept: PULMONOLOGY | Facility: HOSPITAL | Age: 69
Discharge: HOME | End: 2024-08-13
Payer: MEDICARE

## 2024-08-13 VITALS — BODY MASS INDEX: 33.61 KG/M2 | WEIGHT: 202 LBS

## 2024-08-13 PROCEDURE — G0239 OTH RESP PROC, GROUP: HCPCS | Mod: HCNC

## 2024-08-13 NOTE — PROGRESS NOTES
[facility]  Pulmonary Rehab  Session Summary    SUMMARY     Session Data  Session Number: 13  Time In: 1000  Time Out: 1100  Weight: 91.6 kg (202 lb)  Target Heart Rate Zone: Minimum: 89 bpm  Target Heart Rate Zone: Maximum: 119 bpm      Pre Exercise Vitals  SpO2: 93 %  Supplemental O2?: Yes  O2 Device: nasal cannula  O2 Flow (L/min): 2  Pulse: 71  BP: 120/56  Pramod Dyspnea Rating : 0      During Exercise Vitals  SpO2: 94 %  Supplemental O2?: Yes  O2 Device: nasal cannula  O2 Flow (L/min): 2  Pulse: 70  BP: 125/85  Pramod Dyspnea Rating : 3      Post Exercise Vitals  SpO2: 94 %  Supplemental O2?: Yes  O2 Device: nasal cannula  O2 Flow (L/min): 2  Pulse: 75  BP: 112/51  Pramod Dyspnea Rating : 3       Modality  Modality: Arm Ergometer, Hand Free Weights, Nustep, Recumbent Bike, Treadmill      Arm Ergometer  Time: 12 minutes  Level: 2  Mets: 2.2  Distance: 1.34 Miles      Nustep  Time: 20 minutes  Steps: 1350  Load: 4  Mets: 2.2      Recumbent Bike  Time: 10 minutes  Level: 2  Mets: 2.2 (1.24)      Treadmill  Time: 6 minutes  MPH: 1.3 MPH  stGstrstastdstest:st st1st Biceps  lbs: 3 lbs  Sets: 2  Reps: 10  Weight Type : dumbbell      Chest  lbs: 3 lbs  Sets: 2  Reps: 10  Weight Type : dumbbell        Education  Educated pt on Pursed lip and maximizing energy      Therapist Notes   Excellent effort. Increased to level 2 on bike for 10 mins. She tolerated all exercises well and maintained changes well. Vitals were stable. Will continue to motivate and educate pt in rehab.     Dr. Akers immediately available as needed.

## 2024-08-14 ENCOUNTER — TELEPHONE (OUTPATIENT)
Dept: CARDIOLOGY | Facility: CLINIC | Age: 69
End: 2024-08-14
Payer: MEDICARE

## 2024-08-14 NOTE — PROGRESS NOTES
[facility]  Pulmonary Rehab  30 Day Evaluation and Recertification     SUMMARY         Summary of Progress: Ro Hernandez has been doing excellent in rehab. She is increasing her exercise tolerance and will continue to benefit from pulmonary rehab. She works hard in her sessions and is dedicated to the program. Pt is attentive in educational discussions and participates. Pt is encouraged to exercise at home 2 days a week when not at rehab. Pt strives to meet and exceed her goals in rehab. Will continue to motivate and educate pt while striving to increase her strength and endurance in rehab.     Attends:      Patient attends 2 days a week and has completed 12 visits.  The patient's current compliance is 100%.     Problems this Certification Period:   Fluid retention     Referring provider:  MEJIA Aranda     Start date:  6/21/24     Exercise Capacity Summary                         Nustep Date:  7/11/24    Time Load Steps Date:  8/8/24 Time Load Steps     20 3 1261  20 4 1384   Recumbent Bike Date:  7/11/24  (0.31 miles)    Time Level Date:  8/8/24  (1 miles) Time Level     5 1  10 1   Treadmill Date:  7/9/24       Time Speed Grade Date:   8/6/24   Time Speed Grade     5 0.9 0   6 1.3 0   Arm Ergometer Date:  7/11/24    Time Level Date:  8/8/24  (1.18 miles) Time Level     10 1  10 2   Free Weights Date:  7/11/24  (Dumb)    Bicep Curls  Chest Press  Triceps  Legs lbs Set Rep Date:  8/8/24  (Dumb) Bicep Curls  Chest Press  Triceps  Legs lbs Set Reps      2 2 10   3 2 10               Education:  Tips for safe exercise  COPD action plan  Pursed lip breathing  Maximizing energy  Proper heart rate zone  Recognizing flareups  Proper nutrition and breathing  Controlling stress and SOB  Smoking cessation  Pulm knowledge test review  Breathing techniques and exercises         Goals:   met     Updated Exercise Prescription:  Endurance Training: Arm ergometer, 12 mins, level 2  Strength Training: Recumbent bike,  level 2 - 5 mins, level 1 5 mins      I certify that the patient is making progress in pulmonary rehabilitation, is physically able to participate, medically stable and remains motivated.

## 2024-08-14 NOTE — TELEPHONE ENCOUNTER
Contacted patient regarding results patient stated understanding had no further questions or concerns.    ----- Message from Josue Jose MD sent at 8/14/2024  3:55 PM CDT -----  The monitor showed rare arrhythmias  Continue current Rx.   F/U as scheduled

## 2024-08-15 ENCOUNTER — HOSPITAL ENCOUNTER (OUTPATIENT)
Dept: PULMONOLOGY | Facility: HOSPITAL | Age: 69
Discharge: HOME | End: 2024-08-15
Payer: MEDICARE

## 2024-08-15 VITALS — BODY MASS INDEX: 33.61 KG/M2 | WEIGHT: 202 LBS

## 2024-08-15 DIAGNOSIS — E78.2 MIXED HYPERLIPIDEMIA: ICD-10-CM

## 2024-08-15 PROCEDURE — G0239 OTH RESP PROC, GROUP: HCPCS | Mod: HCNC

## 2024-08-15 RX ORDER — EZETIMIBE 10 MG/1
10 TABLET ORAL
Qty: 90 TABLET | Refills: 2 | Status: SHIPPED | OUTPATIENT
Start: 2024-08-15

## 2024-08-15 NOTE — TELEPHONE ENCOUNTER
Refill Decision Note   Ro David  is requesting a refill authorization.  Brief Assessment and Rationale for Refill:  Approve     Medication Therapy Plan:         Comments:     Note composed:11:05 AM 08/15/2024

## 2024-08-15 NOTE — TELEPHONE ENCOUNTER
No care due was identified.  [facility] Embedded Care Due Messages. Reference number: [id number].   8/15/2024 2:13:59 AM CDT

## 2024-08-15 NOTE — PROGRESS NOTES
[facility]  Pulmonary Rehab  Session Summary    SUMMARY     Session Data  Session Number: 14  Time In: 1000  Time Out: 1100  Weight: 91.6 kg (202 lb)  Target Heart Rate Zone: Minimum: 89 bpm  Target Heart Rate Zone: Maximum: 119 bpm      Pre Exercise Vitals  SpO2: 96 %  Supplemental O2?: Yes  O2 Device: nasal cannula  O2 Flow (L/min): 2  Pulse: 72  BP: 158/68  Pramod Dyspnea Rating : 0      During Exercise Vitals  SpO2: 94 %  Supplemental O2?: Yes  O2 Device: nasal cannula  O2 Flow (L/min): 2  Pulse: 69  BP: 115/55  Pramod Dyspnea Rating : 2      Post Exercise Vitals  SpO2: 93 %  Supplemental O2?: Yes  O2 Device: nasal cannula  O2 Flow (L/min): 2  Pulse: 74  BP: 111/51  Pramod Dyspnea Rating : 0.5       Modality  Modality: Arm Ergometer, Hand Free Weights, Nustep, Recumbent Bike, Treadmill             Arm Ergometer  Time: 12 minutes  Level: 2  Mets: 2  Distance: 1.3 Miles             Nustep  Time: 20 minutes  Steps: 1237  Load: 4 (load 6 for 8 min)  Mets: 2.3      Recumbent Bike  Time: 9 minutes  Level: 2  Mets: 2.1 (1.06 miles)      Treadmill  Time: 7 minutes  MPH: 1.3 MPH  stGstrstastdstest:st st1st Biceps  lbs: 3 lbs  Sets: 2  Reps: 10  Weight Type : dumbbell      Chest  lbs: 3 lbs  Sets: 2  Reps: 10  Weight Type : dumbbell         Education  Educated pt on pursed lip breathing and maximizing energy.      Therapist Notes   Excellent effort. Pt increased load on Nustep to 6 for 8 minutes. She tolerated all exercises well and maintained changes well. Vitals were stable. Will continue to motivate and educate pt in rehab.      Dr. Holden immediately available as needed.

## 2024-08-19 PROBLEM — J96.11 CHRONIC RESPIRATORY FAILURE WITH HYPOXIA: Status: RESOLVED | Noted: 2024-05-17 | Resolved: 2024-08-19

## 2024-08-20 ENCOUNTER — HOSPITAL ENCOUNTER (OUTPATIENT)
Dept: PULMONOLOGY | Facility: HOSPITAL | Age: 69
Discharge: HOME | End: 2024-08-20
Payer: MEDICARE

## 2024-08-20 VITALS — BODY MASS INDEX: 33.45 KG/M2 | WEIGHT: 201 LBS

## 2024-08-20 PROCEDURE — G0239 OTH RESP PROC, GROUP: HCPCS | Mod: HCNC

## 2024-08-20 NOTE — PROGRESS NOTES
[facility]  Pulmonary Rehab  Session Summary    SUMMARY     Session Data  Session Number: 15  Time In: 1000  Time Out: 1100  Weight: 91.2 kg (201 lb)  Target Heart Rate Zone: Minimum: 89 bpm  Target Heart Rate Zone: Maximum: 119 bpm  Patient Motivation: Excellent  Patient Effort: Excellent      Pre Exercise Vitals  SpO2: 96 %  Supplemental O2?: Yes  O2 Device: nasal cannula  O2 Flow (L/min): 2  Pulse: 71  BP: 133/58  Pramod Dyspnea Rating : 3      During Exercise Vitals  SpO2: 96 %  Supplemental O2?: Yes  O2 Device: nasal cannula  O2 Flow (L/min): 2  Pulse: 69  BP: 137/58  Pramod Dyspnea Rating : 2      Post Exercise Vitals  SpO2: 98 %  Supplemental O2?: Yes  O2 Device: nasal cannula  O2 Flow (L/min): 2  Pulse: 70  BP: 139/55  Pramod Dyspnea Rating : 3       Modality  Modality: Arm Ergometer, Hand Free Weights, Nustep, Recumbent Bike      Arm Ergometer  Time: 12 minutes  Level: 2  Mets: 2.1  Distance: 1.32 Miles      Nustep  Time: 20 minutes  Steps: 1385  Load: 4  Mets: 2.2      Recumbent Bike  Time: 6 minutes (.73 miles)  Level: 2  Mets: 2.1      Biceps  lbs: 3 lbs  Sets: 2  Reps: 10  Weight Type : dumbbell      Chest  lbs: 3 lbs  Sets: 2  Reps: 10  Weight Type : dumbbell    Education  Lessons learned with COPD      Therapist Notes     Excellent effort.  She tolerated all exercises well. Vitals were stable. Will continue to motivate and educate pt in rehab.      Dr. Akers immediately available as needed.

## 2024-08-22 ENCOUNTER — HOSPITAL ENCOUNTER (OUTPATIENT)
Dept: PULMONOLOGY | Facility: HOSPITAL | Age: 69
Discharge: HOME | End: 2024-08-22
Payer: MEDICARE

## 2024-08-22 VITALS — BODY MASS INDEX: 33.45 KG/M2 | WEIGHT: 201 LBS

## 2024-08-22 PROCEDURE — G0239 OTH RESP PROC, GROUP: HCPCS | Mod: HCNC

## 2024-08-22 NOTE — PROGRESS NOTES
[facility]  Pulmonary Rehab  Session Summary    SUMMARY     Session Data  Session Number: 16  Time In: 1000  Time Out: 1100  Weight: 91.2 kg (201 lb)  Target Heart Rate Zone: Minimum: 89 bpm  Target Heart Rate Zone: Maximum: 119 bpm  Patient Motivation: Excellent  Patient Effort: Excellent      Pre Exercise Vitals  SpO2: 92 %  Supplemental O2?: Yes  O2 Device: nasal cannula  O2 Flow (L/min): 2  Pulse: 75  BP: 130/68  Pramod Dyspnea Rating : 2      During Exercise Vitals  SpO2: 98 %  Supplemental O2?: Yes  O2 Device: nasal cannula  O2 Flow (L/min): 2  Pulse: 70  BP: 117/64  Pramod Dyspnea Rating : 1      Post Exercise Vitals  SpO2: 95 %  Supplemental O2?: Yes  O2 Device: nasal cannula  O2 Flow (L/min): 2  Pulse: 70  BP: 125/57  Pramod Dyspnea Rating : 2       Modality  Modality: Hand Free Weights, Nustep, Arm Ergometer, Recumbent Bike     Arm Ergometer  Time: 12 minutes  Level: 2  Mets: 2  Distance: 1.75 Miles     Nustep  Time: 80 minutes  Steps: 1419  Load: 4  Mets: 2.3      Recumbent Bike  Time: 9 minutes  Level: 2  Mets: 1.9 (1.0 miles)        Biceps  lbs: 3 lbs  Sets: 2  Reps: 10  Weight Type : dumbbell      Chest  lbs: 3 lbs  Sets: 2  Reps: 10  Weight Type : dumbbell            Education  Maintaining a healthy weight      Therapist Notes   Excellent effort.  She tolerated all exercises well. Vitals were stable. Will continue to motivate and educate pt in rehab.      Dr. Holden immediately available as needed.

## 2024-08-27 ENCOUNTER — HOSPITAL ENCOUNTER (OUTPATIENT)
Dept: PULMONOLOGY | Facility: HOSPITAL | Age: 69
Discharge: HOME | End: 2024-08-27
Payer: MEDICARE

## 2024-08-27 VITALS — WEIGHT: 200.31 LBS | BODY MASS INDEX: 33.33 KG/M2

## 2024-08-27 PROCEDURE — G0239 OTH RESP PROC, GROUP: HCPCS | Mod: HCNC

## 2024-08-27 NOTE — PROGRESS NOTES
[facility]  Pulmonary Rehab  Session Summary    SUMMARY     Session Data  Session Number: 17  Time In: 1000  Time Out: 1100  Weight: 90.9 kg (200 lb 4.8 oz)  Target Heart Rate Zone: Minimum: 89 bpm  Target Heart Rate Zone: Maximum: 119 bpm  Patient Motivation: Excellent  Patient Effort: Excellent      Pre Exercise Vitals  SpO2: 92 %  Supplemental O2?: Yes  O2 Device: nasal cannula  O2 Flow (L/min): 2  Pulse: 77  BP: 137/62  Pramod Dyspnea Rating : 2      During Exercise Vitals  SpO2: 96 %  Supplemental O2?: Yes  O2 Device: nasal cannula  O2 Flow (L/min): 2  Pulse: 68  BP: 129/58  Pramod Dyspnea Rating : 1      Post Exercise Vitals  SpO2: 96 %  Supplemental O2?: Yes  O2 Device: nasal cannula  O2 Flow (L/min): 2  Pulse: 70  BP: 149/60  Pramod Dyspnea Rating : 2       Modality  Modality: Hand Free Weights, Nustep, Arm Ergometer, Recumbent Bike      Arm Ergometer  Time: 12 minutes  Level: 2  Mets: 2.3  Distance: 1.3 Miles      Nustep  Time: 20 minutes  Steps: 1377  Load: 4  Mets: 2.5      Recumbent Bike  Time: 10 minutes  Level: 2  Mets: 2.3 (1 mile)    Biceps  lbs: 3 lbs  Sets: 2  Reps: 10  Weight Type : dumbbell      Chest  lbs: 3 lbs  Sets: 2  Reps: 10  Weight Type : dumbbell    Education   Maximizing energy   Pursed lip breathing.     Therapist Notes   Excellent effort.  She tolerated all exercises well. Vitals were stable. Will continue to motivate and educate pt in rehab.      Dr. Akers immediately available as needed.

## 2024-08-29 ENCOUNTER — HOSPITAL ENCOUNTER (OUTPATIENT)
Dept: PULMONOLOGY | Facility: HOSPITAL | Age: 69
Discharge: HOME | End: 2024-08-29
Payer: MEDICARE

## 2024-08-29 VITALS — BODY MASS INDEX: 33.07 KG/M2 | WEIGHT: 198.69 LBS

## 2024-08-29 PROCEDURE — G0239 OTH RESP PROC, GROUP: HCPCS | Mod: HCNC

## 2024-08-29 NOTE — PROGRESS NOTES
[facility]  Pulmonary Rehab  Session Summary    SUMMARY     Session Data  Session Number: 18  Time In: 1000  Time Out: 1100  Weight: 90.1 kg (198 lb 11.2 oz)  Target Heart Rate Zone: Minimum: 89 bpm  Target Heart Rate Zone: Maximum: 119 bpm  Patient Motivation: Excellent  Patient Effort: Excellent      Pre Exercise Vitals  SpO2: 97 %  Supplemental O2?: Yes  O2 Device: nasal cannula  O2 Flow (L/min): 2  Pulse: 65  BP: 125/58  Pramod Dyspnea Rating : 2      During Exercise Vitals  SpO2: 96 %  Supplemental O2?: Yes  O2 Device: nasal cannula  O2 Flow (L/min): 2  Pulse: 65  BP: 107/53  Pramod Dyspnea Rating : 2      Post Exercise Vitals  SpO2: 98 %  Supplemental O2?: Yes  O2 Device: nasal cannula  O2 Flow (L/min): 2  Pulse: 73  BP: 127/66  Pramod Dyspnea Rating : 1       Modality  Modality: Arm Ergometer, Hand Free Weights, Nustep, Recumbent Bike      Arm Ergometer  Time: 12 minutes  Level: 2  Mets: 2.5  Distance: 1.36 Miles    Nustep  Time: 20 minutes  Steps: 1406  Load: 4  Mets: 2.2      Recumbent Bike  Time: 10 minutes (1.32 miles)  Level: 2  Mets: 2      Biceps  lbs: 3 lbs  Sets: 2  Reps: 10  Weight Type : dumbbell      Chest  lbs: 3 lbs  Sets: 2  Reps: 10  Weight Type : dumbbell    Education  COPD action plan  Breathing techniques/exercises      Therapist Notes   Excellent effort.  Pt achieved more steps on nustep and went a further distance on arm ergometer an recumbent bike. She tolerated all exercises well. Vitals were stable. Will continue to motivate and educate pt in rehab.      Dr. Garcia immediately available as needed.

## 2024-09-03 ENCOUNTER — HOSPITAL ENCOUNTER (OUTPATIENT)
Dept: PULMONOLOGY | Facility: HOSPITAL | Age: 69
Discharge: HOME | End: 2024-09-03
Payer: MEDICARE

## 2024-09-03 VITALS — BODY MASS INDEX: 33.2 KG/M2 | WEIGHT: 199.5 LBS

## 2024-09-03 PROCEDURE — G0239 OTH RESP PROC, GROUP: HCPCS | Mod: HCNC

## 2024-09-03 NOTE — PROGRESS NOTES
[facility]  Pulmonary Rehab  Session Summary    SUMMARY     Session Data  Session Number: 19  Time In: 1000  Time Out: 1100  Weight: 90.5 kg (199 lb 8 oz)  Target Heart Rate Zone: Minimum: 89 bpm  Target Heart Rate Zone: Maximum: 119 bpm  Patient Motivation: Excellent  Patient Effort: Excellent      Pre Exercise Vitals  SpO2: 99 %  Supplemental O2?: Yes  O2 Device: nasal cannula  O2 Flow (L/min): 2  Pulse: 74  BP: 113/56  Pramod Dyspnea Rating : 1      During Exercise Vitals  SpO2: 95 %  Supplemental O2?: Yes  O2 Device: nasal cannula  O2 Flow (L/min): 2  Pulse: 71  BP: 116/56  Pramod Dyspnea Rating : 1      Post Exercise Vitals  SpO2: 98 %  Supplemental O2?: Yes  O2 Device: nasal cannula  O2 Flow (L/min): 2  Pulse: 75  BP: 124/85  Pramod Dyspnea Rating : 2       Modality  Modality: Arm Ergometer, Hand Free Weights, Recumbent Bike, Nustep    Arm Ergometer  Time: 12 minutes  Level: 2  Mets: 2.1  Distance: 1.41 Miles      Nustep  Time: 20 minutes  Steps: 1480  Load: 4  Mets: 2.4      Recumbent Bike  Time: 10 minutes (1.45 miles)  Level: 2  Mets: 2    Biceps  lbs: 3 lbs  Sets: 2  Reps: 10  Weight Type : dumbbell      Chest  lbs: 3 lbs  Sets: 2  Reps: 10  Weight Type : dumbbell    Education  Proper nutrition and exercise      Therapist Notes   Excellent effort.  Pt achieved more steps on nustep and went a further distance on arm ergometer an recumbent bike. She tolerated all exercises well. Vitals were stable. Will continue to motivate and educate pt in rehab.      Dr. Jain immediately available as needed.

## 2024-09-05 ENCOUNTER — HOSPITAL ENCOUNTER (OUTPATIENT)
Dept: PULMONOLOGY | Facility: HOSPITAL | Age: 69
Discharge: HOME | End: 2024-09-05
Payer: MEDICARE

## 2024-09-05 VITALS — WEIGHT: 201 LBS | BODY MASS INDEX: 33.45 KG/M2

## 2024-09-05 PROCEDURE — G0239 OTH RESP PROC, GROUP: HCPCS | Mod: HCNC

## 2024-09-05 NOTE — PROGRESS NOTES
[facility]  Pulmonary Rehab  Session Summary    SUMMARY     Session Data  Session Number: 20  Time In: 1000  Time Out: 1100  Weight: 91.2 kg (201 lb)  Target Heart Rate Zone: Minimum: 89 bpm  Target Heart Rate Zone: Maximum: 119 bpm  Patient Motivation: Excellent  Patient Effort: Excellent      Pre Exercise Vitals  SpO2: 93 %  Supplemental O2?: Yes  O2 Device: nasal cannula  O2 Flow (L/min): 2  Pulse: 74  BP: 144/62  Pramod Dyspnea Rating : 3      During Exercise Vitals  SpO2: 96 %  Supplemental O2?: Yes  O2 Device: nasal cannula  O2 Flow (L/min): 2  Pulse: 70  BP: 130/60  Pramod Dyspnea Rating : 2      Post Exercise Vitals  SpO2: 97 %  Supplemental O2?: Yes  O2 Device: nasal cannula  O2 Flow (L/min): 2  Pulse: 74  BP: 122/52  Pramod Dyspnea Rating : 1       Modality  Modality: Arm Ergometer, Hand Free Weights, Nustep, Treadmill    Arm Ergometer  Time: 10 minutes  Level: 3  Mets: 2.3  Distance: 1.27 Miles    Nustep  Time: 20 minutes  Steps: 1407  Load: 4  Mets: 2.2    Treadmill  Time: 7 minutes  MPH: 1.4 MPH  stGstrstastdstest:st st1st Biceps  lbs: 3 lbs  Sets: 2  Reps: 10  Weight Type : dumbbell      Chest  lbs: 3 lbs  Sets: 2  Reps: 10  Weight Type : dumbbell        Therapist Notes   Excellent effort.  Pt exercised on treadmill today. Increased to 1.4 mph. Also increased to level 3 on arm ergometer for 10 mins. She tolerated all exercises well. Vitals were stable. Will continue to motivate and educate pt in rehab.      Dr. Holden immediately available as needed.

## 2024-09-10 ENCOUNTER — HOSPITAL ENCOUNTER (OUTPATIENT)
Dept: PULMONOLOGY | Facility: HOSPITAL | Age: 69
Discharge: HOME | End: 2024-09-10
Payer: MEDICARE

## 2024-09-10 VITALS — BODY MASS INDEX: 33.68 KG/M2 | WEIGHT: 202.38 LBS

## 2024-09-10 PROCEDURE — G0239 OTH RESP PROC, GROUP: HCPCS | Mod: HCNC

## 2024-09-10 NOTE — PROGRESS NOTES
[facility]  Pulmonary Rehab  Session Summary    SUMMARY     Session Data  Session Number: 21  Time In: 1000  Time Out: 1100  Weight: 91.8 kg (202 lb 6.4 oz)  Target Heart Rate Zone: Minimum: 89 bpm  Target Heart Rate Zone: Maximum: 119 bpm  Patient Motivation: Excellent  Patient Effort: Excellent      Pre Exercise Vitals  SpO2: 96 %  Supplemental O2?: Yes  O2 Device: nasal cannula  O2 Flow (L/min): 2  Pulse: 76  BP: 122/79  Pramod Dyspnea Rating : 1      During Exercise Vitals  SpO2: 96 %  Supplemental O2?: Yes  O2 Device: nasal cannula  O2 Flow (L/min): 2  Pulse: 77  BP: 121/60  Pramod Dyspnea Rating : 2      Post Exercise Vitals  SpO2: 93 %  Supplemental O2?: Yes  O2 Device: nasal cannula  O2 Flow (L/min): 2  Pulse: 85  BP: 127/70  Pramod Dyspnea Rating : 2       Modality  Modality: Arm Ergometer, Hand Free Weights, Nustep, Recumbent Bike      Arm Ergometer  Time: 10 minutes  Level: 3  Mets: 2.3  Distance: 1.27 Miles      Nustep  Time: 20 minutes  Steps: 1297  Load: 7  Mets: 2.3      Recumbent Bike  Time: 10 minutes (1.33 miles)  Level: 2  Mets: 2.3      Biceps  lbs: 3 lbs  Sets: 2  Reps: 10  Weight Type : dumbbell      Chest  lbs: 3 lbs  Sets: 2  Reps: 10  Weight Type : dumbbell      Education  Preventing lung infections  Smoking cessation    Therapist Notes   Excellent effort. Increased to load 5 on nustep for 20 mins.Pt tolerated thins change and all exercises well. Vitals were stable. Will continue to motivate and educate pt in rehab.      Dr. Akers immediately available as needed.

## 2024-09-17 ENCOUNTER — HOSPITAL ENCOUNTER (OUTPATIENT)
Dept: PULMONOLOGY | Facility: HOSPITAL | Age: 69
Discharge: HOME | End: 2024-09-17
Payer: MEDICARE

## 2024-09-17 VITALS — WEIGHT: 199.13 LBS | BODY MASS INDEX: 33.13 KG/M2

## 2024-09-17 PROCEDURE — G0239 OTH RESP PROC, GROUP: HCPCS | Mod: HCNC

## 2024-09-17 NOTE — PROGRESS NOTES
[facility]  Pulmonary Rehab  Session Summary    SUMMARY     Session Data  Session Number: 22  Time In: 1000  Time Out: 1100  Weight: 90.3 kg (199 lb 1.6 oz)  Target Heart Rate Zone: Minimum: 89 bpm  Target Heart Rate Zone: Maximum: 119 bpm  Patient Motivation: Excellent  Patient Effort: Excellent      Pre Exercise Vitals  SpO2: 94 %  Supplemental O2?: Yes  O2 Device: nasal cannula  O2 Flow (L/min): 2  Pulse: 75  BP: 130/58  Pramod Dyspnea Rating : 3      During Exercise Vitals  SpO2: 96 %  Supplemental O2?: Yes  O2 Device: nasal cannula  O2 Flow (L/min): 2  Pulse: 74  BP: 111/58  Pramod Dyspnea Rating : 2      Post Exercise Vitals  SpO2: 95 %  Supplemental O2?: Yes  O2 Device: nasal cannula  O2 Flow (L/min): 2  Pulse: 79  BP: 114/55  Pramod Dyspnea Rating : 1       Modality  Modality: Arm Ergometer, Hand Free Weights, Nustep      Arm Ergometer  Time: 12 minutes  Level: 3  Mets: 2.3  Distance: 1.46 Miles      Nustep  Time: 20 minutes  Steps: 1386  Load: 5  Mets: 2.6      Biceps  lbs: 4 lbs  Sets: 2  Reps: 10  Weight Type : dumbbell      Chest  lbs: 4 lbs  Sets: 2  Reps: 10  Weight Type : dumbbell    Education  Maximizing energy  Pursed lip breathing      Therapist Notes   Excellent effort. Increased to 4 lb dumbbells for chest and bicep exercises. Also increased to 12 mins on level 3 on arm ergometer. Pt tolerated this change and all exercises well. Vitals were stable. Will continue to motivate and educate pt in rehab.      Dr. Akers immediately available as needed.

## 2024-09-19 ENCOUNTER — HOSPITAL ENCOUNTER (OUTPATIENT)
Dept: PULMONOLOGY | Facility: HOSPITAL | Age: 69
Discharge: HOME | End: 2024-09-19
Payer: MEDICARE

## 2024-09-19 VITALS — WEIGHT: 198 LBS | BODY MASS INDEX: 32.95 KG/M2

## 2024-09-19 PROCEDURE — G0239 OTH RESP PROC, GROUP: HCPCS | Mod: HCNC

## 2024-09-19 NOTE — PROGRESS NOTES
[facility]  Pulmonary Rehab  Session Summary    SUMMARY     Session Data  Session Number: 23  Time In: 1000  Time Out: 1100  Weight: 89.8 kg (198 lb)  Target Heart Rate Zone: Minimum: 89 bpm  Target Heart Rate Zone: Maximum: 119 bpm  Patient Motivation: Excellent  Patient Effort: Excellent      Pre Exercise Vitals  SpO2: 98 %  Supplemental O2?: Yes  O2 Device: nasal cannula  O2 Flow (L/min): 2  Pulse: 74  BP: 122/56  Pramod Dyspnea Rating : 2      During Exercise Vitals  SpO2: 96 %  Supplemental O2?: Yes  O2 Device: nasal cannula  O2 Flow (L/min): 2  Pulse: 74  BP: 134/59  Pramod Dyspnea Rating : 1      Post Exercise Vitals  SpO2: 96 %  Supplemental O2?: Yes  O2 Device: nasal cannula  O2 Flow (L/min): 2  Pulse: 71  BP: 108/53  Pramod Dyspnea Rating : 1       Modality  Modality: Arm Ergometer, Hand Free Weights, Nustep, Recumbent Bike      Arm Ergometer  Time: 12 minutes  Level: 3  Mets: 2.5  Distance: 1.58 Miles      Nustep  Time: 20 minutes  Steps: 1390  Load: 5  Mets: 2.6      Recumbent Bike  Time: 10 minutes (1.41 miles)  Level: 2  Mets: 2        Biceps  lbs: 4 lbs  Sets: 2  Reps: 10  Weight Type : dumbbell      Chest  lbs: 4 lbs  Sets: 2  Reps: 10  Weight Type : dumbbell      Education  Oxygen therapy      Therapist Notes     Excellent effort.  Pt tolerated all exercises well. Vitals were stable. Will continue to motivate and educate pt in rehab.      Dr. Verdugo immediately available as needed.

## 2024-09-24 ENCOUNTER — HOSPITAL ENCOUNTER (OUTPATIENT)
Dept: PULMONOLOGY | Facility: HOSPITAL | Age: 69
Discharge: HOME | End: 2024-09-24
Payer: MEDICARE

## 2024-09-24 ENCOUNTER — PATIENT OUTREACH (OUTPATIENT)
Dept: PULMONOLOGY | Facility: CLINIC | Age: 69
End: 2024-09-24
Payer: MEDICARE

## 2024-09-24 VITALS — BODY MASS INDEX: 33.33 KG/M2 | WEIGHT: 200.31 LBS

## 2024-09-24 PROCEDURE — G0239 OTH RESP PROC, GROUP: HCPCS | Mod: HCNC

## 2024-09-24 NOTE — PROGRESS NOTES
[facility]  Pulmonary Rehab  Session Summary    SUMMARY     Session Data  Session Number: 24  Time In: 1000  Time Out: 1100  Weight: 90.9 kg (200 lb 4.8 oz)  Target Heart Rate Zone: Minimum: 89 bpm  Target Heart Rate Zone: Maximum: 119 bpm  Patient Motivation: Excellent  Patient Effort: Excellent      Pre Exercise Vitals  SpO2: 95 %  Supplemental O2?: Yes  O2 Device: nasal cannula  O2 Flow (L/min): 2  Pulse: 73  BP: 116/58  Pramod Dyspnea Rating : 2      During Exercise Vitals  SpO2: 94 %  Supplemental O2?: Yes  O2 Device: nasal cannula  O2 Flow (L/min): 2  Pulse: 73  BP: 136/60  Pramod Dyspnea Rating : 1      Post Exercise Vitals  SpO2: 96 %  Supplemental O2?: Yes  O2 Device: nasal cannula  O2 Flow (L/min): 2  Pulse: 71  BP: 121/55  Pramod Dyspnea Rating : 1       Modality  Modality: Arm Ergometer, Hand Free Weights, Nustep, Treadmill      Arm Ergometer  Time: 12 minutes  Level: 3  Mets: 2.4  Distance: 1.63 Miles      Nustep  Time: 20 minutes  Steps: 1363  Load: 5  Mets: 2.5      Treadmill  Time: 10 minutes  MPH: 1.4 MPH  stGstrstastdstest:st st1st Biceps  lbs: 4 lbs  Sets: 2  Reps: 10  Weight Type : dumbbell      Chest  lbs: 4 lbs  Sets: 2  Reps: 10  Weight Type : dumbbell       Education  Preventing infections  Medication mgmt      Therapist Notes     Excellent effort.  Increased to 10 mins on treadmill today, 1.4 mph, 0 incline. Pt tolerated all exercises well. Vitals were stable. Will continue to motivate and educate pt in rehab.      Dr. Jain immediately available as needed.

## 2024-09-26 ENCOUNTER — HOSPITAL ENCOUNTER (OUTPATIENT)
Dept: PULMONOLOGY | Facility: HOSPITAL | Age: 69
Discharge: HOME | End: 2024-09-26
Payer: MEDICARE

## 2024-09-26 VITALS — BODY MASS INDEX: 33.28 KG/M2 | WEIGHT: 200 LBS

## 2024-09-26 PROCEDURE — G0239 OTH RESP PROC, GROUP: HCPCS | Mod: HCNC

## 2024-09-26 NOTE — PROGRESS NOTES
[facility]  Pulmonary Rehab  Session Summary    SUMMARY     Session Data  Session Number: 25  Time In: 1000  Time Out: 1100  Weight: 90.7 kg (200 lb)  Target Heart Rate Zone: Minimum: 89 bpm  Target Heart Rate Zone: Maximum: 119 bpm  Patient Motivation: Excellent  Patient Effort: Excellent      Pre Exercise Vitals  SpO2: 95 %  Supplemental O2?: Yes  O2 Device: nasal cannula  O2 Flow (L/min): 2  Pulse: 76  BP: 143/59  Pramod Dyspnea Rating : 1      During Exercise Vitals  SpO2: 94 %  Supplemental O2?: Yes  O2 Device: nasal cannula  O2 Flow (L/min): 2  Pulse: 77  BP: 126/87  Pramod Dyspnea Rating : 1      Post Exercise Vitals  SpO2: 95 %  Supplemental O2?: Yes  O2 Device: nasal cannula  O2 Flow (L/min): 2  Pulse: 81  BP: 136/51  Pramod Dyspnea Rating : 1       Modality  Modality: Hand Free Weights, Nustep, Recumbent Bike, Treadmill      Nustep  Time: 20 minutes  Steps: 1365  Load: 5  Mets: 2.6      Recumbent Bike  Time: 10 minutes (1.41 miles)  Level: 2  Mets: 2.1      Treadmill  Time: 10 minutes  MPH: 1.4 MPH  stGstrstastdstest:st st1st Biceps  lbs: 4 lbs  Sets: 2  Reps: 10  Weight Type : dumbbell      Chest  lbs: 4 lbs  Sets: 2  Reps: 10  Weight Type : dumbbell      Education  Conserving and maximizing energy      Therapist Notes     Excellent effort.  No changes to equipment levels or times today.  Pt tolerated all exercises well. Vitals were stable. Will continue to motivate and educate pt in rehab.      Dr. SHANNON Garcia immediately available as needed.

## 2024-10-01 ENCOUNTER — HOSPITAL ENCOUNTER (OUTPATIENT)
Dept: PULMONOLOGY | Facility: HOSPITAL | Age: 69
Discharge: HOME | End: 2024-10-01
Payer: MEDICARE

## 2024-10-01 VITALS — BODY MASS INDEX: 33.55 KG/M2 | WEIGHT: 201.63 LBS

## 2024-10-01 PROCEDURE — G0239 OTH RESP PROC, GROUP: HCPCS | Mod: HCNC

## 2024-10-01 NOTE — PROGRESS NOTES
[facility]  Pulmonary Rehab  Session Summary    SUMMARY     Session Data  Session Number: 26  Time In: 1000  Time Out: 1100  Weight: 91.4 kg (201 lb 9.6 oz)  Target Heart Rate Zone: Minimum: 89 bpm  Target Heart Rate Zone: Maximum: 119 bpm  Patient Motivation: Excellent  Patient Effort: Excellent      Pre Exercise Vitals  SpO2: 95 %  Supplemental O2?: Yes  O2 Device: nasal cannula  O2 Flow (L/min): 2  Pulse: 69  BP: 141/64  Pramod Dyspnea Rating : 3      During Exercise Vitals  SpO2: 95 %  Supplemental O2?: Yes  O2 Device: nasal cannula  O2 Flow (L/min): 2  Pulse: 70  BP: 144/61  Pramod Dyspnea Rating : 2      Post Exercise Vitals  SpO2: 95 %  Supplemental O2?: Yes  O2 Device: nasal cannula  O2 Flow (L/min): 2  Pulse: 69  BP: 135/60  Pramod Dyspnea Rating : 2       Modality  Modality: Arm Ergometer, Hand Free Weights, Nustep      Arm Ergometer  Time: 12 minutes  Level: 3  Mets: 2.7  Distance: 1.56 Miles      Nustep  Time: 20 minutes  Steps: 1394  Load: 5  Mets: 2.6        Biceps  lbs: 4 lbs  Sets: 2  Reps: 10  Weight Type : dumbbell      Chest  lbs: 4 lbs  Sets: 2  Reps: 10  Weight Type : dumbbell      Education  Pursed lip breathing      Therapist Notes     Excellent effort.  No changes to equipment levels or times today.  Pt tolerated all exercises well. Vitals were stable. Will continue to motivate and educate pt in rehab.      Dr. Akers immediately available as needed.

## 2024-10-03 ENCOUNTER — HOSPITAL ENCOUNTER (OUTPATIENT)
Dept: PULMONOLOGY | Facility: HOSPITAL | Age: 69
Discharge: HOME | End: 2024-10-03
Payer: MEDICARE

## 2024-10-03 VITALS — WEIGHT: 200.19 LBS | BODY MASS INDEX: 33.32 KG/M2

## 2024-10-03 PROCEDURE — G0239 OTH RESP PROC, GROUP: HCPCS | Mod: HCNC

## 2024-10-03 NOTE — PROGRESS NOTES
[facility]  Pulmonary Rehab  Session Summary    SUMMARY     Session Data  Session Number: 27  Time In: 1000  Time Out: 1100  Weight: 90.8 kg (200 lb 3.2 oz)  Target Heart Rate Zone: Minimum: 89 bpm  Target Heart Rate Zone: Maximum: 119 bpm  Patient Motivation: Excellent  Patient Effort: Excellent      Pre Exercise Vitals  SpO2: 94 %  Supplemental O2?: Yes  O2 Device: nasal cannula  O2 Flow (L/min): 2  Pulse: 75  BP: 142/60  Pramod Dyspnea Rating : 2      During Exercise Vitals  SpO2: 96 %  Supplemental O2?: Yes  O2 Device: nasal cannula  O2 Flow (L/min): 2  Pulse: 80  BP: 122/74  Pramod Dyspnea Rating : 1      Post Exercise Vitals  SpO2: 96 %  Supplemental O2?: Yes  O2 Device: nasal cannula  O2 Flow (L/min): 2  Pulse: 71  BP: 118/56  Pramod Dyspnea Rating : 2       Modality  Modality: Arm Ergometer, Hand Free Weights, Nustep      Arm Ergometer  Time: 12 minutes  Level: 3  Mets: 2.7  Distance: 1.61 Miles      Nustep  Time: 20 minutes  Steps: 1340  Load: 5  Mets: 2.5      Biceps  lbs: 4 lbs  Sets: 2  Reps: 10  Weight Type : dumbbell      Chest  lbs: 4 lbs  Sets: 2  Reps: 10  Weight Type : dumbbell    Education  Tips for safe exercise  Pursed lip breathing      Therapist Notes     Excellent effort.  No changes to equipment levels or times today.  Pt tolerated all exercises well. Vitals were stable. Will continue to motivate and educate pt in rehab.      Dr. Verdugo immediately available as needed.

## 2024-10-08 ENCOUNTER — HOSPITAL ENCOUNTER (OUTPATIENT)
Dept: PULMONOLOGY | Facility: HOSPITAL | Age: 69
Discharge: HOME | End: 2024-10-08
Payer: MEDICARE

## 2024-10-08 VITALS — BODY MASS INDEX: 33.32 KG/M2 | WEIGHT: 200.19 LBS

## 2024-10-08 PROCEDURE — G0239 OTH RESP PROC, GROUP: HCPCS | Mod: HCNC

## 2024-10-08 NOTE — PROGRESS NOTES
[facility]  Pulmonary Rehab  Session Summary    SUMMARY     Session Data  Session Number: 28  Time In: 1000  Time Out: 1100  Weight: 90.8 kg (200 lb 3.2 oz)  Target Heart Rate Zone: Minimum: 89 bpm  Target Heart Rate Zone: Maximum: 119 bpm  Patient Motivation: Excellent  Patient Effort: Excellent      Pre Exercise Vitals  SpO2: 93 %  Supplemental O2?: Yes  O2 Device: nasal cannula  O2 Flow (L/min): 2  Pulse: 76  BP: 137/80  Pramod Dyspnea Rating : 2      During Exercise Vitals  SpO2: 93 %  Supplemental O2?: Yes  O2 Device: nasal cannula  O2 Flow (L/min): 2  Pulse: 76  BP: 124/66  Pramod Dyspnea Rating : 1      Post Exercise Vitals  SpO2: 93 %  Supplemental O2?: Yes  O2 Device: nasal cannula  O2 Flow (L/min): 2  Pulse: 87  BP: 100/63  Pramod Dyspnea Rating : 2       Modality  Modality: Arm Ergometer, Hand Free Weights, Nustep, Recumbent Bike      Arm Ergometer  Time: 12 minutes  Level: 3  Mets: 2.7  Distance: 1.67 Miles      Nustep  Time: 20 minutes  Steps: 1426  Load: 5  Mets: 2.7      Recumbent Bike  Time: 10 minutes (1.45 miles)  Level: 2  Mets: 2.4      Biceps  lbs: 4 lbs  Sets: 2  Reps: 10  Weight Type : dumbbell      Chest  lbs: 4 lbs  Sets: 2  Reps: 10  Weight Type : dumbbell      Education  Maximizing energy  Pursed lip breathing      Therapist Notes     Excellent effort.  No changes to equipment levels or times today.  Pt tolerated all exercises well. Vitals were stable. Will continue to motivate and educate pt in rehab.      Dr. Akers immediately available as needed.

## 2024-10-10 ENCOUNTER — HOSPITAL ENCOUNTER (OUTPATIENT)
Dept: PULMONOLOGY | Facility: HOSPITAL | Age: 69
Discharge: HOME | End: 2024-10-10
Payer: MEDICARE

## 2024-10-10 VITALS — WEIGHT: 200 LBS | BODY MASS INDEX: 33.28 KG/M2

## 2024-10-10 PROCEDURE — G0239 OTH RESP PROC, GROUP: HCPCS | Mod: HCNC

## 2024-10-10 NOTE — PROGRESS NOTES
[facility]  Pulmonary Rehab  Session Summary    SUMMARY     Session Data  Session Number: 29  Time In: 1000  Time Out: 1100  Weight: 90.7 kg (200 lb)  Target Heart Rate Zone: Minimum: 89 bpm  Target Heart Rate Zone: Maximum: 119 bpm  Patient Motivation: Excellent  Patient Effort: Excellent      Pre Exercise Vitals  SpO2: 97 %  Supplemental O2?: Yes  O2 Device: nasal cannula  O2 Flow (L/min): 2  Pulse: 72  BP: 127/59  Pramod Dyspnea Rating : 2      During Exercise Vitals  SpO2: 98 %  Supplemental O2?: Yes  O2 Device: nasal cannula  O2 Flow (L/min): 2  Pulse: 70  BP: 125/79  Pramod Dyspnea Rating : 2      Post Exercise Vitals  SpO2: 96 %  Supplemental O2?: Yes  O2 Device: nasal cannula  O2 Flow (L/min): 2  Pulse: 71  BP: 113/56  Pramod Dyspnea Rating : 2       Modality  Modality: Arm Ergometer, Hand Free Weights, Nustep, Treadmill      Arm Ergometer  Time: 12 minutes  Level: 3  Mets: 2.7  Distance: 1.6 Miles      Nustep  Time: 20 minutes  Steps: 1437  Load: 5  Mets: 2.7      Treadmill  Time: 11 minutes  MPH: 1.6 MPH  stGstrstastdstest:st st1st Biceps  lbs: 4 lbs  Sets: 2  Reps: 10  Weight Type : dumbbell      Chest  lbs: 4 lbs  Sets: 2  Reps: 10  Weight Type : dumbbell      Education  Breathing techniques and exercises      Therapist Notes   Excellent effort.  Increased to 11 mins on treadmill at 1.6 mph, 0 incline.  Pt tolerated all changes and exercises well. Vitals were stable. Will continue to motivate and educate pt in rehab.      Dr. Holden  immediately available as needed.

## 2024-10-11 NOTE — PROGRESS NOTES
[facility]  Pulmonary Rehab  30 Day Evaluation and Recertification     SUMMARY         Summary of Progress: Ro Hernandez has been doing excellent in rehab. She is increasing her exercise tolerance and will continue to benefit from pulmonary rehab. She works hard in her sessions and is dedicated to the program. Pt is attentive in educational discussions and participates. Pt is encouraged to exercise at home 2 days a week when not at rehab. Pt strives to meet and exceed her goals in rehab. Will continue to motivate and educate pt while striving to increase her strength and endurance in rehab.     Attends:      Patient attends 2 days a week and has completed 29 visits.  The patient's current compliance is 100%.     Problems this Certification Period:   none     Referring provider:  MEJIA Aranda     Start date:  6/21/24     Exercise Capacity Summary       Nustep Date:  9/5/24   Time Load Steps Date:  10/10/24 Time Load Steps     20 4 1407  20 5 1437   Recumbent Bike Date:  9/3/24  (1.45 miles)   Time Level Date:  10/4/24  (1.45 miles) Time Level     10 2  10 2   Treadmill Date:  9/5/24   Time Speed Grade Date:  10/4/24 Time Speed Grade     7 1.4 0  10 1.4 0   Arm Ergometer Date:  9/5/24  (1.27 miles)   Time Level Date:  10/4/24  (1.67 miles) Time Level     10 3  12 3   Free Weights Date:  9/5/24  (Dumb)   Bicep Curls  Chest Press  Triceps  Legs lbs Set Rep Date:  10/10/24  (Dumb) Bicep Curls  Chest Press  Triceps  Legs lbs Set Reps      3 2 10   4 2 10            Education:  Preventing lung infections  Smoking cessation  Pursed lip breathing  Conserving and Maximizing energy  Medication mgmt  Recognizing flareups  Tips for safe exercise  Controlling stress and SOB  O2 therapy  Breathing techniques and exercises  Proper breathing and exercise        Goals:   met     Updated Exercise Prescription:  Endurance Training: Recumbent bike, 12 mins, level 2  Strength Training: Nustep load 6, 10 mins, 500  steps     I certify that the patient is making progress in pulmonary rehabilitation, is physically able to participate, medically stable and remains motivated.

## 2024-10-15 ENCOUNTER — HOSPITAL ENCOUNTER (OUTPATIENT)
Dept: PULMONOLOGY | Facility: HOSPITAL | Age: 69
Discharge: HOME | End: 2024-10-15
Payer: MEDICARE

## 2024-10-15 VITALS — BODY MASS INDEX: 33.48 KG/M2 | WEIGHT: 201.19 LBS

## 2024-10-15 PROCEDURE — G0239 OTH RESP PROC, GROUP: HCPCS | Mod: HCNC

## 2024-10-17 ENCOUNTER — HOSPITAL ENCOUNTER (OUTPATIENT)
Dept: PULMONOLOGY | Facility: HOSPITAL | Age: 69
Discharge: HOME | End: 2024-10-17
Payer: MEDICARE

## 2024-10-17 PROCEDURE — G0239 OTH RESP PROC, GROUP: HCPCS | Mod: HCNC

## 2024-10-17 NOTE — PROGRESS NOTES
[facility]  Pulmonary Rehab  Session Summary    SUMMARY     Session Data  Session Number: 31  Time In: 1000  Time Out: 1100  Target Heart Rate Zone: Minimum: 89 bpm  Target Heart Rate Zone: Maximum: 119 bpm  Patient Motivation: Excellent  Patient Effort: Excellent      Pre Exercise Vitals  SpO2: 96 %  Supplemental O2?: Yes  O2 Device: nasal cannula  O2 Flow (L/min): 2  Pulse: 71  BP: 150/68  Pramod Dyspnea Rating : 2      During Exercise Vitals  SpO2: 95 %  Supplemental O2?: Yes  O2 Device: nasal cannula  O2 Flow (L/min): 2  Pulse: 60  BP: 131/60  Pramod Dyspnea Rating : 2      Post Exercise Vitals  SpO2: 95 %  Supplemental O2?: Yes  O2 Device: nasal cannula  O2 Flow (L/min): 2  Pulse: 70  BP: 131/58  Pramod Dyspnea Rating : 2       Modality  Modality: Hand Free Weights, Treadmill, Nustep    Nustep  Time: 20 minutes  Steps: 1409  Load: 6  Mets: 2.5    Treadmill  Time: 12 minutes  MPH: 1.6 MPH  stGstrstastdstest:st st1st Biceps  lbs: 4 lbs  Sets: 2  Reps: 10  Weight Type : dumbbell      Chest  lbs: 4 lbs  Sets: 2  Reps: 10  Weight Type : dumbbell     Education  Proper breathing, maximizing energy.    Therapist Notes   Excellent effort.   Pt tolerated all exercises well. Vitals were stable. Will continue to motivate and educate pt in rehab.      Dr. Verdugo immediately available as needed.

## 2024-10-22 ENCOUNTER — HOSPITAL ENCOUNTER (OUTPATIENT)
Dept: PULMONOLOGY | Facility: HOSPITAL | Age: 69
Discharge: HOME | End: 2024-10-22
Payer: MEDICARE

## 2024-10-22 ENCOUNTER — OFFICE VISIT (OUTPATIENT)
Dept: ORTHOPEDICS | Facility: CLINIC | Age: 69
End: 2024-10-22
Payer: MEDICARE

## 2024-10-22 VITALS — BODY MASS INDEX: 33.22 KG/M2 | WEIGHT: 199.63 LBS

## 2024-10-22 VITALS — WEIGHT: 201.25 LBS | HEIGHT: 65 IN | BODY MASS INDEX: 33.53 KG/M2

## 2024-10-22 DIAGNOSIS — M65.30 TRIGGER FINGER, ACQUIRED: Primary | ICD-10-CM

## 2024-10-22 PROCEDURE — 1125F AMNT PAIN NOTED PAIN PRSNT: CPT | Mod: HCNC,CPTII,S$GLB, | Performed by: ORTHOPAEDIC SURGERY

## 2024-10-22 PROCEDURE — 20550 NJX 1 TENDON SHEATH/LIGAMENT: CPT | Mod: HCNC,LT,S$GLB, | Performed by: ORTHOPAEDIC SURGERY

## 2024-10-22 PROCEDURE — G0239 OTH RESP PROC, GROUP: HCPCS | Mod: HCNC

## 2024-10-22 PROCEDURE — 99999 PR PBB SHADOW E&M-EST. PATIENT-LVL III: CPT | Mod: PBBFAC,HCNC,, | Performed by: ORTHOPAEDIC SURGERY

## 2024-10-22 PROCEDURE — 1159F MED LIST DOCD IN RCRD: CPT | Mod: HCNC,CPTII,S$GLB, | Performed by: ORTHOPAEDIC SURGERY

## 2024-10-22 PROCEDURE — 3288F FALL RISK ASSESSMENT DOCD: CPT | Mod: HCNC,CPTII,S$GLB, | Performed by: ORTHOPAEDIC SURGERY

## 2024-10-22 PROCEDURE — 3008F BODY MASS INDEX DOCD: CPT | Mod: HCNC,CPTII,S$GLB, | Performed by: ORTHOPAEDIC SURGERY

## 2024-10-22 PROCEDURE — 1101F PT FALLS ASSESS-DOCD LE1/YR: CPT | Mod: HCNC,CPTII,S$GLB, | Performed by: ORTHOPAEDIC SURGERY

## 2024-10-22 PROCEDURE — 99213 OFFICE O/P EST LOW 20 MIN: CPT | Mod: 25,HCNC,S$GLB, | Performed by: ORTHOPAEDIC SURGERY

## 2024-10-22 RX ORDER — TRIAMCINOLONE ACETONIDE 40 MG/ML
40 INJECTION, SUSPENSION INTRA-ARTICULAR; INTRAMUSCULAR
Status: DISCONTINUED | OUTPATIENT
Start: 2024-10-22 | End: 2024-10-22 | Stop reason: HOSPADM

## 2024-10-22 RX ADMIN — TRIAMCINOLONE ACETONIDE 40 MG: 40 INJECTION, SUSPENSION INTRA-ARTICULAR; INTRAMUSCULAR at 07:10

## 2024-10-22 NOTE — PROGRESS NOTES
Subjective:     Patient ID: Ro Hernandez is a 69 y.o. female.    Chief Complaint: Pain of the Left Hand      HPI:  The patient is a 69-year-old female with right trigger thumb and right long trigger finger.  She was last injected 12/5/2023 with good results until recently and requests reinjection today    Past Medical History:   Diagnosis Date    Allergy     Angina     Arthritis 6/11/2013    Asthma     Congestive heart failure 7/16/2024    COPD (chronic obstructive pulmonary disease)     GERD (gastroesophageal reflux disease)     Hx of Prinzmetal angina     Hypertension     Obesity 6/11/2013    Other and unspecified hyperlipidemia 6/11/2013    Prinzmetal's angina     Sleep apnea 6/11/2013     Past Surgical History:   Procedure Laterality Date    ABDOMINAL SURGERY      CHOLECYSTECTOMY      COLONOSCOPY N/A 12/6/2019    Procedure: COLONOSCOPY;  Surgeon: Winston Smith MD;  Location: [facility];  Service: Endoscopy;  Laterality: N/A;    ESOPHAGOGASTRODUODENOSCOPY N/A 12/10/2018    Procedure: EGD (ESOPHAGOGASTRODUODENOSCOPY);  Surgeon: Raúl Polanco III, MD;  Location: [facility];  Service: Endoscopy;  Laterality: N/A;    ESOPHAGOGASTRODUODENOSCOPY N/A 9/8/2020    Procedure: EGD (ESOPHAGOGASTRODUODENOSCOPY);  Surgeon: Raúl Polanco III, MD;  Location: [facility];  Service: Endoscopy;  Laterality: N/A;    EYE SURGERY      FOOT SURGERY      INTRALUMINAL GASTROINTESTINAL TRACT IMAGING VIA CAPSULE N/A 9/18/2020    Procedure: IMAGING PROCEDURE, GI TRACT, INTRALUMINAL, VIA CAPSULE;  Surgeon: Blade Holley RN;  Location: [facility];  Service: Endoscopy;  Laterality: N/A;    TONSILLECTOMY      TUBAL LIGATION       Family History   Problem Relation Name Age of Onset    Hypertension Mother      Cancer Mother      Kidney cancer Mother      COPD Father      Hypertension Father      Heart disease Sister      Hyperlipidemia Son      Breast cancer Maternal Grandmother      Colon cancer Neg Hx       Social History      Socioeconomic History    Marital status:    Tobacco Use    Smoking status: Former     Current packs/day: 0.00     Average packs/day: 1 pack/day for 48.4 years (48.4 ttl pk-yrs)     Types: Cigarettes     Start date:      Quit date: 2016     Years since quittin.3     Passive exposure: Never    Smokeless tobacco: Never    Tobacco comments:     smoked for 25 yrs. quit 16yrs. then smoked again for 5-6 before quitting in .   Substance and Sexual Activity    Alcohol use: No     Alcohol/week: 1.0 standard drink of alcohol     Types: 1 Standard drinks or equivalent per week    Drug use: No    Sexual activity: Not Currently     Social Drivers of Health     Financial Resource Strain: Low Risk  (2019)    Overall Financial Resource Strain (CARDIA)     Difficulty of Paying Living Expenses: Not hard at all   Food Insecurity: No Food Insecurity (2019)    Hunger Vital Sign     Worried About Running Out of Food in the Last Year: Never true     Ran Out of Food in the Last Year: Never true   Transportation Needs: No Transportation Needs (2019)    PRAPARE - Transportation     Lack of Transportation (Medical): No     Lack of Transportation (Non-Medical): No   Physical Activity: Unknown (2019)    Exercise Vital Sign     Days of Exercise per Week: 0 days   Stress: No Stress Concern Present (2019)    [employer] - Occupational Stress Questionnaire     Feeling of Stress : Only a little     Medication List with Changes/Refills   Current Medications    ALBUTEROL (PROVENTIL HFA) 90 MCG/ACTUATION INHALER    Inhale 2 puffs into the lungs every 6 (six) hours as needed for Wheezing. Rescue    ALBUTEROL (PROVENTIL) 2.5 MG /3 ML (0.083 %) NEBULIZER SOLUTION    Take 3 mLs (2.5 mg total) by nebulization every 4 (four) hours as needed for Wheezing or Shortness of Breath. Rescue    ALBUTEROL (VENTOLIN HFA) 90 MCG/ACTUATION INHALER    Inhale 2 puffs into the lungs every 6  (six) hours as needed for Wheezing or Shortness of Breath. Rescue    ASPIRIN (ECOTRIN) 81 MG EC TABLET    Take 81 mg by mouth once daily.    CELECOXIB (CELEBREX) 200 MG CAPSULE    Take 200 mg by mouth once daily.    CYCLOBENZAPRINE (FLEXERIL) 5 MG TABLET        EZETIMIBE (ZETIA) 10 MG TABLET    TAKE 1 TABLET EVERY DAY    FLUTICASONE PROPIONATE (FLONASE) 50 MCG/ACTUATION NASAL SPRAY    2 sprays (100 mcg total) by Each Nostril route once daily.    FUROSEMIDE (LASIX) 40 MG TABLET    Take 1 tablet (40 mg total) by mouth once daily.    HYDROCODONE-ACETAMINOPHEN (NORCO)  MG PER TABLET    Take 1 tablet by mouth. One tablet twice a day    INHALATION SPACING DEVICE    Use as directed for inhalation.    ISOSORBIDE MONONITRATE (IMDUR) 30 MG 24 HR TABLET    Take 1 tab po in the AM and 1/2 tab po in the PM    LACTULOSE (CHRONULAC) 10 GRAM/15 ML SOLUTION    Take 15 mLs (10 g total) by mouth 2 (two) times daily.    METOPROLOL SUCCINATE (TOPROL-XL) 100 MG 24 HR TABLET        MULTIVITAMIN (MULTIPLE VITAMIN ORAL)    Take 1 tablet by mouth Daily.    NALOXONE (NARCAN) 4 MG/ACTUATION SPRY        NITROGLYCERIN (NITROSTAT) 0.4 MG SL TABLET    DISSOLVE 1 TABLET UNDER THE TONGUE EVERY 5 MINUTES FOR 3 DOSES AS NEEDED FOR CHEST PAIN    PANTOPRAZOLE (PROTONIX) 20 MG TABLET    Take 1 tablet (20 mg total) by mouth 2 (two) times daily before meals.    SOLIFENACIN (VESICARE) 10 MG TABLET    Take 1 tablet (10 mg total) by mouth once daily.    TIOTROPIUM-OLODATEROL (STIOLTO RESPIMAT) 2.5-2.5 MCG/ACTUATION MIST    Inhale 2 puffs into the lungs once daily. Controller    TRIAMCINOLONE ACETONIDE (KENALOG-40) 40 MG/ML INJECTION    40 mg by Other route once daily.    VERAPAMIL (VERELAN) 360 MG C24P    Take 360 mg by mouth once daily.     Review of patient's allergies indicates:   Allergen Reactions    Ace inhibitors      Other reaction(s): Hives    Lisinopril      Other reaction(s): rash    Pravastatin      Other reaction(s): Mental Changes     Rosuvastatin Other (See Comments)     Other reaction(s): Muscle pain    Simvastatin Other (See Comments)     Other reaction(s): leg cramps     Review of Systems   Constitutional: Negative for malaise/fatigue.   HENT:  Negative for hearing loss.    Eyes:  Negative for double vision and visual disturbance.   Cardiovascular:  Positive for chest pain and dyspnea on exertion.   Respiratory:  Positive for shortness of breath, sleep disturbances due to breathing and wheezing.    Endocrine: Negative for cold intolerance.   Hematologic/Lymphatic: Does not bruise/bleed easily.   Skin:  Negative for poor wound healing and suspicious lesions.   Musculoskeletal:  Positive for arthritis, joint pain and joint swelling. Negative for gout.   Gastrointestinal:  Positive for abdominal pain and heartburn. Negative for nausea and vomiting.   Genitourinary:  Positive for bladder incontinence. Negative for dysuria.   Neurological:  Negative for numbness, paresthesias and sensory change.   Psychiatric/Behavioral:  Positive for substance abuse. Negative for depression and memory loss. The patient is not nervous/anxious.    Allergic/Immunologic: Negative for persistent infections.       Objective:   Body mass index is 33.49 kg/m².  There were no vitals filed for this visit.             General    Constitutional: She is oriented to person, place, and time. She appears well-developed and well-nourished. No distress.   HENT:   Head: Normocephalic.   Eyes: EOM are normal.   Pulmonary/Chest: Effort normal.   Neurological: She is oriented to person, place, and time.   Psychiatric: She has a normal mood and affect.         Left Hand/Wrist Exam     Inspection   Scars: Wrist - absent Hand -  absent  Effusion: Wrist - absent Hand -  absent    Pain   Hand - The patient exhibits pain of the middle MCP.    Other     Sensory Exam  Median Distribution: normal  Ulnar Distribution: normal  Radial Distribution: normal    Comments:  There is active triggering  left long finger with a palpable nodule that moves with tendon excursion.          Vascular Exam       Capillary Refill  Left Hand: normal capillary refill       radiographs were not obtained today  Assessment:     Encounter Diagnosis   Name Primary?    Trigger finger, acquired Yes    Left long finger    Plan:       The patient injected left long trigger finger with 0.5 cc of Kenalog and 0.5 cc of 2% plain lidocaine under sterile technique.  She will wait at least 3 months between injections.              Disclaimer: This note was prepared using a voice recognition system and is likely to have sound alike errors within the text.

## 2024-10-22 NOTE — PROGRESS NOTES
[facility]  Pulmonary Rehab  Session Summary    SUMMARY     Session Data  Session Number: 32  Time In: 1000  Time Out: 1100  Weight: 90.5 kg (199 lb 9.6 oz)  Target Heart Rate Zone: Minimum: 89 bpm  Target Heart Rate Zone: Maximum: 119 bpm  Patient Motivation: Excellent  Patient Effort: Excellent      Pre Exercise Vitals  SpO2: 95 %  Supplemental O2?: Yes  O2 Device: nasal cannula  O2 Flow (L/min): 2  Pulse: 78  BP: 120/56  Pramod Dyspnea Rating : 2      During Exercise Vitals  SpO2: 95 %  Supplemental O2?: Yes  O2 Device: nasal cannula  O2 Flow (L/min): 2  Pulse: 84  BP: 148/66  Pramod Dyspnea Rating : 3      Post Exercise Vitals  SpO2: 98 %  Supplemental O2?: Yes  O2 Device: nasal cannula  O2 Flow (L/min): 2  Pulse: 74  BP: 127/60  Pramod Dyspnea Rating : 2       Modality  Modality: Arm Ergometer, Hand Free Weights, Nustep, Recumbent Bike    Arm Ergometer  Time: 12 minutes  Level: 3  Mets: 2.7  Distance: 1.56 Miles      Nustep  Time: 20 minutes  Steps: 1350  Load: 6 (10 mins load 6 then changed to load 5 for 10 mins)  Mets: 2.6      Recumbent Bike  Time: 12 minutes (1.73 miles)  Level: 2  Mets: 2.4      Biceps  lbs: 4 lbs  Sets: 2  Reps: 10  Weight Type : dumbbell      Chest  lbs: 4 lbs  Sets: 2  Reps: 10  Weight Type : dumbbell      Education  Lessons learned with COPD      Therapist Notes   Excellent effort. Pt exercised on load 6 on nustep for 10 mins, then changed to load 5 for 10 mns. She also increased to 12 mins on level 2 on recumbent bike. Pt has met 30 day goals.   Pt tolerated all exercises well. Vitals were stable. Will continue to motivate and educate pt in rehab.      Dr. Akers immediately available as needed.

## 2024-10-22 NOTE — PROCEDURES
Tendon Sheath    Date/Time: 10/22/2024 7:15 AM    Performed by: Alejandro Romero MD  Authorized by: Alejandro Romero MD    Consent Done?:  Yes (Verbal)  Indications:  Pain  Timeout: prior to procedure the correct patient, procedure, and site was verified    Prep: patient was prepped and draped in usual sterile fashion      Local anesthesia used?: Yes    Local anesthetic:  Lidocaine 2% without epinephrine  Anesthetic total (ml):  0.5    Location:  Long finger  Site:  L long flexor tendon sheath  Ultrasonic guidance for needle placement?: No    Needle size:  25 G  Approach:  Volar  Medications:  40 mg triamcinolone acetonide 40 mg/mL

## 2024-10-27 DIAGNOSIS — J30.9 ALLERGIC RHINITIS, UNSPECIFIED SEASONALITY, UNSPECIFIED TRIGGER: ICD-10-CM

## 2024-10-28 RX ORDER — FLUTICASONE PROPIONATE 50 MCG
2 SPRAY, SUSPENSION (ML) NASAL
Qty: 48 G | Refills: 3 | Status: SHIPPED | OUTPATIENT
Start: 2024-10-28

## 2024-10-29 ENCOUNTER — HOSPITAL ENCOUNTER (OUTPATIENT)
Dept: PULMONOLOGY | Facility: HOSPITAL | Age: 69
Discharge: HOME | End: 2024-10-29
Payer: MEDICARE

## 2024-10-29 VITALS — BODY MASS INDEX: 33.2 KG/M2 | WEIGHT: 199.5 LBS

## 2024-10-29 PROCEDURE — G0239 OTH RESP PROC, GROUP: HCPCS | Mod: HCNC

## 2024-10-31 ENCOUNTER — HOSPITAL ENCOUNTER (OUTPATIENT)
Dept: PULMONOLOGY | Facility: HOSPITAL | Age: 69
Discharge: HOME | End: 2024-10-31
Payer: MEDICARE

## 2024-10-31 VITALS — WEIGHT: 198 LBS | BODY MASS INDEX: 32.95 KG/M2

## 2024-10-31 PROCEDURE — G0239 OTH RESP PROC, GROUP: HCPCS | Mod: HCNC

## 2024-11-05 ENCOUNTER — HOSPITAL ENCOUNTER (OUTPATIENT)
Dept: PULMONOLOGY | Facility: HOSPITAL | Age: 69
Discharge: HOME | End: 2024-11-05
Payer: MEDICARE

## 2024-11-05 VITALS — WEIGHT: 196.5 LBS | BODY MASS INDEX: 32.7 KG/M2

## 2024-11-05 PROCEDURE — G0239 OTH RESP PROC, GROUP: HCPCS | Mod: HCNC

## 2024-11-05 NOTE — PROGRESS NOTES
[facility]  Pulmonary Rehab  Session Summary    SUMMARY     Session Data  Session Number: 35  Time In: 1000  Time Out: 1100  Weight: 89.1 kg (196 lb 8 oz)  Target Heart Rate Zone: Minimum: 89 bpm  Target Heart Rate Zone: Maximum: 119 bpm  Patient Motivation: Excellent  Patient Effort: Excellent      Pre Exercise Vitals  SpO2: 96 %  Supplemental O2?: Yes  O2 Device: nasal cannula  O2 Flow (L/min): 2  Pulse: 77  BP: 125/87  Pramod Dyspnea Rating : 1      During Exercise Vitals  SpO2: 97 %  Supplemental O2?: Yes  O2 Device: nasal cannula  O2 Flow (L/min): 2  Pulse: 76  BP: 125/60  Pramod Dyspnea Rating : 1      Post Exercise Vitals  SpO2: 95 %  Supplemental O2?: Yes  O2 Device: nasal cannula  O2 Flow (L/min): 2  Pulse: 71  BP: 107/71  Pramod Dyspnea Rating : 1       Modality  Modality: Arm Ergometer, Hand Free Weights, Nustep, Treadmill      Arm Ergometer  Time: 12 minutes  Level: 3.5  Mets: 3  Distance: 1.68 Miles      Nustep  Time: 20 minutes  Steps: 1358  Load: 6  Mets: 3.1    Treadmill  Time: 12 minutes  MPH: 1.8 MPH  stGstrstastdstest:st st1st Biceps  lbs: 4 lbs  Sets: 2  Reps: 10  Weight Type : dumbbell      Chest  lbs: 4 lbs  Sets: 2  Reps: 10  Weight Type : dumbbell      Education  Cycle of inactivity      Therapist Notes   Excellent effort. Increased to 12 mins on treadmill at 1.8 mph. Also increased to level 3.5 on arm ergometer for 12 mins. Pt tolerated all exercises well. Vitals were stable. Pt will DC from rehab tomorrow.       Dr. Akers immediately available as needed.

## 2024-11-06 ENCOUNTER — HOSPITAL ENCOUNTER (OUTPATIENT)
Dept: PULMONOLOGY | Facility: HOSPITAL | Age: 69
Discharge: HOME | End: 2024-11-06
Payer: MEDICARE

## 2024-11-06 VITALS — BODY MASS INDEX: 32.84 KG/M2 | HEIGHT: 65 IN | WEIGHT: 197.13 LBS

## 2024-11-06 PROCEDURE — G0239 OTH RESP PROC, GROUP: HCPCS | Mod: HCNC

## 2024-11-06 NOTE — PROGRESS NOTES
[facility]  Pulmonary Rehab  Discharge Summary     SUMMARY      Summary of Progress:   Pt presented today for discharge from pulmonary rehab. Pt did very well, increasing strength and endurance as evidenced in the Exercise Summary below. Pt also walked 50 more ft on discharge 6 MWT compared to initial 6 MWT and she scored much higher on discharge SF-36 survey in the physical section. She states she feels much better since coming to rehab, that she has more energy and can do more chores at home without taking so many breaks. Pt states overall she feels her health is better this year compared to last year. Pt was informed of our Maintenance program, but she plans to continue to exercise at [employer] with her  to maintain the accomplishments she has gained. Pt was encouraged to contact us if she is ever interested in the Maintenance program.      Total Visits: 36    Total Compliance: 100%     Questionnaire Score:               Pulmonary Knowledge Test: 100     SF36 Physical Functionin      SF36 Mental Health: 50      Physical Health Summary    Your Score: 48  Your current score indicates that you are the same or better than the general population of similar age and gender.  Compared to this population your health is:    Same or Better than the average in:  Overall physical functioning  Performance at work, home, or school due to physical problems  Ability to participate in activities due to bodily pain  General health    Mental Health Summary    Your Score: 50  Your current score indicates that you are the same or better than the general population of similar age and gender.  Compared to this population your health is:    Well Below the average in:  Emotional well-being  Same or Better than the average in:  Level of energy  Ability to participate in social activities  Performance at work, home, or school due to emotional problems  Progress    Physical Health Progress     Date Score   "  Current: Nov 06, 2024 48 Your score is better than your previous survey   Previous: Jun 21, 2024 24    Mental Health Progress     Date Score    Current: Nov 06, 2024 50 Your score is worse than your previous survey   Previous: Jun 21, 2024 65         Self Evaluated Transition   Compared to one year ago, you rated your health in general as: Much better          Exercise Summary:    Nustep Date:  7/2/24   Time Load Steps Date:  10/29/24 Time Load Steps     20 3 1072  20 6 1370   Recumbent Bike Date:  7/2/24  (0.29 miles)   Time Level Date:  10/22/24  (1.73 miles) Time Level     5 1  12 2   Treadmill Date:  7/9/24   Time Speed Grade Date:  11/5/24 Time Speed Grade     5 0.9 0  12 1.8 0   Arm Ergometer Date:  7/2/24   Time Level Date:  11/5/24  (1.68 miles) Time Level     10 1  12 3.5   Free Weights Date:  7/2/24  (Dumb)   Bicep Curls  Chest Press  Triceps  Legs lbs Set Rep Date:  11/5/24  (Dumb) Bicep Curls  Chest Press  Triceps  Legs lbs Set Reps      2 2 10   4 2 10               Six Minute Walk:     Height: 5' 5" (165.1 cm)      Weight: 89.4 kg (197 lb 1.6 oz)     Performing RT: Julia LENNOX Tapia              Phase Oxygen Assessment Supplemental O2 Heart   Rate Blood Pressure Pramod Dyspnea Scale Rating   Resting 98 % 2 L/M 64 bpm 138/62 1   Exercise        Minute        1 98 % 2 L/M 77 bpm     2 96 % 2 L/M 83 bpm     3 94 % 2 L/M 83 bpm     4 93 % 2 L/M 84 bpm     5 95 % 2 L/M 87 bpm     6  96 % 2 L/M 86 bpm 135/59 3   Recovery        Minute        1 97 % 2 L/M 75 bpm     2 99 % 2 L/M 70 bpm     3 99 % 2 L/M 69 bpm     4 100 % 2 L/M 66 bpm 146/65 1        Six Minute Walk Summary        Total Time Walked (Calculated): 360 seconds  Total Distance Meters (Calculated): 304.8 meters  Predicted Distance Meters (Calculated): 400.25 meters  Percentage of Predicted (Calculated): 76.15  Peak VO2 (Calculated): 13.12  Mets: 3.75  Symptoms: dyspnea, hip pain           Exercise Results Summary:     Weight:  Initial: " 201.5lbs  Mid: 199.5 lbs  Discharge: 197.1 lbs    Oxygen Use: NC 2lpm     Dyspnea: 0-4 on Pramod dyspnea scale     Goals:   met     I certify this patient is ready to discharge from pulmonary rehabilitation.

## 2024-11-07 ENCOUNTER — TELEPHONE (OUTPATIENT)
Dept: PULMONOLOGY | Facility: CLINIC | Age: 69
End: 2024-11-07
Payer: MEDICARE

## 2024-11-07 NOTE — TELEPHONE ENCOUNTER
Chronic Disease Management  [employer] inhaler program ending. Patient inquired if there were other insurance companies offering the service. Not aware of any.    Time  spent with patient:  Minutes

## 2024-11-11 ENCOUNTER — PATIENT MESSAGE (OUTPATIENT)
Dept: PULMONOLOGY | Facility: CLINIC | Age: 69
End: 2024-11-11

## 2024-11-11 ENCOUNTER — OFFICE VISIT (OUTPATIENT)
Dept: PULMONOLOGY | Facility: CLINIC | Age: 69
End: 2024-11-11
Payer: MEDICARE

## 2024-11-11 VITALS
BODY MASS INDEX: 33.21 KG/M2 | HEIGHT: 65 IN | SYSTOLIC BLOOD PRESSURE: 118 MMHG | HEART RATE: 69 BPM | OXYGEN SATURATION: 97 % | WEIGHT: 199.31 LBS | RESPIRATION RATE: 17 BRPM | DIASTOLIC BLOOD PRESSURE: 66 MMHG

## 2024-11-11 DIAGNOSIS — R09.02 EXERCISE HYPOXEMIA: ICD-10-CM

## 2024-11-11 DIAGNOSIS — J43.2 CENTRILOBULAR EMPHYSEMA: ICD-10-CM

## 2024-11-11 DIAGNOSIS — G47.33 OSA ON CPAP: ICD-10-CM

## 2024-11-11 DIAGNOSIS — F17.211 CIGARETTE NICOTINE DEPENDENCE IN REMISSION: ICD-10-CM

## 2024-11-11 DIAGNOSIS — R91.8 MULTIPLE LUNG NODULES: Primary | ICD-10-CM

## 2024-11-11 PROCEDURE — 3074F SYST BP LT 130 MM HG: CPT | Mod: HCNC,CPTII,S$GLB, | Performed by: HOSPITALIST

## 2024-11-11 PROCEDURE — 1160F RVW MEDS BY RX/DR IN RCRD: CPT | Mod: HCNC,CPTII,S$GLB, | Performed by: HOSPITALIST

## 2024-11-11 PROCEDURE — 3288F FALL RISK ASSESSMENT DOCD: CPT | Mod: HCNC,CPTII,S$GLB, | Performed by: HOSPITALIST

## 2024-11-11 PROCEDURE — 1159F MED LIST DOCD IN RCRD: CPT | Mod: HCNC,CPTII,S$GLB, | Performed by: HOSPITALIST

## 2024-11-11 PROCEDURE — 3008F BODY MASS INDEX DOCD: CPT | Mod: HCNC,CPTII,S$GLB, | Performed by: HOSPITALIST

## 2024-11-11 PROCEDURE — 99999 PR PBB SHADOW E&M-EST. PATIENT-LVL V: CPT | Mod: PBBFAC,HCNC,, | Performed by: HOSPITALIST

## 2024-11-11 PROCEDURE — 3078F DIAST BP <80 MM HG: CPT | Mod: HCNC,CPTII,S$GLB, | Performed by: HOSPITALIST

## 2024-11-11 PROCEDURE — 1101F PT FALLS ASSESS-DOCD LE1/YR: CPT | Mod: HCNC,CPTII,S$GLB, | Performed by: HOSPITALIST

## 2024-11-11 PROCEDURE — 1126F AMNT PAIN NOTED NONE PRSNT: CPT | Mod: HCNC,CPTII,S$GLB, | Performed by: HOSPITALIST

## 2024-11-11 PROCEDURE — 99213 OFFICE O/P EST LOW 20 MIN: CPT | Mod: HCNC,S$GLB,, | Performed by: HOSPITALIST

## 2024-11-11 NOTE — ASSESSMENT & PLAN NOTE
- compliant, benefits from use  - epworth 1 today  - residual AHI 0.8   - would like to turn smart start on

## 2024-11-11 NOTE — PROGRESS NOTES
Subjective:      Patient ID: Ro Hernandez is a 69 y.o. female.    Chief Complaint: COPD, Sleep apnea    HPI 11/11/24:    69 year old female with history of HLD, HTN, JAQUELINE, hx tobacco use, chf, exercise hypoxemia sleep apnea who presents to Pulmonary clinic for follow up. she was last seen 7/2024 by KALIN Rich- continued on aPAP with O2 bled, encouraged to take lasix as directed by ED severl days prior.     Wears cpap every night and benefits from use. Breathing has been doing well. Now on daily lasix and has benefited from this.     Pertinent Work Up:  - CTA Chest 7/2024- Bilateral pleural effusions, ground glass opacities throughout lungs, most consistent with CHF. Stable LLL nodule to my measurement  - PFT 6/2024- Roger with possible restriction, normal TLC, decreased diffusion  - 6MW 5/2024- Resting sat 95%, 2L applied 2nd minute, 335m, 84% predicted, Pramod 2-8  - Overnight ox 5/2024- 7 hours with SpO2 <89%  - Titration 5/2024- CPAP 9cmH2O with 2L O2  - PSG 2011- AHI 10.4    Pulmonary Interventions:  - Albuterol hfa- has used once in the past month  - Albuterol neb- hasn't needed in the past 4 months  - Stiolto- 2 puffs in the afternoon    Review of Systems   Respiratory:  Negative for cough, sputum production, wheezing and somnolence.      Objective:     Physical Exam   Constitutional: She is oriented to person, place, and time. She appears well-developed and well-nourished. She is obese.   Cardiovascular: Normal rate and regular rhythm.   3/6 systolic murmur   Pulmonary/Chest: Normal expansion, effort normal and breath sounds normal.   Neurological: She is alert and oriented to person, place, and time.   Skin: Skin is warm and dry.     Personal Diagnostic Review  As Above      11/6/2024    12:54 PM 11/6/2024    12:49 PM 11/5/2024     4:08 PM 10/31/2024    11:41 AM 10/29/2024     4:06 PM 10/22/2024    11:29 AM 10/22/2024     7:22 AM   Pulmonary Function Tests   Ordering Provider  Dr. Akers        Performing  "nurse/tech/RT  Alvaro, RRT        Diagnosis  --        Height  5' 5" (1.651 m)     5' 5" (1.651 m)   Weight  89.4 kg (197 lb 1.6 oz) 89.1 kg (196 lb 8 oz) 89.8 kg (198 lb) 90.5 kg (199 lb 8 oz) 90.5 kg (199 lb 9.6 oz) 91.3 kg (201 lb 4.5 oz)   BMI (Calculated)  32.8    33.2 33.5   6MWT Status  completed with stops        Patient Reported  Dyspnea;Other (Comment)        Was O2 used?  Yes        Delivery Method  Cannula        6MW Distance walked (feet) 1000 feet 1000 feet        Distance walked (meters) 304.8 meters 304.8 meters        Did patient stop?  No        Type of assistive device(s) used?  no assistive devices;supplemental oxygen        Oxygen Saturation  98 %        Supplemental Oxygen  2 L/M        Heart Rate  64 bpm        Blood Pressure  138/62        Pramod Dyspnea Rating   very light very light very light very light light    Oxygen Saturation  96 %        Supplemental Oxygen  2 L/M        Heart Rate  86 bpm        Blood Pressure  135/59        Pramod Dyspnea Rating   moderate very light light very light light    Recovery Time (seconds)  240 seconds        Oxygen Saturation  100 %        Supplemental Oxygen  2 L/M        Heart Rate  66 bpm        Blood Pressure  146/65        Pramod Dyspnea Rating   very light        Is procedure ready for interpretation?  Yes             Assessment:     No diagnosis found.     Outpatient Encounter Medications as of 11/11/2024   Medication Sig Dispense Refill    albuterol (PROVENTIL HFA) 90 mcg/actuation inhaler Inhale 2 puffs into the lungs every 6 (six) hours as needed for Wheezing. Rescue 8 g 0    albuterol (PROVENTIL) 2.5 mg /3 mL (0.083 %) nebulizer solution Take 3 mLs (2.5 mg total) by nebulization every 4 (four) hours as needed for Wheezing or Shortness of Breath. Rescue 360 mL 11    albuterol (VENTOLIN HFA) 90 mcg/actuation inhaler Inhale 2 puffs into the lungs every 6 (six) hours as needed for Wheezing or Shortness of Breath. Rescue 18 g 2    aspirin (ECOTRIN) 81 " MG EC tablet Take 81 mg by mouth once daily.      celecoxib (CELEBREX) 200 MG capsule Take 200 mg by mouth once daily.      cyclobenzaprine (FLEXERIL) 5 MG tablet       ezetimibe (ZETIA) 10 mg tablet TAKE 1 TABLET EVERY DAY 90 tablet 2    fluticasone propionate (FLONASE) 50 mcg/actuation nasal spray USE 2 SPRAYS IN EACH NOSTRIL ONE TIME DAILY 48 g 3    furosemide (LASIX) 40 MG tablet Take 1 tablet (40 mg total) by mouth once daily. 90 tablet 3    HYDROcodone-acetaminophen (NORCO)  mg per tablet Take 1 tablet by mouth. One tablet twice a day      inhalation spacing device Use as directed for inhalation. 1 each 0    isosorbide mononitrate (IMDUR) 30 MG 24 hr tablet Take 1 tab po in the AM and 1/2 tab po in the  tablet 1    lactulose (CHRONULAC) 10 gram/15 mL solution Take 15 mLs (10 g total) by mouth 2 (two) times daily. 1892 mL 11    metoprolol succinate (TOPROL-XL) 100 MG 24 hr tablet       MULTIVITAMIN (MULTIPLE VITAMIN ORAL) Take 1 tablet by mouth Daily.      naloxone (NARCAN) 4 mg/actuation Spry       nitroGLYCERIN (NITROSTAT) 0.4 MG SL tablet DISSOLVE 1 TABLET UNDER THE TONGUE EVERY 5 MINUTES FOR 3 DOSES AS NEEDED FOR CHEST PAIN 25 tablet 0    pantoprazole (PROTONIX) 20 MG tablet Take 1 tablet (20 mg total) by mouth 2 (two) times daily before meals. 180 tablet 3    solifenacin (VESICARE) 10 MG tablet Take 1 tablet (10 mg total) by mouth once daily. 90 tablet 3    tiotropium-olodateroL (STIOLTO RESPIMAT) 2.5-2.5 mcg/actuation Mist Inhale 2 puffs into the lungs once daily. Controller 12 g 3    triamcinolone acetonide (KENALOG-40) 40 mg/mL injection 40 mg by Other route once daily.      verapamiL (VERELAN) 360 MG C24P Take 360 mg by mouth once daily.      [DISCONTINUED] fluticasone propionate (FLONASE) 50 mcg/actuation nasal spray 2 sprays (100 mcg total) by Each Nostril route once daily. 48 g 3     No facility-administered encounter medications on file as of 11/11/2024.     No orders of the defined  types were placed in this encounter.        Plan:     Problem List Items Addressed This Visit       HERB on CPAP (Chronic)     - compliant, benefits from use  - epworth 1 today  - residual AHI 0.8   - would like to turn smart start on         Relevant Orders    CPAP/BIPAP SUPPLIES    Multiple lung nodules - Primary     - largest is LLL 7.7mm  - stable 5/2024 to 7/2024  - continue screening         Emphysema lung     - continue stiolto  - not needing albuterol often         Cigarette nicotine dependence in remission    Relevant Orders    CT Chest Lung Screening Low Dose    Exercise hypoxemia     - benefits from use  - completed pulmonary rehab          Follow up in May after next LDCt.

## 2024-11-19 ENCOUNTER — OFFICE VISIT (OUTPATIENT)
Dept: URGENT CARE | Facility: CLINIC | Age: 69
End: 2024-11-19
Payer: MEDICARE

## 2024-11-19 VITALS
BODY MASS INDEX: 32.49 KG/M2 | RESPIRATION RATE: 18 BRPM | TEMPERATURE: 98 F | HEART RATE: 66 BPM | HEIGHT: 65 IN | DIASTOLIC BLOOD PRESSURE: 56 MMHG | OXYGEN SATURATION: 95 % | WEIGHT: 195 LBS | SYSTOLIC BLOOD PRESSURE: 121 MMHG

## 2024-11-19 DIAGNOSIS — K11.9 SALIVARY GLAND DISORDER: ICD-10-CM

## 2024-11-19 DIAGNOSIS — R22.0 SWELLING OF LEFT SIDE OF FACE: Primary | ICD-10-CM

## 2024-11-19 PROCEDURE — 99213 OFFICE O/P EST LOW 20 MIN: CPT | Mod: S$GLB,,, | Performed by: FAMILY MEDICINE

## 2024-11-19 RX ORDER — AMOXICILLIN AND CLAVULANATE POTASSIUM 875; 125 MG/1; MG/1
1 TABLET, FILM COATED ORAL EVERY 12 HOURS
Qty: 14 TABLET | Refills: 0 | Status: SHIPPED | OUTPATIENT
Start: 2024-11-19

## 2024-11-19 NOTE — PATIENT INSTRUCTIONS
Thank you for allowing our team to take care of you today.  The diagnosis is swelling left side of face most likely from salivary gland disorder.  Attached is information about the diagnosis and recommendations for treatment.  Adding antibiotic Augmentin one pill twice a day for seven days.  Continue your regular medication.  An order for an ultrasound of the neck is being placed in the system as well. If symptoms continue/don't improve, this can be done at one of [facility] radiology facilities. Call prior to going.  Continue your regular followup with your doctors.  If any worsening, get an immediate medical provider evaluation.

## 2024-11-19 NOTE — PROGRESS NOTES
"Subjective:      Patient ID: Ro Hernandez is a 69 y.o. female.    Vitals:  height is 5' 5" (1.651 m) and weight is 88.5 kg (195 lb). Her temperature is 97.9 °F (36.6 °C). Her blood pressure is 121/56 (abnormal) and her pulse is 66. Her respiration is 18 and oxygen saturation is 95%.     Chief Complaint: Facial Swelling    68 yo female with swelling on the left side of her face and neck since this morning that has progressively gotten worse.  Started quickly. No injury. Pt states she noticed it hurt when she swallowed food this morning and the pain and swelling has gotten worse as the day has gone on.  Pt states her teeth feel fine.  Feels normal otherwise. Took one of her rx celebrex. This hasn't happened before.     Edema  This is a new problem. The current episode started today. The problem has been gradually worsening. Associated symptoms include neck pain. Pertinent negatives include no abdominal pain, anorexia, arthralgias, change in bowel habit, chest pain, chills, congestion, coughing, diaphoresis, fatigue, fever, headaches, joint swelling, myalgias, nausea, numbness, rash, sore throat, swollen glands, urinary symptoms, vertigo, visual change, vomiting or weakness.       Constitution: Negative for chills, sweating, fatigue and fever.   HENT:  Positive for facial swelling. Negative for ear pain, congestion, sore throat and trouble swallowing.    Neck: Positive for neck pain and neck swelling. Negative for neck stiffness.   Cardiovascular:  Negative for chest pain.   Eyes: Negative.    Respiratory: Negative.  Negative for cough.    Gastrointestinal:  Negative for abdominal pain, nausea and vomiting.   Musculoskeletal:  Negative for joint pain, joint swelling and muscle ache.   Skin:  Negative for rash.   Neurological:  Negative for history of vertigo, headaches and numbness.      Objective:     Physical Exam   Constitutional: She is oriented to person, place, and time.   HENT:   Head: Normocephalic.   Ears: "   Right Ear: Tympanic membrane, external ear and ear canal normal.   Left Ear: Tympanic membrane, external ear and ear canal normal.   Nose: Nose normal.   Mouth/Throat: Mucous membranes are moist. No oropharyngeal exudate or posterior oropharyngeal erythema. Oropharynx is clear.   Eyes: Conjunctivae are normal. Pupils are equal, round, and reactive to light.   Neck: Neck supple.      Comments: Left neck with local swelling along the posterior jaw line and just below the left ear. No warmth or fluctuance. No discoloration. No pustules or blisters. Appropriate tenderness. ROM jaw normal. Local moveable anterior cervical lymph nodes. No spinal tenderness.  No neck rigidity present.   Cardiovascular: Normal rate, regular rhythm, normal heart sounds and normal pulses.   Pulmonary/Chest: Effort normal.   Abdominal: Normal appearance.   Neurological: no focal deficit. She is alert and oriented to person, place, and time.   Skin: Skin is no rash.         Comments: Appropriate turgor. Normal perfusion.    Psychiatric: Her behavior is normal. Mood, judgment and thought content normal.   Nursing note and vitals reviewed.      Assessment:     1. Swelling of left side of face    2. Salivary gland disorder        Plan:       Swelling of left side of face  -     US Soft Tissue Head Neck; Future; Expected date: 11/19/2024    Salivary gland disorder  -     US Soft Tissue Head Neck; Future; Expected date: 11/19/2024    Other orders  -     amoxicillin-clavulanate 875-125mg (AUGMENTIN) 875-125 mg per tablet; Take 1 tablet by mouth every 12 (twelve) hours.  Dispense: 14 tablet; Refill: 0      Review of patient's allergies indicates:   Allergen Reactions    Ace inhibitors      Other reaction(s): Hives    Lisinopril      Other reaction(s): rash    Pravastatin      Other reaction(s): Mental Changes    Rosuvastatin Other (See Comments)     Other reaction(s): Muscle pain    Simvastatin Other (See Comments)     Other reaction(s): leg cramps        SUMMARY: See hpi. Clinically salivary gland source. Discussed with patient. Handout given. Adding above. Order for ultrasound placed which she can obtain. Immediate medical provider evaluation if worsening or new symptoms. Followup here as needed.     Patient Instructions   Thank you for allowing our team to take care of you today.  The diagnosis is swelling left side of face most likely from salivary gland disorder.  Attached is information about the diagnosis and recommendations for treatment.  Adding antibiotic Augmentin one pill twice a day for seven days.  Continue your regular medication.  An order for an ultrasound of the neck is being placed in the system as well. If symptoms continue/don't improve, this can be done at one of [facility] radiology facilities. Call prior to going.  Continue your regular followup with your doctors.  If any worsening, get an immediate medical provider evaluation.

## 2024-11-20 ENCOUNTER — OFFICE VISIT (OUTPATIENT)
Dept: CARDIOLOGY | Facility: CLINIC | Age: 69
End: 2024-11-20
Payer: MEDICARE

## 2024-11-20 VITALS
HEIGHT: 65 IN | SYSTOLIC BLOOD PRESSURE: 112 MMHG | OXYGEN SATURATION: 95 % | HEART RATE: 72 BPM | WEIGHT: 196.88 LBS | BODY MASS INDEX: 32.8 KG/M2 | DIASTOLIC BLOOD PRESSURE: 60 MMHG

## 2024-11-20 DIAGNOSIS — R06.02 SOB (SHORTNESS OF BREATH): ICD-10-CM

## 2024-11-20 DIAGNOSIS — E78.2 MIXED HYPERLIPIDEMIA: ICD-10-CM

## 2024-11-20 DIAGNOSIS — I35.0 NONRHEUMATIC AORTIC (VALVE) STENOSIS: Primary | ICD-10-CM

## 2024-11-20 DIAGNOSIS — I50.9 CONGESTIVE HEART FAILURE, UNSPECIFIED HF CHRONICITY, UNSPECIFIED HEART FAILURE TYPE: ICD-10-CM

## 2024-11-20 DIAGNOSIS — J43.2 CENTRILOBULAR EMPHYSEMA: ICD-10-CM

## 2024-11-20 DIAGNOSIS — I42.1 OBSTRUCTIVE HYPERTROPHIC CARDIOMYOPATHY: ICD-10-CM

## 2024-11-20 DIAGNOSIS — I10 ESSENTIAL HYPERTENSION: ICD-10-CM

## 2024-11-20 DIAGNOSIS — I50.32 CHRONIC DIASTOLIC CONGESTIVE HEART FAILURE: ICD-10-CM

## 2024-11-20 DIAGNOSIS — I50.9 CHRONIC CONGESTIVE HEART FAILURE, UNSPECIFIED HEART FAILURE TYPE: ICD-10-CM

## 2024-11-20 DIAGNOSIS — I35.0 NONRHEUMATIC AORTIC VALVE STENOSIS: Primary | ICD-10-CM

## 2024-11-20 DIAGNOSIS — I20.1 PRINZMETAL'S ANGINA: ICD-10-CM

## 2024-11-20 DIAGNOSIS — I70.0 ATHEROSCLEROSIS OF AORTA: ICD-10-CM

## 2024-11-20 PROCEDURE — 3074F SYST BP LT 130 MM HG: CPT | Mod: HCNC,CPTII,S$GLB, | Performed by: INTERNAL MEDICINE

## 2024-11-20 PROCEDURE — 3008F BODY MASS INDEX DOCD: CPT | Mod: HCNC,CPTII,S$GLB, | Performed by: INTERNAL MEDICINE

## 2024-11-20 PROCEDURE — 1160F RVW MEDS BY RX/DR IN RCRD: CPT | Mod: HCNC,CPTII,S$GLB, | Performed by: INTERNAL MEDICINE

## 2024-11-20 PROCEDURE — 1159F MED LIST DOCD IN RCRD: CPT | Mod: HCNC,CPTII,S$GLB, | Performed by: INTERNAL MEDICINE

## 2024-11-20 PROCEDURE — 1125F AMNT PAIN NOTED PAIN PRSNT: CPT | Mod: HCNC,CPTII,S$GLB, | Performed by: INTERNAL MEDICINE

## 2024-11-20 PROCEDURE — 99999 PR PBB SHADOW E&M-EST. PATIENT-LVL V: CPT | Mod: PBBFAC,HCNC,, | Performed by: INTERNAL MEDICINE

## 2024-11-20 PROCEDURE — 99215 OFFICE O/P EST HI 40 MIN: CPT | Mod: HCNC,S$GLB,, | Performed by: INTERNAL MEDICINE

## 2024-11-20 PROCEDURE — 3078F DIAST BP <80 MM HG: CPT | Mod: HCNC,CPTII,S$GLB, | Performed by: INTERNAL MEDICINE

## 2024-11-20 NOTE — H&P (VIEW-ONLY)
Subjective:   Patient ID:  Ro Hernandez is a 69 y.o. female who presents for follow up of Shortness of Breath (Onset:2024)      70 yo female, 3 m f/u  PMH severe AS, HOCM, HTN, GERD, and COPD home O2 for 6 weeks. No h/o MI DM CVA  Prior cardiologist Dr. Quiles. Declined new rx for HOCM  F/u pulm service for home O2  H/o cath nml in   H/o cardiac MRI done before    Recent ER visit in  for CHFpEF. 's and improved after Lasix added  EKG NSR  No faint syncope palpitation  Occasional chest pain NTG helps  Father  of massive heart attack at 53.  Sister  at 73 due to renal   Mother  at 81      Interval history  Finished pulm rehab. On breathing Rx  Echo showed EF nml. Severe AS, Aortic valve area by VTI is 0.91 cm². Aortic valve peak velocity is 5.64 m/s. Mean gradient is 76 mmHg. The dimensionless index is 0.33. There is mild aortic regurgitation. Also severe MAC  Occasional CP at exertion , NTG SL works  No dizziness faint leg swelling orthopnea       Past Medical History:   Diagnosis Date    Allergy     Angina     Arthritis 2013    Asthma     Congestive heart failure 2024    COPD (chronic obstructive pulmonary disease)     GERD (gastroesophageal reflux disease)     Hx of Prinzmetal angina     Hypertension     Obesity 2013    Other and unspecified hyperlipidemia 2013    Prinzmetal's angina     Sleep apnea 2013       Past Surgical History:   Procedure Laterality Date    ABDOMINAL SURGERY      CHOLECYSTECTOMY      COLONOSCOPY N/A 2019    Procedure: COLONOSCOPY;  Surgeon: Winston Smith MD;  Location: [facility] ENDO;  Service: Endoscopy;  Laterality: N/A;    ESOPHAGOGASTRODUODENOSCOPY N/A 12/10/2018    Procedure: EGD (ESOPHAGOGASTRODUODENOSCOPY);  Surgeon: Raúl Polanco III, MD;  Location: [facility] ENDO;  Service: Endoscopy;  Laterality: N/A;    ESOPHAGOGASTRODUODENOSCOPY N/A 2020    Procedure: EGD (ESOPHAGOGASTRODUODENOSCOPY);  Surgeon: Raúl Polanco  MD SAMUEL;  Location: [facility] ENDO;  Service: Endoscopy;  Laterality: N/A;    EYE SURGERY      FOOT SURGERY      INTRALUMINAL GASTROINTESTINAL TRACT IMAGING VIA CAPSULE N/A 2020    Procedure: IMAGING PROCEDURE, GI TRACT, INTRALUMINAL, VIA CAPSULE;  Surgeon: Blade Holley RN;  Location: [facility] ENDO;  Service: Endoscopy;  Laterality: N/A;    TONSILLECTOMY      TUBAL LIGATION         Social History     Tobacco Use    Smoking status: Former     Current packs/day: 0.00     Average packs/day: 1 pack/day for 48.4 years (48.4 ttl pk-yrs)     Types: Cigarettes     Start date:      Quit date: 2016     Years since quittin.4     Passive exposure: Never    Smokeless tobacco: Never    Tobacco comments:     smoked for 25 yrs. quit 16yrs. then smoked again for 5-6 before quitting in 2016.   Substance Use Topics    Alcohol use: No     Alcohol/week: 1.0 standard drink of alcohol     Types: 1 Standard drinks or equivalent per week    Drug use: No       Family History   Problem Relation Name Age of Onset    Hypertension Mother      Cancer Mother      Kidney cancer Mother      COPD Father      Hypertension Father      Heart disease Sister      Hyperlipidemia Son      Breast cancer Maternal Grandmother      Colon cancer Neg Hx           ROS    Objective:   Physical Exam  HENT:      Head: Normocephalic.   Eyes:      Pupils: Pupils are equal, round, and reactive to light.   Neck:      Thyroid: No thyromegaly.      Vascular: Normal carotid pulses. No carotid bruit or JVD.   Cardiovascular:      Rate and Rhythm: Normal rate and regular rhythm. No extrasystoles are present.     Chest Wall: PMI is not displaced.      Pulses: Normal pulses.      Heart sounds: Murmur heard.      Harsh midsystolic murmur is present with a grade of 2/6 at the upper right sternal border radiating to the neck.      No gallop. No S3 sounds.   Pulmonary:      Effort: No respiratory distress.      Breath sounds: Normal breath sounds. No stridor.   Abdominal:       General: Bowel sounds are normal.      Palpations: Abdomen is soft.      Tenderness: There is no abdominal tenderness. There is no rebound.   Musculoskeletal:         General: Normal range of motion.   Skin:     Findings: No rash.   Neurological:      Mental Status: She is alert and oriented to person, place, and time.   Psychiatric:         Behavior: Behavior normal.         Lab Results   Component Value Date    CHOL 178 04/02/2024    CHOL 198 03/02/2023    CHOL 217 (H) 11/19/2021     Lab Results   Component Value Date    HDL 40 04/02/2024    HDL 39 (L) 03/02/2023    HDL 49 11/19/2021     Lab Results   Component Value Date    LDLCALC 114.6 04/02/2024    LDLCALC 133.0 03/02/2023    LDLCALC 133.2 11/19/2021     Lab Results   Component Value Date    TRIG 117 04/02/2024    TRIG 130 03/02/2023    TRIG 174 (H) 11/19/2021     Lab Results   Component Value Date    CHOLHDL 22.5 04/02/2024    CHOLHDL 19.7 (L) 03/02/2023    CHOLHDL 22.6 11/19/2021       Chemistry        Component Value Date/Time     07/31/2024 0946    K 4.6 07/31/2024 0946     07/31/2024 0946    CO2 29 07/31/2024 0946    BUN 25 (H) 07/31/2024 0946    CREATININE 1.0 07/31/2024 0946    GLU 85 07/31/2024 0946        Component Value Date/Time    CALCIUM 9.3 07/31/2024 0946    ALKPHOS 79 07/13/2024 1234    AST 19 07/13/2024 1234    ALT 12 07/13/2024 1234    BILITOT 0.8 07/13/2024 1234    ESTGFRAFRICA >60.0 11/19/2021 0908    EGFRNONAA >60.0 11/19/2021 0908          Lab Results   Component Value Date    HGBA1C 5.0 03/02/2023     Lab Results   Component Value Date    TSH 0.791 04/02/2024     Lab Results   Component Value Date    INR 1.0 01/02/2007     Lab Results   Component Value Date    WBC 6.14 07/13/2024    HGB 12.2 07/13/2024    HCT 36.2 (L) 07/13/2024    MCV 90 07/13/2024     07/13/2024     BMP  Sodium   Date Value Ref Range Status   07/31/2024 142 136 - 145 mmol/L Final     Potassium   Date Value Ref Range Status   07/31/2024 4.6 3.5 -  5.1 mmol/L Final     Chloride   Date Value Ref Range Status   07/31/2024 103 95 - 110 mmol/L Final     CO2   Date Value Ref Range Status   07/31/2024 29 23 - 29 mmol/L Final     BUN   Date Value Ref Range Status   07/31/2024 25 (H) 8 - 23 mg/dL Final     Creatinine   Date Value Ref Range Status   07/31/2024 1.0 0.5 - 1.4 mg/dL Final     Calcium   Date Value Ref Range Status   07/31/2024 9.3 8.7 - 10.5 mg/dL Final     Anion Gap   Date Value Ref Range Status   07/31/2024 10 8 - 16 mmol/L Final     eGFR if    Date Value Ref Range Status   11/19/2021 >60.0 >60 mL/min/1.73 m^2 Final     eGFR if non    Date Value Ref Range Status   11/19/2021 >60.0 >60 mL/min/1.73 m^2 Final     Comment:     Calculation used to obtain the estimated glomerular filtration  rate (eGFR) is the CKD-EPI equation.        BNP  @LABRCNTIP(BNP,BNPTRIAGEBLO)@  @LABRCNTIP(troponini)@  CrCl cannot be calculated (Patient's most recent lab result is older than the maximum 7 days allowed.).  No results found in the last 24 hours.  No results found in the last 24 hours.  No results found in the last 24 hours.    Assessment:      1. Nonrheumatic aortic valve stenosis    2. Centrilobular emphysema    3. Atherosclerosis of aorta    4. Chronic diastolic congestive heart failure    5. Mixed hyperlipidemia    6. Obstructive hypertrophic cardiomyopathy    7. Prinzmetal's angina    8. Unspecified essential hypertension      Severe AS  Symptomatic    Plan:   Arrange LHC and RHC   I have explained the risks, benefits, and alternatives of the procedure in detail with patient and family. The patient voices understanding and all questions have been addressed.  The patient agrees to proceed as planned.    Refer to valve structure clinic for TAVR eval  Continue asa zetia lasix imdur torpoXL verapamil  Rtc post cath

## 2024-11-20 NOTE — PROGRESS NOTES
Subjective:   Patient ID:  Ro Hernandez is a 69 y.o. female who presents for follow up of Shortness of Breath (Onset:2024)      70 yo female, 3 m f/u  PMH severe AS, HOCM, HTN, GERD, and COPD home O2 for 6 weeks. No h/o MI DM CVA  Prior cardiologist Dr. Quiles. Declined new rx for HOCM  F/u pulm service for home O2  H/o cath nml in   H/o cardiac MRI done before    Recent ER visit in  for CHFpEF. 's and improved after Lasix added  EKG NSR  No faint syncope palpitation  Occasional chest pain NTG helps  Father  of massive heart attack at 53.  Sister  at 73 due to renal   Mother  at 81      Interval history  Finished pulm rehab. On breathing Rx  Echo showed EF nml. Severe AS, Aortic valve area by VTI is 0.91 cm². Aortic valve peak velocity is 5.64 m/s. Mean gradient is 76 mmHg. The dimensionless index is 0.33. There is mild aortic regurgitation. Also severe MAC  Occasional CP at exertion , NTG SL works  No dizziness faint leg swelling orthopnea       Past Medical History:   Diagnosis Date    Allergy     Angina     Arthritis 2013    Asthma     Congestive heart failure 2024    COPD (chronic obstructive pulmonary disease)     GERD (gastroesophageal reflux disease)     Hx of Prinzmetal angina     Hypertension     Obesity 2013    Other and unspecified hyperlipidemia 2013    Prinzmetal's angina     Sleep apnea 2013       Past Surgical History:   Procedure Laterality Date    ABDOMINAL SURGERY      CHOLECYSTECTOMY      COLONOSCOPY N/A 2019    Procedure: COLONOSCOPY;  Surgeon: Winston Smith MD;  Location: [facility] ENDO;  Service: Endoscopy;  Laterality: N/A;    ESOPHAGOGASTRODUODENOSCOPY N/A 12/10/2018    Procedure: EGD (ESOPHAGOGASTRODUODENOSCOPY);  Surgeon: Raúl Polanco III, MD;  Location: [facility] ENDO;  Service: Endoscopy;  Laterality: N/A;    ESOPHAGOGASTRODUODENOSCOPY N/A 2020    Procedure: EGD (ESOPHAGOGASTRODUODENOSCOPY);  Surgeon: Raúl Polanco  MD SAMUEL;  Location: [facility] ENDO;  Service: Endoscopy;  Laterality: N/A;    EYE SURGERY      FOOT SURGERY      INTRALUMINAL GASTROINTESTINAL TRACT IMAGING VIA CAPSULE N/A 2020    Procedure: IMAGING PROCEDURE, GI TRACT, INTRALUMINAL, VIA CAPSULE;  Surgeon: Blade Holley RN;  Location: [facility] ENDO;  Service: Endoscopy;  Laterality: N/A;    TONSILLECTOMY      TUBAL LIGATION         Social History     Tobacco Use    Smoking status: Former     Current packs/day: 0.00     Average packs/day: 1 pack/day for 48.4 years (48.4 ttl pk-yrs)     Types: Cigarettes     Start date:      Quit date: 2016     Years since quittin.4     Passive exposure: Never    Smokeless tobacco: Never    Tobacco comments:     smoked for 25 yrs. quit 16yrs. then smoked again for 5-6 before quitting in 2016.   Substance Use Topics    Alcohol use: No     Alcohol/week: 1.0 standard drink of alcohol     Types: 1 Standard drinks or equivalent per week    Drug use: No       Family History   Problem Relation Name Age of Onset    Hypertension Mother      Cancer Mother      Kidney cancer Mother      COPD Father      Hypertension Father      Heart disease Sister      Hyperlipidemia Son      Breast cancer Maternal Grandmother      Colon cancer Neg Hx           ROS    Objective:   Physical Exam  HENT:      Head: Normocephalic.   Eyes:      Pupils: Pupils are equal, round, and reactive to light.   Neck:      Thyroid: No thyromegaly.      Vascular: Normal carotid pulses. No carotid bruit or JVD.   Cardiovascular:      Rate and Rhythm: Normal rate and regular rhythm. No extrasystoles are present.     Chest Wall: PMI is not displaced.      Pulses: Normal pulses.      Heart sounds: Murmur heard.      Harsh midsystolic murmur is present with a grade of 2/6 at the upper right sternal border radiating to the neck.      No gallop. No S3 sounds.   Pulmonary:      Effort: No respiratory distress.      Breath sounds: Normal breath sounds. No stridor.   Abdominal:       General: Bowel sounds are normal.      Palpations: Abdomen is soft.      Tenderness: There is no abdominal tenderness. There is no rebound.   Musculoskeletal:         General: Normal range of motion.   Skin:     Findings: No rash.   Neurological:      Mental Status: She is alert and oriented to person, place, and time.   Psychiatric:         Behavior: Behavior normal.         Lab Results   Component Value Date    CHOL 178 04/02/2024    CHOL 198 03/02/2023    CHOL 217 (H) 11/19/2021     Lab Results   Component Value Date    HDL 40 04/02/2024    HDL 39 (L) 03/02/2023    HDL 49 11/19/2021     Lab Results   Component Value Date    LDLCALC 114.6 04/02/2024    LDLCALC 133.0 03/02/2023    LDLCALC 133.2 11/19/2021     Lab Results   Component Value Date    TRIG 117 04/02/2024    TRIG 130 03/02/2023    TRIG 174 (H) 11/19/2021     Lab Results   Component Value Date    CHOLHDL 22.5 04/02/2024    CHOLHDL 19.7 (L) 03/02/2023    CHOLHDL 22.6 11/19/2021       Chemistry        Component Value Date/Time     07/31/2024 0946    K 4.6 07/31/2024 0946     07/31/2024 0946    CO2 29 07/31/2024 0946    BUN 25 (H) 07/31/2024 0946    CREATININE 1.0 07/31/2024 0946    GLU 85 07/31/2024 0946        Component Value Date/Time    CALCIUM 9.3 07/31/2024 0946    ALKPHOS 79 07/13/2024 1234    AST 19 07/13/2024 1234    ALT 12 07/13/2024 1234    BILITOT 0.8 07/13/2024 1234    ESTGFRAFRICA >60.0 11/19/2021 0908    EGFRNONAA >60.0 11/19/2021 0908          Lab Results   Component Value Date    HGBA1C 5.0 03/02/2023     Lab Results   Component Value Date    TSH 0.791 04/02/2024     Lab Results   Component Value Date    INR 1.0 01/02/2007     Lab Results   Component Value Date    WBC 6.14 07/13/2024    HGB 12.2 07/13/2024    HCT 36.2 (L) 07/13/2024    MCV 90 07/13/2024     07/13/2024     BMP  Sodium   Date Value Ref Range Status   07/31/2024 142 136 - 145 mmol/L Final     Potassium   Date Value Ref Range Status   07/31/2024 4.6 3.5 -  5.1 mmol/L Final     Chloride   Date Value Ref Range Status   07/31/2024 103 95 - 110 mmol/L Final     CO2   Date Value Ref Range Status   07/31/2024 29 23 - 29 mmol/L Final     BUN   Date Value Ref Range Status   07/31/2024 25 (H) 8 - 23 mg/dL Final     Creatinine   Date Value Ref Range Status   07/31/2024 1.0 0.5 - 1.4 mg/dL Final     Calcium   Date Value Ref Range Status   07/31/2024 9.3 8.7 - 10.5 mg/dL Final     Anion Gap   Date Value Ref Range Status   07/31/2024 10 8 - 16 mmol/L Final     eGFR if    Date Value Ref Range Status   11/19/2021 >60.0 >60 mL/min/1.73 m^2 Final     eGFR if non    Date Value Ref Range Status   11/19/2021 >60.0 >60 mL/min/1.73 m^2 Final     Comment:     Calculation used to obtain the estimated glomerular filtration  rate (eGFR) is the CKD-EPI equation.        BNP  @LABRCNTIP(BNP,BNPTRIAGEBLO)@  @LABRCNTIP(troponini)@  CrCl cannot be calculated (Patient's most recent lab result is older than the maximum 7 days allowed.).  No results found in the last 24 hours.  No results found in the last 24 hours.  No results found in the last 24 hours.    Assessment:      1. Nonrheumatic aortic valve stenosis    2. Centrilobular emphysema    3. Atherosclerosis of aorta    4. Chronic diastolic congestive heart failure    5. Mixed hyperlipidemia    6. Obstructive hypertrophic cardiomyopathy    7. Prinzmetal's angina    8. Unspecified essential hypertension      Severe AS  Symptomatic    Plan:   Arrange LHC and RHC   I have explained the risks, benefits, and alternatives of the procedure in detail with patient and family. The patient voices understanding and all questions have been addressed.  The patient agrees to proceed as planned.    Refer to valve structure clinic for TAVR eval  Continue asa zetia lasix imdur torpoXL verapamil  Rtc post cath

## 2024-11-21 ENCOUNTER — HOSPITAL ENCOUNTER (OUTPATIENT)
Dept: RADIOLOGY | Facility: HOSPITAL | Age: 69
Discharge: HOME | End: 2024-11-21
Attending: FAMILY MEDICINE
Payer: MEDICARE

## 2024-11-21 DIAGNOSIS — K11.9 SALIVARY GLAND DISORDER: ICD-10-CM

## 2024-11-21 DIAGNOSIS — R22.0 SWELLING OF LEFT SIDE OF FACE: ICD-10-CM

## 2024-11-21 PROCEDURE — 76536 US EXAM OF HEAD AND NECK: CPT | Mod: 26,HCNC,, | Performed by: RADIOLOGY

## 2024-11-21 PROCEDURE — 76536 US EXAM OF HEAD AND NECK: CPT | Mod: TC,HCNC

## 2024-11-29 ENCOUNTER — LAB VISIT (OUTPATIENT)
Dept: LAB | Facility: HOSPITAL | Age: 69
End: 2024-11-29
Attending: INTERNAL MEDICINE
Payer: MEDICARE

## 2024-11-29 DIAGNOSIS — I50.9 CONGESTIVE HEART FAILURE, UNSPECIFIED HF CHRONICITY, UNSPECIFIED HEART FAILURE TYPE: ICD-10-CM

## 2024-11-29 DIAGNOSIS — I35.0 NONRHEUMATIC AORTIC (VALVE) STENOSIS: ICD-10-CM

## 2024-11-29 DIAGNOSIS — I42.1 OBSTRUCTIVE HYPERTROPHIC CARDIOMYOPATHY: ICD-10-CM

## 2024-11-29 DIAGNOSIS — R06.02 SOB (SHORTNESS OF BREATH): ICD-10-CM

## 2024-11-29 DIAGNOSIS — I50.9 CHRONIC CONGESTIVE HEART FAILURE, UNSPECIFIED HEART FAILURE TYPE: ICD-10-CM

## 2024-11-29 LAB
ANION GAP SERPL CALC-SCNC: 9 MMOL/L (ref 8–16)
APTT PPP: 29.3 SEC (ref 21–32)
BASOPHILS # BLD AUTO: 0.02 K/UL (ref 0–0.2)
BASOPHILS NFR BLD: 0.3 % (ref 0–1.9)
BUN SERPL-MCNC: 21 MG/DL (ref 8–23)
CALCIUM SERPL-MCNC: 9.7 MG/DL (ref 8.7–10.5)
CHLORIDE SERPL-SCNC: 106 MMOL/L (ref 95–110)
CO2 SERPL-SCNC: 26 MMOL/L (ref 23–29)
CREAT SERPL-MCNC: 0.9 MG/DL (ref 0.5–1.4)
DIFFERENTIAL METHOD BLD: ABNORMAL
EOSINOPHIL # BLD AUTO: 0.2 K/UL (ref 0–0.5)
EOSINOPHIL NFR BLD: 2.5 % (ref 0–8)
ERYTHROCYTE [DISTWIDTH] IN BLOOD BY AUTOMATED COUNT: 12.8 % (ref 11.5–14.5)
EST. GFR  (NO RACE VARIABLE): >60 ML/MIN/1.73 M^2
GLUCOSE SERPL-MCNC: 81 MG/DL (ref 70–110)
HCT VFR BLD AUTO: 41.2 % (ref 37–48.5)
HGB BLD-MCNC: 13.6 G/DL (ref 12–16)
IMM GRANULOCYTES # BLD AUTO: 0.02 K/UL (ref 0–0.04)
IMM GRANULOCYTES NFR BLD AUTO: 0.3 % (ref 0–0.5)
INR PPP: 1 (ref 0.8–1.2)
LYMPHOCYTES # BLD AUTO: 1.6 K/UL (ref 1–4.8)
LYMPHOCYTES NFR BLD: 22.9 % (ref 18–48)
MCH RBC QN AUTO: 31.3 PG (ref 27–31)
MCHC RBC AUTO-ENTMCNC: 33 G/DL (ref 32–36)
MCV RBC AUTO: 95 FL (ref 82–98)
MONOCYTES # BLD AUTO: 0.5 K/UL (ref 0.3–1)
MONOCYTES NFR BLD: 7.6 % (ref 4–15)
NEUTROPHILS # BLD AUTO: 4.7 K/UL (ref 1.8–7.7)
NEUTROPHILS NFR BLD: 66.4 % (ref 38–73)
NRBC BLD-RTO: 0 /100 WBC
PLATELET # BLD AUTO: 233 K/UL (ref 150–450)
PMV BLD AUTO: 10 FL (ref 9.2–12.9)
POTASSIUM SERPL-SCNC: 4.7 MMOL/L (ref 3.5–5.1)
PROTHROMBIN TIME: 10.7 SEC (ref 9–12.5)
RBC # BLD AUTO: 4.34 M/UL (ref 4–5.4)
SODIUM SERPL-SCNC: 141 MMOL/L (ref 136–145)
WBC # BLD AUTO: 7.12 K/UL (ref 3.9–12.7)

## 2024-11-29 PROCEDURE — 80048 BASIC METABOLIC PNL TOTAL CA: CPT | Mod: HCNC | Performed by: INTERNAL MEDICINE

## 2024-11-29 PROCEDURE — 85610 PROTHROMBIN TIME: CPT | Mod: HCNC | Performed by: INTERNAL MEDICINE

## 2024-11-29 PROCEDURE — 36415 COLL VENOUS BLD VENIPUNCTURE: CPT | Mod: HCNC,PO | Performed by: INTERNAL MEDICINE

## 2024-11-29 PROCEDURE — 85730 THROMBOPLASTIN TIME PARTIAL: CPT | Mod: HCNC | Performed by: INTERNAL MEDICINE

## 2024-11-29 PROCEDURE — 85025 COMPLETE CBC W/AUTO DIFF WBC: CPT | Mod: HCNC | Performed by: INTERNAL MEDICINE

## 2024-12-02 ENCOUNTER — HOSPITAL ENCOUNTER (OUTPATIENT)
Facility: HOSPITAL | Age: 69
Discharge: HOME | End: 2024-12-02
Attending: INTERNAL MEDICINE | Admitting: INTERNAL MEDICINE
Payer: MEDICARE

## 2024-12-02 VITALS
TEMPERATURE: 98 F | OXYGEN SATURATION: 97 % | BODY MASS INDEX: 32.65 KG/M2 | RESPIRATION RATE: 19 BRPM | WEIGHT: 196 LBS | HEART RATE: 70 BPM | DIASTOLIC BLOOD PRESSURE: 58 MMHG | HEIGHT: 65 IN | SYSTOLIC BLOOD PRESSURE: 122 MMHG

## 2024-12-02 DIAGNOSIS — I35.0 NONRHEUMATIC AORTIC (VALVE) STENOSIS: ICD-10-CM

## 2024-12-02 DIAGNOSIS — I50.9 CHRONIC CONGESTIVE HEART FAILURE, UNSPECIFIED HEART FAILURE TYPE: ICD-10-CM

## 2024-12-02 DIAGNOSIS — R06.02 SOB (SHORTNESS OF BREATH): ICD-10-CM

## 2024-12-02 DIAGNOSIS — I35.0 NONRHEUMATIC AORTIC VALVE STENOSIS: Primary | ICD-10-CM

## 2024-12-02 DIAGNOSIS — I42.1 OBSTRUCTIVE HYPERTROPHIC CARDIOMYOPATHY: ICD-10-CM

## 2024-12-02 LAB — CATH EF QUANTITATIVE: 70 %

## 2024-12-02 PROCEDURE — 93460 R&L HRT ART/VENTRICLE ANGIO: CPT | Mod: 26,HCNC,, | Performed by: INTERNAL MEDICINE

## 2024-12-02 PROCEDURE — 25000003 PHARM REV CODE 250: Mod: HCNC | Performed by: INTERNAL MEDICINE

## 2024-12-02 PROCEDURE — 99152 MOD SED SAME PHYS/QHP 5/>YRS: CPT | Performed by: INTERNAL MEDICINE

## 2024-12-02 PROCEDURE — 93460 R&L HRT ART/VENTRICLE ANGIO: CPT | Mod: HCNC | Performed by: INTERNAL MEDICINE

## 2024-12-02 PROCEDURE — C1769 GUIDE WIRE: HCPCS | Performed by: INTERNAL MEDICINE

## 2024-12-02 PROCEDURE — C1894 INTRO/SHEATH, NON-LASER: HCPCS | Performed by: INTERNAL MEDICINE

## 2024-12-02 PROCEDURE — 99152 MOD SED SAME PHYS/QHP 5/>YRS: CPT | Mod: HCNC,,, | Performed by: INTERNAL MEDICINE

## 2024-12-02 PROCEDURE — C1751 CATH, INF, PER/CENT/MIDLINE: HCPCS | Performed by: INTERNAL MEDICINE

## 2024-12-02 PROCEDURE — 63600175 PHARM REV CODE 636 W HCPCS: Mod: HCNC | Performed by: INTERNAL MEDICINE

## 2024-12-02 PROCEDURE — 99153 MOD SED SAME PHYS/QHP EA: CPT | Performed by: INTERNAL MEDICINE

## 2024-12-02 PROCEDURE — 25500020 PHARM REV CODE 255: Mod: HCNC | Performed by: INTERNAL MEDICINE

## 2024-12-02 RX ORDER — NITROGLYCERIN 5 MG/ML
INJECTION, SOLUTION INTRAVENOUS
Status: DISCONTINUED | OUTPATIENT
Start: 2024-12-02 | End: 2024-12-02 | Stop reason: HOSPADM

## 2024-12-02 RX ORDER — SODIUM CHLORIDE 9 MG/ML
INJECTION, SOLUTION INTRAVENOUS CONTINUOUS
Status: ACTIVE | OUTPATIENT
Start: 2024-12-02 | End: 2024-12-02

## 2024-12-02 RX ORDER — FENTANYL CITRATE 50 UG/ML
INJECTION, SOLUTION INTRAMUSCULAR; INTRAVENOUS
Status: DISCONTINUED | OUTPATIENT
Start: 2024-12-02 | End: 2024-12-02 | Stop reason: HOSPADM

## 2024-12-02 RX ORDER — VERAPAMIL HYDROCHLORIDE 2.5 MG/ML
INJECTION, SOLUTION INTRAVENOUS
Status: DISCONTINUED | OUTPATIENT
Start: 2024-12-02 | End: 2024-12-02 | Stop reason: HOSPADM

## 2024-12-02 RX ORDER — LIDOCAINE HYDROCHLORIDE 20 MG/ML
INJECTION, SOLUTION INFILTRATION; PERINEURAL
Status: DISCONTINUED | OUTPATIENT
Start: 2024-12-02 | End: 2024-12-02 | Stop reason: HOSPADM

## 2024-12-02 RX ORDER — MIDAZOLAM HYDROCHLORIDE 1 MG/ML
INJECTION, SOLUTION INTRAMUSCULAR; INTRAVENOUS
Status: DISCONTINUED | OUTPATIENT
Start: 2024-12-02 | End: 2024-12-02 | Stop reason: HOSPADM

## 2024-12-02 RX ORDER — SODIUM CHLORIDE 0.9 % (FLUSH) 0.9 %
10 SYRINGE (ML) INJECTION
Status: DISCONTINUED | OUTPATIENT
Start: 2024-12-02 | End: 2024-12-02 | Stop reason: HOSPADM

## 2024-12-02 RX ORDER — HEPARIN SODIUM 1000 [USP'U]/ML
INJECTION, SOLUTION INTRAVENOUS; SUBCUTANEOUS
Status: DISCONTINUED | OUTPATIENT
Start: 2024-12-02 | End: 2024-12-02 | Stop reason: HOSPADM

## 2024-12-02 RX ORDER — NAPROXEN SODIUM 220 MG/1
81 TABLET, FILM COATED ORAL ONCE
Status: COMPLETED | OUTPATIENT
Start: 2024-12-02 | End: 2024-12-02

## 2024-12-02 RX ORDER — ACETAMINOPHEN 325 MG/1
650 TABLET ORAL EVERY 4 HOURS PRN
Status: DISCONTINUED | OUTPATIENT
Start: 2024-12-02 | End: 2024-12-02 | Stop reason: HOSPADM

## 2024-12-02 RX ORDER — DIPHENHYDRAMINE HCL 50 MG
50 CAPSULE ORAL ONCE
Status: COMPLETED | OUTPATIENT
Start: 2024-12-02 | End: 2024-12-02

## 2024-12-02 RX ORDER — ONDANSETRON 8 MG/1
8 TABLET, ORALLY DISINTEGRATING ORAL EVERY 8 HOURS PRN
Status: DISCONTINUED | OUTPATIENT
Start: 2024-12-02 | End: 2024-12-02 | Stop reason: HOSPADM

## 2024-12-02 RX ADMIN — ASPIRIN 81 MG CHEWABLE TABLET 81 MG: 81 TABLET CHEWABLE at 10:12

## 2024-12-02 RX ADMIN — DIPHENHYDRAMINE HYDROCHLORIDE 50 MG: 50 CAPSULE ORAL at 10:12

## 2024-12-02 RX ADMIN — SODIUM CHLORIDE: 9 INJECTION, SOLUTION INTRAVENOUS at 12:12

## 2024-12-02 RX ADMIN — ACETAMINOPHEN 650 MG: 325 TABLET ORAL at 12:12

## 2024-12-02 RX ADMIN — SODIUM CHLORIDE: 9 INJECTION, SOLUTION INTRAVENOUS at 10:12

## 2024-12-02 NOTE — INTERVAL H&P NOTE
The patient has been examined and the H&P has been reviewed:    I concur with the findings and no changes have occurred since H&P was written.    Procedure risks, benefits and alternative options discussed and understood by patient/family.          Active Hospital Problems    Diagnosis  POA    *Nonrheumatic aortic valve stenosis [I35.0]  Yes    Congestive heart failure [I50.9]  Yes    Emphysema lung [J43.9]  Yes    Obstructive hypertrophic cardiomyopathy [I42.1]  Yes    Atherosclerosis of aorta [I70.0]  Yes    HERB on CPAP [G47.33]  Yes     Chronic     On APAP 5-12 cm with optimal control AHI: 1.5   5/17/2024 Overnight oximetry on room air on APAP 5-12 cm abnormal  Proceed with 2 L oxygen blended in to CPAP   Proceed with CPAP/bipap retitration   5/21/2024 [facility]: Airsense 11 setup [facility]   5/27/2024 obtained home O2 NC 2L and sleep blended into CPAP   5/31/2024 CPAP titration CPAP 9 cm optimal, still needed 2 L blended in   6/11/2024 patient request stay on Auto CPAP 5-12 with 2 L blended in, prefers auto CPAP over set pressure.   6/12/2024 remote download on APAP 5-12 cm AHI 1.1, okay to stay on patient requested Auto CPAP 5-12 cm with 2 L oxygen blended in            Mixed hyperlipidemia [E78.2]  Yes    Unspecified essential hypertension [I10]  Yes     Dx updated per 2019 IMO Load        Resolved Hospital Problems   No resolved problems to display.

## 2024-12-02 NOTE — NURSING
C/O of HA upon arrival and req'd Tylenol  Rec'd per stretcher NELSON  A & O X 3  Neuro assessment performed/intact.  NS at 125/hr  NSR per monitor  Placed on 2L NC after arrival.  L wrist w/TR Band  Hand mild cyanotic / cool.   Warm blanket to hand

## 2024-12-02 NOTE — Clinical Note
105 ml of contrast were injected throughout the case. 0 mL of contrast was the total wasted during the case. 105 mL was the total amount used during the case.

## 2024-12-02 NOTE — OP NOTE
INPATIENT Operative Note         SUMMARY     Surgery Date: 12/2/2024     Surgeons and Role:     * Donna Holden MD - Primary    ASSISTANT:none    Pre-op Diagnosis:  Nonrheumatic aortic (valve) stenosis [I35.0]  SOB (shortness of breath) [R06.02]  Obstructive hypertrophic cardiomyopathy [I42.1]  Chronic congestive heart failure, unspecified heart failure type [I50.9]      Post-op Diagnosis:  Nonrheumatic aortic (valve) stenosis [I35.0]  SOB (shortness of breath) [R06.02]  Obstructive hypertrophic cardiomyopathy [I42.1]  Chronic congestive heart failure, unspecified heart failure type [I50.9]    Procedure(s) (LRB):  CATHETERIZATION, HEART, BOTH LEFT AND RIGHT (N/A)    COMPLICATION:none    Anesthesia: RN IV Sedation    Findings/Key Components:  Moderate to severe pulmonary htn  Elevated rt and left filling pressures.  Non obs cad    Sever as 90 mmhg gradient    Estimated Blood Loss: < 50 ML.         SPECIMEN: NONE    Devices/Prostetics: None    PLAN: cvt consult

## 2024-12-02 NOTE — NURSING
Dc'd to home via wc to main entrance.  Left to care of  for trip home.  In good spirits.  Very appreciative of care rec'd while in [facility]

## 2024-12-02 NOTE — Clinical Note
The left ventricle was injected. The injected rate was 8 mL/sec. The total injected volume was 24 mL.

## 2024-12-02 NOTE — Clinical Note
The PA catheter was repositioned to the main pulmonary artery. Hemodynamics were performed. Cardiac output was obtained. CO:4.59

## 2024-12-02 NOTE — NURSING
Ambulated w/o diff to restroom and reported voiding successfully  Dsg remains c,d,I to l wrist.  Getting dressed w/assist of

## 2024-12-02 NOTE — NURSING
L TR Band removed from wrist.  Sm amt bruising noted.  Gauze dsg/clear plastic tape w/sm amt tension over gauze/splint reapplied.  DC inst reviewed w/patient and her .  Verb understanding

## 2024-12-04 NOTE — DISCHARGE SUMMARY
[facility] - Cath Lab (Hospital)  Discharge Note  Short Stay    Procedure(s) (LRB):  CATHETERIZATION, HEART, BOTH LEFT AND RIGHT (N/A)      OUTCOME: Patient tolerated treatment/procedure well without complication and is now ready for discharge.    DISPOSITION: Home or Self Care    FINAL DIAGNOSIS:  Nonrheumatic aortic valve stenosis    FOLLOWUP: In clinic    DISCHARGE INSTRUCTIONS:    Discharge Procedure Orders   Diet general     Call MD for:  temperature >100.4     Call MD for:  persistent nausea and vomiting     Call MD for:  severe uncontrolled pain     Call MD for:  difficulty breathing, headache or visual disturbances     Call MD for:  redness, tenderness, or signs of infection (pain, swelling, redness, odor or green/yellow discharge around incision site)     Call MD for:  hives     Call MD for:  persistent dizziness or light-headedness     Call MD for:  extreme fatigue        TIME SPENT ON DISCHARGE: 35 minutes

## 2024-12-09 ENCOUNTER — TELEPHONE (OUTPATIENT)
Dept: UROLOGY | Facility: CLINIC | Age: 69
End: 2024-12-09
Payer: MEDICARE

## 2024-12-09 ENCOUNTER — PATIENT MESSAGE (OUTPATIENT)
Dept: UROLOGY | Facility: CLINIC | Age: 69
End: 2024-12-09
Payer: MEDICARE

## 2024-12-09 ENCOUNTER — LAB VISIT (OUTPATIENT)
Dept: LAB | Facility: HOSPITAL | Age: 69
End: 2024-12-09
Attending: UROLOGY
Payer: MEDICARE

## 2024-12-09 DIAGNOSIS — N39.41 URGE INCONTINENCE: ICD-10-CM

## 2024-12-09 DIAGNOSIS — N39.41 URGE INCONTINENCE: Primary | ICD-10-CM

## 2024-12-09 PROCEDURE — 87186 SC STD MICRODIL/AGAR DIL: CPT | Mod: HCNC | Performed by: UROLOGY

## 2024-12-09 PROCEDURE — 87086 URINE CULTURE/COLONY COUNT: CPT | Mod: HCNC | Performed by: UROLOGY

## 2024-12-09 PROCEDURE — 87088 URINE BACTERIA CULTURE: CPT | Mod: HCNC | Performed by: UROLOGY

## 2024-12-09 RX ORDER — CIPROFLOXACIN 500 MG/1
500 TABLET ORAL 2 TIMES DAILY
Qty: 20 TABLET | Refills: 0 | Status: SHIPPED | OUTPATIENT
Start: 2024-12-09 | End: 2024-12-19

## 2024-12-09 NOTE — TELEPHONE ENCOUNTER
Called patient regarding Freq urination and burning . Asked if she could drop off urine by the end of the today .

## 2024-12-11 ENCOUNTER — DOCUMENTATION ONLY (OUTPATIENT)
Dept: CARDIOLOGY | Facility: CLINIC | Age: 69
End: 2024-12-11
Payer: MEDICARE

## 2024-12-11 ENCOUNTER — OFFICE VISIT (OUTPATIENT)
Dept: CARDIOLOGY | Facility: CLINIC | Age: 69
End: 2024-12-11
Payer: MEDICARE

## 2024-12-11 VITALS
BODY MASS INDEX: 31.88 KG/M2 | DIASTOLIC BLOOD PRESSURE: 60 MMHG | WEIGHT: 191.56 LBS | OXYGEN SATURATION: 96 % | HEART RATE: 64 BPM | SYSTOLIC BLOOD PRESSURE: 112 MMHG

## 2024-12-11 DIAGNOSIS — I70.0 ATHEROSCLEROSIS OF AORTA: ICD-10-CM

## 2024-12-11 DIAGNOSIS — I10 ESSENTIAL HYPERTENSION: ICD-10-CM

## 2024-12-11 DIAGNOSIS — I42.1 OBSTRUCTIVE HYPERTROPHIC CARDIOMYOPATHY: ICD-10-CM

## 2024-12-11 DIAGNOSIS — I35.0 NONRHEUMATIC AORTIC VALVE STENOSIS: Primary | ICD-10-CM

## 2024-12-11 DIAGNOSIS — E78.2 MIXED HYPERLIPIDEMIA: ICD-10-CM

## 2024-12-11 DIAGNOSIS — E66.811 CLASS 1 OBESITY DUE TO EXCESS CALORIES WITH SERIOUS COMORBIDITY AND BODY MASS INDEX (BMI) OF 34.0 TO 34.9 IN ADULT: ICD-10-CM

## 2024-12-11 DIAGNOSIS — I50.32 CHRONIC DIASTOLIC CONGESTIVE HEART FAILURE: ICD-10-CM

## 2024-12-11 DIAGNOSIS — E66.09 CLASS 1 OBESITY DUE TO EXCESS CALORIES WITH SERIOUS COMORBIDITY AND BODY MASS INDEX (BMI) OF 34.0 TO 34.9 IN ADULT: ICD-10-CM

## 2024-12-11 PROCEDURE — 99214 OFFICE O/P EST MOD 30 MIN: CPT | Mod: HCNC,S$GLB,, | Performed by: INTERNAL MEDICINE

## 2024-12-11 PROCEDURE — 1101F PT FALLS ASSESS-DOCD LE1/YR: CPT | Mod: HCNC,CPTII,S$GLB, | Performed by: INTERNAL MEDICINE

## 2024-12-11 PROCEDURE — 99999 PR PBB SHADOW E&M-EST. PATIENT-LVL III: CPT | Mod: PBBFAC,HCNC,, | Performed by: INTERNAL MEDICINE

## 2024-12-11 PROCEDURE — 3074F SYST BP LT 130 MM HG: CPT | Mod: HCNC,CPTII,S$GLB, | Performed by: INTERNAL MEDICINE

## 2024-12-11 PROCEDURE — 3288F FALL RISK ASSESSMENT DOCD: CPT | Mod: HCNC,CPTII,S$GLB, | Performed by: INTERNAL MEDICINE

## 2024-12-11 PROCEDURE — 3078F DIAST BP <80 MM HG: CPT | Mod: HCNC,CPTII,S$GLB, | Performed by: INTERNAL MEDICINE

## 2024-12-11 PROCEDURE — 3008F BODY MASS INDEX DOCD: CPT | Mod: HCNC,CPTII,S$GLB, | Performed by: INTERNAL MEDICINE

## 2024-12-11 PROCEDURE — 1159F MED LIST DOCD IN RCRD: CPT | Mod: HCNC,CPTII,S$GLB, | Performed by: INTERNAL MEDICINE

## 2024-12-11 PROCEDURE — 1160F RVW MEDS BY RX/DR IN RCRD: CPT | Mod: HCNC,CPTII,S$GLB, | Performed by: INTERNAL MEDICINE

## 2024-12-11 NOTE — PROGRESS NOTES
Subjective:   Patient ID:  Ro Hernandez is a 69 y.o. female who presents for follow up of No chief complaint on file.      68 yo female, post cath f/u  PMH severe AS, HOCM, HTN, GERD, and COPD home O2 for 6 weeks. No h/o MI DM CVA  Prior cardiologist Dr. Quiles. Declined new rx for HOCM  F/u pulm service for home O2  H/o cath nml in   H/o cardiac MRI done before    Recent ER visit in  for CHFpEF. 's and improved after Lasix added  EKG NSR  No faint syncope palpitation  Occasional chest pain NTG helps  Father  of massive heart attack at 53.  Sister  at 73 due to renal   Mother  at 81     visit  Finished pulm rehab. On breathing Rx  Echo showed EF nml. Severe AS, Aortic valve area by VTI is 0.91 cm². Aortic valve peak velocity is 5.64 m/s. Mean gradient is 76 mmHg. The dimensionless index is 0.33. There is mild aortic regurgitation. Also severe MAC  Occasional CP at exertion , NTG SL works  No dizziness faint leg swelling orthopnea     Interval history  H/o LHC and RHC. Nonobstructive CAD  RA:  RV: 70/ 10/ 16 PA: /  PWP:  .   Cardiac output was 4.6 by thermodilution. Cardiac index is 2.4 L/min/m2.     BP variated at 110 to 140 mmJHg at home  Occasional NTG SL for chest pain   No dizzienss faint   Left radial site some bruise good pulse      Past Medical History:   Diagnosis Date    Allergy     Angina     Arthritis 2013    Asthma     Congestive heart failure 2024    COPD (chronic obstructive pulmonary disease)     GERD (gastroesophageal reflux disease)     Hx of Prinzmetal angina     Hypertension     Obesity 2013    Other and unspecified hyperlipidemia 2013    Prinzmetal's angina     Sleep apnea 2013       Past Surgical History:   Procedure Laterality Date    ABDOMINAL SURGERY      CATHETERIZATION OF BOTH LEFT AND RIGHT HEART N/A 2024    Procedure: CATHETERIZATION, HEART, BOTH LEFT AND RIGHT;  Surgeon: Donna Holden MD;   Location: [facility] CATH LAB;  Service: Cardiology;  Laterality: N/A;    CHOLECYSTECTOMY      COLONOSCOPY N/A 2019    Procedure: COLONOSCOPY;  Surgeon: Winston Smith MD;  Location: [facility] ENDO;  Service: Endoscopy;  Laterality: N/A;    ESOPHAGOGASTRODUODENOSCOPY N/A 12/10/2018    Procedure: EGD (ESOPHAGOGASTRODUODENOSCOPY);  Surgeon: Raúl Polanco III, MD;  Location: [facility] ENDO;  Service: Endoscopy;  Laterality: N/A;    ESOPHAGOGASTRODUODENOSCOPY N/A 2020    Procedure: EGD (ESOPHAGOGASTRODUODENOSCOPY);  Surgeon: Raúl Polanco III, MD;  Location: [facility] ENDO;  Service: Endoscopy;  Laterality: N/A;    EYE SURGERY      FOOT SURGERY      INTRALUMINAL GASTROINTESTINAL TRACT IMAGING VIA CAPSULE N/A 2020    Procedure: IMAGING PROCEDURE, GI TRACT, INTRALUMINAL, VIA CAPSULE;  Surgeon: Blade Holley RN;  Location: [facility] ENDO;  Service: Endoscopy;  Laterality: N/A;    TONSILLECTOMY      TUBAL LIGATION         Social History     Tobacco Use    Smoking status: Former     Current packs/day: 0.00     Average packs/day: 1 pack/day for 48.4 years (48.4 ttl pk-yrs)     Types: Cigarettes     Start date:      Quit date: 2016     Years since quittin.5     Passive exposure: Never    Smokeless tobacco: Never    Tobacco comments:     smoked for 25 yrs. quit 16yrs. then smoked again for 5-6 before quitting in .   Substance Use Topics    Alcohol use: No     Alcohol/week: 1.0 standard drink of alcohol     Types: 1 Standard drinks or equivalent per week    Drug use: No       Family History   Problem Relation Name Age of Onset    Hypertension Mother      Cancer Mother      Kidney cancer Mother      COPD Father      Hypertension Father      Heart disease Sister      Hyperlipidemia Son      Breast cancer Maternal Grandmother      Colon cancer Neg Hx           ROS    Objective:   Physical Exam  HENT:      Head: Normocephalic.   Eyes:      Pupils: Pupils are equal, round, and reactive to light.   Neck:      Thyroid: No  thyromegaly.      Vascular: Normal carotid pulses. No carotid bruit or JVD.   Cardiovascular:      Rate and Rhythm: Normal rate and regular rhythm. No extrasystoles are present.     Chest Wall: PMI is not displaced.      Pulses: Normal pulses.      Heart sounds: Murmur heard.      Harsh midsystolic murmur is present with a grade of 2/6 at the upper right sternal border radiating to the neck.      No gallop. No S3 sounds.   Pulmonary:      Effort: No respiratory distress.      Breath sounds: Normal breath sounds. No stridor.   Abdominal:      General: Bowel sounds are normal.      Palpations: Abdomen is soft.      Tenderness: There is no abdominal tenderness. There is no rebound.   Musculoskeletal:         General: Normal range of motion.   Skin:     Findings: No rash.   Neurological:      Mental Status: She is alert and oriented to person, place, and time.   Psychiatric:         Behavior: Behavior normal.         Lab Results   Component Value Date    CHOL 178 04/02/2024    CHOL 198 03/02/2023    CHOL 217 (H) 11/19/2021     Lab Results   Component Value Date    HDL 40 04/02/2024    HDL 39 (L) 03/02/2023    HDL 49 11/19/2021     Lab Results   Component Value Date    LDLCALC 114.6 04/02/2024    LDLCALC 133.0 03/02/2023    LDLCALC 133.2 11/19/2021     Lab Results   Component Value Date    TRIG 117 04/02/2024    TRIG 130 03/02/2023    TRIG 174 (H) 11/19/2021     Lab Results   Component Value Date    CHOLHDL 22.5 04/02/2024    CHOLHDL 19.7 (L) 03/02/2023    CHOLHDL 22.6 11/19/2021       Chemistry        Component Value Date/Time     11/29/2024 1117    K 4.7 11/29/2024 1117     11/29/2024 1117    CO2 26 11/29/2024 1117    BUN 21 11/29/2024 1117    CREATININE 0.9 11/29/2024 1117    GLU 81 11/29/2024 1117        Component Value Date/Time    CALCIUM 9.7 11/29/2024 1117    ALKPHOS 79 07/13/2024 1234    AST 19 07/13/2024 1234    ALT 12 07/13/2024 1234    BILITOT 0.8 07/13/2024 1234    ESTGFRAFRICA >60.0 11/19/2021  0908    EGFRNONAA >60.0 11/19/2021 0908          Lab Results   Component Value Date    HGBA1C 5.0 03/02/2023     Lab Results   Component Value Date    TSH 0.791 04/02/2024     Lab Results   Component Value Date    INR 1.0 11/29/2024    INR 1.0 01/02/2007     Lab Results   Component Value Date    WBC 7.12 11/29/2024    HGB 13.6 11/29/2024    HCT 41.2 11/29/2024    MCV 95 11/29/2024     11/29/2024     BMP  Sodium   Date Value Ref Range Status   11/29/2024 141 136 - 145 mmol/L Final     Potassium   Date Value Ref Range Status   11/29/2024 4.7 3.5 - 5.1 mmol/L Final     Chloride   Date Value Ref Range Status   11/29/2024 106 95 - 110 mmol/L Final     CO2   Date Value Ref Range Status   11/29/2024 26 23 - 29 mmol/L Final     BUN   Date Value Ref Range Status   11/29/2024 21 8 - 23 mg/dL Final     Creatinine   Date Value Ref Range Status   11/29/2024 0.9 0.5 - 1.4 mg/dL Final     Calcium   Date Value Ref Range Status   11/29/2024 9.7 8.7 - 10.5 mg/dL Final     Anion Gap   Date Value Ref Range Status   11/29/2024 9 8 - 16 mmol/L Final     eGFR if    Date Value Ref Range Status   11/19/2021 >60.0 >60 mL/min/1.73 m^2 Final     eGFR if non    Date Value Ref Range Status   11/19/2021 >60.0 >60 mL/min/1.73 m^2 Final     Comment:     Calculation used to obtain the estimated glomerular filtration  rate (eGFR) is the CKD-EPI equation.        BNP  @LABRCNTIP(BNP,BNPTRIAGEBLO)@  @LABRCNTIP(troponini)@  CrCl cannot be calculated (Patient's most recent lab result is older than the maximum 7 days allowed.).  No results found in the last 24 hours.  No results found in the last 24 hours.  No results found in the last 24 hours.    Assessment:      1. Nonrheumatic aortic valve stenosis    2. Atherosclerosis of aorta    3. Chronic diastolic congestive heart failure    4. Mixed hyperlipidemia    5. Obstructive hypertrophic cardiomyopathy    6. Unspecified essential hypertension    7. Class 1 obesity due  to excess calories with serious comorbidity and body mass index (BMI) of 34.0 to 34.9 in adult      Severe AS  Nonobstructive CAD  Pulm HTN group 2  Plan:   F/u at TAVR clinic  Continue asa zetia lasix imdur toprolxl verapamil  RTC in

## 2024-12-12 LAB — BACTERIA UR CULT: ABNORMAL

## 2025-01-06 DIAGNOSIS — E78.2 MIXED HYPERLIPIDEMIA: ICD-10-CM

## 2025-01-06 NOTE — TELEPHONE ENCOUNTER
Care Due:                  Date            Visit Type   Department     Provider  --------------------------------------------------------------------------------                                EP -                              PRIMARY      [facility] INTERNAL  Last Visit: 07-      CARE ([facility])   MEDICINE       [facility]  Next Visit: None Scheduled  None         None Found                                                            Last  Test          Frequency    Reason                     Performed    Due Date  --------------------------------------------------------------------------------    Lipid Panel.  12 months..  ezetimibe................  04- 03-    [facility] Embedded Care Due Messages. Reference number: [id number].   1/06/2025 2:00:12 PM CST

## 2025-01-10 RX ORDER — EZETIMIBE 10 MG/1
10 TABLET ORAL DAILY
Qty: 90 TABLET | Refills: 2 | Status: SHIPPED | OUTPATIENT
Start: 2025-01-10

## 2025-01-17 ENCOUNTER — HOSPITAL ENCOUNTER (OUTPATIENT)
Dept: RADIOLOGY | Facility: HOSPITAL | Age: 70
Discharge: HOME | End: 2025-01-17
Attending: INTERNAL MEDICINE
Payer: MEDICARE

## 2025-01-17 ENCOUNTER — OFFICE VISIT (OUTPATIENT)
Dept: CARDIOLOGY | Facility: CLINIC | Age: 70
End: 2025-01-17
Payer: MEDICARE

## 2025-01-17 ENCOUNTER — PATIENT MESSAGE (OUTPATIENT)
Dept: CARDIOLOGY | Facility: CLINIC | Age: 70
End: 2025-01-17

## 2025-01-17 VITALS
WEIGHT: 192.69 LBS | HEIGHT: 65 IN | BODY MASS INDEX: 32.1 KG/M2 | OXYGEN SATURATION: 97 % | HEART RATE: 68 BPM | SYSTOLIC BLOOD PRESSURE: 128 MMHG | DIASTOLIC BLOOD PRESSURE: 56 MMHG

## 2025-01-17 DIAGNOSIS — I10 ESSENTIAL HYPERTENSION: ICD-10-CM

## 2025-01-17 DIAGNOSIS — I35.0 NONRHEUMATIC AORTIC VALVE STENOSIS: ICD-10-CM

## 2025-01-17 DIAGNOSIS — I50.32 CHRONIC DIASTOLIC CONGESTIVE HEART FAILURE: ICD-10-CM

## 2025-01-17 DIAGNOSIS — E66.01 CLASS 2 SEVERE OBESITY WITH SERIOUS COMORBIDITY AND BODY MASS INDEX (BMI) OF 36.0 TO 36.9 IN ADULT, UNSPECIFIED OBESITY TYPE: ICD-10-CM

## 2025-01-17 DIAGNOSIS — E66.812 CLASS 2 SEVERE OBESITY WITH SERIOUS COMORBIDITY AND BODY MASS INDEX (BMI) OF 36.0 TO 36.9 IN ADULT, UNSPECIFIED OBESITY TYPE: ICD-10-CM

## 2025-01-17 DIAGNOSIS — E78.2 MIXED HYPERLIPIDEMIA: Primary | ICD-10-CM

## 2025-01-17 LAB
CREAT SERPL-MCNC: 1.2 MG/DL (ref 0.5–1.4)
SAMPLE: NORMAL

## 2025-01-17 PROCEDURE — 1159F MED LIST DOCD IN RCRD: CPT | Mod: CPTII,S$GLB,, | Performed by: INTERNAL MEDICINE

## 2025-01-17 PROCEDURE — 71275 CT ANGIOGRAPHY CHEST: CPT | Mod: 26,,, | Performed by: RADIOLOGY

## 2025-01-17 PROCEDURE — 99205 OFFICE O/P NEW HI 60 MIN: CPT | Mod: S$GLB,,, | Performed by: INTERNAL MEDICINE

## 2025-01-17 PROCEDURE — 3008F BODY MASS INDEX DOCD: CPT | Mod: CPTII,S$GLB,, | Performed by: INTERNAL MEDICINE

## 2025-01-17 PROCEDURE — 3078F DIAST BP <80 MM HG: CPT | Mod: CPTII,S$GLB,, | Performed by: INTERNAL MEDICINE

## 2025-01-17 PROCEDURE — 25500020 PHARM REV CODE 255: Performed by: INTERNAL MEDICINE

## 2025-01-17 PROCEDURE — 1125F AMNT PAIN NOTED PAIN PRSNT: CPT | Mod: CPTII,S$GLB,, | Performed by: INTERNAL MEDICINE

## 2025-01-17 PROCEDURE — 3288F FALL RISK ASSESSMENT DOCD: CPT | Mod: CPTII,S$GLB,, | Performed by: INTERNAL MEDICINE

## 2025-01-17 PROCEDURE — 3074F SYST BP LT 130 MM HG: CPT | Mod: CPTII,S$GLB,, | Performed by: INTERNAL MEDICINE

## 2025-01-17 PROCEDURE — 71275 CT ANGIOGRAPHY CHEST: CPT | Mod: TC

## 2025-01-17 PROCEDURE — 1101F PT FALLS ASSESS-DOCD LE1/YR: CPT | Mod: CPTII,S$GLB,, | Performed by: INTERNAL MEDICINE

## 2025-01-17 PROCEDURE — 99999 PR PBB SHADOW E&M-EST. PATIENT-LVL V: CPT | Mod: PBBFAC,,, | Performed by: INTERNAL MEDICINE

## 2025-01-17 PROCEDURE — 74174 CTA ABD&PLVS W/CONTRAST: CPT | Mod: 26,,, | Performed by: RADIOLOGY

## 2025-01-17 RX ADMIN — IOHEXOL 100 ML: 350 INJECTION, SOLUTION INTRAVENOUS at 12:01

## 2025-01-17 NOTE — ASSESSMENT & PLAN NOTE
Ro Hernandez is a 69 y.o. female referred by Dr Jose for evaluation of severe AS (NYHA Class III sx).    The patient has undergone the following TAVR work-up:   ECHO (Date 8/1/2024): Aortic valve area by VTI is 0.91 cm². Aortic valve peak velocity is 5.64 m/s. Mean gradient is 76 mmHg. The dimensionless index is 0.33. EF= 65%.   [facility] (Date 12/2/2024): Non-obstructive CAD    STS: 2.9%   Frailty: 1/4   [place] Needs CTA  LVOT area  Needs CTA  Incidental findings on CT:   CT Surgery risk assessment:Needs CTS referral   Rhythm issues: None  PFTs:    KCCQ/5 meter walk:   Comorbidities: HOCM(On reviewing echo there is TAL, HTN, GERD, and COPD

## 2025-01-17 NOTE — PROGRESS NOTES
Subjective:    Patient ID:  Ro Hernandez is a 69 y.o. female who presents for evaluation of severe aortic stenosis      Referring physician: Dr. Josue Jose     Dizziness:    Associated symptoms: chest pain.  Chest Pain   Associated symptoms include dizziness and shortness of breath.   Shortness of Breath  Associated symptoms include chest pain.     69-year-old female with past medical history of hypertension, COPD, Prinzmetal angina, sleep apnea, presents for evaluation of severe aortic stenosis.  She was previously seen by a cardiologist in [place] who told her that she has history of focal but on reviewing the echo she does not have systolic anterior motion of mitral valve with no eccentric mitral regurgitation.  Her aortic valve is Calcified.  Patient was experiencing shortness on breath for the last 5 years on exertion and for the last 6 months she is experiencing chest tightness mainly when she stress.  She denies having any presyncope or syncope.  Currently she denies having any orthopnea PND bilateral lower extremity edema.    Ro Hernandez is a 69 y.o. female referred by Dr Jose for evaluation of severe AS (NYHA Class III sx).     The patient has undergone the following TAVR work-up:   ECHO (Date 8/1/2024): Aortic valve area by VTI is 0.91 cm². Aortic valve peak velocity is 5.64 m/s. Mean gradient is 76 mmHg. The dimensionless index is 0.33. EF= 65%.   [facility] (Date 12/2/2024): Non-obstructive CAD    STS: 2.9%   Frailty: 1/4   [place] Needs CTA  LVOT area  Needs CTA  Incidental findings on CT:   CT Surgery risk assessment:Needs CTS referral   Rhythm issues: None  PFTs:    KCCQ/5 meter walk:   Comorbidities: HOCM(On reviewing echo there is TAL, HTN, GERD, and COPD       Past Medical History:   Diagnosis Date    Allergy     Angina     Arthritis 6/11/2013    Asthma     Congestive heart failure 7/16/2024    COPD (chronic obstructive pulmonary disease)     GERD (gastroesophageal reflux disease)     Hx of  Prinzmetal angina     Hypertension     Obesity 6/11/2013    Other and unspecified hyperlipidemia 6/11/2013    Prinzmetal's angina     Sleep apnea 6/11/2013       Past Surgical History:   Procedure Laterality Date    ABDOMINAL SURGERY      CATHETERIZATION OF BOTH LEFT AND RIGHT HEART N/A 12/2/2024    Procedure: CATHETERIZATION, HEART, BOTH LEFT AND RIGHT;  Surgeon: Donna Holden MD;  Location: [facility] [place] CATH LAB;  Service: Cardiology;  Laterality: N/A;    CHOLECYSTECTOMY      COLONOSCOPY N/A 12/6/2019    Procedure: COLONOSCOPY;  Surgeon: Winston Smith MD;  Location: [facility] [place] ENDO;  Service: Endoscopy;  Laterality: N/A;    ESOPHAGOGASTRODUODENOSCOPY N/A 12/10/2018    Procedure: EGD (ESOPHAGOGASTRODUODENOSCOPY);  Surgeon: Raúl Polanco III, MD;  Location: [facility];  Service: Endoscopy;  Laterality: N/A;    ESOPHAGOGASTRODUODENOSCOPY N/A 9/8/2020    Procedure: EGD (ESOPHAGOGASTRODUODENOSCOPY);  Surgeon: Raúl Polanco III, MD;  Location: [facility];  Service: Endoscopy;  Laterality: N/A;    EYE SURGERY      FOOT SURGERY      INTRALUMINAL GASTROINTESTINAL TRACT IMAGING VIA CAPSULE N/A 9/18/2020    Procedure: IMAGING PROCEDURE, GI TRACT, INTRALUMINAL, VIA CAPSULE;  Surgeon: Blade Holley RN;  Location: [facility];  Service: Endoscopy;  Laterality: N/A;    TONSILLECTOMY      TUBAL LIGATION         Current Outpatient Medications on File Prior to Visit   Medication Sig Dispense Refill    albuterol (PROVENTIL HFA) 90 mcg/actuation inhaler Inhale 2 puffs into the lungs every 6 (six) hours as needed for Wheezing. Rescue 8 g 0    albuterol (PROVENTIL) 2.5 mg /3 mL (0.083 %) nebulizer solution Take 3 mLs (2.5 mg total) by nebulization every 4 (four) hours as needed for Wheezing or Shortness of Breath. Rescue 360 mL 11    amoxicillin-clavulanate 875-125mg (AUGMENTIN) 875-125 mg per tablet Take 1 tablet by mouth every 12 (twelve) hours. 14 tablet 0    aspirin (ECOTRIN) 81 MG EC tablet Take 81 mg by mouth once daily.       celecoxib (CELEBREX) 200 MG capsule Take 200 mg by mouth once daily.      ezetimibe (ZETIA) 10 mg tablet Take 1 tablet (10 mg total) by mouth once daily. 90 tablet 2    fluticasone propionate (FLONASE) 50 mcg/actuation nasal spray USE 2 SPRAYS IN EACH NOSTRIL ONE TIME DAILY 48 g 3    furosemide (LASIX) 40 MG tablet Take 1 tablet (40 mg total) by mouth once daily. 90 tablet 3    HYDROcodone-acetaminophen (NORCO)  mg per tablet Take 1 tablet by mouth. One tablet twice a day      inhalation spacing device Use as directed for inhalation. 1 each 0    isosorbide mononitrate (IMDUR) 30 MG 24 hr tablet Take 1 tab po in the AM and 1/2 tab po in the  tablet 1    lactulose (CHRONULAC) 10 gram/15 mL solution Take 15 mLs (10 g total) by mouth 2 (two) times daily. 1892 mL 11    metoprolol succinate (TOPROL-XL) 100 MG 24 hr tablet       MULTIVITAMIN (MULTIPLE VITAMIN ORAL) Take 1 tablet by mouth Daily.      naloxone (NARCAN) 4 mg/actuation Spry       nitroGLYCERIN (NITROSTAT) 0.4 MG SL tablet DISSOLVE 1 TABLET UNDER THE TONGUE EVERY 5 MINUTES FOR 3 DOSES AS NEEDED FOR CHEST PAIN 25 tablet 0    pantoprazole (PROTONIX) 20 MG tablet Take 1 tablet (20 mg total) by mouth 2 (two) times daily before meals. 180 tablet 3    solifenacin (VESICARE) 10 MG tablet Take 1 tablet (10 mg total) by mouth once daily. 90 tablet 3    tiotropium-olodateroL (STIOLTO RESPIMAT) 2.5-2.5 mcg/actuation Mist Inhale 2 puffs into the lungs once daily. Controller 12 g 3    triamcinolone acetonide (KENALOG-40) 40 mg/mL injection 40 mg by Other route once daily.      verapamiL (VERELAN) 360 MG C24P Take 360 mg by mouth once daily.      albuterol (VENTOLIN HFA) 90 mcg/actuation inhaler Inhale 2 puffs into the lungs every 6 (six) hours as needed for Wheezing or Shortness of Breath. Rescue (Patient not taking: Reported on 1/17/2025) 18 g 2    cyclobenzaprine (FLEXERIL) 5 MG tablet  (Patient not taking: Reported on 1/17/2025)       No current  "facility-administered medications on file prior to visit.       Review of patient's allergies indicates:   Allergen Reactions    Ace inhibitors      Other reaction(s): Hives    Lisinopril      Other reaction(s): rash    Pravastatin      Other reaction(s): Mental Changes    Rosuvastatin Other (See Comments)     Other reaction(s): Muscle pain    Simvastatin Other (See Comments)     Other reaction(s): leg cramps       Social History     Tobacco Use    Smoking status: Former     Current packs/day: 0.00     Average packs/day: 1 pack/day for 48.4 years (48.4 ttl pk-yrs)     Types: Cigarettes     Start date:      Quit date: 2016     Years since quittin.6     Passive exposure: Never    Smokeless tobacco: Never    Tobacco comments:     smoked for 25 yrs. quit 16yrs. then smoked again for 5-6 before quitting in .   Substance Use Topics    Alcohol use: No     Alcohol/week: 1.0 standard drink of alcohol     Types: 1 Standard drinks or equivalent per week    Drug use: No       Family History   Problem Relation Name Age of Onset    Hypertension Mother      Cancer Mother      Kidney cancer Mother      COPD Father      Hypertension Father      Heart disease Sister      Hyperlipidemia Son      Breast cancer Maternal Grandmother      Colon cancer Neg Hx           Review of Systems   Constitutional: Negative.   HENT: Negative.     Cardiovascular:  Positive for chest pain.   Respiratory:  Positive for shortness of breath.    Endocrine: Negative.    Skin: Negative.    Musculoskeletal: Negative.    Gastrointestinal: Negative.    Genitourinary: Negative.    Neurological:  Positive for dizziness.        Objective:     Vitals:    25 1025 25 1026   BP: (!) 123/58 (!) 128/56   BP Location: Right arm Left arm   Patient Position: Sitting Sitting   Pulse: 71 68   SpO2: 97% 97%   Weight: 87.4 kg (192 lb 10.9 oz)    Height: 5' 5" (1.651 m)         Physical Exam  Constitutional:       Appearance: Normal appearance. "   HENT:      Head: Normocephalic and atraumatic.   Eyes:      Extraocular Movements: Extraocular movements intact.      Pupils: Pupils are equal, round, and reactive to light.   Cardiovascular:      Rate and Rhythm: Normal rate and regular rhythm.      Heart sounds: Murmur heard.      Comments: Late peaking ejection systolic murmer present  Pulmonary:      Effort: Pulmonary effort is normal.      Breath sounds: Normal breath sounds.   Abdominal:      General: Abdomen is flat.      Palpations: Abdomen is soft.   Musculoskeletal:         General: Normal range of motion.      Cervical back: Normal range of motion.   Skin:     General: Skin is warm.      Capillary Refill: Capillary refill takes less than 2 seconds.   Neurological:      General: No focal deficit present.      Mental Status: She is alert and oriented to person, place, and time.           Assessmen/Plan   Nonrheumatic aortic valve stenosis  oR Hernandez is a 69 y.o. female referred by Dr Jose for evaluation of severe AS (NYHA Class III sx).    The patient has undergone the following TAVR work-up:   ECHO (Date 8/1/2024): Aortic valve area by VTI is 0.91 cm². Aortic valve peak velocity is 5.64 m/s. Mean gradient is 76 mmHg. The dimensionless index is 0.33. EF= 65%.   [facility] (Date 12/2/2024): Non-obstructive CAD    STS: 2.9%   Frailty: 1/4   [facility] Needs CTA  LVOT area  Needs CTA  Incidental findings on CT:   CT Surgery risk assessment:Needs CTS referral   Rhythm issues: None  PFTs:    KCCQ/5 meter walk:   Comorbidities: HOCM(On reviewing echo there is TAL, HTN, GERD, and COPD               Mixed hyperlipidemia  Continue statins    Unspecified essential hypertension  Well controlled.  Continue on Imdur, metoprolol, verapamil    Daquan Sorensen

## 2025-01-17 NOTE — PROGRESS NOTES
Mrs. Hernandez experienced an extravasation to the RAC with a 20G. About 40-50mL extravasated into her arm. IV flushed well and contrast was in her vein to trigger for the scan. Sagrario Sharma MD came to evaluate Mrs. Hernandez. Instruction was given to the pt and pt understood them.

## 2025-01-17 NOTE — ASSESSMENT & PLAN NOTE
Body mass index is 32.06 kg/m². Healthy lifestyle was discussed with the patient as well as the importance of physical activity to improve cardiovascular health.

## 2025-01-17 NOTE — ASSESSMENT & PLAN NOTE
"Patient has Diastolic (HFpEF) heart failure that is Chronic. On presentation their CHF was decompensated. Evidence of decompensated CHF on presentation includes: shortness of breath. The etiology of their decompensation is likely valvular heart disease . Most recent BNP and echo results are listed below.  No results for input(s): "BNP" in the last 72 hours.  Latest ECHO  Results for orders placed during the hospital encounter of 08/01/24    Echo    Interpretation Summary    Left Ventricle: The left ventricle is normal in size. Mildly increased ventricular mass. Mildly increased wall thickness. There is concentric hypertrophy. There is normal systolic function with a visually estimated ejection fraction of 60 - 65%. Grade II diastolic dysfunction. Elevated left ventricular filling pressure.    Right Ventricle: Normal right ventricular cavity size. Wall thickness is normal. Systolic function is normal.    Left Atrium: Left atrium is mildly dilated.    Aortic Valve: There is severe aortic valve sclerosis. There is severe stenosis. Aortic valve area by VTI is 0.91 cm². Aortic valve peak velocity is 5.64 m/s. Mean gradient is 76 mmHg. The dimensionless index is 0.33. There is mild aortic regurgitation.    Mitral Valve: There is moderate mitral annular calcification present.    Tricuspid Valve: There is mild to moderate regurgitation.    Pulmonary Artery: There is moderate to severe pulmonary hypertension. The estimated pulmonary artery systolic pressure is 59 mmHg.    IVC/SVC: Normal venous pressure at 3 mmHg.    Current Heart Failure Medications       Plan  - Monitor strict I&Os and daily weights.    - Place on telemetry  - Low sodium diet  - Place on fluid restriction of 1.5 L.   - Cardiology has been consulted  - The patient's volume status is stable but not at their baseline as indicated by dyspnea on exertion (MALCOLM) and shortness of breath  - Will proceed with TAVR workup      "

## 2025-01-20 ENCOUNTER — OFFICE VISIT (OUTPATIENT)
Dept: URGENT CARE | Facility: CLINIC | Age: 70
End: 2025-01-20
Payer: MEDICARE

## 2025-01-20 VITALS
OXYGEN SATURATION: 97 % | RESPIRATION RATE: 18 BRPM | HEART RATE: 71 BPM | SYSTOLIC BLOOD PRESSURE: 113 MMHG | BODY MASS INDEX: 31.82 KG/M2 | DIASTOLIC BLOOD PRESSURE: 59 MMHG | HEIGHT: 65 IN | WEIGHT: 191 LBS | TEMPERATURE: 98 F

## 2025-01-20 DIAGNOSIS — R52 BODY ACHES: ICD-10-CM

## 2025-01-20 DIAGNOSIS — J10.1 INFLUENZA A: Primary | ICD-10-CM

## 2025-01-20 DIAGNOSIS — R05.8 PRODUCTIVE COUGH: ICD-10-CM

## 2025-01-20 LAB
CTP QC/QA: YES
CTP QC/QA: YES
POC MOLECULAR INFLUENZA A AGN: POSITIVE
POC MOLECULAR INFLUENZA B AGN: NEGATIVE
SARS-COV-2 AG RESP QL IA.RAPID: NEGATIVE

## 2025-01-20 PROCEDURE — 87502 INFLUENZA DNA AMP PROBE: CPT | Mod: QW,S$GLB,,

## 2025-01-20 PROCEDURE — 87811 SARS-COV-2 COVID19 W/OPTIC: CPT | Mod: QW,S$GLB,,

## 2025-01-20 PROCEDURE — 99213 OFFICE O/P EST LOW 20 MIN: CPT | Mod: S$GLB,,,

## 2025-01-20 RX ORDER — OSELTAMIVIR PHOSPHATE 75 MG/1
75 CAPSULE ORAL 2 TIMES DAILY
Qty: 10 CAPSULE | Refills: 0 | Status: SHIPPED | OUTPATIENT
Start: 2025-01-20 | End: 2025-01-25

## 2025-01-20 NOTE — PROGRESS NOTES
"Subjective:      Patient ID: Ro Hernandez is a 69 y.o. female.    Vitals:  height is 5' 5" (1.651 m) and weight is 86.6 kg (191 lb). Her tympanic temperature is 97.6 °F (36.4 °C). Her blood pressure is 113/59 (abnormal) and her pulse is 71. Her respiration is 18 and oxygen saturation is 97%.     Chief Complaint: Sinus Problem    68 yo female Onset of sxs 01/18/25. Pt is c/o chest congestion, body aches, productive cough with thick mucus production, and post nasal drip. Pt has used  an rx inhaler  and mucinex dm to alleviate sxs, also states NORCO helped with body aches. Denies fever, chills. Was in hospital for  this past week. Denies cp. States her O2 is not usually this low and usually only uses oxygen at night. States she has history of pneumonia. Allergic to ace inhibitors, statin drugs.    Sinus Problem  This is a new problem. The current episode started in the past 7 days. The problem is unchanged. There has been no fever. The pain is mild. Associated symptoms include congestion, coughing, headaches, shortness of breath and a sore throat. Pertinent negatives include no chills, ear pain or neck pain. Past treatments include oral decongestants. The treatment provided no relief.       Constitution: Negative for chills and fever.   HENT:  Positive for congestion, postnasal drip and sore throat. Negative for ear pain, trouble swallowing and voice change.    Neck: Negative for neck pain and neck stiffness.   Cardiovascular:  Negative for chest pain.   Eyes:  Negative for eye discharge, eye itching and eye redness.   Respiratory:  Positive for cough, sputum production, COPD, shortness of breath and asthma.    Musculoskeletal:  Positive for muscle ache.   Skin:  Negative for rash.   Allergic/Immunologic: Positive for asthma.   Neurological:  Positive for headaches.      Objective:     Vitals:    01/20/25 1158   BP: (!) 113/59   Pulse: 71   Resp:    Temp:        Physical Exam   Constitutional: She is " oriented to person, place, and time. She appears well-developed. She is cooperative.  Non-toxic appearance. She does not appear ill. No distress.   HENT:   Head: Normocephalic and atraumatic.   Ears:   Right Ear: Hearing, tympanic membrane, external ear and ear canal normal. No no drainage, swelling or tenderness. Tympanic membrane is not erythematous and not bulging. No middle ear effusion. no impacted cerumen  Left Ear: Hearing, tympanic membrane, external ear and ear canal normal. No no drainage, swelling or tenderness. Tympanic membrane is not erythematous and not bulging.  No middle ear effusion. no impacted cerumen  Nose: Nose normal. No mucosal edema, rhinorrhea or nasal deformity. No epistaxis. Right sinus exhibits no maxillary sinus tenderness and no frontal sinus tenderness. Left sinus exhibits no maxillary sinus tenderness and no frontal sinus tenderness.   Mouth/Throat: Uvula is midline, oropharynx is clear and moist and mucous membranes are normal. Mucous membranes are moist. No trismus in the jaw. Normal dentition. No uvula swelling. Cobblestoning present. No oropharyngeal exudate, posterior oropharyngeal edema or posterior oropharyngeal erythema. No tonsillar exudate.   Eyes: Conjunctivae and lids are normal. Pupils are equal, round, and reactive to light. Right eye exhibits no discharge. Left eye exhibits no discharge. No scleral icterus.   Neck: Trachea normal and phonation normal. Neck supple. No edema present. No erythema present. No neck rigidity present.   Cardiovascular: Normal rate, regular rhythm, normal heart sounds and normal pulses.   Pulmonary/Chest: Effort normal and breath sounds normal. No accessory muscle usage or stridor. No tachypnea. No respiratory distress. She has no decreased breath sounds. She has no wheezes. She has no rhonchi. She has no rales.   Abdominal: Normal appearance.   Musculoskeletal: Normal range of motion.         General: No deformity. Normal range of motion.    Neurological: She is alert and oriented to person, place, and time. She exhibits normal muscle tone. Coordination and gait normal.   Skin: Skin is warm, dry, intact, not diaphoretic, not pale and no rash.   Psychiatric: Her speech is normal and behavior is normal. Judgment and thought content normal.   Nursing note and vitals reviewed.      Assessment:     1. Influenza A    2. Productive cough    3. Body aches      Results for orders placed or performed in visit on 01/20/25   POCT Influenza A/B MOLECULAR    Collection Time: 01/20/25 11:49 AM   Result Value Ref Range    POC Molecular Influenza A Ag Positive (A) Negative    POC Molecular Influenza B Ag Negative Negative     Acceptable Yes    SARS Coronavirus 2 Antigen, POCT Manual Read    Collection Time: 01/20/25 11:52 AM   Result Value Ref Range    SARS Coronavirus 2 Antigen Negative Negative     Acceptable Yes        Plan:       Influenza A  -     oseltamivir (TAMIFLU) 75 MG capsule; Take 1 capsule (75 mg total) by mouth 2 (two) times daily. for 5 days  Dispense: 10 capsule; Refill: 0    Productive cough  -     SARS Coronavirus 2 Antigen, POCT Manual Read  -     POCT Influenza A/B MOLECULAR    Body aches        Patient Instructions   Discharge Instructions       FLU  Your Rapid FLU test was POSITIVE FOR INFLUENZA  If your condition worsens or fails to improve we recommend that you receive another evaluation at the ER immediately or contact your PCP to discuss your concerns or return here. You must understand that you've received an urgent care treatment only and that you may be released before all your medical problems are known or treated. You the patient will arrange for follouwp care as instructed.     - Take full course of Tamilfu as prescribed, unless you cannot tolerate the side effects. You are contagious for 24 hours after you start Tamiflu or 24 hours after your last fever, whichever happens last.  -  Zyrtec D, Claritin D or  Allegra D can help with symptoms of congestion and drainage.   - Over the counter pseudoephedrine can be used as a decongestant but beware that this can raise your blood pressure.   -  If you just have drainage you can take plain Zyrtec, Claritin or Allegra   - You can also take Emergen-C to help boost your immune system.  -  Flonase (fluticasone) is a nasal spray which is available over the counter and may help with your symptoms.   -  Rest and fluids are also important.   - You can treat your symptoms with DayQuil, NyQuil, Cepacol and/or cough drops. Do not take additional Tylenol while taking Dayquil and/or Nyquil, since these medications contain Tylenol.  -  Tylenol (acetaminophen) or Motrin (ibuprofen) every 4 hours can also be used as directed for pain unless you have an allergy to them or medical condition such as stomach ulcers, kidney or liver disease or blood thinners etc for which you should not be taking these type of medications. Do not exceed 4000 mg/day.  - Use Mucinex (guaifenisin) to break up mucous.  - Do not share any utensils or share drinks   - Wash hands frequently  - If for some reason your symptoms worsen or for any new or concerning symptoms, please return, see your primary care provider, or go to the emergency room.       Additional MDM:     Heart Failure Score:   COPD = Yes

## 2025-01-20 NOTE — PATIENT INSTRUCTIONS
Discharge Instructions       FLU  Your Rapid FLU test was POSITIVE FOR INFLUENZA  If your condition worsens or fails to improve we recommend that you receive another evaluation at the ER immediately or contact your PCP to discuss your concerns or return here. You must understand that you've received an urgent care treatment only and that you may be released before all your medical problems are known or treated. You the patient will arrange for follouwp care as instructed.     - Take full course of Tamilfu as prescribed, unless you cannot tolerate the side effects. You are contagious for 24 hours after you start Tamiflu or 24 hours after your last fever, whichever happens last.  -  Zyrtec D, Claritin D or Allegra D can help with symptoms of congestion and drainage.   - Over the counter pseudoephedrine can be used as a decongestant but beware that this can raise your blood pressure.   -  If you just have drainage you can take plain Zyrtec, Claritin or Allegra   - You can also take Emergen-C to help boost your immune system.  -  Flonase (fluticasone) is a nasal spray which is available over the counter and may help with your symptoms.   -  Rest and fluids are also important.   - You can treat your symptoms with DayQuil, NyQuil, Cepacol and/or cough drops. Do not take additional Tylenol while taking Dayquil and/or Nyquil, since these medications contain Tylenol.  -  Tylenol (acetaminophen) or Motrin (ibuprofen) every 4 hours can also be used as directed for pain unless you have an allergy to them or medical condition such as stomach ulcers, kidney or liver disease or blood thinners etc for which you should not be taking these type of medications. Do not exceed 4000 mg/day.  - Use Mucinex (guaifenisin) to break up mucous.  - Do not share any utensils or share drinks   - Wash hands frequently  - If for some reason your symptoms worsen or for any new or concerning symptoms, please return, see your primary care provider, or  go to the emergency room.

## 2025-01-23 ENCOUNTER — DOCUMENTATION ONLY (OUTPATIENT)
Dept: CARDIAC CATH/INVASIVE PROCEDURES | Facility: HOSPITAL | Age: 70
End: 2025-01-23
Payer: MEDICARE

## 2025-01-23 NOTE — PROGRESS NOTES
Ro Hernandez is a 69 y.o. female referred by Dr Jose for evaluation of severe AS (NYHA Class III sx).     The patient has undergone the following TAVR work-up:   ECHO (Date 8/1/2024): Aortic valve area by VTI is 0.91 cm². Aortic valve peak velocity is 5.64 m/s. Mean gradient is 76 mmHg. The dimensionless index is 0.33. EF= 65%.   [facility] (Date 12/2/2024): Non-obstructive CAD    STS: 2.9%   Frailty: 1/4   Iliacs >7 on the right, poor contrast in the study but OK for RTF access  LVOT average diameter 23.9  Incidental findings on CT:   CT Surgery risk assessment: Needs CTS referral   Rhythm issues: None  PFTs:    KCCQ/5 meter walk:   Comorbidities: HOCM(On reviewing echo there is TAL, HTN, GERD, and COPD           Ro Hernandez is a 29 mm  Evolut valve candidate via RTF access. After surgical evaluation.       Name band;

## 2025-01-28 ENCOUNTER — OFFICE VISIT (OUTPATIENT)
Dept: FAMILY MEDICINE | Facility: CLINIC | Age: 70
End: 2025-01-28
Payer: MEDICARE

## 2025-01-28 ENCOUNTER — HOSPITAL ENCOUNTER (OUTPATIENT)
Dept: RADIOLOGY | Facility: HOSPITAL | Age: 70
Discharge: HOME | End: 2025-01-28
Attending: FAMILY MEDICINE
Payer: MEDICARE

## 2025-01-28 VITALS
DIASTOLIC BLOOD PRESSURE: 60 MMHG | TEMPERATURE: 97 F | BODY MASS INDEX: 32.35 KG/M2 | OXYGEN SATURATION: 95 % | HEART RATE: 77 BPM | WEIGHT: 194.13 LBS | SYSTOLIC BLOOD PRESSURE: 122 MMHG | HEIGHT: 65 IN

## 2025-01-28 DIAGNOSIS — E66.01 SEVERE OBESITY (BMI 35.0-35.9 WITH COMORBIDITY): ICD-10-CM

## 2025-01-28 DIAGNOSIS — I42.1 OBSTRUCTIVE HYPERTROPHIC CARDIOMYOPATHY: ICD-10-CM

## 2025-01-28 DIAGNOSIS — Z95.2 HISTORY OF TRANSCATHETER AORTIC VALVE REPLACEMENT (TAVR): Primary | ICD-10-CM

## 2025-01-28 DIAGNOSIS — J96.11 CHRONIC RESPIRATORY FAILURE WITH HYPOXIA: ICD-10-CM

## 2025-01-28 DIAGNOSIS — J11.1 INFLUENZA: ICD-10-CM

## 2025-01-28 DIAGNOSIS — I50.9 CONGESTIVE HEART FAILURE, UNSPECIFIED HF CHRONICITY, UNSPECIFIED HEART FAILURE TYPE: ICD-10-CM

## 2025-01-28 DIAGNOSIS — Z78.9 STATIN INTOLERANCE: ICD-10-CM

## 2025-01-28 DIAGNOSIS — G47.33 OSA ON CPAP: ICD-10-CM

## 2025-01-28 DIAGNOSIS — F11.20 OPIOID DEPENDENCE, UNCOMPLICATED: ICD-10-CM

## 2025-01-28 DIAGNOSIS — I10 PRIMARY HYPERTENSION: ICD-10-CM

## 2025-01-28 DIAGNOSIS — J43.9 PULMONARY EMPHYSEMA, UNSPECIFIED EMPHYSEMA TYPE: ICD-10-CM

## 2025-01-28 PROCEDURE — 1101F PT FALLS ASSESS-DOCD LE1/YR: CPT | Mod: CPTII,S$GLB,, | Performed by: FAMILY MEDICINE

## 2025-01-28 PROCEDURE — 1159F MED LIST DOCD IN RCRD: CPT | Mod: CPTII,S$GLB,, | Performed by: FAMILY MEDICINE

## 2025-01-28 PROCEDURE — 3008F BODY MASS INDEX DOCD: CPT | Mod: CPTII,S$GLB,, | Performed by: FAMILY MEDICINE

## 2025-01-28 PROCEDURE — 71046 X-RAY EXAM CHEST 2 VIEWS: CPT | Mod: 26,,, | Performed by: RADIOLOGY

## 2025-01-28 PROCEDURE — 3074F SYST BP LT 130 MM HG: CPT | Mod: CPTII,S$GLB,, | Performed by: FAMILY MEDICINE

## 2025-01-28 PROCEDURE — G2211 COMPLEX E/M VISIT ADD ON: HCPCS | Mod: S$GLB,,, | Performed by: FAMILY MEDICINE

## 2025-01-28 PROCEDURE — 71046 X-RAY EXAM CHEST 2 VIEWS: CPT | Mod: TC,PO

## 2025-01-28 PROCEDURE — 3078F DIAST BP <80 MM HG: CPT | Mod: CPTII,S$GLB,, | Performed by: FAMILY MEDICINE

## 2025-01-28 PROCEDURE — 3288F FALL RISK ASSESSMENT DOCD: CPT | Mod: CPTII,S$GLB,, | Performed by: FAMILY MEDICINE

## 2025-01-28 PROCEDURE — 1126F AMNT PAIN NOTED NONE PRSNT: CPT | Mod: CPTII,S$GLB,, | Performed by: FAMILY MEDICINE

## 2025-01-28 PROCEDURE — 99999 PR PBB SHADOW E&M-EST. PATIENT-LVL IV: CPT | Mod: PBBFAC,,, | Performed by: FAMILY MEDICINE

## 2025-01-28 PROCEDURE — 99215 OFFICE O/P EST HI 40 MIN: CPT | Mod: S$GLB,,, | Performed by: FAMILY MEDICINE

## 2025-01-28 RX ORDER — AZITHROMYCIN 250 MG/1
TABLET, FILM COATED ORAL
Qty: 6 TABLET | Refills: 0 | Status: SHIPPED | OUTPATIENT
Start: 2025-01-28 | End: 2025-02-02

## 2025-01-28 RX ORDER — PROMETHAZINE HYDROCHLORIDE AND DEXTROMETHORPHAN HYDROBROMIDE 6.25; 15 MG/5ML; MG/5ML
5 SYRUP ORAL 3 TIMES DAILY PRN
Qty: 118 ML | Refills: 0 | Status: SHIPPED | OUTPATIENT
Start: 2025-01-28 | End: 2025-02-07

## 2025-01-28 RX ORDER — METHYLPREDNISOLONE 4 MG/1
TABLET ORAL
Qty: 1 EACH | Refills: 0 | Status: SHIPPED | OUTPATIENT
Start: 2025-01-28

## 2025-01-28 NOTE — PROGRESS NOTES
Subjective:       Patient ID: Ro Hernandez is a 69 y.o. female.    Chief Complaint: Follow-up      HPI Comments:       Current Outpatient Medications:     albuterol (PROVENTIL HFA) 90 mcg/actuation inhaler, Inhale 2 puffs into the lungs every 6 (six) hours as needed for Wheezing. Rescue, Disp: 8 g, Rfl: 0    albuterol (PROVENTIL) 2.5 mg /3 mL (0.083 %) nebulizer solution, Take 3 mLs (2.5 mg total) by nebulization every 4 (four) hours as needed for Wheezing or Shortness of Breath. Rescue, Disp: 360 mL, Rfl: 11    amoxicillin-clavulanate 875-125mg (AUGMENTIN) 875-125 mg per tablet, Take 1 tablet by mouth every 12 (twelve) hours., Disp: 14 tablet, Rfl: 0    aspirin (ECOTRIN) 81 MG EC tablet, Take 81 mg by mouth once daily., Disp: , Rfl:     celecoxib (CELEBREX) 200 MG capsule, Take 200 mg by mouth once daily., Disp: , Rfl:     ezetimibe (ZETIA) 10 mg tablet, Take 1 tablet (10 mg total) by mouth once daily., Disp: 90 tablet, Rfl: 2    fluticasone propionate (FLONASE) 50 mcg/actuation nasal spray, USE 2 SPRAYS IN EACH NOSTRIL ONE TIME DAILY, Disp: 48 g, Rfl: 3    furosemide (LASIX) 40 MG tablet, Take 1 tablet (40 mg total) by mouth once daily., Disp: 90 tablet, Rfl: 3    HYDROcodone-acetaminophen (NORCO)  mg per tablet, Take 1 tablet by mouth. One tablet twice a day, Disp: , Rfl:     inhalation spacing device, Use as directed for inhalation., Disp: 1 each, Rfl: 0    isosorbide mononitrate (IMDUR) 30 MG 24 hr tablet, Take 1 tab po in the AM and 1/2 tab po in the PM, Disp: 135 tablet, Rfl: 1    lactulose (CHRONULAC) 10 gram/15 mL solution, Take 15 mLs (10 g total) by mouth 2 (two) times daily., Disp: 1892 mL, Rfl: 11    metoprolol succinate (TOPROL-XL) 100 MG 24 hr tablet, , Disp: , Rfl:     MULTIVITAMIN (MULTIPLE VITAMIN ORAL), Take 1 tablet by mouth Daily., Disp: , Rfl:     naloxone (NARCAN) 4 mg/actuation [place], , Disp: , Rfl:     nitroGLYCERIN (NITROSTAT) 0.4 MG SL tablet, DISSOLVE 1 TABLET UNDER THE  TONGUE EVERY 5 MINUTES FOR 3 DOSES AS NEEDED FOR CHEST PAIN, Disp: 25 tablet, Rfl: 0    pantoprazole (PROTONIX) 20 MG tablet, Take 1 tablet (20 mg total) by mouth 2 (two) times daily before meals., Disp: 180 tablet, Rfl: 3    solifenacin (VESICARE) 10 MG tablet, Take 1 tablet (10 mg total) by mouth once daily., Disp: 90 tablet, Rfl: 3    tiotropium-olodateroL (STIOLTO RESPIMAT) 2.5-2.5 mcg/actuation Mist, Inhale 2 puffs into the lungs once daily. Controller, Disp: 12 g, Rfl: 3    triamcinolone acetonide (KENALOG-40) 40 mg/mL injection, 40 mg by Other route once daily., Disp: , Rfl:     verapamiL (VERELAN) 360 MG C24P, Take 360 mg by mouth once daily., Disp: , Rfl:     albuterol (VENTOLIN HFA) 90 mcg/actuation inhaler, Inhale 2 puffs into the lungs every 6 (six) hours as needed for Wheezing or Shortness of Breath. Rescue (Patient not taking: Reported on 11/19/2024), Disp: 18 g, Rfl: 2    cyclobenzaprine (FLEXERIL) 5 MG tablet, , Disp: , Rfl:       Last seen by me 6 months ago.      During that time she has been moving gradually toward getting aortic valve replacement surgery.  Sees the surgeon tomorrow to discuss a date for the surgery upcoming.      Last week was treated for influenza.  Completed her Tamiflu.  She is now 7 days out and coughing and producing yellow brown sputum.  Still some wheezing.  Uses nebulizer at home.  Unfortunately her oxygen machine is broken but it should be repaired by tomorrow.    She uses CPAP regularly.    Sees Dr. Chung for lumbar pain.  On chronic opioids      Review of Systems   Constitutional:  Negative for activity change, appetite change, chills and fever.   HENT:  Positive for congestion. Negative for sore throat.    Respiratory:  Positive for cough, chest tightness, shortness of breath and wheezing.    Cardiovascular:  Negative for chest pain.   Gastrointestinal:  Negative for abdominal pain, diarrhea and nausea.   Genitourinary:  Negative for difficulty urinating.  "  Musculoskeletal:  Negative for arthralgias and myalgias.   Neurological:  Negative for dizziness and headaches.       Objective:      Vitals:    01/28/25 0948   BP: 122/60   Pulse: 77   Temp: 96.7 °F (35.9 °C)   TempSrc: Tympanic   SpO2: 95%   Weight: 88 kg (194 lb 1.8 oz)   Height: 5' 5" (1.651 m)   PainSc: 0-No pain     Physical Exam  Vitals and nursing note reviewed.   Constitutional:       General: She is not in acute distress.     Appearance: She is well-developed. She is ill-appearing. She is not diaphoretic.   HENT:      Head: Normocephalic.      Nose: Mucosal edema and rhinorrhea present.      Mouth/Throat:      Pharynx: Pharyngeal swelling and posterior oropharyngeal erythema present. No oropharyngeal exudate.   Neck:      Thyroid: No thyromegaly.   Cardiovascular:      Rate and Rhythm: Normal rate and regular rhythm.      Heart sounds: Normal heart sounds. No murmur heard.  Pulmonary:      Effort: Pulmonary effort is normal.      Breath sounds: Wheezing present. No rales.   Abdominal:      General: There is no distension.      Palpations: Abdomen is soft.   Musculoskeletal:      Cervical back: Neck supple.   Lymphadenopathy:      Cervical: No cervical adenopathy.   Skin:     General: Skin is warm and dry.   Neurological:      Mental Status: She is alert and oriented to person, place, and time.   Psychiatric:         Behavior: Behavior normal.         Thought Content: Thought content normal.         Judgment: Judgment normal.         Assessment:       1. History of transcatheter aortic valve replacement (TAVR)    2. Chronic respiratory failure with hypoxia    3. HERB on CPAP    4. Congestive heart failure, unspecified HF chronicity, unspecified heart failure type    5. Statin intolerance    6. Primary hypertension    7. Obstructive hypertrophic cardiomyopathy    8. Severe obesity (BMI 35.0-35.9 with comorbidity)    9. Opioid dependence, uncomplicated    10. Pulmonary emphysema, unspecified emphysema type  "   11. Influenza        Plan:   History of transcatheter aortic valve replacement (TAVR)  Comments:  Severe AS.  Murmur observed.  Surgery soon    Chronic respiratory failure with hypoxia  Comments:  Home O2    HERB on CPAP  Comments:  Regular compliance    Congestive heart failure, unspecified HF chronicity, unspecified heart failure type  Comments:  Good fluid balance    Statin intolerance  Comments:  On Zetia    Primary hypertension  Comments:  Controlled.  Follow-up 6 months    Obstructive hypertrophic cardiomyopathy  Comments:  Followed by Cardiology    Severe obesity (BMI 35.0-35.9 with comorbidity)  Comments:  No change in weight    Opioid dependence, uncomplicated  Comments:  Pain management.  Lumbar spine    Pulmonary emphysema, unspecified emphysema type  Comments:  On nebulizers at home.  O2 machine getting fixed    Influenza  Comments:  Completed Tamiflu.  Now with productive cough and sputum change.  Z-Azael.  Medrol Dosepak.  Chest x-ray

## 2025-01-30 ENCOUNTER — OFFICE VISIT (OUTPATIENT)
Dept: CARDIOTHORACIC SURGERY | Facility: CLINIC | Age: 70
End: 2025-01-30
Payer: MEDICARE

## 2025-01-30 VITALS
HEART RATE: 82 BPM | OXYGEN SATURATION: 93 % | RESPIRATION RATE: 16 BRPM | SYSTOLIC BLOOD PRESSURE: 150 MMHG | BODY MASS INDEX: 31.48 KG/M2 | WEIGHT: 188.94 LBS | HEIGHT: 65 IN | DIASTOLIC BLOOD PRESSURE: 74 MMHG

## 2025-01-30 DIAGNOSIS — F17.211 CIGARETTE NICOTINE DEPENDENCE IN REMISSION: ICD-10-CM

## 2025-01-30 DIAGNOSIS — D50.0 IRON DEFICIENCY ANEMIA DUE TO CHRONIC BLOOD LOSS: ICD-10-CM

## 2025-01-30 DIAGNOSIS — R91.8 MULTIPLE LUNG NODULES: ICD-10-CM

## 2025-01-30 DIAGNOSIS — J43.8 OTHER EMPHYSEMA: ICD-10-CM

## 2025-01-30 DIAGNOSIS — I70.0 ATHEROSCLEROSIS OF AORTA: ICD-10-CM

## 2025-01-30 DIAGNOSIS — I50.32 CHRONIC DIASTOLIC CONGESTIVE HEART FAILURE: ICD-10-CM

## 2025-01-30 DIAGNOSIS — E78.2 MIXED HYPERLIPIDEMIA: ICD-10-CM

## 2025-01-30 DIAGNOSIS — J45.20 MILD INTERMITTENT ASTHMA WITHOUT COMPLICATION: ICD-10-CM

## 2025-01-30 DIAGNOSIS — R09.02 EXERCISE HYPOXEMIA: ICD-10-CM

## 2025-01-30 DIAGNOSIS — I10 ESSENTIAL HYPERTENSION: ICD-10-CM

## 2025-01-30 DIAGNOSIS — I42.1 OBSTRUCTIVE HYPERTROPHIC CARDIOMYOPATHY: ICD-10-CM

## 2025-01-30 DIAGNOSIS — F11.20 OPIOID DEPENDENCE, UNCOMPLICATED: Primary | ICD-10-CM

## 2025-01-30 DIAGNOSIS — I20.1 PRINZMETAL'S ANGINA: ICD-10-CM

## 2025-01-30 DIAGNOSIS — G47.33 OSA ON CPAP: Chronic | ICD-10-CM

## 2025-01-30 DIAGNOSIS — K21.00 GASTROESOPHAGEAL REFLUX DISEASE WITH ESOPHAGITIS WITHOUT HEMORRHAGE: ICD-10-CM

## 2025-01-30 PROCEDURE — 99999 PR PBB SHADOW E&M-EST. PATIENT-LVL V: CPT | Mod: PBBFAC,,, | Performed by: THORACIC SURGERY (CARDIOTHORACIC VASCULAR SURGERY)

## 2025-01-30 NOTE — PROGRESS NOTES
Subjective:      Patient ID: Ro Hernandez is a 69 y.o. female.    Chief Complaint: No chief complaint on file.    HPI: 69-year-old female with a history of longstanding murmur nicotine dependence COPD oxygen dependence also has critical aortic stenosis symptomatic.  Her symptoms have been becoming worse in the last few months no history of syncope no history of angina but she is oxygen dependent and felt short of breath at rest.    Review of patient's allergies indicates:   Allergen Reactions    Ace inhibitors      Other reaction(s): Hives    Lisinopril      Other reaction(s): rash    Pravastatin      Other reaction(s): Mental Changes    Rosuvastatin Other (See Comments)     Other reaction(s): Muscle pain    Simvastatin Other (See Comments)     Other reaction(s): leg cramps       Past Medical History:   Diagnosis Date    Allergy     Angina     Arthritis 2013    Asthma     Congestive heart failure 2024    COPD (chronic obstructive pulmonary disease)     GERD (gastroesophageal reflux disease)     Hx of Prinzmetal angina     Hypertension     Obesity 2013    Other and unspecified hyperlipidemia 2013    Prinzmetal's angina     Sleep apnea 2013       Family History   Problem Relation Name Age of Onset    Hypertension Mother      Cancer Mother      Kidney cancer Mother      COPD Father      Hypertension Father      Heart disease Sister      Hyperlipidemia Son      Breast cancer Maternal Grandmother      Colon cancer Neg Hx         Social History     Socioeconomic History    Marital status:    Tobacco Use    Smoking status: Former     Current packs/day: 0.00     Average packs/day: 1 pack/day for 48.4 years (48.4 ttl pk-yrs)     Types: Cigarettes     Start date:      Quit date: 2016     Years since quittin.6     Passive exposure: Never    Smokeless tobacco: Never    Tobacco comments:     smoked for 25 yrs. quit 16yrs. then smoked again for 5-6 before quitting in .    Substance and Sexual Activity    Alcohol use: No     Alcohol/week: 1.0 standard drink of alcohol     Types: 1 Standard drinks or equivalent per week    Drug use: No    Sexual activity: Not Currently     Social Drivers of Health     Financial Resource Strain: Low Risk  (9/29/2019)    Overall Financial Resource Strain (CARDIA)     Difficulty of Paying Living Expenses: Not hard at all   Food Insecurity: No Food Insecurity (9/29/2019)    Hunger Vital Sign     Worried About Running Out of Food in the Last Year: Never true     Ran Out of Food in the Last Year: Never true   Transportation Needs: No Transportation Needs (9/29/2019)    PRAPARE - Transportation     Lack of Transportation (Medical): No     Lack of Transportation (Non-Medical): No   Physical Activity: Unknown (9/29/2019)    Exercise Vital Sign     Days of Exercise per Week: 0 days   Stress: No Stress Concern Present (9/29/2019)    [employer] - Occupational Stress Questionnaire     Feeling of Stress : Only a little       Past Surgical History:   Procedure Laterality Date    ABDOMINAL SURGERY      CATHETERIZATION OF BOTH LEFT AND RIGHT HEART N/A 12/2/2024    Procedure: CATHETERIZATION, HEART, BOTH LEFT AND RIGHT;  Surgeon: Donna Holden MD;  Location: [facility] CATH LAB;  Service: Cardiology;  Laterality: N/A;    CHOLECYSTECTOMY      COLONOSCOPY N/A 12/6/2019    Procedure: COLONOSCOPY;  Surgeon: Winston Smith MD;  Location: [facility];  Service: Endoscopy;  Laterality: N/A;    ESOPHAGOGASTRODUODENOSCOPY N/A 12/10/2018    Procedure: EGD (ESOPHAGOGASTRODUODENOSCOPY);  Surgeon: Raúl Polanco III, MD;  Location: [facility];  Service: Endoscopy;  Laterality: N/A;    ESOPHAGOGASTRODUODENOSCOPY N/A 9/8/2020    Procedure: EGD (ESOPHAGOGASTRODUODENOSCOPY);  Surgeon: Raúl Polanco III, MD;  Location: [facility];  Service: Endoscopy;  Laterality: N/A;    EYE SURGERY      FOOT SURGERY      INTRALUMINAL GASTROINTESTINAL TRACT IMAGING VIA  "CAPSULE N/A 9/18/2020    Procedure: IMAGING PROCEDURE, GI TRACT, INTRALUMINAL, VIA CAPSULE;  Surgeon: Blade Holley RN;  Location: [facility];  Service: Endoscopy;  Laterality: N/A;    TONSILLECTOMY      TUBAL LIGATION         Review of Systems   Constitutional:  Negative for activity change and appetite change.   HENT:  Negative for dental problem, nosebleeds and sore throat.    Eyes:  Negative for discharge and visual disturbance.   Respiratory:  Positive for shortness of breath and wheezing. Negative for chest tightness and stridor.    Cardiovascular:  Negative for leg swelling.   Gastrointestinal:  Negative for abdominal distention and abdominal pain.   Genitourinary:  Negative for difficulty urinating and dysuria.   Musculoskeletal:  Negative for arthralgias, back pain and joint swelling.   Allergic/Immunologic: Negative for environmental allergies.   Neurological:  Negative for dizziness, syncope and headaches.   Hematological:  Does not bruise/bleed easily.   Psychiatric/Behavioral:  Negative for behavioral problems.           Objective:   BP (!) 150/74   Pulse 82   Resp 16   Ht 5' 5" (1.651 m)   Wt 85.7 kg (188 lb 15 oz)   SpO2 (!) 93%   BMI 31.44 kg/m²     X-Ray Chest PA And Lateral  Narrative: EXAMINATION:  XR CHEST PA AND LATERAL    CLINICAL HISTORY:  Influenza due to unidentified influenza virus with other respiratory manifestations    TECHNIQUE:  PA and lateral views of the chest were performed.    COMPARISON:  Prior radiographs, most recent 07/13/2024    FINDINGS:  Cardiac silhouette and mediastinal contours are unchanged.  Lungs demonstrate opacity with similar perihilar/lower lung interstitial/vascular prominence.  No pleural effusion.  Osseous structures are intact.  Impression: Similar appearance of the chest when compared with 07/13/2024.  Correlate for mild congestion/interstitial edema.  No focal new opacity.    Electronically signed by: Jose Antonio Reid, " MD  Date:    01/28/2025  Time:    10:33         Physical Exam  Constitutional:       Appearance: Normal appearance. She is obese.   HENT:      Head: Normocephalic and atraumatic.      Mouth/Throat:      Mouth: Mucous membranes are moist.   Eyes:      Extraocular Movements: Extraocular movements intact.      Pupils: Pupils are equal, round, and reactive to light.   Cardiovascular:      Rate and Rhythm: Normal rate and regular rhythm.      Pulses: Normal pulses.      Heart sounds: Murmur heard.   Pulmonary:      Effort: Pulmonary effort is normal.      Breath sounds: Normal breath sounds.   Abdominal:      General: Bowel sounds are normal.      Palpations: Abdomen is soft.   Musculoskeletal:         General: Normal range of motion.   Skin:     General: Skin is warm.      Capillary Refill: Capillary refill takes less than 2 seconds.   Neurological:      General: No focal deficit present.      Mental Status: She is alert and oriented to person, place, and time.   Psychiatric:         Mood and Affect: Mood normal.         Behavior: Behavior normal.       Left Ventricle: The left ventricle is normal in size. Mildly increased ventricular mass. Mildly increased wall thickness. There is concentric hypertrophy. There is normal systolic function with a visually estimated ejection fraction of 60 - 65%. Grade II diastolic dysfunction. Elevated left ventricular filling pressure.    Right Ventricle: Normal right ventricular cavity size. Wall thickness is normal. Systolic function is normal.    Left Atrium: Left atrium is mildly dilated.    Aortic Valve: There is severe aortic valve sclerosis. There is severe stenosis. Aortic valve area by VTI is 0.91 cm². Aortic valve peak velocity is 5.64 m/s. Mean gradient is 76 mmHg. The dimensionless index is 0.33. There is mild aortic regurgitation.    Mitral Valve: There is moderate mitral annular calcification present.    Tricuspid Valve: There is mild to moderate regurgitation.     Pulmonary Artery: There is moderate to severe pulmonary hypertension. The estimated pulmonary artery systolic pressure is 59 mmHg.    IVC/SVC: Normal venous pressure at 3 mmHg.    Assessment:     1. Opioid dependence, uncomplicated    2. Mild intermittent asthma without complication    3. Multiple lung nodules    4. Other emphysema    5. Exercise hypoxemia    6. Mixed hyperlipidemia    7. Prinzmetal's angina    8. Unspecified essential hypertension    9. Atherosclerosis of aorta    10. Obstructive hypertrophic cardiomyopathy    11. Chronic diastolic congestive heart failure    12. Gastroesophageal reflux disease with esophagitis without hemorrhage    13. Iron deficiency anemia due to chronic blood loss    14. HERB on CPAP    15. Cigarette nicotine dependence in remission        Plan    69-year-old female with a history of longstanding murmur nicotine dependence COPD oxygen dependence also has critical aortic stenosis symptomatic.  She is a high-risk candidate for surgical AVR.  Mortality 6%  We will assess her for a transcatheter aortic valve replacement.  She will be a candidate for transcatheter aortic valve replacement with a rescue surgical backup.          Best Porter MD,   [facility]  [place]

## 2025-01-31 ENCOUNTER — EDUCATION (OUTPATIENT)
Dept: CARDIOLOGY | Facility: CLINIC | Age: 70
End: 2025-01-31
Payer: MEDICARE

## 2025-01-31 ENCOUNTER — PATIENT MESSAGE (OUTPATIENT)
Dept: CARDIOLOGY | Facility: CLINIC | Age: 70
End: 2025-01-31
Payer: MEDICARE

## 2025-01-31 DIAGNOSIS — I35.0 NONRHEUMATIC AORTIC VALVE STENOSIS: Primary | ICD-10-CM

## 2025-01-31 DIAGNOSIS — I35.0 SEVERE AORTIC STENOSIS: Primary | ICD-10-CM

## 2025-01-31 RX ORDER — SODIUM CHLORIDE 9 MG/ML
INJECTION, SOLUTION INTRAVENOUS CONTINUOUS
OUTPATIENT
Start: 2025-01-31 | End: 2025-01-31

## 2025-01-31 RX ORDER — SODIUM CHLORIDE 0.9 % (FLUSH) 0.9 %
10 SYRINGE (ML) INJECTION
Status: SHIPPED | OUTPATIENT
Start: 2025-01-31

## 2025-01-31 RX ORDER — DIPHENHYDRAMINE HCL 50 MG
50 CAPSULE ORAL ONCE
OUTPATIENT
Start: 2025-01-31 | End: 2025-01-31

## 2025-01-31 NOTE — PROGRESS NOTES
TAVR DISCHARGE INSTRUCTIONS      1. General Post Op Instructions:  -   No lifting greater than 5 pounds for 5 days  -   No driving or operating heavy machinery for 5 days  -   You may shower as soon as you get home but no bathing or submerging in water       (lakes, pools, tub etc) for 1 week.                -   You may remove the dressing and replace with a band-aid.      -   Keep incision dry and clean.  No lotions, oils, or creams.  You may change the band-aid daily                   until a scab has formed over              -   You may feel a small lump in your groin area due to a closure device used after the procedure.                     The site may be black and blue or swollen and pink for a few days. If the site enlarges,                    becomes painful and/or starts to bleed,please call.               -    You may return to your regular activities as tolerated.        If you develop any fever, urinary tract infection, or any other signs of infection, please call our office immediately.    2.  Preventing infection on Your heart Valve:  -   DO NOT have any dental work, surgery, or any other elective procedures for 6 months.  -   Provide a copy of this card to your Dentist or Gastroenterologist to keep in your chart.  -  You DO need to take antibiotics prior to any routine dental cleaning, GI procedure (colonoscopy, endoscopy), (cystoscopy), or respiratory procedures (bronchoscopy).                -  You DO need antibiotics if you are having a procedure that requires cutting any infected area.              -  Your Dentist or Gastroenterologist will prescribe these for you prior to your appointment.        3.  Managing your heart Failure:  -    Weigh yourself daily and keep track of your weight  -    If you are on Lasix, continue to take it as prescribed.  -    If you gain 3 pounds overnight or 5 pounds over 5 days, double Lasix for 3 days or begin taking  "Lasix once a day for 3 days                   4.  Follow Up:  -   TAVR follow up protocol requires you to return for a one month and a one-year visit. We will schedule an echo, blood work  and an appointment to see a    member of our team.      -   Please continue to see your regular Cardiologist for all other issues.  -   You will receive an ID card from the  of your valve in 4-6 weeks.  Make a copy of the card, and keep it with you at all times.    5.  Discharge:  - If you have any questions or concerns after discharge, please do not hesitate to call our office and speak with a nurse at [phone]              - If you have any problems or concerns related to a Pacemaker or Event monitor please call the Arrhythmia department at [phone].             -  Please address any other concerns with your primary Cardiologist.                   -  We answer messages sent via the "[employer]" portal Monday - Friday 8am - 5pm.               -  Please do not send emergency messages through the portal.                  After hours and weekends, please call [phone] to speak to a Registered Nurse.     "

## 2025-01-31 NOTE — PROGRESS NOTES
TRANSCATHETER AORTIC VALVE REPLACEMENT      PLEASE READ ALL INSTRUCTIONS CAREFULLY     You have been scheduled for your valve replacement on Tuesday, February 11, 2025.     Please report to the Cardiology Waiting Area on the Third floor of the hospital and check in at 10 AM.   You will then be taken to the SSCU (Short Stay Cardiac Unit) and prepared for your procedure. Please be aware that this is not the time of your procedure but the time you are to arrive.     Preperations for your procedure:  Shower with Dial soap the night before and the morning of your procedure.  Nothing to eat or drink after MIDNIGHT the night before.                                            You may take your regular morning medications with as much water or clear liquid as necessary.   Bring your cane and/or walker with you to the hospital.  Bring CPAP/BiPAP, or oxygen if you are on oxygen at home.  Call the office for any signs of infection ( fever, cough, pneumonia, urinary tract, etc.) We cannot implant a device in an infected patient.    Medications:  You may take your regular scheduled medications the morning of your procedure with as much water as necessary.  If you are diabetic and on Metformin (Glucophage), do not take it the day before, the day of, and 2 days after your procedure.    If you take a weekly GLP-1 Inhibitor  You must be off that medication for one full week prior to your procedure.  Anesthesia will cancel your procedure if you do not hold this medication for a minimum of 7 days prior to procedure.       How long will the procedure take?  The procedure takes approximately three to four hours.  After the procedure, you will go to an ICU or the Cardiac Step Down Unit.  The decision will be made by the valve team.  You will be monitored closely for the next several hours and remain overnight.       When can I go home?  Your length of stay in the hospital, will be determined by your physician.  Be prepared to stay 1-3  days.  You will be discharged when your physician thinks it is safe for you to go home.  You must have someone to drive you home when you are discharged.      Follow Up After Valve Replacement:  You will be scheduled for follow up in one month and one year with an echo, lab work, and a clinic visit.    If you are in a research trial, your follow up will be slightly different and according to the trial requirements.  You can follow up with your regular cardiologist regarding any other heart issues.  DO NOT SCHEDULE ANY ELECTIVE PROCEDURES FOR 6 MONTHS AFTER TAVR.  THIS INCLUDES DENTAL WORK, COLONOSCOPY,ETC.         Please contact our office at [phone] for any questions.  .me            THE ABOVE INSTRUCTIONS WERE GIVEN TO THE PATIENT VERBALLY AND THEY VERBALIZED UNDERSTANDING.  THEY DO NOT REQUIRE ANY SPECIAL NEEDS AND DO NOT HAVE ANY LEARNING BARRIERS.          Directions for Reporting to Cardiology Waiting Area in the Hospital  If you park in the Parking Garage:  Take elevators to the1st floor of the parking garage.  Continue past the gift shop, coffee shop, and piano.  Take a right and go to the gold elevators. (Elevator B)  Take the elevator to the 3rd floor.  Follow the arrow on the sign on the wall that says Cath Lab Registration/EP Lab Registration.  Follow the long hallway all the way around until you come to a big open area.  This is the registration area.  Check in at Reception Desk.    OR    If family is dropping you off:  Have them drop you off at the front of the Hospital under the green overhang.  Enter through the doors and take a right.  Take the E elevators to the 3rd floor Cardiology Waiting Area.  Check in at the Reception Desk in the waiting room.

## 2025-02-03 ENCOUNTER — TELEPHONE (OUTPATIENT)
Dept: FAMILY MEDICINE | Facility: CLINIC | Age: 70
End: 2025-02-03
Payer: MEDICARE

## 2025-02-03 NOTE — TELEPHONE ENCOUNTER
----- Message from Minor Charlton MD sent at 2/1/2025  5:52 PM CST -----  Call patient:  No pneumonia on x-ray.  How is she feeling?

## 2025-02-04 ENCOUNTER — LAB VISIT (OUTPATIENT)
Dept: LAB | Facility: HOSPITAL | Age: 70
End: 2025-02-04
Attending: INTERNAL MEDICINE
Payer: MEDICARE

## 2025-02-04 DIAGNOSIS — I35.0 NONRHEUMATIC AORTIC VALVE STENOSIS: ICD-10-CM

## 2025-02-04 LAB
ANION GAP SERPL CALC-SCNC: 5 MMOL/L (ref 8–16)
BUN SERPL-MCNC: 24 MG/DL (ref 8–23)
CALCIUM SERPL-MCNC: 8.9 MG/DL (ref 8.7–10.5)
CHLORIDE SERPL-SCNC: 102 MMOL/L (ref 95–110)
CO2 SERPL-SCNC: 30 MMOL/L (ref 23–29)
CREAT SERPL-MCNC: 0.8 MG/DL (ref 0.5–1.4)
ERYTHROCYTE [DISTWIDTH] IN BLOOD BY AUTOMATED COUNT: 13.6 % (ref 11.5–14.5)
EST. GFR  (NO RACE VARIABLE): >60 ML/MIN/1.73 M^2
GLUCOSE SERPL-MCNC: 68 MG/DL (ref 70–110)
HCT VFR BLD AUTO: 41.4 % (ref 37–48.5)
HGB BLD-MCNC: 13.7 G/DL (ref 12–16)
MCH RBC QN AUTO: 31 PG (ref 27–31)
MCHC RBC AUTO-ENTMCNC: 33.1 G/DL (ref 32–36)
MCV RBC AUTO: 94 FL (ref 82–98)
PLATELET # BLD AUTO: 272 K/UL (ref 150–450)
PMV BLD AUTO: 9.7 FL (ref 9.2–12.9)
POTASSIUM SERPL-SCNC: 4.5 MMOL/L (ref 3.5–5.1)
RBC # BLD AUTO: 4.42 M/UL (ref 4–5.4)
SODIUM SERPL-SCNC: 137 MMOL/L (ref 136–145)
WBC # BLD AUTO: 6.57 K/UL (ref 3.9–12.7)

## 2025-02-04 PROCEDURE — 36415 COLL VENOUS BLD VENIPUNCTURE: CPT | Mod: PO | Performed by: INTERNAL MEDICINE

## 2025-02-04 PROCEDURE — 85027 COMPLETE CBC AUTOMATED: CPT | Performed by: INTERNAL MEDICINE

## 2025-02-04 PROCEDURE — 80048 BASIC METABOLIC PNL TOTAL CA: CPT | Performed by: INTERNAL MEDICINE

## 2025-02-11 ENCOUNTER — ANESTHESIA (OUTPATIENT)
Dept: MEDSURG UNIT | Facility: HOSPITAL | Age: 70
End: 2025-02-11
Payer: MEDICARE

## 2025-02-11 ENCOUNTER — ANESTHESIA EVENT (OUTPATIENT)
Dept: MEDSURG UNIT | Facility: HOSPITAL | Age: 70
End: 2025-02-11
Payer: MEDICARE

## 2025-02-11 ENCOUNTER — HOSPITAL ENCOUNTER (INPATIENT)
Facility: HOSPITAL | Age: 70
LOS: 1 days | Discharge: HOME | DRG: 267 | End: 2025-02-12
Attending: INTERNAL MEDICINE | Admitting: INTERNAL MEDICINE
Payer: MEDICARE

## 2025-02-11 DIAGNOSIS — Z95.3 S/P TAVR (TRANSCATHETER AORTIC VALVE REPLACEMENT): ICD-10-CM

## 2025-02-11 DIAGNOSIS — I44.2 CHB (COMPLETE HEART BLOCK): ICD-10-CM

## 2025-02-11 DIAGNOSIS — I44.2 COMPLETE HEART BLOCK: Primary | ICD-10-CM

## 2025-02-11 DIAGNOSIS — I35.0 SEVERE AORTIC STENOSIS: ICD-10-CM

## 2025-02-11 DIAGNOSIS — Z01.818 PRE-OP TESTING: ICD-10-CM

## 2025-02-11 DIAGNOSIS — I44.7 LBBB (LEFT BUNDLE BRANCH BLOCK): ICD-10-CM

## 2025-02-11 LAB
ABO + RH BLD: NORMAL
BLD GP AB SCN CELLS X3 SERPL QL: NORMAL
BNP SERPL-MCNC: 679 PG/ML (ref 0–99)
OHS QRS DURATION: 82 MS
OHS QTC CALCULATION: 443 MS
POCT GLUCOSE: 131 MG/DL (ref 70–110)
SPECIMEN OUTDATE: NORMAL

## 2025-02-11 PROCEDURE — 25000003 PHARM REV CODE 250: Performed by: STUDENT IN AN ORGANIZED HEALTH CARE EDUCATION/TRAINING PROGRAM

## 2025-02-11 PROCEDURE — 83880 ASSAY OF NATRIURETIC PEPTIDE: CPT | Performed by: INTERNAL MEDICINE

## 2025-02-11 PROCEDURE — 25500020 PHARM REV CODE 255: Performed by: INTERNAL MEDICINE

## 2025-02-11 PROCEDURE — 99900035 HC TECH TIME PER 15 MIN (STAT)

## 2025-02-11 PROCEDURE — 25000003 PHARM REV CODE 250: Performed by: INTERNAL MEDICINE

## 2025-02-11 PROCEDURE — 63600175 PHARM REV CODE 636 W HCPCS: Performed by: ANESTHESIOLOGY

## 2025-02-11 PROCEDURE — 63600175 PHARM REV CODE 636 W HCPCS: Performed by: INTERNAL MEDICINE

## 2025-02-11 PROCEDURE — C1769 GUIDE WIRE: HCPCS | Performed by: INTERNAL MEDICINE

## 2025-02-11 PROCEDURE — C1751 CATH, INF, PER/CENT/MIDLINE: HCPCS | Performed by: ANESTHESIOLOGY

## 2025-02-11 PROCEDURE — 27200677 HC TRANSDUCER MONITOR KIT SINGLE: Performed by: ANESTHESIOLOGY

## 2025-02-11 PROCEDURE — 63600175 PHARM REV CODE 636 W HCPCS

## 2025-02-11 PROCEDURE — 37000009 HC ANESTHESIA EA ADD 15 MINS: Performed by: INTERNAL MEDICINE

## 2025-02-11 PROCEDURE — 36415 COLL VENOUS BLD VENIPUNCTURE: CPT | Performed by: INTERNAL MEDICINE

## 2025-02-11 PROCEDURE — 93005 ELECTROCARDIOGRAM TRACING: CPT

## 2025-02-11 PROCEDURE — 20000000 HC ICU ROOM

## 2025-02-11 PROCEDURE — 27100021 HC MULTIPORT INFUSION MANIFOLD: Performed by: ANESTHESIOLOGY

## 2025-02-11 PROCEDURE — 5A1223Z PERFORMANCE OF CARDIAC PACING, CONTINUOUS: ICD-10-PCS | Performed by: INTERNAL MEDICINE

## 2025-02-11 PROCEDURE — 33361 REPLACE AORTIC VALVE PERQ: CPT | Performed by: INTERNAL MEDICINE

## 2025-02-11 PROCEDURE — D9220A PRA ANESTHESIA: Mod: Q0,,, | Performed by: ANESTHESIOLOGY

## 2025-02-11 PROCEDURE — 63600175 PHARM REV CODE 636 W HCPCS: Performed by: STUDENT IN AN ORGANIZED HEALTH CARE EDUCATION/TRAINING PROGRAM

## 2025-02-11 PROCEDURE — C1894 INTRO/SHEATH, NON-LASER: HCPCS | Performed by: INTERNAL MEDICINE

## 2025-02-11 PROCEDURE — 27000221 HC OXYGEN, UP TO 24 HOURS

## 2025-02-11 PROCEDURE — 27201423 OPTIME MED/SURG SUP & DEVICES STERILE SUPPLY: Performed by: INTERNAL MEDICINE

## 2025-02-11 PROCEDURE — 02RF38Z REPLACEMENT OF AORTIC VALVE WITH ZOOPLASTIC TISSUE, PERCUTANEOUS APPROACH: ICD-10-PCS | Performed by: INTERNAL MEDICINE

## 2025-02-11 PROCEDURE — 047K3ZZ DILATION OF RIGHT FEMORAL ARTERY, PERCUTANEOUS APPROACH: ICD-10-PCS | Performed by: INTERNAL MEDICINE

## 2025-02-11 PROCEDURE — C1760 CLOSURE DEV, VASC: HCPCS | Performed by: INTERNAL MEDICINE

## 2025-02-11 PROCEDURE — C1725 CATH, TRANSLUMIN NON-LASER: HCPCS | Performed by: INTERNAL MEDICINE

## 2025-02-11 PROCEDURE — 94761 N-INVAS EAR/PLS OXIMETRY MLT: CPT

## 2025-02-11 PROCEDURE — C1887 CATHETER, GUIDING: HCPCS | Performed by: INTERNAL MEDICINE

## 2025-02-11 PROCEDURE — 37000008 HC ANESTHESIA 1ST 15 MINUTES: Performed by: INTERNAL MEDICINE

## 2025-02-11 PROCEDURE — 27800903 OPTIME MED/SURG SUP & DEVICES OTHER IMPLANTS: Performed by: INTERNAL MEDICINE

## 2025-02-11 PROCEDURE — C1779 LEAD, PMKR, TRANSVENOUS VDD: HCPCS | Performed by: INTERNAL MEDICINE

## 2025-02-11 PROCEDURE — 86901 BLOOD TYPING SEROLOGIC RH(D): CPT | Performed by: INTERNAL MEDICINE

## 2025-02-11 PROCEDURE — 25000003 PHARM REV CODE 250

## 2025-02-11 DEVICE — SYS EVOLUT FX DELIVERY 23-29MM: Type: IMPLANTABLE DEVICE | Site: HEART | Status: FUNCTIONAL

## 2025-02-11 DEVICE — SYS EVOLUT FX LOADING 23-29MM: Type: IMPLANTABLE DEVICE | Site: HEART | Status: FUNCTIONAL

## 2025-02-11 DEVICE — TAV EVFXPLUS-29 COMM US
Type: IMPLANTABLE DEVICE | Site: HEART | Status: FUNCTIONAL
Brand: EVOLUT™ FX+

## 2025-02-11 RX ORDER — CEFAZOLIN SODIUM 1 G/3ML
2 INJECTION, POWDER, FOR SOLUTION INTRAMUSCULAR; INTRAVENOUS
Status: COMPLETED | OUTPATIENT
Start: 2025-02-11 | End: 2025-02-11

## 2025-02-11 RX ORDER — ONDANSETRON 8 MG/1
8 TABLET, ORALLY DISINTEGRATING ORAL EVERY 8 HOURS PRN
Status: DISCONTINUED | OUTPATIENT
Start: 2025-02-11 | End: 2025-02-12 | Stop reason: HOSPADM

## 2025-02-11 RX ORDER — LIDOCAINE HYDROCHLORIDE 20 MG/ML
INJECTION, SOLUTION EPIDURAL; INFILTRATION; INTRACAUDAL; PERINEURAL
Status: DISCONTINUED | OUTPATIENT
Start: 2025-02-11 | End: 2025-02-11 | Stop reason: HOSPADM

## 2025-02-11 RX ORDER — FENTANYL CITRATE 50 UG/ML
25 INJECTION, SOLUTION INTRAMUSCULAR; INTRAVENOUS EVERY 5 MIN PRN
OUTPATIENT
Start: 2025-02-11

## 2025-02-11 RX ORDER — DIPHENHYDRAMINE HCL 50 MG
50 CAPSULE ORAL ONCE
Status: COMPLETED | OUTPATIENT
Start: 2025-02-11 | End: 2025-02-11

## 2025-02-11 RX ORDER — SODIUM CHLORIDE 9 MG/ML
INJECTION, SOLUTION INTRAVENOUS CONTINUOUS
Status: ACTIVE | OUTPATIENT
Start: 2025-02-11 | End: 2025-02-11

## 2025-02-11 RX ORDER — DEXMEDETOMIDINE HYDROCHLORIDE 4 UG/ML
INJECTION, SOLUTION INTRAVENOUS CONTINUOUS PRN
Status: DISCONTINUED | OUTPATIENT
Start: 2025-02-11 | End: 2025-02-11

## 2025-02-11 RX ORDER — CEFAZOLIN SODIUM 1 G/3ML
INJECTION, POWDER, FOR SOLUTION INTRAMUSCULAR; INTRAVENOUS
Status: DISCONTINUED | OUTPATIENT
Start: 2025-02-11 | End: 2025-02-11

## 2025-02-11 RX ORDER — PROPOFOL 10 MG/ML
VIAL (ML) INTRAVENOUS
Status: DISCONTINUED | OUTPATIENT
Start: 2025-02-11 | End: 2025-02-11

## 2025-02-11 RX ORDER — GLUCAGON 1 MG
1 KIT INJECTION
OUTPATIENT
Start: 2025-02-11

## 2025-02-11 RX ORDER — LIDOCAINE HYDROCHLORIDE 20 MG/ML
INJECTION INTRAVENOUS
Status: DISCONTINUED | OUTPATIENT
Start: 2025-02-11 | End: 2025-02-11

## 2025-02-11 RX ORDER — FENTANYL CITRATE 50 UG/ML
INJECTION, SOLUTION INTRAMUSCULAR; INTRAVENOUS
Status: DISCONTINUED | OUTPATIENT
Start: 2025-02-11 | End: 2025-02-11

## 2025-02-11 RX ORDER — SODIUM CHLORIDE 0.9 % (FLUSH) 0.9 %
10 SYRINGE (ML) INJECTION
OUTPATIENT
Start: 2025-02-11

## 2025-02-11 RX ORDER — HEPARIN SOD,PORCINE/0.9 % NACL 1000/500ML
INTRAVENOUS SOLUTION INTRAVENOUS
Status: DISCONTINUED | OUTPATIENT
Start: 2025-02-11 | End: 2025-02-11 | Stop reason: HOSPADM

## 2025-02-11 RX ORDER — PROTAMINE SULFATE 10 MG/ML
INJECTION, SOLUTION INTRAVENOUS
Status: DISCONTINUED | OUTPATIENT
Start: 2025-02-11 | End: 2025-02-11

## 2025-02-11 RX ORDER — DEXMEDETOMIDINE HYDROCHLORIDE 100 UG/ML
INJECTION, SOLUTION INTRAVENOUS
Status: DISCONTINUED | OUTPATIENT
Start: 2025-02-11 | End: 2025-02-11

## 2025-02-11 RX ORDER — ONDANSETRON HYDROCHLORIDE 2 MG/ML
INJECTION, SOLUTION INTRAVENOUS
Status: DISCONTINUED | OUTPATIENT
Start: 2025-02-11 | End: 2025-02-11

## 2025-02-11 RX ORDER — DEXAMETHASONE SODIUM PHOSPHATE 4 MG/ML
INJECTION, SOLUTION INTRA-ARTICULAR; INTRALESIONAL; INTRAMUSCULAR; INTRAVENOUS; SOFT TISSUE
Status: DISCONTINUED | OUTPATIENT
Start: 2025-02-11 | End: 2025-02-11

## 2025-02-11 RX ORDER — HEPARIN SODIUM 1000 [USP'U]/ML
INJECTION, SOLUTION INTRAVENOUS; SUBCUTANEOUS
Status: DISCONTINUED | OUTPATIENT
Start: 2025-02-11 | End: 2025-02-11

## 2025-02-11 RX ORDER — HALOPERIDOL 5 MG/ML
0.5 INJECTION INTRAMUSCULAR EVERY 10 MIN PRN
OUTPATIENT
Start: 2025-02-11

## 2025-02-11 RX ORDER — ACETAMINOPHEN 325 MG/1
650 TABLET ORAL EVERY 4 HOURS PRN
Status: DISCONTINUED | OUTPATIENT
Start: 2025-02-11 | End: 2025-02-12 | Stop reason: HOSPADM

## 2025-02-11 RX ADMIN — FENTANYL CITRATE 25 MCG: 50 INJECTION INTRAMUSCULAR; INTRAVENOUS at 01:02

## 2025-02-11 RX ADMIN — HEPARIN SODIUM 2000 UNITS: 1000 INJECTION, SOLUTION INTRAVENOUS; SUBCUTANEOUS at 02:02

## 2025-02-11 RX ADMIN — LIDOCAINE HYDROCHLORIDE 100 MG: 20 INJECTION INTRAVENOUS at 03:02

## 2025-02-11 RX ADMIN — CEFAZOLIN 2 G: 330 INJECTION, POWDER, FOR SOLUTION INTRAMUSCULAR; INTRAVENOUS at 05:02

## 2025-02-11 RX ADMIN — DEXAMETHASONE SODIUM PHOSPHATE 8 MG: 4 INJECTION, SOLUTION INTRAMUSCULAR; INTRAVENOUS at 03:02

## 2025-02-11 RX ADMIN — PROPOFOL 30 MG: 10 INJECTION, EMULSION INTRAVENOUS at 01:02

## 2025-02-11 RX ADMIN — SODIUM CHLORIDE: 9 INJECTION, SOLUTION INTRAVENOUS at 12:02

## 2025-02-11 RX ADMIN — HEPARIN SODIUM 9000 UNITS: 1000 INJECTION, SOLUTION INTRAVENOUS; SUBCUTANEOUS at 01:02

## 2025-02-11 RX ADMIN — HEPARIN SODIUM 3000 UNITS: 1000 INJECTION, SOLUTION INTRAVENOUS; SUBCUTANEOUS at 03:02

## 2025-02-11 RX ADMIN — PROPOFOL 20 MG: 10 INJECTION, EMULSION INTRAVENOUS at 01:02

## 2025-02-11 RX ADMIN — CEFAZOLIN 2 G: 330 INJECTION, POWDER, FOR SOLUTION INTRAMUSCULAR; INTRAVENOUS at 09:02

## 2025-02-11 RX ADMIN — SODIUM CHLORIDE: 9 INJECTION, SOLUTION INTRAVENOUS at 01:02

## 2025-02-11 RX ADMIN — CEFAZOLIN 2 G: 330 INJECTION, POWDER, FOR SOLUTION INTRAMUSCULAR; INTRAVENOUS at 03:02

## 2025-02-11 RX ADMIN — ONDANSETRON 4 MG: 2 INJECTION INTRAMUSCULAR; INTRAVENOUS at 03:02

## 2025-02-11 RX ADMIN — CEFAZOLIN 2 G: 330 INJECTION, POWDER, FOR SOLUTION INTRAMUSCULAR; INTRAVENOUS at 01:02

## 2025-02-11 RX ADMIN — DIPHENHYDRAMINE HYDROCHLORIDE 50 MG: 50 CAPSULE ORAL at 11:02

## 2025-02-11 RX ADMIN — SODIUM CHLORIDE: 9 INJECTION, SOLUTION INTRAVENOUS at 04:02

## 2025-02-11 RX ADMIN — PROTAMINE SULFATE 120 MG: 10 INJECTION, SOLUTION INTRAVENOUS at 03:02

## 2025-02-11 RX ADMIN — DEXMEDETOMIDINE HYDROCHLORIDE 1 MCG/KG/HR: 4 INJECTION, SOLUTION INTRAVENOUS at 01:02

## 2025-02-11 RX ADMIN — DEXMEDETOMIDINE 86 MCG: 200 INJECTION, SOLUTION INTRAVENOUS at 01:02

## 2025-02-11 NOTE — OP NOTE
[facility] - Cardiac Intensive Care  Cardiothoracic Surgery  Operative Note    SUMMARY     Date of Procedure: 2/11/2025     Procedure: Procedure(s) (LRB):  REPLACEMENT, AORTIC VALVE, TRANSCATHETER (TAVR) (N/A)  Cardiac Cath [facility] (N/A)  REPLACEMENT, AORTIC VALVE, TRANSCATHETER (TAVR)  PTA, Femoral Artery    Surgeons and Role:  Panel 1:     * Mark Youngblood MD - Primary     * Allan Velez MD - Fellow     * , MD Neo  Panel 2:     * Best Porter MD - Primary    Assisting Surgeon: None    Pre-Operative Diagnosis: Severe aortic stenosis [I35.0]    Post-Operative Diagnosis: Post-Op Diagnosis Codes:     * PAD (peripheral artery disease) [I73.9]     * Severe aortic stenosis [I35.0]    Anesthesia: General/MAC    Operative Findings (including complications, if any):  Successful deployment of 29 Medtronic CoreValve    Description of Technical Procedures: The patient was taken to the operating room hybrid OR placed in a supine position mac anesthesia was given monitoring lines were placed by anesthesia bilateral femoral access was obtained right side was chosen as the deployment side which was pre fired with 2 Perclose devices full heparin was given the catheter was then exchanged for a guidewire and a 18 sheath was advanced in the right femoral artery radial access was obtained from the left radial artery and a marking wire was placed in the non coronary sinus.  We then used a ALT wire and a glide wire to cross the aortic valve which was then exchanged for a pigtail valve a gradient was measured more than 40.  Then and Confida wire was exchanged in the ventricle.  A 29 Medtronic aortic valve was prepared and was advanced under carotid pressure and positioned across the aortic valve.  Under fluoroscopy guidance the valve was gently deployed in position and the nose cone was pulled back into the descending aorta.  The patient recovered in second-degree heart block which was transiently back in  sinus rhythm.  At this time since the patient has mild leak was dilated with a 20 balloon across the valve and the gradient completely disappeared on post dilatation.  At this time right IJ access was obtained and a temporary trans venous the patient was placed in the apex of the right ventricle for backup  After checking the final position of the valve the catheter was withdrawn. and  Finally the sheath was pulled and the previously placed Perclose devices were closed.The iliac arteriogram showed minor a leak from the common femoral arteriotomy site which was controlled with the endo balloon from the left radial access   The patient tolerated the procedure was transferred to the recovery in stable condition.  The patient will be observed in the hospital overnight.    Significant Surgical Tasks Conducted by the Assistant(s), if Applicable:     Estimated Blood Loss (EBL): * No values recorded between 2/11/2025  1:33 PM and 2/11/2025  3:44 PM *           Implants:   Implant Name Type Inv. Item Serial No.  Lot No. LRB No. Used Action   SYS EVOLUT FX DELIVERY 23-29MM - [id number] TAVR Valve SYS EVOLUT FX DELIVERY 23-29MM  [employer] NOT AN IMPLANT N/A 1 Implanted   SYS EVOLUT FX LOADING 23-29MM - [id number] TAVR Valve SYS EVOLUT FX LOADING 23-29MM  [employer] NOT AN IMPLANT N/A 1 Implanted   29MM AORTIC VALVE EVOLUT FX+   [id number] [employer]   1 Implanted       Specimens:   Specimen (24h ago, onward)      None                    Condition: Stable    Disposition: PACU - hemodynamically stable.    Attestation: I was present and scrubbed for the entire procedure.

## 2025-02-11 NOTE — H&P
[facility] Short Stay Cardiac Unit  Interventional Cardiology  H&P    Patient Name: Ro Hernandez  MRN: [id number]  Admission Date: 2/11/2025  Code Status: Prior   Attending Provider: Mark Youngblood MD   Primary Care Physician: Minor Charlton MD  Principal Problem:<principal problem not specified>    Patient information was obtained from patient and ER records.     Subjective:        HPI:  69-year-old female with past medical history of hypertension, COPD, Prinzmetal angina, sleep apnea, sever AS, presents for TAVR.  She was previously seen by a cardiologist in [place] who told her that she has history of focal but on reviewing the echo she does not have systolic anterior motion of mitral valve with no eccentric mitral regurgitation.  Her aortic valve is Calcified.  Patient was experiencing shortness on breath for the last 5 years on exertion and for the last 6 months she is experiencing chest tightness mainly when she stress.  She denies having any presyncope or syncope.  Currently she denies having any orthopnea PND bilateral lower extremity edema.      The patient has undergone the following TAVR work-up:   ECHO (Date 8/1/2024): Aortic valve area by VTI is 0.91 cm². Aortic valve peak velocity is 5.64 m/s. Mean gradient is 76 mmHg. The dimensionless index is 0.33. EF= 65%.   [facility] (Date 12/2/2024): Non-obstructive CAD    STS: 2.9%   Frailty: 1/4   Iliacs >7 on the right, poor contrast in the study but OK for RTF access  LVOT average diameter 23.9  Incidental findings on CT:   CT Surgery risk assessment: Needs CTS referral   Rhythm issues: None  PFTs:    KCCQ/5 meter walk:   Comorbidities: HOCM(On reviewing echo there is TAL, HTN, GERD, and COPD             Ro Hernandez is a 29 mm  Evolut valve candidate via RTF access. After surgical evaluation.    Past Medical History:   Diagnosis Date    Allergy     Angina     Arthritis 6/11/2013    Asthma     Congestive heart failure 7/16/2024    COPD  (chronic obstructive pulmonary disease)     GERD (gastroesophageal reflux disease)     Hx of Prinzmetal angina     Hypertension     Obesity 6/11/2013    Other and unspecified hyperlipidemia 6/11/2013    Prinzmetal's angina     Sleep apnea 6/11/2013       Past Surgical History:   Procedure Laterality Date    ABDOMINAL SURGERY      CATHETERIZATION OF BOTH LEFT AND RIGHT HEART N/A 12/2/2024    Procedure: CATHETERIZATION, HEART, BOTH LEFT AND RIGHT;  Surgeon: Donna Holden MD;  Location: [facility] CATH LAB;  Service: Cardiology;  Laterality: N/A;    CHOLECYSTECTOMY      COLONOSCOPY N/A 12/6/2019    Procedure: COLONOSCOPY;  Surgeon: Winston Smith MD;  Location: [facility] ENDO;  Service: Endoscopy;  Laterality: N/A;    ESOPHAGOGASTRODUODENOSCOPY N/A 12/10/2018    Procedure: EGD (ESOPHAGOGASTRODUODENOSCOPY);  Surgeon: Raúl Polanco III, MD;  Location: [facility] ENDO;  Service: Endoscopy;  Laterality: N/A;    ESOPHAGOGASTRODUODENOSCOPY N/A 9/8/2020    Procedure: EGD (ESOPHAGOGASTRODUODENOSCOPY);  Surgeon: Raúl Polanco III, MD;  Location: [facility] ENDO;  Service: Endoscopy;  Laterality: N/A;    EYE SURGERY      FOOT SURGERY      INTRALUMINAL GASTROINTESTINAL TRACT IMAGING VIA CAPSULE N/A 9/18/2020    Procedure: IMAGING PROCEDURE, GI TRACT, INTRALUMINAL, VIA CAPSULE;  Surgeon: Blade Holley RN;  Location: [facility];  Service: Endoscopy;  Laterality: N/A;    TONSILLECTOMY      TUBAL LIGATION         Review of patient's allergies indicates:   Allergen Reactions    Ace inhibitors      Other reaction(s): Hives    Lisinopril      Other reaction(s): rash    Pravastatin      Other reaction(s): Mental Changes    Rosuvastatin Other (See Comments)     Other reaction(s): Muscle pain    Simvastatin Other (See Comments)     Other reaction(s): leg cramps       Facility-Administered Medications Prior to Admission   Medication    sodium chloride 0.9% flush 10 mL     PTA Medications   Medication Sig    albuterol (PROVENTIL) 2.5 mg /3 mL (0.083 %)  nebulizer solution Take 3 mLs (2.5 mg total) by nebulization every 4 (four) hours as needed for Wheezing or Shortness of Breath. Rescue    aspirin (ECOTRIN) 81 MG EC tablet Take 81 mg by mouth once daily.    celecoxib (CELEBREX) 200 MG capsule Take 200 mg by mouth once daily.    ezetimibe (ZETIA) 10 mg tablet Take 1 tablet (10 mg total) by mouth once daily.    fluticasone propionate (FLONASE) 50 mcg/actuation nasal spray USE 2 SPRAYS IN EACH NOSTRIL ONE TIME DAILY    furosemide (LASIX) 40 MG tablet Take 1 tablet (40 mg total) by mouth once daily.    HYDROcodone-acetaminophen (NORCO)  mg per tablet Take 1 tablet by mouth. One tablet twice a day    isosorbide mononitrate (IMDUR) 30 MG 24 hr tablet Take 1 tab po in the AM and 1/2 tab po in the PM    lactulose (CHRONULAC) 10 gram/15 mL solution Take 15 mLs (10 g total) by mouth 2 (two) times daily.    methylPREDNISolone (MEDROL DOSEPACK) 4 mg tablet use as directed    metoprolol succinate (TOPROL-XL) 100 MG 24 hr tablet     MULTIVITAMIN (MULTIPLE VITAMIN ORAL) Take 1 tablet by mouth Daily.    nitroGLYCERIN (NITROSTAT) 0.4 MG SL tablet DISSOLVE 1 TABLET UNDER THE TONGUE EVERY 5 MINUTES FOR 3 DOSES AS NEEDED FOR CHEST PAIN    pantoprazole (PROTONIX) 20 MG tablet Take 1 tablet (20 mg total) by mouth 2 (two) times daily before meals.    solifenacin (VESICARE) 10 MG tablet Take 1 tablet (10 mg total) by mouth once daily.    tiotropium-olodateroL (STIOLTO RESPIMAT) 2.5-2.5 mcg/actuation Mist Inhale 2 puffs into the lungs once daily. Controller    verapamiL (VERELAN) 360 MG C24P Take 360 mg by mouth once daily.    albuterol (PROVENTIL HFA) 90 mcg/actuation inhaler Inhale 2 puffs into the lungs every 6 (six) hours as needed for Wheezing. Rescue    albuterol (VENTOLIN HFA) 90 mcg/actuation inhaler Inhale 2 puffs into the lungs every 6 (six) hours as needed for Wheezing or Shortness of Breath. Rescue    amoxicillin-clavulanate 875-125mg (AUGMENTIN) 875-125 mg per tablet  Take 1 tablet by mouth every 12 (twelve) hours.    cyclobenzaprine (FLEXERIL) 5 MG tablet  (Patient not taking: Reported on 2024)    inhalation spacing device Use as directed for inhalation.    naloxone (NARCAN) 4 mg/actuation Spry     triamcinolone acetonide (KENALOG-40) 40 mg/mL injection 40 mg by Other route once daily.     Family History       Problem Relation (Age of Onset)    Breast cancer Maternal Grandmother    COPD Father    Cancer Mother    Heart disease Sister    Hyperlipidemia Son    Hypertension Mother, Father    Kidney cancer Mother          Tobacco Use    Smoking status: Former     Current packs/day: 0.00     Average packs/day: 1 pack/day for 48.4 years (48.4 ttl pk-yrs)     Types: Cigarettes     Start date:      Quit date: 2016     Years since quittin.6     Passive exposure: Never    Smokeless tobacco: Never    Tobacco comments:     smoked for 25 yrs. quit 16yrs. then smoked again for 5-6 before quitting in 2016.   Substance and Sexual Activity    Alcohol use: No     Alcohol/week: 1.0 standard drink of alcohol     Types: 1 Standard drinks or equivalent per week    Drug use: No    Sexual activity: Not Currently     Review of Systems   Constitutional: Negative for chills, decreased appetite, diaphoresis, fever, malaise/fatigue and weight gain.   HENT:  Negative for congestion and ear discharge.    Eyes:  Negative for blurred vision.   Cardiovascular:  Positive for dyspnea on exertion. Negative for chest pain, irregular heartbeat, leg swelling, orthopnea, palpitations and paroxysmal nocturnal dyspnea.   Respiratory:  Positive for shortness of breath. Negative for cough, snoring and sputum production.    Skin:  Negative for color change, dry skin and rash.   Musculoskeletal:  Negative for back pain, falls, joint pain and neck pain.   Gastrointestinal:  Negative for bloating, abdominal pain, constipation and diarrhea.   Genitourinary:  Negative for dysuria, flank pain, hematuria and  hesitancy.   Neurological:  Negative for focal weakness, headaches, numbness and seizures.   Psychiatric/Behavioral:  Negative for altered mental status, depression and hallucinations.      Objective:     Vital Signs (Most Recent):  Temp: 97.5 °F (36.4 °C) (02/11/25 1057)  Pulse: 70 (02/11/25 1057)  Resp: 18 (02/11/25 1057)  BP: 131/62 (02/11/25 1058)  SpO2: (!) 93 % (02/11/25 1057) Vital Signs (24h Range):  Temp:  [97.5 °F (36.4 °C)] 97.5 °F (36.4 °C)  Pulse:  [70] 70  Resp:  [18] 18  SpO2:  [93 %] 93 %  BP: (131-159)/(62-65) 131/62     Weight: 86.2 kg (190 lb)  Body mass index is 31.62 kg/m².    SpO2: (!) 93 %       No intake or output data in the 24 hours ending 02/11/25 1059    Lines/Drains/Airways       Peripheral Intravenous Line  Duration                  Peripheral IV - Single Lumen 02/11/25 1057 20 G Left Antecubital <1 day                     Physical Exam  Constitutional:       General: She is not in acute distress.     Appearance: She is not ill-appearing.   HENT:      Nose: Nose normal.      Mouth/Throat:      Mouth: Mucous membranes are moist.   Eyes:      Pupils: Pupils are equal, round, and reactive to light.   Neck:      Comments: No JVD appreciated   Cardiovascular:      Rate and Rhythm: Normal rate and regular rhythm.      Pulses: Normal pulses.      Heart sounds: Murmur heard.   Pulmonary:      Effort: Pulmonary effort is normal. No respiratory distress.      Breath sounds: No wheezing or rales.   Abdominal:      General: Abdomen is flat. There is no distension.      Palpations: Abdomen is soft.      Tenderness: There is no right CVA tenderness or left CVA tenderness.   Musculoskeletal:         General: No swelling.      Cervical back: Normal range of motion and neck supple. No tenderness.      Right lower leg: No edema.      Left lower leg: No edema.   Skin:     General: Skin is warm.      Coloration: Skin is not pale.      Findings: No rash.   Neurological:      General: No focal deficit  "present.      Mental Status: She is alert and oriented to person, place, and time.      Motor: Weakness present.            Significant Labs: ABG: No results for input(s): "PH", "PCO2", "HCO3", "POCSATURATED", "BE" in the last 48 hours., Blood Culture: No results for input(s): "LABBLOO" in the last 48 hours., BMP: No results for input(s): "GLU", "NA", "K", "CL", "CO2", "BUN", "CREATININE", "CALCIUM", "MG" in the last 48 hours., CMP No results for input(s): "NA", "K", "CL", "CO2", "GLU", "BUN", "CREATININE", "CALCIUM", "PROT", "ALBUMIN", "BILITOT", "ALKPHOS", "AST", "ALT", "ANIONGAP", "ESTGFRAFRICA", "EGFRNONAA" in the last 48 hours., CBC No results for input(s): "WBC", "HGB", "HCT", "PLT" in the last 48 hours., INR No results for input(s): "INR", "PROTIME" in the last 48 hours., Lipid Panel No results for input(s): "CHOL", "HDL", "LDLCALC", "TRIG", "CHOLHDL" in the last 48 hours.,   Pathology Results  (Last 10 years)                 09/08/20 1144  Specimen to Pathology, Surgery Gastrointestinal tract Final result    Narrative:  Pre-op Diagnosis: Iron deficiency anemia, unspecified iron   deficiency anemia type [D50.9]   Procedure(s):   EGD (ESOPHAGOGASTRODUODENOSCOPY)   Number of specimens:   Name of specimens:   1.  Fundus biopsy, variant mucosa   2.  Ulcer scar biopsy   Specimen total (fresh, frozen, permanent):->2       12/06/19 0849  Specimen to Pathology, Surgery Gastrointestinal tract Final result    Narrative:  Pre-op Diagnosis: Screening [Z13.9]   Procedure(s):   COLONOSCOPY   Number of specimens: 1   Name of specimens: 1. Splenic flexure polyp   Specimen total (fresh, frozen, permanent):->1           , Troponin No results for input(s): "TROPONINIHS" in the last 48 hours., and All pertinent lab results from the last 24 hours have been reviewed.     Assessment and Plan:     Cardiac/Vascular  Nonrheumatic aortic valve stenosis   69 y.o. female referred by Dr Jose for evaluation of severe AS (NYHA Class III " sx).     The patient has undergone the following TAVR work-up:   ECHO (Date 8/1/2024): Aortic valve area by VTI is 0.91 cm². Aortic valve peak velocity is 5.64 m/s. Mean gradient is 76 mmHg. The dimensionless index is 0.33. EF= 65%.   [facility] (Date 12/2/2024): Non-obstructive CAD    STS: 2.9%   Frailty: 1/4   Iliacs >7 on the right, poor contrast in the study but OK for RTF access  LVOT average diameter 23.9  Incidental findings on CT:   CT Surgery risk assessment: Needs CTS referral   Rhythm issues: None  PFTs:    KCCQ/5 meter walk:   Comorbidities: HOCM(On reviewing echo there is TAL, HTN, GERD, and COPD             Ro Hernandez is a 29 mm  Evolut valve candidate via RTF access. After surgical evaluation.        VTE Risk Mitigation (From admission, onward)      None              Cass Henriquez MD  Interventional Cardiology   [facility] Short Stay Cardiac Unit

## 2025-02-11 NOTE — SUBJECTIVE & OBJECTIVE
Past Medical History:   Diagnosis Date    Allergy     Angina     Arthritis 6/11/2013    Asthma     Congestive heart failure 7/16/2024    COPD (chronic obstructive pulmonary disease)     GERD (gastroesophageal reflux disease)     Hx of Prinzmetal angina     Hypertension     Obesity 6/11/2013    Other and unspecified hyperlipidemia 6/11/2013    Prinzmetal's angina     Sleep apnea 6/11/2013       Past Surgical History:   Procedure Laterality Date    ABDOMINAL SURGERY      CATHETERIZATION OF BOTH LEFT AND RIGHT HEART N/A 12/2/2024    Procedure: CATHETERIZATION, HEART, BOTH LEFT AND RIGHT;  Surgeon: Donna Holden MD;  Location: [facility] CATH LAB;  Service: Cardiology;  Laterality: N/A;    CHOLECYSTECTOMY      COLONOSCOPY N/A 12/6/2019    Procedure: COLONOSCOPY;  Surgeon: Winston Smith MD;  Location: [facility] ENDO;  Service: Endoscopy;  Laterality: N/A;    ESOPHAGOGASTRODUODENOSCOPY N/A 12/10/2018    Procedure: EGD (ESOPHAGOGASTRODUODENOSCOPY);  Surgeon: Raúl Polanco III, MD;  Location: [facility];  Service: Endoscopy;  Laterality: N/A;    ESOPHAGOGASTRODUODENOSCOPY N/A 9/8/2020    Procedure: EGD (ESOPHAGOGASTRODUODENOSCOPY);  Surgeon: Raúl Polanco III, MD;  Location: [facility];  Service: Endoscopy;  Laterality: N/A;    EYE SURGERY      FOOT SURGERY      INTRALUMINAL GASTROINTESTINAL TRACT IMAGING VIA CAPSULE N/A 9/18/2020    Procedure: IMAGING PROCEDURE, GI TRACT, INTRALUMINAL, VIA CAPSULE;  Surgeon: Blade Holley RN;  Location: [facility];  Service: Endoscopy;  Laterality: N/A;    TONSILLECTOMY      TUBAL LIGATION         Review of patient's allergies indicates:   Allergen Reactions    Ace inhibitors      Other reaction(s): Hives    Lisinopril      Other reaction(s): rash    Pravastatin      Other reaction(s): Mental Changes    Rosuvastatin Other (See Comments)     Other reaction(s): Muscle pain    Simvastatin Other (See Comments)     Other reaction(s): leg cramps       Facility-Administered Medications Prior to  Admission   Medication    sodium chloride 0.9% flush 10 mL     PTA Medications   Medication Sig    albuterol (PROVENTIL) 2.5 mg /3 mL (0.083 %) nebulizer solution Take 3 mLs (2.5 mg total) by nebulization every 4 (four) hours as needed for Wheezing or Shortness of Breath. Rescue    aspirin (ECOTRIN) 81 MG EC tablet Take 81 mg by mouth once daily.    celecoxib (CELEBREX) 200 MG capsule Take 200 mg by mouth once daily.    ezetimibe (ZETIA) 10 mg tablet Take 1 tablet (10 mg total) by mouth once daily.    fluticasone propionate (FLONASE) 50 mcg/actuation nasal spray USE 2 SPRAYS IN EACH NOSTRIL ONE TIME DAILY    furosemide (LASIX) 40 MG tablet Take 1 tablet (40 mg total) by mouth once daily.    HYDROcodone-acetaminophen (NORCO)  mg per tablet Take 1 tablet by mouth. One tablet twice a day    isosorbide mononitrate (IMDUR) 30 MG 24 hr tablet Take 1 tab po in the AM and 1/2 tab po in the PM    lactulose (CHRONULAC) 10 gram/15 mL solution Take 15 mLs (10 g total) by mouth 2 (two) times daily.    methylPREDNISolone (MEDROL DOSEPACK) 4 mg tablet use as directed    metoprolol succinate (TOPROL-XL) 100 MG 24 hr tablet     MULTIVITAMIN (MULTIPLE VITAMIN ORAL) Take 1 tablet by mouth Daily.    nitroGLYCERIN (NITROSTAT) 0.4 MG SL tablet DISSOLVE 1 TABLET UNDER THE TONGUE EVERY 5 MINUTES FOR 3 DOSES AS NEEDED FOR CHEST PAIN    pantoprazole (PROTONIX) 20 MG tablet Take 1 tablet (20 mg total) by mouth 2 (two) times daily before meals.    solifenacin (VESICARE) 10 MG tablet Take 1 tablet (10 mg total) by mouth once daily.    tiotropium-olodateroL (STIOLTO RESPIMAT) 2.5-2.5 mcg/actuation Mist Inhale 2 puffs into the lungs once daily. Controller    verapamiL (VERELAN) 360 MG C24P Take 360 mg by mouth once daily.    albuterol (PROVENTIL HFA) 90 mcg/actuation inhaler Inhale 2 puffs into the lungs every 6 (six) hours as needed for Wheezing. Rescue    albuterol (VENTOLIN HFA) 90 mcg/actuation inhaler Inhale 2 puffs into the lungs  every 6 (six) hours as needed for Wheezing or Shortness of Breath. Rescue    amoxicillin-clavulanate 875-125mg (AUGMENTIN) 875-125 mg per tablet Take 1 tablet by mouth every 12 (twelve) hours.    cyclobenzaprine (FLEXERIL) 5 MG tablet  (Patient not taking: Reported on 2024)    inhalation spacing device Use as directed for inhalation.    naloxone (NARCAN) 4 mg/actuation Spry     triamcinolone acetonide (KENALOG-40) 40 mg/mL injection 40 mg by Other route once daily.     Family History       Problem Relation (Age of Onset)    Breast cancer Maternal Grandmother    COPD Father    Cancer Mother    Heart disease Sister    Hyperlipidemia Son    Hypertension Mother, Father    Kidney cancer Mother          Tobacco Use    Smoking status: Former     Current packs/day: 0.00     Average packs/day: 1 pack/day for 48.4 years (48.4 ttl pk-yrs)     Types: Cigarettes     Start date:      Quit date: 2016     Years since quittin.6     Passive exposure: Never    Smokeless tobacco: Never    Tobacco comments:     smoked for 25 yrs. quit 16yrs. then smoked again for 5-6 before quitting in 2016.   Substance and Sexual Activity    Alcohol use: No     Alcohol/week: 1.0 standard drink of alcohol     Types: 1 Standard drinks or equivalent per week    Drug use: No    Sexual activity: Not Currently     Review of Systems   Constitutional: Negative for chills, decreased appetite, diaphoresis, fever, malaise/fatigue and weight gain.   HENT:  Negative for congestion and ear discharge.    Eyes:  Negative for blurred vision.   Cardiovascular:  Positive for dyspnea on exertion. Negative for chest pain, irregular heartbeat, leg swelling, orthopnea, palpitations and paroxysmal nocturnal dyspnea.   Respiratory:  Positive for shortness of breath. Negative for cough, snoring and sputum production.    Skin:  Negative for color change, dry skin and rash.   Musculoskeletal:  Negative for back pain, falls, joint pain and neck pain.    Gastrointestinal:  Negative for bloating, abdominal pain, constipation and diarrhea.   Genitourinary:  Negative for dysuria, flank pain, hematuria and hesitancy.   Neurological:  Negative for focal weakness, headaches, numbness and seizures.   Psychiatric/Behavioral:  Negative for altered mental status, depression and hallucinations.      Objective:     Vital Signs (Most Recent):  Temp: 97.5 °F (36.4 °C) (02/11/25 1057)  Pulse: 70 (02/11/25 1057)  Resp: 18 (02/11/25 1057)  BP: 131/62 (02/11/25 1058)  SpO2: (!) 93 % (02/11/25 1057) Vital Signs (24h Range):  Temp:  [97.5 °F (36.4 °C)] 97.5 °F (36.4 °C)  Pulse:  [70] 70  Resp:  [18] 18  SpO2:  [93 %] 93 %  BP: (131-159)/(62-65) 131/62     Weight: 86.2 kg (190 lb)  Body mass index is 31.62 kg/m².    SpO2: (!) 93 %       No intake or output data in the 24 hours ending 02/11/25 1059    Lines/Drains/Airways       Peripheral Intravenous Line  Duration                  Peripheral IV - Single Lumen 02/11/25 1057 20 G Left Antecubital <1 day                     Physical Exam  Constitutional:       General: She is not in acute distress.     Appearance: She is not ill-appearing.   HENT:      Nose: Nose normal.      Mouth/Throat:      Mouth: Mucous membranes are moist.   Eyes:      Pupils: Pupils are equal, round, and reactive to light.   Neck:      Comments: No JVD appreciated   Cardiovascular:      Rate and Rhythm: Normal rate and regular rhythm.      Pulses: Normal pulses.      Heart sounds: Murmur heard.   Pulmonary:      Effort: Pulmonary effort is normal. No respiratory distress.      Breath sounds: No wheezing or rales.   Abdominal:      General: Abdomen is flat. There is no distension.      Palpations: Abdomen is soft.      Tenderness: There is no right CVA tenderness or left CVA tenderness.   Musculoskeletal:         General: No swelling.      Cervical back: Normal range of motion and neck supple. No tenderness.      Right lower leg: No edema.      Left lower leg: No  "edema.   Skin:     General: Skin is warm.      Coloration: Skin is not pale.      Findings: No rash.   Neurological:      General: No focal deficit present.      Mental Status: She is alert and oriented to person, place, and time.      Motor: Weakness present.            Significant Labs: ABG: No results for input(s): "PH", "PCO2", "HCO3", "POCSATURATED", "BE" in the last 48 hours., Blood Culture: No results for input(s): "LABBLOO" in the last 48 hours., BMP: No results for input(s): "GLU", "NA", "K", "CL", "CO2", "BUN", "CREATININE", "CALCIUM", "MG" in the last 48 hours., CMP No results for input(s): "NA", "K", "CL", "CO2", "GLU", "BUN", "CREATININE", "CALCIUM", "PROT", "ALBUMIN", "BILITOT", "ALKPHOS", "AST", "ALT", "ANIONGAP", "ESTGFRAFRICA", "EGFRNONAA" in the last 48 hours., CBC No results for input(s): "WBC", "HGB", "HCT", "PLT" in the last 48 hours., INR No results for input(s): "INR", "PROTIME" in the last 48 hours., Lipid Panel No results for input(s): "CHOL", "HDL", "LDLCALC", "TRIG", "CHOLHDL" in the last 48 hours.,   Pathology Results  (Last 10 years)                 09/08/20 1144  Specimen to Pathology, Surgery Gastrointestinal tract Final result    Narrative:  Pre-op Diagnosis: Iron deficiency anemia, unspecified iron   deficiency anemia type [D50.9]   Procedure(s):   EGD (ESOPHAGOGASTRODUODENOSCOPY)   Number of specimens:   Name of specimens:   1.  Fundus biopsy, variant mucosa   2.  Ulcer scar biopsy   Specimen total (fresh, frozen, permanent):->2       12/06/19 0849  Specimen to Pathology, Surgery Gastrointestinal tract Final result    Narrative:  Pre-op Diagnosis: Screening [Z13.9]   Procedure(s):   COLONOSCOPY   Number of specimens: 1   Name of specimens: 1. Splenic flexure polyp   Specimen total (fresh, frozen, permanent):->1           , Troponin No results for input(s): "TROPONINIHS" in the last 48 hours., and All pertinent lab results from the last 24 hours have been reviewed.     "

## 2025-02-11 NOTE — Clinical Note
60 ml of contrast were injected throughout the case. 340 mL of contrast was the total wasted during the case. 400 mL was the total amount used during the case.

## 2025-02-11 NOTE — ASSESSMENT & PLAN NOTE
69 y.o. female referred by Dr Jose for evaluation of severe AS (NYHA Class III sx).     The patient has undergone the following TAVR work-up:   ECHO (Date 8/1/2024): Aortic valve area by VTI is 0.91 cm². Aortic valve peak velocity is 5.64 m/s. Mean gradient is 76 mmHg. The dimensionless index is 0.33. EF= 65%.   [facility] (Date 12/2/2024): Non-obstructive CAD    STS: 2.9%   Frailty: 1/4   Iliacs >7 on the right, poor contrast in the study but OK for RTF access  LVOT average diameter 23.9  Incidental findings on CT:   CT Surgery risk assessment: Needs CTS referral   Rhythm issues: None  PFTs:    KCCQ/5 meter walk:   Comorbidities: HOCM(On reviewing echo there is TAL, HTN, GERD, and COPD             Ro Hernandez is a 29 mm  Evolut valve candidate via RTF access. After surgical evaluation.

## 2025-02-11 NOTE — Clinical Note
The catheter was repositioned into the aorta. An angiography was performed of the aorta. The angiography was performed via power injection. The injected amount was 40 mL contrast at 20 mL/s. The PSI from the power injection was 700.

## 2025-02-11 NOTE — SUBJECTIVE & OBJECTIVE
Past Medical History:   Diagnosis Date    Allergy     Angina     Arthritis 06/11/2013    Asthma     Congestive heart failure 07/16/2024    COPD (chronic obstructive pulmonary disease)     GERD (gastroesophageal reflux disease)     Hx of Prinzmetal angina     Hypertension     Obesity 06/11/2013    Other and unspecified hyperlipidemia 06/11/2013    Prinzmetal's angina     Sleep apnea 06/11/2013       Past Surgical History:   Procedure Laterality Date    ABDOMINAL SURGERY      CATHETERIZATION OF BOTH LEFT AND RIGHT HEART N/A 12/2/2024    Procedure: CATHETERIZATION, HEART, BOTH LEFT AND RIGHT;  Surgeon: Donna Holden MD;  Location: [facility] CATH LAB;  Service: Cardiology;  Laterality: N/A;    CHOLECYSTECTOMY      COLONOSCOPY N/A 12/6/2019    Procedure: COLONOSCOPY;  Surgeon: Winston Smith MD;  Location: [facility] ENDO;  Service: Endoscopy;  Laterality: N/A;    ESOPHAGOGASTRODUODENOSCOPY N/A 12/10/2018    Procedure: EGD (ESOPHAGOGASTRODUODENOSCOPY);  Surgeon: Raúl Polanco III, MD;  Location: [facility];  Service: Endoscopy;  Laterality: N/A;    ESOPHAGOGASTRODUODENOSCOPY N/A 9/8/2020    Procedure: EGD (ESOPHAGOGASTRODUODENOSCOPY);  Surgeon: Raúl Polanco III, MD;  Location: [facility];  Service: Endoscopy;  Laterality: N/A;    EYE SURGERY      FOOT SURGERY      INTRALUMINAL GASTROINTESTINAL TRACT IMAGING VIA CAPSULE N/A 9/18/2020    Procedure: IMAGING PROCEDURE, GI TRACT, INTRALUMINAL, VIA CAPSULE;  Surgeon: Blade Holley RN;  Location: [facility];  Service: Endoscopy;  Laterality: N/A;    TONSILLECTOMY      TUBAL LIGATION         Review of patient's allergies indicates:   Allergen Reactions    Ace inhibitors      Other reaction(s): Hives    Lisinopril      Other reaction(s): rash    Pravastatin      Other reaction(s): Mental Changes    Rosuvastatin Other (See Comments)     Other reaction(s): Muscle pain    Simvastatin Other (See Comments)     Other reaction(s): leg cramps       No current  facility-administered medications on file prior to encounter.     Current Outpatient Medications on File Prior to Encounter   Medication Sig    albuterol (PROVENTIL) 2.5 mg /3 mL (0.083 %) nebulizer solution Take 3 mLs (2.5 mg total) by nebulization every 4 (four) hours as needed for Wheezing or Shortness of Breath. Rescue    aspirin (ECOTRIN) 81 MG EC tablet Take 81 mg by mouth once daily.    celecoxib (CELEBREX) 200 MG capsule Take 200 mg by mouth once daily.    ezetimibe (ZETIA) 10 mg tablet Take 1 tablet (10 mg total) by mouth once daily.    fluticasone propionate (FLONASE) 50 mcg/actuation nasal spray USE 2 SPRAYS IN EACH NOSTRIL ONE TIME DAILY    furosemide (LASIX) 40 MG tablet Take 1 tablet (40 mg total) by mouth once daily.    HYDROcodone-acetaminophen (NORCO)  mg per tablet Take 1 tablet by mouth. One tablet twice a day    isosorbide mononitrate (IMDUR) 30 MG 24 hr tablet Take 1 tab po in the AM and 1/2 tab po in the PM    lactulose (CHRONULAC) 10 gram/15 mL solution Take 15 mLs (10 g total) by mouth 2 (two) times daily.    methylPREDNISolone (MEDROL DOSEPACK) 4 mg tablet use as directed    metoprolol succinate (TOPROL-XL) 100 MG 24 hr tablet     MULTIVITAMIN (MULTIPLE VITAMIN ORAL) Take 1 tablet by mouth Daily.    nitroGLYCERIN (NITROSTAT) 0.4 MG SL tablet DISSOLVE 1 TABLET UNDER THE TONGUE EVERY 5 MINUTES FOR 3 DOSES AS NEEDED FOR CHEST PAIN    pantoprazole (PROTONIX) 20 MG tablet Take 1 tablet (20 mg total) by mouth 2 (two) times daily before meals.    solifenacin (VESICARE) 10 MG tablet Take 1 tablet (10 mg total) by mouth once daily.    tiotropium-olodateroL (STIOLTO RESPIMAT) 2.5-2.5 mcg/actuation Mist Inhale 2 puffs into the lungs once daily. Controller    verapamiL (VERELAN) 360 MG C24P Take 360 mg by mouth once daily.    albuterol (PROVENTIL HFA) 90 mcg/actuation inhaler Inhale 2 puffs into the lungs every 6 (six) hours as needed for Wheezing. Rescue    albuterol (VENTOLIN  HFA) 90 mcg/actuation inhaler Inhale 2 puffs into the lungs every 6 (six) hours as needed for Wheezing or Shortness of Breath. Rescue    amoxicillin-clavulanate 875-125mg (AUGMENTIN) 875-125 mg per tablet Take 1 tablet by mouth every 12 (twelve) hours.    cyclobenzaprine (FLEXERIL) 5 MG tablet  (Patient not taking: Reported on 2024)    inhalation spacing device Use as directed for inhalation.    naloxone (NARCAN) 4 mg/actuation Spry     triamcinolone acetonide (KENALOG-40) 40 mg/mL injection 40 mg by Other route once daily.     Family History       Problem Relation (Age of Onset)    Breast cancer Maternal Grandmother    COPD Father    Cancer Mother    Heart disease Sister    Hyperlipidemia Son    Hypertension Mother, Father    Kidney cancer Mother          Tobacco Use    Smoking status: Former     Current packs/day: 0.00     Average packs/day: 1 pack/day for 48.4 years (48.4 ttl pk-yrs)     Types: Cigarettes     Start date:      Quit date: 2016     Years since quittin.6     Passive exposure: Never    Smokeless tobacco: Never    Tobacco comments:     smoked for 25 yrs. quit 16yrs. then smoked again for 5-6 before quitting in 2016.   Substance and Sexual Activity    Alcohol use: No     Alcohol/week: 1.0 standard drink of alcohol     Types: 1 Standard drinks or equivalent per week    Drug use: No    Sexual activity: Not Currently     Review of Systems   All other systems reviewed and are negative.    Objective:     Vital Signs (Most Recent):  Temp: 97.5 °F (36.4 °C) (25 1057)  Pulse: 70 (25 1057)  Resp: 18 (25 1057)  BP: 131/62 (25 1103)  SpO2: (!) 93 % (25 1057) Vital Signs (24h Range):  Temp:  [97.5 °F (36.4 °C)] 97.5 °F (36.4 °C)  Pulse:  [70] 70  Resp:  [18] 18  SpO2:  [93 %] 93 %  BP: (131-159)/(62-65) 131/62       Weight: 86.2 kg (190 lb)  Body mass index is 31.62 kg/m².    SpO2: (!) 93 %        Physical Exam  Constitutional:       Appearance: Normal  appearance.   HENT:      Mouth/Throat:      Mouth: Mucous membranes are moist.   Cardiovascular:      Rate and Rhythm: Normal rate and regular rhythm.      Pulses: Normal pulses.      Heart sounds: No murmur heard.     No friction rub. No gallop.   Pulmonary:      Effort: Pulmonary effort is normal.   Chest:      Comments: Subclavian TVP  Musculoskeletal:         General: Normal range of motion.      Cervical back: Neck supple.      Right lower leg: No edema.      Left lower leg: No edema.   Skin:     General: Skin is warm.      Capillary Refill: Capillary refill takes less than 2 seconds.   Neurological:      Mental Status: She is alert.

## 2025-02-11 NOTE — BRIEF OP NOTE
Brief Operative Note:    : Mark Youngblood MD     Referring Physician: Mark Youngblood     All Operators: Surgeon(s):  Neo Hutchison MD Tafur Soto, Jose D., MD Subramaniam, Venkat, MD Mohamed, Best MAHER MD     Preoperative Diagnosis: Severe aortic stenosis [I35.0]     Postop Diagnosis: Severe aortic stenosis [I35.0]    Treatments/Procedures: Procedure(s) (LRB):  REPLACEMENT, AORTIC VALVE, TRANSCATHETER (TAVR) (N/A)  Cardiac Cath Cosurgeon (N/A)  REPLACEMENT, AORTIC VALVE, TRANSCATHETER (TAVR)  PTA, Femoral Artery    Access: Right CFA    Findings:Severe structural heart disease is present.     See catheterization report for full details.    Intervention:    S/p 29 evolut with post dilation   See catheterization report for full details.    Closure device: Vascband, Perclose       Plan:  - Post cath protocol   - IVF @ 100 cc/kg/hr x 2 hours  - S/p TVP, admit to ICU due to nursing availability   - Bed rest x 2 hours   - Continue aspirin 81 mg daily indefinitely  - Cardiac rehab referral  - Follow up with outpatient cardiologist    Estimated Blood loss: 20 cc    Specimens removed: No    Neo Hutchison MD

## 2025-02-11 NOTE — CONSULTS
[facility] - Cardiac Intensive Care  Cardiac Electrophysiology  Consult Note    Admission Date: 2/11/2025  Code Status: Prior   Attending Provider: Mark Youngblood MD  Consulting Provider: Enzo Steven MD  Principal Problem:<principal problem not specified>    Inpatient consult to Electrophysiology  Consult performed by: Enzo Padilla MD  Consult ordered by: Neo MD        Subjective:     Consult:  CHB after TAVR     HPI:   69 year-old-woman with past medical history of hypertension, COPD, Prinzmetal angina, sleep apnea, sever AS, presents for TAVR. She is now s/p TAVR and during procedure patient had complete heart block. Hence TVP was placed and patient was admitted to CICU for cardiac monitoring overnight and repeat EKG in the morning for decision on EPS vs discharge with event monitoring. Her EKG previous to procedure shows NSR with narrow QRS (80 msec) and no AV or bundle branch blocks.       Past Medical History:   Diagnosis Date    Allergy     Angina     Arthritis 06/11/2013    Asthma     Congestive heart failure 07/16/2024    COPD (chronic obstructive pulmonary disease)     GERD (gastroesophageal reflux disease)     Hx of Prinzmetal angina     Hypertension     Obesity 06/11/2013    Other and unspecified hyperlipidemia 06/11/2013    Prinzmetal's angina     Sleep apnea 06/11/2013       Past Surgical History:   Procedure Laterality Date    ABDOMINAL SURGERY      CATHETERIZATION OF BOTH LEFT AND RIGHT HEART N/A 12/2/2024    Procedure: CATHETERIZATION, HEART, BOTH LEFT AND RIGHT;  Surgeon: Donna Holden MD;  Location: [facility] CATH LAB;  Service: Cardiology;  Laterality: N/A;    CHOLECYSTECTOMY      COLONOSCOPY N/A 12/6/2019    Procedure: COLONOSCOPY;  Surgeon: Winston Smith MD;  Location: [facility] ENDO;  Service: Endoscopy;  Laterality: N/A;    ESOPHAGOGASTRODUODENOSCOPY N/A 12/10/2018    Procedure: EGD (ESOPHAGOGASTRODUODENOSCOPY);  Surgeon: Raúl Polanco III, MD;   Location: [facility];  Service: Endoscopy;  Laterality: N/A;    ESOPHAGOGASTRODUODENOSCOPY N/A 9/8/2020    Procedure: EGD (ESOPHAGOGASTRODUODENOSCOPY);  Surgeon: Raúl Polanco III, MD;  Location: [facility];  Service: Endoscopy;  Laterality: N/A;    EYE SURGERY      FOOT SURGERY      INTRALUMINAL GASTROINTESTINAL TRACT IMAGING VIA CAPSULE N/A 9/18/2020    Procedure: IMAGING PROCEDURE, GI TRACT, INTRALUMINAL, VIA CAPSULE;  Surgeon: lBade Holley RN;  Location: [facility] ENDO;  Service: Endoscopy;  Laterality: N/A;    TONSILLECTOMY      TUBAL LIGATION         Review of patient's allergies indicates:   Allergen Reactions    Ace inhibitors      Other reaction(s): Hives    Lisinopril      Other reaction(s): rash    Pravastatin      Other reaction(s): Mental Changes    Rosuvastatin Other (See Comments)     Other reaction(s): Muscle pain    Simvastatin Other (See Comments)     Other reaction(s): leg cramps       No current facility-administered medications on file prior to encounter.     Current Outpatient Medications on File Prior to Encounter   Medication Sig    albuterol (PROVENTIL) 2.5 mg /3 mL (0.083 %) nebulizer solution Take 3 mLs (2.5 mg total) by nebulization every 4 (four) hours as needed for Wheezing or Shortness of Breath. Rescue    aspirin (ECOTRIN) 81 MG EC tablet Take 81 mg by mouth once daily.    celecoxib (CELEBREX) 200 MG capsule Take 200 mg by mouth once daily.    ezetimibe (ZETIA) 10 mg tablet Take 1 tablet (10 mg total) by mouth once daily.    fluticasone propionate (FLONASE) 50 mcg/actuation nasal spray USE 2 SPRAYS IN EACH NOSTRIL ONE TIME DAILY    furosemide (LASIX) 40 MG tablet Take 1 tablet (40 mg total) by mouth once daily.    HYDROcodone-acetaminophen (NORCO)  mg per tablet Take 1 tablet by mouth. One tablet twice a day    isosorbide mononitrate (IMDUR) 30 MG 24 hr tablet Take 1 tab po in the AM and 1/2 tab po in the PM    lactulose (CHRONULAC) 10 gram/15 mL solution Take 15 mLs (10 g total) by  mouth 2 (two) times daily.    methylPREDNISolone (MEDROL DOSEPACK) 4 mg tablet use as directed    metoprolol succinate (TOPROL-XL) 100 MG 24 hr tablet     MULTIVITAMIN (MULTIPLE VITAMIN ORAL) Take 1 tablet by mouth Daily.    nitroGLYCERIN (NITROSTAT) 0.4 MG SL tablet DISSOLVE 1 TABLET UNDER THE TONGUE EVERY 5 MINUTES FOR 3 DOSES AS NEEDED FOR CHEST PAIN    pantoprazole (PROTONIX) 20 MG tablet Take 1 tablet (20 mg total) by mouth 2 (two) times daily before meals.    solifenacin (VESICARE) 10 MG tablet Take 1 tablet (10 mg total) by mouth once daily.    tiotropium-olodateroL (STIOLTO RESPIMAT) 2.5-2.5 mcg/actuation Mist Inhale 2 puffs into the lungs once daily. Controller    verapamiL (VERELAN) 360 MG C24P Take 360 mg by mouth once daily.    albuterol (PROVENTIL HFA) 90 mcg/actuation inhaler Inhale 2 puffs into the lungs every 6 (six) hours as needed for Wheezing. Rescue    albuterol (VENTOLIN HFA) 90 mcg/actuation inhaler Inhale 2 puffs into the lungs every 6 (six) hours as needed for Wheezing or Shortness of Breath. Rescue    amoxicillin-clavulanate 875-125mg (AUGMENTIN) 875-125 mg per tablet Take 1 tablet by mouth every 12 (twelve) hours.    cyclobenzaprine (FLEXERIL) 5 MG tablet  (Patient not taking: Reported on 2024)    inhalation spacing device Use as directed for inhalation.    naloxone (NARCAN) 4 mg/actuation Spry     triamcinolone acetonide (KENALOG-40) 40 mg/mL injection 40 mg by Other route once daily.     Family History       Problem Relation (Age of Onset)    Breast cancer Maternal Grandmother    COPD Father    Cancer Mother    Heart disease Sister    Hyperlipidemia Son    Hypertension Mother, Father    Kidney cancer Mother          Tobacco Use    Smoking status: Former     Current packs/day: 0.00     Average packs/day: 1 pack/day for 48.4 years (48.4 ttl pk-yrs)     Types: Cigarettes     Start date:      Quit date: 2016     Years since quittin.6     Passive exposure: Never    Smokeless  tobacco: Never    Tobacco comments:     smoked for 25 yrs. quit 16yrs. then smoked again for 5-6 before quitting in 2016.   Substance and Sexual Activity    Alcohol use: No     Alcohol/week: 1.0 standard drink of alcohol     Types: 1 Standard drinks or equivalent per week    Drug use: No    Sexual activity: Not Currently     Review of Systems   All other systems reviewed and are negative.    Objective:     Vital Signs (Most Recent):  Temp: 97.5 °F (36.4 °C) (02/11/25 1057)  Pulse: 70 (02/11/25 1057)  Resp: 18 (02/11/25 1057)  BP: 131/62 (02/11/25 1103)  SpO2: (!) 93 % (02/11/25 1057) Vital Signs (24h Range):  Temp:  [97.5 °F (36.4 °C)] 97.5 °F (36.4 °C)  Pulse:  [70] 70  Resp:  [18] 18  SpO2:  [93 %] 93 %  BP: (131-159)/(62-65) 131/62       Weight: 86.2 kg (190 lb)  Body mass index is 31.62 kg/m².    SpO2: (!) 93 %        Physical Exam  Constitutional:       Appearance: Normal appearance.   HENT:      Mouth/Throat:      Mouth: Mucous membranes are moist.   Cardiovascular:      Rate and Rhythm: Normal rate and regular rhythm.      Pulses: Normal pulses.      Heart sounds: No murmur heard.     No friction rub. No gallop.   Pulmonary:      Effort: Pulmonary effort is normal.   Chest:      Comments: Subclavian TVP  Musculoskeletal:         General: Normal range of motion.      Cervical back: Neck supple.      Right lower leg: No edema.      Left lower leg: No edema.   Skin:     General: Skin is warm.      Capillary Refill: Capillary refill takes less than 2 seconds.   Neurological:      Mental Status: She is alert.                  Assessment and Plan:     Complete heart block  Ro Hernandez with severe AS s/p TAVR. Last EF 60-65%  Symptomatic severe AS with LVEF of 60% s/p TAVR with CHB during procedure and then she recovered and is on NSR. Recommend overnight telemetry monitoring.     Telemetry post procedure showing NSR with narrow QRS    PLAN:  - Continue telemetry and TVP overnight  - Repeat ECG at 5 am  -  NPO at MN, for possible PPM placement in AM  - Routine Post op management per structural team    - EP team will continue to follow, please call for any questions or concerns   - Provided rhythm remains stable will plan for outpatient event monitor on discharge.  - No evidence of AV block or change in QRS. No indication for PPM at this time. Will continue to monitor.    If LBBB:  - If LBBB persists in AM, will need EP study to evaluate the need for PPM  - If LBBB resolves, will go home with 30 day event monitor          Thank you for your consult. I will follow-up with patient. Please contact us if you have any additional questions.    Enzo Steven MD  Cardiac Electrophysiology  [facility] - Cardiac Intensive Care

## 2025-02-11 NOTE — ASSESSMENT & PLAN NOTE
Ro Hernandez with severe AS s/p TAVR. Last EF 60-65%  Symptomatic severe AS with LVEF of 60% s/p TAVR with CHB during procedure and then she recovered and is on NSR. Recommend overnight telemetry monitoring.     Telemetry post procedure showing NSR with narrow QRS    PLAN:  - Continue telemetry and TVP overnight  - Repeat ECG at 5 am  - NPO at MN, for possible PPM placement in AM  - Routine Post op management per structural team    - EP team will continue to follow, please call for any questions or concerns   - Provided rhythm remains stable will plan for outpatient event monitor on discharge.  - No evidence of AV block or change in QRS. No indication for PPM at this time. Will continue to monitor.    If LBBB:  - If LBBB persists in AM, will need EP study to evaluate the need for PPM  - If LBBB resolves, will go home with 30 day event monitor

## 2025-02-11 NOTE — HPI
69-year-old female with past medical history of hypertension, COPD, Prinzmetal angina, sleep apnea, sever AS, presents for TAVR.  She was previously seen by a cardiologist in [place] who told her that she has history of focal but on reviewing the echo she does not have systolic anterior motion of mitral valve with no eccentric mitral regurgitation.  Her aortic valve is Calcified.  Patient was experiencing shortness on breath for the last 5 years on exertion and for the last 6 months she is experiencing chest tightness mainly when she stress.  She denies having any presyncope or syncope.  Currently she denies having any orthopnea PND bilateral lower extremity edema.      The patient has undergone the following TAVR work-up:   ECHO (Date 8/1/2024): Aortic valve area by VTI is 0.91 cm². Aortic valve peak velocity is 5.64 m/s. Mean gradient is 76 mmHg. The dimensionless index is 0.33. EF= 65%.   [facility] (Date 12/2/2024): Non-obstructive CAD    STS: 2.9%   Frailty: 1/4   Iliacs >7 on the right, poor contrast in the study but OK for RTF access  LVOT average diameter 23.9  Incidental findings on CT:   CT Surgery risk assessment: Needs CTS referral   Rhythm issues: None  PFTs:    KCCQ/5 meter walk:   Comorbidities: HOCM(On reviewing echo there is TAL, HTN, GERD, and COPD             Ro Hernandez is a 29 mm  Evolut valve candidate via RTF access. After surgical evaluation.

## 2025-02-11 NOTE — Clinical Note
The DP pulses were 2+ bilaterally. The PT pulses were 1+ bilaterally. The radial pulses were +1 bilaterally.

## 2025-02-11 NOTE — ANESTHESIA PROCEDURE NOTES
Arterial    Diagnosis: TAVR    Patient location during procedure: done in OR    Staffing  Authorizing Provider: Montez Lara Jr., MD  Performing Provider: Sabrina Pak MD    Staffing  Performed by: Sabrina Pak MD  Authorized by: Montez Lara Jr., MD    Anesthesiologist was present at the time of the procedure.    Preanesthetic Checklist  Completed: patient identified, IV checked, site marked, risks and benefits discussed, surgical consent, monitors and equipment checked, pre-op evaluation, timeout performed and anesthesia consent givenArterial  Skin Prep: chlorhexidine gluconate and isopropyl alcohol  Local Infiltration: lidocaine  Orientation: right  Location: radial    Catheter Size: 20 G  Catheter placement by Ultrasound guidance. Heme positive aspiration all ports.   Vessel Caliber: large, patent, compressibility normal  Vascular Doppler:  not done  Needle advanced into vessel with real time Ultrasound guidance.  Guidewire confirmed in vessel.Insertion Attempts: 1  Assessment  Dressing: secured with tape and tegaderm  Patient: Tolerated well

## 2025-02-11 NOTE — TRANSFER OF CARE
"Anesthesia Transfer of Care Note    Patient: Ro Hernandez    Procedure(s) Performed: Procedure(s) (LRB):  REPLACEMENT, AORTIC VALVE, TRANSCATHETER (TAVR) (N/A)  Cardiac Cath Cosurgeon (N/A)  REPLACEMENT, AORTIC VALVE, TRANSCATHETER (TAVR)  PTA, Femoral Artery    Patient location: ICU    Anesthesia Type: general    Transport from OR: Transported from OR on 6-10 L/min O2 by face mask with adequate spontaneous ventilation    Post pain: adequate analgesia    Post assessment: no apparent anesthetic complications    Post vital signs: stable    Level of consciousness: awake and alert    Nausea/Vomiting: no nausea/vomiting    Complications: none    Transfer of care protocol was followed      Last vitals: Visit Vitals  /62   Pulse 70   Temp 36.4 °C (97.5 °F) (Temporal)   Resp 18   Ht 5' 5" (1.651 m)   Wt 86.2 kg (190 lb)   SpO2 (!) 93%   Breastfeeding No   BMI 31.62 kg/m²     "

## 2025-02-11 NOTE — PLAN OF CARE
Patient admitted to SSCU. Initial assessment completed by this RN. VS obtained. 20 g IV inserted to L lateral a/c. Plan of care reviewed with patient. Patient verbalizes understanding and agrees with plan of care. KENDRA

## 2025-02-11 NOTE — ANESTHESIA PREPROCEDURE EVALUATION
[facility]  Anesthesia Pre-Operative Evaluation    Patient Name: Ro Hernandez  YOB: 1955  MRN: [id number]    SUBJECTIVE:     Pre-operative evaluation for Procedure(s) (LRB):  REPLACEMENT, AORTIC VALVE, TRANSCATHETER (TAVR) (N/A)    02/11/2025    Ro Hernandez is a 69 y.o. female with nonobstructive CAD, severe AS, HOCM with TAL, severe pulmonary HTN, COPD, and HERB presenting for the above procedures.    Revised Cardiac Risk Index   1. History of ischemic heart disease   2. History of congestive heart failure   3. History of cerebrovascular disease (stroke or transient ischemic attack)   4. History of diabetes requiring preoperative insulin use   5. Chronic kidney disease (creatinine > 2 mg/dL)   6. Undergoing suprainguinal vascular, intraperitoneal, or intrathoracic surgery     Risk for cardiac death, nonfatal myocardial infarction, and nonfatal cardiac arrest     0 predictors = 3.9%, 1 predictor = 6.0%, 2 predictors = 10.1%, >=3 predictors = >15%    RCRI Calculator Class and Risk percentage:    2(10.1%)                           2D ECHO:  TTE:  Results for orders placed during the hospital encounter of 08/01/24    Echo    Interpretation Summary    Left Ventricle: The left ventricle is normal in size. Mildly increased ventricular mass. Mildly increased wall thickness. There is concentric hypertrophy. There is normal systolic function with a visually estimated ejection fraction of 60 - 65%. Grade II diastolic dysfunction. Elevated left ventricular filling pressure.    Right Ventricle: Normal right ventricular cavity size. Wall thickness is normal. Systolic function is normal.    Left Atrium: Left atrium is mildly dilated.    Aortic Valve: There is severe aortic valve sclerosis. There is severe stenosis. Aortic valve area by VTI is 0.91 cm². Aortic valve peak velocity is 5.64 m/s. Mean gradient is 76 mmHg. The dimensionless index is 0.33. There is mild aortic  regurgitation.    Mitral Valve: There is moderate mitral annular calcification present.    Tricuspid Valve: There is mild to moderate regurgitation.    Pulmonary Artery: There is moderate to severe pulmonary hypertension. The estimated pulmonary artery systolic pressure is 59 mmHg.    IVC/SVC: Normal venous pressure at 3 mmHg.      KIERAN:  No results found for this or any previous visit.    LDA: None documented.     Prev airway: None documented.    Drips: None documented.      Patient Active Problem List   Diagnosis    Class 1 obesity due to excess calories with serious comorbidity and body mass index (BMI) of 34.0 to 34.9 in adult    Unspecified essential hypertension    Mixed hyperlipidemia    GERD (gastroesophageal reflux disease)    Arthritis    Benign heart murmur    HERB on CPAP    Mild intermittent asthma without complication    Multiple lung nodules    Renal cyst    Prinzmetal's angina    Epigastric abdominal pain    Iron deficiency anemia due to chronic blood loss    Iron deficiency anemia    History of 2019 novel coronavirus disease (COVID-19)    Acute cystitis without hematuria    Urge incontinence    Atherosclerosis of aorta    Obstructive hypertrophic cardiomyopathy    Emphysema lung    Cigarette nicotine dependence in remission    Exercise hypoxemia    Congestive heart failure    Nonrheumatic aortic valve stenosis    Class 2 severe obesity with serious comorbidity and body mass index (BMI) of 36.0 to 36.9 in adult, unspecified obesity type    Opioid dependence, uncomplicated       Review of patient's allergies indicates:   Allergen Reactions    Ace inhibitors      Other reaction(s): Hives    Lisinopril      Other reaction(s): rash    Pravastatin      Other reaction(s): Mental Changes    Rosuvastatin Other (See Comments)     Other reaction(s): Muscle pain    Simvastatin Other (See Comments)     Other reaction(s): leg cramps       Current Inpatient Medications:      Antibiotics (From admission, onward)       None            VTE Risk Mitigation (From admission, onward)      None            Current Facility-Administered Medications on File Prior to Encounter   Medication Dose Route Frequency Provider Last Rate Last Admin    sodium chloride 0.9% flush 10 mL  10 mL Intravenous PRN Mark Youngblood MD         Current Outpatient Medications on File Prior to Encounter   Medication Sig Dispense Refill    albuterol (PROVENTIL HFA) 90 mcg/actuation inhaler Inhale 2 puffs into the lungs every 6 (six) hours as needed for Wheezing. Rescue 8 g 0    albuterol (PROVENTIL) 2.5 mg /3 mL (0.083 %) nebulizer solution Take 3 mLs (2.5 mg total) by nebulization every 4 (four) hours as needed for Wheezing or Shortness of Breath. Rescue 360 mL 11    albuterol (VENTOLIN HFA) 90 mcg/actuation inhaler Inhale 2 puffs into the lungs every 6 (six) hours as needed for Wheezing or Shortness of Breath. Rescue 18 g 2    amoxicillin-clavulanate 875-125mg (AUGMENTIN) 875-125 mg per tablet Take 1 tablet by mouth every 12 (twelve) hours. 14 tablet 0    aspirin (ECOTRIN) 81 MG EC tablet Take 81 mg by mouth once daily.      celecoxib (CELEBREX) 200 MG capsule Take 200 mg by mouth once daily.      cyclobenzaprine (FLEXERIL) 5 MG tablet  (Patient not taking: Reported on 11/19/2024)      ezetimibe (ZETIA) 10 mg tablet Take 1 tablet (10 mg total) by mouth once daily. 90 tablet 2    fluticasone propionate (FLONASE) 50 mcg/actuation nasal spray USE 2 SPRAYS IN EACH NOSTRIL ONE TIME DAILY 48 g 3    furosemide (LASIX) 40 MG tablet Take 1 tablet (40 mg total) by mouth once daily. 90 tablet 3    HYDROcodone-acetaminophen (NORCO)  mg per tablet Take 1 tablet by mouth. One tablet twice a day      inhalation spacing device Use as directed for inhalation. 1 each 0    isosorbide mononitrate (IMDUR) 30 MG 24 hr tablet Take 1 tab po in the AM and 1/2 tab po in the  tablet 1    lactulose (CHRONULAC) 10 gram/15 mL solution Take 15 mLs (10 g total) by mouth 2  (two) times daily. 1892 mL 11    methylPREDNISolone (MEDROL DOSEPACK) 4 mg tablet use as directed 1 each 0    metoprolol succinate (TOPROL-XL) 100 MG 24 hr tablet       MULTIVITAMIN (MULTIPLE VITAMIN ORAL) Take 1 tablet by mouth Daily.      naloxone (NARCAN) 4 mg/actuation Spry       nitroGLYCERIN (NITROSTAT) 0.4 MG SL tablet DISSOLVE 1 TABLET UNDER THE TONGUE EVERY 5 MINUTES FOR 3 DOSES AS NEEDED FOR CHEST PAIN 25 tablet 0    pantoprazole (PROTONIX) 20 MG tablet Take 1 tablet (20 mg total) by mouth 2 (two) times daily before meals. 180 tablet 3    solifenacin (VESICARE) 10 MG tablet Take 1 tablet (10 mg total) by mouth once daily. 90 tablet 3    tiotropium-olodateroL (STIOLTO RESPIMAT) 2.5-2.5 mcg/actuation Mist Inhale 2 puffs into the lungs once daily. Controller 12 g 3    triamcinolone acetonide (KENALOG-40) 40 mg/mL injection 40 mg by Other route once daily.      verapamiL (VERELAN) 360 MG C24P Take 360 mg by mouth once daily.         Past Surgical History:   Procedure Laterality Date    ABDOMINAL SURGERY      CATHETERIZATION OF BOTH LEFT AND RIGHT HEART N/A 12/2/2024    Procedure: CATHETERIZATION, HEART, BOTH LEFT AND RIGHT;  Surgeon: Donna Holden MD;  Location: [facility] CATH LAB;  Service: Cardiology;  Laterality: N/A;    CHOLECYSTECTOMY      COLONOSCOPY N/A 12/6/2019    Procedure: COLONOSCOPY;  Surgeon: Winston Smith MD;  Location: [facility] ENDO;  Service: Endoscopy;  Laterality: N/A;    ESOPHAGOGASTRODUODENOSCOPY N/A 12/10/2018    Procedure: EGD (ESOPHAGOGASTRODUODENOSCOPY);  Surgeon: Raúl Polanco III, MD;  Location: [facility] ENDO;  Service: Endoscopy;  Laterality: N/A;    ESOPHAGOGASTRODUODENOSCOPY N/A 9/8/2020    Procedure: EGD (ESOPHAGOGASTRODUODENOSCOPY);  Surgeon: Raúl Polanco III, MD;  Location: [facility];  Service: Endoscopy;  Laterality: N/A;    EYE SURGERY      FOOT SURGERY      INTRALUMINAL GASTROINTESTINAL TRACT IMAGING VIA CAPSULE N/A 9/18/2020    Procedure: IMAGING PROCEDURE, GI TRACT,  INTRALUMINAL, VIA CAPSULE;  Surgeon: Blade Holley RN;  Location: [facility];  Service: Endoscopy;  Laterality: N/A;    TONSILLECTOMY      TUBAL LIGATION         Social History     Socioeconomic History    Marital status:    Tobacco Use    Smoking status: Former     Current packs/day: 0.00     Average packs/day: 1 pack/day for 48.4 years (48.4 ttl pk-yrs)     Types: Cigarettes     Start date:      Quit date: 2016     Years since quittin.6     Passive exposure: Never    Smokeless tobacco: Never    Tobacco comments:     smoked for 25 yrs. quit 16yrs. then smoked again for 5-6 before quitting in .   Substance and Sexual Activity    Alcohol use: No     Alcohol/week: 1.0 standard drink of alcohol     Types: 1 Standard drinks or equivalent per week    Drug use: No    Sexual activity: Not Currently     Social Drivers of Health     Financial Resource Strain: Low Risk  (2019)    Overall Financial Resource Strain (CARDIA)     Difficulty of Paying Living Expenses: Not hard at all   Food Insecurity: No Food Insecurity (2019)    Hunger Vital Sign     Worried About Running Out of Food in the Last Year: Never true     Ran Out of Food in the Last Year: Never true   Transportation Needs: No Transportation Needs (2019)    PRAPARE - Transportation     Lack of Transportation (Medical): No     Lack of Transportation (Non-Medical): No   Physical Activity: Unknown (2019)    Exercise Vital Sign     Days of Exercise per Week: 0 days   Stress: No Stress Concern Present (2019)    [employer] - Occupational Stress Questionnaire     Feeling of Stress : Only a little       OBJECTIVE:     Vital Signs Range (Last 24H):         Significant Labs:  Lab Results   Component Value Date    WBC 6.57 2025    HGB 13.7 2025    HCT 41.4 2025     2025    CHOL 178 2024    TRIG 117 2024    HDL 40 2024    ALT 12 2024    AST 19  07/13/2024     02/04/2025    K 4.5 02/04/2025     02/04/2025    CREATININE 0.8 02/04/2025    BUN 24 (H) 02/04/2025    CO2 30 (H) 02/04/2025    TSH 0.791 04/02/2024    INR 1.0 11/29/2024    HGBA1C 5.0 03/02/2023       Diagnostic Studies: No relevant studies.    EKG:   Results for orders placed or performed during the hospital encounter of 07/13/24   EKG 12-lead    Collection Time: 07/13/24 11:11 AM   Result Value Ref Range    QRS Duration 80 ms    OHS QTC Calculation 458 ms    Narrative    Test Reason :     Vent. Rate : 071 BPM     Atrial Rate : 071 BPM     P-R Int : 190 ms          QRS Dur : 080 ms      QT Int : 422 ms       P-R-T Axes : 058 073 052 degrees     QTc Int : 458 ms    Normal sinus rhythm  Cannot exclude Septal infarct (cited on or before 02-JAN-2007)  When compared with ECG of 21-JUN-2024 09:36,  Questionable change in The axis  Confirmed by BOSSMAN ARIAS, JAIRO SILVA ([id number]) on 7/13/2024 9:29:37 PM    Referred By: [email or web address]   SELF           Confirmed By:JAIRO KEITA MD         ASSESSMENT/PLAN:       Pre-op Assessment    I have reviewed the Patient Summary Reports.     I have reviewed the Nursing Notes. I have reviewed the NPO Status.   I have reviewed the Medications.     Review of Systems  Anesthesia Hx:  No problems with previous Anesthesia   History of prior surgery of interest to airway management or planning:          Denies Family Hx of Anesthesia complications.    Denies Personal Hx of Anesthesia complications.                    Hematology/Oncology:       -- Denies Anemia:                                  Cardiovascular:     Hypertension       CHF                                   Pulmonary:   COPD, severe     Sleep Apnea, CPAP                Renal/:   Denies Chronic Renal Disease.                Hepatic/GI:     GERD Denies Liver Disease.               Neurological:    Denies CVA.    Denies Seizures.                                Endocrine:  Denies Diabetes.                Physical Exam  General: Well nourished, Cooperative, Alert and Oriented    Airway:  Mallampati: III / II  Mouth Opening: Normal  TM Distance: Normal  Tongue: Normal  Neck ROM: Normal ROM    Dental:  Intact    Chest/Lungs:  Normal Respiratory Rate    Heart:  Rate: Normal        Anesthesia Plan  Type of Anesthesia, risks & benefits discussed:    Anesthesia Type: Gen Natural Airway, Gen ETT, MAC  Intra-op Monitoring Plan: Standard ASA Monitors and Art Line  Post Op Pain Control Plan: multimodal analgesia and IV/PO Opioids PRN  Induction:  IV  Airway Plan: , Post-Induction  ASA Score: 4  Day of Surgery Review of History & Physical: H&P Update referred to the surgeon/provider.    Ready For Surgery From Anesthesia Perspective.     .

## 2025-02-11 NOTE — HPI
69 year-old-woman with past medical history of hypertension, COPD, Prinzmetal angina, sleep apnea, sever AS, presents for TAVR. She is now s/p TAVR and during procedure patient had complete heart block. Hence TVP was placed and patient was admitted to CICU for cardiac monitoring overnight and repeat EKG in the morning for decision on EPS vs discharge with event monitoring. Her EKG previous to procedure shows NSR with narrow QRS (80 msec) and no AV or bundle branch blocks.

## 2025-02-12 ENCOUNTER — TELEPHONE (OUTPATIENT)
Dept: CARDIAC REHAB | Facility: CLINIC | Age: 70
End: 2025-02-12
Payer: MEDICARE

## 2025-02-12 ENCOUNTER — CLINICAL SUPPORT (OUTPATIENT)
Dept: CARDIOLOGY | Facility: HOSPITAL | Age: 70
End: 2025-02-12
Attending: INTERNAL MEDICINE
Payer: MEDICARE

## 2025-02-12 VITALS
SYSTOLIC BLOOD PRESSURE: 128 MMHG | WEIGHT: 190 LBS | BODY MASS INDEX: 31.65 KG/M2 | DIASTOLIC BLOOD PRESSURE: 62 MMHG | TEMPERATURE: 97 F | OXYGEN SATURATION: 92 % | RESPIRATION RATE: 17 BRPM | HEIGHT: 65 IN | HEART RATE: 114 BPM

## 2025-02-12 LAB
OHS QRS DURATION: 84 MS
OHS QTC CALCULATION: 471 MS
POC ACTIVATED CLOTTING TIME K: 233 SEC (ref 74–137)
POC ACTIVATED CLOTTING TIME K: 245 SEC (ref 74–137)
POC ACTIVATED CLOTTING TIME K: 262 SEC (ref 74–137)
SAMPLE: ABNORMAL

## 2025-02-12 PROCEDURE — 93270 REMOTE 30 DAY ECG REV/REPORT: CPT

## 2025-02-12 PROCEDURE — 25000003 PHARM REV CODE 250: Performed by: STUDENT IN AN ORGANIZED HEALTH CARE EDUCATION/TRAINING PROGRAM

## 2025-02-12 PROCEDURE — 63600175 PHARM REV CODE 636 W HCPCS: Performed by: STUDENT IN AN ORGANIZED HEALTH CARE EDUCATION/TRAINING PROGRAM

## 2025-02-12 PROCEDURE — 93005 ELECTROCARDIOGRAM TRACING: CPT

## 2025-02-12 RX ORDER — FUROSEMIDE 40 MG/1
40 TABLET ORAL DAILY
Status: DISCONTINUED | OUTPATIENT
Start: 2025-02-12 | End: 2025-02-12 | Stop reason: HOSPADM

## 2025-02-12 RX ORDER — HYDRALAZINE HYDROCHLORIDE 25 MG/1
25 TABLET, FILM COATED ORAL ONCE
Status: COMPLETED | OUTPATIENT
Start: 2025-02-12 | End: 2025-02-12

## 2025-02-12 RX ORDER — HYDRALAZINE HYDROCHLORIDE 20 MG/ML
10 INJECTION INTRAMUSCULAR; INTRAVENOUS ONCE
Status: COMPLETED | OUTPATIENT
Start: 2025-02-12 | End: 2025-02-12

## 2025-02-12 RX ORDER — ISOSORBIDE MONONITRATE 30 MG/1
30 TABLET, EXTENDED RELEASE ORAL DAILY
Status: DISCONTINUED | OUTPATIENT
Start: 2025-02-12 | End: 2025-02-12 | Stop reason: HOSPADM

## 2025-02-12 RX ORDER — EZETIMIBE 10 MG/1
10 TABLET ORAL DAILY
Status: DISCONTINUED | OUTPATIENT
Start: 2025-02-12 | End: 2025-02-12 | Stop reason: HOSPADM

## 2025-02-12 RX ORDER — METOPROLOL SUCCINATE 100 MG/1
100 TABLET, EXTENDED RELEASE ORAL DAILY
Status: DISCONTINUED | OUTPATIENT
Start: 2025-02-12 | End: 2025-02-12 | Stop reason: HOSPADM

## 2025-02-12 RX ORDER — FLUTICASONE PROPIONATE 50 MCG
2 SPRAY, SUSPENSION (ML) NASAL DAILY
Status: DISCONTINUED | OUTPATIENT
Start: 2025-02-13 | End: 2025-02-12 | Stop reason: HOSPADM

## 2025-02-12 RX ORDER — ASPIRIN 81 MG/1
81 TABLET ORAL DAILY
Status: DISCONTINUED | OUTPATIENT
Start: 2025-02-12 | End: 2025-02-12 | Stop reason: HOSPADM

## 2025-02-12 RX ORDER — CELECOXIB 200 MG/1
200 CAPSULE ORAL DAILY
Status: DISCONTINUED | OUTPATIENT
Start: 2025-02-12 | End: 2025-02-12 | Stop reason: HOSPADM

## 2025-02-12 RX ORDER — PANTOPRAZOLE SODIUM 20 MG/1
20 TABLET, DELAYED RELEASE ORAL
Status: DISCONTINUED | OUTPATIENT
Start: 2025-02-12 | End: 2025-02-12 | Stop reason: HOSPADM

## 2025-02-12 RX ADMIN — CELECOXIB 200 MG: 200 CAPSULE ORAL at 11:02

## 2025-02-12 RX ADMIN — EZETIMIBE 10 MG: 10 TABLET ORAL at 10:02

## 2025-02-12 RX ADMIN — ACETAMINOPHEN 650 MG: 325 TABLET ORAL at 02:02

## 2025-02-12 RX ADMIN — FUROSEMIDE 40 MG: 40 TABLET ORAL at 10:02

## 2025-02-12 RX ADMIN — HYDRALAZINE HYDROCHLORIDE 10 MG: 20 INJECTION, SOLUTION INTRAMUSCULAR; INTRAVENOUS at 06:02

## 2025-02-12 RX ADMIN — ASPIRIN 81 MG: 81 TABLET, COATED ORAL at 10:02

## 2025-02-12 RX ADMIN — ISOSORBIDE MONONITRATE 30 MG: 30 TABLET, EXTENDED RELEASE ORAL at 10:02

## 2025-02-12 RX ADMIN — HYDRALAZINE HYDROCHLORIDE 25 MG: 25 TABLET ORAL at 02:02

## 2025-02-12 NOTE — ASSESSMENT & PLAN NOTE
Ro Hernandez with severe AS s/p TAVR. Last EF 60-65%  Symptomatic severe AS with LVEF of 60% s/p TAVR with CHB during procedure and then she recovered and is on NSR. Recommend overnight telemetry monitoring.     Telemetry post procedure showing NSR with narrow QRS  Telemetry overnight with no AV block and narrow QRS    PLAN:  - Event monitor   - Will sign off

## 2025-02-12 NOTE — ANESTHESIA POSTPROCEDURE EVALUATION
Anesthesia Post Evaluation    Patient: Ro Hernandez    Procedure(s) Performed: Procedure(s) (LRB):  REPLACEMENT, AORTIC VALVE, TRANSCATHETER (TAVR) (N/A)  Cardiac Cath Cos[facility]geon (N/A)  REPLACEMENT, AORTIC VALVE, TRANSCATHETER (TAVR)  PTA, Femoral Artery    Final Anesthesia Type: general      Patient location during evaluation: ICU  Patient participation: Yes- Able to Participate  Level of consciousness: awake and alert and oriented  Post-procedure vital signs: reviewed and stable  Pain management: adequate  Airway patency: patent    PONV status at discharge: No PONV  Anesthetic complications: no      Cardiovascular status: blood pressure returned to baseline, hemodynamically stable and stable  Respiratory status: unassisted, room air and spontaneous ventilation  Hydration status: euvolemic  Follow-up not needed.              Vitals Value Taken Time   /76 02/12/25 0302   Temp 36.3 °C (97.3 °F) 02/12/25 0400   Pulse 110 02/12/25 0817   Resp 17 02/12/25 0400   SpO2 96 % 02/12/25 0810   Vitals shown include unfiled device data.      No case tracking events are documented in the log.      Pain/Trinh Score: Pain Rating Prior to Med Admin: 8 (2/12/2025  2:59 AM)  Pain Rating Post Med Admin: 3 (2/12/2025  4:00 AM)

## 2025-02-12 NOTE — PLAN OF CARE
[facility] - Cardiac Intensive [facility]  Discharge Final Note    Primary Care Provider: Minor Charlton MD    Expected Discharge Date: 2/12/2025    Patient discharged home with family. Family provided transportation. No CM needs.    Patient's bedside nurse and pt notified of the above.    Discharge Plan A and Plan B have been determined by review of patient's clinical status, future medical and therapeutic needs, and coverage/benefits for post-acute care in coordination with multidisciplinary team members.    Final Discharge Note (most recent)       Final Note - 02/12/25 1205          Final Note    Assessment Type Final Discharge Note (P)      Anticipated Discharge Disposition Home or Self Care (P)      Hospital Resources/Appts/Education Provided Provided patient/caregiver with written discharge plan information (P)         Post-Acute Status    Discharge Delays None known at this time (P)                      Important Message from Medicare                 Future Appointments   Date Time Provider Department Center   3/18/2025 11:00 AM PULMONARY DISEASE MANAGEMENT ON ON PULMFS [facility]   3/18/2025 11:50 AM LABORATORY, [facility] ON LAB [facility]   3/18/2025 12:30 PM CARDIOVASCULAR 2, ONLH ONLH SPECCPR [facility]   3/26/2025 11:20 AM Josue Jose MD [facility] CARDIO [facility]   4/9/2025  9:30 AM Mark Youngblood MD [facility] STRHRT [facility]   5/8/2025 10:00 AM V CT1 LIMIT 500 LBS [facility] CT SCAN [facility]   5/8/2025 10:30 AM Rachel Menezes PA-C HG[facility] PULMSVC [facility]   6/27/2025 10:30 AM Elias Reyna MD ONLC UROLOGY [facility]   12/22/2025 10:10 AM [facility]  MAMMO1 SCREEN [facility] MAMMO [facility]   12/22/2025 11:00 AM Sammie Stanton MD [facility] OBGYN [facility]       Anirudh Young, MSN  RN Case Management  [phone]

## 2025-02-12 NOTE — PLAN OF CARE
[facility] - Cardiac Intensive Care  Discharge Assessment    Primary Care Provider: Minor Charlton MD      Pt dc before CM could complete initial dc assessment. No CM needs,     Discharge Plan A and Plan B have been determined by review of patient's clinical status, future medical and therapeutic needs, and coverage/benefits for post-acute care in coordination with multidisciplinary team members.     Discharge Assessment (most recent)       BRIEF DISCHARGE ASSESSMENT - 02/12/25 1158          Discharge Planning    Assessment Type Discharge Planning Brief Assessment (P)      Resource/Environmental Concerns none (P)      Support Systems Children (P)      Assistance Needed none (P)      Equipment Currently Used at Home none (P)      Current Living Arrangements home (P)      Patient/Family Anticipates Transition to home with family (P)      Patient/Family Anticipated Services at Transition none (P)      DME Needed Upon Discharge  none (P)      Discharge Plan A Home with family (P)      Discharge Plan B Home (P)         Physical Activity    On average, how many days per week do you engage in moderate to strenuous exercise (like a brisk walk)? 0 days (P)      On average, how many minutes do you engage in exercise at this level? 0 min (P)         Financial Resource Strain    How hard is it for you to pay for the very basics like food, housing, medical care, and heating? Not hard at all (P)         Housing Stability    In the last 12 months, was there a time when you were not able to pay the mortgage or rent on time? No (P)      At any time in the past 12 months, were you homeless or living in a shelter (including now)? No (P)         Transportation Needs    Has the lack of transportation kept you from medical appointments, meetings, work or from getting things needed for daily living? No (P)         Food Insecurity    Within the past 12 months, you worried that your food would run out before you got the money to buy more.  Never true (P)      Within the past 12 months, the food you bought just didn't last and you didn't have money to get more. Never true (P)         Stress    Do you feel stress - tense, restless, nervous, or anxious, or unable to sleep at night because your mind is troubled all the time - these days? Only a little (P)         Social Isolation    How often do you feel lonely or isolated from those around you?  Never (P)         Alcohol Use    Q1: How often do you have a drink containing alcohol? Never (P)      Q2: How many drinks containing alcohol do you have on a typical day when you are drinking? Patient does not drink (P)      Q3: How often do you have six or more drinks on one occasion? Never (P)         Utilities    In the past 12 months has the electric, gas, oil, or water company threatened to shut off services in your home? No (P)         Health Literacy    How often do you need to have someone help you when you read instructions, pamphlets, or other written material from your doctor or pharmacy? Never (P)                      Anirudh Young, MSN  RN Case Management  [phone]

## 2025-02-12 NOTE — SUBJECTIVE & OBJECTIVE
Interval History: NAEON  Telemetry overnight with no CHB, AV block and narrow QRS. Morning QRS narrow on EKG  Event monitor     ROS  Objective:     Vital Signs (Most Recent):  Temp: 97.3 °F (36.3 °C) (02/12/25 0400)  Pulse: 102 (02/12/25 0702)  Resp: 17 (02/12/25 0400)  BP: (!) 156/65 (02/12/25 0200)  SpO2: 96 % (02/12/25 0600) Vital Signs (24h Range):  Temp:  [97.3 °F (36.3 °C)-97.5 °F (36.4 °C)] 97.3 °F (36.3 °C)  Pulse:  [] 102  Resp:  [16-18] 17  SpO2:  [91 %-96 %] 96 %  BP: (116-164)/(59-85) 156/65  Arterial Line BP: (124-178)/(51-74) 175/71     Weight: 86.2 kg (190 lb)  Body mass index is 31.62 kg/m².     SpO2: 96 %        Physical Exam  Constitutional:       Appearance: Normal appearance.   HENT:      Mouth/Throat:      Mouth: Mucous membranes are moist.   Cardiovascular:      Rate and Rhythm: Normal rate and regular rhythm.      Pulses: Normal pulses.      Heart sounds: No murmur heard.     No friction rub. No gallop.   Pulmonary:      Effort: Pulmonary effort is normal.   Abdominal:      General: Abdomen is flat.   Musculoskeletal:         General: Normal range of motion.      Cervical back: Neck supple.      Right lower leg: No edema.      Left lower leg: No edema.   Skin:     General: Skin is warm.   Neurological:      Mental Status: She is alert.

## 2025-02-12 NOTE — HOSPITAL COURSE
Pt brought to the cath lab.  RCFA and LRA access obtained.  Pt underwent successful transfemoral aortic valve replacement with a 29 mm Evolut valve.  During procedure pt had transient CHB and required R subclavian TVP.  EP c/s.  Pt'd EKG re-normalized and required no intervention.  TVP removed.  On day of discharge pt was AF with stable VS on her home oxygen.  Her access site were c/d/I without hematoma.  She was given her event monitor and subsequently discharged home with appropriate f/u.

## 2025-02-12 NOTE — TELEPHONE ENCOUNTER
Letter regarding Phase II cardiac rehab was sent to patient.  Telephone # sent for [facility].  Judith Lugo RN  Cardiac Rehab Nurse

## 2025-02-12 NOTE — DISCHARGE SUMMARY
[facility] - Cardiac Intensive [facility]  Interventional Cardiology  Discharge Summary      Patient Name: Ro Hernandez  MRN: [id number]  Admission Date: 2/11/2025  Hospital Length of Stay: 1 days  Discharge Date and Time:  02/12/2025 8:46 AM  Attending Physician: Mark Youngblood MD  Discharging Provider: Allan Velez MD  Primary Care Physician: Minor Charlton MD    HPI:  69-year-old female with past medical history of hypertension, COPD, Prinzmetal angina, sleep apnea, sever AS, presents for TAVR.  She was previously seen by a cardiologist in [place] who told her that she has history of focal but on reviewing the echo she does not have systolic anterior motion of mitral valve with no eccentric mitral regurgitation.  Her aortic valve is Calcified.  Patient was experiencing shortness on breath for the last 5 years on exertion and for the last 6 months she is experiencing chest tightness mainly when she stress.  She denies having any presyncope or syncope.  Currently she denies having any orthopnea PND bilateral lower extremity edema.      The patient has undergone the following TAVR work-up:   ECHO (Date 8/1/2024): Aortic valve area by VTI is 0.91 cm². Aortic valve peak velocity is 5.64 m/s. Mean gradient is 76 mmHg. The dimensionless index is 0.33. EF= 65%.   [facility] (Date 12/2/2024): Non-obstructive CAD    STS: 2.9%   Frailty: 1/4   Iliacs >7 on the right, poor contrast in the study but OK for RTF access  LVOT average diameter 23.9  Incidental findings on CT:   CT Surgery risk assessment: Needs CTS referral   Rhythm issues: None  PFTs:    KCCQ/5 meter walk:   Comorbidities: HOCM(On reviewing echo there is TAL, HTN, GERD, and COPD             Ro Hernandez is a 29 mm  Evolut valve candidate via RTF access. After surgical evaluation.    Procedure(s) (LRB):  REPLACEMENT, AORTIC VALVE, TRANSCATHETER (TAVR) (N/A)  Cardiac Cath Cosurgeon (N/A)  REPLACEMENT, AORTIC VALVE, TRANSCATHETER (TAVR)  PTA,  Femoral Artery     Indwelling Lines/Drains at time of discharge:  Lines/Drains/Airways       Line  Duration                  Pacer Wires 02/11/25 1530 <1 day                    Hospital Course:  Pt brought to the cath lab.  RCFA and LRA access obtained.  Pt underwent successful transfemoral aortic valve replacement with a 29 mm Evolut valve.  During procedure pt had transient CHB and required R subclavian TVP.  EP c/s.  Pt'd EKG re-normalized and required no intervention.  TVP removed.  On day of discharge pt was AF with stable VS on her home oxygen.  Her access site were c/d/I without hematoma.  She was given her event monitor and subsequently discharged home with appropriate f/u.        Goals of Care Treatment Preferences:  Code Status: Full Code      Consults (From admission, onward)          Status Ordering Provider     Inpatient consult to Electrophysiology  Once        Provider:  (Not yet assigned)    Completed ROWENA GARCIA            Significant Diagnostic Studies: N/A  Successful transfemoral aortic valve replacement with a 29 mm Evolut valve.        Pending Diagnostic Studies:       Procedure Component Value Units Date/Time    EKG 12-LEAD on arrival to floor [[id number]]     Order Status: Sent Lab Status: No result     EKG 12-lead [[id number]]     Order Status: Sent Lab Status: No result           No new Assessment & Plan notes have been filed under this hospital service since the last note was generated.  Service: Interventional Cardiology      Discharged Condition: good    Follow Up:    Patient Instructions:      Cardiac rehab phase II   Standing Status: Future Standing Exp. Date: 02/11/26     Order Specific Question Answer Comments   Department [facility] CARDIAC REHAB    Select qualifying diagnosis: Z95.2 - Presence of prosthetic heart valve      Medications:  Reconciled Home Medications:      Medication List        CONTINUE taking these medications      * albuterol 90 mcg/actuation inhaler  Commonly known  as: PROVENTIL HFA  Inhale 2 puffs into the lungs every 6 (six) hours as needed for Wheezing. Rescue     * albuterol 2.5 mg /3 mL (0.083 %) nebulizer solution  Commonly known as: PROVENTIL  Take 3 mLs (2.5 mg total) by nebulization every 4 (four) hours as needed for Wheezing or Shortness of Breath. Rescue     * albuterol 90 mcg/actuation inhaler  Commonly known as: VENTOLIN HFA  Inhale 2 puffs into the lungs every 6 (six) hours as needed for Wheezing or Shortness of Breath. Rescue     amoxicillin-clavulanate 875-125mg 875-125 mg per tablet  Commonly known as: AUGMENTIN  Take 1 tablet by mouth every 12 (twelve) hours.     aspirin 81 MG EC tablet  Commonly known as: ECOTRIN  Take 81 mg by mouth once daily.     celecoxib 200 MG capsule  Commonly known as: CeleBREX  Take 200 mg by mouth once daily.     ezetimibe 10 mg tablet  Commonly known as: ZETIA  Take 1 tablet (10 mg total) by mouth once daily.     fluticasone propionate 50 mcg/actuation nasal spray  Commonly known as: FLONASE  USE 2 SPRAYS IN EACH NOSTRIL ONE TIME DAILY     furosemide 40 MG tablet  Commonly known as: LASIX  Take 1 tablet (40 mg total) by mouth once daily.     HYDROcodone-acetaminophen  mg per tablet  Commonly known as: NORCO  Take 1 tablet by mouth. One tablet twice a day     inhalation spacing device  Use as directed for inhalation.     isosorbide mononitrate 30 MG 24 hr tablet  Commonly known as: IMDUR  Take 1 tab po in the AM and 1/2 tab po in the PM     lactulose 10 gram/15 mL solution  Commonly known as: CHRONULAC  Take 15 mLs (10 g total) by mouth 2 (two) times daily.     methylPREDNISolone 4 mg tablet  Commonly known as: MEDROL DOSEPACK  use as directed     metoprolol succinate 100 MG 24 hr tablet  Commonly known as: TOPROL-XL     MULTIPLE VITAMIN ORAL  Take 1 tablet by mouth Daily.     naloxone 4 mg/actuation [place]  Commonly known as: NARCAN     nitroGLYCERIN 0.4 MG SL tablet  Commonly known as: NITROSTAT  DISSOLVE 1 TABLET UNDER THE  TONGUE EVERY 5 MINUTES FOR 3 DOSES AS NEEDED FOR CHEST PAIN     pantoprazole 20 MG tablet  Commonly known as: PROTONIX  Take 1 tablet (20 mg total) by mouth 2 (two) times daily before meals.     solifenacin 10 MG tablet  Commonly known as: VESICARE  Take 1 tablet (10 mg total) by mouth once daily.     STIOLTO RESPIMAT 2.5-2.5 mcg/actuation Mist  Generic drug: tiotropium-olodateroL  Inhale 2 puffs into the lungs once daily. Controller     verapamiL 360 MG C24p  Commonly known as: VERELAN  Take 360 mg by mouth once daily.           * This list has 3 medication(s) that are the same as other medications prescribed for you. Read the directions carefully, and ask your doctor or other care provider to review them with you.                STOP taking these medications      cyclobenzaprine 5 MG tablet  Commonly known as: FLEXERIL     triamcinolone acetonide 40 mg/mL injection  Commonly known as: KENALOG-40              Time spent on the discharge of patient: 31 minutes    Allan Velez MD  Interventional Cardiology  [facility] - Card[facility]

## 2025-02-12 NOTE — PROGRESS NOTES
[facility] - [facility]  Cardiac Electrophysiology  Progress Note    Admission Date: 2/11/2025  Code Status: Prior   Attending Physician: Mark Youngblood MD   Expected Discharge Date:   Principal Problem:<principal problem not specified>    Subjective:     Interval History: NAEON  Telemetry overnight with no CHB, AV block and narrow QRS. Morning QRS narrow on EKG  Event monitor     ROS  Objective:     Vital Signs (Most Recent):  Temp: 97.3 °F (36.3 °C) (02/12/25 0400)  Pulse: 102 (02/12/25 0702)  Resp: 17 (02/12/25 0400)  BP: (!) 156/65 (02/12/25 0200)  SpO2: 96 % (02/12/25 0600) Vital Signs (24h Range):  Temp:  [97.3 °F (36.3 °C)-97.5 °F (36.4 °C)] 97.3 °F (36.3 °C)  Pulse:  [] 102  Resp:  [16-18] 17  SpO2:  [91 %-96 %] 96 %  BP: (116-164)/(59-85) 156/65  Arterial Line BP: (124-178)/(51-74) 175/71     Weight: 86.2 kg (190 lb)  Body mass index is 31.62 kg/m².     SpO2: 96 %        Physical Exam  Constitutional:       Appearance: Normal appearance.   HENT:      Mouth/Throat:      Mouth: Mucous membranes are moist.   Cardiovascular:      Rate and Rhythm: Normal rate and regular rhythm.      Pulses: Normal pulses.      Heart sounds: No murmur heard.     No friction rub. No gallop.   Pulmonary:      Effort: Pulmonary effort is normal.   Abdominal:      General: Abdomen is flat.   Musculoskeletal:         General: Normal range of motion.      Cervical back: Neck supple.      Right lower leg: No edema.      Left lower leg: No edema.   Skin:     General: Skin is warm.   Neurological:      Mental Status: She is alert.                Assessment and Plan:     Complete heart block  Ro Hernandez with severe AS s/p TAVR. Last EF 60-65%  Symptomatic severe AS with LVEF of 60% s/p TAVR with CHB during procedure and then she recovered and is on NSR. Recommend overnight telemetry monitoring.     Telemetry post procedure showing NSR with narrow QRS  Telemetry overnight with no AV block and narrow  QRS    PLAN:  - Event monitor   - Will sign off           Enzo Steven MD  Cardiac Electrophysiology  [facility]

## 2025-02-14 ENCOUNTER — PATIENT OUTREACH (OUTPATIENT)
Dept: ADMINISTRATIVE | Facility: CLINIC | Age: 70
End: 2025-02-14
Payer: MEDICARE

## 2025-02-14 NOTE — PROGRESS NOTES
C3 nurse spoke with Ro Hernandez for a TCC post hospital discharge follow up call. C3 nurse scheduled HOSPFU appointment with PCP in [facility] per pts. consent.  HOSPFU with Minor Charlton On 02/21/25 @ 1040.    Pt. is aware and stated understanding.

## 2025-02-21 ENCOUNTER — OFFICE VISIT (OUTPATIENT)
Dept: FAMILY MEDICINE | Facility: CLINIC | Age: 70
End: 2025-02-21
Payer: MEDICARE

## 2025-02-21 VITALS
HEART RATE: 72 BPM | DIASTOLIC BLOOD PRESSURE: 60 MMHG | OXYGEN SATURATION: 96 % | BODY MASS INDEX: 32.37 KG/M2 | TEMPERATURE: 96 F | HEIGHT: 65 IN | RESPIRATION RATE: 16 BRPM | SYSTOLIC BLOOD PRESSURE: 120 MMHG | WEIGHT: 194.31 LBS

## 2025-02-21 DIAGNOSIS — I35.0 AORTIC VALVE STENOSIS, ETIOLOGY OF CARDIAC VALVE DISEASE UNSPECIFIED: ICD-10-CM

## 2025-02-21 DIAGNOSIS — I10 PRIMARY HYPERTENSION: ICD-10-CM

## 2025-02-21 DIAGNOSIS — I50.9 CONGESTIVE HEART FAILURE, UNSPECIFIED HF CHRONICITY, UNSPECIFIED HEART FAILURE TYPE: ICD-10-CM

## 2025-02-21 DIAGNOSIS — J96.11 CHRONIC RESPIRATORY FAILURE WITH HYPOXIA: ICD-10-CM

## 2025-02-21 DIAGNOSIS — Z95.2 HISTORY OF TRANSCATHETER AORTIC VALVE REPLACEMENT (TAVR): Primary | ICD-10-CM

## 2025-02-21 NOTE — PROGRESS NOTES
"Subjective:       Patient ID: Ro Hernandez is a 69 y.o. female.    Chief Complaint: Follow-up (Hospital)      HPI Comments:     Current Medications[1]        Transitional Care Note    Family and/or Caretaker present at visit?  No.  Diagnostic tests reviewed/disposition: No diagnosic tests pending after this hospitalization.  Disease/illness education:  Aortic  Home health/community services discussion/referrals: Patient does not have home health established from hospital visit.  They do not need home health.  If needed, we will set up home health for the patient.   Establishment or re-establishment of referral orders for community resources: No other necessary community resources.   Discussion with other health care providers:  Sees cardiology in one-month             "69-year-old female with past medical history of hypertension, COPD, Prinzmetal angina, sleep apnea, sever AS, presents for TAVR.  She was previously seen by a cardiologist in [place] who told her that she has history of focal but on reviewing the echo she does not have systolic anterior motion of mitral valve with no eccentric mitral regurgitation.  Her aortic valve is Calcified.  Patient was experiencing shortness on breath for the last 5 years on exertion and for the last 6 months she is experiencing chest tightness mainly when she stress.  She denies having any presyncope or syncope.  Currently she denies having any orthopnea PND bilateral lower extremity edema.      The patient has undergone the following TAVR work-up:   · ECHO (Date 8/1/2024): Aortic valve area by VTI is 0.91 cm². Aortic valve peak velocity is 5.64 m/s. Mean gradient is 76 mmHg. The dimensionless index is 0.33. EF= 65%.   · [facility] (Date 12/2/2024): Non-obstructive CAD    · STS: 2.9%   · Frailty: 1/4   · Iliacs >7 on the right, poor contrast in the study but OK for RTF access  · LVOT average diameter 23.9  · Incidental findings on CT:   · CT Surgery risk assessment: Needs " "CTS referral   · Rhythm issues: None  · PFTs:    · KCCQ/5 meter walk:   · Comorbidities: HOCM(On reviewing echo there is TAL, HTN, GERD, and COPD             Ro Hernandez is a 29 mm  Evolut valve candidate via RTF access. After surgical evaluation.     Procedure(s) (LRB):  REPLACEMENT, AORTIC VALVE, TRANSCATHETER (TAVR) (N/A)  Cardiac Cath Cosurgeon (N/A)  REPLACEMENT, AORTIC VALVE, TRANSCATHETER (TAVR)  PTA, Femoral Artery         Hospital Course:  Pt brought to the cath lab.  RCFA and LRA access obtained.  Pt underwent successful transfemoral aortic valve replacement with a 29 mm Evolut valve.  During procedure pt had transient CHB and required R subclavian TVP.  EP c/s.  Pt'd EKG re-normalized and required no intervention.  TVP removed.  On day of discharge pt was AF with stable VS on her home oxygen.  Her access site were c/d/I without hematoma.  She was given her event monitor and subsequently discharged home with appropriate f/u."      Has done very well since discharge.  Feels more energetic.  Blood pressure low at times.  Her diastolic is generally around 60.  However her systolic has been a little lower than usual since being home.  Today it is back to her baseline.    No chest pain or breathing problems.      Review of Systems   Constitutional:  Negative for activity change, appetite change and fever.   HENT:  Negative for sore throat.    Respiratory:  Negative for cough and shortness of breath.    Cardiovascular:  Negative for chest pain.   Gastrointestinal:  Negative for abdominal pain, diarrhea and nausea.   Genitourinary:  Negative for difficulty urinating.   Musculoskeletal:  Negative for arthralgias and myalgias.   Neurological:  Negative for dizziness and headaches.       Objective:      Vitals:    02/21/25 1056   BP: 120/60   Pulse: 72   Resp: 16   Temp: 96.2 °F (35.7 °C)   TempSrc: Tympanic   SpO2: 96%   Weight: 88.2 kg (194 lb 5.4 oz)   Height: 5' 5" (1.651 m)   PainSc: 0-No pain "     Physical Exam  Vitals and nursing note reviewed.   Constitutional:       General: She is not in acute distress.     Appearance: She is well-developed. She is not diaphoretic.   HENT:      Head: Normocephalic.   Neck:      Thyroid: No thyromegaly.   Cardiovascular:      Rate and Rhythm: Normal rate and regular rhythm.      Heart sounds: Normal heart sounds. No murmur heard.  Pulmonary:      Effort: Pulmonary effort is normal.      Breath sounds: Normal breath sounds. No wheezing or rales.   Abdominal:      General: There is no distension.      Palpations: Abdomen is soft.   Musculoskeletal:      Cervical back: Neck supple.   Lymphadenopathy:      Cervical: No cervical adenopathy.   Skin:     General: Skin is warm and dry.   Neurological:      Mental Status: She is alert and oriented to person, place, and time.   Psychiatric:         Mood and Affect: Mood normal.         Behavior: Behavior normal.         Thought Content: Thought content normal.         Judgment: Judgment normal.         Assessment:       1. History of transcatheter aortic valve replacement (TAVR)    2. Aortic valve stenosis, etiology of cardiac valve disease unspecified    3. Chronic respiratory failure with hypoxia    4. Congestive heart failure, unspecified HF chronicity, unspecified heart failure type    5. Primary hypertension        Plan:   History of transcatheter aortic valve replacement (TAVR)  Comments:  Recent valve replacement.  Feels great.  Follow-up with cardiology in one-month.  Monitor blood pressure home    Aortic valve stenosis, etiology of cardiac valve disease unspecified  Comments:  Resolved after replacement    Chronic respiratory failure with hypoxia  Comments:  Not wear oxygen during the day    Congestive heart failure, unspecified HF chronicity, unspecified heart failure type  Comments:  Fluid balance looks good.   during hospitalization    Primary hypertension  Comments:  Controlled.  Monitor for low blood  pressure                 [1]   Current Outpatient Medications:     albuterol (PROVENTIL) 2.5 mg /3 mL (0.083 %) nebulizer solution, Take 3 mLs (2.5 mg total) by nebulization every 4 (four) hours as needed for Wheezing or Shortness of Breath. Rescue, Disp: 360 mL, Rfl: 11    albuterol (VENTOLIN HFA) 90 mcg/actuation inhaler, Inhale 2 puffs into the lungs every 6 (six) hours as needed for Wheezing or Shortness of Breath. Rescue, Disp: 18 g, Rfl: 2    aspirin (ECOTRIN) 81 MG EC tablet, Take 81 mg by mouth once daily., Disp: , Rfl:     celecoxib (CELEBREX) 200 MG capsule, Take 200 mg by mouth once daily., Disp: , Rfl:     ezetimibe (ZETIA) 10 mg tablet, Take 1 tablet (10 mg total) by mouth once daily., Disp: 90 tablet, Rfl: 2    fluticasone propionate (FLONASE) 50 mcg/actuation nasal spray, USE 2 SPRAYS IN EACH NOSTRIL ONE TIME DAILY, Disp: 48 g, Rfl: 3    furosemide (LASIX) 40 MG tablet, Take 1 tablet (40 mg total) by mouth once daily., Disp: 90 tablet, Rfl: 3    HYDROcodone-acetaminophen (NORCO)  mg per tablet, Take 1 tablet by mouth. One tablet twice a day, Disp: , Rfl:     inhalation spacing device, Use as directed for inhalation., Disp: 1 each, Rfl: 0    isosorbide mononitrate (IMDUR) 30 MG 24 hr tablet, Take 1 tab po in the AM and 1/2 tab po in the PM, Disp: 135 tablet, Rfl: 1    lactulose (CHRONULAC) 10 gram/15 mL solution, Take 15 mLs (10 g total) by mouth 2 (two) times daily. (Patient taking differently: Take 10 g by mouth 2 (two) times daily as needed.), Disp: 1892 mL, Rfl: 11    methylPREDNISolone (MEDROL DOSEPACK) 4 mg tablet, use as directed, Disp: 1 each, Rfl: 0    metoprolol succinate (TOPROL-XL) 100 MG 24 hr tablet, , Disp: , Rfl:     MULTIVITAMIN (MULTIPLE VITAMIN ORAL), Take 1 tablet by mouth Daily., Disp: , Rfl:     naloxone (NARCAN) 4 mg/actuation [place], , Disp: , Rfl:     nitroGLYCERIN (NITROSTAT) 0.4 MG SL tablet, DISSOLVE 1 TABLET UNDER THE TONGUE EVERY 5 MINUTES FOR 3 DOSES AS NEEDED FOR  CHEST PAIN, Disp: 25 tablet, Rfl: 0    pantoprazole (PROTONIX) 20 MG tablet, Take 1 tablet (20 mg total) by mouth 2 (two) times daily before meals., Disp: 180 tablet, Rfl: 3    solifenacin (VESICARE) 10 MG tablet, Take 1 tablet (10 mg total) by mouth once daily., Disp: 90 tablet, Rfl: 3    tiotropium-olodateroL (STIOLTO RESPIMAT) 2.5-2.5 mcg/actuation Mist, Inhale 2 puffs into the lungs once daily. Controller, Disp: 12 g, Rfl: 3    verapamiL (VERELAN) 360 MG C24P, Take 360 mg by mouth once daily., Disp: , Rfl:     albuterol (PROVENTIL HFA) 90 mcg/actuation inhaler, Inhale 2 puffs into the lungs every 6 (six) hours as needed for Wheezing. Rescue (Patient not taking: Reported on 2/21/2025), Disp: 8 g, Rfl: 0    amoxicillin-clavulanate 875-125mg (AUGMENTIN) 875-125 mg per tablet, Take 1 tablet by mouth every 12 (twelve) hours. (Patient not taking: Reported on 2/21/2025), Disp: 14 tablet, Rfl: 0    Current Facility-Administered Medications:     sodium chloride 0.9% flush 10 mL, 10 mL, Intravenous, PRN, Mark Youngblood MD

## 2025-02-22 DIAGNOSIS — Z00.00 ENCOUNTER FOR MEDICARE ANNUAL WELLNESS EXAM: ICD-10-CM

## 2025-02-28 ENCOUNTER — PATIENT MESSAGE (OUTPATIENT)
Dept: CARDIOLOGY | Facility: CLINIC | Age: 70
End: 2025-02-28
Payer: MEDICARE

## 2025-02-28 ENCOUNTER — TELEPHONE (OUTPATIENT)
Dept: CARDIOLOGY | Facility: CLINIC | Age: 70
End: 2025-02-28
Payer: MEDICARE

## 2025-02-28 NOTE — TELEPHONE ENCOUNTER
Spoke with patient regarding insurance paperwork. Pt confirmed that insurance received the paperwork today.

## 2025-03-18 ENCOUNTER — HOSPITAL ENCOUNTER (OUTPATIENT)
Dept: CARDIOLOGY | Facility: HOSPITAL | Age: 70
Discharge: HOME | End: 2025-03-18
Attending: INTERNAL MEDICINE
Payer: MEDICARE

## 2025-03-18 ENCOUNTER — CLINICAL SUPPORT (OUTPATIENT)
Dept: PULMONOLOGY | Facility: CLINIC | Age: 70
End: 2025-03-18
Payer: MEDICARE

## 2025-03-18 VITALS
BODY MASS INDEX: 32.32 KG/M2 | HEART RATE: 68 BPM | DIASTOLIC BLOOD PRESSURE: 60 MMHG | HEIGHT: 65 IN | WEIGHT: 194 LBS | SYSTOLIC BLOOD PRESSURE: 120 MMHG

## 2025-03-18 DIAGNOSIS — J43.9 EMPHYSEMA LUNG: ICD-10-CM

## 2025-03-18 DIAGNOSIS — I35.0 NONRHEUMATIC AORTIC VALVE STENOSIS: ICD-10-CM

## 2025-03-18 DIAGNOSIS — J43.9 EMPHYSEMA LUNG: Primary | ICD-10-CM

## 2025-03-18 DIAGNOSIS — J45.20 MILD INTERMITTENT ASTHMA WITHOUT COMPLICATION: Primary | ICD-10-CM

## 2025-03-18 LAB
AORTIC ROOT ANNULUS: 1.89 CM
ASCENDING AORTA: 2.06 CM
AV INDEX (PROSTH): 0.74
AV MEAN GRADIENT: 13 MMHG
AV PEAK GRADIENT: 21 MMHG
AV VALVE AREA BY VELOCITY RATIO: 2 CM²
AV VALVE AREA: 2.3 CM²
AV VELOCITY RATIO: 0.65
BSA FOR ECHO PROCEDURE: 2.01 M2
CV ECHO LV RWT: 0.49 CM
DOP CALC AO PEAK VEL: 2.3 M/S
DOP CALC AO VTI: 52.3 CM
DOP CALC LVOT AREA: 3.1 CM2
DOP CALC LVOT DIAMETER: 2 CM
DOP CALC LVOT PEAK VEL: 1.5 M/S
DOP CALC LVOT STROKE VOLUME: 120.9 CM3
DOP CALC MV VTI: 45.6 CM
DOP CALC RVOT PEAK VEL: 0.61 M/S
DOP CALC RVOT VTI: 13.2 CM
DOP CALCLVOT PEAK VEL VTI: 38.5 CM
E WAVE DECELERATION TIME: 352 MSEC
E/A RATIO: 1.05
E/E' RATIO: 16 M/S
ECHO LV POSTERIOR WALL: 1 CM (ref 0.6–1.1)
EJECTION FRACTION: 60 %
FRACTIONAL SHORTENING: 36.6 % (ref 28–44)
INTERVENTRICULAR SEPTUM: 1.2 CM (ref 0.6–1.1)
IVC DIAMETER: 1.2 CM
IVRT: 86 MSEC
LA MAJOR: 5.1 CM
LA MINOR: 5.2 CM
LA WIDTH: 3.6 CM
LEFT ATRIUM AREA SYSTOLIC (APICAL 2 CHAMBER): 16.51 CM2
LEFT ATRIUM AREA SYSTOLIC (APICAL 4 CHAMBER): 18.08 CM2
LEFT ATRIUM SIZE: 4.2 CM
LEFT ATRIUM VOLUME INDEX MOD: 22 ML/M2
LEFT ATRIUM VOLUME INDEX: 34 ML/M2
LEFT ATRIUM VOLUME MOD: 43 ML
LEFT ATRIUM VOLUME: 66 CM3
LEFT INTERNAL DIMENSION IN SYSTOLE: 2.6 CM (ref 2.1–4)
LEFT VENTRICLE DIASTOLIC VOLUME INDEX: 38.97 ML/M2
LEFT VENTRICLE DIASTOLIC VOLUME: 76 ML
LEFT VENTRICLE END SYSTOLIC VOLUME APICAL 2 CHAMBER: 39.8 ML
LEFT VENTRICLE END SYSTOLIC VOLUME APICAL 4 CHAMBER: 46.45 ML
LEFT VENTRICLE MASS INDEX: 77.6 G/M2
LEFT VENTRICLE SYSTOLIC VOLUME INDEX: 12.8 ML/M2
LEFT VENTRICLE SYSTOLIC VOLUME: 25 ML
LEFT VENTRICULAR INTERNAL DIMENSION IN DIASTOLE: 4.1 CM (ref 3.5–6)
LEFT VENTRICULAR MASS: 151.3 G
LV LATERAL E/E' RATIO: 13.2 M/S
LV SEPTAL E/E' RATIO: 19.8 M/S
LVED V (TEICH): 75.85 ML
LVES V (TEICH): 24.75 ML
LVOT MG: 6.47 MMHG
LVOT MV: 1.23 CM/S
MV MEAN GRADIENT: 3 MMHG
MV PEAK A VEL: 1.13 M/S
MV PEAK E VEL: 1.19 M/S
MV PEAK GRADIENT: 6 MMHG
MV STENOSIS PRESSURE HALF TIME: 102.08 MS
MV VALVE AREA BY CONTINUITY EQUATION: 2.65 CM2
MV VALVE AREA P 1/2 METHOD: 2.16 CM2
OHS CV RV/LV RATIO: 0.73 CM
PISA TR MAX VEL: 3.5 M/S
PULM VEIN S/D RATIO: 1.06
PV MEAN GRADIENT: 1 MMHG
PV PEAK D VEL: 0.31 M/S
PV PEAK GRADIENT: 4 MMHG
PV PEAK S VEL: 0.33 M/S
PV PEAK VELOCITY: 1.06 M/S
RA MAJOR: 4.87 CM
RA PRESSURE ESTIMATED: 3 MMHG
RA WIDTH: 3.44 CM
RIGHT VENTRICLE DIASTOLIC BASEL DIMENSION: 3 CM
RIGHT VENTRICULAR END-DIASTOLIC DIMENSION: 3.02 CM
RV TB RVSP: 7 MMHG
SINUS: 1.72 CM
STJ: 1.5 CM
TDI LATERAL: 0.09 M/S
TDI SEPTAL: 0.06 M/S
TDI: 0.08 M/S
TR MAX PG: 48 MMHG
TRICUSPID ANNULAR PLANE SYSTOLIC EXCURSION: 1.87 CM
TV REST PULMONARY ARTERY PRESSURE: 52 MMHG
Z-SCORE OF LEFT VENTRICULAR DIMENSION IN END DIASTOLE: -3.05
Z-SCORE OF LEFT VENTRICULAR DIMENSION IN END SYSTOLE: -2.17

## 2025-03-18 PROCEDURE — 99999 PR PBB SHADOW E&M-EST. PATIENT-LVL II: CPT | Mod: PBBFAC,,,

## 2025-03-18 PROCEDURE — 93306 TTE W/DOPPLER COMPLETE: CPT | Mod: 26,,, | Performed by: INTERNAL MEDICINE

## 2025-03-18 PROCEDURE — 93306 TTE W/DOPPLER COMPLETE: CPT

## 2025-03-18 NOTE — PROGRESS NOTES
Pulmonary Disease Management  [facility]  Follow up Visit  Chronic Care Management    Ro Hernandez  was seen 3/18/2025  11:00 AM in the Pulmonary Disease Management Clinic for evaluation, education, reinforcement of self management techniques and exacerbation action plan.    Gayle Van    Past Medical History:   Diagnosis Date    Allergy     Angina     Arthritis 06/11/2013    Asthma     Congestive heart failure 07/16/2024    COPD (chronic obstructive pulmonary disease)     GERD (gastroesophageal reflux disease)     Hx of Prinzmetal angina     Hypertension     Obesity 06/11/2013    Other and unspecified hyperlipidemia 06/11/2013    Prinzmetal's angina     Sleep apnea 06/11/2013       Patient's Medications   New Prescriptions    No medications on file   Previous Medications    ALBUTEROL (PROVENTIL HFA) 90 MCG/ACTUATION INHALER    Inhale 2 puffs into the lungs every 6 (six) hours as needed for Wheezing. Rescue    ALBUTEROL (PROVENTIL) 2.5 MG /3 ML (0.083 %) NEBULIZER SOLUTION    Take 3 mLs (2.5 mg total) by nebulization every 4 (four) hours as needed for Wheezing or Shortness of Breath. Rescue    ALBUTEROL (VENTOLIN HFA) 90 MCG/ACTUATION INHALER    Inhale 2 puffs into the lungs every 6 (six) hours as needed for Wheezing or Shortness of Breath. Rescue    AMOXICILLIN-CLAVULANATE 875-125MG (AUGMENTIN) 875-125 MG PER TABLET    Take 1 tablet by mouth every 12 (twelve) hours.    ASPIRIN (ECOTRIN) 81 MG EC TABLET    Take 81 mg by mouth once daily.    CELECOXIB (CELEBREX) 200 MG CAPSULE    Take 200 mg by mouth once daily.    EZETIMIBE (ZETIA) 10 MG TABLET    Take 1 tablet (10 mg total) by mouth once daily.    FLUTICASONE PROPIONATE (FLONASE) 50 MCG/ACTUATION NASAL SPRAY    USE 2 SPRAYS IN EACH NOSTRIL ONE TIME DAILY    FUROSEMIDE (LASIX) 40 MG TABLET    Take 1 tablet (40 mg total) by mouth once daily.    HYDROCODONE-ACETAMINOPHEN (NORCO)  MG PER TABLET    Take 1 tablet by mouth. One tablet twice  a day    INHALATION SPACING DEVICE    Use as directed for inhalation.    ISOSORBIDE MONONITRATE (IMDUR) 30 MG 24 HR TABLET    Take 1 tab po in the AM and 1/2 tab po in the PM    LACTULOSE (CHRONULAC) 10 GRAM/15 ML SOLUTION    Take 15 mLs (10 g total) by mouth 2 (two) times daily.    METHYLPREDNISOLONE (MEDROL DOSEPACK) 4 MG TABLET    use as directed    METOPROLOL SUCCINATE (TOPROL-XL) 100 MG 24 HR TABLET        MULTIVITAMIN (MULTIPLE VITAMIN ORAL)    Take 1 tablet by mouth Daily.    NALOXONE (NARCAN) 4 MG/ACTUATION SPRY        NITROGLYCERIN (NITROSTAT) 0.4 MG SL TABLET    DISSOLVE 1 TABLET UNDER THE TONGUE EVERY 5 MINUTES FOR 3 DOSES AS NEEDED FOR CHEST PAIN    PANTOPRAZOLE (PROTONIX) 20 MG TABLET    Take 1 tablet (20 mg total) by mouth 2 (two) times daily before meals.    SOLIFENACIN (VESICARE) 10 MG TABLET    Take 1 tablet (10 mg total) by mouth once daily.    TIOTROPIUM-OLODATEROL (STIOLTO RESPIMAT) 2.5-2.5 MCG/ACTUATION MIST    Inhale 2 puffs into the lungs once daily. Controller    VERAPAMIL (VERELAN) 360 MG C24P    Take 360 mg by mouth once daily.   Modified Medications    No medications on file   Discontinued Medications    No medications on file             Educational assessment:   [x]            Good  []            Fair  []            Poor    Readiness to learn:   [x]            Good  []            Fair  []            Poor    Vision Status:   [x]            Good  []            Fair  []            Poor    Reading Ability:  [x]            Good  []            Fair  []            Poor    Knowledge of condition:   [x]            Good  []            Fair  []            Poor    Language Barriers:   []            Good  []            Fair  []            Poor  [x]            None    Cognitive/ Physical Barriers:   []            Good  []            Fair  []            Poor  [x]            None    Learning best by:                       [x]            Seeing  [x]            Hearing  [x]            Reading                          [x]            Doing    Describe any barrier /Limitation or financial implications of care choices identified     []            Financial  []            Emotional  []            Education  []            Vision/Hearing  []            Physical  [x]            None  []                TOPIC /CONTENT FOR TODAY:    [x]            MDI with or without spacer  [x]            Respimat inhaler  []            Acapella   []           Peak Flow meter  []            COPD action plan  []            Nebulizer use  []            Oxygen use safety  []            Breathing and cough techniques  []            Energy conservation  []            Infection prevention  []           OTHER________________________        Learner:    [x]            Patient   []            Caregiver    Method:    [x]            Verbal explanation  []            Audio visual    [x]            Literature  [x]            Teach back      Evaluation:    [x]            Teach back  [x]            Demonstrate  [x]            Follow up phone call    []            2 weeks     []            4 weeks   []            PRN    Additional comments:   Patient was seen today to review respiratory medication purpose and proper technique for use of inhalers. Patient practiced proper technique using MDI with valved holding chamber (spacer) and DPI inhalers. Patient demonstrated understanding. Literature was given to patient.     Asthma trigger checklist was verbally reviewed and literature given to patient.     Infection prevention was discussed. Patient was reminded to get influenza vaccine. Hand hygiene and cleaning of respiratory equipment was also discussed. Patient verbalized understanding.      COPD action plan was reviewed and literature was given to patient. Patient verbalized understanding.     The patient had aortic valve replacement surgery and states she feels much better. She has discontinued use of supplemental oxygen and stiolto.  Schedule 6MWT and  spirometry    Plan:  Next Provider Visit: 5/8/25  Next Spirometry/CPFT: 5/8/25  Approximate time spent with patient: 30 minutes

## 2025-04-07 PROBLEM — E66.812 CLASS 2 SEVERE OBESITY WITH SERIOUS COMORBIDITY AND BODY MASS INDEX (BMI) OF 36.0 TO 36.9 IN ADULT, UNSPECIFIED OBESITY TYPE: Status: RESOLVED | Noted: 2025-01-17 | Resolved: 2025-04-07

## 2025-04-07 PROBLEM — E66.01 CLASS 2 SEVERE OBESITY WITH SERIOUS COMORBIDITY AND BODY MASS INDEX (BMI) OF 36.0 TO 36.9 IN ADULT, UNSPECIFIED OBESITY TYPE: Status: RESOLVED | Noted: 2025-01-17 | Resolved: 2025-04-07

## 2025-04-07 PROBLEM — R10.13 EPIGASTRIC ABDOMINAL PAIN: Status: RESOLVED | Noted: 2018-12-10 | Resolved: 2025-04-07

## 2025-04-07 PROBLEM — E66.811 CLASS 1 OBESITY WITH SERIOUS COMORBIDITY AND BODY MASS INDEX (BMI) OF 32.0 TO 32.9 IN ADULT: Status: ACTIVE | Noted: 2025-04-07

## 2025-04-07 PROBLEM — I44.2 COMPLETE HEART BLOCK: Status: RESOLVED | Noted: 2025-02-11 | Resolved: 2025-04-07

## 2025-04-07 PROBLEM — Z86.16 HISTORY OF 2019 NOVEL CORONAVIRUS DISEASE (COVID-19): Status: RESOLVED | Noted: 2020-10-14 | Resolved: 2025-04-07

## 2025-04-07 PROBLEM — N30.00 ACUTE CYSTITIS WITHOUT HEMATURIA: Status: RESOLVED | Noted: 2021-11-17 | Resolved: 2025-04-07

## 2025-04-07 PROBLEM — I27.20 PULMONARY HYPERTENSION: Status: ACTIVE | Noted: 2025-04-07

## 2025-04-09 ENCOUNTER — OFFICE VISIT (OUTPATIENT)
Dept: CARDIOLOGY | Facility: CLINIC | Age: 70
End: 2025-04-09
Payer: MEDICARE

## 2025-04-09 VITALS
WEIGHT: 194.88 LBS | OXYGEN SATURATION: 96 % | DIASTOLIC BLOOD PRESSURE: 64 MMHG | HEIGHT: 65 IN | BODY MASS INDEX: 32.47 KG/M2 | SYSTOLIC BLOOD PRESSURE: 136 MMHG | HEART RATE: 72 BPM

## 2025-04-09 DIAGNOSIS — I35.0 NONRHEUMATIC AORTIC VALVE STENOSIS: Primary | ICD-10-CM

## 2025-04-09 DIAGNOSIS — E78.2 MIXED HYPERLIPIDEMIA: ICD-10-CM

## 2025-04-09 DIAGNOSIS — I50.32 CHRONIC DIASTOLIC CONGESTIVE HEART FAILURE: ICD-10-CM

## 2025-04-09 PROCEDURE — 99214 OFFICE O/P EST MOD 30 MIN: CPT | Mod: S$GLB,,, | Performed by: INTERNAL MEDICINE

## 2025-04-09 PROCEDURE — 1101F PT FALLS ASSESS-DOCD LE1/YR: CPT | Mod: CPTII,S$GLB,, | Performed by: INTERNAL MEDICINE

## 2025-04-09 PROCEDURE — 3008F BODY MASS INDEX DOCD: CPT | Mod: CPTII,S$GLB,, | Performed by: INTERNAL MEDICINE

## 2025-04-09 PROCEDURE — 1160F RVW MEDS BY RX/DR IN RCRD: CPT | Mod: CPTII,S$GLB,, | Performed by: INTERNAL MEDICINE

## 2025-04-09 PROCEDURE — 99999 PR PBB SHADOW E&M-EST. PATIENT-LVL IV: CPT | Mod: PBBFAC,,, | Performed by: INTERNAL MEDICINE

## 2025-04-09 PROCEDURE — 3288F FALL RISK ASSESSMENT DOCD: CPT | Mod: CPTII,S$GLB,, | Performed by: INTERNAL MEDICINE

## 2025-04-09 PROCEDURE — 1159F MED LIST DOCD IN RCRD: CPT | Mod: CPTII,S$GLB,, | Performed by: INTERNAL MEDICINE

## 2025-04-09 PROCEDURE — 3078F DIAST BP <80 MM HG: CPT | Mod: CPTII,S$GLB,, | Performed by: INTERNAL MEDICINE

## 2025-04-09 PROCEDURE — 3075F SYST BP GE 130 - 139MM HG: CPT | Mod: CPTII,S$GLB,, | Performed by: INTERNAL MEDICINE

## 2025-04-09 PROCEDURE — 1126F AMNT PAIN NOTED NONE PRSNT: CPT | Mod: CPTII,S$GLB,, | Performed by: INTERNAL MEDICINE

## 2025-04-09 NOTE — ASSESSMENT & PLAN NOTE
"Patient has Diastolic (HFpEF) heart failure that is Chronic. On presentation their CHF was well compensated. Most recent BNP and echo results are listed below.  No results for input(s): "BNP" in the last 72 hours.  Latest ECHO  Results for orders placed during the hospital encounter of 03/18/25    Echo    Interpretation Summary    Left Ventricle: The left ventricle is normal in size. Normal wall thickness. There is concentric remodeling. There is normal systolic function. Ejection fraction is approximately 60%. Grade II diastolic dysfunction.    Right Ventricle: The right ventricle is normal in size. Wall thickness is normal. Systolic function is normal.    Aortic Valve: There is a transcatheter valve replacement in the aortic position.    Tricuspid Valve: There is mild regurgitation with a centrally directed jet.    Pulmonary Artery: The estimated pulmonary artery systolic pressure is 52 mmHg.    IVC/SVC: Normal venous pressure at 3 mmHg.    Current Heart Failure Medications       Plan  - Monitor strict I&Os and daily weights.    - Place on telemetry  - Low sodium diet  - Place on fluid restriction of 2 L.   - Cardiology has been consulted  - The patient's volume status is at their baseline  - FU with Cardiology  "

## 2025-04-09 NOTE — ASSESSMENT & PLAN NOTE
S/P TAVR 1 month ago  NYHA Class I symptoms  CCS Class 0 Symptoms  Echo images reviewed by myself, no clinically significant PVL.  Appropriate valve function.  Lab work reviewed and major abnormalities discussed  Instructions given about avoiding elective procedures the first 6 months, SBE prophylaxis after that prior to procedures   Cardiology follow up per referring physician     actual

## 2025-04-09 NOTE — PROGRESS NOTES
INTERVENTIONAL CARDIOLOGY CLINIC  HEART VALVE CENTER    REFERRING PHYSICIAN: Adina    REASON FOR VISIT:  TAVR follow up    HISTORY OF PRESENT ILLNESS  Ro Hernandez is a 69 y.o. female is here for follow up after transcatheter valve replacement. He underwent successful TAVR with 29 mm Evolut valve via RTF access 1 month ago. Since then she reports that his symptoms have resolved.    NYHA Class: I  CCS Class: 0          PAST MEDICAL HISTORY  Past Medical History:   Diagnosis Date    Allergy     Angina     Arthritis 06/11/2013    Asthma     Congestive heart failure 07/16/2024    COPD (chronic obstructive pulmonary disease)     GERD (gastroesophageal reflux disease)     Hx of Prinzmetal angina     Hypertension     Obesity 06/11/2013    Other and unspecified hyperlipidemia 06/11/2013    Prinzmetal's angina     Sleep apnea 06/11/2013        PAST SURGICAL HISTORY  Past Surgical History:   Procedure Laterality Date    ABDOMINAL SURGERY      CARDIAC CATH COSURGEON N/A 2/11/2025    Procedure: Cardiac Cath Cosurgeon;  Surgeon: Best Porter MD;  Location: [facility] CATH LAB;  Service: Cardiothoracic;  Laterality: N/A;    CATHETERIZATION OF BOTH LEFT AND RIGHT HEART N/A 12/2/2024    Procedure: CATHETERIZATION, HEART, BOTH LEFT AND RIGHT;  Surgeon: Donna Holden MD;  Location: [facility] CATH LAB;  Service: Cardiology;  Laterality: N/A;    CHOLECYSTECTOMY      COLONOSCOPY N/A 12/6/2019    Procedure: COLONOSCOPY;  Surgeon: Winston Smith MD;  Location: [facility];  Service: Endoscopy;  Laterality: N/A;    ESOPHAGOGASTRODUODENOSCOPY N/A 12/10/2018    Procedure: EGD (ESOPHAGOGASTRODUODENOSCOPY);  Surgeon: Raúl Polanco III, MD;  Location: [facility];  Service: Endoscopy;  Laterality: N/A;    ESOPHAGOGASTRODUODENOSCOPY N/A 9/8/2020    Procedure: EGD (ESOPHAGOGASTRODUODENOSCOPY);  Surgeon: Raúl Polanco III, MD;  Location: [facility];  Service: Endoscopy;  Laterality: N/A;    EYE SURGERY      FOOT SURGERY       INTRALUMINAL GASTROINTESTINAL TRACT IMAGING VIA CAPSULE N/A 9/18/2020    Procedure: IMAGING PROCEDURE, GI TRACT, INTRALUMINAL, VIA CAPSULE;  Surgeon: Blade Holley RN;  Location: [facility] ENDO;  Service: Endoscopy;  Laterality: N/A;    PTA, FEMORAL ARTERY  2/11/2025    Procedure: PTA, Femoral Artery;  Surgeon: Mark Youngblood MD;  Location: [facility] CATH LAB;  Service: Cardiology;;    TONSILLECTOMY      TRANSCATHETER AORTIC VALVE REPLACEMENT (TAVR) N/A 2/11/2025    Procedure: REPLACEMENT, AORTIC VALVE, TRANSCATHETER (TAVR);  Surgeon: Mark Youngblood MD;  Location: [facility] CATH LAB;  Service: Cardiology;  Laterality: N/A;    TRANSCATHETER AORTIC VALVE REPLACEMENT (TAVR)  2/11/2025    Procedure: REPLACEMENT, AORTIC VALVE, TRANSCATHETER (TAVR);  Surgeon: Best Porter MD;  Location: [facility] CATH LAB;  Service: Cardiothoracic;;    TUBAL LIGATION         SOCIAL HISTORY  TOBACCO: Denies    REVIEW OF SYSTEMS  Non contributory, relevant information discussed in HPI    STUDIES  I independently reviewed the following studies and my interpretation is reflected in the plan:  Echocardiogram      PHYSICAL EXAM  Physical Exam  Constitutional:       General: She is not in acute distress.  HENT:      Nose: Nose normal.   Cardiovascular:      Rate and Rhythm: Normal rate and regular rhythm.   Pulmonary:      Effort: Pulmonary effort is normal.   Musculoskeletal:      Cervical back: Neck supple.      Left lower leg: No edema.   Skin:     General: Skin is warm.      Capillary Refill: Capillary refill takes less than 2 seconds.   Neurological:      Mental Status: She is alert and oriented to person, place, and time.   Psychiatric:         Mood and Affect: Mood normal.         ASSESSMENT AND PLAN  Nonrheumatic aortic valve stenosis  S/P TAVR 1 month ago  NYHA Class I symptoms  CCS Class 0 Symptoms  Echo images reviewed by myself, no clinically significant PVL.  Appropriate valve function.  Lab work reviewed and major abnormalities  "discussed  Instructions given about avoiding elective procedures the first 6 months, SBE prophylaxis after that prior to procedures   Cardiology follow up per referring physician      Congestive heart failure  Patient has Diastolic (HFpEF) heart failure that is Chronic. On presentation their CHF was well compensated. Most recent BNP and echo results are listed below.  No results for input(s): "BNP" in the last 72 hours.  Latest ECHO  Results for orders placed during the hospital encounter of 03/18/25    Echo    Interpretation Summary    Left Ventricle: The left ventricle is normal in size. Normal wall thickness. There is concentric remodeling. There is normal systolic function. Ejection fraction is approximately 60%. Grade II diastolic dysfunction.    Right Ventricle: The right ventricle is normal in size. Wall thickness is normal. Systolic function is normal.    Aortic Valve: There is a transcatheter valve replacement in the aortic position.    Tricuspid Valve: There is mild regurgitation with a centrally directed jet.    Pulmonary Artery: The estimated pulmonary artery systolic pressure is 52 mmHg.    IVC/SVC: Normal venous pressure at 3 mmHg.    Current Heart Failure Medications       Plan  - Monitor strict I&Os and daily weights.    - Place on telemetry  - Low sodium diet  - Place on fluid restriction of 2 L.   - Cardiology has been consulted  - The patient's volume status is at their baseline  - FU with Cardiology    Mixed hyperlipidemia  Continue statin therapy    Body mass index is 32.43 kg/m². Healthy lifestyle was discussed with the patient as well as the importance of physical activity to improve cardiovascular health.    The medication list was reviewed with the patient and inactive medications as well as duplicates were removed from the medical record.      Mark Queen MD [facility]  Interventional Cardiology  Structural/Valvular heart disease  [phone]      "

## 2025-04-14 ENCOUNTER — OFFICE VISIT (OUTPATIENT)
Dept: FAMILY MEDICINE | Facility: CLINIC | Age: 70
End: 2025-04-14
Payer: MEDICARE

## 2025-04-14 VITALS
HEIGHT: 65 IN | BODY MASS INDEX: 31.98 KG/M2 | SYSTOLIC BLOOD PRESSURE: 120 MMHG | RESPIRATION RATE: 20 BRPM | DIASTOLIC BLOOD PRESSURE: 58 MMHG | WEIGHT: 191.94 LBS

## 2025-04-14 DIAGNOSIS — I50.9 CHRONIC CONGESTIVE HEART FAILURE, UNSPECIFIED HEART FAILURE TYPE: ICD-10-CM

## 2025-04-14 DIAGNOSIS — I70.0 ATHEROSCLEROSIS OF AORTA: ICD-10-CM

## 2025-04-14 DIAGNOSIS — R91.8 MULTIPLE LUNG NODULES: ICD-10-CM

## 2025-04-14 DIAGNOSIS — N28.1 RENAL CYST: ICD-10-CM

## 2025-04-14 DIAGNOSIS — F11.20 OPIOID DEPENDENCE, UNCOMPLICATED: ICD-10-CM

## 2025-04-14 DIAGNOSIS — R09.02 EXERCISE HYPOXEMIA: ICD-10-CM

## 2025-04-14 DIAGNOSIS — R01.0 BENIGN HEART MURMUR: ICD-10-CM

## 2025-04-14 DIAGNOSIS — R26.9 ABNORMALITY OF GAIT AND MOBILITY: ICD-10-CM

## 2025-04-14 DIAGNOSIS — I42.1 OBSTRUCTIVE HYPERTROPHIC CARDIOMYOPATHY: ICD-10-CM

## 2025-04-14 DIAGNOSIS — G47.33 OSA ON CPAP: Chronic | ICD-10-CM

## 2025-04-14 DIAGNOSIS — N39.41 URGE INCONTINENCE: ICD-10-CM

## 2025-04-14 DIAGNOSIS — K21.9 GASTROESOPHAGEAL REFLUX DISEASE, UNSPECIFIED WHETHER ESOPHAGITIS PRESENT: ICD-10-CM

## 2025-04-14 DIAGNOSIS — E66.811 CLASS 1 OBESITY WITH SERIOUS COMORBIDITY AND BODY MASS INDEX (BMI) OF 32.0 TO 32.9 IN ADULT, UNSPECIFIED OBESITY TYPE: ICD-10-CM

## 2025-04-14 DIAGNOSIS — E78.2 MIXED HYPERLIPIDEMIA: ICD-10-CM

## 2025-04-14 DIAGNOSIS — J45.20 MILD INTERMITTENT ASTHMA WITHOUT COMPLICATION: ICD-10-CM

## 2025-04-14 DIAGNOSIS — I27.20 PULMONARY HYPERTENSION: ICD-10-CM

## 2025-04-14 DIAGNOSIS — I10 ESSENTIAL HYPERTENSION: ICD-10-CM

## 2025-04-14 DIAGNOSIS — Z12.11 COLON CANCER SCREENING: ICD-10-CM

## 2025-04-14 DIAGNOSIS — D50.9 IRON DEFICIENCY ANEMIA, UNSPECIFIED IRON DEFICIENCY ANEMIA TYPE: ICD-10-CM

## 2025-04-14 DIAGNOSIS — Z00.00 ENCOUNTER FOR MEDICARE ANNUAL WELLNESS EXAM: Primary | ICD-10-CM

## 2025-04-14 DIAGNOSIS — M19.90 ARTHRITIS: ICD-10-CM

## 2025-04-14 DIAGNOSIS — J43.9 PULMONARY EMPHYSEMA, UNSPECIFIED EMPHYSEMA TYPE: ICD-10-CM

## 2025-04-14 DIAGNOSIS — I35.0 NONRHEUMATIC AORTIC VALVE STENOSIS: ICD-10-CM

## 2025-04-14 DIAGNOSIS — I20.1 PRINZMETAL'S ANGINA: ICD-10-CM

## 2025-04-14 DIAGNOSIS — E66.811 CLASS 1 OBESITY WITH SERIOUS COMORBIDITY AND BODY MASS INDEX (BMI) OF 31.0 TO 31.9 IN ADULT, UNSPECIFIED OBESITY TYPE: ICD-10-CM

## 2025-04-14 DIAGNOSIS — F17.211 CIGARETTE NICOTINE DEPENDENCE IN REMISSION: ICD-10-CM

## 2025-04-14 PROCEDURE — 3074F SYST BP LT 130 MM HG: CPT | Mod: CPTII,S$GLB,, | Performed by: NURSE PRACTITIONER

## 2025-04-14 PROCEDURE — 3288F FALL RISK ASSESSMENT DOCD: CPT | Mod: CPTII,S$GLB,, | Performed by: NURSE PRACTITIONER

## 2025-04-14 PROCEDURE — 99999 PR PBB SHADOW E&M-EST. PATIENT-LVL V: CPT | Mod: PBBFAC,,, | Performed by: NURSE PRACTITIONER

## 2025-04-14 PROCEDURE — 3078F DIAST BP <80 MM HG: CPT | Mod: CPTII,S$GLB,, | Performed by: NURSE PRACTITIONER

## 2025-04-14 PROCEDURE — 1125F AMNT PAIN NOTED PAIN PRSNT: CPT | Mod: CPTII,S$GLB,, | Performed by: NURSE PRACTITIONER

## 2025-04-14 PROCEDURE — 1158F ADVNC CARE PLAN TLK DOCD: CPT | Mod: CPTII,S$GLB,, | Performed by: NURSE PRACTITIONER

## 2025-04-14 PROCEDURE — 1159F MED LIST DOCD IN RCRD: CPT | Mod: CPTII,S$GLB,, | Performed by: NURSE PRACTITIONER

## 2025-04-14 PROCEDURE — 1101F PT FALLS ASSESS-DOCD LE1/YR: CPT | Mod: CPTII,S$GLB,, | Performed by: NURSE PRACTITIONER

## 2025-04-14 PROCEDURE — G0439 PPPS, SUBSEQ VISIT: HCPCS | Mod: S$GLB,,, | Performed by: NURSE PRACTITIONER

## 2025-04-14 PROCEDURE — 1170F FXNL STATUS ASSESSED: CPT | Mod: CPTII,S$GLB,, | Performed by: NURSE PRACTITIONER

## 2025-04-14 PROCEDURE — 1160F RVW MEDS BY RX/DR IN RCRD: CPT | Mod: CPTII,S$GLB,, | Performed by: NURSE PRACTITIONER

## 2025-04-14 NOTE — PATIENT INSTRUCTIONS
Counseling and Referral of Other Preventative  (Italic type indicates deductible and co-insurance are waived)    Patient Name: Ro Hernandez  Today's Date: 4/14/2025    Health Maintenance       Date Due Completion Date    Shingles Vaccine (1 of 2) Never done ---    RSV Vaccine (Age 60+ and Pregnant patients) (1 - Risk 60-74 years 1-dose series) Never done ---    Influenza Vaccine (1) 09/01/2024 11/19/2021    COVID-19 Vaccine (1 - 2024-25 season) Never done ---    Colorectal Cancer Screening 12/06/2024 12/6/2019    Lipid Panel 04/02/2025 4/2/2024    LDCT Lung Screen 07/13/2025 7/13/2024    Mammogram 12/11/2025 12/11/2024    Override on 7/28/2016: Done ([facility] Sammie Stanton)    TETANUS VACCINE 08/19/2026 8/19/2016    DEXA Scan 09/09/2027 9/9/2022    Override on 8/2/2012: Done (per [facility] records)        Orders Placed This Encounter   Procedures    Ambulatory referral/consult to Endo Procedure     POCT Lipid Panel     The following information is provided to all patients.  This information is to help you find resources for any of the problems found today that may be affecting your health:                  Living healthy guide: www.[facility].[place].gov      Understanding Diabetes: www.diabetes.org      Eating healthy: www.cdc.gov/healthyweight      CDC home safety checklist: www.cdc.gov/steadi/patient.html      Agency on Aging: www.goea.[place].[facility]      Alcoholics anonymous (AA): www.aa.org      Physical Activity: www.nigel.nih.gov/uj9qkxo      Tobacco use: www.quitwithusla.org

## 2025-04-14 NOTE — PROGRESS NOTES
"  Ro Hernandez presented for a  Medicare AWV and comprehensive Health Risk Assessment today. The following components were reviewed and updated:    Medical history  Family History  Social history  Allergies and Current Medications  Health Risk Assessment  Health Maintenance  Care Team         ** See Completed Assessments for Annual Wellness Visit within the encounter summary.**         The following assessments were completed:  Living Situation  CAGE  Depression Screening  Timed Get Up and Go  Whisper Test  Cognitive Function Screening  Nutrition Screening  ADL Screening  PAQ Screening      Opioid documentation:      Patient does have a current opioid prescription.      Patient accepted further discussion regarding opioid medication use.      Patient is currently taking hydrocodone narcotic for  "everywhere", hand pain.        Pain level today is  1/5     In addition to narcotic pain medications, patient is also using NSAIDs for pain control.       Patient is followed by a specialist currently for their pain and will not be referred today.       Patient's opioid risk potential based on ORT-OUD tool:       Gab each box that applies   No   Yes     Family history of substance abuse   Alcohol [x] []   Illegal drugs [x] []   Rx drugs [x] []     Personal history of substance abuse   Alcohol [x] []   Illegal drugs [x] []   Rx drugs [x] []     Age between 16-45 years   [x]   []     Patient with ADD, OCD, Bipolar disorder, schizoprenia   [x]   []     Patient with depression   [x]   []                         Scoring total                                  0                               Non-opioid treatment options have been discussed today and added to the patient's after visit summary.        Vitals:    04/14/25 1107   BP: (!) 120/58   BP Location: Right arm   Patient Position: Sitting   Resp: 20   Weight: 87.1 kg (191 lb 14.6 oz)   Height: 5' 5" (1.651 m)     Body mass index is 31.94 kg/m².  Physical Exam  Constitutional:  "      General: She is not in acute distress.     Appearance: She is not ill-appearing or diaphoretic.   HENT:      Mouth/Throat:      Mouth: Mucous membranes are moist.   Pulmonary:      Effort: Pulmonary effort is normal. No respiratory distress.   Skin:     General: Skin is warm and dry.   Neurological:      Mental Status: She is alert and oriented to person, place, and time. Mental status is at baseline.      Gait: Gait abnormal.   Psychiatric:         Mood and Affect: Mood normal.         Behavior: Behavior normal.         Thought Content: Thought content normal.         Judgment: Judgment normal.           Diagnoses and health risks identified today and associated recommendations/orders:    1. Encounter for Medicare annual wellness exam    Patient states she does not drink alcohol    I offered to discuss advanced care planning, including how to pick a person who would make decisions for you if you were unable to make them for yourself, called a health care power of , and what kind of decisions you might make such as use of life sustaining treatments such as ventilators and tube feeding when faced with a life limiting illness recorded on a living will that they will need to know. (How you want to be cared for as you near the end of your natural life)     X  Patient has advanced directives written and agrees to provide copies to the institution.  - Referral to Enhanced Annual Wellness Visit (eAWV) W+1  - Ambulatory referral/consult to Endo Procedure ; Future- Colonoscopy  - POCT Lipid Panel    2. Atherosclerosis of aorta,  Mixed hyperlipidemia, Prinzmetal's angina  Monitor  Follow up with Cardiology as directed  Continue home asa, zetia, imdur, prn Ntg  - POCT Lipid Panel    3. Pulmonary hypertension  3/18/2025 Echo-   Summary  Show Result Comparison     Left Ventricle: The left ventricle is normal in size. Normal wall thickness. There is concentric remodeling. There is normal systolic function.  Ejection fraction is approximately 60%. Grade II diastolic dysfunction.    Right Ventricle: The right ventricle is normal in size. Wall thickness is normal. Systolic function is normal.    Aortic Valve: There is a transcatheter valve replacement in the aortic position.    Tricuspid Valve: There is mild regurgitation with a centrally directed jet.    Pulmonary Artery: The estimated pulmonary artery systolic pressure is 52 mmHg.    IVC/SVC: Normal venous pressure at 3 mmHg.     Monitor  Follow up with Cardiology, Pulmonology  Dx discussed with patient     4. Class 1 obesity with serious comorbidity and body mass index (BMI) of 31.0 to 31.9 in adult, unspecified obesity type    Monitor  Follow up with PCP  Increased activity encouraged   Chair yoga encouraged    5. Opioid dependence, uncomplicated, Arthritis  Monitor  Follow up as needed, directed  Continue home norco, prn narcan, celebrex  Fall precautions  Chair yoga encouraged    6. Chronic congestive heart failure, unspecified heart failure type, Obstructive hypertrophic cardiomyopathy, Nonrheumatic aortic valve stenosis S/p TAVR, Benign heart murmur  Monitor  Follow up with Cardiology as needed, directed  Continue home lasix    7. Renal cyst, Urge incontinence  Monitor  Follow up as needed, directed  Continue home vesicare    8. Pulmonary emphysema, unspecified emphysema type, Mild intermittent asthma without complication, Exercise hypoxemia, Multiple lung nodules,  HERB on CPAP, Cigarette nicotine dependence in remission  Monitor  Stable  Continue home albuterol, flonase  Patient states she no longer requires O2 since recent TAVR    9. Unspecified essential hypertension  Monitor  Stable  Continue home lasix , toprol xl, verapamil    10.  Iron deficiency anemia, unspecified iron deficiency anemia type  Monitor  Stable  Continue home MVI    11. Gastroesophageal reflux disease, unspecified whether esophagitis present  Monitor  Follow up as needed, directed    12.  Colon cancer screening  Health maintenance screening  - Ambulatory referral/consult to Endo Procedure ; Future                                    Provided Ro with a 5-10 year written screening schedule and personal prevention plan. Recommendations were developed using the USPSTF age appropriate recommendations. Education, counseling, and referrals were provided as needed. After Visit Summary printed and given to patient which includes a list of additional screenings\tests needed.    Follow up in about 1 year (around 4/14/2026) for Annual wellness visit.    Kayla Martinez, ELOISE

## 2025-04-23 ENCOUNTER — OFFICE VISIT (OUTPATIENT)
Dept: CARDIOLOGY | Facility: CLINIC | Age: 70
End: 2025-04-23
Payer: MEDICARE

## 2025-04-23 VITALS
HEART RATE: 70 BPM | WEIGHT: 192.81 LBS | BODY MASS INDEX: 32.12 KG/M2 | HEIGHT: 65 IN | OXYGEN SATURATION: 96 % | DIASTOLIC BLOOD PRESSURE: 64 MMHG | SYSTOLIC BLOOD PRESSURE: 128 MMHG

## 2025-04-23 DIAGNOSIS — I50.32 CHRONIC DIASTOLIC CONGESTIVE HEART FAILURE: ICD-10-CM

## 2025-04-23 DIAGNOSIS — I20.1 PRINZMETAL'S ANGINA: ICD-10-CM

## 2025-04-23 DIAGNOSIS — E78.2 MIXED HYPERLIPIDEMIA: ICD-10-CM

## 2025-04-23 DIAGNOSIS — I10 ESSENTIAL HYPERTENSION: Primary | ICD-10-CM

## 2025-04-23 DIAGNOSIS — I27.20 PULMONARY HYPERTENSION: ICD-10-CM

## 2025-04-23 DIAGNOSIS — I35.0 NONRHEUMATIC AORTIC VALVE STENOSIS: ICD-10-CM

## 2025-04-23 PROCEDURE — 99999 PR PBB SHADOW E&M-EST. PATIENT-LVL III: CPT | Mod: PBBFAC,,, | Performed by: INTERNAL MEDICINE

## 2025-04-23 PROCEDURE — 3074F SYST BP LT 130 MM HG: CPT | Mod: CPTII,S$GLB,, | Performed by: INTERNAL MEDICINE

## 2025-04-23 PROCEDURE — 1159F MED LIST DOCD IN RCRD: CPT | Mod: CPTII,S$GLB,, | Performed by: INTERNAL MEDICINE

## 2025-04-23 PROCEDURE — 1160F RVW MEDS BY RX/DR IN RCRD: CPT | Mod: CPTII,S$GLB,, | Performed by: INTERNAL MEDICINE

## 2025-04-23 PROCEDURE — 1101F PT FALLS ASSESS-DOCD LE1/YR: CPT | Mod: CPTII,S$GLB,, | Performed by: INTERNAL MEDICINE

## 2025-04-23 PROCEDURE — 1126F AMNT PAIN NOTED NONE PRSNT: CPT | Mod: CPTII,S$GLB,, | Performed by: INTERNAL MEDICINE

## 2025-04-23 PROCEDURE — 3078F DIAST BP <80 MM HG: CPT | Mod: CPTII,S$GLB,, | Performed by: INTERNAL MEDICINE

## 2025-04-23 PROCEDURE — 3008F BODY MASS INDEX DOCD: CPT | Mod: CPTII,S$GLB,, | Performed by: INTERNAL MEDICINE

## 2025-04-23 PROCEDURE — 99214 OFFICE O/P EST MOD 30 MIN: CPT | Mod: S$GLB,,, | Performed by: INTERNAL MEDICINE

## 2025-04-23 PROCEDURE — 3288F FALL RISK ASSESSMENT DOCD: CPT | Mod: CPTII,S$GLB,, | Performed by: INTERNAL MEDICINE

## 2025-04-23 RX ORDER — ISOSORBIDE MONONITRATE 30 MG/1
60 TABLET, EXTENDED RELEASE ORAL DAILY
Qty: 135 TABLET | Refills: 3 | Status: SHIPPED | OUTPATIENT
Start: 2025-04-23

## 2025-04-23 RX ORDER — FUROSEMIDE 40 MG/1
40 TABLET ORAL DAILY
Qty: 90 TABLET | Refills: 3 | Status: SHIPPED | OUTPATIENT
Start: 2025-04-23 | End: 2026-04-23

## 2025-04-23 RX ORDER — METOPROLOL SUCCINATE 100 MG/1
100 TABLET, EXTENDED RELEASE ORAL NIGHTLY
Qty: 90 TABLET | Refills: 3 | Status: SHIPPED | OUTPATIENT
Start: 2025-04-23

## 2025-04-23 RX ORDER — VERAPAMIL HYDROCHLORIDE 360 MG/1
360 CAPSULE, DELAYED RELEASE ORAL DAILY
Qty: 90 CAPSULE | Refills: 3 | Status: SHIPPED | OUTPATIENT
Start: 2025-04-23

## 2025-04-23 NOTE — PROGRESS NOTES
Subjective:   Patient ID:  Ro Hernandez is a 69 y.o. female who presents for follow up of No chief complaint on file.      68 yo female, 6 m f/u  PMH severe AS s/p TAVR A 29 mm Evolut valve in  by Dr. Queen AT [facility], normal LHC in , HTN, GERD, and COPD home O2 for 6 weeks. No h/o MI DM CVA  Prior cardiologist Dr. Quiles. Declined new rx for HOCM  F/u pulm service for home O2  H/o cath nml in   H/o cardiac MRI done before    Recent ER visit in  for CHFpEF. 's and improved after Lasix added  EKG NSR  No faint syncope palpitation  Occasional chest pain NTG helps  Father  of massive heart attack at 53.  Sister  at 73 due to renal   Mother  at 81     visit  Finished pulm rehab. On breathing Rx  Echo showed EF nml. Severe AS, Aortic valve area by VTI is 0.91 cm². Aortic valve peak velocity is 5.64 m/s. Mean gradient is 76 mmHg. The dimensionless index is 0.33. There is mild aortic regurgitation. Also severe MAC  Occasional CP at exertion , NTG SL works  No dizziness faint leg swelling orthopnea      VISIT  H/o LHC and RHC. Nonobstructive CAD  RA: / 12 RV: 70/ 10/ 16 PA: 71/ 29/ 47 PWP: /  .   Cardiac output was 4.6 by thermodilution. Cardiac index is 2.4 L/min/m2.     BP variated at 110 to 140 mmJHg at home  Occasional NTG SL for chest pain   No dizzienss faint   Left radial site some bruise good pulse    Interval history  S/P tavr IN    Off home O2      History of Present Illness    CHIEF COMPLAINT:  - Ro presents for medication refills and follow-up AVR surgery.    HPI:  - Ro, a female, underwent AVR surgery on  of this year at [facility], performed by Dr. Fenton  - Reports significant improvement  - Previously had difficulty walking and performing household chores  - Now can clean her entire house, though needs to do it one room at a time  - Occasionally has mild pain when stressed or hurrying, but it is less  severe  - Had an echo 2 weeks ago, which showed normal EF  - Discontinued home oxygen 2 weeks post-op  - History of Prinzmetal angina, taking isosorbide since age 40  - Reports weakness and trembling when not taking isosorbide  - Underwent heart catheterization in December, showing normal left heart catheterization results and evidence of a thick valve  - Denies syncope, chest pain, dyspnea, and a history of diabetes  - Denies current use of home oxygen    CARDIAC HISTORY:  - s/p TVAR 02/11/2025 at [place] (Dr. Queen)  - Echo (recent): normal heart function, valves in place, EF normal, valves sitting well  - Heart cath 12/2024: normal LHC, thick valve  - Prinzmetal angina    MEDICATIONS:  - Isosorbide (Amdur) 1.5 tablets daily for Prinzmetal angina  - Metoprolol at night  - Lasix PRN, up to 2 tablets some days  - Verapamil    TEST RESULTS:  - Sodium: Recently improved  - Renal function: Recently noted as impaired  - Liver function: Recently noted as normal  - Sugar level: Recently noted as normal  - Cholesterol: Recently noted as controlled, borderline control     MEDICAL HISTORY:  - Cirrhosis  - Congestive heart failure  - Aortic valve disease    SOCIAL HISTORY:  - Smoking: No  - Marital status:  to Abdirizak          Past Medical History:   Diagnosis Date    Allergy     Angina     Arthritis 06/11/2013    Asthma     Congestive heart failure 07/16/2024    COPD (chronic obstructive pulmonary disease)     GERD (gastroesophageal reflux disease)     Hx of Prinzmetal angina     Hypertension     Obesity 06/11/2013    Other and unspecified hyperlipidemia 06/11/2013    Prinzmetal's angina     Sleep apnea 06/11/2013       Past Surgical History:   Procedure Laterality Date    ABDOMINAL SURGERY      CARDIAC CATH COSURGEON N/A 2/11/2025    Procedure: Cardiac Cath Cosurgeon;  Surgeon: Best Porter MD;  Location: [facility] CATH LAB;  Service: Cardiothoracic;  Laterality: N/A;    CATHETERIZATION OF BOTH LEFT AND  RIGHT HEART N/A 12/2/2024    Procedure: CATHETERIZATION, HEART, BOTH LEFT AND RIGHT;  Surgeon: Donna Holden MD;  Location: [facility] CATH LAB;  Service: Cardiology;  Laterality: N/A;    CHOLECYSTECTOMY      COLONOSCOPY N/A 12/6/2019    Procedure: COLONOSCOPY;  Surgeon: Winston Smith MD;  Location: [facility] ENDO;  Service: Endoscopy;  Laterality: N/A;    ESOPHAGOGASTRODUODENOSCOPY N/A 12/10/2018    Procedure: EGD (ESOPHAGOGASTRODUODENOSCOPY);  Surgeon: Raúl Polanco III, MD;  Location: [facility] ENDO;  Service: Endoscopy;  Laterality: N/A;    ESOPHAGOGASTRODUODENOSCOPY N/A 9/8/2020    Procedure: EGD (ESOPHAGOGASTRODUODENOSCOPY);  Surgeon: Raúl Polanco III, MD;  Location: [facility];  Service: Endoscopy;  Laterality: N/A;    EYE SURGERY      FOOT SURGERY      INTRALUMINAL GASTROINTESTINAL TRACT IMAGING VIA CAPSULE N/A 9/18/2020    Procedure: IMAGING PROCEDURE, GI TRACT, INTRALUMINAL, VIA CAPSULE;  Surgeon: Blade Holley RN;  Location: [facility] ENDO;  Service: Endoscopy;  Laterality: N/A;    PTA, FEMORAL ARTERY  2/11/2025    Procedure: PTA, Femoral Artery;  Surgeon: Mark Youngblood MD;  Location: [facility] CATH LAB;  Service: Cardiology;;    TONSILLECTOMY      TRANSCATHETER AORTIC VALVE REPLACEMENT (TAVR) N/A 2/11/2025    Procedure: REPLACEMENT, AORTIC VALVE, TRANSCATHETER (TAVR);  Surgeon: Mark Youngblood MD;  Location: [facility] CATH LAB;  Service: Cardiology;  Laterality: N/A;    TRANSCATHETER AORTIC VALVE REPLACEMENT (TAVR)  2/11/2025    Procedure: REPLACEMENT, AORTIC VALVE, TRANSCATHETER (TAVR);  Surgeon: Best Porter MD;  Location: [facility] CATH LAB;  Service: Cardiothoracic;;    TUBAL LIGATION         Social History[1]    Family History   Problem Relation Name Age of Onset    Hypertension Mother Anamaria     Cancer Mother Anamaria     Kidney cancer Mother Anamaria     Arthritis Mother Anamaria     COPD Father Pete     Hypertension Father Pete     Heart disease Sister Luz     Hyperlipidemia Son Kwt     Breast cancer  Maternal Grandmother      Colon cancer Neg Hx           ROS    Objective:   Physical Exam  HENT:      Head: Normocephalic.   Eyes:      Pupils: Pupils are equal, round, and reactive to light.   Neck:      Thyroid: No thyromegaly.      Vascular: Normal carotid pulses. No carotid bruit or JVD.   Cardiovascular:      Rate and Rhythm: Normal rate and regular rhythm. No extrasystoles are present.     Chest Wall: PMI is not displaced.      Pulses: Normal pulses.      Heart sounds: Murmur heard.      Harsh midsystolic murmur is present with a grade of 2/6 at the upper right sternal border radiating to the neck.      No gallop. No S3 sounds.   Pulmonary:      Effort: No respiratory distress.      Breath sounds: Normal breath sounds. No stridor.   Abdominal:      General: Bowel sounds are normal.      Palpations: Abdomen is soft.      Tenderness: There is no abdominal tenderness. There is no rebound.   Musculoskeletal:         General: Normal range of motion.   Skin:     Findings: No rash.   Neurological:      Mental Status: She is alert and oriented to person, place, and time.   Psychiatric:         Behavior: Behavior normal.         Lab Results   Component Value Date    CHOL 178 04/02/2024    CHOL 198 03/02/2023    CHOL 217 (H) 11/19/2021     Lab Results   Component Value Date    HDL 40 04/02/2024    HDL 39 (L) 03/02/2023    HDL 49 11/19/2021     Lab Results   Component Value Date    LDLCALC 114.6 04/02/2024    LDLCALC 133.0 03/02/2023    LDLCALC 133.2 11/19/2021     Lab Results   Component Value Date    TRIG 117 04/02/2024    TRIG 130 03/02/2023    TRIG 174 (H) 11/19/2021     Lab Results   Component Value Date    CHOLHDL 22.5 04/02/2024    CHOLHDL 19.7 (L) 03/02/2023    CHOLHDL 22.6 11/19/2021       Chemistry        Component Value Date/Time     02/04/2025 1010    K 4.5 02/04/2025 1010     02/04/2025 1010    CO2 30 (H) 02/04/2025 1010    BUN 24 (H) 02/04/2025 1010    CREATININE 0.9 03/18/2025 1156    GLU 68  (L) 02/04/2025 1010        Component Value Date/Time    CALCIUM 8.9 02/04/2025 1010    ALKPHOS 79 07/13/2024 1234    AST 19 07/13/2024 1234    ALT 12 07/13/2024 1234    BILITOT 0.8 07/13/2024 1234    ESTGFRAFRICA >60.0 11/19/2021 0908    EGFRNONAA >60.0 11/19/2021 0908          Lab Results   Component Value Date    HGBA1C 5.0 03/02/2023     Lab Results   Component Value Date    TSH 0.791 04/02/2024     Lab Results   Component Value Date    INR 1.0 11/29/2024    INR 1.0 01/02/2007     Lab Results   Component Value Date    WBC 6.45 03/18/2025    HGB 14.1 03/18/2025    HCT 43.1 03/18/2025    MCV 93 03/18/2025     03/18/2025     BMP  Sodium   Date Value Ref Range Status   02/04/2025 137 136 - 145 mmol/L Final     Potassium   Date Value Ref Range Status   02/04/2025 4.5 3.5 - 5.1 mmol/L Final     Chloride   Date Value Ref Range Status   02/04/2025 102 95 - 110 mmol/L Final     CO2   Date Value Ref Range Status   02/04/2025 30 (H) 23 - 29 mmol/L Final     BUN   Date Value Ref Range Status   02/04/2025 24 (H) 8 - 23 mg/dL Final     Creatinine   Date Value Ref Range Status   03/18/2025 0.9 0.5 - 1.4 mg/dL Final     Calcium   Date Value Ref Range Status   02/04/2025 8.9 8.7 - 10.5 mg/dL Final     Anion Gap   Date Value Ref Range Status   02/04/2025 5 (L) 8 - 16 mmol/L Final     eGFR if    Date Value Ref Range Status   11/19/2021 >60.0 >60 mL/min/1.73 m^2 Final     eGFR if non    Date Value Ref Range Status   11/19/2021 >60.0 >60 mL/min/1.73 m^2 Final     Comment:     Calculation used to obtain the estimated glomerular filtration  rate (eGFR) is the CKD-EPI equation.        BNP  @LABRCNTIP(BNP,BNPTRIAGEBLO)@  @LABRCNTIP(troponini)@  CrCl cannot be calculated (Patient's most recent lab result is older than the maximum 7 days allowed.).  No results found in the last 24 hours.  No results found in the last 24 hours.  No results found in the last 24 hours.    Assessment:      1. Unspecified  essential hypertension    2. Pulmonary hypertension    3. Prinzmetal's angina    4. Nonrheumatic aortic valve stenosis S/p TAVR    5. Mixed hyperlipidemia    6. Chronic diastolic congestive heart failure        Plan:     Assessment & Plan    IMPRESSION:  - Current cardiac status assessed: blood pressure, cholesterol, and congestive heart failure controlled.  - Persistent heart murmur noted on exam, to be reassessed in 6 months.  - Patient reported improvement in symptoms and discontinued home oxygen use.    ANGINA PECTORIS:  - Continued isosorbide at 45 mg, 1.5 tablets daily; provided 135 tablets for 90-day supply.  - Continued metoprolol at night.  - Continued verapamil; provided 90-day supply with 3 refills.    HEART FAILURE:  - Continued Lasix, to be taken as needed; provided 3 tablets per day.    OTHER HEART DISORDERS:  - Continued stomach medicine (specific medication not mentioned).    HYPERLIPIDEMIA:  - Ro to continue exercising, work on weight loss.    FOLLOW-UP:  - Follow up in 6 months.            This note was generated with the assistance of ambient listening technology. Verbal consent was obtained by the patient and accompanying visitor(s) for the recording of patient appointment to facilitate this note. I attest to having reviewed and edited the generated note for accuracy, though some syntax or spelling errors may persist. Please contact the author of this note for any clarification.            [1]   Social History  Tobacco Use    Smoking status: Former     Current packs/day: 0.00     Average packs/day: 1 pack/day for 48.4 years (48.4 ttl pk-yrs)     Types: Cigarettes     Start date:      Quit date: 2016     Years since quittin.8     Passive exposure: Never    Smokeless tobacco: Never    Tobacco comments:     smoked for 25 yrs. quit 16yrs. then smoked again for 5-6 before quitting in 2016.   Substance Use Topics    Alcohol use: No     Alcohol/week: 1.0 standard drink of alcohol      Types: 1 Standard drinks or equivalent per week    Drug use: No

## 2025-04-29 ENCOUNTER — PATIENT MESSAGE (OUTPATIENT)
Dept: GASTROENTEROLOGY | Facility: CLINIC | Age: 70
End: 2025-04-29
Payer: MEDICARE

## 2025-04-29 RX ORDER — PANTOPRAZOLE SODIUM 20 MG/1
20 TABLET, DELAYED RELEASE ORAL
Qty: 180 TABLET | Refills: 0 | Status: SHIPPED | OUTPATIENT
Start: 2025-04-29 | End: 2025-07-28

## 2025-05-08 ENCOUNTER — CLINICAL SUPPORT (OUTPATIENT)
Dept: PULMONOLOGY | Facility: CLINIC | Age: 70
End: 2025-05-08
Payer: MEDICARE

## 2025-05-08 ENCOUNTER — OFFICE VISIT (OUTPATIENT)
Dept: PULMONOLOGY | Facility: CLINIC | Age: 70
End: 2025-05-08
Attending: HOSPITALIST
Payer: MEDICARE

## 2025-05-08 ENCOUNTER — HOSPITAL ENCOUNTER (OUTPATIENT)
Dept: RADIOLOGY | Facility: HOSPITAL | Age: 70
Discharge: HOME | End: 2025-05-08
Attending: HOSPITALIST
Payer: MEDICARE

## 2025-05-08 ENCOUNTER — RESULTS FOLLOW-UP (OUTPATIENT)
Dept: PULMONOLOGY | Facility: CLINIC | Age: 70
End: 2025-05-08

## 2025-05-08 VITALS
HEIGHT: 65 IN | DIASTOLIC BLOOD PRESSURE: 70 MMHG | HEART RATE: 75 BPM | WEIGHT: 192.88 LBS | RESPIRATION RATE: 18 BRPM | SYSTOLIC BLOOD PRESSURE: 170 MMHG | OXYGEN SATURATION: 97 % | BODY MASS INDEX: 32.14 KG/M2

## 2025-05-08 VITALS — HEIGHT: 65 IN | BODY MASS INDEX: 32.14 KG/M2 | WEIGHT: 192.88 LBS

## 2025-05-08 DIAGNOSIS — J44.9 CHRONIC OBSTRUCTIVE PULMONARY DISEASE, UNSPECIFIED COPD TYPE: ICD-10-CM

## 2025-05-08 DIAGNOSIS — J43.9 EMPHYSEMA LUNG: ICD-10-CM

## 2025-05-08 DIAGNOSIS — G47.33 OSA ON CPAP: Chronic | ICD-10-CM

## 2025-05-08 DIAGNOSIS — R06.02 SOB (SHORTNESS OF BREATH) ON EXERTION: ICD-10-CM

## 2025-05-08 DIAGNOSIS — F17.211 CIGARETTE NICOTINE DEPENDENCE IN REMISSION: Primary | ICD-10-CM

## 2025-05-08 DIAGNOSIS — J43.2 CENTRILOBULAR EMPHYSEMA: ICD-10-CM

## 2025-05-08 DIAGNOSIS — J45.20 MILD INTERMITTENT ASTHMA WITHOUT COMPLICATION: ICD-10-CM

## 2025-05-08 DIAGNOSIS — F17.211 CIGARETTE NICOTINE DEPENDENCE IN REMISSION: ICD-10-CM

## 2025-05-08 DIAGNOSIS — R09.02 EXERCISE HYPOXEMIA: Primary | ICD-10-CM

## 2025-05-08 LAB
BRPFT: NORMAL
FEF 25 75 LLN: 1.17
FEF 25 75 PRE REF: 99.7 %
FEF 25 75 REF: 2.57
FEV1 FVC LLN: 64
FEV1 FVC PRE REF: 109.2 %
FEV1 FVC REF: 78
FEV1 LLN: 1.61
FEV1 PRE REF: 76.9 %
FEV1 REF: 2.23
FVC LLN: 2.09
FVC PRE REF: 69.8 %
FVC REF: 2.89
PEF LLN: 3.96
PEF PRE REF: 93.1 %
PEF REF: 5.72
PRE FEF 25 75: 2.56 L/S
PRE FET 100: 6.25 SEC
PRE FEV1 FVC: 84.91 %
PRE FEV1: 1.71 L
PRE FVC: 2.02 L
PRE PEF: 5.32 L/S

## 2025-05-08 PROCEDURE — 99999 PR PBB SHADOW E&M-EST. PATIENT-LVL V: CPT | Mod: PBBFAC,,, | Performed by: HOSPITALIST

## 2025-05-08 PROCEDURE — 71271 CT THORAX LUNG CANCER SCR C-: CPT | Mod: 26,,, | Performed by: RADIOLOGY

## 2025-05-08 PROCEDURE — 71271 CT THORAX LUNG CANCER SCR C-: CPT | Mod: TC

## 2025-05-08 RX ORDER — ALBUTEROL SULFATE 90 UG/1
2 INHALANT RESPIRATORY (INHALATION) EVERY 6 HOURS PRN
Qty: 18 G | Refills: 2 | Status: SHIPPED | OUTPATIENT
Start: 2025-05-08

## 2025-05-08 RX ORDER — TIOTROPIUM BROMIDE AND OLODATEROL 3.124; 2.736 UG/1; UG/1
2 SPRAY, METERED RESPIRATORY (INHALATION) DAILY
Qty: 12 G | Refills: 3 | Status: SHIPPED | OUTPATIENT
Start: 2025-05-08

## 2025-05-08 NOTE — PROGRESS NOTES
Subjective:      Patient ID: Ro Hernandez is a 69 y.o. female.    Chief Complaint: COPD, Sleep Apnea    Interval Hx 25:    Mrs. Hernandez presents today for follow up COPD and sleep apnea. She was seen for the last time 24- continued on Stiolto. In the interim she had a LHC in December which showed moderate pulmonary hypertension and severe AS, now s/p TAVR 2025.     Initially felt great after TAVR, a lot more energy. Over the past month+ has been dealing with her  becoming ill, 30+ day stay in hospital and his  his tomorrow. She is planning on getting back to her exercise routine, had stopped using Stiolto and is planning to start back. No issues with PAP, recently received supplies.    Interim Testing:  - LDCT 25- Stable LLL nodule, several bilateral <5mm nodules, official read pending  - [place] 25- Restrictive pattern with FVC 69.8%, improved compared to year prior  - Walk 25- Resting sat 95%, 2L applied 1st minute, 3L applied 3rd minute, Pramod 1-2, 335m (82% predicted), same distance as year prior, needed 3L as opposed to 2L year prior    Pulmonary Interventions:  - Albuterol HFA-   - Albuterol neb  - Stiolto- had stopped with everything going on     HPI 24:     69 year old female with history of HLD, HTN, JAQUELINE, hx tobacco use, chf, exercise hypoxemia sleep apnea who presents to Pulmonary clinic for follow up. she was last seen 2024 by KALIN Rich- continued on aPAP with O2 bled, encouraged to take lasix as directed by ED severl days prior.      Wears cpap every night and benefits from use. Breathing has been doing well. Now on daily lasix and has benefited from this.      Pertinent Work Up:  - CTA Chest 2024- Bilateral pleural effusions, ground glass opacities throughout lungs, most consistent with CHF. Stable LLL nodule to my measurement  - PFT 2024- [place] with possible restriction, normal TLC, decreased diffusion  - 6MW 2024- Resting sat 95%, 2L  "applied 2nd minute, 335m, 84% predicted, Pramod 2-8  - Overnight ox 5/2024- 7 hours with SpO2 <89%  - Titration 5/2024- CPAP 9cmH2O with 2L O2  - PSG 2011- AHI 10.4     Pulmonary Interventions:  - Albuterol hfa- has used once in the past month  - Albuterol neb- hasn't needed in the past 4 months  - Stiolto- 2 puffs in the afternoon    Review of Systems   Respiratory:  Positive for dyspnea on extertion. Negative for cough, sputum production, wheezing and somnolence.      Objective:     Physical Exam   Constitutional: She is oriented to person, place, and time. She appears well-developed and well-nourished. She is obese.   Cardiovascular:   - systolic ejection murmur   Pulmonary/Chest: Normal expansion, effort normal and breath sounds normal.   Normal effort at rest on room air and with conversation   Neurological: She is alert and oriented to person, place, and time.   Skin: Skin is warm and dry.     Personal Diagnostic Review  As Above      4/23/2025     2:24 PM 4/14/2025    11:07 AM 4/9/2025     9:52 AM 3/18/2025     1:16 PM 2/21/2025    10:56 AM 2/12/2025    10:00 AM 2/12/2025     9:00 AM   Pulmonary Function Tests   SpO2 96 %  96 %  96 % 92 % 97 %   Height 5' 5" (1.651 m) 5' 5" (1.651 m) 5' 5" (1.651 m) 5' 5" (1.651 m) 5' 5" (1.651 m)     Weight 87.5 kg (192 lb 12.7 oz) 87.1 kg (191 lb 14.6 oz) 88.4 kg (194 lb 14.2 oz) 88 kg (194 lb) 88.2 kg (194 lb 5.4 oz)     BMI (Calculated) 32.1 31.9 32.4 32.3 32.3          Assessment:     No diagnosis found.     Encounter Medications[1]  No orders of the defined types were placed in this encounter.    Plan:     Problem List Items Addressed This Visit       HERB on CPAP (Chronic)    - compliant, benefits from use           Mild intermittent asthma without complication    - continue Stiolto, albuterol as needed         Relevant Medications    albuterol (VENTOLIN HFA) 90 mcg/actuation inhaler    Emphysema lung    Relevant Medications    tiotropium-olodateroL (STIOLTO RESPIMAT) " 2.5-2.5 mcg/actuation Mist    albuterol (VENTOLIN HFA) 90 mcg/actuation inhaler    Cigarette nicotine dependence in remission    - LDCT 5/2025- Stable LLL 7-8mm nodule  - continue annual screening  - quit 2016, 48 pack years         Exercise hypoxemia - Primary    - up to 3L on walk today, same distance but increase from max 2L last year  - continue oxygen with activity          Other Visit Diagnoses         SOB (shortness of breath) on exertion        Relevant Medications    tiotropium-olodateroL (STIOLTO RESPIMAT) 2.5-2.5 mcg/actuation Mist      Chronic obstructive pulmonary disease, unspecified COPD type        Relevant Medications    tiotropium-olodateroL (STIOLTO RESPIMAT) 2.5-2.5 mcg/actuation Mist    Other Relevant Orders    CPAP/BIPAP SUPPLIES          Follow up in 6 months or sooner as needed.         [1]   Outpatient Encounter Medications as of 5/8/2025   Medication Sig Dispense Refill    albuterol (PROVENTIL) 2.5 mg /3 mL (0.083 %) nebulizer solution Take 3 mLs (2.5 mg total) by nebulization every 4 (four) hours as needed for Wheezing or Shortness of Breath. Rescue 360 mL 11    albuterol (VENTOLIN HFA) 90 mcg/actuation inhaler Inhale 2 puffs into the lungs every 6 (six) hours as needed for Wheezing or Shortness of Breath. Rescue 18 g 2    aspirin (ECOTRIN) 81 MG EC tablet Take 81 mg by mouth once daily.      BIOTIN ORAL Take by mouth once daily.      CALCIUM CITRATE-VITAMIN D2 ORAL Take 1 tablet by mouth once daily.      celecoxib (CELEBREX) 200 MG capsule Take 200 mg by mouth once daily.      diphenhydrAMINE-acetaminophen (TYLENOL PM)  mg Tab Take 2 tablets by mouth every evening.      ezetimibe (ZETIA) 10 mg tablet Take 1 tablet (10 mg total) by mouth once daily. 90 tablet 2    fluticasone propionate (FLONASE) 50 mcg/actuation nasal spray USE 2 SPRAYS IN EACH NOSTRIL ONE TIME DAILY 48 g 3    furosemide (LASIX) 40 MG tablet Take 1 tablet (40 mg total) by mouth once daily. 90 tablet 3     HYDROcodone-acetaminophen (NORCO)  mg per tablet Take 1 tablet by mouth. One tablet twice a day      inhalation spacing device Use as directed for inhalation. 1 each 0    isosorbide mononitrate (IMDUR) 30 MG 24 hr tablet Take 2 tablets (60 mg total) by mouth once daily. Take 1 tab po in the AM and 1/2 tab po in the  tablet 3    lactulose (CHRONULAC) 10 gram/15 mL solution Take 15 mLs (10 g total) by mouth 2 (two) times daily. (Patient taking differently: Take 10 g by mouth 2 (two) times daily as needed (constipation).) 1892 mL 11    metoprolol succinate (TOPROL-XL) 100 MG 24 hr tablet Take 1 tablet (100 mg total) by mouth every evening. 90 tablet 3    MULTIVITAMIN (MULTIPLE VITAMIN ORAL) Take 1 tablet by mouth Daily.      naloxone (NARCAN) 4 mg/actuation Spry       nitroGLYCERIN (NITROSTAT) 0.4 MG SL tablet DISSOLVE 1 TABLET UNDER THE TONGUE EVERY 5 MINUTES FOR 3 DOSES AS NEEDED FOR CHEST PAIN 25 tablet 0    pantoprazole (PROTONIX) 20 MG tablet Take 1 tablet (20 mg total) by mouth 2 (two) times daily before meals. 180 tablet 0    solifenacin (VESICARE) 10 MG tablet Take 1 tablet (10 mg total) by mouth once daily. 90 tablet 3    tiotropium-olodateroL (STIOLTO RESPIMAT) 2.5-2.5 mcg/actuation Mist Inhale 2 puffs into the lungs once daily. Controller (Patient not taking: Reported on 4/9/2025) 12 g 3    verapamiL (VERELAN) 360 MG C24P Take 1 capsule (360 mg total) by mouth once daily. 90 capsule 3    [DISCONTINUED] albuterol (PROVENTIL HFA) 90 mcg/actuation inhaler Inhale 2 puffs into the lungs every 6 (six) hours as needed for Wheezing. Rescue 8 g 0    [DISCONTINUED] amoxicillin-clavulanate 875-125mg (AUGMENTIN) 875-125 mg per tablet Take 1 tablet by mouth every 12 (twelve) hours. 14 tablet 0    [DISCONTINUED] furosemide (LASIX) 40 MG tablet Take 1 tablet (40 mg total) by mouth once daily. 90 tablet 3    [DISCONTINUED] isosorbide mononitrate (IMDUR) 30 MG 24 hr tablet Take 1 tab po in the AM and 1/2 tab po  in the  tablet 1    [DISCONTINUED] methylPREDNISolone (MEDROL DOSEPACK) 4 mg tablet use as directed 1 each 0    [DISCONTINUED] metoprolol succinate (TOPROL-XL) 100 MG 24 hr tablet Take 100 mg by mouth every evening.      [DISCONTINUED] pantoprazole (PROTONIX) 20 MG tablet Take 1 tablet (20 mg total) by mouth 2 (two) times daily before meals. 180 tablet 3    [DISCONTINUED] verapamiL (VERELAN) 360 MG C24P Take 360 mg by mouth once daily.      [DISCONTINUED] sodium chloride 0.9% flush 10 mL  (Patient taking differently: Inject 10 mLs into the vein as needed for Line Care.)       No facility-administered encounter medications on file as of 5/8/2025.

## 2025-05-08 NOTE — PROCEDURES
"[facility]rd[facility]  Six Minute Walk     SUMMARY     Ordering Provider: Josr ARIAS   Interpreting Provider: Josr ARIAS  Performing nurse/tech/RT: LS RRT  Diagnosis: Emphysema  Height: 5' 5" (165.1 cm)  Weight: 87.5 kg (192 lb 14.4 oz)  BMI (Calculated): 32.1                Phase Oxygen Assessment Supplemental O2 Heart   Rate Blood Pressure Pramod Dyspnea Scale Rating   Resting 95 % Room Air 75 bpm 135/63 1   Exercise        Minute        1 88 % Room Air 86 bpm     2 92 % 2 L/M 85 bpm     3 89 % 2 L/M 84 bpm     4 92 % 3 L/M 89 bpm     5 93 % 3 L/M 96 bpm     6  92 % 3 L/M 97 bpm 165/69 2   Recovery        Minute        1 93 % 3 L/M 88 bpm     2 94 % 3 L/M 82 bpm     3 94 % 3 L/M 79 bpm     4 97 % 3 L/M 75 bpm 170/70 0     Six Minute Walk Summary  6MWT Status: completed without stopping  Patient Reported: No complaints     Interpretation:  Did the patient stop or pause?: No                                         Total Time Walked (Calculated): 360 seconds  Final Partial Lap Distance (feet): 100 feet  Total Distance Meters (Calculated): 335.28 meters  Predicted Distance Meters (Calculated): 404.61 meters  Percentage of Predicted (Calculated): 82.86  Peak VO2 (Calculated): 14.04  Mets: 4.01  Has The Patient Had a Previous Six Minute Walk Test?: Yes       Previous 6MWT Results  Has The Patient Had a Previous Six Minute Walk Test?: Yes  Date of Previous Test: 05/17/24  Total Time Walked: 360 seconds  Total Distance (meters): 335.2  Predicted Distance (meters): 397.7 meters  Percentage of Predicted: 84  Percent Change (Calculated): 0    "

## 2025-05-08 NOTE — ASSESSMENT & PLAN NOTE
- up to 3L on walk today, same distance but increase from max 2L last year  - continue oxygen with activity

## 2025-05-12 DIAGNOSIS — I35.0 SEVERE AORTIC STENOSIS: Primary | ICD-10-CM

## 2025-05-14 ENCOUNTER — OFFICE VISIT (OUTPATIENT)
Dept: ORTHOPEDICS | Facility: CLINIC | Age: 70
End: 2025-05-14
Payer: MEDICARE

## 2025-05-14 VITALS — BODY MASS INDEX: 32.14 KG/M2 | HEIGHT: 65 IN | WEIGHT: 192.88 LBS

## 2025-05-14 DIAGNOSIS — M65.30 TRIGGER FINGER, ACQUIRED: Primary | ICD-10-CM

## 2025-05-14 PROCEDURE — 99999 PR PBB SHADOW E&M-EST. PATIENT-LVL III: CPT | Mod: PBBFAC,,, | Performed by: ORTHOPAEDIC SURGERY

## 2025-05-14 RX ORDER — TRIAMCINOLONE ACETONIDE 40 MG/ML
40 INJECTION, SUSPENSION INTRA-ARTICULAR; INTRAMUSCULAR
Status: DISCONTINUED | OUTPATIENT
Start: 2025-05-14 | End: 2025-05-14 | Stop reason: HOSPADM

## 2025-05-14 RX ADMIN — TRIAMCINOLONE ACETONIDE 40 MG: 40 INJECTION, SUSPENSION INTRA-ARTICULAR; INTRAMUSCULAR at 11:05

## 2025-05-14 NOTE — PROGRESS NOTES
Subjective:     Patient ID: Ro Hernandez is a 69 y.o. female.    Chief Complaint: Pain of the Left Hand      HPI:  The patient is a 69-year-old female with a left long trigger finger last injected 10/22/2024 with good relief until recently who requests reinjection.    Past Medical History:   Diagnosis Date    Allergy     Angina     Arthritis 06/11/2013    Asthma     Congestive heart failure 07/16/2024    COPD (chronic obstructive pulmonary disease)     GERD (gastroesophageal reflux disease)     Hx of Prinzmetal angina     Hypertension     Obesity 06/11/2013    Other and unspecified hyperlipidemia 06/11/2013    Prinzmetal's angina     Sleep apnea 06/11/2013     Past Surgical History:   Procedure Laterality Date    ABDOMINAL SURGERY      CARDIAC CATH COSURGEON N/A 2/11/2025    Procedure: Cardiac Cath Cosurgeon;  Surgeon: Best Porter MD;  Location: [facility] CATH LAB;  Service: Cardiothoracic;  Laterality: N/A;    CATHETERIZATION OF BOTH LEFT AND RIGHT HEART N/A 12/2/2024    Procedure: CATHETERIZATION, HEART, BOTH LEFT AND RIGHT;  Surgeon: Donna Holden MD;  Location: [facility] CATH LAB;  Service: Cardiology;  Laterality: N/A;    CHOLECYSTECTOMY      COLONOSCOPY N/A 12/6/2019    Procedure: COLONOSCOPY;  Surgeon: Winston Smith MD;  Location: [facility];  Service: Endoscopy;  Laterality: N/A;    ESOPHAGOGASTRODUODENOSCOPY N/A 12/10/2018    Procedure: EGD (ESOPHAGOGASTRODUODENOSCOPY);  Surgeon: Raúl Polanco III, MD;  Location: [facility];  Service: Endoscopy;  Laterality: N/A;    ESOPHAGOGASTRODUODENOSCOPY N/A 9/8/2020    Procedure: EGD (ESOPHAGOGASTRODUODENOSCOPY);  Surgeon: Raúl Polanco III, MD;  Location: [facility];  Service: Endoscopy;  Laterality: N/A;    EYE SURGERY      FOOT SURGERY      INTRALUMINAL GASTROINTESTINAL TRACT IMAGING VIA CAPSULE N/A 9/18/2020    Procedure: IMAGING PROCEDURE, GI TRACT, INTRALUMINAL, VIA CAPSULE;  Surgeon: Blade Holley RN;  Location: [facility];  Service: Endoscopy;   Laterality: N/A;    PTA, FEMORAL ARTERY  2/11/2025    Procedure: PTA, Femoral Artery;  Surgeon: Mark Youngblood MD;  Location: [facility] CATH LAB;  Service: Cardiology;;    TONSILLECTOMY      TRANSCATHETER AORTIC VALVE REPLACEMENT (TAVR) N/A 2/11/2025    Procedure: REPLACEMENT, AORTIC VALVE, TRANSCATHETER (TAVR);  Surgeon: Mark Youngblood MD;  Location: [facility] CATH LAB;  Service: Cardiology;  Laterality: N/A;    TRANSCATHETER AORTIC VALVE REPLACEMENT (TAVR)  2/11/2025    Procedure: REPLACEMENT, AORTIC VALVE, TRANSCATHETER (TAVR);  Surgeon: Best Porter MD;  Location: [facility] CATH LAB;  Service: Cardiothoracic;;    TUBAL LIGATION       Family History   Problem Relation Name Age of Onset    Hypertension Mother Anamaria     Cancer Mother Anamaria     Kidney cancer Mother Anamaria     Arthritis Mother Anamaria     COPD Father Pete     Hypertension Father Pete     Heart disease Sister Luz     Hyperlipidemia Son Kwt     Breast cancer Maternal Grandmother      Colon cancer Neg Hx       Social History[1]  Medication List with Changes/Refills   Current Medications    ALBUTEROL (PROVENTIL) 2.5 MG /3 ML (0.083 %) NEBULIZER SOLUTION    Take 3 mLs (2.5 mg total) by nebulization every 4 (four) hours as needed for Wheezing or Shortness of Breath. Rescue    ALBUTEROL (VENTOLIN HFA) 90 MCG/ACTUATION INHALER    Inhale 2 puffs into the lungs every 6 (six) hours as needed for Wheezing or Shortness of Breath. Rescue    ASPIRIN (ECOTRIN) 81 MG EC TABLET    Take 81 mg by mouth once daily.    BIOTIN ORAL    Take by mouth once daily.    CALCIUM CITRATE-VITAMIN D2 ORAL    Take 1 tablet by mouth once daily.    CELECOXIB (CELEBREX) 200 MG CAPSULE    Take 200 mg by mouth once daily.    DIPHENHYDRAMINE-ACETAMINOPHEN (TYLENOL PM)  MG TAB    Take 2 tablets by mouth every evening.    EZETIMIBE (ZETIA) 10 MG TABLET    Take 1 tablet (10 mg total) by mouth once daily.    FLUTICASONE PROPIONATE (FLONASE) 50 MCG/ACTUATION NASAL SPRAY    USE 2  SPRAYS IN EACH NOSTRIL ONE TIME DAILY    FUROSEMIDE (LASIX) 40 MG TABLET    Take 1 tablet (40 mg total) by mouth once daily.    HYDROCODONE-ACETAMINOPHEN (NORCO)  MG PER TABLET    Take 1 tablet by mouth. One tablet twice a day    INHALATION SPACING DEVICE    Use as directed for inhalation.    ISOSORBIDE MONONITRATE (IMDUR) 30 MG 24 HR TABLET    Take 2 tablets (60 mg total) by mouth once daily. Take 1 tab po in the AM and 1/2 tab po in the PM    LACTULOSE (CHRONULAC) 10 GRAM/15 ML SOLUTION    Take 15 mLs (10 g total) by mouth 2 (two) times daily.    METOPROLOL SUCCINATE (TOPROL-XL) 100 MG 24 HR TABLET    Take 1 tablet (100 mg total) by mouth every evening.    MULTIVITAMIN (MULTIPLE VITAMIN ORAL)    Take 1 tablet by mouth Daily.    NALOXONE (NARCAN) 4 MG/ACTUATION SPRY        NITROGLYCERIN (NITROSTAT) 0.4 MG SL TABLET    DISSOLVE 1 TABLET UNDER THE TONGUE EVERY 5 MINUTES FOR 3 DOSES AS NEEDED FOR CHEST PAIN    PANTOPRAZOLE (PROTONIX) 20 MG TABLET    Take 1 tablet (20 mg total) by mouth 2 (two) times daily before meals.    SOLIFENACIN (VESICARE) 10 MG TABLET    Take 1 tablet (10 mg total) by mouth once daily.    TIOTROPIUM-OLODATEROL (STIOLTO RESPIMAT) 2.5-2.5 MCG/ACTUATION MIST    Inhale 2 puffs into the lungs once daily. Controller    VERAPAMIL (VERELAN) 360 MG C24P    Take 1 capsule (360 mg total) by mouth once daily.     Review of patient's allergies indicates:   Allergen Reactions    Ace inhibitors      Other reaction(s): Hives    Lisinopril      Other reaction(s): rash    Pravastatin      Other reaction(s): Mental Changes    Rosuvastatin Other (See Comments)     Other reaction(s): Muscle pain    Simvastatin Other (See Comments)     Other reaction(s): leg cramps     Review of Systems   Constitutional: Negative for malaise/fatigue.   HENT:  Negative for hearing loss.    Eyes:  Negative for double vision and visual disturbance.   Cardiovascular:  Positive for chest pain.   Respiratory:  Positive for shortness  of breath, sleep disturbances due to breathing and wheezing.    Endocrine: Negative for cold intolerance.   Hematologic/Lymphatic: Does not bruise/bleed easily.   Skin:  Negative for poor wound healing and suspicious lesions.   Musculoskeletal:  Positive for arthritis, joint pain and joint swelling. Negative for gout.   Gastrointestinal:  Positive for heartburn. Negative for nausea and vomiting.   Genitourinary:  Positive for bladder incontinence and urgency. Negative for dysuria.   Neurological:  Positive for loss of balance. Negative for numbness, paresthesias and sensory change.   Psychiatric/Behavioral:  Positive for substance abuse. Negative for depression and memory loss. The patient is not nervous/anxious.    Allergic/Immunologic: Negative for persistent infections.       Objective:   Body mass index is 32.1 kg/m².  There were no vitals filed for this visit.             General    Constitutional: She is oriented to person, place, and time. She appears well-developed and well-nourished. No distress.   HENT:   Head: Normocephalic.   Eyes: EOM are normal.   Pulmonary/Chest: Effort normal.   Neurological: She is oriented to person, place, and time.   Psychiatric: She has a normal mood and affect.         Left Hand/Wrist Exam     Inspection   Scars: Wrist - absent Hand -  absent  Effusion: Wrist - absent Hand -  absent    Pain   Hand - The patient exhibits pain of the middle MCP.    Other     Sensory Exam  Median Distribution: normal  Ulnar Distribution: normal  Radial Distribution: normal    Comments:  There is active triggering left long finger with a palpable nodule that moves with tendon excursion.          Vascular Exam       Capillary Refill  Left Hand: normal capillary refill         radiographs were not obtained today  Assessment:     Encounter Diagnosis   Name Primary?    Trigger finger, acquired Yes    Left long finger    Plan:       The patient is injected left long trigger finger with 0.5 cc Kenalog  and 0.5 cc 2% plain lidocaine under sterile technique.  She will wait at least 3 months between injections.              Disclaimer: This note was prepared using a voice recognition system and is likely to have sound alike errors within the text.          [1]   Social History  Socioeconomic History    Marital status:    Tobacco Use    Smoking status: Former     Current packs/day: 0.00     Average packs/day: 1 pack/day for 48.4 years (48.4 ttl pk-yrs)     Types: Cigarettes     Start date:      Quit date: 2016     Years since quittin.9     Passive exposure: Never    Smokeless tobacco: Never    Tobacco comments:     smoked for 25 yrs. quit 16yrs. then smoked again for 5-6 before quitting in 2016.   Substance and Sexual Activity    Alcohol use: No     Alcohol/week: 1.0 standard drink of alcohol     Types: 1 Standard drinks or equivalent per week    Drug use: No    Sexual activity: Not Currently     Social Drivers of Health     Financial Resource Strain: Low Risk  (2025)    Overall Financial Resource Strain (CARDIA)     Difficulty of Paying Living Expenses: Not hard at all   Food Insecurity: No Food Insecurity (2025)    Hunger Vital Sign     Worried About Running Out of Food in the Last Year: Never true     Ran Out of Food in the Last Year: Never true   Transportation Needs: No Transportation Needs (2025)    PRAPARE - Transportation     Lack of Transportation (Medical): No     Lack of Transportation (Non-Medical): No   Physical Activity: Inactive (2025)    Exercise Vital Sign     Days of Exercise per Week: 0 days     Minutes of Exercise per Session: 0 min   Stress: No Stress Concern Present (2025)    Citizen of [place] [employer] - Occupational Stress Questionnaire     Feeling of Stress : Only a little   Housing Stability: Low Risk  (2025)    Housing Stability Vital Sign     Unable to Pay for Housing in the Last Year: No     Homeless in the Last Year: No

## 2025-05-14 NOTE — PROCEDURES
Tendon Sheath    Date/Time: 5/14/2025 11:30 AM    Performed by: Alejandro Romero MD  Authorized by: Alejandro Romero MD    Consent Done?:  Yes (Verbal)  Indications:  Pain  Timeout: prior to procedure the correct patient, procedure, and site was verified    Prep: patient was prepped and draped in usual sterile fashion      Local anesthesia used?: Yes    Local anesthetic:  Lidocaine 2% without epinephrine  Anesthetic total (ml):  0.5    Location:  Long finger  Site:  L long flexor tendon sheath  Ultrasonic guidance for needle placement?: No    Needle size:  25 G  Approach:  Volar  Medications:  40 mg triamcinolone acetonide 40 mg/mL

## 2025-05-16 ENCOUNTER — HOSPITAL ENCOUNTER (OUTPATIENT)
Dept: PREADMISSION TESTING | Facility: HOSPITAL | Age: 70
Discharge: HOME | End: 2025-05-16
Attending: FAMILY MEDICINE
Payer: MEDICARE

## 2025-05-16 DIAGNOSIS — Z12.11 COLON CANCER SCREENING: Primary | ICD-10-CM

## 2025-05-16 DIAGNOSIS — Z00.00 ENCOUNTER FOR MEDICARE ANNUAL WELLNESS EXAM: ICD-10-CM

## 2025-05-16 RX ORDER — SODIUM, POTASSIUM,MAG SULFATES 17.5-3.13G
1 SOLUTION, RECONSTITUTED, ORAL ORAL DAILY
Qty: 1 KIT | Refills: 0 | Status: SHIPPED | OUTPATIENT
Start: 2025-05-16 | End: 2025-05-18

## 2025-05-20 ENCOUNTER — TELEPHONE (OUTPATIENT)
Dept: PREADMISSION TESTING | Facility: HOSPITAL | Age: 70
End: 2025-05-20
Payer: MEDICARE

## 2025-05-20 NOTE — TELEPHONE ENCOUNTER
----- Message from Tech Logan sent at 5/19/2025  1:14 PM CDT -----  Regarding: reschedule  Patient need to reschedule procedure dateThanks   Scheduled procedure with Patient at    Telephone Information:   Mobile [phone]      Scheduled Via: Case Request Order for  [facility]  Did patient request a different provider then the referred to:No  Procedure date: 04/15/2024   Procedure time: tbd ; Did patient request this specific time? No  -If a MAC pt is scheduled, pt was notified a family member/friend is need to stay with the patient over night after their procedure: Yes            Was letter mailed Yes  Was the letter sent thru [employer]? Yes. If yes was the patient to to look under letters for prep instructions?  Yes  Was patient told to contact us back if not received  Yes    The following have been confirmed:  Was patients demographics verified, (address,phone number email)  Yes  Insurance name confirmed as [place] [employer], will be the same at time of procedure?: Yes Ins Accepted at Facility? Yes    Prep required? Yes, [facility] ; was request sent to nurse to send prep to pharmacy? Yes; Briefly reviewed? Yes;   If procedure is scheduled 7 days or less, patient was told to  prep letter?: Yes

## 2025-06-02 NOTE — PROGRESS NOTES
The lesion removed on recent colonoscopy was completely benign and not a true polyp.  Recommend keeping repeat surveillance colonoscopy in 5 years as previously discussed. Lot # For Kenalog (Optional): [id number] Administered By (Optional): Dr. Shelby How Many Mls Were Removed From The 40 Mg/Ml (1ml) Vial When Preparing The Injectable Solution?: 0 Include Z78.9 (Other Specified Conditions Influencing Health Status) As An Associated Diagnosis?: No Consent: The risks of atrophy were reviewed with the patient. Kenalog Type Of Vial: Multiple Dose Kenalog Preparation: Kenalog Validate Note Data When Using Inventory: Yes Expiration Date For Kenalog (Optional): 09/2027 Total Volume (Ccs): .1 Concentration Of Kenalog Solution Injected (Mg/Ml): 10.0 Ndc# For Kenalog Only: 6965-5682-80 Medical Necessity Clause: This procedure was medically necessary because the lesions that were treated were: Detail Level: Detailed

## 2025-06-11 ENCOUNTER — OFFICE VISIT (OUTPATIENT)
Dept: GASTROENTEROLOGY | Facility: CLINIC | Age: 70
End: 2025-06-11
Payer: MEDICARE

## 2025-06-11 VITALS — WEIGHT: 185.31 LBS | HEIGHT: 65 IN | BODY MASS INDEX: 30.88 KG/M2

## 2025-06-11 DIAGNOSIS — E66.812 CLASS 2 SEVERE OBESITY WITH SERIOUS COMORBIDITY AND BODY MASS INDEX (BMI) OF 36.0 TO 36.9 IN ADULT, UNSPECIFIED OBESITY TYPE: ICD-10-CM

## 2025-06-11 DIAGNOSIS — E66.01 CLASS 2 SEVERE OBESITY WITH SERIOUS COMORBIDITY AND BODY MASS INDEX (BMI) OF 36.0 TO 36.9 IN ADULT, UNSPECIFIED OBESITY TYPE: ICD-10-CM

## 2025-06-11 DIAGNOSIS — K59.09 CHRONIC CONSTIPATION: ICD-10-CM

## 2025-06-11 DIAGNOSIS — K21.9 GASTROESOPHAGEAL REFLUX DISEASE, UNSPECIFIED WHETHER ESOPHAGITIS PRESENT: Primary | ICD-10-CM

## 2025-06-11 DIAGNOSIS — K55.20 AVM (ARTERIOVENOUS MALFORMATION) OF SMALL BOWEL, ACQUIRED: ICD-10-CM

## 2025-06-11 PROCEDURE — 98005 SYNCH AUDIO-VIDEO EST LOW 20: CPT | Mod: 95,,, | Performed by: PHYSICIAN ASSISTANT

## 2025-06-11 PROCEDURE — 1101F PT FALLS ASSESS-DOCD LE1/YR: CPT | Mod: CPTII,95,, | Performed by: PHYSICIAN ASSISTANT

## 2025-06-11 PROCEDURE — 1159F MED LIST DOCD IN RCRD: CPT | Mod: CPTII,95,, | Performed by: PHYSICIAN ASSISTANT

## 2025-06-11 PROCEDURE — 1126F AMNT PAIN NOTED NONE PRSNT: CPT | Mod: CPTII,95,, | Performed by: PHYSICIAN ASSISTANT

## 2025-06-11 PROCEDURE — 3008F BODY MASS INDEX DOCD: CPT | Mod: CPTII,95,, | Performed by: PHYSICIAN ASSISTANT

## 2025-06-11 PROCEDURE — 3288F FALL RISK ASSESSMENT DOCD: CPT | Mod: CPTII,95,, | Performed by: PHYSICIAN ASSISTANT

## 2025-06-11 RX ORDER — PANTOPRAZOLE SODIUM 20 MG/1
20 TABLET, DELAYED RELEASE ORAL
Qty: 180 TABLET | Refills: 3 | Status: SHIPPED | OUTPATIENT
Start: 2025-06-11

## 2025-06-11 NOTE — PROGRESS NOTES
Subjective:      Patient ID: Ro Hernandez is a 69 y.o. female.    Chief Complaint: Annual Exam (PPI refill/)    The patient location is: [place]  The chief complaint leading to consultation is: See above    Visit type: audiovisual    Face to Face time with patient: 6 minutes  10 minutes of total time spent on the encounter, which includes face to face time and non-face to face time preparing to see the patient (eg, review of tests), Obtaining and/or reviewing separately obtained history, Documenting clinical information in the electronic or other health record, Independently interpreting results (not separately reported) and communicating results to the patient/family/caregiver, or Care coordination (not separately reported).         Each patient to whom he or she provides medical services by telemedicine is:  (1) informed of the relationship between the physician and patient and the respective role of any other health care provider with respect to management of the patient; and (2) notified that he or she may decline to receive medical services by telemedicine and may withdraw from such care at any time.    Notes:     HPI  The patient has a history of anemia, SB angioectasia, GERD, belching and chronic constipation. She was last seen January 2024 for refills of her Protonix and Lactulose.   In the interim she had a LHC in December which showed moderate pulmonary hypertension and severe AS, now s/p TAVR 2/2025. Her  also became ill and passed away.     Today she reports doing well. She is requesting a refill of Protonix. She is using Lactulose prn. She denies nausea, vomiting or abdominal pain.     VCE (09/2020): single angioectasia without bleeding in the proximal small bowel.  EGD (09/2020): bilious gastric fluid, scar in gastric antrum, gastric mucosal variant.   Colonoscopy (12/06/19) - 3 mm polyp, diverticulosis, non-bleeding internal hemorrhoids. Repeat 5 years.    Review of Systems  As per HPI.      Objective:     Physical Exam  Constitutional:       General: She is not in acute distress.     Appearance: She is well-developed.   HENT:      Head: Normocephalic and atraumatic.   Pulmonary:      Effort: Pulmonary effort is normal.   Neurological:      Mental Status: She is alert and oriented to person, place, and time.      Cranial Nerves: No cranial nerve deficit.   Psychiatric:         Behavior: Behavior normal.         Assessment:     1. Gastroesophageal reflux disease, unspecified whether esophagitis present    2. Chronic constipation    3. AVM (arteriovenous malformation) of small bowel, acquired    4. Class 2 severe obesity with serious comorbidity and body mass index (BMI) of 36.0 to 36.9 in adult, unspecified obesity type        Plan:     The patient is doing well and PPI refilled.   She is due for a colonoscopy but said she can't do anything until she is six months out from her valve procedure. She is already scheduled with [facility] for August.     Medications Ordered This Encounter   Medications    pantoprazole (PROTONIX) 20 MG tablet     Sig: Take 1 tablet (20 mg total) by mouth 2 (two) times daily before meals.     Dispense:  180 tablet     Refill:  3       Follow up in about 1 year (around 6/11/2026).    Thank you for the opportunity to participate in the care of this patient.   Clarence Barboza PA-C.

## 2025-07-08 DIAGNOSIS — I10 ESSENTIAL HYPERTENSION: ICD-10-CM

## 2025-07-09 RX ORDER — ISOSORBIDE MONONITRATE 30 MG/1
60 TABLET, EXTENDED RELEASE ORAL DAILY
Qty: 135 TABLET | Refills: 3 | Status: SHIPPED | OUTPATIENT
Start: 2025-07-09

## 2025-07-15 DIAGNOSIS — I10 ESSENTIAL HYPERTENSION: ICD-10-CM

## 2025-07-15 RX ORDER — ISOSORBIDE MONONITRATE 30 MG/1
60 TABLET, EXTENDED RELEASE ORAL DAILY
Qty: 135 TABLET | Refills: 3 | Status: CANCELLED | OUTPATIENT
Start: 2025-07-15

## 2025-07-15 NOTE — TELEPHONE ENCOUNTER
Spoke with pharmacy In regards to clarification.         Copied from CRM #[id number]. Topic: General Inquiry - Patient Advice  >> Jul 15, 2025  9:36 AM [place] wrote:  Type:  Patient Requesting Call Back    Who Called:Kristen at [facility]  Does the patient know what this is regarding?:isosorbide mononitrate (IMDUR) 30 MG 24 hr tablet  Would the patient rather a call back or a response via [facility]? Please call back  Best Call Back Number:[phone] and ref# [id number]  Additional Information: pharmacist need a clarification on dosage

## 2025-08-03 DIAGNOSIS — E78.2 MIXED HYPERLIPIDEMIA: ICD-10-CM

## 2025-08-04 NOTE — TELEPHONE ENCOUNTER
Care Due:                  Date            Visit Type   Department     Provider  --------------------------------------------------------------------------------                                HOSPITAL     [facility] INTERNAL  Last Visit: 02-      FOLLOW UP    MEDICINE       Minor Charlton  Next Visit: None Scheduled  None         None Found                                                            Last  Test          Frequency    Reason                     Performed    Due Date  --------------------------------------------------------------------------------    CMP.........  12 months..  ezetimibe................  07- 07-    Lipid Panel.  12 months..  ezetimibe................  04- 03-    [facility] Embedded Care Due Messages. Reference number: [id number].   8/03/2025 9:13:57 PM CDT

## 2025-08-05 NOTE — TELEPHONE ENCOUNTER
Refill Routing Note   Medication(s) are not appropriate for processing by [facility] for the following reason(s):        Required labs outdated    [facility] action(s):  Defer   Requires labs : Yes             Appointments  past 12m or future 3m with PCP    Date Provider   Last Visit   2/21/2025 Minor Charlton MD   Next Visit   Visit date not found Minor Chralton MD   ED visits in past 90 days: 0        Note composed:7:43 PM 08/04/2025

## 2025-08-06 ENCOUNTER — PATIENT OUTREACH (OUTPATIENT)
Dept: ADMINISTRATIVE | Facility: HOSPITAL | Age: 70
End: 2025-08-06
Payer: MEDICARE

## 2025-08-06 DIAGNOSIS — I10 ESSENTIAL HYPERTENSION: ICD-10-CM

## 2025-08-06 DIAGNOSIS — E78.2 MIXED HYPERLIPIDEMIA: Primary | ICD-10-CM

## 2025-08-06 RX ORDER — EZETIMIBE 10 MG/1
10 TABLET ORAL
Qty: 90 TABLET | Refills: 3 | Status: SHIPPED | OUTPATIENT
Start: 2025-08-06

## 2025-08-06 NOTE — Clinical Note
Dr Charlton  Patient currently has HM labs due.  All  labs have been hyper-linked to scheduled appt on 8/8/2025.   If patient needs any other labs would you please place the orders.   Thank you for your support, Leona THAPA [facility] Care Coordination Department [facility]

## 2025-08-06 NOTE — PROGRESS NOTES
Working [facility] [employer] HTN report:   Called patient to obtain remote bp reading - Spoke with patient who states that her bp machine isn't working so she hasn't been monitoring her BP.   Scheduled annual visit with PCP 8/12/2025.  Lipid panel ordered and scheduled 8/8/2025.  Message sent to PCP for additional lab orders.

## 2025-08-08 ENCOUNTER — LAB VISIT (OUTPATIENT)
Dept: LAB | Facility: HOSPITAL | Age: 70
End: 2025-08-08
Attending: FAMILY MEDICINE
Payer: MEDICARE

## 2025-08-08 DIAGNOSIS — E78.2 MIXED HYPERLIPIDEMIA: ICD-10-CM

## 2025-08-08 DIAGNOSIS — I10 ESSENTIAL HYPERTENSION: ICD-10-CM

## 2025-08-08 LAB
CHOLEST SERPL-MCNC: 216 MG/DL (ref 120–199)
CHOLEST/HDLC SERPL: 4.6 {RATIO} (ref 2–5)
HDLC SERPL-MCNC: 47 MG/DL (ref 40–75)
HDLC SERPL: 21.8 % (ref 20–50)
LDLC SERPL CALC-MCNC: 140.8 MG/DL (ref 63–159)
NONHDLC SERPL-MCNC: 169 MG/DL
TRIGL SERPL-MCNC: 141 MG/DL (ref 30–150)

## 2025-08-08 PROCEDURE — 36415 COLL VENOUS BLD VENIPUNCTURE: CPT | Mod: PO

## 2025-08-08 PROCEDURE — 80061 LIPID PANEL: CPT

## 2025-08-11 ENCOUNTER — E-CONSULT (OUTPATIENT)
Dept: CARDIOLOGY | Facility: HOSPITAL | Age: 70
End: 2025-08-11
Payer: MEDICARE

## 2025-08-11 ENCOUNTER — HOSPITAL ENCOUNTER (OUTPATIENT)
Dept: PREADMISSION TESTING | Facility: HOSPITAL | Age: 70
Discharge: HOME | End: 2025-08-11
Attending: INTERNAL MEDICINE
Payer: MEDICARE

## 2025-08-11 DIAGNOSIS — Z01.810 PREOP CARDIOVASCULAR EXAM: Primary | ICD-10-CM

## 2025-08-11 DIAGNOSIS — Z12.11 COLON CANCER SCREENING: Primary | ICD-10-CM

## 2025-08-12 ENCOUNTER — OFFICE VISIT (OUTPATIENT)
Dept: FAMILY MEDICINE | Facility: CLINIC | Age: 70
End: 2025-08-12
Payer: MEDICARE

## 2025-08-12 VITALS
DIASTOLIC BLOOD PRESSURE: 70 MMHG | WEIGHT: 193.13 LBS | OXYGEN SATURATION: 95 % | HEIGHT: 65 IN | BODY MASS INDEX: 32.18 KG/M2 | TEMPERATURE: 97 F | SYSTOLIC BLOOD PRESSURE: 130 MMHG | HEART RATE: 73 BPM

## 2025-08-12 DIAGNOSIS — I10 PRIMARY HYPERTENSION: ICD-10-CM

## 2025-08-12 DIAGNOSIS — G47.33 OSA ON CPAP: Chronic | ICD-10-CM

## 2025-08-12 DIAGNOSIS — Z00.00 ANNUAL PHYSICAL EXAM: Primary | ICD-10-CM

## 2025-08-12 DIAGNOSIS — J43.9 PULMONARY EMPHYSEMA, UNSPECIFIED EMPHYSEMA TYPE: ICD-10-CM

## 2025-08-12 DIAGNOSIS — Z78.9 STATIN INTOLERANCE: ICD-10-CM

## 2025-08-12 DIAGNOSIS — J96.11 CHRONIC RESPIRATORY FAILURE WITH HYPOXIA: ICD-10-CM

## 2025-08-12 DIAGNOSIS — F17.211 CIGARETTE NICOTINE DEPENDENCE IN REMISSION: ICD-10-CM

## 2025-08-12 DIAGNOSIS — E78.2 MIXED HYPERLIPIDEMIA: ICD-10-CM

## 2025-08-12 DIAGNOSIS — I27.20 PULMONARY HYPERTENSION: ICD-10-CM

## 2025-08-12 DIAGNOSIS — K21.9 GASTROESOPHAGEAL REFLUX DISEASE, UNSPECIFIED WHETHER ESOPHAGITIS PRESENT: ICD-10-CM

## 2025-08-12 DIAGNOSIS — Z95.2 HISTORY OF TRANSCATHETER AORTIC VALVE REPLACEMENT (TAVR): ICD-10-CM

## 2025-08-12 DIAGNOSIS — Z63.4 BEREAVEMENT: ICD-10-CM

## 2025-08-12 PROCEDURE — 1101F PT FALLS ASSESS-DOCD LE1/YR: CPT | Mod: CPTII,S$GLB,, | Performed by: FAMILY MEDICINE

## 2025-08-12 PROCEDURE — 3075F SYST BP GE 130 - 139MM HG: CPT | Mod: CPTII,S$GLB,, | Performed by: FAMILY MEDICINE

## 2025-08-12 PROCEDURE — 3288F FALL RISK ASSESSMENT DOCD: CPT | Mod: CPTII,S$GLB,, | Performed by: FAMILY MEDICINE

## 2025-08-12 PROCEDURE — 3078F DIAST BP <80 MM HG: CPT | Mod: CPTII,S$GLB,, | Performed by: FAMILY MEDICINE

## 2025-08-12 PROCEDURE — 3008F BODY MASS INDEX DOCD: CPT | Mod: CPTII,S$GLB,, | Performed by: FAMILY MEDICINE

## 2025-08-12 PROCEDURE — 99397 PER PM REEVAL EST PAT 65+ YR: CPT | Mod: S$GLB,,, | Performed by: FAMILY MEDICINE

## 2025-08-12 PROCEDURE — 1159F MED LIST DOCD IN RCRD: CPT | Mod: CPTII,S$GLB,, | Performed by: FAMILY MEDICINE

## 2025-08-12 PROCEDURE — 99999 PR PBB SHADOW E&M-EST. PATIENT-LVL IV: CPT | Mod: PBBFAC,,, | Performed by: FAMILY MEDICINE

## 2025-08-12 PROCEDURE — 1126F AMNT PAIN NOTED NONE PRSNT: CPT | Mod: CPTII,S$GLB,, | Performed by: FAMILY MEDICINE

## 2025-08-12 RX ORDER — CLINDAMYCIN HYDROCHLORIDE 300 MG/1
300 CAPSULE ORAL EVERY 8 HOURS
COMMUNITY
Start: 2025-08-04 | End: 2025-08-14

## 2025-08-12 SDOH — SOCIAL DETERMINANTS OF HEALTH (SDOH): DISSAPEARANCE AND DEATH OF FAMILY MEMBER: Z63.4

## 2025-08-18 PROBLEM — Z01.810 PREOP CARDIOVASCULAR EXAM: Status: RESOLVED | Noted: 2025-08-11 | Resolved: 2025-08-18

## 2025-08-25 ENCOUNTER — TELEPHONE (OUTPATIENT)
Dept: PREADMISSION TESTING | Facility: HOSPITAL | Age: 70
End: 2025-08-25
Payer: MEDICARE

## 2025-08-29 ENCOUNTER — HOSPITAL ENCOUNTER (OUTPATIENT)
Dept: ENDOSCOPY | Facility: HOSPITAL | Age: 70
Discharge: HOME | End: 2025-08-29
Attending: NURSE PRACTITIONER | Admitting: INTERNAL MEDICINE
Payer: MEDICARE

## 2025-08-29 ENCOUNTER — ANESTHESIA (OUTPATIENT)
Dept: ENDOSCOPY | Facility: HOSPITAL | Age: 70
End: 2025-08-29
Payer: MEDICARE

## 2025-08-29 ENCOUNTER — ANESTHESIA EVENT (OUTPATIENT)
Dept: ENDOSCOPY | Facility: HOSPITAL | Age: 70
End: 2025-08-29
Payer: MEDICARE

## 2025-08-29 PROCEDURE — 63600175 PHARM REV CODE 636 W HCPCS: Performed by: ANESTHESIOLOGY

## 2025-08-29 RX ORDER — LIDOCAINE HYDROCHLORIDE 10 MG/ML
INJECTION, SOLUTION EPIDURAL; INFILTRATION; INTRACAUDAL; PERINEURAL
Status: DISCONTINUED | OUTPATIENT
Start: 2025-08-29 | End: 2025-08-29

## 2025-08-29 RX ORDER — PROPOFOL 10 MG/ML
VIAL (ML) INTRAVENOUS
Status: DISCONTINUED | OUTPATIENT
Start: 2025-08-29 | End: 2025-08-29

## 2025-08-29 RX ADMIN — PROPOFOL 50 MG: 10 INJECTION, EMULSION INTRAVENOUS at 11:08

## 2025-08-29 RX ADMIN — LIDOCAINE HYDROCHLORIDE 100 MG: 10 INJECTION, SOLUTION EPIDURAL; INFILTRATION; INTRACAUDAL; PERINEURAL at 11:08

## (undated) DEVICE — KIT CUSTOM MANIFOLD

## (undated) DEVICE — SHEATH PINNACLE 8FR

## (undated) DEVICE — KIT GLIDESHEATH SLEND 6FR 10CM

## (undated) DEVICE — CATH CV QD LUMN 6FRX110CM

## (undated) DEVICE — COVER TABLE 44X90 STERILE

## (undated) DEVICE — OMNIPAQUE CONTRAST 350MG/100ML

## (undated) DEVICE — SYR MARK 7 ARTERION 150ML

## (undated) DEVICE — CATH TEMP PACER 5.0FR

## (undated) DEVICE — GUIDEWIRE EMERALD 150CM PTFE

## (undated) DEVICE — BAND TR COMP DEVICE REG 24CM

## (undated) DEVICE — OMNIPAQUE 300MG 150ML VIAL

## (undated) DEVICE — INFLATOR ENCORE 26 BLLN INFL

## (undated) DEVICE — GUIDEWIRE X SPORT .014IN 190CM

## (undated) DEVICE — CATH IMPULSE FR4 5FR 125CM

## (undated) DEVICE — TRAY CATH LAB OMC

## (undated) DEVICE — PAD DEFIB CADENCE ADULT R2

## (undated) DEVICE — KIT MICROINTRO 4F .018X40X7CM

## (undated) DEVICE — CATH JR4 5FR

## (undated) DEVICE — DRAPE ANGIO BRACH 38X44IN

## (undated) DEVICE — TUBING PRSS MON M/M LL 72IN

## (undated) DEVICE — KIT SYR REUSABLE

## (undated) DEVICE — GUIDEWIRE STF .035X260CM STR

## (undated) DEVICE — CATH MP 4FR 125CM

## (undated) DEVICE — Device

## (undated) DEVICE — CATH DXTERITY AL20 100CM 6FR

## (undated) DEVICE — WIRE GUIDE TEFLON 3CM .035 145

## (undated) DEVICE — SHEATH GUIDE R2P 6FR 119CM STR

## (undated) DEVICE — CABLE PACING ALLGTR CLIP 12FT

## (undated) DEVICE — KIT CO-PILOT

## (undated) DEVICE — DRAPE FEMORAL 82 X 124 IN

## (undated) DEVICE — CONTRAST FLEXCIL INJECTION

## (undated) DEVICE — CATH IMPULSE PIGTAIL 6F 110CM

## (undated) DEVICE — STOPCOCK 3-WAY

## (undated) DEVICE — BLLN LOMA VISTA TRUE 22MM

## (undated) DEVICE — CATH MPA2 INFINITI 4FR 100CM

## (undated) DEVICE — CATH GUIDE LAUNCH MB1 6F 100CM

## (undated) DEVICE — KIT MNTR POLE MT DUL 12&48 MAC

## (undated) DEVICE — CATH PIG145 INFINITI 5X110CM

## (undated) DEVICE — PACK HEART CATH BR

## (undated) DEVICE — CATH INFINITI MULTIPAK JR4 5FR

## (undated) DEVICE — GUIDEWIRE SUPRA CORE 035 190CM

## (undated) DEVICE — SHEATH INTRODUCER 6FR 11CM

## (undated) DEVICE — GUIDEWIRE STF .035X180CM ANG

## (undated) DEVICE — SPIKE SHORT LG BORE 1-WAY 2IN

## (undated) DEVICE — KIT CNTRL LINE DRSNG CHNG SLA

## (undated) DEVICE — ANGIOTOUCH KIT

## (undated) DEVICE — COVER INSTR ELASTIC BAND 40X20

## (undated) DEVICE — CABLE PACER

## (undated) DEVICE — SUT PERCLOSE PROSTYLE MEDIATE

## (undated) DEVICE — CATH JL4 5FR

## (undated) DEVICE — GUIDEWIRE CONFIDA BECKER CURVE

## (undated) DEVICE — FLEXSHEATH DRYSEAL 14FR 33CM

## (undated) DEVICE — GUIDEWIRE SUPRA CORE 035 300CM

## (undated) DEVICE — GUIDEWIRE WHOLEY HI TORQ 175CM

## (undated) DEVICE — COVER BAND BAG 40 X 40

## (undated) DEVICE — KIT MANIFOLD LOW PRESS TUBING

## (undated) DEVICE — HEMOSTAT VASC BAND REG 24CM

## (undated) DEVICE — KIT PERCUTANEOUS SHEATH

## (undated) DEVICE — CLAMP TOWEL EASY RELEASE TAB